# Patient Record
Sex: MALE | Race: WHITE | Employment: OTHER | URBAN - METROPOLITAN AREA
[De-identification: names, ages, dates, MRNs, and addresses within clinical notes are randomized per-mention and may not be internally consistent; named-entity substitution may affect disease eponyms.]

---

## 2018-03-11 ENCOUNTER — NURSE TRIAGE (OUTPATIENT)
Dept: NURSING | Facility: CLINIC | Age: 68
End: 2018-03-11

## 2018-03-11 NOTE — TELEPHONE ENCOUNTER
"  Reason for Disposition    [1] Redness spreading into foot or red streak into foot AND [2] no fever     Wife calling\" My  was in the snow last Wed. 3/7 helping a car that was stuck. The he noticed his big toe on the right foot was red. But it didn't look like frost bite(more like gout). Now it's red, swollen and painful.\" Denies fever, but the whole foot looks \"like there's a red hue\" to it. Triaged and advised UC.    Additional Information    Negative: Followed a toe injury    Negative: Wound looks infected    Negative: Caused by an animal bite    Negative: Caused by frostbite    Negative: Caused by an ingrown toenail    Negative: Athlete's Foot suspected (i.e., itchy red rash in web space between toes)    Negative: Foot pain is main symptom    Negative: Foot is cool or blue in comparison to other foot    Negative: Purple or black skin on toe  (Exception: simple recalled injury with bruise)    Negative: [1] Looks infected (e.g., spreading redness, red streak, pus) AND [2] fever    Negative: Patient sounds very sick or weak to the triager    Negative: [1] SEVERE pain (e.g., excruciating, unable to do any normal activities) AND [2] not improved after 2 hours of pain medicine    Protocols used: TOE PAIN-ADULT-    "

## 2020-10-10 ENCOUNTER — NURSE TRIAGE (OUTPATIENT)
Dept: NURSING | Facility: CLINIC | Age: 70
End: 2020-10-10

## 2020-10-10 NOTE — TELEPHONE ENCOUNTER
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  He felt a rash started to develop on his left forehead after doing this yard work  Today the top of left eye is swelling, redness and sensitive in spots   Patient wonders if he has an insect stinger in his eyebrow  Vision is fine, no fever  Gave disposition to be seen in the [facility] urgent care today  Patient was agreeable and will go this morning  Rae Dow RN  [facility] Nurse Advisor          Additional Information    Negative: [1] SEVERE eyelid swelling (i.e., shut or almost) AND [2] fever    Negative: [1] Eyelid (outer) is very red AND [2] fever    MODERATE-SEVERE eyelid swelling on one side  (Exception: due to a mosquito bite)    Negative: Patient sounds very sick or weak to the triager    Negative: [1] Pregnant > 20 weeks AND [2] sudden weight gain (i.e., more than 3 lbs or 1.4 kg in one week)    Negative: [1] SEVERE eyelid swelling (i.e., shut or almost) AND [2] involves both eyes      (Exception: itchy eyes, which  are probably an allergic reaction)    Negative: [1] SEVERE eyelid swelling on one side AND [2] red and painful (or tender to touch)    Negative: [1] SEVERE eyelid swelling on one side AND [2] sinus pain or pressure    Negative: [1] MILD swelling AND [2] fever    Protocols used: EYE - SWELLING-A-AH

## 2021-07-12 ENCOUNTER — APPOINTMENT (OUTPATIENT)
Dept: CARDIOLOGY | Facility: CLINIC | Age: 71
DRG: 551 | End: 2021-07-12
Attending: INTERNAL MEDICINE
Payer: COMMERCIAL

## 2021-07-12 ENCOUNTER — APPOINTMENT (OUTPATIENT)
Dept: MRI IMAGING | Facility: CLINIC | Age: 71
DRG: 551 | End: 2021-07-12
Attending: HOSPITALIST
Payer: COMMERCIAL

## 2021-07-12 ENCOUNTER — HOSPITAL ENCOUNTER (INPATIENT)
Facility: CLINIC | Age: 71
LOS: 2 days | Discharge: ANOTHER HOSPITAL | DRG: 551 | End: 2021-07-14
Attending: EMERGENCY MEDICINE | Admitting: INTERNAL MEDICINE
Payer: COMMERCIAL

## 2021-07-12 ENCOUNTER — APPOINTMENT (OUTPATIENT)
Dept: CT IMAGING | Facility: CLINIC | Age: 71
DRG: 551 | End: 2021-07-12
Attending: EMERGENCY MEDICINE
Payer: COMMERCIAL

## 2021-07-12 ENCOUNTER — APPOINTMENT (OUTPATIENT)
Dept: GENERAL RADIOLOGY | Facility: CLINIC | Age: 71
DRG: 551 | End: 2021-07-12
Attending: EMERGENCY MEDICINE
Payer: COMMERCIAL

## 2021-07-12 DIAGNOSIS — R29.898 BILATERAL ARM WEAKNESS: ICD-10-CM

## 2021-07-12 DIAGNOSIS — J18.9 PNEUMONIA DUE TO INFECTIOUS ORGANISM, UNSPECIFIED LATERALITY, UNSPECIFIED PART OF LUNG: ICD-10-CM

## 2021-07-12 DIAGNOSIS — R60.0 LOWER EXTREMITY EDEMA: ICD-10-CM

## 2021-07-12 DIAGNOSIS — R09.02 HYPOXIA: ICD-10-CM

## 2021-07-12 DIAGNOSIS — R06.02 SHORTNESS OF BREATH: ICD-10-CM

## 2021-07-12 DIAGNOSIS — M48.02 CERVICAL STENOSIS OF SPINAL CANAL: Primary | ICD-10-CM

## 2021-07-12 LAB
ABO/RH(D): NORMAL
ALBUMIN SERPL-MCNC: 3.4 G/DL (ref 3.4–5)
ALBUMIN UR-MCNC: 50 MG/DL
ALP SERPL-CCNC: 89 U/L (ref 40–150)
ALT SERPL W P-5'-P-CCNC: 30 U/L (ref 0–70)
ANION GAP SERPL CALCULATED.3IONS-SCNC: 3 MMOL/L (ref 3–14)
ANTIBODY SCREEN: NEGATIVE
APPEARANCE UR: CLEAR
AST SERPL W P-5'-P-CCNC: 33 U/L (ref 0–45)
BASOPHILS # BLD AUTO: 0.1 10E3/UL (ref 0–0.2)
BASOPHILS NFR BLD AUTO: 1 %
BILIRUB SERPL-MCNC: 0.4 MG/DL (ref 0.2–1.3)
BILIRUB UR QL STRIP: NEGATIVE
BUN SERPL-MCNC: 22 MG/DL (ref 7–30)
CALCIUM SERPL-MCNC: 8.9 MG/DL (ref 8.5–10.1)
CHLORIDE BLD-SCNC: 114 MMOL/L (ref 94–109)
CK SERPL-CCNC: 168 U/L (ref 30–300)
CO2 SERPL-SCNC: 25 MMOL/L (ref 20–32)
COLOR UR AUTO: ABNORMAL
CREAT BLD-MCNC: 1.5 MG/DL (ref 0.7–1.3)
CREAT SERPL-MCNC: 1.37 MG/DL (ref 0.66–1.25)
EOSINOPHIL # BLD AUTO: 0.1 10E3/UL (ref 0–0.7)
EOSINOPHIL NFR BLD AUTO: 1 %
ERYTHROCYTE [DISTWIDTH] IN BLOOD BY AUTOMATED COUNT: 11.8 % (ref 10–15)
GFR SERPL CREATININE-BSD FRML MDRD: 46 ML/MIN/1.73M2
GFR SERPL CREATININE-BSD FRML MDRD: 52 ML/MIN/1.73M2
GLUCOSE BLD-MCNC: 139 MG/DL (ref 70–99)
GLUCOSE UR STRIP-MCNC: NEGATIVE MG/DL
HCO3 BLDV-SCNC: 26 MMOL/L (ref 21–28)
HCT VFR BLD AUTO: 43.1 % (ref 40–53)
HGB BLD-MCNC: 14.7 G/DL (ref 13.3–17.7)
HGB UR QL STRIP: ABNORMAL
HOLD SPECIMEN: NORMAL
IMM GRANULOCYTES # BLD: 0.1 10E3/UL
IMM GRANULOCYTES NFR BLD: 1 %
INTERPRETATION ECG - MUSE: NORMAL
KETONES UR STRIP-MCNC: NEGATIVE MG/DL
LACTATE BLD-SCNC: 0.6 MMOL/L
LEUKOCYTE ESTERASE UR QL STRIP: NEGATIVE
LIPASE SERPL-CCNC: 133 U/L (ref 73–393)
LVEF ECHO: NORMAL
LYMPHOCYTES # BLD AUTO: 1.4 10E3/UL (ref 0.8–5.3)
LYMPHOCYTES NFR BLD AUTO: 19 %
MAGNESIUM SERPL-MCNC: 2.3 MG/DL (ref 1.6–2.3)
MCH RBC QN AUTO: 34.2 PG (ref 26.5–33)
MCHC RBC AUTO-ENTMCNC: 34.1 G/DL (ref 31.5–36.5)
MCV RBC AUTO: 100 FL (ref 78–100)
MONOCYTES # BLD AUTO: 0.3 10E3/UL (ref 0–1.3)
MONOCYTES NFR BLD AUTO: 4 %
MUCOUS THREADS #/AREA URNS LPF: PRESENT /LPF
NEUTROPHILS # BLD AUTO: 5.2 10E3/UL (ref 1.6–8.3)
NEUTROPHILS NFR BLD AUTO: 74 %
NITRATE UR QL: NEGATIVE
NRBC # BLD AUTO: 0 10E3/UL
NRBC BLD AUTO-RTO: 0 /100
NT-PROBNP SERPL-MCNC: 71 PG/ML (ref 0–900)
PCO2 BLDV: 58 MM HG (ref 40–50)
PH BLDV: 7.26 [PH] (ref 7.32–7.43)
PH UR STRIP: 5.5 [PH] (ref 5–7)
PLATELET # BLD AUTO: 102 10E3/UL (ref 150–450)
PO2 BLDV: 74 MM HG (ref 25–47)
POTASSIUM BLD-SCNC: 4.4 MMOL/L (ref 3.4–5.3)
PROT SERPL-MCNC: 7.6 G/DL (ref 6.8–8.8)
RBC # BLD AUTO: 4.3 10E6/UL (ref 4.4–5.9)
RBC URINE: 9 /HPF
SAO2 % BLDV: 92 % (ref 94–100)
SARS-COV-2 RNA RESP QL NAA+PROBE: NEGATIVE
SODIUM SERPL-SCNC: 142 MMOL/L (ref 133–144)
SP GR UR STRIP: 1.02 (ref 1–1.03)
SPECIMEN EXPIRATION DATE: NORMAL
SQUAMOUS EPITHELIAL: <1 /HPF
TROPONIN I SERPL-MCNC: <0.015 UG/L (ref 0–0.04)
TSH SERPL DL<=0.005 MIU/L-ACNC: 2.68 MU/L (ref 0.4–4)
UROBILINOGEN UR STRIP-MCNC: NORMAL MG/DL
WBC # BLD AUTO: 7.1 10E3/UL (ref 4–11)
WBC URINE: 0 /HPF

## 2021-07-12 PROCEDURE — 96375 TX/PRO/DX INJ NEW DRUG ADDON: CPT

## 2021-07-12 PROCEDURE — C9803 HOPD COVID-19 SPEC COLLECT: HCPCS

## 2021-07-12 PROCEDURE — 84443 ASSAY THYROID STIM HORMONE: CPT | Performed by: EMERGENCY MEDICINE

## 2021-07-12 PROCEDURE — 96361 HYDRATE IV INFUSION ADD-ON: CPT

## 2021-07-12 PROCEDURE — 84484 ASSAY OF TROPONIN QUANT: CPT | Performed by: EMERGENCY MEDICINE

## 2021-07-12 PROCEDURE — A9585 GADOBUTROL INJECTION: HCPCS | Performed by: INTERNAL MEDICINE

## 2021-07-12 PROCEDURE — 93306 TTE W/DOPPLER COMPLETE: CPT | Mod: 26 | Performed by: INTERNAL MEDICINE

## 2021-07-12 PROCEDURE — 96360 HYDRATION IV INFUSION INIT: CPT | Mod: 59

## 2021-07-12 PROCEDURE — 96365 THER/PROPH/DIAG IV INF INIT: CPT | Mod: 59

## 2021-07-12 PROCEDURE — 70553 MRI BRAIN STEM W/O & W/DYE: CPT

## 2021-07-12 PROCEDURE — 71275 CT ANGIOGRAPHY CHEST: CPT

## 2021-07-12 PROCEDURE — 255N000002 HC RX 255 OP 636: Performed by: INTERNAL MEDICINE

## 2021-07-12 PROCEDURE — 258N000003 HC RX IP 258 OP 636: Performed by: EMERGENCY MEDICINE

## 2021-07-12 PROCEDURE — 82565 ASSAY OF CREATININE: CPT

## 2021-07-12 PROCEDURE — 99291 CRITICAL CARE FIRST HOUR: CPT | Mod: 25

## 2021-07-12 PROCEDURE — 250N000011 HC RX IP 250 OP 636: Performed by: INTERNAL MEDICINE

## 2021-07-12 PROCEDURE — 258N000003 HC RX IP 258 OP 636: Performed by: PSYCHIATRY & NEUROLOGY

## 2021-07-12 PROCEDURE — 72156 MRI NECK SPINE W/O & W/DYE: CPT

## 2021-07-12 PROCEDURE — 99223 1ST HOSP IP/OBS HIGH 75: CPT | Mod: AI | Performed by: INTERNAL MEDICINE

## 2021-07-12 PROCEDURE — 72125 CT NECK SPINE W/O DYE: CPT

## 2021-07-12 PROCEDURE — 250N000011 HC RX IP 250 OP 636: Performed by: EMERGENCY MEDICINE

## 2021-07-12 PROCEDURE — 71045 X-RAY EXAM CHEST 1 VIEW: CPT

## 2021-07-12 PROCEDURE — 70450 CT HEAD/BRAIN W/O DYE: CPT

## 2021-07-12 PROCEDURE — 83880 ASSAY OF NATRIURETIC PEPTIDE: CPT | Performed by: EMERGENCY MEDICINE

## 2021-07-12 PROCEDURE — 250N000011 HC RX IP 250 OP 636: Performed by: PSYCHIATRY & NEUROLOGY

## 2021-07-12 PROCEDURE — 258N000003 HC RX IP 258 OP 636: Performed by: INTERNAL MEDICINE

## 2021-07-12 PROCEDURE — 93005 ELECTROCARDIOGRAM TRACING: CPT

## 2021-07-12 PROCEDURE — 86900 BLOOD TYPING SEROLOGIC ABO: CPT | Performed by: EMERGENCY MEDICINE

## 2021-07-12 PROCEDURE — 120N000001 HC R&B MED SURG/OB

## 2021-07-12 PROCEDURE — 99292 CRITICAL CARE ADDL 30 MIN: CPT

## 2021-07-12 PROCEDURE — 83735 ASSAY OF MAGNESIUM: CPT | Performed by: EMERGENCY MEDICINE

## 2021-07-12 PROCEDURE — 87635 SARS-COV-2 COVID-19 AMP PRB: CPT | Performed by: EMERGENCY MEDICINE

## 2021-07-12 PROCEDURE — 82550 ASSAY OF CK (CPK): CPT | Performed by: EMERGENCY MEDICINE

## 2021-07-12 PROCEDURE — 82803 BLOOD GASES ANY COMBINATION: CPT

## 2021-07-12 PROCEDURE — 81003 URINALYSIS AUTO W/O SCOPE: CPT | Performed by: EMERGENCY MEDICINE

## 2021-07-12 PROCEDURE — 36592 COLLECT BLOOD FROM PICC: CPT | Performed by: EMERGENCY MEDICINE

## 2021-07-12 PROCEDURE — 80053 COMPREHEN METABOLIC PANEL: CPT | Performed by: EMERGENCY MEDICINE

## 2021-07-12 PROCEDURE — 83690 ASSAY OF LIPASE: CPT | Performed by: EMERGENCY MEDICINE

## 2021-07-12 PROCEDURE — 85025 COMPLETE CBC W/AUTO DIFF WBC: CPT | Performed by: EMERGENCY MEDICINE

## 2021-07-12 PROCEDURE — G0463 HOSPITAL OUTPT CLINIC VISIT: HCPCS | Performed by: PHYSICIAN ASSISTANT

## 2021-07-12 PROCEDURE — 87040 BLOOD CULTURE FOR BACTERIA: CPT | Performed by: EMERGENCY MEDICINE

## 2021-07-12 PROCEDURE — 999N000208 ECHOCARDIOGRAM COMPLETE

## 2021-07-12 PROCEDURE — 74176 CT ABD & PELVIS W/O CONTRAST: CPT

## 2021-07-12 RX ORDER — ONDANSETRON 4 MG/1
4 TABLET, ORALLY DISINTEGRATING ORAL EVERY 6 HOURS PRN
Status: DISCONTINUED | OUTPATIENT
Start: 2021-07-12 | End: 2021-07-14 | Stop reason: HOSPADM

## 2021-07-12 RX ORDER — ACETAMINOPHEN 325 MG/1
650 TABLET ORAL EVERY 6 HOURS PRN
Status: DISCONTINUED | OUTPATIENT
Start: 2021-07-12 | End: 2021-07-14 | Stop reason: HOSPADM

## 2021-07-12 RX ORDER — AMOXICILLIN 250 MG
1 CAPSULE ORAL 2 TIMES DAILY PRN
Status: DISCONTINUED | OUTPATIENT
Start: 2021-07-12 | End: 2021-07-14 | Stop reason: HOSPADM

## 2021-07-12 RX ORDER — CEFTRIAXONE 2 G/1
2 INJECTION, POWDER, FOR SOLUTION INTRAMUSCULAR; INTRAVENOUS EVERY 24 HOURS
Status: DISCONTINUED | OUTPATIENT
Start: 2021-07-12 | End: 2021-07-14 | Stop reason: HOSPADM

## 2021-07-12 RX ORDER — GADOBUTROL 604.72 MG/ML
15 INJECTION INTRAVENOUS ONCE
Status: COMPLETED | OUTPATIENT
Start: 2021-07-12 | End: 2021-07-12

## 2021-07-12 RX ORDER — AZITHROMYCIN 500 MG/5ML
500 INJECTION, POWDER, LYOPHILIZED, FOR SOLUTION INTRAVENOUS ONCE
Status: COMPLETED | OUTPATIENT
Start: 2021-07-12 | End: 2021-07-12

## 2021-07-12 RX ORDER — ONDANSETRON 2 MG/ML
4 INJECTION INTRAMUSCULAR; INTRAVENOUS EVERY 30 MIN PRN
Status: DISCONTINUED | OUTPATIENT
Start: 2021-07-12 | End: 2021-07-12

## 2021-07-12 RX ORDER — ONDANSETRON 2 MG/ML
4 INJECTION INTRAMUSCULAR; INTRAVENOUS EVERY 6 HOURS PRN
Status: DISCONTINUED | OUTPATIENT
Start: 2021-07-12 | End: 2021-07-14 | Stop reason: HOSPADM

## 2021-07-12 RX ORDER — AMOXICILLIN 250 MG
2 CAPSULE ORAL 2 TIMES DAILY PRN
Status: DISCONTINUED | OUTPATIENT
Start: 2021-07-12 | End: 2021-07-14 | Stop reason: HOSPADM

## 2021-07-12 RX ORDER — AZITHROMYCIN 250 MG/1
250 TABLET, FILM COATED ORAL DAILY
Status: DISCONTINUED | OUTPATIENT
Start: 2021-07-13 | End: 2021-07-14 | Stop reason: HOSPADM

## 2021-07-12 RX ORDER — ACETAMINOPHEN 650 MG/1
650 SUPPOSITORY RECTAL EVERY 6 HOURS PRN
Status: DISCONTINUED | OUTPATIENT
Start: 2021-07-12 | End: 2021-07-14 | Stop reason: HOSPADM

## 2021-07-12 RX ORDER — LIDOCAINE 40 MG/G
CREAM TOPICAL
Status: DISCONTINUED | OUTPATIENT
Start: 2021-07-12 | End: 2021-07-14 | Stop reason: HOSPADM

## 2021-07-12 RX ORDER — IOPAMIDOL 755 MG/ML
500 INJECTION, SOLUTION INTRAVASCULAR ONCE
Status: COMPLETED | OUTPATIENT
Start: 2021-07-12 | End: 2021-07-12

## 2021-07-12 RX ADMIN — AZITHROMYCIN MONOHYDRATE 500 MG: 500 INJECTION, POWDER, LYOPHILIZED, FOR SOLUTION INTRAVENOUS at 06:55

## 2021-07-12 RX ADMIN — CEFTRIAXONE 2 G: 2 INJECTION, POWDER, FOR SOLUTION INTRAMUSCULAR; INTRAVENOUS at 08:55

## 2021-07-12 RX ADMIN — IOPAMIDOL 90 ML: 755 INJECTION, SOLUTION INTRAVENOUS at 00:48

## 2021-07-12 RX ADMIN — TAZOBACTAM SODIUM AND PIPERACILLIN SODIUM 4.5 G: 500; 4 INJECTION, SOLUTION INTRAVENOUS at 02:02

## 2021-07-12 RX ADMIN — GADOBUTROL 12 ML: 604.72 INJECTION INTRAVENOUS at 17:23

## 2021-07-12 RX ADMIN — HUMAN ALBUMIN MICROSPHERES AND PERFLUTREN 3 ML: 10; .22 INJECTION, SOLUTION INTRAVENOUS at 14:22

## 2021-07-12 RX ADMIN — SODIUM CHLORIDE 86 ML: 9 INJECTION, SOLUTION INTRAVENOUS at 00:48

## 2021-07-12 RX ADMIN — SODIUM CHLORIDE 500 MG: 9 INJECTION, SOLUTION INTRAVENOUS at 20:46

## 2021-07-12 RX ADMIN — ONDANSETRON 4 MG: 2 INJECTION INTRAMUSCULAR; INTRAVENOUS at 02:01

## 2021-07-12 ASSESSMENT — ACTIVITIES OF DAILY LIVING (ADL)
PATIENT_/_FAMILY_COMMUNICATION_STYLE: SPOKEN LANGUAGE (ENGLISH OR BILINGUAL)
ADLS_ACUITY_SCORE: 21
ADLS_ACUITY_SCORE: 22
ADLS_ACUITY_SCORE: 22
HEARING_DIFFICULTY_OR_DEAF: NO
ADLS_ACUITY_SCORE: 21

## 2021-07-12 ASSESSMENT — MIFFLIN-ST. JEOR: SCORE: 1907.49

## 2021-07-12 ASSESSMENT — ENCOUNTER SYMPTOMS
WEAKNESS: 1
ARTHRALGIAS: 1
ABDOMINAL PAIN: 0
CONFUSION: 1
COUGH: 0
DIAPHORESIS: 1
FEVER: 0
SHORTNESS OF BREATH: 1
NUMBNESS: 1

## 2021-07-12 NOTE — CONSULTS
"[facility]    Neurosurgery Consultation     Date of Admission:  7/12/2021  Date of Consult (When I saw the patient): 07/12/21    Assessment & Plan   Rigo Johns is a 70 year old male with history of C5-7 anterior fusion in 1993 in [place] presented to ED on 7/12/21 with acute onset of neck pain and BL shoulder pain with associated BUE weakness and LLE weakness.    PLAN  Obtain c spine MRI w/o contrast. Scheduled for 1430.  Determine plans to follow    I have discussed the following assessment and plan with Dr. Hanley who is in agreement with the initial plan and will follow up with further consultation recommendations.    Code Status    Full Code    Reason for Consult   Reason for consult: I was asked by Dr. Hammond to evaluate this patient for neck pain and BUE weakness.    Primary Care Physician   [facility]    Chief Complaint   BUE weakness    History is obtained from the patient    History of Present Illness   Rigo Johns is a 70 year old male with history of C5-7 fusion in 1993 in [place] presented to ED on 7/12/21 with acute onset of neck pain and BL shoulder pain with associated BUE weakness and LLE weakness. Rigo is historian and reliable. Rigo was sitting at his desk with neck hyperflexed looking at paper work for approximately 2 hours when he noted acute neck pain that radiated into right shoulder. After several hours, he noted pain radiated into both shoulders and deltoids and had weakness in both arms. Currently, pain is still in neck and bilateral shoulders. Weakness initially was primarily in right arm and is now mostly in LUE. He admits he can no longer lift his left arm off the bed. He admits to \"tingles\" in his 3rd-5th digits in his right hand. Denies tingles, numbness in his left upper extremity. Admits to slight weakness of his left leg as well. Denies paresthesias in legs, saddle anesthesia, bowel/bladder changes. No known " trauma or fall prior to pain and weakness starting. No known infectious s/s.     EXAM: CT CERVICAL SPINE W/O CONTRAST  LOCATION: [facility]  DATE/TIME: 7/12/2021 2:27 AM     INDICATION: Cervical radiculopathy, prior C-spine surgery  Cervical radiculopathy, infection suspected  COMPARISON: None.  TECHNIQUE: Routine CT Cervical Spine without IV contrast. Multiplanar reformats. Dose reduction techniques were used.     FINDINGS:  VERTEBRA: Straightening of usual cervical lordosis. There is solid fusion of C5-C7. No fracture or posttraumatic subluxation.      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 Moderate foraminal narrowing bilaterally at C3-4, on the right C4-5, bilaterally at C5-6. There is at least moderate central canal stenosis at C4-5 and C3-4.     PARASPINAL: No extraspinal abnormality.                                                                      IMPRESSION:  1.  No evidence for acute fracture or subluxation.  2.  Straightening of the usual cervical lordosis with solid fusion from C5 through C7.  3.  The spinal canal is narrow on a congenital basis pedicles and there is at least moderate central canal stenosis at C3-4 and C4-5.  4.  Multilevel neural foraminal narrowing as described.    Past Medical History   I have reviewed this patient's medical history and updated it with pertinent information if needed.   No past medical history on file.    Past Surgical History   I have reviewed this patient's surgical history and updated it with pertinent information if needed.  No past surgical history on file.    Prior to Admission Medications   Prior to Admission Medications   Prescriptions Last Dose Informant Patient Reported? Taking?   UNKNOWN TO PATIENT   Yes Yes   Sig: Patient takes some unknown supplements-will try to clarify.      Facility-Administered Medications: None     Allergies   Allergies   Allergen Reactions     Shrimp GI Disturbance  "      Social History   I have reviewed this patient's social history and updated it with pertinent information if needed. Rigo Johns      Family History   I have reviewed this patient's family history and updated it with pertinent information if needed.   No family history on file.    Review of Systems    ROS: 10 point ROS neg other than the symptoms noted above in the HPI.    Physical Exam   Temp: 97.8  F (36.6  C) Temp src: Oral BP: 135/70 Pulse: 87   Resp: 18 SpO2: 97 % O2 Device: None (Room air) Oxygen Delivery: 1 LPM  Vital Signs with Ranges  Temp:  [97.5  F (36.4  C)-97.8  F (36.6  C)] 97.8  F (36.6  C)  Pulse:  [57-87] 87  Resp:  [0-28] 18  BP: (131-162)/(70-94) 135/70  SpO2:  [93 %-98 %] 97 %  249 lbs 3.19 oz     , Blood pressure 135/70, pulse 87, temperature 97.8  F (36.6  C), temperature source Oral, resp. rate 18, height 5' 11\" (1.803 m), weight 249 lb 3.2 oz (113 kg), SpO2 97 %.  249 lbs 3.19 oz    NEUROLOGICAL EXAMINATION:   Awake, alert, appropriate, NAD  PERRL  Full EOMs  Symmetric facies  Tongue midline   +TTP paraspinous lower cervical region   No midline TTP along cervical spine     RUE shoulder flexion 3/5, triceps 4/5, biceps 3/5, wrist 4/5, grasp 4/5  LUE shoulder flexion 2/5, triceps 2/5, biceps 2/5, wrist 4/5, grasp 4/5    RLE 5/5 throughout   LLE hip 3/5, knee 3/5, PF/DF 5/5    Data   All new lab and imaging data was personally reviewed by me.  CT:  EXAM: CT CERVICAL SPINE W/O CONTRAST  LOCATION: [facility]  DATE/TIME: 7/12/2021 2:27 AM     INDICATION: Cervical radiculopathy, prior C-spine surgery  Cervical radiculopathy, infection suspected  COMPARISON: None.  TECHNIQUE: Routine CT Cervical Spine without IV contrast. Multiplanar reformats. Dose reduction techniques were used.     FINDINGS:  VERTEBRA: Straightening of usual cervical lordosis. There is solid fusion of C5-C7. No fracture or posttraumatic subluxation.      CANAL/FORAMINA: The spinal canal is narrow on a " congenital basis due to short pedicles. Moderate foraminal narrowing bilaterally at C3-4, on the right C4-5, bilaterally at C5-6. There is at least moderate central canal stenosis at C4-5 and C3-4.     PARASPINAL: No extraspinal abnormality.                                                                      IMPRESSION:  1.  No evidence for acute fracture or subluxation.  2.  Straightening of the usual cervical lordosis with solid fusion from C5 through C7.  3.  The spinal canal is narrow on a congenital basis pedicles and there is at least moderate central canal stenosis at C3-4 and C4-5.  4.  Multilevel neural foraminal narrowing as described.  CBC RESULTS:   Recent Labs   Lab Test 07/12/21  0025   WBC 7.1   RBC 4.30*   HGB 14.7   HCT 43.1      MCH 34.2*   MCHC 34.1   RDW 11.8   *     Basic Metabolic Panel:  Lab Results   Component Value Date     07/12/2021      Lab Results   Component Value Date    POTASSIUM 4.4 07/12/2021     Lab Results   Component Value Date    CHLORIDE 114 07/12/2021     Lab Results   Component Value Date    GARRISON 8.9 07/12/2021     Lab Results   Component Value Date    CO2 25 07/12/2021     Lab Results   Component Value Date    BUN 22 07/12/2021     Lab Results   Component Value Date    CR 1.5 07/12/2021    CR 1.37 07/12/2021     Lab Results   Component Value Date     07/12/2021     INR:  No results found for: INR

## 2021-07-12 NOTE — ED NOTES
Straight cath performed w/ 14f coude for UA. Pt tolerated well. Was difficult cath w/ straight cath. 330cc of clear, yellow urine out.

## 2021-07-12 NOTE — H&P
[facility]  History and Physical   Hospitalist Service    Joel Beatty MD    Rigo Johns MRN# [id number]   YOB: 1950 Age: 70 year old      Date of Admission:  7/12/2021           Assessment and Plan:   Summary:  Rigo Johns is a 70-year-old male with history of obstructive sleep apnea for which he uses CPAP and C6-7 bulging disc for which he had surgery.  He does not really go to the doctor much.  He does not take any medications.  He presented to the emergency department early this morning for evaluation of shortness of breath.  Yesterday afternoon at about 4 PM he developed right neck and shoulder pain.  That lasted for couple of hours.  He had some associated bilateral arm weakness.  At about 11 PM he developed shortness of breath.  He called paramedics.  When they arrived he had oxygen saturations of 59%.  He was placed on 12 L and brought to the emergency department.  He reports some recent bilateral ankle swelling.  He had persistent weakness in his arms upon arrival here.  He denied chest pain, abdominal pain, fever, chills, and cough.  He was able to wean down on oxygen but not completely off of it.  His shortness of breath improved.  Weakness in his right arm improved some but not back to normal.  His left arm remains weak.  Emergency department evaluation showed hypoxia and elevated blood pressure.  Laboratory evaluation showed thrombocytopenia with platelets of 62.  Creatinine was 1.37 but basic metabolic panel was otherwise unremarkable.  Liver function tests, urine analysis, BNP, lactic acid, and COVID-19 testing were negative.  Venous blood gas showed hypoxia.  Urinalysis was unremarkable.  Chest x-ray showed no acute process.  CT of abdomen and pelvis showed bibasilar atelectasis or infiltrate.  It also showed nodular liver abnormality.  In the right lower pole of the kidney there was a lesion.  Follow-up ultrasound at some point was recommended.  CT of chest  was obtained and showed no pulmonary embolism.  It also showed by basilar infiltrates.  CT of head was unremarkable.  CT of C-spine showed central stenosis at C3-4 and C4-5.  Stroke neurology was called because of this weakness.  They did not think this was a stroke and recommended general neurology consult.  Rigo was given Zosyn for possible pneumonia.  I was asked to admit him to the hospital for further evaluation.    Problem list:    Improved right neck and shoulder pain  Bilateral upper extremity weakness, left greater than right  -The cause of these presenting symptoms is not clear.  Consider due to cervical stenosis.  I have ordered MRI of brain and MRI of C-spine.  Have requested general neurology consult    Acute hypoxic respiratory failure of unclear cause, improving  Possible bibasilar pneumonia  Rule out congestive heart failure  -Wean supplemental oxygen as able  -For possible pneumonia with Rocephin and Zithromax  -With recent pedal edema will evaluate for congestive heart failure with echocardiogram    Nodular liver changes  -This is worrisome for early cirrhosis  -Abdominal ultrasound at some point would be appropriate    Right lower pole kidney lesion  -Ultrasound at some point to evaluate for complex cyst versus solid tumor    Thrombocytopenia  -Repeat CBC with platelets; if persisting consider peripheral blood smear    Obstructive sleep apnea  -CPAP with sleep    CODE STATUS: Full code  DVT prophylaxis: Pneumoboots  Disposition: Admit as inpatient           Code Status:   Full Code         Primary Care Physician:   [facility], [facility]- [phone]         Chief Complaint:   Right neck and shoulder pain, shortness of breath, bilateral arm weakness    History is obtained from Dr. Michele Sierra, and the medical record         History of Present Illness:   Rigo Johns is a 70-year-old male with history of obstructive sleep apnea for which he uses CPAP and C6-7  bulging disc for which he had surgery.  He does not really go to the doctor much.  He does not take any medications.  He presented to the emergency department early this morning for evaluation of shortness of breath.  Yesterday afternoon at about 4 PM he developed right neck and shoulder pain.  That lasted for couple of hours.  He had some associated bilateral arm weakness.  At about 11 PM he developed shortness of breath.  He called paramedics.  When they arrived he had oxygen saturations of 59%.  He was placed on 12 L and brought to the emergency department.  He reports some recent bilateral ankle swelling.  He had persistent weakness in his arms upon arrival here.  He denied chest pain, abdominal pain, fever, chills, and cough.  He was able to wean down on oxygen but not completely off of it.  His shortness of breath improved.  Weakness in his right arm improved some but not back to normal.  His left arm remains weak.  Emergency department evaluation showed hypoxia and elevated blood pressure.  Laboratory evaluation showed thrombocytopenia with platelets of 62.  Creatinine was 1.37 but basic metabolic panel was otherwise unremarkable.  Liver function tests, urine analysis, BNP, lactic acid, and COVID-19 testing were negative.  Venous blood gas showed hypoxia.  Urinalysis was unremarkable.  Chest x-ray showed no acute process.  CT of abdomen and pelvis showed bibasilar atelectasis or infiltrate.  It also showed nodular liver abnormality.  In the right lower pole of the kidney there was a lesion.  Follow-up ultrasound at some point was recommended.  CT of chest was obtained and showed no pulmonary embolism.  It also showed by basilar infiltrates.  CT of head was unremarkable.  CT of C-spine showed central stenosis at C3-4 and C4-5.  Stroke neurology was called because of this weakness.  They did not think this was a stroke and recommended general neurology consult.  Rigo was given Zosyn for possible pneumonia.   I was asked to admit him to the hospital for further evaluation.           Past Medical History:     Patient Active Problem List   Diagnosis     Shortness of breath     Hypoxia     Bilateral arm weakness      Active sleep apnea for which he uses CPAP          Past Surgical History:   Discectomy at C6-7 for bulging disc         Home Medications:   None         Allergies:     Allergies   Allergen Reactions     Shrimp GI Disturbance            Social History:     Does not use tobacco  Does not drink alcohol         Family History:   Denies family medical history           Review of Systems:   The 10 point Review of Systems is negative other than as noted in the HPI.           Physical Exam:   Blood pressure (!) 141/78, pulse 74, temperature 97.5  F (36.4  C), temperature source Oral, resp. rate 18, weight 111.1 kg (245 lb), SpO2 98 %.  245 lbs 0 oz      GENERAL: Pleasant and cooperative. No acute distress.  EYES: Pupils equal and round. No scleral erythema or icterus.  ENT: External ears are normal without deformity. Posterior oropharynx is without erythem, swelling, or exudate.  NECK: Supple. No masses or swelling. No tenderness. Thyroid is normal without mass or tenderness.  CHEST: Clear to auscultation. Normal breath sounds. No retractions.   CV: Regular rate and rhythm. No JVD. Pulses normal.  ABDOMEN: Bowel sounds present. No tenderness. No masses or hernia.  EXTREMETIES: No clubbing, cyanosis, or ischemia.  SKIN: Warm and dry to touch. No wounds or rashes.  NEUROLOGIC: Strength is diminished in both arms- left> right. ensation is normal. Deep tendon reflexes are normal. Cranial nerves are normal.             Data:   All new lab and imaging data was reviewed.     Results for orders placed or performed during the hospital encounter of 07/12/21 (from the past 24 hour(s))   EKG 12 lead   Result Value Ref Range    Interpretation ECG Click View Image link to view waveform and result    Extra Tube ([place] Draw)     Narrative    The following orders were created for panel order Extra Tube ([place] Draw).  Procedure                               Abnormality         Status                     ---------                               -----------         ------                     Extra Blood Culture Bottle[[id number]]                                                  Extra Blue Top Tube[[id number]]                              Final result               Extra Red Top Tube[[id number]]                               Final result               Extra Green Top (L[place]...[[id number]]                      Final result               Extra Green Top (Lithium...[[id number]]                                                 Extra Purple Top Tube[[id number]]                            Final result               Extra Blood Bank Purple ...[[id number]]                      In process                   Please view results for these tests on the individual orders.   Extra Blue Top Tube   Result Value Ref Range    Hold Specimen JIC    Extra Red Top Tube   Result Value Ref Range    Hold Specimen JIC    Extra Green Top ([place]ium Heparin) Tube   Result Value Ref Range    Hold Specimen JIC    Extra Purple Top Tube   Result Value Ref Range    Hold Specimen DONE    ABO/Rh type and screen    Narrative    The following orders were created for panel order ABO/Rh type and screen.  Procedure                               Abnormality         Status                     ---------                               -----------         ------                     Adult Type and Screen[[id number]]                            Edited Result - FINAL        Please view results for these tests on the individual orders.   Comprehensive metabolic panel   Result Value Ref Range    Sodium 142 133 - 144 mmol/L    Potassium 4.4 3.4 - 5.3 mmol/L    Chloride 114 (H) 94 - 109 mmol/L    Carbon Dioxide (CO2) 25 20 - 32 mmol/L    Anion Gap 3 3 - 14 mmol/L    Urea Nitrogen 22 7 - 30 mg/dL     Creatinine 1.37 (H) 0.66 - 1.25 mg/dL    Calcium 8.9 8.5 - 10.1 mg/dL    Glucose 139 (H) 70 - 99 mg/dL    Alkaline Phosphatase 89 40 - 150 U/L    AST 33 0 - 45 U/L    ALT 30 0 - 70 U/L    Protein Total 7.6 6.8 - 8.8 g/dL    Albumin 3.4 3.4 - 5.0 g/dL    Bilirubin Total 0.4 0.2 - 1.3 mg/dL    GFR Estimate 52 (L) >60 mL/min/1.73m2   Troponin I   Result Value Ref Range    Troponin I <0.015 0.000 - 0.045 ug/L   TSH with free T4 reflex   Result Value Ref Range    TSH 2.68 0.40 - 4.00 mU/L   Magnesium   Result Value Ref Range    Magnesium 2.3 1.6 - 2.3 mg/dL   Nt probnp inpatient (BNP)   Result Value Ref Range    N terminal Pro BNP Inpatient 71 0 - 900 pg/mL   CK total   Result Value Ref Range     30 - 300 U/L   Adult Type and Screen   Result Value Ref Range    ABO/RH(D) O POS     Antibody Screen Negative Negative    SPECIMEN EXPIRATION DATE [id number]    Lipase   Result Value Ref Range    Lipase 133 73 - 393 U/L   Extra Tube ([place] Draw)    Narrative    The following orders were created for panel order Extra Tube ([place] Draw).  Procedure                               Abnormality         Status                     ---------                               -----------         ------                     Extra Blood Culture Bottle[[id number]]                                                  Extra Purple Top Tube[[id number]]                            Final result               Extra Heparinized Syringe[[id number]]                                                   Extra Green Top (Lithium...[[id number]]                      Final result                 Please view results for these tests on the individual orders.   Extra Purple Top Tube   Result Value Ref Range    Hold Specimen JIC    Extra Green Top (Lithium Heparin) ON ICE   Result Value Ref Range    Hold Specimen JIC    CBC with platelets differential    Narrative    The following orders were created for panel order CBC with platelets differential.  Procedure                                Abnormality         Status                     ---------                               -----------         ------                     CBC with platelets and d...[[id number]]  Abnormal            Final result                 Please view results for these tests on the individual orders.   iStat Gases (lactate) venous, POCT   Result Value Ref Range    Lactic Acid POCT 0.6 <=2.0 mmol/L    Bicarbonate Venous POCT 26 21 - 28 mmol/L    O2 Sat, Venous POCT 92 (L) 94 - 100 %    pCO2V Venous POCT 58 (H) 40 - 50 mm Hg    pH Venous POCT 7.26 (L) 7.32 - 7.43    pO2 Venous POCT 74 (H) 25 - 47 mm Hg   Creatinine POCT   Result Value Ref Range    Creatinine POCT 1.5 (H) 0.7 - 1.3 mg/dL    GFR, ESTIMATED 46 (L) >60 mL/min/1.73m2   CBC with platelets and differential   Result Value Ref Range    WBC Count 7.1 4.0 - 11.0 10e3/uL    RBC Count 4.30 (L) 4.40 - 5.90 10e6/uL    Hemoglobin 14.7 13.3 - 17.7 g/dL    Hematocrit 43.1 40.0 - 53.0 %     78 - 100 fL    MCH 34.2 (H) 26.5 - 33.0 pg    MCHC 34.1 31.5 - 36.5 g/dL    RDW 11.8 10.0 - 15.0 %    Platelet Count 102 (L) 150 - 450 10e3/uL    % Neutrophils 74 %    % Lymphocytes 19 %    % Monocytes 4 %    % Eosinophils 1 %    % Basophils 1 %    % Immature Granulocytes 1 %    NRBCs per 100 WBC 0 <1 /100    Absolute Neutrophils 5.2 1.6 - 8.3 10e3/uL    Absolute Lymphocytes 1.4 0.8 - 5.3 10e3/uL    Absolute Monocytes 0.3 0.0 - 1.3 10e3/uL    Absolute Eosinophils 0.1 0.0 - 0.7 10e3/uL    Absolute Basophils 0.1 0.0 - 0.2 10e3/uL    Absolute Immature Granulocytes 0.1 (H) <=0.0 10e3/uL    Absolute NRBCs 0.0 10e3/uL   CBC with platelets differential *Canceled*    Narrative    The following orders were created for panel order CBC with platelets differential.  Procedure                               Abnormality         Status                     ---------                               -----------         ------                       Please view results for these tests on the  individual orders.   Extra Tube ([place] Draw)    Narrative    The following orders were created for panel order Extra Tube ([place] Draw).  Procedure                               Abnormality         Status                     ---------                               -----------         ------                     Extra Blood Culture Bottle[[id number]]                                                    Please view results for these tests on the individual orders.   XR Chest Port 1 View    Narrative    EXAM: XR CHEST PORT 1 VIEW  LOCATION: [facility]  DATE/TIME: 7/12/2021 12:42 AM    INDICATION: SOB  COMPARISON: None.      Impression    IMPRESSION: Heart size within normal limits. Moderately low lung volumes with bibasilar infiltrate. Upper lung zones are clear. No visible pneumothorax.   CT Head w/o Contrast    Narrative    EXAM: CT HEAD W/O CONTRAST  LOCATION: [facility]  DATE/TIME: 7/12/2021 12:45 AM    INDICATION: Neuro deficit, acute, stroke suspected  COMPARISON: None.  TECHNIQUE: Routine CT Head without IV contrast. Multiplanar reformats. Dose reduction techniques were used.    FINDINGS:  INTRACRANIAL CONTENTS: No intracranial hemorrhage, extraaxial collection, or mass effect.  No CT evidence of acute infarct. Mild presumed chronic small vessel ischemic changes. Mild generalized volume loss. No hydrocephalus.     VISUALIZED ORBITS/SINUSES/MASTOIDS: No intraorbital abnormality. No paranasal sinus mucosal disease. No middle ear or mastoid effusion.    BONES/SOFT TISSUES: No acute abnormality.      Impression    IMPRESSION:  1.  No acute intracranial process.   CT Chest Pulmonary Embolism w Contrast    Narrative    EXAM: CT CHEST PULMONARY EMBOLISM W CONTRAST  LOCATION: [facility]  DATE/TIME: 7/12/2021 12:46 AM    INDICATION: Shortness of breath.  COMPARISON: None.  TECHNIQUE: CT chest pulmonary angiogram during arterial phase injection of IV contrast. Multiplanar reformats  and MIP reconstructions were performed. Dose reduction techniques were used.   CONTRAST: 90 mL Isovue-370.    FINDINGS:  ANGIOGRAM CHEST: Pulmonary arteries are normal caliber and negative for pulmonary emboli. Thoracic aorta is negative for dissection. No CT evidence of right heart strain.    LUNGS AND PLEURA: Moderate patchy infiltrates versus atelectasis with some mild consolidation in the lower lungs greatest in the lower lobes bilaterally. While much of this may be due to atelectasis, cannot exclude pneumonitis and clinical correlation is   needed. Central airways are clear. No pleural effusions.    MEDIASTINUM/AXILLAE: A mildly prominent right hilar lymph node is probably reactive in nature. Otherwise no significant adenopathy. Mild cardiac enlargement. No pericardial effusion. Normal caliber thoracic aorta. Esophagus is grossly negative.    CORONARY ARTERY CALCIFICATION: None.    UPPER ABDOMEN: Normal.    MUSCULOSKELETAL: Hypertrophic/degenerative changes thoracic spine.      Impression    IMPRESSION:  1.  Negative for pulmonary embolism.    2.  Moderately prominent patchy opacities in both lower lungs greatest in the lower lobes. While some of this is probably due to atelectasis, there is also concern for underlying pneumonitis as well. Clinical correlation.    3.  Cardiac enlargement.    4.  No significant findings elsewhere.   Symptomatic COVID-19 Virus (Coronavirus) by PCR Nasopharyngeal    Specimen: Nasopharyngeal; Swab    Narrative    The following orders were created for panel order Symptomatic COVID-19 Virus (Coronavirus) by PCR Nasopharyngeal.  Procedure                               Abnormality         Status                     ---------                               -----------         ------                     SARS-COV2 (COVID-19) Vir...[[id number]]  Normal              Final result                 Please view results for these tests on the individual orders.   SARS-COV2 (COVID-19) Virus RT-PCR     Specimen: Nasopharyngeal; Swab   Result Value Ref Range    SARS CoV2 PCR Negative Negative    Narrative    Testing was performed using the ivonne  SARS-CoV-2 & Influenza A/B Assay on the ivonne  [employer]  System.  This test should be ordered for the detection of SARS-COV-2 in individuals who meet SARS-CoV-2 clinical and/or epidemiological criteria. Test performance is unknown in asymptomatic patients.  This test is for in vitro diagnostic use under the FDA EUA for laboratories certified under CLIA to perform moderate and/or high complexity testing. This test has not been FDA cleared or approved.  A negative test does not rule out the presence of PCR inhibitors in the specimen or target RNA in concentration below the limit of detection for the assay. The possibility of a false negative should be considered if the patient's recent exposure or clinical presentation suggests COVID-19.  [facility] are certified under the Clinical Laboratory Improvement Amendments of 1988 (CLIA-88) as qualified to perform moderate and/or high complexity laboratory testing.   UA with Microscopic reflex to Culture    Specimen: Urine, Catheter   Result Value Ref Range    Color Urine Light Yellow Colorless, Straw, Light Yellow, Yellow    Appearance Urine Clear Clear    Glucose Urine Negative Negative mg/dL    Bilirubin Urine Negative Negative    Ketones Urine Negative Negative mg/dL    Specific Gravity Urine 1.025 1.003 - 1.035    Blood Urine Small (A) Negative    pH Urine 5.5 5.0 - 7.0    Protein Albumin Urine 50  (A) Negative mg/dL    Urobilinogen Urine Normal Normal, 2.0 mg/dL    Nitrite Urine Negative Negative    Leukocyte Esterase Urine Negative Negative    Mucus Urine Present (A) None Seen /LPF    RBC Urine 9 (H) <=2 /HPF    WBC Urine 0 <=5 /HPF    Squamous Epithelials Urine <1 <=1 /HPF    Narrative    Urine Culture not indicated   Abd/pelvis CT no contrast - [place] Protocol    Narrative    EXAM: CT ABDOMEN PELVIS W/O  CONTRAST  LOCATION: [facility]  DATE/TIME: 7/12/2021 2:27 AM    INDICATION: Abdominal distension  COMPARISON: Chest CT performed on the same date.  TECHNIQUE: CT scan of the abdomen and pelvis was performed without IV contrast. Multiplanar reformats were obtained. Dose reduction techniques were used.  CONTRAST: None.    FINDINGS:   LOWER CHEST: Bibasilar atelectasis and/or infiltrate.    HEPATOBILIARY: Nondistended gallbladder. Slight nodularity of the liver surface contour inferiorly.    PANCREAS: Normal.    SPLEEN: Normal.    ADRENAL GLANDS: Normal.    KIDNEYS/BLADDER: Excreted contrast opacifies the collecting systems. There is an exophytic lesion of intermediate density arising from the right renal lower pole measuring 2.7 x 2.5 cm on image 100 of series 3. No hydronephrosis. Excreted contrast within   the urinary bladder. Bladder is unremarkable.    BOWEL: Normal caliber large and small bowel. Normal appendix. No free air.    LYMPH NODES: Normal.    VASCULATURE: Mild aortoiliac atherosclerotic calcification.    PELVIC ORGANS: Normal.    MUSCULOSKELETAL: Lumbar spine facet arthropathy. Small fat-containing supraumbilical ventral hernia.      Impression    IMPRESSION:   1.  No bowel obstruction or free air.    2.  Bibasilar atelectasis or infiltrate.    3.  Slight nodularity of the liver surface contour concerning for early changes of cirrhosis and/or fibrosis.    4.  2.7 cm exophytic focus of intermediate density arising from the right renal lower pole. Recommend follow-up ultrasound on a nonemergent basis to determine if this is a complex cyst or solid lesion.     Cervical spine CT w/o contrast    Narrative    EXAM: CT CERVICAL SPINE W/O CONTRAST  LOCATION: [facility]  DATE/TIME: 7/12/2021 2:27 AM    INDICATION: Cervical radiculopathy, prior C-spine surgery  Cervical radiculopathy, infection suspected  COMPARISON: None.  TECHNIQUE: Routine CT Cervical Spine without IV contrast.  Multiplanar reformats. Dose reduction techniques were used.    FINDINGS:  VERTEBRA: Straightening of usual cervical lordosis. There is solid fusion of C5-C7. No fracture or posttraumatic subluxation.     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 Moderate foraminal narrowing bilaterally at C3-4, on the right C4-5, bilaterally at C5-6. There is at least moderate central canal stenosis at C4-5 and C3-4.    PARASPINAL: No extraspinal abnormality.      Impression    IMPRESSION:  1.  No evidence for acute fracture or subluxation.  2.  Straightening of the usual cervical lordosis with solid fusion from C5 through C7.  3.  The spinal canal is narrow on a congenital basis pedicles and there is at least moderate central canal stenosis at C3-4 and C4-5.  4.  Multilevel neural foraminal narrowing as described.

## 2021-07-12 NOTE — PHARMACY-ADMISSION MEDICATION HISTORY
Admission medication history interview status for this patient is complete. See [facility] admission navigator for allergy information, prior to admission medications and immunization status.     Medication history interview done, indicate source(s): Patient and Family  Medication history resources (including written lists, pill bottles, clinic record):None  Ph[facility]: [facility]    Changes made to PTA medication list:  Added: all  Deleted:    Changed:      Actions taken by pharmacist (provider contacted, etc):None     Additional medication history information:None    Medication reconciliation/reorder completed by provider prior to medication history?  N   (Y/N)         Prior to Admission medications    Medication Sig Last Dose Taking? Auth Provider   Digestive Enzymes (DIGESTIVE SUPPORT PO) Unknown ingredients  Yes Unknown, Entered By History   TURMERIC PO   Yes Unknown, Entered By History   UNKNOWN TO PATIENT Patient takes Immune Booster supplement (contains Vit C, Zinc, D3 etc)  Yes Unknown, Entered By History

## 2021-07-12 NOTE — ED TRIAGE NOTES
Arrives by EMS, experienced L arm/shoulder/back pain at 1600. This evening at 2300 had onset of SOB, diaphoresis and altered mental status. O2 sats on EMS arrival 59% on RA. Increased to 96% w/ 12L o2 NRB.

## 2021-07-12 NOTE — PLAN OF CARE
Vitals: Temp: 97.8  F (36.6  C) Temp src: Oral BP: 135/70 Pulse: 87   Resp: 18 SpO2: 97 % O2 Device: None (Room air)   Pain: Denies  Neuro: Ao4, able to make needs known. Unable to life left arm, able to lift right arm but not able to feed self. Reports tingling in right hand. See flowsheets for full neuro  Respiratory: LS clr, denies SOB. O2 stable on room air  Cardiac: Tele SR w/BBB. Denies CP  GI/: Incontinent of bowels x1 this shift  Skin: Redness on groin. Pt states redness has been there for multiple years, does not itch or burn or bother him.   LDAs: PIV SL  Diet: Reg  Activity: A2 with lift  Plan: Echo done today, MRI scheduled for today. Will continue POC.

## 2021-07-12 NOTE — PROGRESS NOTES
Patient admitted by my colleague Demario Beatty MD this AM.  See his H&P for full details of presentation.    70-year-old male with a history of cervical stenosis with C5-7 anterior fusion in the early 1990s who presents with acute onset of cervical neck pain with bilateral shoulder pain along with bilateral upper extremity weakness.  Left upper extremity weakness more profound than right upper extremity weakness.  Sensation seems to be intact.  Cranial nerves are all intact.  No preceding illness.  No fall or trauma.  He was sitting at his desk with his neck flexed when symptoms started.      CT cervical spine showed no evidence of fracture but did show spinal canal narrowing and at least moderate central canal stenosis.  CT of the head negative.    Vitals:    07/12/21 0820 07/12/21 1131 07/12/21 1208 07/12/21 1551   BP: 131/74 (!) 141/94 135/70 (!) 142/75   BP Location:   Right arm Right arm   Pulse:  70 87 79   Resp:  18 18 18   Temp:   97.8  F (36.6  C) 98.7  F (37.1  C)   TempSrc:   Oral Oral   SpO2: 94% 95% 97% 96%   Weight:       Height:         Patient is resting in bed.  He is in no acute distress.  Answers appropriately.  Lungs are clear bilaterally.  Heart is regular without murmur.  Abdomen is soft and nondistended.  Neuro exam is notable for cranial nerves all being intact.  Speech is clear.  He has 4/5 strength in right upper extremity with shoulder flexion and grasp with 2-3/5 strength in the left upper extremity with shoulder flexion and grasp.  Sensation seems to be intact.      Suspect patient's symptoms are either related to cervical stenosis given history of fusion with canal narrowing noted on CT scan vs. Cervical spondylotic myelopathy (though would not expect such rapid onset)  vs transverse myelitis.     -I did discuss with neurosurgery after my evaluation who recommended MRI of the cervical spine and brain which is already ordered.  It is to be done today.  Neurology is also consulted as neuro  stroke was contacted in the ED and given symptoms associated with cervical neck pain lower suspicion for stroke as symptoms.  Appreciate their evaluation and recommendations.   -In terms of his initial hypoxemia, patient is on Rocephin and azithromycin given CT showing possible bibasilar pneumonia.  Lower suspicion given absence of fevers and white count.  He is now off supplemental oxygen.    Hesham Hammond MD

## 2021-07-12 NOTE — PLAN OF CARE
Assumed care around 0456. Transferred from stretcher via sliding board assist of 4. VSS ex 1 LPM via oxymask, weaned down from 3. A&Ox4, can make needs known and calls appropiately. LS clear, diminished in bilat lower lobes. Denies pain and nausea. Reporting weakness in left extremity but hand  moderate bilaterally. Reporting mild tingling in right hand. Bladder scanned for 140 mls, strait cathed in ED. PIVx2.

## 2021-07-12 NOTE — ED PROVIDER NOTES
History   Chief Complaint:  Shortness of Breath and Altered Mental Status       HPI   Rigo Johns is a 70 year old male who presents with shortness of breath and an altered mental status. Per EMS, the patient comes from home. At 1600 this afternoon, he began experiencing bilateral shoulder pain that lasted for a couple of hours. He tried to stretch, but it did not relieve symptoms. While sitting in his recliner at around 2300, he subsequently began experiencing shortness of breath and noticed that he was pale in appearance. Upon EMS arrival, he was satting at 59 on room air. 12 L of oxygen via mask brought him to 100%. At this time, he was altered and diaphoretic. Here in the ED, he seems less altered, but still mentions that he is short of breath. Recently, he has noticed bilateral ankle swelling. He currently endorses whole-body weakness, which he says is more severe in his arms. While his upper back/shoulders were hurting earlier this afternoon, he was experiencing numbness and tingling in this region. This has since resolved. He denies chest pain, abdominal pain, fever, cough, recent falls, recent changes in daily activities, and says that he is not currently in pain. He is not taking any daily medication and does not see a doctor regularly. He has no significant past medical history. The last time he visited the hospital was in the 90's, when he had a spinal fusion performed. No recent illness.    Review of Systems   Constitutional: Positive for diaphoresis. Negative for fever.   Respiratory: Positive for shortness of breath. Negative for cough.    Cardiovascular: Positive for leg swelling. Negative for chest pain.   Gastrointestinal: Negative for abdominal pain.   Musculoskeletal: Positive for arthralgias.   Skin: Positive for pallor.   Neurological: Positive for weakness and numbness.   Psychiatric/Behavioral: Positive for confusion.   All other systems reviewed and are  negative.    Allergies:  Shrimp    Medications:  No known current medications    Past Medical History:    No known past medical history    Past Surgical History:    Spinal fusion    Family History:    No known family history    Social History:  Denies smoking  Arrives via EMS from home  Unaccompanied in ED  Minimal alcohol use    Physical Exam     Patient Vitals for the past 24 hrs:   BP Temp Temp src Pulse Resp SpO2 Weight   07/12/21 0410 -- -- -- 58 16 96 % --   07/12/21 0405 -- -- -- 57 -- 96 % --   07/12/21 0400 (!) 144/82 -- -- 63 -- 97 % --   07/12/21 0355 -- -- -- 59 -- 96 % --   07/12/21 0350 -- -- -- 60 -- 95 % --   07/12/21 0345 -- -- -- 60 -- 97 % --   07/12/21 0340 -- -- -- 60 12 96 % --   07/12/21 0335 -- -- -- 67 16 94 % --   07/12/21 0330 (!) 143/81 -- -- 58 8 95 % --   07/12/21 0325 -- -- -- 62 10 96 % --   07/12/21 0320 -- -- -- 63 11 96 % --   07/12/21 0315 (!) 148/86 -- -- 70 23 94 % --   07/12/21 0310 -- -- -- 62 18 95 % --   07/12/21 0305 -- -- -- 57 (!) 0 95 % --   07/12/21 0300 (!) 156/94 -- -- 62 9 96 % --   07/12/21 0255 -- -- -- 62 (!) 0 96 % --   07/12/21 0250 -- -- -- 72 17 95 % --   07/12/21 0245 (!) 156/88 -- -- 65 (!) 0 95 % --   07/12/21 0240 (!) 152/91 -- -- 63 (!) 0 95 % --   07/12/21 0225 -- -- -- 61 (!) 7 96 % --   07/12/21 0220 -- -- -- 68 15 95 % --   07/12/21 0215 (!) 144/85 -- -- 68 10 95 % --   07/12/21 0210 -- -- -- 68 19 95 % --   07/12/21 0205 -- -- -- 72 28 94 % --   07/12/21 0200 (!) 147/80 -- -- 79 13 95 % --   07/12/21 0145 (!) 147/79 -- -- 74 -- 95 % --   07/12/21 0130 (!) 152/78 -- -- 75 -- 96 % --   07/12/21 0121 (!) 147/76 -- -- 87 19 96 % --   07/12/21 0115 (!) 147/76 -- -- 80 21 96 % --   07/12/21 0100 (!) 154/82 -- -- -- -- -- --   07/12/21 0039 -- -- -- -- -- -- 111.1 kg (245 lb)   07/12/21 0038 -- 97.5  F (36.4  C) Oral -- -- -- --   07/12/21 0033 (!) 162/90 -- -- 87 26 96 % --   07/12/21 0030 (!) 146/80 -- -- 82 23 96 % --   07/12/21 0016 (!) 150/77 -- -- 84  26 94 % --       Physical Exam  General: Sitting up in bed  Eyes:  The pupils are equal and round    Conjunctivae and sclerae are normal  ENT:    Wearing a mask  Neck:  Normal range of motion  CV:  Regular rate, regular rhythm     Skin warm and well perfused     Radial pulses 2+ bilaterally    PT pulses 2+ bilaterally  Resp:  Non labored breathing on room air    No tachypnea    No cough heard    Lungs diminished bilaterally    On oxygen via mask  GI:  Abdomen is soft, there is no rigidity    No distension    No rebound tenderness     No abdominal tenderness  MS:  Mild bilateral LE edema  Skin:  No rash or acute skin lesions noted  Neuro:   Awake, alert.      Speech is normal and fluent.    Face is symmetric.     Minimal movement of bilateral LE    SILT on bilateral UE/LE    Unable to lift arms off bed, symmetric    Has decreased  strength    Able to move bilateral LE but also very weak  Psych: Normal affect.  Appropriate interactions.    Emergency Department Course   ECG  ECG taken at 0022, ECG read at 0027  Normal sinus rhythm  Left axis deviation  Nonspecific intraventricular block  Abnormal ECG   Rate 83 bpm. LA interval 168 ms. QRS duration 130 ms. QT/QTc 388/455 ms. P-R-T axes 33 -72 15.     Imaging:    Cervical spine CT w/o contrast    1.  No evidence for acute fracture or subluxation.  2.  Straightening of the usual cervical lordosis with solid fusion from C5 through C7.  3.  The spinal canal is narrow on a congenital basis pedicles and there is at least moderate central canal stenosis at C3-4 and C4-5.  4.  Multilevel neural foraminal narrowing as described.    Abd/pelvis CT no contrast - Stone Protocol    1.  No bowel obstruction or free air.    2.  Bibasilar atelectasis or infiltrate.    3.  Slight nodularity of the liver surface contour concerning for early changes of cirrhosis and/or fibrosis.    4.  2.7 cm exophytic focus of intermediate density arising from the right renal lower pole. Recommend  follow-up ultrasound on a nonemergent basis to determine if this is a complex cyst or solid lesion.    CT Chest Pulmonary Embolism w Contrast    1.  Negative for pulmonary embolism.    2.  Moderately prominent patchy opacities in both lower lungs greatest in the lower lobes. While some of this is probably due to atelectasis, there is also concern for underlying pneumonitis as well. Clinical correlation.    3.  Cardiac enlargement.    4.  No significant findings elsewhere.    CT Head w/o Contrast  1.  No acute intracranial process.    XR Chest Port 1 View  Heart size within normal limits. Moderately low lung volumes with bibasilar infiltrate. Upper lung zones are clear. No visible pneumothorax.    Reads per radiology    Laboratory:    ISTAT gases venous: pH: 7.26 (L), PCO2: 58 (H), PO2: 74 (H), Bicarbonate: 26, Lactw 0.6, O2 sat 92 (L)    Creatinine POCT: creatinine 1.5 (H), GFR est 46 (L)    CBC: WBC 7.1, HGB 14.7,  (L)     CMP: chloride 114 (H), glucose 139 (H), GFR 52 (L) o/w WNL (Creatinine 1.37 (H))     ABO RH Type and Screen: O, antibody positive    Troponin (Collected 0022): <0.015    TSH: 2.68    Magnesium: 2.3    BNP: 71    CK total: 168    UA with microscopic: blood small (A), albumin 50 (A), mucus present (A), rbc urine 9 (H) o/w WNL    Symptomatic Influenza A/B & SARS-CoV2 (COVID19) Virus PCR Multiplex: negative     Blood cultures pending x2    Emergency Department Course:    Reviewed:  I reviewed nursing notes, vitals, past medical history and care everywhere    Assessments:  0012 I obtained history and examined the patient as noted above.   0155 I rechecked the patient and explained findings.    I rexamined the patient, now with more movement of RUE but still has difficulty lifting LUE off bed    Consults:   0207 I consulted with Dr. Paul MD, of stroke neuro.   0340 I consulted with Dr. Rogerio MD, of the hospitalist service, who agrees to admit the patient.     Interventions:  0048 Normal  Saline 1000 mL IV  0201 Zofran 4 mg IV  0202 Zosyn 4.5 g IV    Disposition:  The patient was admitted to the hospital under the care of Dr. Rogerio MD.     Impression & Plan     Medical Decision Making:  Rigo Johns is a 70 year old male who presented to the ED with shortness of breath. Patient profoundly hypoxic at home.  Patient on mask here in the emergency department on arrival.  Has diffuse weakness on exam though seems to have more weakness on the upper extremities versus the lower extremities.  Limited bedside ultrasound done and does appear that likely has low EF.  Given his profound hypoxia, concern for PE so obtain CT chest that shows no PE but does have possible pneumonia.  CT head unremarkable.  Repeated exam and now seems to actually have improved strength though still weak in upper extremities left more than right.  While the emergency department he did have loose stool which it seemed he had difficulty controlling and was incontinent. Also had no urge to urinate and had ~350 in bladder.  Also started to dry heave.  Given this, obtain CT abdomen that showed findings of possible cirrhosis. Also needs imaging of kidney which was discussed with him.  Imaging of neck obtained that shows evidence of his prior fusion.  Now more concerned that this is something neurologic.  Spoke to stroke neurology recommended obtaining neurology consult in the morning and likely will need MRI suspect of brain and cervical spine.  Given the bilateral nature of the weakness, stroke seems unlikely.  Would not be a TPA candidate given the timing of onset and doubt acute occlusion.  Wonder if may have cervical pathology such as myelitis, severe stenosis, etc. Patient trialed off oxygen but still becomes hypoxic into the mid 80s requiring oxygen.  Patient is alert and oriented.  Maintaining respiratory drive and does not require intubation.  Discussed with hospitalist for admission. No preceding illness to suggest guillain  barre and seems unusual with the UE>LE weakness.     Critical Care Time: was 60 minutes for this patient excluding procedures    Covid-19  Rigo Johns was evaluated during a global COVID-19 pandemic, which necessitated consideration that the patient might be at risk for infection with the SARS-CoV-2 virus that causes COVID-19.   Applicable protocols for evaluation were followed during the patient's care.   COVID-19 was considered as part of the patient's evaluation. The plan for testing is:  a test was obtained during this visit.    Diagnosis:    ICD-10-CM    1. Hypoxia  R09.02    2. Bilateral arm weakness  R29.898    3. Shortness of breath  R06.02    4. Lower extremity edema  R60.0    5. Pneumonia due to infectious organism, unspecified laterality, unspecified part of lung  J18.9      Scribe Disclosure:  Avery MCKEON, am serving as a scribe at 12:14 AM on 7/12/2021 to document services personally performed by Dr. Michele MD, based on my observations and the provider's statements to me.                                                                  Linda Bautista MD  07/12/21 0777

## 2021-07-12 NOTE — ED NOTES
[facility]  ED Nurse Handoff Report    Rigo Johns is a 70 year old male   ED Chief complaint: Shortness of Breath and Altered Mental Status  . ED Diagnosis:   Final diagnoses:   None     Allergies:   Allergies   Allergen Reactions     Shrimp GI Disturbance       Code Status: Full Code  Activity level - Baseline/Home:  Independent. Activity Level - Current:   Total Care. Lift room needed: No. Bariatric: No   Needed: No   Isolation: No. Infection: Not Applicable  COVID r/o and special precautions.     Vital Signs:   Vitals:    07/12/21 0320 07/12/21 0325 07/12/21 0330 07/12/21 0335   BP:   (!) 143/81    Pulse: 63 62 58 67   Resp: 11 10  16   Temp:       TempSrc:       SpO2: 96% 96% 95% 94%   Weight:           Cardiac Rhythm:  ,      Pain level:    Patient confused: Yes. Patient Falls Risk: Yes.   Elimination Status: Urethral catheter (torres) in place; refer to orders to discontinue as per protocol    Patient Report - Initial Complaint: shortness of breath/AMS. Focused Assessment:    00:14 ED Triage Notes Filed Arrives by EMS, experienced L arm/shoulder/back pain at 1600. This evening at 2300 had onset of SOB, diaphoresis and altered mental status. O2 sats on EMS arrival 59% on RA. Increased to 96% w/ 12L o2 NRB.      00:50 Neurological Cranial Nerve Assess - Facial Sensation: Sensation intact  Hearing - left: no identified problems  Hearing - right: no identified problems   Hand /Ankle Strength - Hand , Left: moderate  Hand , Right: moderate   Additional Neuro Documentation - Other Neurologic Symptoms: None  EM     00:50 Musculoskeletal Musculoskeletal - General Mobility: generalized weakness  LUE Extremity Movement: active ROM severely impaired  RUE Extremity Movement: active ROM severely impaired  CMS Intact: Yes       Tests Performed: blood work, imagine. Abnormal Results:   Labs Ordered and Resulted from Time of ED Arrival Up to the Time of Departure from the ED    COMPREHENSIVE METABOLIC PANEL - Abnormal; Notable for the following components:       Result Value    Chloride 114 (*)     Creatinine 1.37 (*)     Glucose 139 (*)     GFR Estimate 52 (*)     All other components within normal limits   ROUTINE UA WITH MICROSCOPIC REFLEX TO CULTURE - Abnormal; Notable for the following components:    Blood Urine Small (*)     Protein Albumin Urine 50  (*)     Mucus Urine Present (*)     RBC Urine 9 (*)     All other components within normal limits    Narrative:     Urine Culture not indicated   ISTAT GASES LACTATE VENOUS POCT - Abnormal; Notable for the following components:    O2 Sat, Venous POCT 92 (*)     pCO2V Venous POCT 58 (*)     pH Venous POCT 7.26 (*)     pO2 Venous POCT 74 (*)     All other components within normal limits   ISTAT CREATININE POCT - Abnormal; Notable for the following components:    Creatinine POCT 1.5 (*)     GFR, ESTIMATED 46 (*)     All other components within normal limits   CBC WITH PLATELETS AND DIFFERENTIAL - Abnormal; Notable for the following components:    RBC Count 4.30 (*)     MCH 34.2 (*)     Platelet Count 102 (*)     Absolute Immature Granulocytes 0.1 (*)     All other components within normal limits   TROPONIN I - Normal   TSH WITH FREE T4 REFLEX - Normal   MAGNESIUM - Normal   NT PROBNP INPATIENT - Normal   CK TOTAL - Normal   SARS-COV2 (COVID-19) VIRUS RT-PCR - Normal    Narrative:     Testing was performed using the ivonne  SARS-CoV-2 & Influenza A/B Assay on the ivonne  Chana  System.  This test should be ordered for the detection of SARS-COV-2 in individuals who meet SARS-CoV-2 clinical and/or epidemiological criteria. Test performance is unknown in asymptomatic patients.  This test is for in vitro diagnostic use under the FDA EUA for laboratories certified under CLIA to perform moderate and/or high complexity testing. This test has not been FDA cleared or approved.  A negative test does not rule out the presence of PCR inhibitors in the  specimen or target RNA in concentration below the limit of detection for the assay. The possibility of a false negative should be considered if the patient's recent exposure or clinical presentation suggests COVID-19.  [facility] are certified under the Clinical Laboratory Improvement Amendments of 1988 (CLIA-88) as qualified to perform moderate and/or high complexity laboratory testing.   EXTRA BLUE TOP TUBE   EXTRA RED TOP TUBE   EXTRA GREEN TOP (LITHIUM HEPARIN) TUBE   EXTRA PURPLE TOP TUBE   EXTRA PURPLE TOP TUBE   EXTRA GREEN TOP (LITHIUM HEPARIN) ON ICE   EXTRA BLOOD CULTURE BOTTLE   EXTRA GREEN TOP (LITHIUM HEPARIN) TUBE   EXTRA BLOOD BANK PURPLE TOP TUBE   EXTRA BLOOD CULTURE BOTTLE   EXTRA HEPARINIZED SYRINGE   INR   EXTRA BLOOD CULTURE BOTTLE   LIPASE   PERIPHERAL IV CATHETER   ISTAT CG4 GASES LACTATE SANGEETA NURSING POCT   CREATININE POCT   TYPE AND SCREEN, ADULT   COVID-19 VIRUS (CORONAVIRUS) BY PCR    Narrative:     The following orders were created for panel order Symptomatic COVID-19 Virus (Coronavirus) by PCR Nasopharyngeal.  Procedure                               Abnormality         Status                     ---------                               -----------         ------                     SARS-COV2 (COVID-19) Vir...[[id number]]  Normal              Final result                 Please view results for these tests on the individual orders.   BLOOD CULTURE   BLOOD CULTURE   ABO/RH TYPE AND SCREEN    Narrative:     The following orders were created for panel order ABO/Rh type and screen.  Procedure                               Abnormality         Status                     ---------                               -----------         ------                     Adult Type and Screen[[id number]]                            Edited Result - FINAL        Please view results for these tests on the individual orders.   CBC WITH PLATELETS & DIFFERENTIAL    Narrative:     The following orders  were created for panel order CBC with platelets differential.  Procedure                               Abnormality         Status                     ---------                               -----------         ------                     CBC with platelets and d...[[id number]]  Abnormal            Final result                 Please view results for these tests on the individual orders.   EXTRA TUBE    Narrative:     The following orders were created for panel order Extra Tube ([place] Draw).  Procedure                               Abnormality         Status                     ---------                               -----------         ------                     Extra Blood Culture Bottle[[id number]]                                                  Extra Blue Top Tube[[id number]]                              Final result               Extra Red Top Tube[[id number]]                               Final result               Extra Green Top (Lithium...[[id number]]                      Final result               Extra Green Top (Lithium...[[id number]]                                                 Extra Purple Top Tube[[id number]]                            Final result               Extra Blood Bank Purple ...[[id number]]                      In process                   Please view results for these tests on the individual orders.   EXTRA TUBE    Narrative:     The following orders were created for panel order Extra Tube ([place] Draw).  Procedure                               Abnormality         Status                     ---------                               -----------         ------                     Extra Blood Culture Bottle[[id number]]                                                  Extra Purple Top Tube[[id number]]                            Final result               Extra Heparinized Syringe[[id number]]                                                   Extra Green Top (Lithium...[[id number]]                       Final result                 Please view results for these tests on the individual orders.   CBC WITH PLATELETS & DIFFERENTIAL   EXTRA TUBE    Narrative:     The following orders were created for panel order Extra Tube ([place] Draw).  Procedure                               Abnormality         Status                     ---------                               -----------         ------                     Extra Blood Culture Bottle[[id number]]                                                    Please view results for these tests on the individual orders.     Cervical spine CT w/o contrast   Final Result   IMPRESSION:   1.  No evidence for acute fracture or subluxation.   2.  Straightening of the usual cervical lordosis with solid fusion from C5 through C7.   3.  The spinal canal is narrow on a congenital basis pedicles and there is at least moderate central canal stenosis at C3-4 and C4-5.   4.  Multilevel neural foraminal narrowing as described.      Abd/pelvis CT no contrast - [place] Protocol   Final Result   IMPRESSION:    1.  No bowel obstruction or free air.      2.  Bibasilar atelectasis or infiltrate.      3.  Slight nodularity of the liver surface contour concerning for early changes of cirrhosis and/or fibrosis.      4.  2.7 cm exophytic focus of intermediate density arising from the right renal lower pole. Recommend follow-up ultrasound on a nonemergent basis to determine if this is a complex cyst or solid lesion.         CT Chest Pulmonary Embolism w Contrast   Final Result   IMPRESSION:   1.  Negative for pulmonary embolism.      2.  Moderately prominent patchy opacities in both lower lungs greatest in the lower lobes. While some of this is probably due to atelectasis, there is also concern for underlying pneumonitis as well. Clinical correlation.      3.  Cardiac enlargement.      4.  No significant findings elsewhere.      CT Head w/o Contrast   Final Result   IMPRESSION:   1.  No acute intracranial  process.      XR Chest Port 1 View   Final Result   IMPRESSION: Heart size within normal limits. Moderately low lung volumes with bibasilar infiltrate. Upper lung zones are clear. No visible pneumothorax.        .   Treatments provided: zofran, fluids, antibiotics   Family Comments: no family present  OBS brochure/video discussed/provided to patient:  N/A  ED Medications:   Medications   ondansetron (ZOFRAN) injection 4 mg (4 mg Intravenous Given 7/12/21 0201)   0.9% sodium chloride BOLUS (0 mLs Intravenous Stopped 7/12/21 0335)   iopamidol (ISOVUE-370) solution 500 mL (90 mLs Intravenous Given 7/12/21 0048)   piperacillin-tazobactam (ZOSYN) intermittent infusion 4.5 g (0 g Intravenous Stopped 7/12/21 0249)     Drips infusing:  No  For the majority of the shift, the patient's behavior Green. Interventions performed were frequent rounding.    Sepsis treatment initiated: No     Patient tested for COVID 19 prior to admission: YES    ED Nurse Name/Phone Number: Shannan Hernandez RN,   3:36 AM  RECEIVING UNIT ED HANDOFF REVIEW    Above ED Nurse Handoff Report was reviewed: Yes  Reviewed by: Gema Buck RN on July 12, 2021 at 4:10 AM

## 2021-07-13 ENCOUNTER — ANESTHESIA EVENT (OUTPATIENT)
Dept: SURGERY | Facility: CLINIC | Age: 71
DRG: 471 | End: 2021-07-13
Payer: COMMERCIAL

## 2021-07-13 PROBLEM — M48.02 CERVICAL STENOSIS OF SPINAL CANAL: Status: ACTIVE | Noted: 2021-07-13

## 2021-07-13 LAB
ANION GAP SERPL CALCULATED.3IONS-SCNC: 9 MMOL/L (ref 3–14)
BUN SERPL-MCNC: 29 MG/DL (ref 7–30)
CALCIUM SERPL-MCNC: 9.1 MG/DL (ref 8.5–10.1)
CHLORIDE BLD-SCNC: 114 MMOL/L (ref 94–109)
CO2 SERPL-SCNC: 21 MMOL/L (ref 20–32)
CREAT SERPL-MCNC: 1.88 MG/DL (ref 0.66–1.25)
ERYTHROCYTE [DISTWIDTH] IN BLOOD BY AUTOMATED COUNT: 12 % (ref 10–15)
GFR SERPL CREATININE-BSD FRML MDRD: 35 ML/MIN/1.73M2
GLUCOSE BLD-MCNC: 140 MG/DL (ref 70–99)
HCT VFR BLD AUTO: 47.1 % (ref 40–53)
HGB BLD-MCNC: 15.2 G/DL (ref 13.3–17.7)
MAGNESIUM SERPL-MCNC: 2.4 MG/DL (ref 1.6–2.3)
MCH RBC QN AUTO: 33 PG (ref 26.5–33)
MCHC RBC AUTO-ENTMCNC: 32.3 G/DL (ref 31.5–36.5)
MCV RBC AUTO: 102 FL (ref 78–100)
PLATELET # BLD AUTO: 125 10E3/UL (ref 150–450)
POTASSIUM BLD-SCNC: 4 MMOL/L (ref 3.4–5.3)
RBC # BLD AUTO: 4.6 10E6/UL (ref 4.4–5.9)
SODIUM SERPL-SCNC: 144 MMOL/L (ref 133–144)
WBC # BLD AUTO: 7.7 10E3/UL (ref 4–11)

## 2021-07-13 PROCEDURE — 83735 ASSAY OF MAGNESIUM: CPT | Performed by: HOSPITALIST

## 2021-07-13 PROCEDURE — 250N000013 HC RX MED GY IP 250 OP 250 PS 637: Performed by: INTERNAL MEDICINE

## 2021-07-13 PROCEDURE — 258N000003 HC RX IP 258 OP 636: Performed by: PSYCHIATRY & NEUROLOGY

## 2021-07-13 PROCEDURE — 250N000011 HC RX IP 250 OP 636: Performed by: PSYCHIATRY & NEUROLOGY

## 2021-07-13 PROCEDURE — 36415 COLL VENOUS BLD VENIPUNCTURE: CPT | Performed by: INTERNAL MEDICINE

## 2021-07-13 PROCEDURE — 120N000001 HC R&B MED SURG/OB

## 2021-07-13 PROCEDURE — 85014 HEMATOCRIT: CPT | Performed by: INTERNAL MEDICINE

## 2021-07-13 PROCEDURE — 99239 HOSP IP/OBS DSCHRG MGMT >30: CPT | Performed by: HOSPITALIST

## 2021-07-13 PROCEDURE — 999N000147 HC STATISTIC PT IP EVAL DEFER

## 2021-07-13 PROCEDURE — 250N000011 HC RX IP 250 OP 636: Performed by: INTERNAL MEDICINE

## 2021-07-13 PROCEDURE — 80048 BASIC METABOLIC PNL TOTAL CA: CPT | Performed by: INTERNAL MEDICINE

## 2021-07-13 RX ORDER — CEFTRIAXONE 2 G/1
2 INJECTION, POWDER, FOR SOLUTION INTRAMUSCULAR; INTRAVENOUS EVERY 24 HOURS
Status: ON HOLD | DISCHARGE
Start: 2021-07-14 | End: 2021-07-18

## 2021-07-13 RX ORDER — ACETAMINOPHEN 325 MG/1
650 TABLET ORAL EVERY 6 HOURS PRN
DISCHARGE
Start: 2021-07-13 | End: 2021-11-09

## 2021-07-13 RX ORDER — AZITHROMYCIN 250 MG/1
250 TABLET, FILM COATED ORAL DAILY
Status: ON HOLD | DISCHARGE
Start: 2021-07-14 | End: 2021-07-18

## 2021-07-13 RX ORDER — CEFAZOLIN SODIUM 2 G/100ML
2 INJECTION, SOLUTION INTRAVENOUS
Status: CANCELLED | OUTPATIENT
Start: 2021-07-14

## 2021-07-13 RX ORDER — CEFAZOLIN SODIUM 2 G/100ML
2 INJECTION, SOLUTION INTRAVENOUS SEE ADMIN INSTRUCTIONS
Status: CANCELLED | OUTPATIENT
Start: 2021-07-14

## 2021-07-13 RX ADMIN — AZITHROMYCIN MONOHYDRATE 250 MG: 250 TABLET ORAL at 08:41

## 2021-07-13 RX ADMIN — CEFTRIAXONE 2 G: 2 INJECTION, POWDER, FOR SOLUTION INTRAMUSCULAR; INTRAVENOUS at 08:41

## 2021-07-13 RX ADMIN — SODIUM CHLORIDE 500 MG: 9 INJECTION, SOLUTION INTRAVENOUS at 19:52

## 2021-07-13 ASSESSMENT — ACTIVITIES OF DAILY LIVING (ADL)
ADLS_ACUITY_SCORE: 25
ADLS_ACUITY_SCORE: 25
ADLS_ACUITY_SCORE: 21
ADLS_ACUITY_SCORE: 25
ADLS_ACUITY_SCORE: 28
ADLS_ACUITY_SCORE: 25

## 2021-07-13 NOTE — PROGRESS NOTES
[facility]    Neurosurgery  Daily Note    Assessment & Plan   Rigo Johns is a 70 year old male with history of C5-7 anterior fusion in 1993 in [place] presented to ED on 7/12/21 with acute onset of neck pain and BL shoulder pain with associated BUE weakness and LLE weakness with radiographic evidence as stated below.     Reviewed imaging findings, plans for surgical intervention, procedure, post op recovery time frame.     CERVICAL SPINE MRI:  1.  High-grade C3-C4 and C4-C5 spinal canal stenoses and bilateral neural foraminal stenoses.  2.  Abnormal T2 prolongation in each hemicord from C3 through C4. It is slightly more pronounced oFon the left.  3.  High-grade C3-C4 and C4-C5 spinal canal stenoses and bilateral neural foraminal stenoses.  4.  Solid C5-C6 and C6-C7 interbody fusions. Bony bridging at C5-C6 is better seen on CT     Plan:  Transfer to [facility] today for surgical intervention with Dr. Hanley likely on Wednesday 7/14/21  Solumedrol as ordered by Neurology.  Follow platelet count (102k today)    Active Problems:    Shortness of breath    Hypoxia    Bilateral arm weakness    Interval History   Stable     Physical Exam   Temp: 98.7  F (37.1  C) Temp src: Oral BP: (!) 150/78 Pulse: 89   Resp: 19 SpO2: 99 % O2 Device: Simple face mask Oxygen Delivery: 1 LPM  Vitals:    07/12/21 0039 07/12/21 0810   Weight: 245 lb (111.1 kg) 249 lb 3.2 oz (113 kg)     Vital Signs with Ranges  Temp:  [98.7  F (37.1  C)] 98.7  F (37.1  C)  Pulse:  [79-89] 89  Resp:  [18-20] 19  BP: (142-150)/(75-80) 150/78  SpO2:  [90 %-99 %] 99 %  I/O last 3 completed shifts:  In: 260 [P.O.:240; I.V.:20]  Out: -     Awake, alert, NAD  See note from 7/12. Motor exam unchanged from yesterday   Weakness BUE L>>R  Weakness LLE    Medications        azithromycin  250 mg Oral Daily     cefTRIAXone  2 g Intravenous Q24H     methylPREDNISolone  500 mg Intravenous Q24H     sodium chloride (PF)  3 mL Intracatheter Q8H       Plans  discussed with Dr. Hanley who was in agreement with plans    Irasema CAIN [facility]  [address] [place] [address]    Tel [phone]  Pager [phone]

## 2021-07-13 NOTE — PROGRESS NOTES
Brief sw note:    Sw met with the pt and his wife who provided sw with a completed HCD to be scanned into the pt's chart.  Sw obtained a copy for the pt's chart and emailed it to [employer] [employer] to be scanned into the pt's chart.    BARRETT Simon, [facility]  Inpatient Care Coordination  [facility]  [phone]

## 2021-07-13 NOTE — PROGRESS NOTES
Cross cover following up on brain and C-spine MRIs.  Brain MRI does not show any acute CVA.  CERVICAL SPINE MRI:  1.  High-grade C3-C4 and C4-C5 spinal canal stenoses and bilateral neural foraminal stenoses.  2.  Abnormal T2 prolongation in each hemicord from C3 through C4. It is slightly more pronounced oFon the left.  3.  High-grade C3-C4 and C4-C5 spinal canal stenoses and bilateral neural foraminal stenoses.  4.  Solid C5-C6 and C6-C7 interbody fusions. Bony bridging at C5-C6 is better seen on CT    Spoke to neurosurgery on call.  Plan for surgical intervention, likely Wednesday at [facility], this will not happen tomorrow.  Call patient placement tomorrow morning for transfer.  Neurology has ordered 500 mg IV Solu-Medrol daily for 3 days.  I discussed the results of the MRI with the patient and he is agreeable with the plan.

## 2021-07-13 NOTE — DISCHARGE SUMMARY
"[facility]    Discharge Summary  Hospitalist    Date of Admission:  7/12/2021  Date of Discharge:  7/13/2021  Discharging Provider: Hesham Hammond MD  Date of Service (when I saw the patient): 07/13/21    Discharge Diagnoses   #Compressive cervical myelopathy due to cervical stenosis  #Possible community-acquired pneumonia    History of Present Illness   \"Rigo Johns is a 70-year-old male with history of obstructive sleep apnea for which he uses CPAP and C6-7 bulging disc for which he had surgery.  He does not really go to the doctor much.  He does not take any medications.  He presented to the emergency department early this morning for evaluation of shortness of breath.  Yesterday afternoon at about 4 PM he developed right neck and shoulder pain.  That lasted for couple of hours.  He had some associated bilateral arm weakness.  At about 11 PM he developed shortness of breath.  He called paramedics.  When they arrived he had oxygen saturations of 59%.  He was placed on 12 L and brought to the emergency department.  He reports some recent bilateral ankle swelling.  He had persistent weakness in his arms upon arrival here.  He denied chest pain, abdominal pain, fever, chills, and cough.  He was able to wean down on oxygen but not completely off of it.  His shortness of breath improved.  Weakness in his right arm improved some but not back to normal.  His left arm remains weak.  Emergency department evaluation showed hypoxia and elevated blood pressure.  Laboratory evaluation showed thrombocytopenia with platelets of 62.  Creatinine was 1.37 but basic metabolic panel was otherwise unremarkable.  Liver function tests, urine analysis, BNP, lactic acid, and COVID-19 testing were negative.  Venous blood gas showed hypoxia.  Urinalysis was unremarkable.  Chest x-ray showed no acute process.  CT of abdomen and pelvis showed bibasilar atelectasis or infiltrate.  It also showed nodular liver " "abnormality.  In the right lower pole of the kidney there was a lesion.  Follow-up ultrasound at some point was recommended.  CT of chest was obtained and showed no pulmonary embolism.  It also showed by basilar infiltrates.  CT of head was unremarkable.  CT of C-spine showed central stenosis at C3-4 and C4-5.  Stroke neurology was called because of this weakness.  They did not think this was a stroke and recommended general neurology consult.  Rigo was given Zosyn for possible pneumonia.  I was asked to admit him to the hospital for further evaluation.\"    Hospital Course   #Compressive cervical myelopathy due to cervical stenosis: Patient with ongoing weakness in both his arms and his legs, worse in his arms and on the left side.  He underwent MRI of his brain and cervical spine.  MRI of the brain showed no acute abnormality.  MRI of cervical spine showed high-grade cervical spinal canal stenoses and bilateral neuroforaminal stenoses with abnormal T2 prolongation from C3-C4 that was slightly more pronounced on the left.  Neurology and neurosurgery were both consulted.  He was started on IV steroids.  Neurosurgery recommended transfer to [facility] for decompressive surgery which is scheduled for Wednesday, 7/14.    -He will continue on methyl Pred 500 mg for 3 days per neurology  -I have spoken to Dr. De Anda from [facility] who graciously accepts patient for transfer.  Anticipate patient will be transferred later this afternoon for planned OR tomorrow.  Explained plan to patient and he is agreeable.    #Possible community-acquired pneumonia: Patient with hypoxemia requiring supplemental oxygen on presentation.  He underwent CT of the chest that showed patchy opacities in both lungs greatest in the lower lobes.  Thought probably due to atelectasis but was started on ceftriaxone and azithromycin for possible pneumonia.  He did not have a white count or fever on presentation.    Hesham Hammond MD    Pending " Results   These results will be followed up by [facility]  Unresulted Labs Ordered in the Past 30 Days of this Admission     Date and Time Order Name Status Description    7/12/2021 12:26 AM Blood Culture Line, Other Preliminary     7/12/2021 12:26 AM Blood Culture Arm, Left Preliminary           Code Status   Full Code       Primary Care Physician   [facility]    Physical Exam   Temp: 98.7  F (37.1  C) Temp src: Oral BP: (!) 150/78 Pulse: 89   Resp: 19 SpO2: 90 % O2 Device: None (Room air)    Vitals:    07/12/21 0039 07/12/21 0810   Weight: 111.1 kg (245 lb) 113 kg (249 lb 3.2 oz)     Vital Signs with Ranges  Temp:  [97.8  F (36.6  C)-98.7  F (37.1  C)] 98.7  F (37.1  C)  Pulse:  [70-89] 89  Resp:  [18-20] 19  BP: (135-150)/(70-94) 150/78  SpO2:  [90 %-97 %] 90 %  I/O last 3 completed shifts:  In: 260 [P.O.:240; I.V.:20]  Out: -     Gen: nontoxic, lying in bed. Conversational  HEENT: MMM. No oral lesions.  Supple  CV: regular. No murmur  Lungs: CTAB  Abd: BS+, NT, ND  Ext: WWP, no edema  Neuro: Conversational, CNII-XII intact. 4/5 strenght in RUE with shoulder flexion, 2/5 strength in LUE with shoulder flexion.  Sensation intact. 4/5 strength in LE bilaterally with sensation intact.   Psych: calm    Discharge Disposition   Transferred to [facility]  Condition at discharge: Stable    Consultations This Hospital Stay   NEUROLOGY IP CONSULT  PHYSICAL THERAPY ADULT IP CONSULT  OCCUPATIONAL THERAPY ADULT IP CONSULT  SPINE SURGERY ADULT IP CONSULT    Time Spent on this Encounter   I, Hesham Hammond MD, personally saw the patient today and spent greater than 30 minutes discharging this patient.    Discharge Orders   No discharge procedures on file.  Discharge Medications   Current Discharge Medication List      START taking these medications    Details   acetaminophen (TYLENOL) 325 MG tablet Take 2 tablets (650 mg) by mouth every 6 hours as needed for mild pain or other  (and adjunct with moderate or severe pain or per patient request)    Associated Diagnoses: Cervical stenosis of spinal canal      azithromycin (ZITHROMAX) 250 MG tablet Take 1 tablet (250 mg) by mouth daily    Associated Diagnoses: Pneumonia due to infectious organism, unspecified laterality, unspecified part of lung      cefTRIAXone (ROCEPHIN) 2 GM vial Inject 2 g into the vein every 24 hours    Associated Diagnoses: Pneumonia due to infectious organism, unspecified laterality, unspecified part of lung      methylPREDNISolone sodium succinate 500 mg Inject 500 mg into the vein every 24 hours    Associated Diagnoses: Cervical stenosis of spinal canal         CONTINUE these medications which have NOT CHANGED    Details   TURMERIC PO          STOP taking these medications       Digestive Enzymes (DIGESTIVE SUPPORT PO) Comments:   Reason for Stopping:         UNKNOWN TO PATIENT Comments:   Reason for Stopping:             Allergies   Allergies   Allergen Reactions     Shrimp GI Disturbance     Data   Most Recent 3 CBC's:Recent Labs   Lab Test 07/13/21 0628 07/12/21 0025   WBC 7.7 7.1   HGB 15.2 14.7   * 100   * 102*      Most Recent 3 BMP's:  Recent Labs   Lab Test 07/13/21 0628 07/12/21 0025 07/12/21 0022     --  142   POTASSIUM 4.0  --  4.4   CHLORIDE 114*  --  114*   CO2 21  --  25   BUN 29  --  22   CR 1.88* 1.5* 1.37*   ANIONGAP 9  --  3   GARRISON 9.1  --  8.9   *  --  139*     Most Recent 2 LFT's:  Recent Labs   Lab Test 07/12/21 0022   AST 33   ALT 30   ALKPHOS 89   BILITOTAL 0.4     Most Recent INR's and Anticoagulation Dosing History:  Anticoagulation Dose History    There is no flowsheet data to display.       Most Recent 3 Troponin's:  Recent Labs   Lab Test 07/12/21 0022   TROPONIN <0.015     Most Recent Cholesterol Panel:No lab results found.  Most Recent 6 Bacteria Isolates From Any Culture (See EPIC Reports for Culture Details):No lab results found.  Most Recent TSH, T4 and  A1c Labs:  Recent Labs   Lab Test 07/12/21  0022   TSH 2.68

## 2021-07-13 NOTE — PLAN OF CARE
PT: Patient with planned transfer to [facility] today per chart review and discussion with RN.  Will complete [facility] PT orders at this time.

## 2021-07-13 NOTE — PLAN OF CARE
A&Ox4. Ax2 with lift. VSS. Denies any pain. Tele- SR w/BBB, O2 stable on 1LPM w/ simple mask, +1 edema in BLE, Reg diet.Transfer to [facility] this evening. Surgical intervention planned for tomorrow.

## 2021-07-13 NOTE — CONSULTS
Neurology Consult Note  [facility]       [2021]                                                                                       Admission Date: 2021  Hospital Day: 1      Patient: Rigo Johns      : 1950  MRN:  [id number]     CC:      Chief Complaint   Patient presents with     Shortness of Breath     Altered Mental Status       Consult Request:  Referring Provider:  Joel Beatty MD  Primary Care Provider:  [facility]- MD        HPI:  Rigo Johns is a 70 year old yo male admitted for new onset weakness in arms and legs with neck soreness yesterday evening. He was working on computer for few hours and noted right sided neck pain and bilateral shoulder area pain. He felt shortness of breath and difficulty in taking deep breath. He noticed increased weakness in left arm and leg more than the right. He also c/o difficulty in passing urine.He was in acute respiratory failure at the admission which improved over the next few hours.  Patient has h/o sleep apnea and  C6-7 disc bulge s/p decompressive surgery and fusion ().          A complete review of symptoms was performed including vascular, infectious, cardiovascular, pulmonary, gastrointestinal, endocrinological, hematologic, dermatologic, musculoskeletal, and neurological. All were normal except as above.    PAST MEDICAL HISTORY:  ALLERGIES:   Allergies   Allergen Reactions     Shrimp GI Disturbance     Tobacco:    History   Smoking Status     Not on file   Smokeless Tobacco     Not on file     Alcohol:  Social History    Substance and Sexual Activity      Alcohol use: Not on file    MEDICATIONS:       CURRENTLY SCHEDULED MEDICATIONS     [START ON 2021] azithromycin  250 mg Oral Daily     cefTRIAXone  2 g Intravenous Q24H     sodium chloride (PF)  3 mL Intracatheter Q8H          HOME MEDICATIONS  Medications Prior to Admission   Medication Sig  "Dispense Refill Last Dose     Digestive Enzymes (DIGESTIVE SUPPORT PO) Unknown ingredients        TURMERIC PO         UNKNOWN TO PATIENT Patient takes Immune Booster supplement (contains Vit C, Zinc, D3 etc)        MEDICAL HISTORY  No past medical history on file.  SURGICAL HISTORY  No past surgical history on file.  FAMILY HISTORY    No family history on file.  SOCIAL HISTORY  Social History     Socioeconomic History     Marital status:      Spouse name: Not on file     Number of children: Not on file     Years of education: Not on file     Highest education level: Not on file   Occupational History     Not on file   Tobacco Use     Smoking status: Not on file   Substance and Sexual Activity     Alcohol use: Not on file     Drug use: Not on file     Sexual activity: Not on file   Other Topics Concern     Not on file   Social History Narrative     Not on file     Social Determinants of Health     Financial Resource Strain:      Difficulty of Paying Living Expenses:    Food Insecurity:      Worried About Running Out of Food in the Last Year:      Ran Out of Food in the Last Year:    Transportation Needs:      Lack of Transportation (Medical):      Lack of Transportation (Non-Medical):    Physical Activity:      Days of Exercise per Week:      Minutes of Exercise per Session:    Stress:      Feeling of Stress :    Social Connections:      Frequency of Communication with Friends and Family:      Frequency of Social Gatherings with Friends and Family:      Attends Yarsanism Services:      Active Member of Clubs or Organizations:      Attends Club or Organization Meetings:      Marital Status:    Intimate Partner Violence:      Fear of Current or Ex-Partner:      Emotionally Abused:      Physically Abused:      Sexually Abused:             Height: 180.3 cm (5' 11\")     Temp: 98.7  F (37.1  C)   Weight: 113 kg (249 lb 3.2 oz)    Temp src: Oral         BP: (!) 142/75         Estimated body mass index is 34.76 kg/m  " "as calculated from the following:    Height as of this encounter: 1.803 m (5' 11\").    Weight as of this encounter: 113 kg (249 lb 3.2 oz).    Resp: 18   SpO2: 96 %   O2 Device: None (Room air)     Blood Pressure:   BP Readings from Last 3 Encounters:   21 (!) 142/75     T24 : Temp (24hrs), Av.9  F (36.6  C), Min:97.5  F (36.4  C), Max:98.7  F (37.1  C)       GENERAL EXAMINATION:  HEENT: PERRLA, EOMI.  Neck: Supple.  Chest: Bilateral air entry present.  Heart: S1, S2 RRR.      NEUROLOGICAL EXAMINATION  Mental Status:  Patient is awake, alert, and oriented X3. Speech and language functions are normal. Short- and long-term memory are intact.      Cranial Nerves: Pupils are equal and reactive to light.Visual fields are full to confrontation. Extraocular movements are intact. There is no nystagmus in the horizontal or vertical planes.No facial sensory deficits. No facial weakness. The tongue is midline.     Motor: Muscle tone is decreased in upper extremities. He has significant weakness in left shoulder abductors 2/5, let biceps/triceps 3/5, left finger abductors 4/5. The right UE showed 3/5 proximal strength and minimal weakness in finger abductors. Patient has 3/5 strength in LLE proximally and 4/5 in toe extensors. The RLE had 4/5 strength proximally and 5/5 distally. Absent reflexes in upper extremities bilaterally and 3/5 reflexes in knees and ankles. Plantars are equivocal .     Sensory: Patient has sensory level at T2..     Coordination: not evaluated.    Gait: not evaluated.    .  LABORATORY RESULTS      Recent Labs   Lab 21  002   WBC 7.1   HGB 14.7   HCT 43.1      *     Recent Labs   Lab 21  0025 21  0022   NA  --  142   POTASSIUM  --  4.4   CHLORIDE  --  114*   CO2  --  25   ANIONGAP  --  3   GLC  --  139*   BUN  --  22   CR 1.5* 1.37*   GFRESTIMATED 46* 52*   GARRISON  --  8.9   MAG  --  2.3   PROTTOTAL  --  7.6   ALBUMIN  --  3.4   BILITOTAL  --  0.4   ALKPHOS  --  89 "   AST  --  33   ALT  --  30     Recent Labs   Lab 07/12/21  0150   COLOR Light Yellow   APPEARANCE Clear   URINEGLC Negative   URINEBILI Negative   URINEKETONE Negative   SG 1.025   UBLD Small*   URINEPH 5.5   PROTEIN 50 *   NITRITE Negative   LEUKEST Negative   RBCU 9*   WBCU 0       IMAGING RESULTS     Recent Results (from the past 24 hour(s))   XR Chest Port 1 View    Narrative    EXAM: XR CHEST PORT 1 VIEW  LOCATION: [facility]  DATE/TIME: 7/12/2021 12:42 AM    INDICATION: SOB  COMPARISON: None.      Impression    IMPRESSION: Heart size within normal limits. Moderately low lung volumes with bibasilar infiltrate. Upper lung zones are clear. No visible pneumothorax.   CT Head w/o Contrast    Narrative    EXAM: CT HEAD W/O CONTRAST  LOCATION: [facility]  DATE/TIME: 7/12/2021 12:45 AM    INDICATION: Neuro deficit, acute, stroke suspected  COMPARISON: None.  TECHNIQUE: Routine CT Head without IV contrast. Multiplanar reformats. Dose reduction techniques were used.    FINDINGS:  INTRACRANIAL CONTENTS: No intracranial hemorrhage, extraaxial collection, or mass effect.  No CT evidence of acute infarct. Mild presumed chronic small vessel ischemic changes. Mild generalized volume loss. No hydrocephalus.     VISUALIZED ORBITS/SINUSES/MASTOIDS: No intraorbital abnormality. No paranasal sinus mucosal disease. No middle ear or mastoid effusion.    BONES/SOFT TISSUES: No acute abnormality.      Impression    IMPRESSION:  1.  No acute intracranial process.   CT Chest Pulmonary Embolism w Contrast    Narrative    EXAM: CT CHEST PULMONARY EMBOLISM W CONTRAST  LOCATION: [facility]  DATE/TIME: 7/12/2021 12:46 AM    INDICATION: Shortness of breath.  COMPARISON: None.  TECHNIQUE: CT chest pulmonary angiogram during arterial phase injection of IV contrast. Multiplanar reformats and MIP reconstructions were performed. Dose reduction techniques were used.   CONTRAST: 90 mL  Isovue-370.    FINDINGS:  ANGIOGRAM CHEST: Pulmonary arteries are normal caliber and negative for pulmonary emboli. Thoracic aorta is negative for dissection. No CT evidence of right heart strain.    LUNGS AND PLEURA: Moderate patchy infiltrates versus atelectasis with some mild consolidation in the lower lungs greatest in the lower lobes bilaterally. While much of this may be due to atelectasis, cannot exclude pneumonitis and clinical correlation is   needed. Central airways are clear. No pleural effusions.    MEDIASTINUM/AXILLAE: A mildly prominent right hilar lymph node is probably reactive in nature. Otherwise no significant adenopathy. Mild cardiac enlargement. No pericardial effusion. Normal caliber thoracic aorta. Esophagus is grossly negative.    CORONARY ARTERY CALCIFICATION: None.    UPPER ABDOMEN: Normal.    MUSCULOSKELETAL: Hypertrophic/degenerative changes thoracic spine.      Impression    IMPRESSION:  1.  Negative for pulmonary embolism.    2.  Moderately prominent patchy opacities in both lower lungs greatest in the lower lobes. While some of this is probably due to atelectasis, there is also concern for underlying pneumonitis as well. Clinical correlation.    3.  Cardiac enlargement.    4.  No significant findings elsewhere.   Abd/pelvis CT no contrast - Stone Protocol    Narrative    EXAM: CT ABDOMEN PELVIS W/O CONTRAST  LOCATION: [facility]  DATE/TIME: 7/12/2021 2:27 AM    INDICATION: Abdominal distension  COMPARISON: Chest CT performed on the same date.  TECHNIQUE: CT scan of the abdomen and pelvis was performed without IV contrast. Multiplanar reformats were obtained. Dose reduction techniques were used.  CONTRAST: None.    FINDINGS:   LOWER CHEST: Bibasilar atelectasis and/or infiltrate.    HEPATOBILIARY: Nondistended gallbladder. Slight nodularity of the liver surface contour inferiorly.    PANCREAS: Normal.    SPLEEN: Normal.    ADRENAL GLANDS: Normal.    KIDNEYS/BLADDER:  Excreted contrast opacifies the collecting systems. There is an exophytic lesion of intermediate density arising from the right renal lower pole measuring 2.7 x 2.5 cm on image 100 of series 3. No hydronephrosis. Excreted contrast within   the urinary bladder. Bladder is unremarkable.    BOWEL: Normal caliber large and small bowel. Normal appendix. No free air.    LYMPH NODES: Normal.    VASCULATURE: Mild aortoiliac atherosclerotic calcification.    PELVIC ORGANS: Normal.    MUSCULOSKELETAL: Lumbar spine facet arthropathy. Small fat-containing supraumbilical ventral hernia.      Impression    IMPRESSION:   1.  No bowel obstruction or free air.    2.  Bibasilar atelectasis or infiltrate.    3.  Slight nodularity of the liver surface contour concerning for early changes of cirrhosis and/or fibrosis.    4.  2.7 cm exophytic focus of intermediate density arising from the right renal lower pole. Recommend follow-up ultrasound on a nonemergent basis to determine if this is a complex cyst or solid lesion.     Cervical spine CT w/o contrast    Narrative    EXAM: CT CERVICAL SPINE W/O CONTRAST  LOCATION: [facility]  DATE/TIME: 7/12/2021 2:27 AM    INDICATION: Cervical radiculopathy, prior C-spine surgery  Cervical radiculopathy, infection suspected  COMPARISON: None.  TECHNIQUE: Routine CT Cervical Spine without IV contrast. Multiplanar reformats. Dose reduction techniques were used.    FINDINGS:  VERTEBRA: Straightening of usual cervical lordosis. There is solid fusion of C5-C7. No fracture or posttraumatic subluxation.     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 Moderate foraminal narrowing bilaterally at C3-4, on the right C4-5, bilaterally at C5-6. There is at least moderate central canal stenosis at C4-5 and C3-4.    PARASPINAL: No extraspinal abnormality.      Impression    IMPRESSION:  1.  No evidence for acute fracture or subluxation.  2.  Straightening of  the usual cervical lordosis with solid fusion from C5 through C7.  3.  The spinal canal is narrow on a congenital basis pedicles and there is at least moderate central canal stenosis at C3-4 and C4-5.  4.  Multilevel neural foraminal narrowing as described.   Echocardiogram Complete   Result Value    LVEF  60-65%    [facility]    [id number]  [id number]  [id number]  [id number]^ROLANDO^SUGAR^MACO     [facility]  Echocardiography Laboratory  [address] [place] [address]     Name: ELHAM TURNER  MRN: [id number]  : 1950  Study Date: 2021 01:54 PM  Age: 70 yrs  Gender: Male  Patient Location: [facility]  Reason For Study: SOB  Ordering Physician: SUGAR MARSHALL  Referring Physician: [facility]  Performed By: Robi Hebert RDCS     BSA: 2.3 m2  Height: 71 in  Weight: 245 lb  HR: 69  BP: 141/78 mmHg  ______________________________________________________________________________  Procedure  Complete Portable Echo Adult. Optison (NDC #8057-2067) given intravenously.  ______________________________________________________________________________  Interpretation Summary     Very poor image quality, likely due to lung interference. This notably  decreases quality of interpretation  Left ventricular systolic function is normal.  Cannot tell is septum is mildly hypokinetic or abnormal motion due to  conduction disturbance  RV not well seen, probably normal RVEF, possible RVH  ______________________________________________________________________________  Left Ventricle  The left ventricle is not well visualized. The left ventricle is normal in  size. Left ventricular systolic function is normal. The visual ejection  fraction is 60-65%. Left ventricular diastolic function is indeterminate.  Regional wall motion abnormalities cannot be excluded due to limited  visualization. Cannot tell is septum is mildly hypokinetic or abnormal motion  due to conduction disturbance.     Right Ventricle  RV not  well seen, probably normal RVEF, possible RVH. The right ventricle is  not well visualized.     Atria  Normal left atrial size. Right atrial size is normal.     Mitral Valve  The mitral valve is not well visualized. Grossly normal mitral valve.     Tricuspid Valve  The tricuspid valve is not well visualized. TV not well seen, grossly normal.     Aortic Valve  The aortic valve is not well visualized. No hemodynamically significant  valvular aortic stenosis.     Pulmonic Valve  The pulmonic valve is not well visualized.     Vessels  The aortic root is not well visualized.     Pericardium  The pericardium appears normal.     Rhythm  Sinus rhythm was noted.  ______________________________________________________________________________  MMode/2D Measurements & Calculations  Ao root diam: 3.2 cm  LA dimension: 3.1 cm     LA/Ao: 0.99  LA Volume (BP): 36.7 ml  LA Volume Index (BP): 16.0 ml/m2     Doppler Measurements & Calculations  MV E max melania: 94.6 cm/sec  MV A max melania: 101.4 cm/sec  MV E/A: 0.93  MV dec time: 0.27 sec  E/E' av.9  Lateral E/e': 12.6  Medial E/e': 11.3     ______________________________________________________________________________  Report approved by: Janie Blakely 2021 02:49 PM         MR Brain w/o & w Contrast    Narrative    EXAM: MR BRAIN W/O and W CONTRAST, MR CERVICAL SPINE W/O and W CONTRAST  LOCATION: [facility]  DATE/TIME: 2021 5:15 PM    INDICATION: Bilateral upper extremity weakness. Acute onset of neck pain and shoulder pain.  COMPARISON: Head CT 2021 and cervical spine CT 2021.  CONTRAST: 12 mL Gadavist.  TECHNIQUE:   1) Routine multiplanar multisequence head MRI without and with intravenous contrast.  2) Routine Cervical Spine MRI without and with IV contrast.    FINDINGS:  HEAD MRI:  INTRACRANIAL CONTENTS: No acute or subacute infarct. No mass, acute hemorrhage, or extra-axial fluid collections. Small, chronic, lacunar infarct in the left  thalamus. Minimal presumed chronic small vessel ischemic change. Mild generalized cerebral   atrophy. No hydrocephalus. Normal position of the cerebellar tonsils. No pathologic enhancement.    SELLA: No abnormality accounting for technique.    OSSEOUS STRUCTURES/SOFT TISSUES: Normal marrow signal. The major intracranial vascular flow voids are maintained.     ORBITS: No abnormality accounting for technique.     SINUSES/MASTOIDS: No paranasal sinus mucosal disease. No middle ear or mastoid effusion.     CERVICAL SPINE:   Normal vertebral body heights. Straightened lordosis. Solid C5-C6 and C6-C7 interbody fusions. Bony bridging at C5-C6 is better seen on CT. Mild T2 prolongation in each hemicord from C3 through C4. It is slightly more prominent on the left. No   extraspinal abnormality.    Craniovertebral junction and C1-C2: Normal.    C2-C3: 3 mm retrolisthesis. Normal disc height. Mild disc T2 signal loss. Mild posterior annular bulge. Mild bilateral facet arthropathy. No spinal canal or neural foraminal stenosis.     C3-C4: Moderate disc height loss. Mild circumferential disc osteophyte complex. Mild bilateral facet arthropathy. Moderate to severe spinal canal stenosis. Moderate to severe bilateral neural foraminal stenoses.     C4-C5: Moderate disc height loss. Mild to moderate circumferential disc osteophyte complex. Mild bilateral facet arthropathy. Severe spinal canal stenosis. Moderate to severe bilateral neural foraminal stenoses.     C5-C6: Solid interbody fusion. Mild facet arthropathy bilaterally. Mild effacement of the ventral spinal canal. Mild bilateral neural foraminal stenoses.     C6-C7: Solid interbody fusion. Mild bilateral facet arthropathy. No spinal canal or neural foraminal stenosis.     C7-T1: Mild disc height loss. Mild right foraminal osteophytic spurring. Mild bilateral facet arthropathy. No spinal canal stenosis. Mild to moderate right neural foraminal stenosis. No left neural foraminal  stenosis.      Impression    IMPRESSION:  HEAD MRI:   1.  No mass, hemorrhage or acute stroke.  2.  Small chronic lacunar infarct in the left thalamus.  3.  Minimal presumed chronic small vessel ischemic change with mild generalized volume loss.    CERVICAL SPINE MRI:  1.  High-grade C3-C4 and C4-C5 spinal canal stenoses and bilateral neural foraminal stenoses.  2.  Abnormal T2 prolongation in each hemicord from C3 through C4. It is slightly more pronounced on the left.  3.  High-grade C3-C4 and C4-C5 spinal canal stenoses and bilateral neural foraminal stenoses.  4.  Solid C5-C6 and C6-C7 interbody fusions. Bony bridging at C5-C6 is better seen on CT.   MR Cervical Spine w/o & w Contrast    Narrative    EXAM: MR BRAIN W/O and W CONTRAST, MR CERVICAL SPINE W/O and W CONTRAST  LOCATION: [facility]  DATE/TIME: 7/12/2021 5:15 PM    INDICATION: Bilateral upper extremity weakness. Acute onset of neck pain and shoulder pain.  COMPARISON: Head CT 07/12/2021 and cervical spine CT 07/12/2021.  CONTRAST: 12 mL Gadavist.  TECHNIQUE:   1) Routine multiplanar multisequence head MRI without and with intravenous contrast.  2) Routine Cervical Spine MRI without and with IV contrast.    FINDINGS:  HEAD MRI:  INTRACRANIAL CONTENTS: No acute or subacute infarct. No mass, acute hemorrhage, or extra-axial fluid collections. Small, chronic, lacunar infarct in the left thalamus. Minimal presumed chronic small vessel ischemic change. Mild generalized cerebral   atrophy. No hydrocephalus. Normal position of the cerebellar tonsils. No pathologic enhancement.    SELLA: No abnormality accounting for technique.    OSSEOUS STRUCTURES/SOFT TISSUES: Normal marrow signal. The major intracranial vascular flow voids are maintained.     ORBITS: No abnormality accounting for technique.     SINUSES/MASTOIDS: No paranasal sinus mucosal disease. No middle ear or mastoid effusion.     CERVICAL SPINE:   Normal vertebral body heights.  Straightened lordosis. Solid C5-C6 and C6-C7 interbody fusions. Bony bridging at C5-C6 is better seen on CT. Mild T2 prolongation in each hemicord from C3 through C4. It is slightly more prominent on the left. No   extraspinal abnormality.    Craniovertebral junction and C1-C2: Normal.    C2-C3: 3 mm retrolisthesis. Normal disc height. Mild disc T2 signal loss. Mild posterior annular bulge. Mild bilateral facet arthropathy. No spinal canal or neural foraminal stenosis.     C3-C4: Moderate disc height loss. Mild circumferential disc osteophyte complex. Mild bilateral facet arthropathy. Moderate to severe spinal canal stenosis. Moderate to severe bilateral neural foraminal stenoses.     C4-C5: Moderate disc height loss. Mild to moderate circumferential disc osteophyte complex. Mild bilateral facet arthropathy. Severe spinal canal stenosis. Moderate to severe bilateral neural foraminal stenoses.     C5-C6: Solid interbody fusion. Mild facet arthropathy bilaterally. Mild effacement of the ventral spinal canal. Mild bilateral neural foraminal stenoses.     C6-C7: Solid interbody fusion. Mild bilateral facet arthropathy. No spinal canal or neural foraminal stenosis.     C7-T1: Mild disc height loss. Mild right foraminal osteophytic spurring. Mild bilateral facet arthropathy. No spinal canal stenosis. Mild to moderate right neural foraminal stenosis. No left neural foraminal stenosis.      Impression    IMPRESSION:  HEAD MRI:   1.  No mass, hemorrhage or acute stroke.  2.  Small chronic lacunar infarct in the left thalamus.  3.  Minimal presumed chronic small vessel ischemic change with mild generalized volume loss.    CERVICAL SPINE MRI:  1.  High-grade C3-C4 and C4-C5 spinal canal stenoses and bilateral neural foraminal stenoses.  2.  Abnormal T2 prolongation in each hemicord from C3 through C4. It is slightly more pronounced on the left.  3.  High-grade C3-C4 and C4-C5 spinal canal stenoses and bilateral neural  foraminal stenoses.  4.  Solid C5-C6 and C6-C7 interbody fusions. Bony bridging at C5-C6 is better seen on CT.       _____________________________________________________________________________    _____________________________________________________________________________          ASSESSMENT     1. Cervical myelopathy at C3 and C4- Compressive myelopathy due to cervical stenosis.        RECOMMENDATIONS   1. Neurosurgery to evaluate and consider decompressive surgery.  2. Start patient on IV Solumedrol 500 mg every day for 3 days

## 2021-07-13 NOTE — PLAN OF CARE
[id number]  Neurology placed methylprednisone order; neurosurgery paged per hospitalist, possible transfer tomorrow to [facility] and surgery to follow. Pt has generalized weakness with left upper and lower extremity more weak when compared to right; pt states improvement since methylprednisone was administered. Pt is alert and oriented and denies pain.

## 2021-07-14 ENCOUNTER — DOCUMENTATION ONLY (OUTPATIENT)
Dept: OTHER | Facility: CLINIC | Age: 71
End: 2021-07-14

## 2021-07-14 ENCOUNTER — ANESTHESIA (OUTPATIENT)
Dept: SURGERY | Facility: CLINIC | Age: 71
DRG: 471 | End: 2021-07-14
Payer: COMMERCIAL

## 2021-07-14 ENCOUNTER — HOSPITAL ENCOUNTER (INPATIENT)
Facility: CLINIC | Age: 71
LOS: 4 days | Discharge: NURSING, REHAB OR LONG-TERM CARE | DRG: 471 | End: 2021-07-18
Attending: NEUROLOGICAL SURGERY | Admitting: NEUROLOGICAL SURGERY
Payer: COMMERCIAL

## 2021-07-14 ENCOUNTER — APPOINTMENT (OUTPATIENT)
Dept: GENERAL RADIOLOGY | Facility: CLINIC | Age: 71
DRG: 471 | End: 2021-07-14
Attending: NEUROLOGICAL SURGERY
Payer: COMMERCIAL

## 2021-07-14 VITALS
SYSTOLIC BLOOD PRESSURE: 147 MMHG | HEART RATE: 78 BPM | TEMPERATURE: 98.1 F | DIASTOLIC BLOOD PRESSURE: 80 MMHG | WEIGHT: 249.2 LBS | OXYGEN SATURATION: 91 % | HEIGHT: 71 IN | BODY MASS INDEX: 34.89 KG/M2 | RESPIRATION RATE: 18 BRPM

## 2021-07-14 DIAGNOSIS — M48.02 CERVICAL STENOSIS OF SPINAL CANAL: Primary | ICD-10-CM

## 2021-07-14 DIAGNOSIS — M48.02 CERVICAL STENOSIS OF SPINAL CANAL: ICD-10-CM

## 2021-07-14 LAB
GLUCOSE BLDC GLUCOMTR-MCNC: 116 MG/DL (ref 70–99)
GLUCOSE BLDC GLUCOMTR-MCNC: 121 MG/DL (ref 70–99)
HBA1C MFR BLD: 5 % (ref 0–5.6)
HOLD SPECIMEN: NORMAL
PLATELET # BLD AUTO: 126 10E3/UL (ref 150–450)
POTASSIUM BLD-SCNC: 4.1 MMOL/L (ref 3.4–5.3)

## 2021-07-14 PROCEDURE — 250N000011 HC RX IP 250 OP 636: Performed by: ANESTHESIOLOGY

## 2021-07-14 PROCEDURE — 22853 INSJ BIOMECHANICAL DEVICE: CPT | Mod: AS | Performed by: NURSE PRACTITIONER

## 2021-07-14 PROCEDURE — 272N000001 HC OR GENERAL SUPPLY STERILE: Performed by: NEUROLOGICAL SURGERY

## 2021-07-14 PROCEDURE — 250N000013 HC RX MED GY IP 250 OP 250 PS 637: Performed by: NURSE PRACTITIONER

## 2021-07-14 PROCEDURE — 83036 HEMOGLOBIN GLYCOSYLATED A1C: CPT | Performed by: PHYSICIAN ASSISTANT

## 2021-07-14 PROCEDURE — 258N000003 HC RX IP 258 OP 636: Performed by: ANESTHESIOLOGY

## 2021-07-14 PROCEDURE — 250N000011 HC RX IP 250 OP 636: Performed by: NURSE PRACTITIONER

## 2021-07-14 PROCEDURE — 370N000017 HC ANESTHESIA TECHNICAL FEE, PER MIN: Performed by: NEUROLOGICAL SURGERY

## 2021-07-14 PROCEDURE — 0RG20A0 FUSION OF 2 OR MORE CERVICAL VERTEBRAL JOINTS WITH INTERBODY FUSION DEVICE, ANTERIOR APPROACH, ANTERIOR COLUMN, OPEN APPROACH: ICD-10-PCS | Performed by: NEUROLOGICAL SURGERY

## 2021-07-14 PROCEDURE — 22845 INSERT SPINE FIXATION DEVICE: CPT | Mod: 59 | Performed by: NEUROLOGICAL SURGERY

## 2021-07-14 PROCEDURE — 250N000013 HC RX MED GY IP 250 OP 250 PS 637: Performed by: PHYSICIAN ASSISTANT

## 2021-07-14 PROCEDURE — 36415 COLL VENOUS BLD VENIPUNCTURE: CPT | Performed by: HOSPITALIST

## 2021-07-14 PROCEDURE — 258N000003 HC RX IP 258 OP 636: Performed by: PHYSICIAN ASSISTANT

## 2021-07-14 PROCEDURE — 99222 1ST HOSP IP/OBS MODERATE 55: CPT | Performed by: PHYSICIAN ASSISTANT

## 2021-07-14 PROCEDURE — 36415 COLL VENOUS BLD VENIPUNCTURE: CPT | Performed by: ANESTHESIOLOGY

## 2021-07-14 PROCEDURE — 250N000011 HC RX IP 250 OP 636: Performed by: PHYSICIAN ASSISTANT

## 2021-07-14 PROCEDURE — 258N000003 HC RX IP 258 OP 636: Performed by: NURSE ANESTHETIST, CERTIFIED REGISTERED

## 2021-07-14 PROCEDURE — 85049 AUTOMATED PLATELET COUNT: CPT | Performed by: ANESTHESIOLOGY

## 2021-07-14 PROCEDURE — 999N000180 XR SURGERY CARM FLUORO LESS THAN 5 MIN

## 2021-07-14 PROCEDURE — 22853 INSJ BIOMECHANICAL DEVICE: CPT | Performed by: NEUROLOGICAL SURGERY

## 2021-07-14 PROCEDURE — 22552 ARTHRD ANT NTRBD CERVICAL EA: CPT | Mod: AS | Performed by: NURSE PRACTITIONER

## 2021-07-14 PROCEDURE — 278N000051 HC OR IMPLANT GENERAL: Performed by: NEUROLOGICAL SURGERY

## 2021-07-14 PROCEDURE — 01N10ZZ RELEASE CERVICAL NERVE, OPEN APPROACH: ICD-10-PCS | Performed by: NEUROLOGICAL SURGERY

## 2021-07-14 PROCEDURE — 999N000141 HC STATISTIC PRE-PROCEDURE NURSING ASSESSMENT: Performed by: NEUROLOGICAL SURGERY

## 2021-07-14 PROCEDURE — 22845 INSERT SPINE FIXATION DEVICE: CPT | Mod: AS | Performed by: NURSE PRACTITIONER

## 2021-07-14 PROCEDURE — C1713 ANCHOR/SCREW BN/BN,TIS/BN: HCPCS | Performed by: NEUROLOGICAL SURGERY

## 2021-07-14 PROCEDURE — 22551 ARTHRD ANT NTRBDY CERVICAL: CPT | Performed by: NEUROLOGICAL SURGERY

## 2021-07-14 PROCEDURE — 710N000009 HC RECOVERY PHASE 1, LEVEL 1, PER MIN: Performed by: NEUROLOGICAL SURGERY

## 2021-07-14 PROCEDURE — 22552 ARTHRD ANT NTRBD CERVICAL EA: CPT | Performed by: NEUROLOGICAL SURGERY

## 2021-07-14 PROCEDURE — 250N000009 HC RX 250: Performed by: NURSE ANESTHETIST, CERTIFIED REGISTERED

## 2021-07-14 PROCEDURE — C1762 CONN TISS, HUMAN(INC FASCIA): HCPCS | Performed by: NEUROLOGICAL SURGERY

## 2021-07-14 PROCEDURE — 120N000001 HC R&B MED SURG/OB

## 2021-07-14 PROCEDURE — 250N000011 HC RX IP 250 OP 636: Performed by: NEUROLOGICAL SURGERY

## 2021-07-14 PROCEDURE — 250N000025 HC SEVOFLURANE, PER MIN: Performed by: NEUROLOGICAL SURGERY

## 2021-07-14 PROCEDURE — 84132 ASSAY OF SERUM POTASSIUM: CPT | Performed by: HOSPITALIST

## 2021-07-14 PROCEDURE — 99207 PR CONSULT E&M CHANGED TO INITIAL LEVEL: CPT | Performed by: PHYSICIAN ASSISTANT

## 2021-07-14 PROCEDURE — 0RT30ZZ RESECTION OF CERVICAL VERTEBRAL DISC, OPEN APPROACH: ICD-10-PCS | Performed by: NEUROLOGICAL SURGERY

## 2021-07-14 PROCEDURE — 250N000011 HC RX IP 250 OP 636: Performed by: NURSE ANESTHETIST, CERTIFIED REGISTERED

## 2021-07-14 PROCEDURE — 250N000009 HC RX 250: Performed by: NEUROLOGICAL SURGERY

## 2021-07-14 PROCEDURE — 22551 ARTHRD ANT NTRBDY CERVICAL: CPT | Mod: AS | Performed by: NURSE PRACTITIONER

## 2021-07-14 PROCEDURE — 360N000084 HC SURGERY LEVEL 4 W/ FLUORO, PER MIN: Performed by: NEUROLOGICAL SURGERY

## 2021-07-14 DEVICE — GRAFT BONE FIBERS GRAFTON DBM DBF 1ML T50101: Type: IMPLANTABLE DEVICE | Site: SPINE CERVICAL | Status: FUNCTIONAL

## 2021-07-14 DEVICE — IMP SCR MEDT ZEVO 3.5X15MM ST VA 7723515: Type: IMPLANTABLE DEVICE | Site: SPINE CERVICAL | Status: FUNCTIONAL

## 2021-07-14 DEVICE — IMP PLATE CERV MEDT ZEVO 39MM 2 LVL 3002039: Type: IMPLANTABLE DEVICE | Site: SPINE CERVICAL | Status: FUNCTIONAL

## 2021-07-14 DEVICE — IMP SCR MEDT ZEVO 3.5X17MM ST VA 7723517: Type: IMPLANTABLE DEVICE | Site: SPINE CERVICAL | Status: FUNCTIONAL

## 2021-07-14 DEVICE — IMP SPACER MEDT CERVICAL ANATOMIC PEEK PTC 16X14X6MM 5030664: Type: IMPLANTABLE DEVICE | Site: SPINE CERVICAL | Status: FUNCTIONAL

## 2021-07-14 RX ORDER — NALOXONE HYDROCHLORIDE 0.4 MG/ML
0.2 INJECTION, SOLUTION INTRAMUSCULAR; INTRAVENOUS; SUBCUTANEOUS
Status: DISCONTINUED | OUTPATIENT
Start: 2021-07-14 | End: 2021-07-18 | Stop reason: HOSPADM

## 2021-07-14 RX ORDER — CEFAZOLIN SODIUM 2 G/100ML
2 INJECTION, SOLUTION INTRAVENOUS SEE ADMIN INSTRUCTIONS
Status: DISCONTINUED | OUTPATIENT
Start: 2021-07-14 | End: 2021-07-14

## 2021-07-14 RX ORDER — DEXTROSE MONOHYDRATE 25 G/50ML
25-50 INJECTION, SOLUTION INTRAVENOUS
Status: DISCONTINUED | OUTPATIENT
Start: 2021-07-14 | End: 2021-07-18 | Stop reason: HOSPADM

## 2021-07-14 RX ORDER — ONDANSETRON 2 MG/ML
INJECTION INTRAMUSCULAR; INTRAVENOUS PRN
Status: DISCONTINUED | OUTPATIENT
Start: 2021-07-14 | End: 2021-07-14

## 2021-07-14 RX ORDER — PROCHLORPERAZINE MALEATE 5 MG
5 TABLET ORAL EVERY 6 HOURS PRN
Status: DISCONTINUED | OUTPATIENT
Start: 2021-07-14 | End: 2021-07-18 | Stop reason: HOSPADM

## 2021-07-14 RX ORDER — POLYETHYLENE GLYCOL 3350 17 G/17G
17 POWDER, FOR SOLUTION ORAL DAILY
Status: DISCONTINUED | OUTPATIENT
Start: 2021-07-15 | End: 2021-07-18 | Stop reason: HOSPADM

## 2021-07-14 RX ORDER — ACETAMINOPHEN 325 MG/1
975 TABLET ORAL EVERY 8 HOURS
Status: DISPENSED | OUTPATIENT
Start: 2021-07-14 | End: 2021-07-17

## 2021-07-14 RX ORDER — ACETAMINOPHEN 325 MG/1
975 TABLET ORAL ONCE
Status: COMPLETED | OUTPATIENT
Start: 2021-07-14 | End: 2021-07-14

## 2021-07-14 RX ORDER — HYDROMORPHONE HCL IN WATER/PF 6 MG/30 ML
0.2 PATIENT CONTROLLED ANALGESIA SYRINGE INTRAVENOUS
Status: DISCONTINUED | OUTPATIENT
Start: 2021-07-14 | End: 2021-07-18 | Stop reason: HOSPADM

## 2021-07-14 RX ORDER — BUPIVACAINE HYDROCHLORIDE AND EPINEPHRINE 2.5; 5 MG/ML; UG/ML
INJECTION, SOLUTION INFILTRATION; PERINEURAL PRN
Status: DISCONTINUED | OUTPATIENT
Start: 2021-07-14 | End: 2021-07-14 | Stop reason: HOSPADM

## 2021-07-14 RX ORDER — ACETAMINOPHEN 325 MG/1
650 TABLET ORAL EVERY 4 HOURS PRN
Status: DISCONTINUED | OUTPATIENT
Start: 2021-07-17 | End: 2021-07-18 | Stop reason: HOSPADM

## 2021-07-14 RX ORDER — SODIUM CHLORIDE, SODIUM LACTATE, POTASSIUM CHLORIDE, CALCIUM CHLORIDE 600; 310; 30; 20 MG/100ML; MG/100ML; MG/100ML; MG/100ML
INJECTION, SOLUTION INTRAVENOUS CONTINUOUS
Status: DISCONTINUED | OUTPATIENT
Start: 2021-07-14 | End: 2021-07-14 | Stop reason: HOSPADM

## 2021-07-14 RX ORDER — SODIUM CHLORIDE, SODIUM LACTATE, POTASSIUM CHLORIDE, CALCIUM CHLORIDE 600; 310; 30; 20 MG/100ML; MG/100ML; MG/100ML; MG/100ML
INJECTION, SOLUTION INTRAVENOUS CONTINUOUS
Status: DISCONTINUED | OUTPATIENT
Start: 2021-07-14 | End: 2021-07-14

## 2021-07-14 RX ORDER — HYDRALAZINE HYDROCHLORIDE 20 MG/ML
10 INJECTION INTRAMUSCULAR; INTRAVENOUS EVERY 4 HOURS PRN
Status: DISCONTINUED | OUTPATIENT
Start: 2021-07-14 | End: 2021-07-18 | Stop reason: HOSPADM

## 2021-07-14 RX ORDER — SODIUM CHLORIDE, SODIUM LACTATE, POTASSIUM CHLORIDE, CALCIUM CHLORIDE 600; 310; 30; 20 MG/100ML; MG/100ML; MG/100ML; MG/100ML
INJECTION, SOLUTION INTRAVENOUS CONTINUOUS PRN
Status: DISCONTINUED | OUTPATIENT
Start: 2021-07-14 | End: 2021-07-14

## 2021-07-14 RX ORDER — FENTANYL CITRATE 50 UG/ML
25-50 INJECTION, SOLUTION INTRAMUSCULAR; INTRAVENOUS EVERY 5 MIN PRN
Status: DISCONTINUED | OUTPATIENT
Start: 2021-07-14 | End: 2021-07-14 | Stop reason: HOSPADM

## 2021-07-14 RX ORDER — CEFAZOLIN SODIUM 2 G/100ML
2 INJECTION, SOLUTION INTRAVENOUS
Status: DISCONTINUED | OUTPATIENT
Start: 2021-07-14 | End: 2021-07-14

## 2021-07-14 RX ORDER — AZITHROMYCIN 250 MG/1
250 TABLET, FILM COATED ORAL EVERY EVENING
Status: COMPLETED | OUTPATIENT
Start: 2021-07-14 | End: 2021-07-16

## 2021-07-14 RX ORDER — KETAMINE HYDROCHLORIDE 10 MG/ML
INJECTION INTRAMUSCULAR; INTRAVENOUS PRN
Status: DISCONTINUED | OUTPATIENT
Start: 2021-07-14 | End: 2021-07-14

## 2021-07-14 RX ORDER — SODIUM CHLORIDE 9 MG/ML
INJECTION, SOLUTION INTRAVENOUS CONTINUOUS
Status: DISCONTINUED | OUTPATIENT
Start: 2021-07-14 | End: 2021-07-16

## 2021-07-14 RX ORDER — HYDROXYZINE HYDROCHLORIDE 10 MG/1
10 TABLET, FILM COATED ORAL EVERY 6 HOURS PRN
Status: DISCONTINUED | OUTPATIENT
Start: 2021-07-14 | End: 2021-07-18 | Stop reason: HOSPADM

## 2021-07-14 RX ORDER — CEFAZOLIN SODIUM 2 G/100ML
2 INJECTION, SOLUTION INTRAVENOUS
Status: COMPLETED | OUTPATIENT
Start: 2021-07-14 | End: 2021-07-14

## 2021-07-14 RX ORDER — FENTANYL CITRATE 50 UG/ML
INJECTION, SOLUTION INTRAMUSCULAR; INTRAVENOUS PRN
Status: DISCONTINUED | OUTPATIENT
Start: 2021-07-14 | End: 2021-07-14

## 2021-07-14 RX ORDER — LIDOCAINE HYDROCHLORIDE 20 MG/ML
INJECTION, SOLUTION INFILTRATION; PERINEURAL PRN
Status: DISCONTINUED | OUTPATIENT
Start: 2021-07-14 | End: 2021-07-14

## 2021-07-14 RX ORDER — AMOXICILLIN 250 MG
1 CAPSULE ORAL 2 TIMES DAILY
Status: DISCONTINUED | OUTPATIENT
Start: 2021-07-14 | End: 2021-07-18 | Stop reason: HOSPADM

## 2021-07-14 RX ORDER — CEFAZOLIN SODIUM 2 G/100ML
2 INJECTION, SOLUTION INTRAVENOUS EVERY 8 HOURS
Status: DISCONTINUED | OUTPATIENT
Start: 2021-07-14 | End: 2021-07-14

## 2021-07-14 RX ORDER — PROPOFOL 10 MG/ML
INJECTION, EMULSION INTRAVENOUS PRN
Status: DISCONTINUED | OUTPATIENT
Start: 2021-07-14 | End: 2021-07-14

## 2021-07-14 RX ORDER — BISACODYL 10 MG
10 SUPPOSITORY, RECTAL RECTAL DAILY PRN
Status: DISCONTINUED | OUTPATIENT
Start: 2021-07-14 | End: 2021-07-18 | Stop reason: HOSPADM

## 2021-07-14 RX ORDER — ONDANSETRON 4 MG/1
4 TABLET, ORALLY DISINTEGRATING ORAL EVERY 6 HOURS PRN
Status: DISCONTINUED | OUTPATIENT
Start: 2021-07-14 | End: 2021-07-18 | Stop reason: HOSPADM

## 2021-07-14 RX ORDER — OXYCODONE HYDROCHLORIDE 5 MG/1
5 TABLET ORAL EVERY 4 HOURS PRN
Status: DISCONTINUED | OUTPATIENT
Start: 2021-07-14 | End: 2021-07-18 | Stop reason: HOSPADM

## 2021-07-14 RX ORDER — PROPOFOL 10 MG/ML
INJECTION, EMULSION INTRAVENOUS CONTINUOUS PRN
Status: DISCONTINUED | OUTPATIENT
Start: 2021-07-14 | End: 2021-07-14

## 2021-07-14 RX ORDER — NALOXONE HYDROCHLORIDE 0.4 MG/ML
0.4 INJECTION, SOLUTION INTRAMUSCULAR; INTRAVENOUS; SUBCUTANEOUS
Status: DISCONTINUED | OUTPATIENT
Start: 2021-07-14 | End: 2021-07-18 | Stop reason: HOSPADM

## 2021-07-14 RX ORDER — ONDANSETRON 2 MG/ML
4 INJECTION INTRAMUSCULAR; INTRAVENOUS EVERY 30 MIN PRN
Status: DISCONTINUED | OUTPATIENT
Start: 2021-07-14 | End: 2021-07-14 | Stop reason: HOSPADM

## 2021-07-14 RX ORDER — EPHEDRINE SULFATE 50 MG/ML
INJECTION, SOLUTION INTRAMUSCULAR; INTRAVENOUS; SUBCUTANEOUS PRN
Status: DISCONTINUED | OUTPATIENT
Start: 2021-07-14 | End: 2021-07-14

## 2021-07-14 RX ORDER — LIDOCAINE 40 MG/G
CREAM TOPICAL
Status: DISCONTINUED | OUTPATIENT
Start: 2021-07-14 | End: 2021-07-18 | Stop reason: HOSPADM

## 2021-07-14 RX ORDER — SODIUM CHLORIDE, SODIUM LACTATE, POTASSIUM CHLORIDE, CALCIUM CHLORIDE 600; 310; 30; 20 MG/100ML; MG/100ML; MG/100ML; MG/100ML
INJECTION, SOLUTION INTRAVENOUS CONTINUOUS
Status: CANCELLED | OUTPATIENT
Start: 2021-07-14

## 2021-07-14 RX ORDER — CALCIUM CARBONATE 500 MG/1
500 TABLET, CHEWABLE ORAL 4 TIMES DAILY PRN
Status: DISCONTINUED | OUTPATIENT
Start: 2021-07-14 | End: 2021-07-18 | Stop reason: HOSPADM

## 2021-07-14 RX ORDER — DEXAMETHASONE SODIUM PHOSPHATE 4 MG/ML
INJECTION, SOLUTION INTRA-ARTICULAR; INTRALESIONAL; INTRAMUSCULAR; INTRAVENOUS; SOFT TISSUE PRN
Status: DISCONTINUED | OUTPATIENT
Start: 2021-07-14 | End: 2021-07-14

## 2021-07-14 RX ORDER — HYDROMORPHONE HCL IN WATER/PF 6 MG/30 ML
0.2 PATIENT CONTROLLED ANALGESIA SYRINGE INTRAVENOUS EVERY 5 MIN PRN
Status: DISCONTINUED | OUTPATIENT
Start: 2021-07-14 | End: 2021-07-14 | Stop reason: HOSPADM

## 2021-07-14 RX ORDER — NICOTINE POLACRILEX 4 MG
15-30 LOZENGE BUCCAL
Status: DISCONTINUED | OUTPATIENT
Start: 2021-07-14 | End: 2021-07-18 | Stop reason: HOSPADM

## 2021-07-14 RX ORDER — ONDANSETRON 4 MG/1
4 TABLET, ORALLY DISINTEGRATING ORAL EVERY 30 MIN PRN
Status: DISCONTINUED | OUTPATIENT
Start: 2021-07-14 | End: 2021-07-14 | Stop reason: HOSPADM

## 2021-07-14 RX ORDER — CEFTRIAXONE 2 G/1
2 INJECTION, POWDER, FOR SOLUTION INTRAMUSCULAR; INTRAVENOUS EVERY 24 HOURS
Status: DISCONTINUED | OUTPATIENT
Start: 2021-07-14 | End: 2021-07-18

## 2021-07-14 RX ORDER — METHOCARBAMOL 750 MG/1
750 TABLET, FILM COATED ORAL EVERY 6 HOURS PRN
Status: DISCONTINUED | OUTPATIENT
Start: 2021-07-14 | End: 2021-07-18 | Stop reason: HOSPADM

## 2021-07-14 RX ORDER — ONDANSETRON 2 MG/ML
4 INJECTION INTRAMUSCULAR; INTRAVENOUS EVERY 6 HOURS PRN
Status: DISCONTINUED | OUTPATIENT
Start: 2021-07-14 | End: 2021-07-18 | Stop reason: HOSPADM

## 2021-07-14 RX ADMIN — OXYCODONE HYDROCHLORIDE 5 MG: 5 TABLET ORAL at 18:46

## 2021-07-14 RX ADMIN — AZITHROMYCIN MONOHYDRATE 250 MG: 250 TABLET ORAL at 20:21

## 2021-07-14 RX ADMIN — MIDAZOLAM 1 MG: 1 INJECTION INTRAMUSCULAR; INTRAVENOUS at 12:54

## 2021-07-14 RX ADMIN — Medication 5 MG: at 15:10

## 2021-07-14 RX ADMIN — SENNOSIDES AND DOCUSATE SODIUM 1 TABLET: 8.6; 5 TABLET ORAL at 20:21

## 2021-07-14 RX ADMIN — FENTANYL CITRATE 25 MCG: 50 INJECTION, SOLUTION INTRAMUSCULAR; INTRAVENOUS at 16:45

## 2021-07-14 RX ADMIN — PROPOFOL 170 MG: 10 INJECTION, EMULSION INTRAVENOUS at 13:02

## 2021-07-14 RX ADMIN — SODIUM CHLORIDE, POTASSIUM CHLORIDE, SODIUM LACTATE AND CALCIUM CHLORIDE: 600; 310; 30; 20 INJECTION, SOLUTION INTRAVENOUS at 09:16

## 2021-07-14 RX ADMIN — HYDROMORPHONE HYDROCHLORIDE 0.2 MG: 0.2 INJECTION, SOLUTION INTRAMUSCULAR; INTRAVENOUS; SUBCUTANEOUS at 16:20

## 2021-07-14 RX ADMIN — LIDOCAINE HYDROCHLORIDE 50 MG: 20 INJECTION, SOLUTION INFILTRATION; PERINEURAL at 13:02

## 2021-07-14 RX ADMIN — SODIUM CHLORIDE, POTASSIUM CHLORIDE, SODIUM LACTATE AND CALCIUM CHLORIDE: 600; 310; 30; 20 INJECTION, SOLUTION INTRAVENOUS at 13:10

## 2021-07-14 RX ADMIN — FENTANYL CITRATE 25 MCG: 50 INJECTION, SOLUTION INTRAMUSCULAR; INTRAVENOUS at 16:56

## 2021-07-14 RX ADMIN — PHENYLEPHRINE HYDROCHLORIDE 0.2 MCG/KG/MIN: 10 INJECTION INTRAVENOUS at 13:35

## 2021-07-14 RX ADMIN — ONDANSETRON 4 MG: 2 INJECTION INTRAMUSCULAR; INTRAVENOUS at 15:11

## 2021-07-14 RX ADMIN — PROPOFOL 30 MG: 10 INJECTION, EMULSION INTRAVENOUS at 14:05

## 2021-07-14 RX ADMIN — ACETAMINOPHEN 975 MG: 325 TABLET, FILM COATED ORAL at 09:27

## 2021-07-14 RX ADMIN — CEFTRIAXONE SODIUM 2 G: 2 INJECTION, POWDER, FOR SOLUTION INTRAMUSCULAR; INTRAVENOUS at 20:21

## 2021-07-14 RX ADMIN — ROCURONIUM BROMIDE 50 MG: 10 INJECTION INTRAVENOUS at 13:02

## 2021-07-14 RX ADMIN — ROCURONIUM BROMIDE 20 MG: 10 INJECTION INTRAVENOUS at 14:05

## 2021-07-14 RX ADMIN — Medication 10 MG: at 13:50

## 2021-07-14 RX ADMIN — SODIUM CHLORIDE, POTASSIUM CHLORIDE, SODIUM LACTATE AND CALCIUM CHLORIDE: 600; 310; 30; 20 INJECTION, SOLUTION INTRAVENOUS at 06:43

## 2021-07-14 RX ADMIN — CEFAZOLIN SODIUM 2 G: 2 INJECTION, SOLUTION INTRAVENOUS at 13:08

## 2021-07-14 RX ADMIN — FENTANYL CITRATE 50 MCG: 50 INJECTION, SOLUTION INTRAMUSCULAR; INTRAVENOUS at 13:48

## 2021-07-14 RX ADMIN — ROCURONIUM BROMIDE 10 MG: 10 INJECTION INTRAVENOUS at 14:45

## 2021-07-14 RX ADMIN — OXYCODONE HYDROCHLORIDE 5 MG: 5 TABLET ORAL at 23:50

## 2021-07-14 RX ADMIN — ACETAMINOPHEN 975 MG: 325 TABLET, FILM COATED ORAL at 18:46

## 2021-07-14 RX ADMIN — SODIUM CHLORIDE 500 MG: 9 INJECTION, SOLUTION INTRAVENOUS at 21:07

## 2021-07-14 RX ADMIN — SUGAMMADEX 200 MG: 100 INJECTION, SOLUTION INTRAVENOUS at 15:11

## 2021-07-14 RX ADMIN — HYDROMORPHONE HYDROCHLORIDE 0.2 MG: 0.2 INJECTION, SOLUTION INTRAMUSCULAR; INTRAVENOUS; SUBCUTANEOUS at 17:07

## 2021-07-14 RX ADMIN — SODIUM CHLORIDE: 9 INJECTION, SOLUTION INTRAVENOUS at 22:37

## 2021-07-14 RX ADMIN — Medication 10 MG: at 14:45

## 2021-07-14 RX ADMIN — FENTANYL CITRATE 50 MCG: 50 INJECTION, SOLUTION INTRAMUSCULAR; INTRAVENOUS at 14:05

## 2021-07-14 RX ADMIN — HYDROMORPHONE HYDROCHLORIDE 0.2 MG: 0.2 INJECTION, SOLUTION INTRAMUSCULAR; INTRAVENOUS; SUBCUTANEOUS at 16:10

## 2021-07-14 RX ADMIN — DEXAMETHASONE SODIUM PHOSPHATE 4 MG: 4 INJECTION, SOLUTION INTRA-ARTICULAR; INTRALESIONAL; INTRAMUSCULAR; INTRAVENOUS; SOFT TISSUE at 13:55

## 2021-07-14 ASSESSMENT — ACTIVITIES OF DAILY LIVING (ADL)
ADLS_ACUITY_SCORE: 21
ADLS_ACUITY_SCORE: 27
ADLS_ACUITY_SCORE: 21
ADLS_ACUITY_SCORE: 21

## 2021-07-14 NOTE — OP NOTE
Date of surgery: 7/14/2021  Surgeon: Tee Hanley MD  Assistant: Mary Evans NP  Note: Mary Evans was present for and assisted with the entire surgery, and his/her role as an assistant was crucial for aid in positioning, exposure, suctioning, retraction, and closure    Preoperative diagnosis: Cervical myelopathy  Postoperative diagnosis: Cervical myelopathy    Procedure:  1.  C3-4 anterior discectomy and interbody arthrodesis  2.  C4-5 anterior discectomy and interbody arthrodesis  3.  C3-4 insertion of intervertebral graft  4.  C4-5 insertion of intervertebral graft  5.  C3 to C5 anterior spinal instrumentation with insertion of Medtronic Zevo plate and vertebral body screws  6.  Use of intraoperative microscope and fluoroscopy    EBL: 50 mL    Indications: 70-year-old male with history of C5-7 ACDF was admitted with progressive bilateral arm and leg weakness, worse on the left, and overt myelopathy.  MRI demonstrated severe stenosis from C3 to C5 with cord signal changes.  Non-surgical management without improvement.  Risks, benefits, indications, and alternatives were discussed with the patient and family in detail.  All their questions were answered, and they wished to proceed with surgery.    Description of surgery: The patient was positioned supine.  Sterile prepping and draping procedures were performed.  Antibiotics were administered and timeout was performed.  A right horizontal neck incision was performed.  The monopolar was used to divide the platysma, and the Metzenbaum scissors were used to create a plane in the fascia medial to the sternocleidomastoid muscle.  Blunt dissection was used to come down upon the anterior cervical spine.  The spinal needle was used to verify the correct level.  The monopolar was used to elevate the longus coli, and the Trimline retractor was inserted.  The 15 blade was used to perform an annulotomy in the C4-5 disc space.  A complete discectomy was  performed with combination of the high-speed drill, curettes, and pituitary rongeurs.  Interbody arthrodesis was performed with a high-speed drill.  The posterior osteophytes were removed with the drill, and the posterior longitudinal ligament was removed with the Kerrison rongeurs, with decompression of the bilateral neural foramina.  An intervertebral graft was delivered in the disc space at C4-5.  Next, the 15 blade was used to perform an annulotomy in the C3-4 disc space.  A complete discectomy was performed with combination of the high-speed drill, curettes, and pituitary rongeurs.  Interbody arthrodesis was performed with a high-speed drill.  The posterior osteophytes were removed with the drill, and the posterior longitudinal ligament was removed with the Kerrison rongeurs, with decompression of the bilateral neural foramina.  An intervertebral graft was delivered in the disc space at C3-4.  Subsequently, a Medtronic Zevo plate was positioned, and fixed into place from C3 to C5 with bilateral vertebral body screws at C3, C4, and C5.  Hemostasis was achieved.  Antibiotic irrigation was performed.  The platysma and dermal layers were closed with 3-0 Vicryl sutures, and the skin was closed with a running subcuticular stitch.  There were no intraprocedural complications.

## 2021-07-14 NOTE — PLAN OF CARE
Pt transferred at change of shift to [facility] via EMS. VSS on RA. Pre-op aware EMS on the way.

## 2021-07-14 NOTE — PLAN OF CARE
A/Ox4.  VSS on 2L O2 oxymask.  Desatted to 88% on 1L.  LS dim, denies SOB.  Cpap HS.  Tele: SR, denies CP.  Trace edema to BLE.  R PIV SL x2.  External catheter WDL, good UO.  A2 w/ lift.  Reg diet, good appetite.  NPO at midnight.  Plan to transfer to [facility] pre-op tomm at 0730 for surgical interventions.  Continue POC.

## 2021-07-14 NOTE — ANESTHESIA POSTPROCEDURE EVALUATION
Patient: Rigo Johns    Procedure(s):  C3-C4, C4-C5 anterior cervical discetomy and fusion    Diagnosis:Cervical stenosis of spinal canal [M48.02]  Diagnosis Additional Information: No value filed.    Anesthesia Type:  General    Note:     Postop Pain Control: Uneventful            Sign Out: Well controlled pain   PONV: No   Neuro/Psych: Uneventful            Sign Out: Acceptable/Baseline neuro status   Airway/Respiratory: Uneventful            Sign Out: Acceptable/Baseline resp. status   CV/Hemodynamics: Uneventful            Sign Out: Acceptable CV status   Other NRE: NONE   DID A NON-ROUTINE EVENT OCCUR? No           Last vitals:  Vitals:    07/14/21 1610 07/14/21 1620 07/14/21 1630   BP: (!) 156/89 (!) 150/86 (!) 148/82   Pulse: 81 81 84   Resp: 19 14 12   Temp:      SpO2: 93% 94% 93%       Last vitals prior to Anesthesia Care Transfer:  CRNA VITALS  7/14/2021 1508 - 7/14/2021 1608      7/14/2021             NIBP:  (!) 178/121    NIBP Mean:  154          Electronically Signed By: Ryann Jean-Baptiste MD  July 14, 2021  4:46 PM

## 2021-07-14 NOTE — ANESTHESIA PREPROCEDURE EVALUATION
Anesthesia Pre-Procedure Evaluation    Patient: Rigo Johns   MRN: [id number] : 1950        Preoperative Diagnosis: Cervical stenosis of spinal canal [M48.02]   Procedure : Procedure(s):  C3-C4, C4-C5 anterior cervical disketomy and fusion     No past medical history on file.   No past surgical history on file.   Allergies   Allergen Reactions     Shrimp GI Disturbance      Social History     Tobacco Use     Smoking status: Not on file   Substance Use Topics     Alcohol use: Not on file      Wt Readings from Last 1 Encounters:   21 113 kg (249 lb 3.2 oz)        Anesthesia Evaluation   Pt has had prior anesthetic.     No history of anesthetic complications       ROS/MED HX  ENT/Pulmonary: Comment: Hypoxemia  dyspnea , Possible pneumonia                                                                  IMPRESSION:  1.  Negative for pulmonary embolism.     2.  Moderately prominent patchy opacities in both lower lungs greatest in the lower lobes. While some of this is probably due to atelectasis, there is also concern for underlying pneumonitis as well. Clinical correlation.     3.  Cardiac enlargement.    (+) sleep apnea, uses CPAP,     Neurologic: Comment: 4 ext weakness, numbness, tingling, loss of bowel bladder fn, no change in sx with neck ext or flex,  bilat shoulder and neck pain                                                                     IMPRESSION:  HEAD MRI:   1.  No mass, hemorrhage or acute stroke.  2.  Small chronic lacunar infarct in the left thalamus.  3.  Minimal presumed chronic small vessel ischemic change with mild generalized volume loss.   bilat arm weakness        CERVICAL SPINE MRI:  1.  High-grade C3-C4 and C4-C5 spinal canal stenoses and bilateral neural foraminal stenoses.  2.  Abnormal T2 prolongation in each hemicord from C3 through C4. It is slightly more pronounced on the left.  3.  High-grade C3-C4 and C4-C5 spinal canal stenoses and bilateral neural foraminal  stenoses.  4.  Solid C5-C6 and C6-C7 interbody fusions. Bony bridging at C5-C6 is better seen on CT.      Cardiovascular: Comment: Untreated HTN    Procedure  Complete Portable Echo Adult. Optison (NDC #[id number]) given intravenously.  ______________________________________________________________________________  Interpretation Summary     Very poor image quality, likely due to lung interference. This notably  decreases quality of interpretation  Left ventricular systolic function is normal.  Cannot tell is septum is mildly hypokinetic or abnormal motion due to  conduction disturbance  RV not well seen, probably normal RVEF, possible RVH    (+) hypertension-----    METS/Exercise Tolerance: >4 METS    Hematologic: Comments: Thrombocytopenia       Musculoskeletal: Comment: C 5-7 ant fusion      GI/Hepatic:       Renal/Genitourinary:     (+) renal disease, type: ARF,     Endo:     (+) Obesity,     Psychiatric/Substance Use:       Infectious Disease:       Malignancy:       Other:            Physical Exam    Airway        Mallampati: II   TM distance: > 3 FB   Neck ROM: full   Mouth opening: > 3 cm    Respiratory Devices and Support     Nasal Canula 2  l/min.     Dental       (+) caps      Cardiovascular          Rhythm and rate: regular     Pulmonary           breath sounds clear to auscultation           OUTSIDE LABS:  CBC:   Lab Results   Component Value Date    WBC 7.7 07/13/2021    WBC 7.1 07/12/2021    HGB 15.2 07/13/2021    HGB 14.7 07/12/2021    HCT 47.1 07/13/2021    HCT 43.1 07/12/2021     (L) 07/13/2021     (L) 07/12/2021     BMP:   Lab Results   Component Value Date     07/13/2021     07/12/2021    POTASSIUM 4.0 07/13/2021    POTASSIUM 4.4 07/12/2021    CHLORIDE 114 (H) 07/13/2021    CHLORIDE 114 (H) 07/12/2021    CO2 21 07/13/2021    CO2 25 07/12/2021    BUN 29 07/13/2021    BUN 22 07/12/2021    CR 1.88 (H) 07/13/2021    CR 1.5 (H) 07/12/2021     (H) 07/13/2021      (H) 07/12/2021     COAGS: No results found for: PTT, INR, FIBR  POC: No results found for: BGM, HCG, HCGS  HEPATIC:   Lab Results   Component Value Date    ALBUMIN 3.4 07/12/2021    PROTTOTAL 7.6 07/12/2021    ALT 30 07/12/2021    AST 33 07/12/2021    ALKPHOS 89 07/12/2021    BILITOTAL 0.4 07/12/2021     OTHER:   Lab Results   Component Value Date    PH 7.26 (L) 07/12/2021    LACT 0.6 07/12/2021    GARRISON 9.1 07/13/2021    MAG 2.4 (H) 07/13/2021    LIPASE 133 07/12/2021    TSH 2.68 07/12/2021     Prior to Admission medications    Medication Sig Start Date End Date Taking? Authorizing Provider   acetaminophen (TYLENOL) 325 MG tablet Take 2 tablets (650 mg) by mouth every 6 hours as needed for mild pain or other (and adjunct with moderate or severe pain or per patient request) 7/13/21   Hesham Hammond MD   azithromycin (ZITHROMAX) 250 MG tablet Take 1 tablet (250 mg) by mouth daily 7/14/21   Hehsam Hammond MD   cefTRIAXone (ROCEPHIN) 2 GM vial Inject 2 g into the vein every 24 hours 7/14/21   Hesham Hammond MD   methylPREDNISolone sodium succinate 500 mg Inject 500 mg into the vein every 24 hours 7/13/21   Hesham Hammond MD   TURMERIC PO     Unknown, Entered By History     Current Facility-Administered Medications Ordered in Epic   Medication Dose Route Frequency Last Rate Last Admin     ceFAZolin (ANCEF) intermittent infusion 2 g in 100 mL dextrose PRE-MIX  2 g Intravenous Pre-Op/Pre-procedure x 1 dose         ceFAZolin (ANCEF) intermittent infusion 2 g in 100 mL dextrose PRE-MIX  2 g Intravenous See Admin Instructions         ceFAZolin (ANCEF) intermittent infusion 2 g in 100 mL dextrose PRE-MIX  2 g Intravenous Pre-Op/Pre-procedure x 1 dose         ceFAZolin (ANCEF) intermittent infusion 2 g in 100 mL dextrose PRE-MIX  2 g Intravenous See Admin Instructions         lactated ringers infusion   Intravenous Continuous 10 mL/hr at 07/14/21 0916 New Bag at 07/14/21 0916     lidocaine 1 % 0.1-1 mL  0.1-1 mL Other Q1H PRN         No  current Epic-ordered outpatient medications on file.       lactated ringers 10 mL/hr at 07/14/21 0916     No results for input(s): ABO, RH in the last 72591 hours.  No results for input(s): HCG in the last 76813 hours.  Recent Results (from the past 744 hour(s))   XR Chest Port 1 View    Narrative    EXAM: XR CHEST PORT 1 VIEW  LOCATION: [facility]  DATE/TIME: 7/12/2021 12:42 AM    INDICATION: SOB  COMPARISON: None.      Impression    IMPRESSION: Heart size within normal limits. Moderately low lung volumes with bibasilar infiltrate. Upper lung zones are clear. No visible pneumothorax.   CT Head w/o Contrast    Narrative    EXAM: CT HEAD W/O CONTRAST  LOCATION: [facility]  DATE/TIME: 7/12/2021 12:45 AM    INDICATION: Neuro deficit, acute, stroke suspected  COMPARISON: None.  TECHNIQUE: Routine CT Head without IV contrast. Multiplanar reformats. Dose reduction techniques were used.    FINDINGS:  INTRACRANIAL CONTENTS: No intracranial hemorrhage, extraaxial collection, or mass effect.  No CT evidence of acute infarct. Mild presumed chronic small vessel ischemic changes. Mild generalized volume loss. No hydrocephalus.     VISUALIZED ORBITS/SINUSES/MASTOIDS: No intraorbital abnormality. No paranasal sinus mucosal disease. No middle ear or mastoid effusion.    BONES/SOFT TISSUES: No acute abnormality.      Impression    IMPRESSION:  1.  No acute intracranial process.   CT Chest Pulmonary Embolism w Contrast    Narrative    EXAM: CT CHEST PULMONARY EMBOLISM W CONTRAST  LOCATION: [facility]  DATE/TIME: 7/12/2021 12:46 AM    INDICATION: Shortness of breath.  COMPARISON: None.  TECHNIQUE: CT chest pulmonary angiogram during arterial phase injection of IV contrast. Multiplanar reformats and MIP reconstructions were performed. Dose reduction techniques were used.   CONTRAST: 90 mL [id number].    FINDINGS:  ANGIOGRAM CHEST: Pulmonary arteries are normal caliber and negative for pulmonary  emboli. Thoracic aorta is negative for dissection. No CT evidence of right heart strain.    LUNGS AND PLEURA: Moderate patchy infiltrates versus atelectasis with some mild consolidation in the lower lungs greatest in the lower lobes bilaterally. While much of this may be due to atelectasis, cannot exclude pneumonitis and clinical correlation is   needed. Central airways are clear. No pleural effusions.    MEDIASTINUM/AXILLAE: A mildly prominent right hilar lymph node is probably reactive in nature. Otherwise no significant adenopathy. Mild cardiac enlargement. No pericardial effusion. Normal caliber thoracic aorta. Esophagus is grossly negative.    CORONARY ARTERY CALCIFICATION: None.    UPPER ABDOMEN: Normal.    MUSCULOSKELETAL: Hypertrophic/degenerative changes thoracic spine.      Impression    IMPRESSION:  1.  Negative for pulmonary embolism.    2.  Moderately prominent patchy opacities in both lower lungs greatest in the lower lobes. While some of this is probably due to atelectasis, there is also concern for underlying pneumonitis as well. Clinical correlation.    3.  Cardiac enlargement.    4.  No significant findings elsewhere.   Abd/pelvis CT no contrast - Stone Protocol    Narrative    EXAM: CT ABDOMEN PELVIS W/O CONTRAST  LOCATION: [facility]  DATE/TIME: 7/12/2021 2:27 AM    INDICATION: Abdominal distension  COMPARISON: Chest CT performed on the same date.  TECHNIQUE: CT scan of the abdomen and pelvis was performed without IV contrast. Multiplanar reformats were obtained. Dose reduction techniques were used.  CONTRAST: None.    FINDINGS:   LOWER CHEST: Bibasilar atelectasis and/or infiltrate.    HEPATOBILIARY: Nondistended gallbladder. Slight nodularity of the liver surface contour inferiorly.    PANCREAS: Normal.    SPLEEN: Normal.    ADRENAL GLANDS: Normal.    KIDNEYS/BLADDER: Excreted contrast opacifies the collecting systems. There is an exophytic lesion of intermediate density arising  from the right renal lower pole measuring 2.7 x 2.5 cm on image 100 of series 3. No hydronephrosis. Excreted contrast within   the urinary bladder. Bladder is unremarkable.    BOWEL: Normal caliber large and small bowel. Normal appendix. No free air.    LYMPH NODES: Normal.    VASCULATURE: Mild aortoiliac atherosclerotic calcification.    PELVIC ORGANS: Normal.    MUSCULOSKELETAL: Lumbar spine facet arthropathy. Small fat-containing supraumbilical ventral hernia.      Impression    IMPRESSION:   1.  No bowel obstruction or free air.    2.  Bibasilar atelectasis or infiltrate.    3.  Slight nodularity of the liver surface contour concerning for early changes of cirrhosis and/or fibrosis.    4.  2.7 cm exophytic focus of intermediate density arising from the right renal lower pole. Recommend follow-up ultrasound on a nonemergent basis to determine if this is a complex cyst or solid lesion.     Cervical spine CT w/o contrast    Narrative    EXAM: CT CERVICAL SPINE W/O CONTRAST  LOCATION: [facility]  DATE/TIME: 7/12/2021 2:27 AM    INDICATION: Cervical radiculopathy, prior C-spine surgery  Cervical radiculopathy, infection suspected  COMPARISON: None.  TECHNIQUE: Routine CT Cervical Spine without IV contrast. Multiplanar reformats. Dose reduction techniques were used.    FINDINGS:  VERTEBRA: Straightening of usual cervical lordosis. There is solid fusion of C5-C7. No fracture or posttraumatic subluxation.     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 Moderate foraminal narrowing bilaterally at C3-4, on the right C4-5, bilaterally at C5-6. There is at least moderate central canal stenosis at C4-5 and C3-4.    PARASPINAL: No extraspinal abnormality.      Impression    IMPRESSION:  1.  No evidence for acute fracture or subluxation.  2.  Straightening of the usual cervical lordosis with solid fusion from C5 through C7.  3.  The spinal canal is narrow on a  congenital basis pedicles and there is at least moderate central canal stenosis at C3-4 and C4-5.  4.  Multilevel neural foraminal narrowing as described.   Echocardiogram Complete   Result Value    LVEF  60-65%    [facility]    [id number]  [id number]  [id number]  [id number]^ROLANDO^SUGAR^MACO     [facility]  Echocardiography Laboratory  [address] [place] [address]     Name: ELHAM TURNER  MRN: [id number]  : 1950  Study Date: 2021 01:54 PM  Age: 70 yrs  Gender: Male  Patient Location: [facility]  Reason For Study: SOB  Ordering Physician: SUGAR MARSHALL  Referring Physician: [facility]  Performed By: Robi Hebert RDCS     BSA: 2.3 m2  Height: 71 in  Weight: 245 lb  HR: 69  BP: 141/78 mmHg  ______________________________________________________________________________  Procedure  Complete Portable Echo Adult. Optison (NDC #[id number]) given intravenously.  ______________________________________________________________________________  Interpretation Summary     Very poor image quality, likely due to lung interference. This notably  decreases quality of interpretation  Left ventricular systolic function is normal.  Cannot tell is septum is mildly hypokinetic or abnormal motion due to  conduction disturbance  RV not well seen, probably normal RVEF, possible RVH  ______________________________________________________________________________  Left Ventricle  The left ventricle is not well visualized. The left ventricle is normal in  size. Left ventricular systolic function is normal. The visual ejection  fraction is 60-65%. Left ventricular diastolic function is indeterminate.  Regional wall motion abnormalities cannot be excluded due to limited  visualization. Cannot tell is septum is mildly hypokinetic or abnormal motion  due to conduction disturbance.     Right Ventricle  RV not well seen, probably normal RVEF, possible RVH. The right ventricle is  not well visualized.      Atria  Normal left atrial size. Right atrial size is normal.     Mitral Valve  The mitral valve is not well visualized. Grossly normal mitral valve.     Tricuspid Valve  The tricuspid valve is not well visualized. TV not well seen, grossly normal.     Aortic Valve  The aortic valve is not well visualized. No hemodynamically significant  valvular aortic stenosis.     Pulmonic Valve  The pulmonic valve is not well visualized.     Vessels  The aortic root is not well visualized.     Pericardium  The pericardium appears normal.     Rhythm  Sinus rhythm was noted.  ______________________________________________________________________________  MMode/2D Measurements & Calculations  Ao root diam: 3.2 cm  LA dimension: 3.1 cm     LA/Ao: 0.99  LA Volume (BP): 36.7 ml  LA Volume Index (BP): 16.0 ml/m2     Doppler Measurements & Calculations  MV E max melania: 94.6 cm/sec  MV A max melania: 101.4 cm/sec  MV E/A: 0.93  MV dec time: 0.27 sec  E/E' av.9  Lateral E/e': 12.6  Medial E/e': 11.3     ______________________________________________________________________________  Report approved by: Janie Blakely 2021 02:49 PM         MR Brain w/o & w Contrast    Narrative    EXAM: MR BRAIN W/O and W CONTRAST, MR CERVICAL SPINE W/O and W CONTRAST  LOCATION: [facility]  DATE/TIME: 2021 5:15 PM    INDICATION: Bilateral upper extremity weakness. Acute onset of neck pain and shoulder pain.  COMPARISON: Head CT 2021 and cervical spine CT 2021.  CONTRAST: 12 mL Gadavist.  TECHNIQUE:   1) Routine multiplanar multisequence head MRI without and with intravenous contrast.  2) Routine Cervical Spine MRI without and with IV contrast.    FINDINGS:  HEAD MRI:  INTRACRANIAL CONTENTS: No acute or subacute infarct. No mass, acute hemorrhage, or extra-axial fluid collections. Small, chronic, lacunar infarct in the left thalamus. Minimal presumed chronic small vessel ischemic change. Mild generalized cerebral    atrophy. No hydrocephalus. Normal position of the cerebellar tonsils. No pathologic enhancement.    SELLA: No abnormality accounting for technique.    OSSEOUS STRUCTURES/SOFT TISSUES: Normal marrow signal. The major intracranial vascular flow voids are maintained.     ORBITS: No abnormality accounting for technique.     SINUSES/MASTOIDS: No paranasal sinus mucosal disease. No middle ear or mastoid effusion.     CERVICAL SPINE:   Normal vertebral body heights. Straightened lordosis. Solid C5-C6 and C6-C7 interbody fusions. Bony bridging at C5-C6 is better seen on CT. Mild T2 prolongation in each hemicord from C3 through C4. It is slightly more prominent on the left. No   extraspinal abnormality.    Craniovertebral junction and C1-C2: Normal.    C2-C3: 3 mm retrolisthesis. Normal disc height. Mild disc T2 signal loss. Mild posterior annular bulge. Mild bilateral facet arthropathy. No spinal canal or neural foraminal stenosis.     C3-C4: Moderate disc height loss. Mild circumferential disc osteophyte complex. Mild bilateral facet arthropathy. Moderate to severe spinal canal stenosis. Moderate to severe bilateral neural foraminal stenoses.     C4-C5: Moderate disc height loss. Mild to moderate circumferential disc osteophyte complex. Mild bilateral facet arthropathy. Severe spinal canal stenosis. Moderate to severe bilateral neural foraminal stenoses.     C5-C6: Solid interbody fusion. Mild facet arthropathy bilaterally. Mild effacement of the ventral spinal canal. Mild bilateral neural foraminal stenoses.     C6-C7: Solid interbody fusion. Mild bilateral facet arthropathy. No spinal canal or neural foraminal stenosis.     C7-T1: Mild disc height loss. Mild right foraminal osteophytic spurring. Mild bilateral facet arthropathy. No spinal canal stenosis. Mild to moderate right neural foraminal stenosis. No left neural foraminal stenosis.      Impression    IMPRESSION:  HEAD MRI:   1.  No mass, hemorrhage or acute  stroke.  2.  Small chronic lacunar infarct in the left thalamus.  3.  Minimal presumed chronic small vessel ischemic change with mild generalized volume loss.    CERVICAL SPINE MRI:  1.  High-grade C3-C4 and C4-C5 spinal canal stenoses and bilateral neural foraminal stenoses.  2.  Abnormal T2 prolongation in each hemicord from C3 through C4. It is slightly more pronounced on the left.  3.  High-grade C3-C4 and C4-C5 spinal canal stenoses and bilateral neural foraminal stenoses.  4.  Solid C5-C6 and C6-C7 interbody fusions. Bony bridging at C5-C6 is better seen on CT.   MR Cervical Spine w/o & w Contrast    Narrative    EXAM: MR BRAIN W/O and W CONTRAST, MR CERVICAL SPINE W/O and W CONTRAST  LOCATION: [facility]  DATE/TIME: 7/12/2021 5:15 PM    INDICATION: Bilateral upper extremity weakness. Acute onset of neck pain and shoulder pain.  COMPARISON: Head CT 07/12/2021 and cervical spine CT 07/12/2021.  CONTRAST: 12 mL Gadavist.  TECHNIQUE:   1) Routine multiplanar multisequence head MRI without and with intravenous contrast.  2) Routine Cervical Spine MRI without and with IV contrast.    FINDINGS:  HEAD MRI:  INTRACRANIAL CONTENTS: No acute or subacute infarct. No mass, acute hemorrhage, or extra-axial fluid collections. Small, chronic, lacunar infarct in the left thalamus. Minimal presumed chronic small vessel ischemic change. Mild generalized cerebral   atrophy. No hydrocephalus. Normal position of the cerebellar tonsils. No pathologic enhancement.    SELLA: No abnormality accounting for technique.    OSSEOUS STRUCTURES/SOFT TISSUES: Normal marrow signal. The major intracranial vascular flow voids are maintained.     ORBITS: No abnormality accounting for technique.     SINUSES/MASTOIDS: No paranasal sinus mucosal disease. No middle ear or mastoid effusion.     CERVICAL SPINE:   Normal vertebral body heights. Straightened lordosis. Solid C5-C6 and C6-C7 interbody fusions. Bony bridging at C5-C6 is better  seen on CT. Mild T2 prolongation in each hemicord from C3 through C4. It is slightly more prominent on the left. No   extraspinal abnormality.    Craniovertebral junction and C1-C2: Normal.    C2-C3: 3 mm retrolisthesis. Normal disc height. Mild disc T2 signal loss. Mild posterior annular bulge. Mild bilateral facet arthropathy. No spinal canal or neural foraminal stenosis.     C3-C4: Moderate disc height loss. Mild circumferential disc osteophyte complex. Mild bilateral facet arthropathy. Moderate to severe spinal canal stenosis. Moderate to severe bilateral neural foraminal stenoses.     C4-C5: Moderate disc height loss. Mild to moderate circumferential disc osteophyte complex. Mild bilateral facet arthropathy. Severe spinal canal stenosis. Moderate to severe bilateral neural foraminal stenoses.     C5-C6: Solid interbody fusion. Mild facet arthropathy bilaterally. Mild effacement of the ventral spinal canal. Mild bilateral neural foraminal stenoses.     C6-C7: Solid interbody fusion. Mild bilateral facet arthropathy. No spinal canal or neural foraminal stenosis.     C7-T1: Mild disc height loss. Mild right foraminal osteophytic spurring. Mild bilateral facet arthropathy. No spinal canal stenosis. Mild to moderate right neural foraminal stenosis. No left neural foraminal stenosis.      Impression    IMPRESSION:  HEAD MRI:   1.  No mass, hemorrhage or acute stroke.  2.  Small chronic lacunar infarct in the left thalamus.  3.  Minimal presumed chronic small vessel ischemic change with mild generalized volume loss.    CERVICAL SPINE MRI:  1.  High-grade C3-C4 and C4-C5 spinal canal stenoses and bilateral neural foraminal stenoses.  2.  Abnormal T2 prolongation in each hemicord from C3 through C4. It is slightly more pronounced on the left.  3.  High-grade C3-C4 and C4-C5 spinal canal stenoses and bilateral neural foraminal stenoses.  4.  Solid C5-C6 and C6-C7 interbody fusions. Bony bridging at C5-C6 is better seen  on CT.         Anesthesia Plan    ASA Status:  3      Anesthesia Type: General.     - Airway: ETT         Techniques and Equipment:     - Airway: Video-Laryngoscope         Consents    Anesthesia Plan(s) and associated risks, benefits, and realistic alternatives discussed. Questions answered and patient/representative(s) expressed understanding.     - Discussed with:  Patient      - Extended Intubation/Ventilatory Support Discussed: Yes.         Postoperative Care       PONV prophylaxis: Ondansetron (or other 5HT-3)     Comments:    Glidescope, phenylephrine infusion             Ryann Jean-Baptiste MD

## 2021-07-14 NOTE — OR NURSING
Patient left arm significantly weaker than right. Able to perform a shoulder shrug, but not lift left arm above shoulder height. Lower extremity neuros intact. Patient reports he is incontinent for urine and stool at present.

## 2021-07-14 NOTE — CONSULTS
[facility]  Consult Note - Hospitalist Service     Date of Admission:  7/14/2021  Consult Requested by: Neurosurgery  Reason for Consult: CAP, medical management    Assessment & Plan   Rigo Johns is a 70 year old male with PMH significant for PILI on CPAP, C5-7 anterior fusion 1993 in [place] who was admitted to [facility] on 7/12/21 with acute onset of neck, bilateral shoulder pain with associated BUE weakness and LLE weakness. Workup revealed high grade cervical stenosis with compressive cervical myelopathy and neurosurgery recommended transfer to [facility] for surgical intervention with Dr. Hanley. Hospitalization was complicated by acute respiratory failure due to possible community acquired pneumonia. He was transferred to [facility] for surgery on 7/14/21 and Hospitalist consulted for medical management of pneumonia and other medical problems.    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 He underwent MRI of his brain and cervical spine.  MRI of the brain showed no acute abnormality.  MRI of cervical spine showed high-grade cervical spinal canal stenoses and bilateral neuroforaminal stenoses with abnormal T2 prolongation from C3-C4 that was slightly more pronounced on the left. He was started on IV steroids.  Neurosurgery recommended transfer to [facility] for decompressive surgery which was completed today.  - Postoperative cares per primary service, Neurosurgery including IVF, pain control  - PT/OT a  - Neurology evaluated at [facility] and recommended Solumedrol 500 mg/d x 3 days. Final dose to be given this evening. No further steroids have been ordered by Neurosurgery.       Possible community-acquired pneumonia  Acute hypoxic respiratory failure, resolved, PE ruled  out  Presented to [facility] with SOB and hypoxemia reportedly down to 59% via EMS requiring supplemental oxygen 8-12L on presentation. Workup including chest CT revealed patchy opacities in bilateral lungs greatest in the lower lobes, no PE. Thought probably due to atelectasis but was given a dose of Zosyn in the ED and started on ceftriaxone and azithromycin for possible pneumonia and hypoxia. No leukocytosis or fevers. Lactate wnl. ABG c/w respiratory acidosis. ECHO with normal EF, very poor image quality, unable to visualize RV properly.  - Blood cultures NGTD  - Monitor for fevers  - Weaned to O2, monitor pulseox  - Continue course of ceftriaxone and azithromycin    PILI on nocturnal CPAP: Continue CPAP with home settings    Acute vs Chronic kidney disease, likely stage 3a  Proteinuria  Baseline Cr unclear ad no prior labs available and patient does not doctor frequently. On admission, 1.37-->1.88.  Proteinuria noted on UA  - IVF  - Avoid nephrotoxins - contrast, NSAIDs  - BMP in am    Hypertension  BPs ranging 130s-160s/70s-80s at [facility]. Patient indicates he has previously been told he has HTN but wife prefers to focus on lifestyle and dietary changes  - Monitor for now. Hydralazine prn   - Dicussed with patient if consistently elevated would recommend treatment, especially in the setting of CKD    Thrombocytopenia  - Monitor, 126 day of admission to [facility]  - ? Related to acute illness    Steroid induced hyperglycemia  - Monitor  - Initial gluc check elevated, check HbA1c as patient does not doctor frequently  - SSI    Possible cirrhosis found on CT scan  Liver tests wnl. Notes 1 alcoholic drink per week  - PCP follow up    Renal lesion, incidental finding on CT  - Follow up with PCP for ultrasound to further evaluate, nonurgent  - Patient informed in the ED    Obesity class I  BMI 34.  - Increases all cause mortality  - Establish with primary care        The patient's care was discussed with the  Patient.    Carline Gonzales PA-C  [facility]  Securely message with the [employer] Web Console (learn more here)  Text page via [employer] Paging/Directory        ______________________________________________________________________    Chief Complaint   Neck pain, extremity weakness, shortness of breath    History of Present Illness   Rigo Johns is a 70 year old male with PMH significant for PILI on CPAP, C5-7 anterior fusion 1993 in [place] who was admitted to [facility] on 7/12/21 with acute onset of shortness of breath, neck and bilateral shoulder pain with associated BUE weakness and LLE weakness. Head CT negative for acute pathology. Cspine CT showed central stenosis of C 3-4 and C4-5. Stroke neurology called due to weakness and felt not c/w stroke, recommended general neurology consultation. Cervical MRI revealed high grade cervical stenosis with compressive cervical myelopathy and neurosurgery recommended transfer to [facility] for surgical intervention with Dr. Hanley.    Hospitalization was complicated by acute respiratory failure due to possible community acquired pneumonia. He was found to have oxygen saturation 59% by EMS and started on 12L O2 weaned down to 8L in the ED. CT chest showed no PE but had evidence of possible bilateral lower lobe infiltrate vs pneumonitis vs atelectasis. CXR without acute pathology. VBG c/w respiratory acidosis. Given dose of Zosyn and started on ceftriaxone and azithromycin for possible community acquired pneumonia and able to wean off of oxygen. COVID19 negative. UA unremarkable. Liver tests, lytes, BNP, lactate wnl. Given cervical findings as above he was transferred to [facility] for surgery on 7/14/21 and Hospitalist consulted for medical management of pneumonia and other medical problems. For full details see HPI from [facility] Hospitalist H&P.    Upon arrival to [facility] the patient went straight to the OR for spinal decompression  with Dr. Hanley. No immediate postoperative complications. EBL 50 ml.       Review of Systems   The 10 point Review of Systems is negative other than noted in the HPI or here.     Past Medical History    I have reviewed this patient's medical history and updated it with pertinent information if needed.   Past Medical History:   Diagnosis Date     Hypertension      Sleep apnea     uses cpap       Past Surgical History   I have reviewed this patient's surgical history and updated it with pertinent information if needed.  Past Surgical History:   Procedure Laterality Date     BACK SURGERY      cervical c5-6 fusion       Social History   I have reviewed this patient's social history and updated it with pertinent information if needed.  Social History     Tobacco Use     Smoking status: Never Smoker     Smokeless tobacco: Never Used   Vaping Use     Vaping Use: Never assessed   Substance Use Topics     Alcohol use: Yes     Comment: once a week     Drug use: Never       Family History     No significant family history    Medications   I have reviewed this patient's current medications    Allergies   Allergies   Allergen Reactions     Shrimp GI Disturbance       Physical Exam   Vital Signs: Temp: 98.5  F (36.9  C) Temp src: Oral BP: 138/83 Pulse: 80   Resp: 18 SpO2: 95 % O2 Device: Nasal cannula Oxygen Delivery: 2 LPM  Weight: 0 lbs 0 oz    Constitutional: Alert, resting comfortably in NAD  HEENT: Head normocephalic, atraumatic. Eyes sclera non icteric. Oropharynx clear and most. Dressing to anterior neck c/d/i. JOSEFINA drain inplace  Respiratory: Normal effort, symmetric expansion, no crackles or wheezing  Cardiovascular: RRR no murmurs   GI: Non distended, normal bowels sounds, no tenderness or guarding  MSK: LE without edema. Dorsalis pedis pulse palpated bilaterally.   Skin/Integumen: Clear  Neuro: CN II-XII grossly intact  Psych:  Alert and oriented x 3. Normal affect        Data   I personally reviewed no images or EKG's  today.

## 2021-07-14 NOTE — BRIEF OP NOTE
[facility] Brief Operative Note    Pre-operative diagnosis: Cervical stenosis of spinal canal [M48.02]   Post-operative diagnosis As above   Procedure: Procedure(s):  C3-C4, C4-C5 anterior cervical discetomy and fusion   Surgeon(s): Surgeon(s) and Role:     * Tee Hanley MD - Primary   Estimated blood loss: 50 mL    Specimens: ID Type Source Tests Collected by Time Destination   A :  Tissue Other OR DOCUMENTATION ONLY Tee Hanley MD 7/14/2021  2:53 PM       Findings: See op note

## 2021-07-14 NOTE — PLAN OF CARE
Patient is A/Ox4 with VSS. Patient has been using CPAP since HS. Patient is discharging to [facility] today at 0730 for spinal decompression. He has been resting in bed all night with no concerns. Assist of 2 with lift. Tele--SR.    6:45 AM  Report given to OLGA LIDIA Johnston at [facility] for patient transfer. She requested a call back when he leaves [facility]. Call back number--[phone]

## 2021-07-14 NOTE — PROGRESS NOTES
Arrived on station 73.  BP (!) 146/80   Pulse 80   Temp 98.5  F (36.9  C) (Oral)   Resp 16   SpO2 96%   Dressing cdi.  Cms and neuros intact.  JOSEFINA patent.

## 2021-07-14 NOTE — PLAN OF CARE
This RN spoke w/overnight OR charge RN @ [facility]. Pt on their schedule but no accepting unit or provider known (other than Dr. Hanley - neurosurg), no time of arrival known, & pt placement unaware of pt coming to [facility] on 7/14/21. Chart reviewed, per neurosurgery PA note Dr. Hanley is anticipated to be performing pt's surgery. Per Dr. Hammond's discharge summary note, Dr. De Anda had accepted the pt as a transfer on 7/13/21. Unit [address] no longer had a bed for pt so transfer postponed to 7/14/21. [facility] this RN was informed pt is to be picked up at [facility] @ 0730, was to come to the OR pre-admitting area for surgery @ 1010. Concern of where to hold pt between arrival to [facility] & prior to OR. This RN will discuss w/[facility] ANS to aid in facilitating and ensure all steps in place for pt transfer later this AM.    ADDM: [facility] speaker. Marina ANS reported all arrangements are complete & no concerns for delay of pt arrival to [facility] are expected.    Cande Arrieta, BSN, RN  Medical/Telemetry - 3

## 2021-07-14 NOTE — ANESTHESIA PROCEDURE NOTES
Airway       Patient location during procedure: OR       Procedure Start/Stop Times: 7/14/2021 1:05 PM  Staff -        Anesthesiologist:  Ryann Jean-Baptiste MD       CRNA: Kaylene Miller APRN CRNA       Performed By: CRNA  Consent for Airway        Urgency: elective  Indications and Patient Condition       Indications for airway management: sandra-procedural       Induction type:intravenous       Mask difficulty assessment: 2 - vent by mask + OA or adjuvant +/- NMBA    Final Airway Details       Final airway type: endotracheal airway       Successful airway: ETT - single  Endotracheal Airway Details        ETT size (mm): 8.0       Cuffed: yes       Cuff volume (mL): 8       Successful intubation technique: video laryngoscopy       VL Blade Size: Glidescope 4       Grade View of Cords: 1       Adjucts: stylet       Position: Left       Measured from: lips       Secured at (cm): 23       Bite block used: Molar    Post intubation assessment        Placement verified by: capnometry, equal breath sounds and chest rise        Number of attempts at approach: 1       Secured with: pink tape       Ease of procedure: easy       Dentition: Unchanged and Intact

## 2021-07-14 NOTE — ANESTHESIA CARE TRANSFER NOTE
Patient: Rigo Johns    Procedure(s):  C3-C4, C4-C5 anterior cervical discetomy and fusion    Diagnosis: Cervical stenosis of spinal canal [M48.02]  Diagnosis Additional Information: No value filed.    Anesthesia Type:   General     Note:    Oropharynx: oropharynx clear of all foreign objects and spontaneously breathing  Level of Consciousness: drowsy  Oxygen Supplementation: face mask  Level of Supplemental Oxygen (L/min / FiO2): 8  Independent Airway: airway patency satisfactory and stable  Dentition: dentition unchanged  Vital Signs Stable: post-procedure vital signs reviewed and stable  Report to RN Given: handoff report given  Patient transferred to: PACU    Handoff Report: Identifed the Patient, Identified the Reponsible Provider, Reviewed the pertinent medical history, Discussed the surgical course, Reviewed Intra-OP anesthesia mangement and issues during anesthesia, Set expectations for post-procedure period and Allowed opportunity for questions and acknowledgement of understanding      Vitals: (Last set prior to Anesthesia Care Transfer)  CRNA VITALS  7/14/2021 1508 - 7/14/2021 1546      7/14/2021             NIBP:  (!) 178/121    NIBP Mean:  154        Electronically Signed By: AMERICA Tee CRNA  July 14, 2021  3:46 PM

## 2021-07-15 ENCOUNTER — APPOINTMENT (OUTPATIENT)
Dept: OCCUPATIONAL THERAPY | Facility: CLINIC | Age: 71
DRG: 471 | End: 2021-07-15
Attending: NURSE PRACTITIONER
Payer: COMMERCIAL

## 2021-07-15 ENCOUNTER — APPOINTMENT (OUTPATIENT)
Dept: PHYSICAL THERAPY | Facility: CLINIC | Age: 71
DRG: 471 | End: 2021-07-15
Attending: NEUROLOGICAL SURGERY
Payer: COMMERCIAL

## 2021-07-15 LAB
ANION GAP SERPL CALCULATED.3IONS-SCNC: 7 MMOL/L (ref 3–14)
BUN SERPL-MCNC: 66 MG/DL (ref 7–30)
CALCIUM SERPL-MCNC: 8.2 MG/DL (ref 8.5–10.1)
CHLORIDE BLD-SCNC: 115 MMOL/L (ref 94–109)
CO2 SERPL-SCNC: 20 MMOL/L (ref 20–32)
CREAT SERPL-MCNC: 2.13 MG/DL (ref 0.66–1.25)
ERYTHROCYTE [DISTWIDTH] IN BLOOD BY AUTOMATED COUNT: 11.8 % (ref 10–15)
GFR SERPL CREATININE-BSD FRML MDRD: 30 ML/MIN/1.73M2
GLUCOSE BLD-MCNC: 131 MG/DL (ref 70–99)
GLUCOSE BLDC GLUCOMTR-MCNC: 124 MG/DL (ref 70–99)
GLUCOSE BLDC GLUCOMTR-MCNC: 140 MG/DL (ref 70–99)
GLUCOSE BLDC GLUCOMTR-MCNC: 141 MG/DL (ref 70–99)
GLUCOSE BLDC GLUCOMTR-MCNC: 145 MG/DL (ref 70–99)
GLUCOSE BLDC GLUCOMTR-MCNC: 146 MG/DL (ref 70–99)
GLUCOSE BLDC GLUCOMTR-MCNC: 172 MG/DL (ref 70–99)
HCT VFR BLD AUTO: 38.7 % (ref 40–53)
HGB BLD-MCNC: 13 G/DL (ref 13.3–17.7)
MCH RBC QN AUTO: 32.9 PG (ref 26.5–33)
MCHC RBC AUTO-ENTMCNC: 33.6 G/DL (ref 31.5–36.5)
MCV RBC AUTO: 98 FL (ref 78–100)
PLATELET # BLD AUTO: 101 10E3/UL (ref 150–450)
POTASSIUM BLD-SCNC: 4.1 MMOL/L (ref 3.4–5.3)
RBC # BLD AUTO: 3.95 10E6/UL (ref 4.4–5.9)
SODIUM SERPL-SCNC: 142 MMOL/L (ref 133–144)
WBC # BLD AUTO: 12.7 10E3/UL (ref 4–11)

## 2021-07-15 PROCEDURE — 97535 SELF CARE MNGMENT TRAINING: CPT | Mod: GO | Performed by: OCCUPATIONAL THERAPIST

## 2021-07-15 PROCEDURE — 99233 SBSQ HOSP IP/OBS HIGH 50: CPT | Performed by: INTERNAL MEDICINE

## 2021-07-15 PROCEDURE — 120N000001 HC R&B MED SURG/OB

## 2021-07-15 PROCEDURE — 250N000011 HC RX IP 250 OP 636: Performed by: PHYSICIAN ASSISTANT

## 2021-07-15 PROCEDURE — 36415 COLL VENOUS BLD VENIPUNCTURE: CPT | Performed by: PHYSICIAN ASSISTANT

## 2021-07-15 PROCEDURE — 250N000013 HC RX MED GY IP 250 OP 250 PS 637: Performed by: NURSE PRACTITIONER

## 2021-07-15 PROCEDURE — 97530 THERAPEUTIC ACTIVITIES: CPT | Mod: GP | Performed by: PHYSICAL THERAPIST

## 2021-07-15 PROCEDURE — 97530 THERAPEUTIC ACTIVITIES: CPT | Mod: GO | Performed by: OCCUPATIONAL THERAPIST

## 2021-07-15 PROCEDURE — 250N000013 HC RX MED GY IP 250 OP 250 PS 637: Performed by: PHYSICIAN ASSISTANT

## 2021-07-15 PROCEDURE — 80048 BASIC METABOLIC PNL TOTAL CA: CPT | Performed by: PHYSICIAN ASSISTANT

## 2021-07-15 PROCEDURE — 97161 PT EVAL LOW COMPLEX 20 MIN: CPT | Mod: GP | Performed by: PHYSICAL THERAPIST

## 2021-07-15 PROCEDURE — 97166 OT EVAL MOD COMPLEX 45 MIN: CPT | Mod: GO | Performed by: OCCUPATIONAL THERAPIST

## 2021-07-15 PROCEDURE — 258N000003 HC RX IP 258 OP 636: Performed by: INTERNAL MEDICINE

## 2021-07-15 PROCEDURE — 85027 COMPLETE CBC AUTOMATED: CPT | Performed by: PHYSICIAN ASSISTANT

## 2021-07-15 PROCEDURE — 258N000003 HC RX IP 258 OP 636: Performed by: PHYSICIAN ASSISTANT

## 2021-07-15 RX ADMIN — ACETAMINOPHEN 975 MG: 325 TABLET, FILM COATED ORAL at 20:11

## 2021-07-15 RX ADMIN — ACETAMINOPHEN 975 MG: 325 TABLET, FILM COATED ORAL at 09:26

## 2021-07-15 RX ADMIN — ACETAMINOPHEN 975 MG: 325 TABLET, FILM COATED ORAL at 03:55

## 2021-07-15 RX ADMIN — CEFTRIAXONE SODIUM 2 G: 2 INJECTION, POWDER, FOR SOLUTION INTRAMUSCULAR; INTRAVENOUS at 20:11

## 2021-07-15 RX ADMIN — POLYETHYLENE GLYCOL 3350 17 G: 17 POWDER, FOR SOLUTION ORAL at 09:26

## 2021-07-15 RX ADMIN — AZITHROMYCIN MONOHYDRATE 250 MG: 250 TABLET ORAL at 20:12

## 2021-07-15 RX ADMIN — SODIUM CHLORIDE: 9 INJECTION, SOLUTION INTRAVENOUS at 11:30

## 2021-07-15 RX ADMIN — SENNOSIDES AND DOCUSATE SODIUM 1 TABLET: 8.6; 5 TABLET ORAL at 09:26

## 2021-07-15 RX ADMIN — SODIUM CHLORIDE, POTASSIUM CHLORIDE, SODIUM LACTATE AND CALCIUM CHLORIDE 1000 ML: 600; 310; 30; 20 INJECTION, SOLUTION INTRAVENOUS at 17:46

## 2021-07-15 ASSESSMENT — ACTIVITIES OF DAILY LIVING (ADL)
ADLS_ACUITY_SCORE: 19
PREVIOUS_RESPONSIBILITIES: MEAL PREP;HOUSEKEEPING;LAUNDRY;SHOPPING;YARDWORK;MEDICATION MANAGEMENT;FINANCES;DRIVING
ADLS_ACUITY_SCORE: 21
ADLS_ACUITY_SCORE: 25
IADL_COMMENTS: PT IS RETIRED

## 2021-07-15 NOTE — PLAN OF CARE
POD #1 from a C3-5 anterior cervical fusion. A&Ox4. CMS intact, left sided weakness LUE 3/5, LLE 4/5. Bowel sounds active, not passing flatus, tolerating regular diet. VSS. Dressing CDI. JOSEFINA patent. Up with A1/GB. Fluids infusing. Incontinent of bladder, . Straight cathed for 1275 ml at 0415. C/o minimal incisional pain, decreased with 5 mg oxycodone, ice, and rest. Pt scoring green on the Aggression Stop Light Tool. Continue to mobilize.

## 2021-07-15 NOTE — PROGRESS NOTES
"   07/15/21 0900   Quick Adds   Type of Visit Initial PT Evaluation       Present no   Living Environment   People in home spouse   Current Living Arrangements house   Home Accessibility stairs to enter home   Number of Stairs, Main Entrance 2   Stair Railings, Main Entrance railing on left side (ascending)   Transportation Anticipated family or friend will provide   Living Environment Comments Pt lives with his spouse in a house. Pt reports there are stairs to negotiate upon entering home but once inside, the bedroom, bathroom, and kitchen are all on the main floor. Pt reports his spouse will pick pt up upon discharge. Pt reports his spouse will be available 24/7 to assist pt as needed.   Self-Care   Usual Activity Tolerance good   Current Activity Tolerance moderate   Regular Exercise No   Equipment Currently Used at Home grab bar, toilet;grab bar, tub/shower   Activity/Exercise/Self-Care Comment Pt reports being IND at baseline with all ADLs including cooking, cleaning, bathing, and dressing. Pt reports ambulating within the household and community without AD. Pt reports having access to a FWW if needed. Pt reports stairs are not difficult for pt. Pt reports ambulating ~1 block before needing a seated rest break. Pt reports he still drives and gets assistance from wife with errand management.   Disability/Function   Hearing Difficulty or Deaf no   Wear Glasses or Blind yes   Vision Management glasses   Concentrating, Remembering or Making Decisions Difficulty no   Difficulty Communicating no   Walking or Climbing Stairs Difficulty no   Doing Errands Independently Difficulty (such as shopping) no   Fall history within last six months no   Change in Functional Status Since Onset of Current Illness/Injury yes   General Information   Onset of Illness/Injury or Date of Surgery 07/15/21   Referring Physician Mary Evans NP   Patient/Family Therapy Goals Statement (PT) \"To get stronger " "and to go home\"   Pertinent History of Current Problem (include personal factors and/or comorbidities that impact the POC) s/p C3-C5 anterior fusion POD#1   Existing Precautions/Restrictions spinal   Weight-Bearing Status - LLE full weight-bearing   Weight-Bearing Status - RLE full weight-bearing   General Observations Pt has a deteriorating meniscus in the R LE and limits his functional mobility.   Cognition   Orientation Status (Cognition) oriented x 3   Pain Assessment   Patient Currently in Pain No   Integumentary/Edema   Integumentary/Edema Comments Incision in anterior neck with dressing intact   Posture    Posture Forward head position;Protracted shoulders   Range of Motion (ROM)   ROM Quick Adds ROM WFL   Strength   Manual Muscle Testing Quick Adds Able to perform R SLR;Deficits observed during functional mobility   Strength Comments R hip flexion: 4+/5 ; L hip flexion: 3/5   Bed Mobility   Comment (Bed Mobility) Performed supine>sit w/ min A x 1   Transfers   Transfer Safety Comments Performed sit>stand from elevated bed w/ FWW and mod A x 1   Gait/Stairs (Locomotion)   Comment (Gait/Stairs) Unable to initiate   Balance   Balance Comments Pt able to sit at EOB unsupported without LOB. Pt ambulates using a FWW for added stability and support.   Sensory Examination   Sensory Perception Comments Pt reports numbness and tingling in fingers 2-4 on the R UE   Clinical Impression   Criteria for Skilled Therapeutic Intervention yes, treatment indicated   PT Diagnosis (PT) Impaired gait   Influenced by the following impairments Decreased activity tolerance; decreased balance; decreased strength   Functional limitations due to impairments Impaired functional mobility   Clinical Presentation Stable/Uncomplicated   Clinical Presentation Rationale Clinical Judgement   Clinical Decision Making (Complexity) low complexity   Therapy Frequency (PT) Daily   Predicted Duration of Therapy Intervention (days/wks) 7 days "   Planned Therapy Interventions (PT) balance training;bed mobility training;gait training;home exercise program;patient/family education;stair training;strengthening;transfer training   Risk & Benefits of therapy have been explained evaluation/treatment results reviewed;care plan/treatment goals reviewed;risks/benefits reviewed;current/potential barriers reviewed;participants voiced agreement with care plan;participants included;patient   PT Discharge Planning    PT Discharge Recommendation (DC Rec) [facility]-Motivated patient will benefit from intensive, interdisciplinary therapy.  Anticipate will be able to tolerate 3 hours of therapy per day   PT Rationale for DC Rec Pt is below baseline at this time but is highly motivated to return to PLOF. Pt unable to initiate gait at this time due to decreased activity tolerance and strength, especially in LUE and LLE. Pt unable to lift L LE in standing and needed assist to place LUE on FWW. Pt would benefit from continued skilled PT servicees to improve activity tolerance, balance, and IND with safety and functional mobility. If pt improves quickly during hospital stay, may be able to discharge home with assist. At this time, pt unsafe to return home due to inability to initiate gait and is a high falls risk.   PT Brief overview of current status  Supine>sit w/ min A x 1, sit>stand w/ FWW and mod A x 1   Total Evaluation Time   Total Evaluation Time (Minutes) 10

## 2021-07-15 NOTE — PROGRESS NOTES
Neurosurgery Progress     Dx:     POD #1 s/p C3-C4, C4-C5 anterior cervical discetomy and fusion.    Neuro stable.     TODAY'S PLAN:     We will remove the drain today based on output. Will work with therapies today in anticipation of discharge in the next day or two.      In the event that patient's symptoms worsen or change we would appreciate being contacted. We did discuss signs of a worsening problem that he should seek being evaluated.     We did review the above information with the patient whom agrees with the plan and did verbalize understanding.   ________________________________________________________________     Mr. Johns overall feels well and denies any significant discomfort. Tolerating regular diet without n/v. Bladder scan improved.     /72 (BP Location: Right arm)   Pulse 53   Temp 97.6  F (36.4  C) (Oral)   Resp 18   SpO2 95%      Patient examined in room 711. He appears comfortable and in no apparent distress. He is moving all of his extremities well. Left extremities feel improved regard to strength. Left extremities are improved in regard to paresthesia and numbness.  CN II-XII intact, alert and appropriate with conversation. Follows all commands.   He does have an acceptable post-operative range of motion.   His cervical incision is absent for concerning edema, erythema or drainage.   Bandage is clean, dry and intact.   Calves soft and non-tender.      All pertinent labs and updated imaging reviewed in EPIC.     Kuldip Samano PA-C   Neurosurgery   [phone] (P)

## 2021-07-15 NOTE — PLAN OF CARE
Reason for Admission: POD 1 C3-5 anterior cervical fusion    Cognitive/Mentation: A/Ox 4  Neuros/CMS: Intact ex left sided weakness LUE 3/5 and LLE 4/5. Reports tingling in right hand, but claims this was baseline before surgery and it is improving.  VS: stable  GI: BS audible, ++ flatus, No BM this shift. Continent.  : BS at 1100 820 ml. Attempted urinal sitting bedside, no results. MD notified. Davis placed. 1150 ml drained immediately.  Pulmonary: LS diminished bilaterally.  Pain: incisional pain managed with scheduled tylenol.     Drains: Davis  Skin: Incision dressing changed and JOSEFINA drain removed per MD order  Activity: Assist x 2 with GB and walker.   PT stood with pt at bedside. No walking due to weakness.  Diet: Regular with thin liquids. Takes pills whole.     Therapies recs: PT/OT  Discharge: Pending post-surgical progress  and resolution of urinary symptoms

## 2021-07-15 NOTE — PROGRESS NOTES
[facility]    Medicine Progress Note - Hospitalist Service       Date of Admission:  7/14/2021  Assessment & Plan   Rigo Johns is a 70 year-old male with past medical history significant for PILI on CPAP, C5-7 anterior fusion 1993 in [place] who was admitted to [facility] on 7/12/21 with acute onset of neck, bilateral shoulder pain with associated BUE weakness and LLE weakness. Workup revealed high grade cervical stenosis with compressive cervical myelopathy and neurosurgery recommended transfer to [facility] for surgical intervention with Dr. Hanley. Hospitalization was complicated by acute respiratory failure due to possible community acquired pneumonia. He was transferred to [facility] for surgery on 7/14/21 and Hospitalist consulted for medical management of pneumonia and other medical problems.     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 He underwent MRI of his brain and cervical spine.  MRI of the brain showed no acute abnormality.  MRI of cervical spine showed high-grade cervical spinal canal stenoses and bilateral neuroforaminal stenoses with abnormal T2 prolongation from C3-C4 that was slightly more pronounced on the left. Treated initially with IV steroids.  Neurosurgery recommended transfer to [facility] for decompressive surgery which was completed today.  - Routine post-operative cares per primary service, neurosurgery including IVF, pain control  - PT/OT     Possible community-acquired pneumonia  Acute hypoxic respiratory failure, resolved, PE ruled out  Presented to [facility] with SOB and hypoxemia reportedly down to 59% via EMS requiring supplemental oxygen 8-12L on presentation. Workup including chest CT revealed patchy opacities in bilateral lungs greatest in the lower lobes,  no PE. Thought probably due to atelectasis but was given a dose of Zosyn in the ED and started on ceftriaxone and azithromycin for possible pneumonia and hypoxia. No leukocytosis or fevers. Lactate wnl. ABG c/w respiratory acidosis. ECHO with normal EF, very poor image quality, unable to visualize RV properly.  - Blood cultures 7/12 NGTD  - Weaned to room air, though oxygen saturations borderline. Continue to monitor   - Continue ceftriaxone and azithromycin     PILI on nocturnal CPAP  Continue CPAP with home settings     Acute kidney injury on possible chronic kidney disease  Proteinuria  Baseline creatinine unknown, does not doctor frequently. Initial creatinine 1.37, increased to 1.88. Intake has been poor for several days.   - Appears pre-renal with BUN 66, creatinine 2.13. Also noted to have retention as below, may be contributing to post renal etiology.  - 1L LR ordered  - Avoid nephrotoxins     Urinary retention  No prior history of retention. Potentially secondary to spinal cord compression versus pain medication versus immobility versus anesthesia, most likely multifactorial   - Davis catheter  - Consider TOV prior to discharge versus as outpatient     Hypertension  BPs ranging 130s-160s/70s-80s at  [facility]. Patient indicates he has previously been told he has hypertension but wife prefers to focus on lifestyle and dietary changes.  - Blood pressure improving, currently normal   - Monitor trend. Unless significantly and persistently elevated during hospitalization, anticipate following up with primary care provider for ambulatory check     Leukocytosis  - WBC 12.7  - Likely secondary to steroids  - Monitor     Thrombocytopenia  Initial platelet count 102. Unclear if secondary to infection versus possible undiagnosed liver disease as noted below  - Stable in low 100s.      Steroid induced hyperglycemia  - HgbA1c 5.0%  - Sliding scale insulin for now, likely can discontinue shortly as steroids are finished       Abnormal liver on CT scan  Liver tests wnl. Notes 1 alcoholic drink per week  - PCP follow up     Renal lesion, incidental finding on CT  Discussed in ED.  - Follow up with PCP for ultrasound to further evaluate, non-urgent     Obesity class I  BMI 34. Increase in all-cause morbidity and mortality. Encourage weight loss.     Diet: Advance Diet as Tolerated: Regular Diet Adult    DVT Prophylaxis: Defer to surgical services   Davis Catheter: Not present  Code Status: Full Code      Disposition Plan   Expected discharge: Pending improvement in renal function, neurosurgical clearance.  Anticipate 1 to 2 days.  Entered: Donn Castañeda MD 07/15/2021, 8:37 AM       The patient's care was discussed with the Patient.    Donn Castañeda MD  Hospitalist Service  [facility]    ______________________________________________________________________    Interval History   No acute events overnight. Reports some mild post-operative pain.  Denies any chest pain.  Shortness of breath improving, feels near baseline.  Intermittent dry cough.  Tolerating diet.  Denies any prior history of urinary retention or prostate enlargement.    Data reviewed today: I reviewed all medications, new labs and imaging results over the last 24 hours. I personally reviewed no images or EKG's today.    Physical Exam   Vital Signs: Temp: 97.6  F (36.4  C) Temp src: Oral BP: 123/72 Pulse: 53   Resp: 18 SpO2: 95 % O2 Device: BiPAP/CPAP Oxygen Delivery: 2 LPM  Weight: 0 lbs 0 oz    Constitutional: Well-appearing, NAD  Respiratory: Clear to auscultation bilaterally, good air movement bilaterally  Cardiovascular: RRR, no m/r/g. No peripheral edema.  GI: Soft, non-tender, non-distended.   Skin/Integumen: Warm, dry  Other:      Data   Recent Labs   Lab 07/15/21  0202 07/14/21  2100 07/14/21 2026 07/14/21  0909 07/14/21  0622 07/13/21  0628 07/12/21  0025 07/12/21  0022   WBC  --   --   --   --   --  7.7 7.1  --    HGB  --   --    --   --   --  15.2 14.7  --    MCV  --   --   --   --   --  102* 100  --    PLT  --   --   --  126*  --  125* 102*  --    NA  --   --   --   --   --  144  --  142   POTASSIUM  --   --   --   --  4.1 4.0  --  4.4   CHLORIDE  --   --   --   --   --  114*  --  114*   CO2  --   --   --   --   --  21  --  25   BUN  --   --   --   --   --  29  --  22   CR  --   --   --   --   --  1.88* 1.5* 1.37*   ANIONGAP  --   --   --   --   --  9  --  3   GARRISON  --   --   --   --   --  9.1  --  8.9   * 121* 116*  --   --  140*  --  139*   ALBUMIN  --   --   --   --   --   --   --  3.4   PROTTOTAL  --   --   --   --   --   --   --  7.6   BILITOTAL  --   --   --   --   --   --   --  0.4   ALKPHOS  --   --   --   --   --   --   --  89   ALT  --   --   --   --   --   --   --  30   AST  --   --   --   --   --   --   --  33   LIPASE  --   --   --   --   --   --   --  133   TROPONIN  --   --   --   --   --   --   --  <0.015       Recent Results (from the past 24 hour(s))   XR Surgery JACKY Fluoro L/T 5 Min    Narrative    This exam was marked as non-reportable because it will not be read by a   radiologist or a [facility] non-radiologist provider.           Medications     sodium chloride 75 mL/hr at 07/14/21 2237       acetaminophen  975 mg Oral Q8H     azithromycin  250 mg Oral QPM     cefTRIAXone  2 g Intravenous Q24H     insulin aspart  1-7 Units Subcutaneous TID AC     insulin aspart  1-5 Units Subcutaneous At Bedtime     polyethylene glycol  17 g Oral Daily     senna-docusate  1 tablet Oral BID     sodium chloride (PF)  3 mL Intracatheter Q8H

## 2021-07-15 NOTE — PROGRESS NOTES
Date: July 15, 2021  Shift: [id number]  Name: Rigo Johns  Age: 70 year old  YOB: 1950    Reason for Admission: POD 1 C3-5 anterior cervical fusion     Cognitive/Mentation: A/Ox 4  Neuros/CMS: Intact ex left sided weakness LUE 3/5 and LLE 4/5. Reports tingling in right hand, but claims this was baseline before surgery and it is improving.  VS: stable  GI: BS audible, ++ flatus, No BM this shift. Continent.  : Bladder Scanned at 1100 820 ml. Attempted urinal sitting bedside, no results. MD notified. Davis placed. 1150 ml drained immediately.  Pulmonary: LS diminished bilaterally.  Pain: incisional pain managed with scheduled tylenol.      Drains: Davis  Skin: Incision dressing changed and JOSEFINA drain removed per MD order  Activity: Assist x 2 with GB and walker.   PT stood with pt at bedside. No walking due to weakness.  Diet: Regular with thin liquids. Takes pills whole.      Therapies recs: PT/OT  Discharge: Pending post-surgical progress  and resolution of urinary symptoms. Bolus of LR started, 1000 mls over 4 hours.

## 2021-07-15 NOTE — PLAN OF CARE
Pt here POD 0 C3-5 discectomy and fusion.  A/O x 4.  LUE 3/5.  LLE 4/5.  Denies NT.  Vitals stable.  LS clear.  Intermittent cough.  CPAP on while sleeping.  2L supp O2.  Tolerating small amount of full liquid diet.  BS audible.  Passing gas.  Incontinent of urine.  Cr elevated.  MIVF at 75.  BMP in AM.  Using external cath.  Pain control adequate with ice, tylenol and oxycodone.  JOSEFINA  Patent.  60ml red bloody output.  PIV infusing.  Belongings at  Bedside. Scoring green on aggression stoplight tool.

## 2021-07-15 NOTE — PHARMACY-ADMISSION MEDICATION HISTORY
"Admission medication history interview status for the 7/14/2021  admission is complete. See EPIC admission navigator for prior to admission medications     Medication history source reliability:Good    Actions taken by pharmacist (provider contacted, etc):Rewviewed MARS from [facility] and PTA med rec done at [facility] to update PTA med rec list.     Additional medication history information not noted on PTA med list :None    Medication reconciliation/reorder completed by provider prior to medication history? No    Time spent in this activity: 15\"    Prior to Admission medications    Medication Sig Last Dose Taking? Auth Provider   azithromycin (ZITHROMAX) 250 MG tablet Take 1 tablet (250 mg) by mouth daily 7/13/2021 at 0841 Yes Hesham Hammond MD   cefTRIAXone (ROCEPHIN) 2 GM vial Inject 2 g into the vein every 24 hours 7/13/2021 at 0841 Yes Hesham Hammond MD   methylPREDNISolone sodium succinate 500 mg Inject 500 mg into the vein every 24 hours 7/13/2021 at 1952 Yes Hesham Hammond MD   acetaminophen (TYLENOL) 325 MG tablet Take 2 tablets (650 mg) by mouth every 6 hours as needed for mild pain or other (and adjunct with moderate or severe pain or per patient request) Hesham Gastelum MD   TURMERIC PO  PTA med  Unknown, Entered By History         "

## 2021-07-15 NOTE — PROGRESS NOTES
"   07/15/21 1447   Quick Adds   Type of Visit Initial Occupational Therapy Evaluation   Living Environment   People in home spouse   Current Living Arrangements house   Home Accessibility stairs to enter home   Number of Stairs, Main Entrance 2   Stair Railings, Main Entrance railing on left side (ascending)   Transportation Anticipated family or friend will provide   Living Environment Comments 2 steps to get inside house, once inside all needs met on main level. Walk-in shower with 4\" threshold, shower seat. Has jacuzzi tub with seat and cutout door, only 6\" to step into tub   Self-Care   Usual Activity Tolerance good   Current Activity Tolerance moderate   Regular Exercise No   Equipment Currently Used at Home grab bar, tub/shower;shower chair  (shower seat)   Activity/Exercise/Self-Care Comment Pt reports IND at baseline with I/ADLs   Instrumental Activities of Daily Living (IADL)   Previous Responsibilities meal prep;housekeeping;laundry;shopping;yardwork;medication management;finances;driving   IADL Comments Pt is retired   Disability/Function   Hearing Difficulty or Deaf no   Wear Glasses or Blind yes   Vision Management glasses   Concentrating, Remembering or Making Decisions Difficulty no   Walking or Climbing Stairs Difficulty no   Dressing/Bathing Difficulty no   Toileting issues no   Doing Errands Independently Difficulty (such as shopping) no   Fall history within last six months no   General Information   Onset of Illness/Injury or Date of Surgery 07/14/21   Referring Physician Mary Evans NP   Additional Occupational Profile Info/Pertinent History of Current Problem Rigo Johns is a 70 year old male with PMH significant for PILI on CPAP, C5-7 anterior fusion 1993 in [place] who was admitted to [facility] on 7/12/21 with acute onset of neck, bilateral shoulder pain with associated BUE weakness and LLE weakness. Workup revealed high grade cervical stenosis with compressive " cervical myelopathy and neurosurgery recommended transfer to [facility] for surgical intervention with Dr. Hanley. Hospitalization was complicated by acute respiratory failure due to possible community acquired pneumonia. He was transferred to [facility] for surgery on 7/14/21    Existing Precautions/Restrictions fall;spinal   Cognitive Status Examination   Orientation Status orientation to person, place and time   Affect/Mental Status (Cognitive) WFL   Follows Commands WFL   Visual Perception   Visual Impairment/Limitations corrective lenses for distance   Sensory   Sensory Comments Pt reports tingling in R 2-4th fingers   Integumentary/Edema   Integumentary/Edema Comments Bandage to anterior neck   Posture   Posture forward head position;protracted shoulders   Range of Motion Comprehensive   General Range of Motion upper extremity range of motion deficits identified   Comment, General Range of Motion Pt with limited AROM in L UE, shoulder flex to 20 degrees with significant shoulder elevation to compensate. Elbow flexion to ~40 degrees actively. Strong grasp in L UE. R UE shoulder flexion limited to 90 degrees, pt unable to perform functional reach to back of head with R UE without turning head   General Upper Extremity Assessment (Range of Motion)   Upper Extremity: Range of Motion shoulder, left: UE ROM;shoulder, right: UE ROM;elbow/forearm, left: UE ROM;wrist, left: UE ROM   Strength Comprehensive (MMT)   Comment, General Manual Muscle Testing (MMT) Assessment Pt with below 3 strength in L UE, see ROM notes above. L UE 4/5 strength. R knee meniscus breakdown per pt   Muscle Tone Assessment   Muscle Tone Quick Adds No deficits were identified   Coordination   Coordination Comments Pt with intact finger to thumb opposition bilaterally.    Bed Mobility   Bed Mobility supine-sit;sit-supine   Supine-Sit [place] (Bed Mobility) minimum assist (75% patient effort)   Sit-Supine [place] (Bed Mobility) moderate  assist (50% patient effort)   Assistive Device (Bed Mobility) bed rails   Transfers   Transfers sit-stand transfer   Sit-Stand Transfer   Sit-Stand [place] (Transfers) moderate assist (50% patient effort);2 person assist   Sit/Stand Transfer Comments Pt with LE buckling in standing, requiring 2 person assist   Activities of Daily Living   BADL Assessment grooming;lower body dressing   Lower Body Dressing Assessment   [place] Level (Lower Body Dressing) moderate assist (50% patient effort)   Comment (Lower Body Dressing) Pt able to achieve figure 4 with L LE and doff sock with R hand, unable to achieve with R LE   Grooming Assessment   [place] Level (Grooming) set up   Position (Grooming) sitting up in bed   Clinical Impression   Criteria for Skilled Therapeutic Interventions Met (OT) yes;meets criteria;skilled treatment is necessary   OT Diagnosis Impaired independence with I/ADLs and func mobility   OT Problem List-Impairments impacting ADL activity tolerance impaired;balance;coordination;mobility;motor control;range of motion (ROM);strength;post-surgical precautions;postural control;sensory feedback   Assessment of Occupational Performance 5 or more Performance Deficits   Identified Performance Deficits func mobility, bathing, toileting, dressing, I/ADLs, grooming   Planned Therapy Interventions (OT) ADL retraining;IADL retraining;bed mobility training;motor coordination training;neuromuscular re-education;ROM;strengthening;transfer training   Clinical Decision Making Complexity (OT) moderate complexity   Therapy Frequency (OT) Daily   Anticipated Equipment Needs Upon Discharge (OT)   (defer to TCU)   Risk & Benefits of therapy have been explained evaluation/treatment results reviewed;care plan/treatment goals reviewed;risks/benefits reviewed;current/potential barriers reviewed;participants included;participants voiced agreement with care plan;patient   OT Discharge Planning    OT Discharge  Recommendation (DC Rec) Acute Rehab Center-Motivated patient will benefit from intensive, interdisciplinary therapy.  Anticipate will be able to tolerate 3 hours of therapy per day   OT Rationale for DC Rec Pt well below baseline of independence with functional mobility and I/ADLs, limited by UE and LE weakness, unable to ambulate at this time. Pt very motivated to work with therapies, recommend intensive therapies at [facility] to maximize independence and safety with I/ADLs   OT Brief overview of current status  min A sup>sit, mod A sit>sup, mod A of 2 sit>stand   Total Evaluation Time (Minutes)   Total Evaluation Time (Minutes) 15

## 2021-07-15 NOTE — PROVIDER NOTIFICATION
[facility]  [address]  711  SHEYLA  note says  IVF for DONOVAN.  pt only has fluids ordered at 10ml/hr.  did you want it at this rate?  please advise.  Mark aguirre RN  [phone]

## 2021-07-16 ENCOUNTER — APPOINTMENT (OUTPATIENT)
Dept: OCCUPATIONAL THERAPY | Facility: CLINIC | Age: 71
DRG: 471 | End: 2021-07-16
Attending: NEUROLOGICAL SURGERY
Payer: COMMERCIAL

## 2021-07-16 ENCOUNTER — APPOINTMENT (OUTPATIENT)
Dept: PHYSICAL THERAPY | Facility: CLINIC | Age: 71
DRG: 471 | End: 2021-07-16
Attending: NEUROLOGICAL SURGERY
Payer: COMMERCIAL

## 2021-07-16 ENCOUNTER — APPOINTMENT (OUTPATIENT)
Dept: ULTRASOUND IMAGING | Facility: CLINIC | Age: 71
DRG: 471 | End: 2021-07-16
Attending: INTERNAL MEDICINE
Payer: COMMERCIAL

## 2021-07-16 LAB
ANION GAP SERPL CALCULATED.3IONS-SCNC: 2 MMOL/L (ref 3–14)
BUN SERPL-MCNC: 58 MG/DL (ref 7–30)
CALCIUM SERPL-MCNC: 7.9 MG/DL (ref 8.5–10.1)
CHLORIDE BLD-SCNC: 120 MMOL/L (ref 94–109)
CO2 SERPL-SCNC: 26 MMOL/L (ref 20–32)
CREAT SERPL-MCNC: 1.58 MG/DL (ref 0.66–1.25)
GFR SERPL CREATININE-BSD FRML MDRD: 43 ML/MIN/1.73M2
GLUCOSE BLD-MCNC: 133 MG/DL (ref 70–99)
GLUCOSE BLDC GLUCOMTR-MCNC: 124 MG/DL (ref 70–99)
GLUCOSE BLDC GLUCOMTR-MCNC: 127 MG/DL (ref 70–99)
GLUCOSE BLDC GLUCOMTR-MCNC: 135 MG/DL (ref 70–99)
GLUCOSE BLDC GLUCOMTR-MCNC: 138 MG/DL (ref 70–99)
GLUCOSE BLDC GLUCOMTR-MCNC: 140 MG/DL (ref 70–99)
HGB BLD-MCNC: 12.8 G/DL (ref 13.3–17.7)
POTASSIUM BLD-SCNC: 3.9 MMOL/L (ref 3.4–5.3)
SODIUM SERPL-SCNC: 148 MMOL/L (ref 133–144)

## 2021-07-16 PROCEDURE — 258N000002 HC RX IP 258 OP 250: Performed by: INTERNAL MEDICINE

## 2021-07-16 PROCEDURE — 250N000011 HC RX IP 250 OP 636: Performed by: PHYSICIAN ASSISTANT

## 2021-07-16 PROCEDURE — 250N000013 HC RX MED GY IP 250 OP 250 PS 637: Performed by: INTERNAL MEDICINE

## 2021-07-16 PROCEDURE — 97112 NEUROMUSCULAR REEDUCATION: CPT | Mod: GO | Performed by: OCCUPATIONAL THERAPIST

## 2021-07-16 PROCEDURE — 97530 THERAPEUTIC ACTIVITIES: CPT | Mod: GP

## 2021-07-16 PROCEDURE — 250N000013 HC RX MED GY IP 250 OP 250 PS 637: Performed by: NURSE PRACTITIONER

## 2021-07-16 PROCEDURE — 97116 GAIT TRAINING THERAPY: CPT | Mod: GP

## 2021-07-16 PROCEDURE — 99232 SBSQ HOSP IP/OBS MODERATE 35: CPT | Performed by: INTERNAL MEDICINE

## 2021-07-16 PROCEDURE — 97535 SELF CARE MNGMENT TRAINING: CPT | Mod: GO | Performed by: OCCUPATIONAL THERAPIST

## 2021-07-16 PROCEDURE — 36415 COLL VENOUS BLD VENIPUNCTURE: CPT | Performed by: NURSE PRACTITIONER

## 2021-07-16 PROCEDURE — 85018 HEMOGLOBIN: CPT | Performed by: NURSE PRACTITIONER

## 2021-07-16 PROCEDURE — 80048 BASIC METABOLIC PNL TOTAL CA: CPT | Performed by: INTERNAL MEDICINE

## 2021-07-16 PROCEDURE — 258N000003 HC RX IP 258 OP 636: Performed by: PHYSICIAN ASSISTANT

## 2021-07-16 PROCEDURE — 120N000001 HC R&B MED SURG/OB

## 2021-07-16 PROCEDURE — 76770 US EXAM ABDO BACK WALL COMP: CPT

## 2021-07-16 PROCEDURE — 250N000013 HC RX MED GY IP 250 OP 250 PS 637: Performed by: PHYSICIAN ASSISTANT

## 2021-07-16 RX ORDER — TAMSULOSIN HYDROCHLORIDE 0.4 MG/1
0.4 CAPSULE ORAL DAILY
Status: DISCONTINUED | OUTPATIENT
Start: 2021-07-16 | End: 2021-07-18 | Stop reason: HOSPADM

## 2021-07-16 RX ORDER — SODIUM CHLORIDE 450 MG/100ML
INJECTION, SOLUTION INTRAVENOUS CONTINUOUS
Status: DISCONTINUED | OUTPATIENT
Start: 2021-07-16 | End: 2021-07-17

## 2021-07-16 RX ADMIN — SODIUM CHLORIDE: 4.5 INJECTION, SOLUTION INTRAVENOUS at 11:31

## 2021-07-16 RX ADMIN — CEFTRIAXONE SODIUM 2 G: 2 INJECTION, POWDER, FOR SOLUTION INTRAMUSCULAR; INTRAVENOUS at 20:07

## 2021-07-16 RX ADMIN — SODIUM CHLORIDE: 4.5 INJECTION, SOLUTION INTRAVENOUS at 23:48

## 2021-07-16 RX ADMIN — TAMSULOSIN HYDROCHLORIDE 0.4 MG: 0.4 CAPSULE ORAL at 11:38

## 2021-07-16 RX ADMIN — POLYETHYLENE GLYCOL 3350 17 G: 17 POWDER, FOR SOLUTION ORAL at 09:49

## 2021-07-16 RX ADMIN — SENNOSIDES AND DOCUSATE SODIUM 1 TABLET: 8.6; 5 TABLET ORAL at 09:49

## 2021-07-16 RX ADMIN — SODIUM CHLORIDE: 9 INJECTION, SOLUTION INTRAVENOUS at 04:06

## 2021-07-16 RX ADMIN — ACETAMINOPHEN 975 MG: 325 TABLET, FILM COATED ORAL at 03:59

## 2021-07-16 RX ADMIN — ACETAMINOPHEN 975 MG: 325 TABLET, FILM COATED ORAL at 11:38

## 2021-07-16 RX ADMIN — ACETAMINOPHEN 975 MG: 325 TABLET, FILM COATED ORAL at 20:06

## 2021-07-16 RX ADMIN — AZITHROMYCIN MONOHYDRATE 250 MG: 250 TABLET ORAL at 20:07

## 2021-07-16 ASSESSMENT — ACTIVITIES OF DAILY LIVING (ADL)
ADLS_ACUITY_SCORE: 19
ADLS_ACUITY_SCORE: 18
ADLS_ACUITY_SCORE: 19

## 2021-07-16 NOTE — PLAN OF CARE
1900- 2300: A&Ox4. VSS on RA, home CPAP at night. Assist of 2, GB and walker. Tolerating regular diet. Lung sounds clear. Bowel sounds active, + flatus, multiple small BMs. Adequate urine output via torres. Dressing CDI. Neuros intact, ex baseline L sided weakness. Denies pain.

## 2021-07-16 NOTE — PLAN OF CARE
POD #2 from a C3-5 anterior cervical fusion. A&Ox4. CMS intact, left sided weakness LUE 3/5, LLE 4/5. Bowel sounds active, passing flatus, tolerating regular diet. VSS. Dressing CDI. Up with A2/GB/W. Fluids infusing. Davis in place for retention. CPAP overnight. C/o minimal incisional pain, decreased with scheduled tylenol. Pt scoring green on the Aggression Stop Light Tool. Continue to mobilize.

## 2021-07-16 NOTE — PLAN OF CARE
Reason for Admission: POD 2 from C3-5 anterior cervical fusion    Cognitive/Mentation: A/Ox 4  Neuros/CMS: Intact ex weakness 4/5 on left side  VS: stable  GI: BS audible normoactive, ++ flatus, last BM today. Incontinent.  : Davis.  Pulmonary: LS clear equal bilaterally.  Pain: Incisional pain managed with scheduled Tylenol.    Drains: Davis patent and draining  Skin: Groin rash. Pt states it is normal for him. Daiana care and powder applied.   Activity: Assist x 2 with GB walker. Up to chair for lunch.  Diet: Regular with thin liquids. Takes pills whole.     Therapies recs: PT/OT  Discharge: Acute rehab when stable    End of shift summary: Urology ultra sound this morning. Flomax started for urinary retention. Pt incontinent of bowel 2x this shift. States he has a delayed sensation and by the time he feels he has to go, it is too late. Recommend to hold bowel meds. Bed and chair alarms on. Pt calls appropriately.

## 2021-07-16 NOTE — PROGRESS NOTES
[facility]    Neurosurgery  Daily Note    Assessment & Plan   Procedure(s):  C3-C4, C4-C5 anterior cervical discetomy and fusion   2 Days Post-Op  Rigo Johns is a 70 year old male with history of C5-7 anterior fusion in 1993 in [place] presented to ED on 7/12/21 with acute onset of neck pain and BL shoulder pain with associated BUE weakness and LLE weakness who is POD2 s/p C3-5 ACDF with Dr. Hanley. Strength on left side is improving per patient. Incision c/d/i.    Plan:  -Advance activity as tolerated  -Continue supportive and symptomatic treatment  -Start or continue physical therapy  -Pain control measures  -Advance diet as tolerated  -Routine wound care  -Reviewed plans with patient and Dr. Hanley   -Call with questions    Principal Problem:    Cervical stenosis of spinal canal    Interval History   Stable.    Physical Exam   Temp: 97.9  F (36.6  C) Temp src: Oral BP: 130/74 Pulse: 55   Resp: 18 SpO2: 93 % O2 Device: None (Room air)    There were no vitals filed for this visit.  Vital Signs with Ranges  Temp:  [97.5  F (36.4  C)-98.4  F (36.9  C)] 97.9  F (36.6  C)  Pulse:  [55-71] 55  Resp:  [18] 18  BP: (119-133)/(62-74) 130/74  SpO2:  [90 %-93 %] 93 %  I/O last 3 completed shifts:  In: -   Out: 3120 [Urine:3100; Drains:20]    Awake, alert, NAD  II-XII grossly intact  Anterior dressing removed; incision c/d/i with steri strips in place  R shoulder abduction 3/5, remainder RUE 4/5  LUE 3/5 at shoulder, elbow; 4/5 hand grasp   LLE 3/5 throughout      Medications     NaCl          acetaminophen  975 mg Oral Q8H     azithromycin  250 mg Oral QPM     cefTRIAXone  2 g Intravenous Q24H     insulin aspart  1-7 Units Subcutaneous TID AC     insulin aspart  1-5 Units Subcutaneous At Bedtime     polyethylene glycol  17 g Oral Daily     senna-docusate  1 tablet Oral BID     sodium chloride (PF)  3 mL Intracatheter Q8H     tamsulosin  0.4 mg Oral Daily       Plans discussed with Dr. Hanley who was  in agreement with plans    Irasema CAIN [facility]  [address] [place] [address]    Tel [phone]  Pager [phone]

## 2021-07-16 NOTE — PROGRESS NOTES
[facility]    Medicine Progress Note - Hospitalist Service        Date of Admission:  7/14/2021  8:43 AM    Assessment & Plan:   Rigo Johns is a 70 year-old male with past medical history significant for PILI on CPAP, C5-7 anterior fusion 1993 in [place] who was admitted to [facility] on 7/12/21 with acute onset of neck, bilateral shoulder pain with associated BUE weakness and LLE weakness. Workup revealed high grade cervical stenosis with compressive cervical myelopathy and neurosurgery recommended transfer to [facility] for surgical intervention with Dr. Halney. Hospitalization was complicated by acute respiratory failure due to possible community acquired pneumonia. He was transferred to [facility] for surgery on 7/14/21 and Hospitalist consulted for medical management of pneumonia and other medical problems.     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 He underwent MRI of his brain and cervical spine.  MRI of the brain showed no acute abnormality.  MRI of cervical spine showed high-grade cervical spinal canal stenoses and bilateral neuroforaminal stenoses with abnormal T2 prolongation from C3-C4 that was slightly more pronounced on the left. Treated initially with IV steroids.  Neurosurgery recommended transfer to [facility] for decompressive surgery which was completed on 7/14/2021  - Routine post-operative cares per primary service, neurosurgery including IVF, pain control  - PT/OT  - Physical therapy recommending acute rehab     Possible community-acquired pneumonia  Acute hypoxic respiratory failure, resolved, PE ruled out  Presented to [facility] with SOB and hypoxemia reportedly down to 59% via EMS requiring supplemental oxygen 8-12L on presentation. Workup including chest CT revealed patchy  opacities in bilateral lungs greatest in the lower lobes, no PE. Thought probably due to atelectasis but was given a dose of Zosyn in the ED and started on ceftriaxone and azithromycin for possible pneumonia and hypoxia. No leukocytosis or fevers. Lactate wnl. ABG c/w respiratory acidosis. ECHO with normal EF, very poor image quality, unable to visualize RV properly.  - Blood cultures 7/12 NGTD  - Weaned to room air  - Continue ceftriaxone(day 5/7) and azithromycin(day 5/5 today)     PILI on nocturnal CPAP  -Continue CPAP with home settings     Acute kidney injury on possible chronic kidney disease-stage III  -Baseline creatinine unknown, does not doctor frequently. Initial creatinine 1.37, increased to 2.13. Intake has been poor for several days.   -Likely prerenal, improving with IV hydration, creatinine down to 1.58. Continue IV hydration for another 24 hours.(Changed to 0.45% saline due to hyponatremia)  - Avoid nephrotoxins      Urinary retention  No prior history of retention. Potentially secondary to spinal cord compression versus pain medication versus immobility versus anesthesia, most likely multifactorial   -Davis catheter  -Start Flomax 0.4 mg p.o. daily  -Renal ultrasound with no hydronephrosis in either kidney  -Likely will discharge with Davis catheter and Flomax. Recommend outpatient urology follow-up in 1 to 2 weeks.     Hypertension  BPs ranging 130s-160s/70s-80s at  [facility]. Patient indicates he has previously been told he has hypertension but wife prefers to focus on lifestyle and dietary changes.  -Blood pressure improving, currently normal   -Monitor trend. Unless significantly and persistently elevated during hospitalization, anticipate following up with primary care provider for ambulatory check     Leukocytosis  -WBC 12.7  -Likely secondary to steroids  -Monitor      Thrombocytopenia  Initial platelet count 102. Unclear if secondary to infection versus possible undiagnosed liver disease  "as noted below  -Stable in low 100s.      Steroid induced hyperglycemia  -HgbA1c 5.0%  -Sliding scale insulin for now, likely can discontinue shortly as steroids are finished      Abnormal liver on CT scan  Liver tests wnl. Notes 1 alcoholic drink per week  -PCP follow up     Renal lesion, incidental finding on CT  Discussed in ED.  -Follow up with PCP for ultrasound to further evaluate, non-urgent     Obesity class I  BMI 34. Increase in all-cause morbidity and mortality. Encourage weight loss.        Diet: Advance Diet as Tolerated: Regular Diet Adult     DVT Prophylaxis: Pneumatic Compression Devices   Davis Catheter: PRESENT, indication: Retention  Code Status: Full Code     Disposition Plan    Expected discharge: 07/17/2021 recommended to acute rehab.    Entered: Michael Bagley MD 07/16/2021, 11:46 AM        The patient's care was discussed with the Bedside Nurse and Patient.    Michael Bagley MD  Hospitalist Service  [facility]  Text Page 7AM-6PM  Securely message with the Vocera Web Console (learn more here)  Text page via [employer] Paging/Directory    ______________________________________________________________________    Interval History   Renal function better with hydration. Overall feels better. Respiratory status continues to be stable, continues to be off oxygen. Pain adequately controlled.    Data reviewed today: I reviewed all medications, new labs and imaging results over the last 24 hours. I personally reviewed no images or EKG's today.    Physical Exam   Vital signs:  Temp: 97.9  F (36.6  C) Temp src: Oral BP: 130/74 Pulse: 55   Resp: 18 SpO2: 93 % O2 Device: None (Room air) Oxygen Delivery: 2 LPM      Estimated body mass index is 34.76 kg/m  as calculated from the following:    Height as of 7/12/21: 1.803 m (5' 11\").    Weight as of 7/12/21: 113 kg (249 lb 3.2 oz).      Wt Readings from Last 2 Encounters:   07/12/21 113 kg (249 lb 3.2 oz)       Gen: AAOX3, NAD  Resp: " CTA B/L, normal WOB, no crackles  CVS: RRR, no murmur  Abd/GI: Soft, non-tender. BS- normoactive.    Skin: Warm, dry no rashes  MSK: no pedal edema  : Davis +  Neuro- CN- intact. No focal deficits.      Data   Recent Labs   Lab 07/16/21  0851 07/16/21  0610 07/16/21  0210 07/15/21  0940 07/14/21  0909 07/14/21  0622 07/13/21  0628 07/13/21  0628 07/12/21  0025 07/12/21  0022 07/12/21  0022   WBC  --   --   --  12.7*  --   --   --  7.7 7.1  --   --    HGB 12.8*  --   --  13.0*  --   --   --  15.2 14.7   < >  --    MCV  --   --   --  98  --   --   --  102* 100  --   --    PLT  --   --   --  101* 126*  --   --  125* 102*   < >  --    *  --   --  142  --   --   --  144  --   --  142   POTASSIUM 3.9  --   --  4.1  --  4.1  --  4.0  --   --  4.4   CHLORIDE 120*  --   --  115*  --   --   --  114*  --   --  114*   CO2 26  --   --  20  --   --   --  21  --   --  25   BUN 58*  --   --  66*  --   --   --  29  --   --  22   CR 1.58*  --   --  2.13*  --   --   --  1.88* 1.5*  --  1.37*   ANIONGAP 2*  --   --  7  --   --   --  9  --   --  3   GARRISON 7.9*  --   --  8.2*  --   --   --  9.1  --   --  8.9   * 135* 138* 131*  --   --    < > 140*  --   --  139*   ALBUMIN  --   --   --   --   --   --   --   --   --   --  3.4   PROTTOTAL  --   --   --   --   --   --   --   --   --   --  7.6   BILITOTAL  --   --   --   --   --   --   --   --   --   --  0.4   ALKPHOS  --   --   --   --   --   --   --   --   --   --  89   ALT  --   --   --   --   --   --   --   --   --   --  30   AST  --   --   --   --   --   --   --   --   --   --  33   LIPASE  --   --   --   --   --   --   --   --   --   --  133   TROPONIN  --   --   --   --   --   --   --   --   --   --  <0.015    < > = values in this interval not displayed.       No results found for this or any previous visit (from the past 24 hour(s)).  Medications     NaCl 75 mL/hr at 07/16/21 1131       acetaminophen  975 mg Oral Q8H     azithromycin  250 mg Oral QPM     cefTRIAXone  2 g  Intravenous Q24H     insulin aspart  1-7 Units Subcutaneous TID AC     insulin aspart  1-5 Units Subcutaneous At Bedtime     polyethylene glycol  17 g Oral Daily     senna-docusate  1 tablet Oral BID     sodium chloride (PF)  3 mL Intracatheter Q8H     tamsulosin  0.4 mg Oral Daily

## 2021-07-17 ENCOUNTER — APPOINTMENT (OUTPATIENT)
Dept: OCCUPATIONAL THERAPY | Facility: CLINIC | Age: 71
DRG: 471 | End: 2021-07-17
Attending: NEUROLOGICAL SURGERY
Payer: COMMERCIAL

## 2021-07-17 LAB
ANION GAP SERPL CALCULATED.3IONS-SCNC: 3 MMOL/L (ref 3–14)
BACTERIA BLD CULT: NO GROWTH
BACTERIA BLD CULT: NO GROWTH
BUN SERPL-MCNC: 42 MG/DL (ref 7–30)
CALCIUM SERPL-MCNC: 7.7 MG/DL (ref 8.5–10.1)
CHLORIDE BLD-SCNC: 119 MMOL/L (ref 94–109)
CO2 SERPL-SCNC: 23 MMOL/L (ref 20–32)
CREAT SERPL-MCNC: 1.28 MG/DL (ref 0.66–1.25)
GFR SERPL CREATININE-BSD FRML MDRD: 56 ML/MIN/1.73M2
GLUCOSE BLD-MCNC: 96 MG/DL (ref 70–99)
GLUCOSE BLDC GLUCOMTR-MCNC: 126 MG/DL (ref 70–99)
GLUCOSE BLDC GLUCOMTR-MCNC: 87 MG/DL (ref 70–99)
GLUCOSE BLDC GLUCOMTR-MCNC: 95 MG/DL (ref 70–99)
POTASSIUM BLD-SCNC: 3.8 MMOL/L (ref 3.4–5.3)
SODIUM SERPL-SCNC: 145 MMOL/L (ref 133–144)

## 2021-07-17 PROCEDURE — 120N000001 HC R&B MED SURG/OB

## 2021-07-17 PROCEDURE — 80048 BASIC METABOLIC PNL TOTAL CA: CPT | Performed by: INTERNAL MEDICINE

## 2021-07-17 PROCEDURE — 36415 COLL VENOUS BLD VENIPUNCTURE: CPT | Performed by: INTERNAL MEDICINE

## 2021-07-17 PROCEDURE — 250N000011 HC RX IP 250 OP 636: Performed by: INTERNAL MEDICINE

## 2021-07-17 PROCEDURE — 250N000013 HC RX MED GY IP 250 OP 250 PS 637: Performed by: INTERNAL MEDICINE

## 2021-07-17 PROCEDURE — 99232 SBSQ HOSP IP/OBS MODERATE 35: CPT | Performed by: INTERNAL MEDICINE

## 2021-07-17 PROCEDURE — 97112 NEUROMUSCULAR REEDUCATION: CPT | Mod: GO

## 2021-07-17 PROCEDURE — 250N000013 HC RX MED GY IP 250 OP 250 PS 637: Performed by: NURSE PRACTITIONER

## 2021-07-17 RX ADMIN — ACETAMINOPHEN 650 MG: 325 TABLET, FILM COATED ORAL at 17:38

## 2021-07-17 RX ADMIN — TAMSULOSIN HYDROCHLORIDE 0.4 MG: 0.4 CAPSULE ORAL at 08:21

## 2021-07-17 RX ADMIN — ACETAMINOPHEN 975 MG: 325 TABLET, FILM COATED ORAL at 04:26

## 2021-07-17 RX ADMIN — ACETAMINOPHEN 650 MG: 325 TABLET, FILM COATED ORAL at 13:05

## 2021-07-17 RX ADMIN — ACETAMINOPHEN 650 MG: 325 TABLET, FILM COATED ORAL at 21:39

## 2021-07-17 RX ADMIN — CEFTRIAXONE SODIUM 2 G: 2 INJECTION, POWDER, FOR SOLUTION INTRAMUSCULAR; INTRAVENOUS at 21:39

## 2021-07-17 ASSESSMENT — ACTIVITIES OF DAILY LIVING (ADL)
ADLS_ACUITY_SCORE: 18

## 2021-07-17 NOTE — PLAN OF CARE
POD #3 from an anterior C3 - C5 discectomy/fusion with Dr. Hanley. Alert, oriented x4. CMS with improving strength with the L. Side scoring a 4/5 and R. Side scoring a 5/5, tingling in the 2nd and 3rd digits of the R. Hand. Incision SATNAM, steri strips present, approximated, no drainage noted. Bowel sounds audible, passing flatus, tolerating regular diet. VSS, on CPAP overnight. Up with A2 using GB/walker. Advis in place with adequate output, incontinent of bowel. C/o mild incisional pain, decreased with scheduled tylenol. Pt scoring green on the Aggression Stop Light Tool. Discharge to ARU pending.

## 2021-07-17 NOTE — PROGRESS NOTES
[facility]     Neurosurgery  Daily Note        Assessment & Plan     Procedure(s):  C3-C4, C4-C5 anterior cervical discetomy and fusion   3 Days Post-Op  Rigo Johns is a 70 year old male with history of C5-7 anterior fusion in 1993 in [place] presented to ED on 7/12/21 with acute onset of neck pain and BL shoulder pain with associated BUE weakness and LLE weakness who is POD2 s/p C3-5 ACDF with Dr. Hanley. Strength on left side is improving per patient. Incision c/d/i.     Plan:  -Advance activity as tolerated  -Continue supportive and symptomatic treatment  -Start or continue physical therapy  -Pain control measures  -Advance diet as tolerated  -Routine wound care  -Awaiting placement.   -Call with questions     Principal Problem:    Cervical stenosis of spinal canal           Interval History     Stable.           Physical Exam     Temp: 97.9  F (36.6  C) Temp src: Oral BP: 130/74 Pulse: 55   Resp: 18 SpO2: 93 % O2 Device: None (Room air)    There were no vitals filed for this visit.  Vital Signs with Ranges  Temp:  [97.5  F (36.4  C)-98.4  F (36.9  C)] 97.9  F (36.6  C)  Pulse:  [55-71] 55  Resp:  [18] 18  BP: (119-133)/(62-74) 130/74  SpO2:  [90 %-93 %] 93 %  I/O last 3 completed shifts:  In: -   Out: 3120 [Urine:3100; Drains:20]     Awake, alert, NAD  II-XII grossly intact  Anterior dressing removed; incision c/d/i with steri strips in place  R shoulder abduction 3/5, remainder RUE 4/5  LUE 3/5 at shoulder, elbow; 4/5 hand grasp   LLE 3/5 throughout

## 2021-07-17 NOTE — INTERIM SUMMARY
[facility] Pre-Admission Screen    Referral Source:  [place] [address] [facility]  [address]  Admit date to referring facility: 7/14/2021    Physical Medicine and Rehab Consult Completed: No    Rehab Diagnosis:    Spinal Cord Dysfunction Spinal Non-Traumatic 04.1211 Quadriplegia, Incomplete C1-4 - s/p C3-5 ACDF.     Justification for Acute Inpatient Rehabilitation  Rigo Johns is a 70 year-old male with past medical history significant for PILI on CPAP, C5-7 anterior fusion 1993 in [place] who was admitted to [facility] on 7/12/21 with acute onset of neck, bilateral shoulder pain with associated BUE weakness and LLE weakness. Workup revealed high grade cervical stenosis with compressive cervical myelopathy. Pt now s/p s/p C3-5 ACDF. Hospitalization was complicated by acute respiratory failure due to possible community acquired pneumonia, DONOVAN, urinary retention, HTN, and thrombocytopenia. Pt is medically ready to admit to Acute Inpatient Rehab Unit.     Patient requires an intensive inpatient rehab program to address the following acute impairments:impaired strength, impaired activity tolerance, impaired balance and impaired coordination    Current Active Medical Management Needs/Risks for Clinical Complications  The patient requires the high level of rehabilitation physician supervision that accompanies the provision of intensive rehabilitation therapy.  The patient needs the services of the rehabilitation physician to assess the patient medically and functionally and to modify the course of treatment as needed to maximize the patient's capacity to benefit from the rehabilitation process.    Neuro - Manages changes in neuro status. Steroids completed. Anterior neck incision C/D/I - monitor for s/s of infection.      Pulmonary/PNA - chest CT revealed patchy opacities in bilateral lungs greatest in the lower lobes, no PE. Treated with  "Ceftriaxone. Blood cultures 7/12 NGTD. Supplemental O2 weaned. On RA and Bipap at night  - s/o PILI.     Renal - Initial creatinine 1.37, increased to 2.13.  Creatinine now much improved with IV hydration.Renal lesion, incidental finding on CT.  Monitor labs.    Cardiac/HTN - BPs ranging 130s-160s/70s-80s at  [facility]. Wife prefers to focus on lifestyle and dietary changes for control.    Thrombocytopenia - Initial platelet count 102. Unclear if secondary to infection versus possible undiagnosed liver disease. Liver tests wnl.Last plt count 101 on 7/15. F/u with PCP at discharge.    Urinary retention - Potentially secondary to spinal cord compression versus pain medication versus immobility versus anesthesia, most likely multifactorial. Renal ultrasound with no hydronephrosis in either kidney. Flomax.    Pain management - Tylenol, Oxycodone PRN    DVT prophylaxis - Pneumatic Compression Devices     Past Medical/Surgical History   Surgery in the past 100 days: Yes   Additional relevant past medical history: PILI on CPAP, C5-7 anterior fusion 1993 in [place]    Level of Functioning Prior to Admission:    LIVING ENVIRONMENT   People in home: spouse   Current Living Arrangements: house   Home Accessibility: stairs to enter home    Number of Stairs, Main Entrance: 2    Stair Railings, Main Entrance: railing on left side (ascending)    Transportation Anticipated: family or friend will provide   Living Environment Comments: 2 steps to get inside house, once inside all needs met on main level. Walk-in shower with 4\" threshold, shower seat. Has SkySQLi tub with seat and cutout door, only 6\" to step into tub    SELF-CARE   Usual Activity Tolerance: good   Regular Exercise: No   Equipment Currently Used at Home: grab bar, tub/shower, shower chair (shower seat)   Activity/Exercise/Self-Care Comment: Pt reports IND at baseline with I/ADLs    DISABILITY/FUNCTION   Concentrating, Remembering or Making Decisions Difficulty: " no   Difficulty Communicating: no   Walking or Climbing Stairs Difficulty: no   Dressing/Bathing Difficulty: no     Toileting issues: no   Doing Errands Independently Difficulty (such as shopping): no   Fall history within last six months: no;     Change in Functional Status Since Onset of Current Illness/Injury: yes    Level of Function: GG Scale (Section GG Functional Ability and Goals; CMS's CHING Version 3.0 Manual effective 10.1.2019):  PT Current Function Goals for Rehab   Bed Rolling 4 Supervision or touching assitance 6 Independent   Supine to Sit 3 Partial/moderate assistance 6 Independent   Sit to Stand 3 Partial/moderate assistance 6 Independent   Transfer 3 Partial/moderate assistance 6 Independent   Ambulation 1 Dependent 6 Independent   Stairs Not completed 6 Independent     OT Current Function Goals for Rehab   Feeding 5 Setup or clean-up assistance 6 Independent   Grooming 4 Supervision or touching assitance 6 Independent   Bathing Not completed 6 Independent   Upper Body Dressing Not completed 6 Independent   Lower Body Dressing Not completed 6 Independent   Toileting 3 Partial/moderate assistance 6 Independent   Toilet Transfer 3 Partial/moderate assistance 6 Independent   Tub/Shower Transfer Not completed 6 Independent   Cognition Not Impaired Independent     SLP Current Function Goals for Rehab   Swallow Not Impaired Not applicable   Communication Not Impaired Not applicable       Current Diet:  Regular diet and Thin liquids    Summary Statement:  Sup>sit with cues for logroll technique, CGA. Sit<>stand from elevated bed to FWW with modAx1. Standing balance x4 minutes with weight shift L/R .Cues for increasing equal WB through LEs. Pt reports about 60% through R, 40% through L due to L LE weakness. Small forward/backward steps within Ax2 FWW. Difficulty fully clearning L LE performing shuffling steps. Pt sat at EOB for ~20 minutes with neuromuscular re-ed and ADL activity. Pt brushed teeth with  setup, demonstrating good bilateral coordination, lifting R UE to mouth to brush teeth. Sit>supine with min A to help LE into bed.    Expected Therapies and Services Required During Inpatient Rehab Admission  Intensity of Therapy: Patient requires intensive therapies not available in a lesser level of care. Patient is motivated, making gains, and can tolerate 3 hours of therapy a day.  Physical Therapy: 90 minutes per day, 7 days a week for 14 days  Occupational Therapy: 90 minutes per day, 7 days a week for 14 days  Speech and Language Therapy: SLP services not indicated  Rehabilitation Nursing Needs: Patient requires 24 hour Rehab Nursing to manage vitals, medication education, positioning, carryover of new rehab techniques, care coordination, skin integrity, assess neurologic status, pain management and provide safe environment for patient at falls risk.    Precautions/restrictions/special needs:   Precautions: spinal precautions   Restrictions: N/A   Special Needs: N/A    Expected Level of Improvement: Mod I with mobility, transfers, staris, and ADLs.   Expected Length of time to achieve: 14 days    Anticipated Discharge Needs:  Anticipated Discharge Destination: Home  Anticipated Discharge Support: Family member and Friends  24/7 support available : Unknown  Identified caregiver(s):  Wife, daughter  Anticipated Discharge Needs: Home with outpatient therapy    Identified challenges/barriers:  None     Rehab Admissions Liaison Signature:      Physician statement of review and agreement:  I have reviewed and am in agreement of the need for IRF stay to address above functional and medical needs. In addition to above statements address, Patient requires intensive active and ongoing therapeutic intervention and multiple therapies; Patient requires medical supervision; Expected to actively participate in the intensive rehab program; Sufficiently stable to actively participate; Expectation for measurable improvement in  functional capacity or adaption to impairments.    Physician Signature:                                Date/TIme:

## 2021-07-17 NOTE — PROGRESS NOTES
Care Management Follow Up    Length of Stay (days): 3    Expected Discharge Date: 07/18/2021 or 7/19/2021     Concerns to be Addressed: all concerns addressed in this encounter     Patient plan of care discussed at interdisciplinary rounds: Yes    Anticipated Discharge Disposition:  None  Anticipated Discharge Services:  None  Anticipated Discharge DME:  None    Patient/family educated on Medicare website which has current facility and service quality ratings:    Education Provided on the Discharge Plan:    Patient/Family in Agreement with the Plan:      Referrals Placed by CM/SW:  BC[employer] authorization started with [employer] via phone  Private pay costs discussed: Not applicable    Additional Information:  Spoke with [facility] to inquire about bed availability and they do not have bed available for patient today. Requested BS[employer] authorization via [employer]. (Case#[id number])      ZAYRA Rivera    Auth received [employer] on behalf of [facility]. Approved for Acute Rehab  7/18-7/22 (Auth # [id number]). Updated [facility] and they may have bed available on Sunday. [facility] wheelchair at 1330

## 2021-07-17 NOTE — PROGRESS NOTES
[facility]    Medicine Progress Note - Hospitalist Service        Date of Admission:  7/14/2021  8:43 AM    Assessment & Plan:   Rigo Johns is a 70 year-old male with past medical history significant for PILI on CPAP, C5-7 anterior fusion 1993 in [place] who was admitted to [facility] on 7/12/21 with acute onset of neck, bilateral shoulder pain with associated BUE weakness and LLE weakness. Workup revealed high grade cervical stenosis with compressive cervical myelopathy and neurosurgery recommended transfer to [facility] for surgical intervention with Dr. Hanley. Hospitalization was complicated by acute respiratory failure due to possible community acquired pneumonia. He was transferred to [facility] for surgery on 7/14/21 and Hospitalist consulted for medical management of pneumonia and other medical problems.     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 He underwent MRI of his brain and cervical spine.  MRI of the brain showed no acute abnormality.  MRI of cervical spine showed high-grade cervical spinal canal stenoses and bilateral neuroforaminal stenoses with abnormal T2 prolongation from C3-C4 that was slightly more pronounced on the left. Treated initially with IV steroids.  Neurosurgery recommended transfer to [facility] for decompressive surgery which was completed on 7/14/2021  - Routine post-operative cares per primary service, neurosurgery including IVF, pain control  - PT/OT  - Physical therapy recommending acute rehab     Possible community-acquired pneumonia  Acute hypoxic respiratory failure, resolved, PE ruled out  Presented to [facility] with SOB and hypoxemia reportedly down to 59% via EMS requiring supplemental oxygen 8-12L on presentation. Workup including chest CT revealed patchy  opacities in bilateral lungs greatest in the lower lobes, no PE. Thought probably due to atelectasis but was given a dose of Zosyn in the ED and started on ceftriaxone and azithromycin for possible pneumonia and hypoxia. No leukocytosis or fevers. Lactate wnl. ABG c/w respiratory acidosis. ECHO with normal EF, very poor image quality, unable to visualize RV properly.  - Blood cultures 7/12 NGTD  - Weaned to room air  -Completed azithromycin.  - Continue ceftriaxone(day 6/7)      PILI on nocturnal CPAP  -Continue CPAP with home settings     Acute kidney injury on possible chronic kidney disease-stage III  -Baseline creatinine unknown, does not doctor frequently. Initial creatinine 1.37, increased to 2.13.  Creatinine now much improved with IV hydration, down to 1.28.     Urinary retention  No prior history of retention. Potentially secondary to spinal cord compression versus pain medication versus immobility versus anesthesia, most likely multifactorial   -Davis catheter placed on 7/15 due to acute urinary retention  -Started on  Flomax 0.4 mg p.o. daily  -Renal ultrasound with no hydronephrosis in either kidney  -We will attempt a voiding trial either prior to discharge or at acute rehab depending on how long he ends up staying here.     Hypertension  BPs ranging 130s-160s/70s-80s at  [facility]. Patient indicates he has previously been told he has hypertension but wife prefers to focus on lifestyle and dietary changes.  -Blood pressure improving, currently normal   -Monitor trend. Unless significantly and persistently elevated during hospitalization, anticipate following up with primary care provider for ambulatory check.     Leukocytosis  -WBC 12.7  -Likely secondary to steroids  -Monitor      Thrombocytopenia  Initial platelet count 102. Unclear if secondary to infection versus possible undiagnosed liver disease as noted below  -Stable in low 100s.      Steroid induced hyperglycemia  -HgbA1c 5.0%  -Sliding scale  "insulin for now, likely can discontinue shortly as steroids are finished      Abnormal liver on CT scan  Liver tests wnl. Notes 1 alcoholic drink per week  -PCP follow up     Renal lesion, incidental finding on CT  Discussed in ED.  -Follow up with PCP for ultrasound to further evaluate, non-urgent     Obesity class I  BMI 34. Increase in all-cause morbidity and mortality. Encourage weight loss.        Diet: Advance Diet as Tolerated: Regular Diet Adult     DVT Prophylaxis: Pneumatic Compression Devices   Davis Catheter: PRESENT, indication: Retention  Code Status: Full Code     Disposition Plan    Expected discharge: Awaiting transfer to acute rehab.  Entered: Michael Bagley MD 07/17/2021, 11:20 AM        The patient's care was discussed with the Bedside Nurse and Patient.    Michael Bagley MD  Hospitalist Service  [facility]  Text Page 7AM-6PM  Securely message with the Vocera Web Console (learn more here)  Text page via [employer] Paging/Directory    ______________________________________________________________________    Interval History   Renal function test sodium improved with IV hydration.  Respiratory function continues to improve.  No nausea or vomiting.    Data reviewed today: I reviewed all medications, new labs and imaging results over the last 24 hours. I personally reviewed no images or EKG's today.    Physical Exam   Vital signs:  Temp: 97.7  F (36.5  C) Temp src: Oral BP: 130/76 Pulse: 53   Resp: 16 SpO2: 96 % O2 Device: BiPAP/CPAP Oxygen Delivery: 2 LPM      Estimated body mass index is 34.76 kg/m  as calculated from the following:    Height as of 7/12/21: 1.803 m (5' 11\").    Weight as of 7/12/21: 113 kg (249 lb 3.2 oz).      Wt Readings from Last 2 Encounters:   07/12/21 113 kg (249 lb 3.2 oz)       Gen: AAOX3, NAD  Resp: CTA B/L, normal WOB, no crackles  CVS: RRR, no murmur  Abd/GI: Soft, non-tender. BS- normoactive.    Skin: Warm, dry no rashes  MSK: no pedal " edema  : Davis +  Neuro- CN- intact. No focal deficits.      Data   Recent Labs   Lab 07/17/21  0755 07/17/21  0727 07/17/21  0218 07/16/21  0851 07/15/21  0940 07/14/21  0909 07/14/21  0622 07/13/21  0628 07/12/21  0025 07/12/21  0022 07/12/21 0022   WBC  --   --   --   --  12.7*  --   --  7.7 7.1  --   --    HGB  --   --   --  12.8* 13.0*  --   --  15.2 14.7   < >  --    MCV  --   --   --   --  98  --   --  102* 100  --   --    PLT  --   --   --   --  101* 126*  --  125* 102*   < >  --    *  --   --  148* 142  --   --  144  --   --  142   POTASSIUM 3.8  --   --  3.9 4.1  --   --  4.0  --   --  4.4   CHLORIDE 119*  --   --  120* 115*  --   --  114*  --   --  114*   CO2 23  --   --  26 20  --   --  21  --   --  25   BUN 42*  --   --  58* 66*  --   --  29  --   --  22   CR 1.28*  --   --  1.58* 2.13*  --   --  1.88* 1.5*  --  1.37*   ANIONGAP 3  --   --  2* 7  --   --  9  --   --  3   GARRISON 7.7*  --   --  7.9* 8.2*  --   --  9.1  --   --  8.9   GLC 96 87 126* 133* 131*  --    < > 140*  --   --  139*   ALBUMIN  --   --   --   --   --   --   --   --   --   --  3.4   PROTTOTAL  --   --   --   --   --   --   --   --   --   --  7.6   BILITOTAL  --   --   --   --   --   --   --   --   --   --  0.4   ALKPHOS  --   --   --   --   --   --   --   --   --   --  89   ALT  --   --   --   --   --   --   --   --   --   --  30   AST  --   --   --   --   --   --   --   --   --   --  33   LIPASE  --   --   --   --   --   --   --   --   --   --  133   TROPONIN  --   --   --   --   --   --   --   --   --   --  <0.015    < > = values in this interval not displayed.       No results found for this or any previous visit (from the past 24 hour(s)).  Medications       acetaminophen  975 mg Oral Q8H     cefTRIAXone  2 g Intravenous Q24H     polyethylene glycol  17 g Oral Daily     senna-docusate  1 tablet Oral BID     sodium chloride (PF)  3 mL Intracatheter Q8H     tamsulosin  0.4 mg Oral Daily

## 2021-07-17 NOTE — PLAN OF CARE
POD 2 C3-C5 anterior spinal fusion. Alert and oriented x4. VSS on RA. CMS with tingling in fingers 2 and 3 of R hand, LUE with weak-moderate  and 3/5 strength, LLE 4/5 strength with moderate dorsi/plantar flexions. Lung sounds clear. Dressing on anterior neck WDL, open to air. Davis for retention - patent with adequate o/p. Incontinent of stool x1. Ambulating short distances with asst of 2 GB and walker. Minimal incision pain well managed with scheduled tylenol. Therapies recommending ARU at discharge.

## 2021-07-18 ENCOUNTER — APPOINTMENT (OUTPATIENT)
Dept: PHYSICAL THERAPY | Facility: CLINIC | Age: 71
DRG: 471 | End: 2021-07-18
Attending: NEUROLOGICAL SURGERY
Payer: COMMERCIAL

## 2021-07-18 ENCOUNTER — HOSPITAL ENCOUNTER (INPATIENT)
Facility: CLINIC | Age: 71
LOS: 9 days | Discharge: ANOTHER HOSPITAL | DRG: 052 | End: 2021-07-27
Attending: PHYSICAL MEDICINE & REHABILITATION | Admitting: PHYSICAL MEDICINE & REHABILITATION
Payer: COMMERCIAL

## 2021-07-18 VITALS
RESPIRATION RATE: 16 BRPM | SYSTOLIC BLOOD PRESSURE: 144 MMHG | OXYGEN SATURATION: 94 % | TEMPERATURE: 98.9 F | HEART RATE: 65 BPM | DIASTOLIC BLOOD PRESSURE: 80 MMHG

## 2021-07-18 DIAGNOSIS — R19.7 DIARRHEA, UNSPECIFIED TYPE: ICD-10-CM

## 2021-07-18 DIAGNOSIS — G47.00 INSOMNIA, UNSPECIFIED TYPE: ICD-10-CM

## 2021-07-18 DIAGNOSIS — K59.00 CONSTIPATION, UNSPECIFIED CONSTIPATION TYPE: Primary | ICD-10-CM

## 2021-07-18 DIAGNOSIS — R33.9 URINARY RETENTION: ICD-10-CM

## 2021-07-18 DIAGNOSIS — R11.0 NAUSEA: ICD-10-CM

## 2021-07-18 DIAGNOSIS — A41.9 SEPSIS, DUE TO UNSPECIFIED ORGANISM, UNSPECIFIED WHETHER ACUTE ORGAN DYSFUNCTION PRESENT (H): ICD-10-CM

## 2021-07-18 PROBLEM — G95.9 CERVICAL MYELOPATHY (H): Status: ACTIVE | Noted: 2021-07-18

## 2021-07-18 LAB
GLUCOSE BLDC GLUCOMTR-MCNC: 86 MG/DL (ref 70–99)
GLUCOSE BLDC GLUCOMTR-MCNC: 90 MG/DL (ref 70–99)

## 2021-07-18 PROCEDURE — 97110 THERAPEUTIC EXERCISES: CPT | Mod: GP | Performed by: PHYSICAL THERAPIST

## 2021-07-18 PROCEDURE — 99232 SBSQ HOSP IP/OBS MODERATE 35: CPT | Performed by: INTERNAL MEDICINE

## 2021-07-18 PROCEDURE — 128N000003 HC R&B REHAB

## 2021-07-18 PROCEDURE — 250N000013 HC RX MED GY IP 250 OP 250 PS 637: Performed by: NURSE PRACTITIONER

## 2021-07-18 PROCEDURE — 99223 1ST HOSP IP/OBS HIGH 75: CPT | Mod: 24 | Performed by: PHYSICAL MEDICINE & REHABILITATION

## 2021-07-18 PROCEDURE — 97530 THERAPEUTIC ACTIVITIES: CPT | Mod: GP | Performed by: PHYSICAL THERAPIST

## 2021-07-18 PROCEDURE — 250N000013 HC RX MED GY IP 250 OP 250 PS 637: Performed by: PHYSICAL MEDICINE & REHABILITATION

## 2021-07-18 PROCEDURE — 250N000013 HC RX MED GY IP 250 OP 250 PS 637: Performed by: INTERNAL MEDICINE

## 2021-07-18 RX ORDER — TAMSULOSIN HYDROCHLORIDE 0.4 MG/1
0.4 CAPSULE ORAL DAILY
Refills: 0 | Status: ON HOLD | DISCHARGE
Start: 2021-07-18 | End: 2021-09-08

## 2021-07-18 RX ORDER — CEFUROXIME AXETIL 500 MG/1
500 TABLET ORAL 2 TIMES DAILY WITH MEALS
Status: DISCONTINUED | OUTPATIENT
Start: 2021-07-18 | End: 2021-07-18 | Stop reason: HOSPADM

## 2021-07-18 RX ORDER — NALOXONE HYDROCHLORIDE 0.4 MG/ML
0.4 INJECTION, SOLUTION INTRAMUSCULAR; INTRAVENOUS; SUBCUTANEOUS
Status: DISCONTINUED | OUTPATIENT
Start: 2021-07-18 | End: 2021-07-27 | Stop reason: HOSPADM

## 2021-07-18 RX ORDER — ONDANSETRON 4 MG/1
4 TABLET, FILM COATED ORAL EVERY 6 HOURS PRN
Status: DISCONTINUED | OUTPATIENT
Start: 2021-07-18 | End: 2021-07-27 | Stop reason: HOSPADM

## 2021-07-18 RX ORDER — POLYETHYLENE GLYCOL 3350 17 G/17G
17 POWDER, FOR SOLUTION ORAL DAILY
Status: DISCONTINUED | OUTPATIENT
Start: 2021-07-19 | End: 2021-07-18

## 2021-07-18 RX ORDER — TAMSULOSIN HYDROCHLORIDE 0.4 MG/1
0.4 CAPSULE ORAL DAILY
Status: DISCONTINUED | OUTPATIENT
Start: 2021-07-19 | End: 2021-07-27 | Stop reason: HOSPADM

## 2021-07-18 RX ORDER — POLYETHYLENE GLYCOL 3350 17 G/17G
17 POWDER, FOR SOLUTION ORAL DAILY PRN
Status: DISCONTINUED | OUTPATIENT
Start: 2021-07-18 | End: 2021-07-27 | Stop reason: HOSPADM

## 2021-07-18 RX ORDER — NALOXONE HYDROCHLORIDE 0.4 MG/ML
0.2 INJECTION, SOLUTION INTRAMUSCULAR; INTRAVENOUS; SUBCUTANEOUS
Status: DISCONTINUED | OUTPATIENT
Start: 2021-07-18 | End: 2021-07-27 | Stop reason: HOSPADM

## 2021-07-18 RX ORDER — CEFTRIAXONE 2 G/1
2 INJECTION, POWDER, FOR SOLUTION INTRAMUSCULAR; INTRAVENOUS ONCE
Status: DISCONTINUED | OUTPATIENT
Start: 2021-07-18 | End: 2021-07-18

## 2021-07-18 RX ORDER — AMOXICILLIN 250 MG
1 CAPSULE ORAL 2 TIMES DAILY
Status: DISCONTINUED | OUTPATIENT
Start: 2021-07-18 | End: 2021-07-18

## 2021-07-18 RX ORDER — ACETAMINOPHEN 325 MG/1
650 TABLET ORAL EVERY 4 HOURS PRN
Status: DISCONTINUED | OUTPATIENT
Start: 2021-07-18 | End: 2021-07-27 | Stop reason: HOSPADM

## 2021-07-18 RX ORDER — AMOXICILLIN 250 MG
1 CAPSULE ORAL 2 TIMES DAILY PRN
Refills: 0 | Status: ON HOLD | DISCHARGE
Start: 2021-07-18 | End: 2021-09-08

## 2021-07-18 RX ORDER — OXYCODONE HYDROCHLORIDE 5 MG/1
5 TABLET ORAL EVERY 4 HOURS PRN
Status: DISCONTINUED | OUTPATIENT
Start: 2021-07-18 | End: 2021-07-27 | Stop reason: HOSPADM

## 2021-07-18 RX ORDER — METHOCARBAMOL 750 MG/1
750 TABLET, FILM COATED ORAL EVERY 6 HOURS PRN
Qty: 50 TABLET | Refills: 0 | Status: ON HOLD | DISCHARGE
Start: 2021-07-18 | End: 2021-09-08

## 2021-07-18 RX ORDER — BISACODYL 10 MG
10 SUPPOSITORY, RECTAL RECTAL DAILY PRN
Status: DISCONTINUED | OUTPATIENT
Start: 2021-07-18 | End: 2021-07-27 | Stop reason: HOSPADM

## 2021-07-18 RX ORDER — METHOCARBAMOL 750 MG/1
750 TABLET, FILM COATED ORAL EVERY 6 HOURS PRN
Status: DISCONTINUED | OUTPATIENT
Start: 2021-07-18 | End: 2021-07-27 | Stop reason: HOSPADM

## 2021-07-18 RX ORDER — AMOXICILLIN 250 MG
1 CAPSULE ORAL 2 TIMES DAILY PRN
Status: DISCONTINUED | OUTPATIENT
Start: 2021-07-18 | End: 2021-07-27 | Stop reason: HOSPADM

## 2021-07-18 RX ORDER — OXYCODONE HYDROCHLORIDE 5 MG/1
5 TABLET ORAL EVERY 4 HOURS PRN
Qty: 50 TABLET | Refills: 0 | Status: ON HOLD | DISCHARGE
Start: 2021-07-18 | End: 2021-07-27

## 2021-07-18 RX ADMIN — CEFUROXIME AXETIL 500 MG: 500 TABLET ORAL at 10:33

## 2021-07-18 RX ADMIN — ACETAMINOPHEN 650 MG: 325 TABLET, FILM COATED ORAL at 06:16

## 2021-07-18 RX ADMIN — ACETAMINOPHEN 650 MG: 325 TABLET, FILM COATED ORAL at 12:44

## 2021-07-18 RX ADMIN — ACETAMINOPHEN 650 MG: 325 TABLET, FILM COATED ORAL at 01:27

## 2021-07-18 RX ADMIN — ACETAMINOPHEN 650 MG: 325 TABLET, FILM COATED ORAL at 21:20

## 2021-07-18 RX ADMIN — ACETAMINOPHEN 650 MG: 325 TABLET, FILM COATED ORAL at 17:19

## 2021-07-18 RX ADMIN — TAMSULOSIN HYDROCHLORIDE 0.4 MG: 0.4 CAPSULE ORAL at 09:02

## 2021-07-18 ASSESSMENT — ACTIVITIES OF DAILY LIVING (ADL)
ADLS_ACUITY_SCORE: 18

## 2021-07-18 ASSESSMENT — MIFFLIN-ST. JEOR: SCORE: 1942.87

## 2021-07-18 NOTE — PLAN OF CARE
POD 3 C3-C5 anterior spinal fusion. Alert and oriented x4. VSS on RA. CMS with resolved tingling in R hand, LUE with moderate , strong bicep/tricep and weak deltoid, LLE 4/5 strength with moderate dorsi flexion.  Lung sounds clear. Dressing on anterior neck WDL, open to air. Davis for retention - patent with adequate o/p. Incontinent of stool x1. Ambulating short distances with asst of 1-2 GB and walker. Minimal incision pain well managed with scheduled tylenol. Therapies recommending ARU at discharge - plan for discharge tomorrow at 1330 with HE ride.

## 2021-07-18 NOTE — PROVIDER NOTIFICATION
"Text page sent to hospitalist: \"Pt does not have IV access. Can last dose of Rocephin be changed to oral abx? Pt has been accepted at ARU - may go today at 1330 if bed is available.\"  "

## 2021-07-18 NOTE — PROGRESS NOTES
[facility]    Medicine Progress Note - Hospitalist Service        Date of Admission:  7/14/2021  8:43 AM    Assessment & Plan:   Rigo Johns is a 70 year-old male with past medical history significant for PILI on CPAP, C5-7 anterior fusion 1993 in [place] who was admitted to [facility] on 7/12/21 with acute onset of neck, bilateral shoulder pain with associated BUE weakness and LLE weakness. Workup revealed high grade cervical stenosis with compressive cervical myelopathy and neurosurgery recommended transfer to [facility] for surgical intervention with Dr. Hanley. Hospitalization was complicated by acute respiratory failure due to possible community acquired pneumonia. He was transferred to [facility] for surgery on 7/14/21 and Hospitalist consulted for medical management of pneumonia and other medical problems.     Compressive cervical myelopathy due to high grade cervical stenosis s/p C3-C5 anterior cervical diskectomy and fusion 7/14/21   Presented to [facility] with weakness in bilateral arms and legs, worse in his arms and on the left side. He underwent MRI of his brain and cervical spine.  MRI of the brain showed no acute abnormality.  MRI of cervical spine showed high-grade cervical spinal canal stenoses and bilateral neuroforaminal stenoses with abnormal T2 prolongation from C3-C4 that was slightly more pronounced on the left. Treated initially with IV steroids.  Neurosurgery recommended transfer to [facility] for decompressive surgery which was completed on 7/14/2021  - Routine post-operative cares per neurosurgery.  - PT/OT  - Physical therapy recommending acute rehab     Possible community-acquired pneumonia  Acute hypoxic respiratory failure, resolved, PE ruled out  Presented to [facility] with SOB and hypoxemia reportedly down to 59% via EMS requiring supplemental oxygen 8-12L on presentation. Workup including chest CT revealed patchy opacities in bilateral lungs greatest in the  lower lobes, no PE. Thought probably due to atelectasis but was given a dose of Zosyn in the ED and started on ceftriaxone and azithromycin for possible pneumonia and hypoxia. No leukocytosis or fevers. Lactate wnl. ABG c/w respiratory acidosis. ECHO with normal EF, very poor image quality, unable to visualize RV properly.  - Blood cultures 7/12 NGTD  - Weaned to room air  - Completed course of azithromycin and Rocephin in the hospital.    PILI on nocturnal CPAP  -Continue CPAP with home settings     Acute kidney injury on possible chronic kidney disease-stage III  -Baseline creatinine unknown, does not doctor frequently. Initial creatinine 1.37, increased to 2.13.  Creatinine now much improved with IV hydration, down to 1.28.     Urinary retention  No prior history of retention. Potentially secondary to spinal cord compression versus pain medication versus immobility versus anesthesia, most likely multifactorial   -Davis catheter placed on 7/15 due to acute urinary retention  -Started on  Flomax 0.4 mg p.o. daily  -Renal ultrasound with no hydronephrosis in either kidney  -Recommend attempting a voiding trial in 2 to 3 days at acute rehab.     Hypertension  BPs ranging 130s-160s/70s-80s at  [facility]. Patient indicates he has previously been told he has hypertension but wife prefers to focus on lifestyle and dietary changes.  -Blood pressure improving, currently normal   -Monitor trend.  Continues to be normotensive.  Follow-up outpatient with PCP     Leukocytosis  -WBC 12.7  -Likely secondary to steroids  -Monitor      Thrombocytopenia  Initial platelet count 102. Unclear if secondary to infection versus possible undiagnosed liver disease as noted below  -Stable in low 100s.      Steroid induced hyperglycemia  -HgbA1c 5.0%  -Off sliding scale.     Abnormal liver on CT scan  Liver tests wnl. Notes 1 alcoholic drink per week  -PCP follow up     Renal lesion, incidental finding on CT  Discussed in ED.  -Follow up  "with PCP for ultrasound to further evaluate, non-urgent     Obesity class I  BMI 34. Increase in all-cause morbidity and mortality. Encourage weight loss.        Diet: Advance Diet as Tolerated: Regular Diet Adult  Advance Diet as Tolerated     DVT Prophylaxis: Pneumatic Compression Devices   Davis Catheter: PRESENT, indication: Retention  Code Status: Full Code     Disposition Plan    Expected discharge: To acute rehab today  Entered: Michael Bagley MD 07/18/2021, 9:30 AM        The patient's care was discussed with the Bedside Nurse and Patient.    Michael Bagley MD  Hospitalist Service  [facility]  Text Page 7AM-6PM  Securely message with the Vocera Web Console (learn more here)  Text page via [employer] Paging/Directory    ______________________________________________________________________    Interval History   Patient endorses pain at surgical site otherwise denies new complaints.  Dyspnea stable.  No nausea vomiting.    Data reviewed today: I reviewed all medications, new labs and imaging results over the last 24 hours. I personally reviewed no images or EKG's today.    Physical Exam   Vital signs:  Temp: 98.2  F (36.8  C) Temp src: Oral BP: 130/76 Pulse: 56   Resp: 16 SpO2: 96 % O2 Device: BiPAP/CPAP Oxygen Delivery: 2 LPM      Estimated body mass index is 34.76 kg/m  as calculated from the following:    Height as of 7/12/21: 1.803 m (5' 11\").    Weight as of 7/12/21: 113 kg (249 lb 3.2 oz).      Wt Readings from Last 2 Encounters:   07/12/21 113 kg (249 lb 3.2 oz)       Gen: AAOX3, NAD  Resp: CTA B/L, normal WOB, no crackles  CVS: RRR, no murmur  Abd/GI: Soft, non-tender. BS- normoactive.    Skin: Warm, dry no rashes  MSK: no pedal edema  : Davis +  Neuro- CN- intact. No focal deficits.      Data   Recent Labs   Lab 07/18/21  0812 07/18/21  0214 07/17/21  2145 07/17/21  0755 07/16/21  0851 07/15/21  0940 07/14/21  0909 07/14/21  0622 07/13/21  0628 07/12/21  0025 " 07/12/21 0022 07/12/21  0022   WBC  --   --   --   --   --  12.7*  --   --  7.7 7.1  --   --    HGB  --   --   --   --  12.8* 13.0*  --   --  15.2 14.7   < >  --    MCV  --   --   --   --   --  98  --   --  102* 100  --   --    PLT  --   --   --   --   --  101* 126*  --  125* 102*   < >  --    NA  --   --   --  145* 148* 142  --   --  144  --   --  142   POTASSIUM  --   --   --  3.8 3.9 4.1  --   --  4.0  --   --  4.4   CHLORIDE  --   --   --  119* 120* 115*  --   --  114*  --   --  114*   CO2  --   --   --  23 26 20  --   --  21  --   --  25   BUN  --   --   --  42* 58* 66*  --   --  29  --   --  22   CR  --   --   --  1.28* 1.58* 2.13*  --   --  1.88* 1.5*  --  1.37*   ANIONGAP  --   --   --  3 2* 7  --   --  9  --   --  3   GARRISON  --   --   --  7.7* 7.9* 8.2*  --   --  9.1  --   --  8.9   GLC 86 90 95 96 133* 131*  --    < > 140*  --   --  139*   ALBUMIN  --   --   --   --   --   --   --   --   --   --   --  3.4   PROTTOTAL  --   --   --   --   --   --   --   --   --   --   --  7.6   BILITOTAL  --   --   --   --   --   --   --   --   --   --   --  0.4   ALKPHOS  --   --   --   --   --   --   --   --   --   --   --  89   ALT  --   --   --   --   --   --   --   --   --   --   --  30   AST  --   --   --   --   --   --   --   --   --   --   --  33   LIPASE  --   --   --   --   --   --   --   --   --   --   --  133   TROPONIN  --   --   --   --   --   --   --   --   --   --   --  <0.015    < > = values in this interval not displayed.       No results found for this or any previous visit (from the past 24 hour(s)).  Medications       cefTRIAXone  2 g Intravenous Once     polyethylene glycol  17 g Oral Daily     senna-docusate  1 tablet Oral BID     sodium chloride (PF)  3 mL Intracatheter Q8H     tamsulosin  0.4 mg Oral Daily

## 2021-07-18 NOTE — PLAN OF CARE
Physical Therapy Discharge Summary    Reason for therapy discharge:    Discharged to acute rehabilitation facility.    Progress towards therapy goal(s). See goals on Care Plan in [facility] electronic health record for goal details.  Goals partially met.  Barriers to achieving goals:   discharge from facility.    Therapy recommendation(s):    Continued therapy is recommended.  Rationale/Recommendations:   .

## 2021-07-18 NOTE — PLAN OF CARE
POD #4 from an anterior C3 - C5 discectomy/fusion with Dr. Hanley. Alert, oriented x4. CMS with LUE scoring a 3/5, LLE scoring a 4/5 and R. Side scoring a 5/5, tingling in the 2nd and 3rd digits of the R. Hand. Incision SATNAM, steri strips present, approximated, no drainage noted. Bowel sounds audible, passing flatus, tolerating regular diet. VSS, on CPAP overnight. Up with A2 using GB/walker. Davis in place with adequate output, incontinent of bowel. C/o incisional pain, decreased with scheduled tylenol. Pt scoring green on the Aggression Stop Light Tool. Discharge to ARU today, transport setup at 1330.

## 2021-07-18 NOTE — PROGRESS NOTES
Care Management Discharge Note    Discharge Date: 07/18/2021       Discharge Disposition: Transitional Care    Discharge Services: Transportation Services    Discharge DME: None    Discharge Transportation: agency    Private pay costs discussed: transportation costs    PAS Confirmation Code:    Patient/family educated on Medicare website which has current facility and service quality ratings: yes    Education Provided on the Discharge Plan:    Persons Notified of Discharge Plans: nurse, admissions at [facility] and patient  Patient/Family in Agreement with the Plan: yes    Handoff Referral Completed: No    Additional Information:  Patient to discharge to [facility] via [facility] wheelchair transport at 1330. Patient aware cost of WC transport ($75 and $5 per mile).  BCBS auth faxed to [facility].         ZAYRA Rivera

## 2021-07-18 NOTE — PLAN OF CARE
Patient is A&Ox4, calm, and cooperative. Assist of 2 stand, pivot for transfers. Admitted today from [facility]. Has torres catheter, patent. Incontinent of bowel, last BM today. Regular/thin diet, takes pills whole. Complains of neck pain during shift, PRN Tylenol given. Has steri strips on cervical surgical site.  Wearing CPAP at night. Patient had mild fever, denies any other symptoms. VS stable. Continue POC.

## 2021-07-18 NOTE — PLAN OF CARE
"BP (!) 152/69 (BP Location: Right arm)   Pulse 78   Temp 99.5  F (37.5  C) (Oral)   Resp 18   Ht 1.803 m (5' 11\")   Wt 116.6 kg (257 lb)   SpO2 91%   BMI 35.84 kg/m    Pt arrived in the unit at around 2.20 pm, room orientation provided and educated on safety precautions. Vitals stable ex for slightly elevated BP. Denies pain.  Transferred with assist of 2 stand pivot with gait belt. Davis catheter in place. Wearing a soft collar for comfort per report. Alarms on for safety.  "

## 2021-07-18 NOTE — PLAN OF CARE
POD 4 C3-C5 anterior spinal fusion. Alert and oriented x4. VSS on RA. CMS with resolved tingling in R hand, LUE with moderate , strong bicep/tricep and weak deltoid, LLE 4/5 strength with moderate dorsi flexion.  Lung sounds clear. Dressing on anterior neck WDL, open to air. Davis for retention - patent with adequate o/p. Ambulating short distances with asst of 1-2 GB and walker. Minimal incision pain well managed with scheduled tylenol. Reviewed written discharge instructions with pt and his spouse and [facility] Report provided to receiving RN at [facility]

## 2021-07-18 NOTE — DISCHARGE INSTRUCTIONS
[facility] at [facility]  Dr. Hanley Discharge Instructions Following Spine Surgery  [phone]  Monday - Friday; 8:00 AM - 4:00 PM    In General:   After you have had surgery on your spine, remember do not twist, or excessively flex or extend the area that you had surgery.  These activities can prevent healing.  Pain is normal and to be expected following surgery.  Please call our office to schedule your appointment follow up appointment.      Bowel Care:  Many people have constipation (hard stools) after surgery.  To help prevent constipation: Drink plenty of fluid (8-10 glasses/day); Eat more fiber, such as whole grain bread, bran cereal, and fruits and vegetables; Stay active by walking; Over the counter stool softener may also help.      Medications:  Spine surgery and pain management is unique to all patients.  You will generally be given medications for pain, muscle spasms or tightness, and for constipation during the immediate post op period.  It is important that you use these as prescribed.  Please remember to bring your pill bottles to all of your appointments. Avoid alcoholic beverages while taking narcotic pain medications. You can use ice to areas of pain as needed, 20 minutes at a time.  Changing positions and walking will help loosen your muscles as well.    Driving:  No driving while on narcotic pain medications.  It is state law not to drive while under the influence of a drug to a degree which renders you incapable of safely driving.  The narcotic medication you will be taking after surgery falls under this category.     Activity:   After surgery, most people feel less pain than they have had in a long time.  Walking and light activities will help you regain the use of your muscles.  You are encouraged to walk: start with short walks 5-10 minutes at a time for 4-5 times per day and increase as tolerated.  Stair climbing as tolerated, we recommend you use the railing.  No lifting greater than 10 pounds: approximately equal to one gallon of milk. No twisting, bending in the area you have had surgery. No housework, vacuuming, laundry, leaf raking, lawn mowing, or snow removal. Wear your brace (if ordered) as directed.    Showers:  If you have sutures or staples you may shower two days after surgery. It is ok to let water run over your incision but do not touch or scrub on the incision. Pat dry immediately after showering. If there is a dressing in place, you may remove it 2 days after surgery. If you were closed with Derma hurtado (glue), you may shower without covering the incision. No baths, hot tubs, or pool activity for at least 6 weeks.     Nutrition:  In general, your diet restrictions will not change with your surgery.  You may need to eat small frequent meals initially until your appetite returns.  Eat plenty of high fiber foods and drink plenty of fluids. If you do not have a fluid restriction from or prior to surgery, we recommend 6-8 (8oz) glasses of water per day. Other fluids are fine, but water is best. Nausea is not uncommon; it is a common side effect to many pain medications.  We recommend that you take the pain medications with food, if this does not improve your symptoms, please call us.     Smoking:  For proper healing it is required that you quit using all tobacco products.  This includes smoking, chewing, nicotine gums, and nicotine patches.  Call Dr. Hanley if these occur: Drainage from your incision, increased pain/redness/swelling, temperatures greater than 101.5, increased leg pain or swelling or unrelieved headaches    Go to the nearest Emergency Room if you experience: chest pain, shortness of breath, neck swelling or swallowing problems

## 2021-07-18 NOTE — PLAN OF CARE
Occupational Therapy Discharge Summary    Reason for therapy discharge:    Discharged to acute rehabilitation facility.    Progress towards therapy goal(s). See goals on Care Plan in [facility] electronic health record for goal details.  Goals not met.  Barriers to achieving goals:   discharge from facility.    Therapy recommendation(s):    Continued therapy is recommended.  Rationale/Recommendations:  Pt limited by UE/LE weakness, would benefit from continued skilled OT at ARU to address independence and safety with I/ADLs.

## 2021-07-18 NOTE — H&P
[facility]   Acute Rehabilitation Unit  Admission History and Physical    CHIEF COMPLAINT   Weakness, left worse than right     REHAB DIAGNOSIS  Cervical myelopathy s/p C3-4, C4-5 ACDF    HISTORY OF PRESENT ILLNESS  Rigo Johns is a 70 year old L hand dominant male with a PMH of HTN, PILI, and prior cervical surgery (C5-7 anterior fusion in 1993 per notes) who initially presented to the ED at [facility] on 7/12/21 with dyspnea and acute onset of neck and shoulder pain, and bilateral arm weakness.  A CT of the chest demonstrated bibasilar ATX vs. Infiltrate, and he was started on Zosyn for likely PNA.  A CT of the C-spine was also done and showed central stenosis at the C3-4 and C4-5 levels, with a subsequent MRI confirming this and also demonstrating severe b/l foraminal stenosis and abnormal T2 signal bilaterally, L>R.  He was started on IV steroids and transferred to [facility] for a C3-4, C4-5 ACDF which occurred on 7/14/21 by Dr. Hanley.  Hospital course has been complicated by DONOVAN with improvement with IV fluids, urinary retention with a Davis currently in place, steroid induced hyperglycemia, thrombocytopenia, and steroid induced leukocytosis.      Currently overall feels well, Mr. Johns rates his pain 3/10. Denies chest pain, shortness of breath, fevers, chills, or diaphoresis but has noted some facial flushing. Denies any dysphagia and appetite has been good. Davis catheter remains in place and he has had loose stools with incontinence but feels he is getting more control of his bowels with sphincter contraction.      FUNCTIONAL HISTORY  Current Functional Status:  PT:  Sup>sit with cues for logroll technique, CGA. Sit<>stand from elevated bed to FWW with modAx1. Standing balance x4 minutes with weight shift L/R .Cues for increasing equal WB through LEs. Pt reports about 60% through R, 40% through L due to L LE weakness. Small forward/backward steps within Ax2 FWW. Difficulty  fully clearning L LE performing shuffling steps.    OT:   Pt sat at EOB for ~20 minutes with neuromuscular re-ed and ADL activity. Pt brushed teeth with setup, demonstrating good bilateral coordination, lifting R UE to mouth to brush teeth. Sit>supine with min A to help LE into bed.      Prior Functional Status:  Independent with ambulation, mobility, and ADLs. Wife perform any IADLs around the home.    PAST MEDICAL HISTORY   Reviewed and updated in Epic.  Past Medical History:   Diagnosis Date     Hypertension      Sleep apnea     uses cpap       SURGICAL HISTORY  Reviewed and updated in Epic.  Past Surgical History:   Procedure Laterality Date     BACK SURGERY      cervical c5-6 fusion     FUSION CERVICAL ANTERIOR TWO LEVELS N/A 7/14/2021    Procedure: C3-C4, C4-C5 anterior cervical discetomy and fusion;  Surgeon: Tee Hanley MD;  Location:  OR       SOCIAL HISTORY  Reviewed and updated in Epic.  Marital Status:   Living situation: Lives in a house with 2+1 stairs to enter and all needs are met on the main level  Family support: Wife is very supportive and also has a niece who is a physical therapist  Tobacco use: Denies  Alcohol use: 1 alcoholic beverage per week    Social History     Socioeconomic History     Marital status:      Spouse name: Not on file     Number of children: Not on file     Years of education: Not on file     Highest education level: Not on file   Occupational History     Not on file   Tobacco Use     Smoking status: Never Smoker     Smokeless tobacco: Never Used   Vaping Use     Vaping Use: Never assessed   Substance and Sexual Activity     Alcohol use: Yes     Comment: once a week     Drug use: Never     Sexual activity: Not on file   Other Topics Concern     Parent/sibling w/ CABG, MI or angioplasty before 65F 55M? Not Asked   Social History Narrative     Not on file     Social Determinants of Health     Financial Resource Strain:      Difficulty of Paying Living  Expenses:    Food Insecurity:      Worried About Running Out of Food in the Last Year:      Ran Out of Food in the Last Year:    Transportation Needs:      Lack of Transportation (Medical):      Lack of Transportation (Non-Medical):    Physical Activity:      Days of Exercise per Week:      Minutes of Exercise per Session:    Stress:      Feeling of Stress :    Social Connections:      Frequency of Communication with Friends and Family:      Frequency of Social Gatherings with Friends and Family:      Attends Tenriism Services:      Active Member of Clubs or Organizations:      Attends Club or Organization Meetings:      Marital Status:    Intimate Partner Violence:      Fear of Current or Ex-Partner:      Emotionally Abused:      Physically Abused:      Sexually Abused:        FAMILY HISTORY  Reviewed and updated in Epic.  No family history on file.        MEDICATIONS  Scheduled meds  Medications Prior to Admission   Medication Sig Dispense Refill Last Dose     acetaminophen (TYLENOL) 325 MG tablet Take 2 tablets (650 mg) by mouth every 6 hours as needed for mild pain or other (and adjunct with moderate or severe pain or per patient request)   7/18/2021 at Unknown time     oxyCODONE (ROXICODONE) 5 MG tablet Take 1 tablet (5 mg) by mouth every 4 hours as needed for severe pain ((pain rating 7-10)) 50 tablet 0 Past Week at Unknown time     tamsulosin (FLOMAX) 0.4 MG capsule Take 1 capsule (0.4 mg) by mouth daily  0 7/18/2021 at Unknown time     methocarbamol (ROBAXIN) 750 MG tablet Take 1 tablet (750 mg) by mouth every 6 hours as needed for muscle spasms 50 tablet 0 Unknown at Unknown time     senna-docusate (SENOKOT-S/PERICOLACE) 8.6-50 MG tablet Take 1 tablet by mouth 2 times daily as needed for constipation  0 Unknown at Unknown time     TURMERIC PO    Unknown at Unknown time       ALLERGIES     Allergies   Allergen Reactions     Shrimp GI Disturbance   No known drug allergies      REVIEW OF SYSTEMS  A 10 point  "ROS was performed and negative unless otherwise noted in HPI.     Constitutional: +Flushing  Eyes: Negative for change in vision  Ears, Nose, Throat: Negative for dysphagia  Cardiovascular: Negative for chest pain  Respiratory: Negative for SOB  Gastrointestinal: Retention with Davis  Genitourinary: Loose, incontinent  Musculoskeletal: +Weakness  Neurologic: Cervical myelopathy  Skin: Negative for rashes      PHYSICAL EXAM  VITAL SIGNS:  BP (!) 152/69 (BP Location: Right arm)   Pulse 78   Temp 99.5  F (37.5  C) (Oral)   Resp 18   Ht 1.803 m (5' 11\")   Wt 116.6 kg (257 lb)   SpO2 91%   BMI 35.84 kg/m    BMI:  Estimated body mass index is 35.84 kg/m  as calculated from the following:    Height as of this encounter: 1.803 m (5' 11\").    Weight as of this encounter: 116.6 kg (257 lb).     General: NAD, pleasant and cooperative, obese  HEENT: Anterior cervical surgical incision covered with steristrips  Pulmonary: Non-labored breathing, CTA b/l, no w/r/r  Cardiovascular: RRR, S1+S2, no m/r/g  Abdominal: Soft, NT/ND, BS+  Lower Extremities: Trace pitting LE edema b/l, no calf tenderness  MSK/neuro:   Mental Status:  alert and oriented x3    Cranial Nerves: grossly normal   1. 2nd CN: Pupils equal, round, reactive to light and accomodation. and visual fields intact to confrontation.   2. 3rd,4th,6th CN:  EOMI, appropriate pupillary responses  3. 5th CN: facial sensation intact   4. 7th CN: face symmetrical   5. 8th CN: functional hearing bilaterally  6. 9th, 10th CN: palate elevates symmetrically   7. 11th CN: sternocleidomastoids and trapezii strong   8. 12th CN: tongue midline and without fasciculations    Sensory: Normal to light touch in bilateral upper and lower extremities   Strength:   Shoulder abd  EF  WE  EE    Finger abd  R 2 4+ 4+ 4+ 4+ 4+  L 1 4- 4 4 4 4     HF  KE  DF  EHL  PF   R  4 5 5 5 5  L  3  4 4 4 5     Reflexes: "    Biceps BR Triceps Patella Achilles  R 1+ 1+ 1+ 2+ 1+  L 1+ 1+ 1+ 2+ 1+     Epstein's test: negative bilaterally    Abnormal movements: None    Speech: Fluent  Comprehension: In tact  Repetition: In tact  -->: No aphasia      LABS  CBC RESULTS: Recent Labs   Lab Test 07/16/21  0851 07/15/21  0940   WBC  --  12.7*   RBC  --  3.95*   HGB 12.8* 13.0*   HCT  --  38.7*   MCV  --  98   MCH  --  32.9   MCHC  --  33.6   RDW  --  11.8   PLT  --  101*         Last Comprehensive Metabolic Panel:  Sodium   Date Value Ref Range Status   07/17/2021 145 (H) 133 - 144 mmol/L Final     Potassium   Date Value Ref Range Status   07/17/2021 3.8 3.4 - 5.3 mmol/L Final     Chloride   Date Value Ref Range Status   07/17/2021 119 (H) 94 - 109 mmol/L Final     Carbon Dioxide (CO2)   Date Value Ref Range Status   07/17/2021 23 20 - 32 mmol/L Final     Anion Gap   Date Value Ref Range Status   07/17/2021 3 3 - 14 mmol/L Final     Glucose   Date Value Ref Range Status   07/17/2021 96 70 - 99 mg/dL Final     GLUCOSE BY METER POCT   Date Value Ref Range Status   07/18/2021 86 70 - 99 mg/dL Final     Urea Nitrogen   Date Value Ref Range Status   07/17/2021 42 (H) 7 - 30 mg/dL Final     Creatinine   Date Value Ref Range Status   07/17/2021 1.28 (H) 0.66 - 1.25 mg/dL Final     GFR Estimate   Date Value Ref Range Status   07/17/2021 56 (L) >60 mL/min/1.73m2 Final     Comment:     As of July 11, 2021, eGFR is calculated by the CKD-EPI creatinine equation, without race adjustment. eGFR can be influenced by muscle mass, exercise, and diet. The reported eGFR is an estimation only and is only applicable if the renal function is stable.     GFR, ESTIMATED POCT   Date Value Ref Range Status   07/12/2021 46 (L) >60 mL/min/1.73m2 Final     Calcium   Date Value Ref Range Status   07/17/2021 7.7 (L) 8.5 - 10.1 mg/dL Final     Recent Labs   Lab 07/18/21  0812 07/18/21  0214 07/17/21  2145 07/17/21  0755 07/17/21  0727 07/17/21  0218   GLC 86 90 95 96 87 126*          IMAGING  MRI C-Spine (7/12/21)  IMPRESSION:  HEAD MRI:   1.  No mass, hemorrhage or acute stroke.  2.  Small chronic lacunar infarct in the left thalamus.  3.  Minimal presumed chronic small vessel ischemic change with mild generalized volume loss.     CERVICAL SPINE MRI:  1.  High-grade C3-C4 and C4-C5 spinal canal stenoses and bilateral neural foraminal stenoses.  2.  Abnormal T2 prolongation in each hemicord from C3 through C4. It is slightly more pronounced on the left.  3.  High-grade C3-C4 and C4-C5 spinal canal stenoses and bilateral neural foraminal stenoses.  4.  Solid C5-C6 and C6-C7 interbody fusions. Bony bridging at C5-C6 is better seen on CT.      Renal Ultrasound (7/16/21)  IMPRESSION:  1.  No hydronephrosis in either kidney.      IMPRESSION  Rigo Johns is a 70 year old L hand dominant male with a PMH of HTN, PILI, and prior cervical surgery (C5-7 anterior fusion in 1993 per notes) who is now status post a C3-4, C4-5 ACDF on 7/14/2021 for cervical myelopathy. Hospital course has been complicated by community-acquired pneumonia status post antibiotics, DONOVAN, urinary retention requiring a Davis catheter, steroid induced hyperglycemia, thrombocytopenia, and steroid induced leukocytosis.        Impairment group code: 04.1211 Quadriplegia, Incomplete C1-4 - s/p C3-5 ACDF.     PLAN  Rehabilitation  -The patient has impairments in strength, range of motion, balance, and endurance, which are leading to activity limitations in ambulation, mobility, and ADLs.  He will be admitted into a comprehensive, interdisciplinary, inpatient rehabilitation program including  PT and OT for 90 minutes of each on a daily basis.  The patient requires close supervision by a physiatrist for management and monitoring of active and chronic medical co-morbidities, and to ensure the patient's ability to fully participate and benefit from the rigorous program.  Additionally, the patient requires specialized rehabilitation  nursing for monitoring of vital signs, medication administration, patient training and education, monitoring of bowel and bladder, and wound care.  The assistance of the dietary and social work teams are also needed to optimize nutritional status and to collaborate and achieve the identified discharge goals.   A comprehensive individualized Care Plan will be formulated by the attending Physiatrist after initial evaluations by each Rehabilitation Team member at the initial team conference within four days of admission and updated/modified at subsequent team conferences.   This level of care and multidisciplinary approach is medically necessary and only available at the inpatient rehabilitation facility level of care.  The patient is willing to participate in 3 hours of therapy per day and has the potential for improvement.     Medical  1)Neurology  -Cervical Myelopathy s/p C3-4, C4-5 ACDF on 7/14/21   -Maintain postoperative spinal precautions. No need for cervical collar.  -Follow-up with neurosurgery scheduled for 7/28      2)CVS  -No acute issues  -Monitor blood pressures as he has had some high readings. In addition with described facial flushing, will monitor closely for autonomic dysreflexia.      3)Pulmonary  #CAP  -Now completed course of antibiotics (Zosyn x1 in ED, Azithromycin 7/12-7/16, Ceftriaxone 7/12 - 7/17, and Cefuroxime x1 prior to transfer to rehab.  -Encourage incentive spirometer  -Monitor respiratory status, supplementary oxygen PRN.  Now weaned to room air.     #PILI  -Continue CPAP with home settings    4)FENGI  #Diet: Regular consistency solids and thin liquids    #Neurogenic bowel  -patient has been incontinent but states he is having more sensation and control of sphincter  -Stools have been loose therefore we will change all bowel meds to as needed including Senokot-S, MiraLAX, Dulcolax suppository. If remains incontinent consider formal bowel program  -Monitor, adjust as  needed    #Liver  -Abnormal appearance is seen on CAT scan with slight nodularity inferiorly. LFTs within normal limits.  -Follow-up primary care physician    5)  #Urinary Retention  -Davis catheter placed 7/15/21.  Plan for TOV in the next few days  -Continue Flomax 0.4 mg daily    #DONOVAN and likely CKD  -Pt with limited physician follow up so baseline creatinine not known. Peak at 2.13, improved with IV hydration  -Avoid nephrotoxic agents, NSAIDs  -Re-check BMP in the morning    #Renal lesion  -Incidental finding on CT scan  -Renal ultrasound showed simple cysts of bilateral kidneys  -Follow up with PCP    6)DVT prophylaxis  -PCDs and ambulation      7)Pain  -Tylenol 650 mg q4h PRN  -Oxycodone 5 mg q4h PRN and Robaxin 750 mg q6h PRN.  Wean as able.    8)Endo  -Steroid induced hyperglycemia.  HbA1c 5.0  -Now completed course of steroids and glucose checks have been well controlled. No need for further routine checks      9)Heme  #Acute postoperative blood loss anemia  -hemoglobin mildly low at 12.8. Monitor peripherally.    #Leukocytosis  -Thought to be secondary to steroids which are now discontinued. WBCs 12.7 on last check.  -Recheck CBC in the morning      #Thrombocytopenia  -Noted upon initial admission to the emergency room. Has been stable low 100s. Unclear if due to acute distress and infection versus possible undiagnosed liver disease. Monitor peripherally, follow-up with primary care physician.      10)Psych  -Monitor mood, will consult health psychology if needed        11)Social/Dispo  -Anticipate discharge home at a modified independent level for ambulation, mobility, and ADLs.   -ELOS: 14 days  -Rehab prognosis: Good  -Follow up appointments: PCP, NSGY (Dr. Hanley)    Code status: Full Code (Discussed with patient upon admission)    Robi Whitehead MD  Department of Rehabilitation Medicine    Time Spent on this Encounter   I, Robi Whitehead, spent a total of 80 minutes bedside and on the inpatient unit today  managing the care of Rigo Johns.  Over 50% of my time on the unit was spent counseling the patient and /or coordinating care.  See note for details.

## 2021-07-19 ENCOUNTER — APPOINTMENT (OUTPATIENT)
Dept: OCCUPATIONAL THERAPY | Facility: CLINIC | Age: 71
DRG: 052 | End: 2021-07-19
Attending: PHYSICAL MEDICINE & REHABILITATION
Payer: COMMERCIAL

## 2021-07-19 ENCOUNTER — APPOINTMENT (OUTPATIENT)
Dept: GENERAL RADIOLOGY | Facility: CLINIC | Age: 71
DRG: 052 | End: 2021-07-19
Attending: PHYSICAL MEDICINE & REHABILITATION
Payer: COMMERCIAL

## 2021-07-19 ENCOUNTER — APPOINTMENT (OUTPATIENT)
Dept: ULTRASOUND IMAGING | Facility: CLINIC | Age: 71
DRG: 052 | End: 2021-07-19
Attending: PHYSICAL MEDICINE & REHABILITATION
Payer: COMMERCIAL

## 2021-07-19 ENCOUNTER — APPOINTMENT (OUTPATIENT)
Dept: PHYSICAL THERAPY | Facility: CLINIC | Age: 71
DRG: 052 | End: 2021-07-19
Attending: PHYSICAL MEDICINE & REHABILITATION
Payer: COMMERCIAL

## 2021-07-19 PROBLEM — R50.9 FEVER AND CHILLS: Status: ACTIVE | Noted: 2021-07-19

## 2021-07-19 LAB
ALBUMIN UR-MCNC: 10 MG/DL
ALBUMIN UR-MCNC: NEGATIVE MG/DL
ANION GAP SERPL CALCULATED.3IONS-SCNC: 4 MMOL/L (ref 3–14)
APPEARANCE UR: CLEAR
APPEARANCE UR: CLEAR
BACTERIA #/AREA URNS HPF: ABNORMAL /HPF
BACTERIA #/AREA URNS HPF: ABNORMAL /HPF
BASOPHILS # BLD AUTO: 0 10E3/UL (ref 0–0.2)
BASOPHILS # BLD AUTO: 0 10E3/UL (ref 0–0.2)
BASOPHILS NFR BLD AUTO: 0 %
BASOPHILS NFR BLD AUTO: 0 %
BILIRUB UR QL STRIP: NEGATIVE
BILIRUB UR QL STRIP: NEGATIVE
BUN SERPL-MCNC: 27 MG/DL (ref 7–30)
CALCIUM SERPL-MCNC: 8.4 MG/DL (ref 8.5–10.1)
CHLORIDE BLD-SCNC: 114 MMOL/L (ref 94–109)
CO2 SERPL-SCNC: 25 MMOL/L (ref 20–32)
COLOR UR AUTO: ABNORMAL
COLOR UR AUTO: ABNORMAL
CREAT SERPL-MCNC: 1.32 MG/DL (ref 0.66–1.25)
CRP SERPL-MCNC: 64 MG/L (ref 0–8)
EOSINOPHIL # BLD AUTO: 0.5 10E3/UL (ref 0–0.7)
EOSINOPHIL # BLD AUTO: 0.5 10E3/UL (ref 0–0.7)
EOSINOPHIL NFR BLD AUTO: 4 %
EOSINOPHIL NFR BLD AUTO: 4 %
ERYTHROCYTE [DISTWIDTH] IN BLOOD BY AUTOMATED COUNT: 11.9 % (ref 10–15)
ERYTHROCYTE [DISTWIDTH] IN BLOOD BY AUTOMATED COUNT: 11.9 % (ref 10–15)
GFR SERPL CREATININE-BSD FRML MDRD: 54 ML/MIN/1.73M2
GLUCOSE BLD-MCNC: 123 MG/DL (ref 70–99)
GLUCOSE UR STRIP-MCNC: NEGATIVE MG/DL
GLUCOSE UR STRIP-MCNC: NEGATIVE MG/DL
HCT VFR BLD AUTO: 38.9 % (ref 40–53)
HCT VFR BLD AUTO: 41.1 % (ref 40–53)
HGB BLD-MCNC: 13 G/DL (ref 13.3–17.7)
HGB BLD-MCNC: 14 G/DL (ref 13.3–17.7)
HGB UR QL STRIP: ABNORMAL
HGB UR QL STRIP: ABNORMAL
HOLD SPECIMEN: NORMAL
HYALINE CASTS: 1 /LPF
IMM GRANULOCYTES # BLD: 0.1 10E3/UL
IMM GRANULOCYTES # BLD: 0.1 10E3/UL
IMM GRANULOCYTES NFR BLD: 1 %
IMM GRANULOCYTES NFR BLD: 1 %
KETONES UR STRIP-MCNC: NEGATIVE MG/DL
KETONES UR STRIP-MCNC: NEGATIVE MG/DL
LACTATE SERPL-SCNC: 1.5 MMOL/L (ref 0.7–2)
LEUKOCYTE ESTERASE UR QL STRIP: NEGATIVE
LEUKOCYTE ESTERASE UR QL STRIP: NEGATIVE
LYMPHOCYTES # BLD AUTO: 1.4 10E3/UL (ref 0.8–5.3)
LYMPHOCYTES # BLD AUTO: 1.5 10E3/UL (ref 0.8–5.3)
LYMPHOCYTES NFR BLD AUTO: 11 %
LYMPHOCYTES NFR BLD AUTO: 12 %
MCH RBC QN AUTO: 33 PG (ref 26.5–33)
MCH RBC QN AUTO: 33.5 PG (ref 26.5–33)
MCHC RBC AUTO-ENTMCNC: 33.4 G/DL (ref 31.5–36.5)
MCHC RBC AUTO-ENTMCNC: 34.1 G/DL (ref 31.5–36.5)
MCV RBC AUTO: 98 FL (ref 78–100)
MCV RBC AUTO: 99 FL (ref 78–100)
MONOCYTES # BLD AUTO: 0.7 10E3/UL (ref 0–1.3)
MONOCYTES # BLD AUTO: 1.1 10E3/UL (ref 0–1.3)
MONOCYTES NFR BLD AUTO: 5 %
MONOCYTES NFR BLD AUTO: 8 %
MUCOUS THREADS #/AREA URNS LPF: PRESENT /LPF
MUCOUS THREADS #/AREA URNS LPF: PRESENT /LPF
NEUTROPHILS # BLD AUTO: 10 10E3/UL (ref 1.6–8.3)
NEUTROPHILS # BLD AUTO: 9.7 10E3/UL (ref 1.6–8.3)
NEUTROPHILS NFR BLD AUTO: 76 %
NEUTROPHILS NFR BLD AUTO: 78 %
NITRATE UR QL: NEGATIVE
NITRATE UR QL: NEGATIVE
NRBC # BLD AUTO: 0 10E3/UL
NRBC # BLD AUTO: 0 10E3/UL
NRBC BLD AUTO-RTO: 0 /100
NRBC BLD AUTO-RTO: 0 /100
PH UR STRIP: 5 [PH] (ref 5–7)
PH UR STRIP: 5 [PH] (ref 5–7)
PLATELET # BLD AUTO: 103 10E3/UL (ref 150–450)
PLATELET # BLD AUTO: 95 10E3/UL (ref 150–450)
POTASSIUM BLD-SCNC: 4.3 MMOL/L (ref 3.4–5.3)
PROCALCITONIN SERPL-MCNC: <0.05 NG/ML
RBC # BLD AUTO: 3.94 10E6/UL (ref 4.4–5.9)
RBC # BLD AUTO: 4.18 10E6/UL (ref 4.4–5.9)
RBC URINE: 27 /HPF
RBC URINE: 8 /HPF
SODIUM SERPL-SCNC: 143 MMOL/L (ref 133–144)
SP GR UR STRIP: 1.01 (ref 1–1.03)
SP GR UR STRIP: 1.02 (ref 1–1.03)
UROBILINOGEN UR STRIP-MCNC: NORMAL MG/DL
UROBILINOGEN UR STRIP-MCNC: NORMAL MG/DL
WBC # BLD AUTO: 12.4 10E3/UL (ref 4–11)
WBC # BLD AUTO: 13.2 10E3/UL (ref 4–11)
WBC URINE: 1 /HPF
WBC URINE: 2 /HPF

## 2021-07-19 PROCEDURE — 99233 SBSQ HOSP IP/OBS HIGH 50: CPT | Mod: 24 | Performed by: PHYSICAL MEDICINE & REHABILITATION

## 2021-07-19 PROCEDURE — 97535 SELF CARE MNGMENT TRAINING: CPT | Mod: GO

## 2021-07-19 PROCEDURE — 97530 THERAPEUTIC ACTIVITIES: CPT | Mod: GP | Performed by: STUDENT IN AN ORGANIZED HEALTH CARE EDUCATION/TRAINING PROGRAM

## 2021-07-19 PROCEDURE — 84145 PROCALCITONIN (PCT): CPT | Performed by: PHYSICAL MEDICINE & REHABILITATION

## 2021-07-19 PROCEDURE — 81001 URINALYSIS AUTO W/SCOPE: CPT | Performed by: PHYSICAL MEDICINE & REHABILITATION

## 2021-07-19 PROCEDURE — 81003 URINALYSIS AUTO W/O SCOPE: CPT | Performed by: STUDENT IN AN ORGANIZED HEALTH CARE EDUCATION/TRAINING PROGRAM

## 2021-07-19 PROCEDURE — 85004 AUTOMATED DIFF WBC COUNT: CPT | Performed by: PHYSICAL MEDICINE & REHABILITATION

## 2021-07-19 PROCEDURE — 83605 ASSAY OF LACTIC ACID: CPT | Performed by: STUDENT IN AN ORGANIZED HEALTH CARE EDUCATION/TRAINING PROGRAM

## 2021-07-19 PROCEDURE — 80048 BASIC METABOLIC PNL TOTAL CA: CPT | Performed by: PHYSICAL MEDICINE & REHABILITATION

## 2021-07-19 PROCEDURE — 97166 OT EVAL MOD COMPLEX 45 MIN: CPT | Mod: GO

## 2021-07-19 PROCEDURE — 250N000013 HC RX MED GY IP 250 OP 250 PS 637: Performed by: PHYSICAL MEDICINE & REHABILITATION

## 2021-07-19 PROCEDURE — 97163 PT EVAL HIGH COMPLEX 45 MIN: CPT | Mod: GP | Performed by: STUDENT IN AN ORGANIZED HEALTH CARE EDUCATION/TRAINING PROGRAM

## 2021-07-19 PROCEDURE — 85004 AUTOMATED DIFF WBC COUNT: CPT | Performed by: STUDENT IN AN ORGANIZED HEALTH CARE EDUCATION/TRAINING PROGRAM

## 2021-07-19 PROCEDURE — 128N000003 HC R&B REHAB

## 2021-07-19 PROCEDURE — 93971 EXTREMITY STUDY: CPT | Mod: 26 | Performed by: RADIOLOGY

## 2021-07-19 PROCEDURE — 36415 COLL VENOUS BLD VENIPUNCTURE: CPT | Performed by: STUDENT IN AN ORGANIZED HEALTH CARE EDUCATION/TRAINING PROGRAM

## 2021-07-19 PROCEDURE — 71046 X-RAY EXAM CHEST 2 VIEWS: CPT | Mod: 26 | Performed by: RADIOLOGY

## 2021-07-19 PROCEDURE — 71046 X-RAY EXAM CHEST 2 VIEWS: CPT

## 2021-07-19 PROCEDURE — 87040 BLOOD CULTURE FOR BACTERIA: CPT | Performed by: STUDENT IN AN ORGANIZED HEALTH CARE EDUCATION/TRAINING PROGRAM

## 2021-07-19 PROCEDURE — 86140 C-REACTIVE PROTEIN: CPT | Performed by: PHYSICAL MEDICINE & REHABILITATION

## 2021-07-19 PROCEDURE — 93971 EXTREMITY STUDY: CPT | Mod: RT

## 2021-07-19 PROCEDURE — 93971 EXTREMITY STUDY: CPT | Mod: LT,XS

## 2021-07-19 RX ADMIN — TAMSULOSIN HYDROCHLORIDE 0.4 MG: 0.4 CAPSULE ORAL at 07:38

## 2021-07-19 RX ADMIN — ACETAMINOPHEN 650 MG: 325 TABLET, FILM COATED ORAL at 14:13

## 2021-07-19 RX ADMIN — ACETAMINOPHEN 650 MG: 325 TABLET, FILM COATED ORAL at 21:56

## 2021-07-19 RX ADMIN — ACETAMINOPHEN 650 MG: 325 TABLET, FILM COATED ORAL at 05:45

## 2021-07-19 NOTE — PROGRESS NOTES
07/19/21 1500   Quick Adds   Type of Visit Initial Occupational Therapy Evaluation   Living Environment   People in home spouse   Home Accessibility stairs to enter home;stairs within home   Number of Stairs, Main Entrance 3   Stair Railings, Within Home, Primary railings on both sides of stairs   Transportation Anticipated family or friend will provide   Living Environment Comments 2 short steps w/ rail then one step into home. full flight to basement with model train Jiahe, B rails to basement. walk in shower and uses shower chair with back and arms. 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  wife does most of the indoor task   Disability/Function   Hearing Difficulty or Deaf no   Wear Glasses or Blind yes   Vision Management constant vision management   Concentrating, Remembering or Making Decisions Difficulty no   Difficulty Communicating no   Difficulty Eating/Swallowing no   Walking or Climbing Stairs Difficulty no   Dressing/Bathing Difficulty no   Toileting issues no   Doing Errands Independently Difficulty (such as shopping) no   Fall history within last six months no   Change in Functional Status Since Onset of Current Illness/Injury no   General Information   Onset of Illness/Injury or Date of Surgery 07/12/21   Referring Physician Luis Whitehead MD   Patient/Family Therapy Goal Statement (OT) to return home   Additional Occupational Profile Info/Pertinent History of Current Problem per MD report pt  is a 70 year old L hand dominant male with a PMH of HTN, PILI, and prior cervical surgery (C5-7 anterior fusion in 1993 per notes) who initially  presented to the ED at [facility] on 7/12/21 with dyspnea and acute onset of neck and shoulder pain, and bilateral arm weakness.  A CT of the chest demonstrated bibasilar ATX vs. Infiltrate, and he was started on Zosyn for likely PNA.  A CT of the C-spine was also done and showed central stenosis at the C3-4 and C4-5 levels, with a subsequent MRI confirming this and also demonstrating severe b/l foraminal stenosis and abnormal T2 signal bilaterally, L>R.  He was started on IV steroids and transferred to [facility] for a C3-4, C4-5 ACDF which occurred on 7/14/21 by Dr. Hanley.  Hospital course has been complicated by DONOVAN with improvement with IV fluids, urinary retention with a Davis currently in place, steroid induced hyperglycemia, thrombocytopenia, and steroid induced leukocytosis.     Left Upper Extremity (Weight-bearing Status) weight-bearing as tolerated (WBAT)  (spinal precautions)   Right Upper Extremity (Weight-bearing Status) weight-bearing as tolerated (WBAT)  (spinal precautions)   Left Lower Extremity (Weight-bearing Status) weight-bearing as tolerated (WBAT)  (spinal precautions)   Right Lower Extremity (Weight-bearing Status) weight-bearing as tolerated (WBAT)  (spinal precautions)   Heart Disease Risk Factors Medical history   Cognitive Status Examination   Orientation Status orientation to person, place and time   Visual Perception   Visual Impairment/Limitations WNL   Sensory   Sensory Quick Adds   (tingling in r hand)   Pain Assessment   Patient Currently in Pain No   Integumentary/Edema   Integumentary/Edema   (l u/e, and b l/e)   Range of Motion Comprehensive   Comment, General Range of Motion r shoulder prom wfl arom to 70 . l shoulder prom not tested to due cheching on cause of edema arom 10 degrees with subsitution of shoulder elevation, arom 90 degrees elbow flexion   Strength Comprehensive (MMT)   Comment, General Manual Muscle Testing (MMT) Assessment r shoulder 2-/5 1/2 range, elbow 4/5, wirst 4/5     Coordination   Coordination Comments ot l u/e to be assessed pt waiting for testing of L U/E due to increase swelling last few days   ARC Assessment Only   Acute Rehab Functional Assessment See IP Rehab Daily Documentation Flowsheet for Functional Mobility/ADL Assessment   Clinical Impression   Criteria for Skilled Therapeutic Interventions Met (OT) yes   OT Diagnosis decrease adls and Iadls   OT Problem List-Impairments impacting ADL activity tolerance impaired;balance;mobility;range of motion (ROM);strength;sensation;post-surgical precautions   ADL comments/analysis decrease adls and Iadls from pta   Assessment of Occupational Performance 3-5 Performance Deficits   Identified Performance Deficits bathing dressing toileting  meal prep and homemanagement   Planned Therapy Interventions (OT) ADL retraining;IADL retraining;balance training;bed mobility training;fine motor coordination training;motor coordination training;neuromuscular re-education;ROM;strengthening;transfer training;home program guidelines;progressive activity/exercise   Intervention Comments who initially presented to the ED at [facility] on 7/12/21 with dyspnea and acute onset of neck and shoulder pain, and bilateral arm weakness.  A CT of the chest demonstrated bibasilar ATX vs. Infiltrate, and he was started on Zosyn for likely PNA.  A CT of the C-spine was also done and showed central stenosis at the C3-4 and C4-5 levels, with a subsequent MRI confirming this and also demonstrating severe b/l foraminal stenosis and abnormal T2 signal bilaterally, L>R.  He was started on IV steroids and transferred to [facility] for a C3-4, C4-5 ACDF which occurred on 7/14/21 by Dr. Hanley.  pt has decrease adl and Iadls from prior to diagnosisi will benifit from ot per ot goals   Clinical Decision Making Complexity (OT) moderate complexity   Therapy Frequency (OT) Daily   Predicted Duration of Therapy 3weeks   Anticipated Equipment Needs Upon Discharge (OT) raised  toilet seat   Risk & Benefits of therapy have been explained evaluation/treatment results reviewed;care plan/treatment goals reviewed;risks/benefits reviewed;current/potential barriers reviewed;participants voiced agreement with care plan;participants included;patient   Comment-Clinical Impression pt silver benift from ot following decreae b u/e strength  resulting in  [facility]SD for a C3-4, C4-5 ACDF which occurred on 7/14/21 pt will benifit from ot per ot goals   Total Evaluation Time (Minutes)   Total Evaluation Time (Minutes) 30

## 2021-07-19 NOTE — PROGRESS NOTES
"  [facility]   Acute Rehabilitation Unit  Daily progress note    INTERVAL HISTORY  Patient had an acute fever this am 101.3 F, which re-occurred this afternoon. Work-up unremarkable thus far (see details below) and further investigations have been ordered. He also had BP ranging from 140/63 - 157/66. This am he complained that at times it is \"harder to breathe\" but denies chest pain. She also stated that when he woke up this morning his bed was wet with sweat and his head felt flushed, which was relieved with a cold towel on his head. He denies nausea, vomiting, and abdominal pain. He also denies having pain or discomfort otherwise as well.       Functionally, he is dependent with ambulation, partial/moderate assist for transfer, STS, supine to sit, toileting, and toilet transfer. Also needs supervision or touching assistance with bed rolling and grooming.         MEDICATIONS  Scheduled meds    tamsulosin  0.4 mg Oral Daily       PRN meds:  acetaminophen, bisacodyl, methocarbamol, naloxone **OR** naloxone **OR** naloxone **OR** naloxone, ondansetron, oxyCODONE, polyethylene glycol, senna-docusate      PHYSICAL EXAM  /69 (BP Location: Right arm)   Pulse 83   Temp 100.2  F (37.9  C) (Oral)   Resp 16   Ht 1.803 m (5' 11\")   Wt 116.6 kg (257 lb)   SpO2 95%   BMI 35.84 kg/m    Gen: cooperative, alert, obese, skin not flushed at this time  HEENT: atraumatic, anterior cervical surgical incision covered with steri-strips and bandage, slightly tender with no exudate from bandage  Cardio: RRR, S1 and S2 present, no murmurs auscultated  Pulm: Non-labored breathing, clear to auscultation b/l  Abd: Soft, non-distended, non-tender, bowel sounds present  Ext: Trace pitting edema in LE b/l, no calf tenderness, +2 edema in LUE from hand to above elbow  Neuro/MSK: A&O x3, EOM intact, no slurring of speech     LABS  Results for orders placed or performed during the hospital encounter " of 07/18/21 (from the past 24 hour(s))   CBC with platelets differential    Narrative    The following orders were created for panel order CBC with platelets differential.  Procedure                               Abnormality         Status                     ---------                               -----------         ------                     CBC with platelets and d...[[id number]]  Abnormal            Final result                 Please view results for these tests on the individual orders.   Basic metabolic panel   Result Value Ref Range    Sodium 143 133 - 144 mmol/L    Potassium 4.3 3.4 - 5.3 mmol/L    Chloride 114 (H) 94 - 109 mmol/L    Carbon Dioxide (CO2) 25 20 - 32 mmol/L    Anion Gap 4 3 - 14 mmol/L    Urea Nitrogen 27 7 - 30 mg/dL    Creatinine 1.32 (H) 0.66 - 1.25 mg/dL    Calcium 8.4 (L) 8.5 - 10.1 mg/dL    Glucose 123 (H) 70 - 99 mg/dL    GFR Estimate 54 (L) >60 mL/min/1.73m2   Lactic acid whole blood   Result Value Ref Range    Lactic Acid 1.5 0.7 - 2.0 mmol/L   CRP inflammation   Result Value Ref Range    CRP Inflammation 64.0 (H) 0.0 - 8.0 mg/L   CBC with platelets and differential   Result Value Ref Range    WBC Count 12.4 (H) 4.0 - 11.0 10e3/uL    RBC Count 4.18 (L) 4.40 - 5.90 10e6/uL    Hemoglobin 14.0 13.3 - 17.7 g/dL    Hematocrit 41.1 40.0 - 53.0 %    MCV 98 78 - 100 fL    MCH 33.5 (H) 26.5 - 33.0 pg    MCHC 34.1 31.5 - 36.5 g/dL    RDW 11.9 10.0 - 15.0 %    Platelet Count 103 (L) 150 - 450 10e3/uL    % Neutrophils 78 %    % Lymphocytes 12 %    % Monocytes 5 %    % Eosinophils 4 %    % Basophils 0 %    % Immature Granulocytes 1 %    NRBCs per 100 WBC 0 <1 /100    Absolute Neutrophils 9.7 (H) 1.6 - 8.3 10e3/uL    Absolute Lymphocytes 1.5 0.8 - 5.3 10e3/uL    Absolute Monocytes 0.7 0.0 - 1.3 10e3/uL    Absolute Eosinophils 0.5 0.0 - 0.7 10e3/uL    Absolute Basophils 0.0 0.0 - 0.2 10e3/uL    Absolute Immature Granulocytes 0.1 (H) <=0.0 10e3/uL    Absolute NRBCs 0.0 10e3/uL   US Lower Extremity  Venous Duplex Right    Narrative    EXAMINATION: US LOWER EXTREMITY VENOUS DUPLEX RIGHT  7/19/2021 9:40 AM       CLINICAL HISTORY: Swelling in right lower extremity and fever    COMPARISON: None        PROCEDURE COMMENTS: Ultrasound was performed of the deep venous system  of the right lower extremity using grayscale, color, and spectral  Doppler.    FINDINGS:  The common femoral, greater saphenous origin, femoral, popliteal, and  deep calf veins are visualized of the right lower extremity and are  patent. The common femoral vein of the left leg is visualized and is  patent. Venous waveforms are normal. There is normal response to  compression.      Impression    IMPRESSION:.  No deep vein thrombosis in the right lower extremity..    I have personally reviewed the examination and initial interpretation  and I agree with the findings.    DARIEL HAHN MD         SYSTEM ID:  [id number]   Extra Tube    Narrative    The following orders were created for panel order Extra Tube.  Procedure                               Abnormality         Status                     ---------                               -----------         ------                     Extra Urine Collection[[id number]]                           Final result                 Please view results for these tests on the individual orders.   Extra Urine Collection   Result Value Ref Range    Hold Specimen JIC    UA reflex to Microscopic and Culture    Specimen: Urine, Davis Catheter   Result Value Ref Range    Color Urine Light Yellow Colorless, Straw, Light Yellow, Yellow    Appearance Urine Clear Clear    Glucose Urine Negative Negative mg/dL    Bilirubin Urine Negative Negative    Ketones Urine Negative Negative mg/dL    Specific Gravity Urine 1.013 1.003 - 1.035    Blood Urine Moderate (A) Negative    pH Urine 5.0 5.0 - 7.0    Protein Albumin Urine Negative Negative mg/dL    Urobilinogen Urine Normal Normal, 2.0 mg/dL    Nitrite Urine Negative Negative     Leukocyte Esterase Urine Negative Negative    Bacteria Urine Few (A) None Seen /HPF    Mucus Urine Present (A) None Seen /LPF    RBC Urine 8 (H) <=2 /HPF    WBC Urine 1 <=5 /HPF    Narrative    Urine Culture not indicated   XR Chest 2 Views    Narrative    EXAM: XR CHEST 2 VW  7/19/2021 11:56 AM      HISTORY: fever, recent hx of PNA    COMPARISON: CT dated 7/12/2021    FINDINGS: Two views of the chest. Stable rightward tracheal deviation.  Cardiomediastinal silhouette is within normal limits. Hazy bibasilar  opacity seen on prior chest x-ray are improved. Streaky left midlung  opacity likely represents atelectasis. No pleural effusion or  pneumothorax.      Impression    IMPRESSION:  Streaky left midlung opacity likely represents atelectasis. Previously  seen hazy opacities in the bilateral bases are improved.    I have personally reviewed the examination and initial interpretation  and I agree with the findings.    CORY CEBALLOS MD         SYSTEM ID:  [id number]       ASSESSMENT AND PLAN    Rigo Johns is a 70 year old L hand dominant male with a PMH of HTN, PILI, and prior cervical surgery (C5-7 anterior fusion in 1993 per notes) who is now status post a C3-4, C4-5 ACDF on 7/14/2021 for cervical myelopathy. Hospital course has been complicated by community-acquired pneumonia status post antibiotics, DONOVAN, urinary retention requiring a Davis catheter, steroid induced hyperglycemia, thrombocytopenia, and steroid induced leukocytosis.      Impairment group code: 04.1211 Quadriplegia, Incomplete C1-4 - s/p C3-5 ACDF.     PLAN  Rehabilitation  -The patient has impairments in strength, range of motion, balance, and endurance, which are leading to activity limitations in ambulation, mobility, and ADLs.  He will be admitted into a comprehensive, interdisciplinary, inpatient rehabilitation program including  PT and OT for 90 minutes of each on a daily basis.  The patient requires close supervision by a physiatrist for  management and monitoring of active and chronic medical co-morbidities, and to ensure the patient's ability to fully participate and benefit from the rigorous program.  Additionally, the patient requires specialized rehabilitation nursing for monitoring of vital signs, medication administration, patient training and education, monitoring of bowel and bladder, and wound care.  The assistance of the dietary and social work teams are also needed to optimize nutritional status and to collaborate and achieve the identified discharge goals.   A comprehensive individualized Care Plan will be formulated by the attending Physiatrist after initial evaluations by each Rehabilitation Team member at the initial team conference within four days of admission and updated/modified at subsequent team conferences.   This level of care and multidisciplinary approach is medically necessary and only available at the inpatient rehabilitation facility level of care.  The patient is willing to participate in 3 hours of therapy per day and has the potential for improvement.      Medical  Cervical Myelopathy s/p C3-4, C4-5 ACDF on 7/14/21   -Maintain postoperative spinal precautions. No need for cervical collar.  -Follow-up with neurosurgery scheduled for 7/28        Fever 2/2 possible autonomic dysreflexia vs infection  Recent CAP infection  Leukocytosis  -Now completed course of antibiotics (Zosyn x1 in ED, Azithromycin 7/12-7/16, Ceftriaxone 7/12 - 7/17, and Cefuroxime x1 prior to transfer to rehab)  This morning and this afternoon pt had fever of >101. CXR, WBC (white count stable), lactic acid, UA, U/S of RLE are all unremarkable. Awaiting blood cultures x2, urine culture, procalc, and ultrasound of LUE (had edema this am of LUE).  -Leukocytosis may be secondary to steroids  -Monitor blood pressures as he has had some high readings. In addition with described facial flushing, will continue to monitor closely for possible noxious causes  of autonomic dysreflexia  -Follow procalc to further investigate possibility of infection  -Ultrasound of LUE ordered with presence of edema and fever  -Monitor respiratory status, supplementary oxygen PRN.  Now weaned to room air.      PILI  -Continue CPAP with home settings     Diet: Regular consistency solids and thin liquids     Neurogenic bowel  -patient has been incontinent but states he is having more sensation and control of sphincter  -Stools have been loose therefore we will change all bowel meds to as needed including Senokot-S, MiraLAX, Dulcolax suppository. If remains incontinent consider formal bowel program  -Monitor, adjust as needed     Urinary Retention  -Davis catheter placed 7/15/21.  Plan for TOV in the next few days  -Continue Flomax 0.4 mg daily     DONOVAN and likely CKD  -Pt with limited physician follow up so baseline creatinine not known. Peak at 2.13, improved with IV hydration  -Avoid nephrotoxic agents, NSAIDs  -Re-check BMP in the morning    Renal lesion  -Incidental finding on CT scan  -Renal ultrasound showed simple cysts of bilateral kidneys  -Follow up with PCP        Pain  -Tylenol 650 mg q4h PRN  -Oxycodone 5 mg q4h PRN and Robaxin 750 mg q6h PRN.  Wean as able.     Steroid induced hyperglycemia, resolved  HbA1c 5.0  -Now completed course of steroids and glucose checks have been well controlled. No need for further routine checks      Acute postoperative blood loss anemia  -hemoglobin mildly low at 12.8, today 14.0. Monitor peripherally.        Thrombocytopenia  -Noted upon initial admission to the emergency room. Has been stable low 100s. Unclear if due to acute distress and infection versus possible undiagnosed liver disease. Monitor peripherally, follow-up with primary care physician.      Psych  -Monitor mood, will consult health psychology if needed           Social/Dispo  -Anticipate discharge home at a modified independent level for ambulation, mobility, and ADLs.   -ELOS: 14  days  -Rehab prognosis: Good  -Follow up appointments: Abnormal appearance of liver as seen on CAT scan with slight nodularity inferiorly. LFTs within normal limits (Follow-up primary care physician), NSGY (Dr. Hanley)     DVT prophylaxis  -PCDs and ambulation    Code status: Full Code (Discussed with patient upon admission)      Teri Jordan MD  Physical Medicine & Rehabilitation

## 2021-07-19 NOTE — PLAN OF CARE
FOCUS/GOAL  Medical management    ASSESSMENT, INTERVENTIONS AND CONTINUING PLAN FOR GOAL:  Temp 101.3 this morning. Patient received Tylenol  less then 2 hours earlier. . Other vitals WNL. Patent is puffy, R ankle particularly swollen. No SOB . Lung sounds are normal. Resident informed in person. STAT labs, UA/UC and imaging ordered.  All the tests done, urine sample sent to lab. Wife Odessa updated.   Temp down to 97.7 mid day then spike to 100.2 around 1400. Tylenol given at the moment for neck/back pain per patient request. Davis patent . Will continue with POC.

## 2021-07-19 NOTE — PLAN OF CARE
Discharge Planner Post-Acute Rehab OT:     Discharge Plan: home with wife home vs OP therapy    Precautions: spinal waiting results of testing 7-19 for increased temp and swelling of L U/E and  b L/E    Current Status:  ADLs: max a shirt sitting, max a feet  IADLs: tbd  Vision/Cognition: wfl at eval assess as needed    Assessment: pt busy day with testing max a with unit(s)/b dressing and feet cga w/c to bed transfer and mod a with legs eob to supine pt will benift from ot per ot goals    Other Barriers to Discharge (DME, Family Training, etc): tbd

## 2021-07-19 NOTE — PHARMACY-MEDICATION REGIMEN REVIEW
Pharmacy Medication Regimen Review  Rigo Johns is a 70 year old male who is currently in the Acute Rehab Unit.    Assessment: All medications have an appropriate indications, durations and no unnecessary use was found    Plan:   Attending provider will be sent this note for review.  If there are any emergent issues noted above, pharmacist will contact provider directly by phone.      Pharmacy will periodically review the resident's medication regimen for any PRN medications not administered in > 72 hours and discontinue them. The pharmacist will discuss gradual dose reductions of psychopharmacologic medications with interdisciplinary team on a regular basis.    Please contact pharmacy if the above does not answer specific medication questions/concerns.    Background:  A pharmacist has reviewed all medications and pertinent medical history today.  Medications were reviewed for appropriate use and any irregularities found are listed with recommendations.      Current Facility-Administered Medications:      acetaminophen (TYLENOL) tablet 650 mg, 650 mg, Oral, Q4H PRN, Luis Whitehead MD, 650 mg at 07/19/21 0545     bisacodyl (DULCOLAX) Suppository 10 mg, 10 mg, Rectal, Daily PRN, Luis Whitehead MD     methocarbamol (ROBAXIN) tablet 750 mg, 750 mg, Oral, Q6H PRN, Luis Whitehead MD     naloxone (NARCAN) injection 0.2 mg, 0.2 mg, Intravenous, Q2 Min PRN **OR** naloxone (NARCAN) injection 0.4 mg, 0.4 mg, Intravenous, Q2 Min PRN **OR** naloxone (NARCAN) injection 0.2 mg, 0.2 mg, Intramuscular, Q2 Min PRN **OR** naloxone (NARCAN) injection 0.4 mg, 0.4 mg, Intramuscular, Q2 Min PRN, Luis Whitehead MD     ondansetron (ZOFRAN) tablet 4 mg, 4 mg, Oral, Q6H PRN, Luis Whitehead MD     oxyCODONE (ROXICODONE) tablet 5 mg, 5 mg, Oral, Q4H PRN, Luis Whitehead MD     polyethylene glycol (MIRALAX) Packet 17 g, 17 g, Oral, Daily PRN, Luis Whitehead MD     senna-docusate  (SENOKOT-S/PERICOLACE) 8.6-50 MG per tablet 1 tablet, 1 tablet, Oral, BID PRN, Luis Whitehead MD     tamsulosin (FLOMAX) capsule 0.4 mg, 0.4 mg, Oral, Daily, Luis Whitehead MD, 0.4 mg at 07/19/21 0738  No current outpatient prescriptions on file.   Janae Lizama, [facility]  PharmD,BCPS  July 19, 2021

## 2021-07-19 NOTE — DISCHARGE SUMMARY
Service Date: 2021  Discharge Date: 2021    PRIMARY DIAGNOSIS:    1.  Cervical stenosis of spinal canal.   2.  Operation performed C3-C4, C4-C5 anterior cervical diskectomy and fusion performed by Dr. Hanley on 2021.    COMPLICATIONS:  None.    CONSULTATIONS:  Hospitalist Service.    HOSPITAL COURSE:  The patient is a 70-year-old male who presented with cervical spinal stenosis in which surgical management was deemed appropriate and the patient was prepared for surgery 2021 at which time he underwent the above-described procedure.  Please see the dictated operative report for further details.  He tolerated surgery well and there were no complications.    Postoperatively, he has made an adequate neurosurgical recovery.  He has remained afebrile with stable vital signs and he is tolerating a regular diet without difficulty.  His operative incision is healing well.  There are no signs of drainage, erythema or edema.  He is ready to be discharged to the transitional care unit 2021 in improved condition as compared to admission.    All instructions regarding diet, activity, wound care, bowel management and followup were given to the patient who was released in good condition.    DISCHARGE MEDICATIONS:    1.  Robaxin 750 mg, instructed to take 1 tablet every 6 hours as needed for pain.  2.  Oxycodone 5 mg, instructed to take 1 tablet by mouth as needed for pain every 4 hours.    PLAN:    1.  Follow up in the Neurosurgery Clinic 2021.  2.  Follow up in Neurosurgery Clinic 2021 and 10/22/2021.  3.  As always, the patient was instructed to call or return to the clinic for any worsening or changes in symptoms.    As Dictated by KARL SIDDIQI        D: 2021   T: 2021   MT: [id number]    Name:     ELHAM TURNER  MRN:      [id number]        Account:      [id number]   :      1950           Service Date: 2021                                  Discharge Date:  07/18/2021     Document: [id number]

## 2021-07-19 NOTE — PROGRESS NOTES
07/19/21 0914   Quick Adds   Type of Visit Initial PT Evaluation   Living Environment   People in home spouse   Home Accessibility stairs to enter home;stairs within home   Number of Stairs, Main Entrance 3   Stair Railings, Main Entrance railings safe and in good condition   Number of Stairs, Within Home, Primary other (see comments)   Stair Railings, Within Home, Primary railings on both sides of stairs   Transportation Anticipated family or friend will provide   Living Environment Comments 2 short steps w/ rail then one step into home. full flight to basement with model train [employer], B rails to basement. walk in shower. raised toilet seats, adjustable bed (HOB) high off ground   Self-Care   Usual Activity Tolerance good   Current Activity Tolerance moderate   Regular Exercise No   Equipment Currently Used at Home none   Activity/Exercise/Self-Care Comment Pt reports IND at baseline with I/ADLs, manages 7 acres land   Disability/Function   Hearing Difficulty or Deaf no   Wear Glasses or Blind yes   Concentrating, Remembering or Making Decisions Difficulty no   Difficulty Communicating no   Difficulty Eating/Swallowing no   Walking or Climbing Stairs Difficulty no   Dressing/Bathing Difficulty no   Toileting issues no   Doing Errands Independently Difficulty (such as shopping) no   Fall history within last six months no   Change in Functional Status Since Onset of Current Illness/Injury no   General Information   Onset of Illness/Injury or Date of Surgery 07/12/21   Referring Physician Luis Whitehead MD   Patient/Family Therapy Goals Statement (PT) to go home safely   Pertinent History of Current Problem (include personal factors and/or comorbidities that impact the POC) presented to hospital with pna, neck shoulder pain with BUE weakness, s/p C3-4, C4-5 ACDF, Hospital course has been complicated by DONOVAN with improvement with IV fluids, urinary retention, steroid induced hyperglycemia, thrombocytopenia, and  steroid induced leukocytosis. PMH of HTN, PILI, and prior cervical surgery (C5-7 anterior fusion in 1993)   Existing Precautions/Restrictions fall;spinal   Heart Disease Risk Factors High blood pressure   General Observations pt in bed, pleasant and agreeable to PT eval   Cognition   Orientation Status (Cognition) oriented x 4   Affect/Mental Status (Cognition) WNL   Follows Commands (Cognition) WNL   Safety Deficit (Cognition) insight into deficits/self-awareness   Cognitive Status Comments pt clearly stating PMH and CMH, verbalizes understanding of his deficits   Pain Assessment   Patient Currently in Pain Yes, see Vital Sign flowsheet   Integumentary/Edema   Integumentary/Edema Comments +1 BLE, L hand +2   Posture    Posture Forward head position;Protracted shoulders;Kyphosis   Range of Motion (ROM)   ROM Comment gorssly WFL, some tightness in hips, knees. LUE supination lacking ~35*   Strength   Strength Comments BLE MMT tests fairly strong, though L <R, however functionally falls apart. toe intrinsics: L side 3-/5, grt toe EXT L 3/5   MMT: Hip, Rehab Eval   Hip Flexion - Left Side (4-/5) good minus, left   Hip Flexion - Right Side (5/5) normal,right   MMT: Knee, Rehab Eval   Knee Flexion - Left Side (4-/5) good minus, left   Knee Extension - Left Side (5/5) normal,left   Knee Flexion - Right Side (5/5) normal,right   Knee Extension - Right Side (5/5) normal,right   MMT: Ankle, Rehab Eval   Ankle Dorsiflexion - Left Side (3+/5) fair plus, left   Ankle Plantarflexion - Left Side 5/5) normal,left   Ankle Dorsiflexion - Right Side 5/5) normal,right   Ankle Plantarflexion - Right Side 5/5) normal, right   ARC Assessment Only   Acute Rehab Functional Assessment See IP Rehab Daily Documentation Flowsheet for Functional Mobility/ADL Assessment   Balance   Balance Comments needs haeavy UE support in standing as well as locking knees (pt reports recent knee buckling in static stance)   Sensory Examination   Sensory  Perception Comments protective sensation intact B hands, severely impaired B feet 9/10 absent. grt toe proprioceptipn intact B'ly. B feet perceive sharp/dull as dull   Hot/Cold Sensation   LUE w/i normal limits   LLE absent sensation   RUE w/i normal limits   RLE absent sensation   Sensation Sharp Dull Discrimination   LUE w/i normal limits   LLE severe impairment   RUE w/i normal limits   RLE severe impairment   Proprioception    LLE w/i normal limits   RLE w/i normal limits   Coordination   Coordination Comments grossly impaired in standing, in sitting BLE difficulty with floor circles,    Muscle Tone   Muscle Tone no deficits were identified   Clinical Impression   Criteria for Skilled Therapeutic Intervention yes, treatment indicated   PT Diagnosis (PT) impaired functional strength in context of cervical SCI   Influenced by the following impairments strength, balance, coordination, somatosensation, activity tolerance   Functional limitations due to impairments bed mobility, transfers, gait, stairs, household and community mobility, ability to drive, maintain acreage at home   Clinical Presentation Evolving/Changing   Clinical Presentation Rationale complex medical, > 4 personal factors limiting independence with functional mobility   Clinical Decision Making (Complexity) high complexity   Therapy Frequency (PT) Other (see comments)  ( minutes daily)   Predicted Duration of Therapy Intervention (days/wks) 3 weeks   Planned Therapy Interventions (PT) balance training;bed mobility training;E-stim;gait training;home exercise program;motor coordination training;orthotic fitting/training;patient/family education;stair training;strengthening;stretching;TENS;transfer training;wheelchair management/propulsion training;progressive activity/exercise;risk factor education;home program guidelines   Anticipated Equipment Needs at Discharge (PT) gait belt;transfer board;walker, rolling;wheelchair   Risk & Benefits of  therapy have been explained evaluation/treatment results reviewed;care plan/treatment goals reviewed;risks/benefits reviewed;participants voiced agreement with care plan;participants included;patient   Clinical Impression Comments pt below baseline will benefit from skilled PT to maximize independence with functional mobility, decrease care giver burden, skilled assessment/planning for DME/A.D, decision for FWW vs wc vs mixed mobility for safe household and community mobility.    PT Discharge Planning    PT Discharge Recommendation (DC Rec) home with assist;home with home care physical therapy;home with outpatient physical therapy   PT Rationale for DC Rec pending progress, activity tolerance   PT Brief overview of current status  please see above   Total Evaluation Time   Total Evaluation Time (Minutes) 40   Skin WDL   Skin WDL X   Skin Elasticity quick return to original state   Skin Integrity/Characteristics bruised;unable to assess  (steri strip on neck, )

## 2021-07-19 NOTE — PLAN OF CARE
Discharge Planner Post-Acute Rehab PT:     Discharge Plan: anticipate home vs OP PT, ELOS 3 weeks    Precautions: falls, cervical    Current Status:  Bed Mobility: SBA sup>sit, sit>sup NT  Transfer: close SBA for lateral scoot level transfer  Gait: unable at this time  Stairs: NT  Balance: good sitting balance, needs heavy UE support in standing    Assessment:  pt below baseline will benefit from skilled PT to maximize independence with functional mobility, decrease care giver burden, skilled assessment/planning for DME/A.D, decision for FWW vs wc vs mixed mobility for safe household and community mobility.     Other Barriers to Discharge (DME, Family Training, etc): JOH[place] home, family training, potential mixed mobility FWW and wc   Pt very motivated.

## 2021-07-19 NOTE — CONSULTS
" 21 1000   Living Arrangements   People in home spouse   Able to Return to Prior Arrangements yes   Home Safety   Patient Feels Safe Living in Home? yes   CM/SW Discharge Planning   Patient/Family Anticipates Transition to home with family   Concerns to be Addressed no discharge needs identified;denies needs/concerns at this time   Patient/family educated on Medicare website which has current facility and service quality ratings no   Transportation Anticipated family or friend will provide   Anticipated Discharge Disposition (CM/SW) Home;Home Care;Outpatient Rehab (PT, OT, SLP, Cardiac or Pulmonary)   Patient/Family in Agreement with Plan yes       Social Work: Initial Assessment with Discharge Plan    Patient Name: Rigo Johns  : 1950  Age: 70 year old  MRN: [id number]  Completed assessment with: Chart review and interview with pt at bedside   Admitted to ARU: 2021    Presenting Information   Date of  assessment: 2021  Health Care Directive: Copy in Chart and Health Care Directive Agent (if patient not able to make decisions)  Primary Health Care Agent: Patient/self  Secondary Health Care Agent: Pt spouse, NOK.   Living Situation: Lives in a house w/ spouse in [place], [place]. 2+1 stairs to enter and all needs are met on the main level. Walk-in shower with 4\" threshold, shower seat. Has jacuzzi tub with seat and cutout door, only 6\" to step into tub. No pets in the home.   Previous Functional Status: Indep with ADLs and IADLs. Manage own medications, finances, was driving and retired. Denied falls at home. Left handed.   DME available: grab bar, tub/shower, shower chair (shower seat)  Patient and family understanding of hospitalization: Appropriate   Cultural/Language/Spiritual Considerations: 69 y/o  male, english-speaking and Taoist-angel luis.       Physical Health  Reason for admission: Cervical myelopathy s/p C3-4, C4-5 ACDF     HISTORY OF PRESENT ILLNESS  Rigo Johns is " a 70 year old L hand dominant male with a PMH of HTN, PILI, and prior cervical surgery (C5-7 anterior fusion in 1993 per notes) who initially presented to the ED at [facility] on 7/12/21 with dyspnea and acute onset of neck and shoulder pain, and bilateral arm weakness.  A CT of the chest demonstrated bibasilar ATX vs. Infiltrate, and he was started on Zosyn for likely PNA.  A CT of the C-spine was also done and showed central stenosis at the C3-4 and C4-5 levels, with a subsequent MRI confirming this and also demonstrating severe b/l foraminal stenosis and abnormal T2 signal bilaterally, L>R.  He was started on IV steroids and transferred to [facility] for a C3-4, C4-5 ACDF which occurred on 7/14/21 by Dr. Hanley.  Hospital course has been complicated by DONOVAN with improvement with IV fluids, urinary retention with a Davis currently in place, steroid induced hyperglycemia, thrombocytopenia, and steroid induced leukocytosis.      Provider Information   Primary Care Physician:[facility]-   [address] [place] [address]  PH: [phone]  : None reported     Mental Health/Chemical Dependency:   Diagnosis: None reported   Alcohol/Tobacco/Narcotis: None reported. Reported 1 drink/month.   Support/Services in Place: None reported   Services Needed/Recommended: Supportive services available by consult (Health Psychology and [place] services)   Sexuality/Intimacy: Not discussed     Support System  Marital Status: Wife supportive, able to A at discharge. Wife retired, does a lot of gardening. Pt reported that his wife is healthy and well.   Family support: Niece who lives close and is a PT at [employer]. No children. No other family in the area.   Other support available: Pt reported some close friends that he can rely on.     Community Resources  Current in home services: None reported   Previous services: None reported     Financial/Employment/Education  Employment Status: Worked  at the [employer] in /planning.   Income Source: [employer] long-term.   Education: Not discussed   Financial Concerns:  None reported   Insurance: BCBS Med Advant[employer]       Discharge Plan   Patient and family discharge goal: Home with family A and HC vs OP pending progress   Provided Education on discharge plan: Yes  Patient agreeable to discharge plan:  Yes  Provided education and attained signature for Medicare IM and IRF Patient Rights and Privacy Information provided to patient : YES  Provided patient with [place] [facility] [place] Resources: N/A  Barriers to discharge: None identified     Discharge Recommendations   Disposition: See above   Transportation Needs: Family assistance   Name of Transportation Company and Phone: N/A     Additional comments   ELOS 14 days. Discharge needs pending progress. SW role and team rounds explained. Pt denied any immediate needs or concerns. SW available as needs arise.     Please invite to Care Conference:  N/A     MELA Aguilar, CAD-[facility] Acute Rehab Unit   Phone: [phone]  I   Pager: [phone]

## 2021-07-19 NOTE — PLAN OF CARE
FOCUS/GOAL  Bowel management, Bladder management, Pain management and Cognition/Memory/Judgment/Problem solving    ASSESSMENT, INTERVENTIONS AND CONTINUING PLAN FOR GOAL:  Pt is alert and oriented x4, denies fever, chills, CP, SOB, N/V, abdominal pain, or new weakness/numbness/tingling, continent of bowel and bladder, transferring with assist of 2 stand pivot transfer, dressing on neck CDI, sleeping well throughout the night, using a CPAP, Pt temp in within normal range but has elevated BP, no further care concerns at this time continue with POC.

## 2021-07-20 ENCOUNTER — APPOINTMENT (OUTPATIENT)
Dept: OCCUPATIONAL THERAPY | Facility: CLINIC | Age: 71
DRG: 052 | End: 2021-07-20
Attending: PHYSICAL MEDICINE & REHABILITATION
Payer: COMMERCIAL

## 2021-07-20 ENCOUNTER — APPOINTMENT (OUTPATIENT)
Dept: PHYSICAL THERAPY | Facility: CLINIC | Age: 71
DRG: 052 | End: 2021-07-20
Attending: PHYSICAL MEDICINE & REHABILITATION
Payer: COMMERCIAL

## 2021-07-20 PROBLEM — R19.7 DIARRHEA: Status: ACTIVE | Noted: 2021-07-20

## 2021-07-20 PROBLEM — D72.829 LEUKOCYTOSIS: Status: ACTIVE | Noted: 2021-07-20

## 2021-07-20 LAB
ANION GAP SERPL CALCULATED.3IONS-SCNC: 2 MMOL/L (ref 3–14)
BASOPHILS # BLD AUTO: 0 10E3/UL (ref 0–0.2)
BASOPHILS # BLD AUTO: 0 10E3/UL (ref 0–0.2)
BASOPHILS NFR BLD AUTO: 0 %
BASOPHILS NFR BLD AUTO: 0 %
BUN SERPL-MCNC: 26 MG/DL (ref 7–30)
C DIFF TOX B STL QL: NEGATIVE
CALCIUM SERPL-MCNC: 8.3 MG/DL (ref 8.5–10.1)
CHLORIDE BLD-SCNC: 115 MMOL/L (ref 94–109)
CO2 SERPL-SCNC: 23 MMOL/L (ref 20–32)
CREAT SERPL-MCNC: 1.21 MG/DL (ref 0.66–1.25)
CRP SERPL-MCNC: 74 MG/L (ref 0–8)
EOSINOPHIL # BLD AUTO: 0.5 10E3/UL (ref 0–0.7)
EOSINOPHIL # BLD AUTO: 0.6 10E3/UL (ref 0–0.7)
EOSINOPHIL NFR BLD AUTO: 3 %
EOSINOPHIL NFR BLD AUTO: 3 %
ERYTHROCYTE [DISTWIDTH] IN BLOOD BY AUTOMATED COUNT: 12 % (ref 10–15)
ERYTHROCYTE [DISTWIDTH] IN BLOOD BY AUTOMATED COUNT: 12 % (ref 10–15)
GFR SERPL CREATININE-BSD FRML MDRD: 60 ML/MIN/1.73M2
GLUCOSE BLD-MCNC: 83 MG/DL (ref 70–99)
HCT VFR BLD AUTO: 37.6 % (ref 40–53)
HCT VFR BLD AUTO: 39.4 % (ref 40–53)
HGB BLD-MCNC: 12.9 G/DL (ref 13.3–17.7)
HGB BLD-MCNC: 13.6 G/DL (ref 13.3–17.7)
IMM GRANULOCYTES # BLD: 0.1 10E3/UL
IMM GRANULOCYTES # BLD: 0.2 10E3/UL
IMM GRANULOCYTES NFR BLD: 1 %
IMM GRANULOCYTES NFR BLD: 1 %
LACTATE SERPL-SCNC: 1.4 MMOL/L (ref 0.7–2)
LYMPHOCYTES # BLD AUTO: 1.3 10E3/UL (ref 0.8–5.3)
LYMPHOCYTES # BLD AUTO: 1.3 10E3/UL (ref 0.8–5.3)
LYMPHOCYTES NFR BLD AUTO: 8 %
LYMPHOCYTES NFR BLD AUTO: 8 %
MCH RBC QN AUTO: 33.7 PG (ref 26.5–33)
MCH RBC QN AUTO: 34.1 PG (ref 26.5–33)
MCHC RBC AUTO-ENTMCNC: 34.3 G/DL (ref 31.5–36.5)
MCHC RBC AUTO-ENTMCNC: 34.5 G/DL (ref 31.5–36.5)
MCV RBC AUTO: 98 FL (ref 78–100)
MCV RBC AUTO: 99 FL (ref 78–100)
MONOCYTES # BLD AUTO: 1.1 10E3/UL (ref 0–1.3)
MONOCYTES # BLD AUTO: 1.4 10E3/UL (ref 0–1.3)
MONOCYTES NFR BLD AUTO: 7 %
MONOCYTES NFR BLD AUTO: 8 %
NEUTROPHILS # BLD AUTO: 13 10E3/UL (ref 1.6–8.3)
NEUTROPHILS # BLD AUTO: 13.3 10E3/UL (ref 1.6–8.3)
NEUTROPHILS NFR BLD AUTO: 80 %
NEUTROPHILS NFR BLD AUTO: 81 %
NRBC # BLD AUTO: 0 10E3/UL
NRBC # BLD AUTO: 0 10E3/UL
NRBC BLD AUTO-RTO: 0 /100
NRBC BLD AUTO-RTO: 0 /100
PLATELET # BLD AUTO: 102 10E3/UL (ref 150–450)
PLATELET # BLD AUTO: 103 10E3/UL (ref 150–450)
POTASSIUM BLD-SCNC: 4.6 MMOL/L (ref 3.4–5.3)
RBC # BLD AUTO: 3.83 10E6/UL (ref 4.4–5.9)
RBC # BLD AUTO: 3.99 10E6/UL (ref 4.4–5.9)
SODIUM SERPL-SCNC: 140 MMOL/L (ref 133–144)
WBC # BLD AUTO: 16.1 10E3/UL (ref 4–11)
WBC # BLD AUTO: 16.7 10E3/UL (ref 4–11)

## 2021-07-20 PROCEDURE — 80048 BASIC METABOLIC PNL TOTAL CA: CPT | Performed by: STUDENT IN AN ORGANIZED HEALTH CARE EDUCATION/TRAINING PROGRAM

## 2021-07-20 PROCEDURE — 86140 C-REACTIVE PROTEIN: CPT | Performed by: PHYSICAL MEDICINE & REHABILITATION

## 2021-07-20 PROCEDURE — 99232 SBSQ HOSP IP/OBS MODERATE 35: CPT | Mod: 24 | Performed by: PHYSICAL MEDICINE & REHABILITATION

## 2021-07-20 PROCEDURE — 36415 COLL VENOUS BLD VENIPUNCTURE: CPT | Performed by: PHYSICAL MEDICINE & REHABILITATION

## 2021-07-20 PROCEDURE — 97530 THERAPEUTIC ACTIVITIES: CPT | Mod: GP | Performed by: PHYSICAL THERAPIST

## 2021-07-20 PROCEDURE — 97110 THERAPEUTIC EXERCISES: CPT | Mod: GO

## 2021-07-20 PROCEDURE — 97110 THERAPEUTIC EXERCISES: CPT | Mod: GP | Performed by: PHYSICAL THERAPIST

## 2021-07-20 PROCEDURE — 250N000013 HC RX MED GY IP 250 OP 250 PS 637: Performed by: PHYSICAL MEDICINE & REHABILITATION

## 2021-07-20 PROCEDURE — 87493 C DIFF AMPLIFIED PROBE: CPT | Performed by: PHYSICAL MEDICINE & REHABILITATION

## 2021-07-20 PROCEDURE — 85025 COMPLETE CBC W/AUTO DIFF WBC: CPT | Performed by: PHYSICAL MEDICINE & REHABILITATION

## 2021-07-20 PROCEDURE — 85025 COMPLETE CBC W/AUTO DIFF WBC: CPT | Performed by: STUDENT IN AN ORGANIZED HEALTH CARE EDUCATION/TRAINING PROGRAM

## 2021-07-20 PROCEDURE — 36415 COLL VENOUS BLD VENIPUNCTURE: CPT | Performed by: STUDENT IN AN ORGANIZED HEALTH CARE EDUCATION/TRAINING PROGRAM

## 2021-07-20 PROCEDURE — 97116 GAIT TRAINING THERAPY: CPT | Mod: GP | Performed by: PHYSICAL THERAPIST

## 2021-07-20 PROCEDURE — 83605 ASSAY OF LACTIC ACID: CPT | Performed by: PHYSICAL MEDICINE & REHABILITATION

## 2021-07-20 PROCEDURE — 97535 SELF CARE MNGMENT TRAINING: CPT | Mod: GO

## 2021-07-20 PROCEDURE — 128N000003 HC R&B REHAB

## 2021-07-20 RX ADMIN — TAMSULOSIN HYDROCHLORIDE 0.4 MG: 0.4 CAPSULE ORAL at 08:10

## 2021-07-20 RX ADMIN — ACETAMINOPHEN 650 MG: 325 TABLET, FILM COATED ORAL at 22:49

## 2021-07-20 RX ADMIN — METHOCARBAMOL 750 MG: 750 TABLET ORAL at 10:08

## 2021-07-20 RX ADMIN — ACETAMINOPHEN 650 MG: 325 TABLET, FILM COATED ORAL at 17:53

## 2021-07-20 RX ADMIN — METHOCARBAMOL 750 MG: 750 TABLET ORAL at 22:49

## 2021-07-20 RX ADMIN — ACETAMINOPHEN 650 MG: 325 TABLET, FILM COATED ORAL at 08:10

## 2021-07-20 NOTE — PLAN OF CARE
Discharge Planner Post-Acute Rehab OT:     Discharge Plan: home with wife home vs OP therapy    Precautions: spinal, fall    Current Status:  ADLs:  G/H: set up   UB dressing:  Max a for hospital gown,   LB dressing: max a   IADLs: tbd  Vision/Cognition: wfl at [facility] assess as needed    Assessment: Shower completed this date. Pt requires significant assist due to UE weakness and limited endurance. Will progress as appropriate.     Other Barriers to Discharge (DME, Family Training, etc): tbd

## 2021-07-20 NOTE — PLAN OF CARE
Pt alert and oriented x 4, with soreness on neck at start of shift PRN Tylenol just given at 1400H, PRN Tylenol given at 2130H for neck pain with relief, sleeping at end of shift. Davis catheter patent with good output. On regular and thin liquids, ate 50%. Had incontinent bm this pm shift, loose. A1 squat, pivot. VSS except temperature, slightly elevated throughout pm shift. Ultrasound of L arm done this pm shift and resulted - no DVT. Labs resulted and not remarkable. L arm still with edema, weak. CPAP on.

## 2021-07-20 NOTE — PLAN OF CARE
"Pt c/o 2/10 anterior neck pain this AM. Tylenol 650mg given. No change in pain. Pt had OT therapy & pain increased to 4/10 pain.  C/o \"crunching\" while turning neck & \"twitches but not quite spasms\".  Pt does not want to take oxycodone unless absolutely necessary. PRN Robaxin 750mg & there was a decrease in pain to 2/10 with absence of the \"crunching\" w/neck turns & no longer having the twitches.  Pt had urge to have bowel movement x2 this shift w/no results.  Stool sample needs to be collected on next BM to rule out CDiff. Pt would like to start on probiotic. He has been taking it approximately 10years & would like to start again particularly since he just ran a recent course of probiotics & is experiencing loose stools. Sticky note left for provider.  Continues to experience low grade fevers (99.9) w/Tylenol on board.   "

## 2021-07-20 NOTE — PROGRESS NOTES
"  [facility]   Acute Rehabilitation Unit  Daily progress note    INTERVAL HISTORY  Patient continued to have a low-grade fever overnight. This am he reports having felt feverish overnight and this am along with sweating, albeit, not as sweaty as yesterday morning. He is unsure if his diet has made him feel this sick, although he only endorses \"stomach rumbles\" and being gassy, but no cramps or abdominal pain reported. Per nursing charting, pt has been incontinent with stools and they have been loose. He states he normally takes a probiotic, which he has not been receiving here at the hospital. He otherwise does not feel sick. He has a productive cough that is chronic and he attributes to the dry CPAP he uses nightly. He denies n/v, SOB, chest pain, congestion, and sore throat.      Functionally, he needs max assistance with upper and lower body dressing due to weakness and limited endurance. Needs FWW and minimal assist for sit to transfer and for ambulation 2x10 feet needs FWW and CGA (wheelchair will follow).      MEDICATIONS  Scheduled meds    tamsulosin  0.4 mg Oral Daily       PRN meds:  acetaminophen, bisacodyl, methocarbamol, naloxone **OR** naloxone **OR** naloxone **OR** naloxone, ondansetron, oxyCODONE, polyethylene glycol, senna-docusate      PHYSICAL EXAM  BP (!) 154/74 (BP Location: Right arm)   Pulse 83   Temp (!) 101.1  F (38.4  C) (Oral)   Resp 16   Ht 1.803 m (5' 11\")   Wt 116.6 kg (257 lb)   SpO2 95%   BMI 35.84 kg/m    Gen: cooperative, alert, obese, skin not flushed at this time  HEENT: atraumatic, anterior cervical surgical incision covered with steri-strips and bandage, slightly tender with no exudate from bandage  Cardio: RRR, S1 and S2 present, no murmurs auscultated  Pulm: Non-labored breathing, clear to auscultation b/l  Abd: Soft, non-distended, non-tender, bowel sounds present  Ext: Trace pitting edema in LE b/l, no calf tenderness, +2 edema in " LUE from hand to above elbow  Neuro/MSK: A&O x3, EOM intact, no slurring of speech     LABS  Results for orders placed or performed during the hospital encounter of 07/18/21 (from the past 24 hour(s))   CBC with Platelets & Differential    Narrative    The following orders were created for panel order CBC with Platelets & Differential.  Procedure                               Abnormality         Status                     ---------                               -----------         ------                     CBC with platelets and d...[[id number]]  Abnormal            Final result                 Please view results for these tests on the individual orders.   Procalcitonin   Result Value Ref Range    Procalcitonin <0.05 <5.00 ng/mL   CBC with platelets and differential   Result Value Ref Range    WBC Count 13.2 (H) 4.0 - 11.0 10e3/uL    RBC Count 3.94 (L) 4.40 - 5.90 10e6/uL    Hemoglobin 13.0 (L) 13.3 - 17.7 g/dL    Hematocrit 38.9 (L) 40.0 - 53.0 %    MCV 99 78 - 100 fL    MCH 33.0 26.5 - 33.0 pg    MCHC 33.4 31.5 - 36.5 g/dL    RDW 11.9 10.0 - 15.0 %    Platelet Count 95 (L) 150 - 450 10e3/uL    % Neutrophils 76 %    % Lymphocytes 11 %    % Monocytes 8 %    % Eosinophils 4 %    % Basophils 0 %    % Immature Granulocytes 1 %    NRBCs per 100 WBC 0 <1 /100    Absolute Neutrophils 10.0 (H) 1.6 - 8.3 10e3/uL    Absolute Lymphocytes 1.4 0.8 - 5.3 10e3/uL    Absolute Monocytes 1.1 0.0 - 1.3 10e3/uL    Absolute Eosinophils 0.5 0.0 - 0.7 10e3/uL    Absolute Basophils 0.0 0.0 - 0.2 10e3/uL    Absolute Immature Granulocytes 0.1 (H) <=0.0 10e3/uL    Absolute NRBCs 0.0 10e3/uL   CBC with platelets differential    Narrative    The following orders were created for panel order CBC with platelets differential.  Procedure                               Abnormality         Status                     ---------                               -----------         ------                     CBC with platelets and d...[[id number]]  Abnormal             Final result                 Please view results for these tests on the individual orders.   Basic metabolic panel   Result Value Ref Range    Sodium 140 133 - 144 mmol/L    Potassium 4.6 3.4 - 5.3 mmol/L    Chloride 115 (H) 94 - 109 mmol/L    Carbon Dioxide (CO2) 23 20 - 32 mmol/L    Anion Gap 2 (L) 3 - 14 mmol/L    Urea Nitrogen 26 7 - 30 mg/dL    Creatinine 1.21 0.66 - 1.25 mg/dL    Calcium 8.3 (L) 8.5 - 10.1 mg/dL    Glucose 83 70 - 99 mg/dL    GFR Estimate 60 (L) >60 mL/min/1.73m2   CRP inflammation   Result Value Ref Range    CRP Inflammation 74.0 (H) 0.0 - 8.0 mg/L   CBC with platelets and differential   Result Value Ref Range    WBC Count 16.1 (H) 4.0 - 11.0 10e3/uL    RBC Count 3.99 (L) 4.40 - 5.90 10e6/uL    Hemoglobin 13.6 13.3 - 17.7 g/dL    Hematocrit 39.4 (L) 40.0 - 53.0 %    MCV 99 78 - 100 fL    MCH 34.1 (H) 26.5 - 33.0 pg    MCHC 34.5 31.5 - 36.5 g/dL    RDW 12.0 10.0 - 15.0 %    Platelet Count 103 (L) 150 - 450 10e3/uL    % Neutrophils 81 %    % Lymphocytes 8 %    % Monocytes 7 %    % Eosinophils 3 %    % Basophils 0 %    % Immature Granulocytes 1 %    NRBCs per 100 WBC 0 <1 /100    Absolute Neutrophils 13.0 (H) 1.6 - 8.3 10e3/uL    Absolute Lymphocytes 1.3 0.8 - 5.3 10e3/uL    Absolute Monocytes 1.1 0.0 - 1.3 10e3/uL    Absolute Eosinophils 0.5 0.0 - 0.7 10e3/uL    Absolute Basophils 0.0 0.0 - 0.2 10e3/uL    Absolute Immature Granulocytes 0.1 (H) <=0.0 10e3/uL    Absolute NRBCs 0.0 10e3/uL       ASSESSMENT AND PLAN    Rigo Johns is a 70 year old L hand dominant male with a PMH of HTN, PILI, and prior cervical surgery (C5-7 anterior fusion in 1993 per notes) who is now status post a C3-4, C4-5 ACDF on 7/14/2021 for cervical myelopathy. Hospital course has been complicated by community-acquired pneumonia status post antibiotics, DONOVAN, urinary retention requiring a Davis catheter, steroid induced hyperglycemia, thrombocytopenia, and steroid induced leukocytosis.      Impairment group  code: 04.1211 Quadriplegia, Incomplete C1-4 - s/p C3-5 ACDF.     PLAN  Rehabilitation  -The patient has impairments in strength, range of motion, balance, and endurance, which are leading to activity limitations in ambulation, mobility, and ADLs.  He will be admitted into a comprehensive, interdisciplinary, inpatient rehabilitation program including  PT and OT for 90 minutes of each on a daily basis.  The patient requires close supervision by a physiatrist for management and monitoring of active and chronic medical co-morbidities, and to ensure the patient's ability to fully participate and benefit from the rigorous program.  Additionally, the patient requires specialized rehabilitation nursing for monitoring of vital signs, medication administration, patient training and education, monitoring of bowel and bladder, and wound care.  The assistance of the dietary and social work teams are also needed to optimize nutritional status and to collaborate and achieve the identified discharge goals.   A comprehensive individualized Care Plan will be formulated by the attending Physiatrist after initial evaluations by each Rehabilitation Team member at the initial team conference within four days of admission and updated/modified at subsequent team conferences.   This level of care and multidisciplinary approach is medically necessary and only available at the inpatient rehabilitation facility level of care.  The patient is willing to participate in 3 hours of therapy per day and has the potential for improvement.      Medical  Cervical Myelopathy s/p C3-4, C4-5 ACDF on 7/14/21   -Maintain postoperative spinal precautions. No need for cervical collar.  -Follow-up with neurosurgery scheduled for 7/28        Fever 2/2 possible autonomic dysreflexia vs infection  Recent CAP infection  Leukocytosis  -Now completed course of antibiotics (Zosyn x1 in ED, Azithromycin 7/12-7/16, Ceftriaxone 7/12 - 7/17, and Cefuroxime x1 prior to  transfer to rehab)  Continues to have fevers and loose stools. WBC was stable, but increased from 13.2 yesterday to 16.1 today. CXR, lactic acid, UA, procalc, U/S of RLE and LUE are all unremarkable. No growth of blood cultures x2 at 24 hours and urine culture pending. Ordered C diff lab today given loose stools and recent antibiotic regimen. If this results as negative, will seek consultation from IM. May be a lingering infection that is now re-appearing as antibiotic regimen stopped. Will repeat labs in the am and continue to monitor closely.     -Leukocytosis thought originally to be secondary to steroids, but now suspicious for occult infection  -Monitor blood pressures as he has had some high readings. In addition with described facial flushing, will continue to monitor closely for possible noxious causes of autonomic dysreflexia  -C diff ordered, if negative will consult IM  -Monitor respiratory status, supplementary oxygen PRN.  Now weaned to room air.      PILI  -Continue CPAP with home settings     Diet: Regular consistency solids and thin liquids     Neurogenic bowel  -patient has been incontinent but states he is having more sensation and control of sphincter  -Stools have been loose therefore we will change all bowel meds to as needed including Senokot-S, MiraLAX, Dulcolax suppository. If remains incontinent consider formal bowel program  -Monitor, adjust as needed     Urinary Retention  -Davis catheter placed 7/15/21.  Plan for TOV in the next few days  -Continue Flomax 0.4 mg daily     DONOVAN and likely CKD  -Pt with limited physician follow up so baseline creatinine not known. Peak at 2.13, improved with IV hydration; Cr 1.21 on 7/20  -Avoid nephrotoxic agents, NSAIDs    Renal lesion  -Incidental finding on CT scan  -Renal ultrasound showed simple cysts of bilateral kidneys  -Follow up with PCP      Pain  -Tylenol 650 mg q4h PRN  -Oxycodone 5 mg q4h PRN and Robaxin 750 mg q6h PRN.  Wean as  able.     Steroid induced hyperglycemia, resolved  HbA1c 5.0  -Now completed course of steroids and glucose checks have been well controlled. No need for further routine checks      Acute postoperative blood loss anemia  -hemoglobin mildly low at 12.8, on 7/20 13.6. Monitor peripherally.     Thrombocytopenia  -Noted upon initial admission to the emergency room. Has been stable low 100s. Unclear if due to acute distress and infection versus possible undiagnosed liver disease.   Platelets 103 07/20  -Monitor peripherally, follow-up with primary care physician.      Psych  -Monitor mood, will consult health psychology if needed           Social/Dispo  -Anticipate discharge home at a modified independent level for ambulation, mobility, and ADLs.   -ELOS: 14 days  -Rehab prognosis: Good  -Follow up appointments: Abnormal appearance of liver as seen on CAT scan with slight nodularity inferiorly. LFTs within normal limits (Follow-up primary care physician), NSGY (Dr. Hanley)     DVT prophylaxis  -PCDs and ambulation    Code status: Full Code (Discussed with patient upon admission)      Teri Jordan MD  Physical Medicine & Rehabilitation

## 2021-07-20 NOTE — PLAN OF CARE
FOCUS/GOAL  Bowel management, Bladder management, Pain management and Cognition/Memory/Judgment/Problem solving    ASSESSMENT, INTERVENTIONS AND CONTINUING PLAN FOR GOAL:  Pt is alert and oriented, sleeping well using CPAP overnight, torres in place and patent, incontinent of stool, able to reposition in bed, assist of 1 squat pivot to transfer, no further care concerns at this time continue with POC.

## 2021-07-20 NOTE — PLAN OF CARE
PT: Attempted to see pt for scheduled time of 1630. Pt informs author that he just recently had a BM and is waiting for nursing to take a stool sample and therapy will have to be held.     - 30 minutes.

## 2021-07-21 ENCOUNTER — APPOINTMENT (OUTPATIENT)
Dept: OCCUPATIONAL THERAPY | Facility: CLINIC | Age: 71
DRG: 052 | End: 2021-07-21
Attending: PHYSICAL MEDICINE & REHABILITATION
Payer: COMMERCIAL

## 2021-07-21 ENCOUNTER — APPOINTMENT (OUTPATIENT)
Dept: PHYSICAL THERAPY | Facility: CLINIC | Age: 71
DRG: 052 | End: 2021-07-21
Attending: PHYSICAL MEDICINE & REHABILITATION
Payer: COMMERCIAL

## 2021-07-21 LAB
ALBUMIN SERPL-MCNC: 2.3 G/DL (ref 3.4–5)
ALBUMIN SERPL-MCNC: 2.5 G/DL (ref 3.4–5)
ALP SERPL-CCNC: 78 U/L (ref 40–150)
ALP SERPL-CCNC: 86 U/L (ref 40–150)
ALT SERPL W P-5'-P-CCNC: 39 U/L (ref 0–70)
ALT SERPL W P-5'-P-CCNC: 41 U/L (ref 0–70)
ANION GAP SERPL CALCULATED.3IONS-SCNC: 2 MMOL/L (ref 3–14)
ANION GAP SERPL CALCULATED.3IONS-SCNC: <1 MMOL/L (ref 3–14)
AST SERPL W P-5'-P-CCNC: ABNORMAL U/L
AST SERPL W P-5'-P-CCNC: ABNORMAL U/L
BASOPHILS # BLD AUTO: 0 10E3/UL (ref 0–0.2)
BASOPHILS NFR BLD AUTO: 0 %
BILIRUB SERPL-MCNC: 1 MG/DL (ref 0.2–1.3)
BILIRUB SERPL-MCNC: 1.2 MG/DL (ref 0.2–1.3)
BUN SERPL-MCNC: 23 MG/DL (ref 7–30)
BUN SERPL-MCNC: 24 MG/DL (ref 7–30)
CALCIUM SERPL-MCNC: 8.2 MG/DL (ref 8.5–10.1)
CALCIUM SERPL-MCNC: 8.5 MG/DL (ref 8.5–10.1)
CHLORIDE BLD-SCNC: 111 MMOL/L (ref 94–109)
CHLORIDE BLD-SCNC: 112 MMOL/L (ref 94–109)
CO2 SERPL-SCNC: 26 MMOL/L (ref 20–32)
CO2 SERPL-SCNC: 27 MMOL/L (ref 20–32)
CREAT SERPL-MCNC: 1.31 MG/DL (ref 0.66–1.25)
CREAT SERPL-MCNC: 1.34 MG/DL (ref 0.66–1.25)
CRP SERPL-MCNC: 100 MG/L (ref 0–8)
EOSINOPHIL # BLD AUTO: 0.5 10E3/UL (ref 0–0.7)
EOSINOPHIL NFR BLD AUTO: 3 %
ERYTHROCYTE [DISTWIDTH] IN BLOOD BY AUTOMATED COUNT: 12.1 % (ref 10–15)
GFR SERPL CREATININE-BSD FRML MDRD: 53 ML/MIN/1.73M2
GFR SERPL CREATININE-BSD FRML MDRD: 54 ML/MIN/1.73M2
GLUCOSE BLD-MCNC: 83 MG/DL (ref 70–99)
GLUCOSE BLD-MCNC: 85 MG/DL (ref 70–99)
HCT VFR BLD AUTO: 41.6 % (ref 40–53)
HGB BLD-MCNC: 13.9 G/DL (ref 13.3–17.7)
IMM GRANULOCYTES # BLD: 0.1 10E3/UL
IMM GRANULOCYTES NFR BLD: 1 %
LYMPHOCYTES # BLD AUTO: 1.4 10E3/UL (ref 0.8–5.3)
LYMPHOCYTES NFR BLD AUTO: 8 %
MCH RBC QN AUTO: 33.1 PG (ref 26.5–33)
MCHC RBC AUTO-ENTMCNC: 33.4 G/DL (ref 31.5–36.5)
MCV RBC AUTO: 99 FL (ref 78–100)
MONOCYTES # BLD AUTO: 1.2 10E3/UL (ref 0–1.3)
MONOCYTES NFR BLD AUTO: 7 %
NEUTROPHILS # BLD AUTO: 13.6 10E3/UL (ref 1.6–8.3)
NEUTROPHILS NFR BLD AUTO: 81 %
NRBC # BLD AUTO: 0 10E3/UL
NRBC BLD AUTO-RTO: 0 /100
PLATELET # BLD AUTO: 116 10E3/UL (ref 150–450)
POTASSIUM BLD-SCNC: 5 MMOL/L (ref 3.4–5.3)
POTASSIUM BLD-SCNC: 5.4 MMOL/L (ref 3.4–5.3)
PROT SERPL-MCNC: 6.4 G/DL (ref 6.8–8.8)
PROT SERPL-MCNC: 7 G/DL (ref 6.8–8.8)
RBC # BLD AUTO: 4.2 10E6/UL (ref 4.4–5.9)
SODIUM SERPL-SCNC: 139 MMOL/L (ref 133–144)
SODIUM SERPL-SCNC: 139 MMOL/L (ref 133–144)
WBC # BLD AUTO: 16.8 10E3/UL (ref 4–11)

## 2021-07-21 PROCEDURE — 99223 1ST HOSP IP/OBS HIGH 75: CPT | Performed by: INTERNAL MEDICINE

## 2021-07-21 PROCEDURE — 97530 THERAPEUTIC ACTIVITIES: CPT | Mod: GP

## 2021-07-21 PROCEDURE — 250N000013 HC RX MED GY IP 250 OP 250 PS 637: Performed by: PHYSICAL MEDICINE & REHABILITATION

## 2021-07-21 PROCEDURE — 36415 COLL VENOUS BLD VENIPUNCTURE: CPT | Performed by: INTERNAL MEDICINE

## 2021-07-21 PROCEDURE — 87040 BLOOD CULTURE FOR BACTERIA: CPT | Performed by: INTERNAL MEDICINE

## 2021-07-21 PROCEDURE — 128N000003 HC R&B REHAB

## 2021-07-21 PROCEDURE — 250N000011 HC RX IP 250 OP 636: Performed by: INTERNAL MEDICINE

## 2021-07-21 PROCEDURE — 97110 THERAPEUTIC EXERCISES: CPT | Mod: GP

## 2021-07-21 PROCEDURE — 99232 SBSQ HOSP IP/OBS MODERATE 35: CPT | Mod: 24 | Performed by: PHYSICAL MEDICINE & REHABILITATION

## 2021-07-21 PROCEDURE — 86140 C-REACTIVE PROTEIN: CPT | Performed by: PHYSICAL MEDICINE & REHABILITATION

## 2021-07-21 PROCEDURE — 36415 COLL VENOUS BLD VENIPUNCTURE: CPT | Performed by: PHYSICAL MEDICINE & REHABILITATION

## 2021-07-21 PROCEDURE — 82247 BILIRUBIN TOTAL: CPT | Performed by: PHYSICAL MEDICINE & REHABILITATION

## 2021-07-21 PROCEDURE — 97535 SELF CARE MNGMENT TRAINING: CPT | Mod: GO

## 2021-07-21 PROCEDURE — 85025 COMPLETE CBC W/AUTO DIFF WBC: CPT | Performed by: PHYSICAL MEDICINE & REHABILITATION

## 2021-07-21 PROCEDURE — 84155 ASSAY OF PROTEIN SERUM: CPT | Performed by: PHYSICAL MEDICINE & REHABILITATION

## 2021-07-21 RX ORDER — SACCHAROMYCES BOULARDII 250 MG
250 CAPSULE ORAL 2 TIMES DAILY
Status: DISCONTINUED | OUTPATIENT
Start: 2021-07-21 | End: 2021-07-26

## 2021-07-21 RX ORDER — LIDOCAINE 40 MG/G
CREAM TOPICAL
Status: DISCONTINUED | OUTPATIENT
Start: 2021-07-21 | End: 2021-07-24

## 2021-07-21 RX ADMIN — METHOCARBAMOL 750 MG: 750 TABLET ORAL at 21:01

## 2021-07-21 RX ADMIN — Medication 250 MG: at 21:01

## 2021-07-21 RX ADMIN — TAMSULOSIN HYDROCHLORIDE 0.4 MG: 0.4 CAPSULE ORAL at 08:26

## 2021-07-21 RX ADMIN — ACETAMINOPHEN 650 MG: 325 TABLET, FILM COATED ORAL at 11:02

## 2021-07-21 RX ADMIN — ACETAMINOPHEN 650 MG: 325 TABLET, FILM COATED ORAL at 15:51

## 2021-07-21 RX ADMIN — Medication 250 MG: at 08:25

## 2021-07-21 RX ADMIN — CEFEPIME HYDROCHLORIDE 2 G: 2 INJECTION, POWDER, FOR SOLUTION INTRAVENOUS at 17:54

## 2021-07-21 NOTE — DISCHARGE INSTRUCTIONS
Follow up in the Neurosurgery Clinic 07/28/2021, 08/25/2021 and 10/22/2021 (per neurosurgery).   Follow-up with PCP 1 week following discharge.

## 2021-07-21 NOTE — PLAN OF CARE
Discharge Planner Post-Acute Rehab OT:      Discharge Plan: home with wife home vs OP therapy     Precautions: spinal, fall     Current Status:  ADLs-  G/H: set up   UB dressing:  Mod A don pull over shirt   LB dressing: mod A w/AE  IADLs: tbd  Vision/Cognition: wfl at eval assess as needed     Assessment:  therapist faciliated ADL routine with training for compensatory strategies. Pt supine to sit EOB SBA, instructed pt in reacher for donning shorts, pt able to complete with Mod A when standing to pull over hips w/fww. Instructed pt in use of sock aide, pt donned with Min A for RLE while seated EOB. Pt CGA w/fww for SPT bed to w/c. From w/c pt completed seated g/h tasks at sink with compensatory positioning techniques for decreased BUE shld. strength during tasks. Pt Mod A to don pull over shirt w/cues for technique. Fatigued with tasks, requires PRN rest breaks.     2nd OT session cancelled, -45 min d/t pt fever increased to 101.2 and pt c/o not feeling well or up to it.       Other Barriers to Discharge (DME, Family Training, etc): tbd

## 2021-07-21 NOTE — PLAN OF CARE
FOCUS/GOAL  Medical management    ASSESSMENT, INTERVENTIONS AND CONTINUING PLAN FOR GOAL:  Temp 100.3 this morning, temp increased to 101.2 around 11:00.  Provider informed. Patient has internal Medicine consult order in. Waiting for the specialist to came visit.He received 650 Tylenol. Result TBD. Reported increased sediment in urine bag need change?. Denied pain at this moment. Will continue with POC.

## 2021-07-21 NOTE — PLAN OF CARE
Discharge Planner Post-Acute Rehab PT:      Discharge Plan: Home with 3 JOHNSON with bilat handrail. Home care vs OP PT pending progress.      Precautions: falls, cervical     Current Status:  Bed Mobility: SBA supine<>sit  Transfer: SBA squat pivot transfer. Variable CGA<>min A for STS from EOB, WC  Gait: up to 10 ft with FWW, CGA, WC follow. Significantly decreased gait speed and stride length, with forward flexed posture  Stairs: NT  Balance: good sitting balance, needs heavy UE support in standing     Assessment:  More fatigued today with ongoing fever. Pt engaged in NuStep for strengthening, but tolerance was limited d/t not feeling well.      Other Barriers to Discharge (DME, Family Training, etc): JOHNSON home, family training, potential mixed mobility FWW vs WC

## 2021-07-21 NOTE — PLAN OF CARE
Pt alert and oriented x 4; on regular diet and thin liquids ate 25%; torres catheter in place, patent with clear/light yellow urine. Incontinent of bowel, stool specimen sent to lab for C. Diff awaiting result. With elevated temperatures ranging from 100.3 to 101.8, PRN Tylenol given with slight drop in temperature. Cold compress applied to forehead. With slight pain/soreness on back of neck. Another PRN Tylenol given at 2249 as well as PRN Robaxin (per pt request). CPAP on.

## 2021-07-21 NOTE — PROGRESS NOTES
"  [facility]   Acute Rehabilitation Unit  Daily progress note    INTERVAL HISTORY  Continues to spike fevers overnight, Tmax 101.8.  He does feel hot and diaphoretic during these times, and also does not have an appetite.  He otherwise denies chest pain, shortness of breath, or abdominal pain.  Neck pain is overall improving since his surgery.  C. Diff sent yesterday and was negative.  Will consult ID today for further assistance which was discussed with him.  Functionally he is SBA for squat pivot transfers, variable STS transfers, ambulating up to 10 ft with a FWW.      MEDICATIONS  Scheduled meds    saccharomyces boulardii  250 mg Oral BID     tamsulosin  0.4 mg Oral Daily       PRN meds:  acetaminophen, bisacodyl, methocarbamol, naloxone **OR** naloxone **OR** naloxone **OR** naloxone, ondansetron, oxyCODONE, polyethylene glycol, senna-docusate      PHYSICAL EXAM  /66 (BP Location: Right arm)   Pulse 70   Temp 100.3  F (37.9  C) (Oral)   Resp 18   Ht 1.803 m (5' 11\")   Wt 116.6 kg (257 lb)   SpO2 92%   BMI 35.84 kg/m    Gen: cooperative, alert, obese, skin not flushed at this time  HEENT: atraumatic, anterior cervical surgical incision covered with steri-strips and bandage, slightly tender with no exudate from bandage  Cardio: RRR, S1 and S2 present, no murmurs auscultated  Pulm: Non-labored breathing, clear to auscultation b/l  Abd: Soft, non-distended, non-tender, Hyperactive bowel sounds present  Ext: Trace pitting edema in LE b/l, no calf tenderness, +2 edema in LUE from hand to above elbow  Neuro/MSK: A&O x3, EOM intact, no slurring of speech     LABS  Results for orders placed or performed during the hospital encounter of 07/18/21 (from the past 24 hour(s))   Clostridium difficile toxin B PCR    Specimen: Per Rectum; Stool   Result Value Ref Range    C Difficile Toxin B by PCR Negative Negative    Narrative    The [employer] Xpert C. difficile Assay, performed " on the [employer], is a qualitative in vitro diagnostic test for rapid detection of toxin B gene sequences from unformed (liquid or soft) stool specimens collected from patients suspected of having Clostridioides difficile infection (CDI). The test utilizes automated real-time polymerase chain reaction (PCR) to detect toxin gene sequences associated with toxin producing C. difficile. The Xpert C. difficile Assay is intended as an aid in the diagnosis of CDI.   Lactic Acid STAT   Result Value Ref Range    Lactic Acid 1.4 0.7 - 2.0 mmol/L   CBC with Platelets & Differential    Narrative    The following orders were created for panel order CBC with Platelets & Differential.  Procedure                               Abnormality         Status                     ---------                               -----------         ------                     CBC with platelets and d...[[id number]]  Abnormal            Final result                 Please view results for these tests on the individual orders.   CBC with platelets and differential   Result Value Ref Range    WBC Count 16.7 (H) 4.0 - 11.0 10e3/uL    RBC Count 3.83 (L) 4.40 - 5.90 10e6/uL    Hemoglobin 12.9 (L) 13.3 - 17.7 g/dL    Hematocrit 37.6 (L) 40.0 - 53.0 %    MCV 98 78 - 100 fL    MCH 33.7 (H) 26.5 - 33.0 pg    MCHC 34.3 31.5 - 36.5 g/dL    RDW 12.0 10.0 - 15.0 %    Platelet Count 102 (L) 150 - 450 10e3/uL    % Neutrophils 80 %    % Lymphocytes 8 %    % Monocytes 8 %    % Eosinophils 3 %    % Basophils 0 %    % Immature Granulocytes 1 %    NRBCs per 100 WBC 0 <1 /100    Absolute Neutrophils 13.3 (H) 1.6 - 8.3 10e3/uL    Absolute Lymphocytes 1.3 0.8 - 5.3 10e3/uL    Absolute Monocytes 1.4 (H) 0.0 - 1.3 10e3/uL    Absolute Eosinophils 0.6 0.0 - 0.7 10e3/uL    Absolute Basophils 0.0 0.0 - 0.2 10e3/uL    Absolute Immature Granulocytes 0.2 (H) <=0.0 10e3/uL    Absolute NRBCs 0.0 10e3/uL   CRP inflammation   Result Value Ref Range    CRP  Inflammation 100.0 (H) 0.0 - 8.0 mg/L   Comprehensive metabolic panel   Result Value Ref Range    Sodium 139 133 - 144 mmol/L    Potassium 5.4 (H) 3.4 - 5.3 mmol/L    Chloride 112 (H) 94 - 109 mmol/L    Carbon Dioxide (CO2) 27 20 - 32 mmol/L    Anion Gap <1 (L) 3 - 14 mmol/L    Urea Nitrogen 23 7 - 30 mg/dL    Creatinine 1.34 (H) 0.66 - 1.25 mg/dL    Calcium 8.2 (L) 8.5 - 10.1 mg/dL    Glucose 85 70 - 99 mg/dL    Alkaline Phosphatase 78 40 - 150 U/L    AST      ALT 39 0 - 70 U/L    Protein Total 6.4 (L) 6.8 - 8.8 g/dL    Albumin 2.3 (L) 3.4 - 5.0 g/dL    Bilirubin Total 1.0 0.2 - 1.3 mg/dL    GFR Estimate 53 (L) >60 mL/min/1.73m2   CBC with platelets differential    Narrative    The following orders were created for panel order CBC with platelets differential.  Procedure                               Abnormality         Status                     ---------                               -----------         ------                     CBC with platelets and d...[[id number]]  Abnormal            Final result                 Please view results for these tests on the individual orders.   CBC with platelets and differential   Result Value Ref Range    WBC Count 16.8 (H) 4.0 - 11.0 10e3/uL    RBC Count 4.20 (L) 4.40 - 5.90 10e6/uL    Hemoglobin 13.9 13.3 - 17.7 g/dL    Hematocrit 41.6 40.0 - 53.0 %    MCV 99 78 - 100 fL    MCH 33.1 (H) 26.5 - 33.0 pg    MCHC 33.4 31.5 - 36.5 g/dL    RDW 12.1 10.0 - 15.0 %    Platelet Count 116 (L) 150 - 450 10e3/uL    % Neutrophils 81 %    % Lymphocytes 8 %    % Monocytes 7 %    % Eosinophils 3 %    % Basophils 0 %    % Immature Granulocytes 1 %    NRBCs per 100 WBC 0 <1 /100    Absolute Neutrophils 13.6 (H) 1.6 - 8.3 10e3/uL    Absolute Lymphocytes 1.4 0.8 - 5.3 10e3/uL    Absolute Monocytes 1.2 0.0 - 1.3 10e3/uL    Absolute Eosinophils 0.5 0.0 - 0.7 10e3/uL    Absolute Basophils 0.0 0.0 - 0.2 10e3/uL    Absolute Immature Granulocytes 0.1 (H) <=0.0 10e3/uL    Absolute NRBCs 0.0 10e3/uL        ASSESSMENT AND PLAN    Rigo Johns is a 70 year old L hand dominant male with a PMH of HTN, PILI, and prior cervical surgery (C5-7 anterior fusion in 1993 per notes) who is now status post a C3-4, C4-5 ACDF on 7/14/2021 for cervical myelopathy. Hospital course has been complicated by community-acquired pneumonia status post antibiotics, DONOVAN, urinary retention requiring a Davis catheter, steroid induced hyperglycemia, thrombocytopenia, and steroid induced leukocytosis.      Impairment group code: 04.1211 Quadriplegia, Incomplete C1-4 - s/p C3-5 ACDF.     PLAN  Rehabilitation     1. PT and OT 90 minutes of each on a daily basis, in addition to rehab nursing and close management of physiatrist.       2. Impairment of ADL's: Noted to have impaired ROM, impaired strength, impaired activity tolerance, edema management, impaired balance, and impaired coordination, all affecting his ability to safely and independently perform basic ADLs.  Goal for mod I with basic ADLs.     3. Impairment of mobility:  Noted to have impaired ROM, impaired strength, impaired activity tolerance, edema management, impaired balance and impaired coordination, all affecting his ability to safely and independently ambulate and perform basic mobility.  Goal for mod I with basic mobility.     Medical  Cervical Myelopathy s/p C3-4, C4-5 ACDF on 7/14/21   -Maintain postoperative spinal precautions. No need for cervical collar.  -Follow-up with neurosurgery scheduled for 7/28        Fever 2/2 possible autonomic dysreflexia vs infection  Recent CAP infection  Leukocytosis  -Now completed course of antibiotics (Zosyn x1 in ED, Azithromycin 7/12-7/16, Ceftriaxone 7/12 - 7/17, and Cefuroxime x1 prior to transfer to rehab)  Continues to have fevers and loose stools. WBC increased to 16.8, and CRP increased to 100. CXR, lactic acid, UA, procalc, C. Diff, and U/S of RLE and LUE are all unremarkable. No growth of blood cultures x2 at 24 hours  -Will  consult infectious disease for further assistance regarding fever of unknown origin  -Monitor blood pressures as he has had some high readings. In addition with described facial flushing, will continue to monitor closely for possible noxious causes of autonomic dysreflexia  -Monitor respiratory status, supplementary oxygen PRN.  Now weaned to room air.      PILI  -Continue CPAP with home settings     Diet: Regular consistency solids and thin liquids     Neurogenic bowel  -patient has been incontinent but states he is having more sensation and control of sphincter  -Stools have been loose therefore we will change all bowel meds to as needed including Senokot-S, MiraLAX, Dulcolax suppository. If remains incontinent consider formal bowel program  -Monitor, adjust as needed     Urinary Retention  -Davis catheter placed 7/15/21.  Plan for TOV in the next few days  -Continue Flomax 0.4 mg daily     DONOVAN and likely CKD  -Pt with limited physician follow up so baseline creatinine not known. Peak at 2.13, improved with IV hydration; Cr 1.31 on 7/21 which is overall stable  -Avoid nephrotoxic agents, NSAIDs    Renal lesion  -Incidental finding on CT scan  -Renal ultrasound showed simple cysts of bilateral kidneys  -Follow up with PCP      Pain  -Tylenol 650 mg q4h PRN  -Oxycodone 5 mg q4h PRN and Robaxin 750 mg q6h PRN.  Wean as able.     Steroid induced hyperglycemia, resolved  HbA1c 5.0  -Now completed course of steroids and glucose checks have been well controlled. No need for further routine checks      Acute postoperative blood loss anemia  -hemoglobin 13.9 on 7/21.      Thrombocytopenia  -Noted upon initial admission to the emergency room. Has been stable low 100s. Unclear if due to acute distress and infection versus possible undiagnosed liver disease.   Platelets 116 7/21 (Prior 102)  -Monitor peripherally, follow-up with primary care physician.      Psych  -Monitor mood, will consult health psychology if  needed           Social/Dispo  -Anticipate discharge home at a modified independent level for ambulation, mobility, and ADLs.   -ELOS: Tentative discharge date 8/9/21  -Rehab prognosis: Good  -Follow up appointments: Abnormal appearance of liver as seen on CAT scan with slight nodularity inferiorly. LFTs within normal limits (Follow-up primary care physician), NSGY (Dr. Hanley)     DVT prophylaxis  -PCDs and ambulation    Code status: Full Code (Discussed with patient upon admission)    Robi Whitehead MD  Department of Rehabilitation Medicine    Time Spent on this Encounter   I, Robi Whitehead, spent a total of 25 minutes face-to-face or managing the care of Rigo Johns. Over 50% of my time on the unit was spent counseling the patient and coordinating care. See note for details.

## 2021-07-21 NOTE — DISCHARGE SUMMARY
[facility]   Acute Rehabilitation Unit  Discharge summary     Date of Admission: 7/18/2021  Date of Discharge: 07/27/21  Disposition: [facility]   Primary Care Physician: Stowe Shaji, [facility]-  Attending physician: Luis Whitehead MD  Other significant physician provider(s): Dr. Teri Jordan (resident on team) and Dr. Angela Lucio (ID consult)      discharge diagnosis    - 04.1211 Quadriplegia, Incomplete C1-4 - s/p C3-5 ACDF    - Came to ACU status post a C3-4, C4-5 ACDF on 7/14/2021 for cervical myelopathy. Patient had impairments in strength, range of motion, balance, and endurance, which are leading to activity limitations in ambulation, mobility, and ADLs.    - Other medical concerns:  HTN, PILI, recent diagnosis of CAP, neurogenic bowel, fevers, urinary retention, DONOVAN and likely CKD, leukocytosis, thrombocytopenia, and a renal lesion.      brief summary per HPI  Rigo Johns is a 70 year old L hand dominant male with a PMH of HTN, PILI, and prior cervical surgery (C5-7 anterior fusion in 1993 per notes) who initially presented to the ED at [facility] on 7/12/21 with dyspnea and acute onset of neck and shoulder pain, and bilateral arm weakness.  A CT of the chest demonstrated bibasilar ATX vs. Infiltrate, and he was started on Zosyn for likely PNA.  A CT of the C-spine was also done and showed central stenosis at the C3-4 and C4-5 levels, with a subsequent MRI confirming this and also demonstrating severe b/l foraminal stenosis and abnormal T2 signal bilaterally, L>R.  He was started on IV steroids and transferred to [facility] for a C3-4, C4-5 ACDF which occurred on 7/14/21 by Dr. Hanlye at [facility].  Hospital course was been complicated by DONOVAN with improvement with IV fluids, urinary retention with a Davis currently in place, steroid induced hyperglycemia, thrombocytopenia, and steroid induced leukocytosis.        Currently overall feels well, Mr. Johns rates his pain 3/10. Denies chest pain, shortness of breath, fevers, chills, or diaphoresis but has noted some facial flushing. Denies any dysphagia and appetite has been good. Davis catheter remains in place and he has had loose stools with incontinence but feels he is getting more control of his bowels with sphincter contraction.    rehabilitaiton course  Rigo Johns was admitted to the acute inpatient rehabilitation unit on 7/18/21 where he participated in physical and occupational therapies for 90 minutes of each on a daily basis. Although he was highly motivated to participate in therapies, his course since arrival to the unit was impeded by his fevers and feeling unwell (see below). At the time of admission, his functionality was:    PT: Sup>sit with cues for logroll technique, CGA. Sit<>stand from elevated bed to FWW with modAx1. Standing balance x4 minutes with weight shift L/R .Cues for increasing equal WB through LEs. Pt reports about 60% through R, 40% through L due to L LE weakness. Small forward/backward steps within Ax2 FWW. Difficulty fully clearning L LE performing shuffling steps.  OT:  Pt sat at EOB for ~20 minutes with neuromuscular re-ed and ADL activity. Pt brushed teeth with setup, demonstrating good bilateral coordination, lifting R UE to mouth to brush teeth. Sit>supine with min A to help LE into bed.    At time of discharge his functionality improved to:    PT: Bed Mobility: SBA supine<>sit  Transfer: SBA squat pivot transfer. Variable CGA<>min A for STS from EOB, WC  Gait: CGA with WW up to 37 feet  Stairs: NT  Balance: good sitting balance, needs heavy UE support in standing  Pt w/ c/o feeling exhausted, states 15 days of poor sleep - multifactorial reasons. PT addressed positioning noc as well as sitting, adjusted C-collar. Spoke with PA and RN for minimizing noc interruptions, clustering cares, other sleep hygiene practices.   Completed  bed>wc SPT with FWW CGA.     OT: ADLs: set up at sink for g/h tasks; Mod A for UB dressing; CGA for LB dressing excluding shoes. Needs catheter mgmt and AE for LB dressing, assist for incontinence. Pt making progress with ADL, pt needs larger shirts in order to be successful in donning own shirt. Incontinence still an issue.         mEDICAL COURSE    The patient who initially presented to the ED at [facility] on 7/12/21 with dyspnea and acute onset of neck and shoulder pain, and bilateral arm weakness was found to have cervical spine stenosis s/p fusion. During that hospitalization, he had a CT of the chest in the OSH (7/12) and it demonstrated bibasilar atelectasis vs. Infiltrate, and he was started on Zosyn for likely pneumonia. A CT of the C-spine was also done on 7/12 and showed central stenosis at the C3-4 and C4-5 levels, with a subsequent MRI confirming this and also demonstrating severe b/l foraminal stenosis and abnormal T2 signal bilaterally, L>R.  He was started on IV steroids and transferred to [facility] for a C3-4, C4-5 anterior cervical discetomy and fusion which occurred on 7/14/21 by Dr. Hanley. That hospital course was complicated by DONOVAN with improvement with IV fluids, urinary retention with a Davis currently in place, steroid induced hyperglycemia, thrombocytopenia, and steroid induced leukocytosis.      He received 3 days of azithromycin (7/14-7/16) and 4 days of ceftriaxone (7/14-7/17) with transition to a single dose of cefuroxime administered on 7/18 for presumable pneumonia. His antibiotics were discontinued following discharge to his current hospitalization at the acute rehab unit on 7/18. The morning following his admission to the rehab unit (on 7/19), he spiked of fever of 101.3F and began developing an increasing white count and CRP level. He continued having fevers through the date of discharge. After initial work-up (detailed below) did not reveal a source of infection and  autonomic dysreflexia seemed a much less likely possibility, ID was consulted on 7/21/21. They recommended gathering a new set of blood cultures (prior to start of antibiotics) and cefepime 2g IV q8h which was initiated the evening of 7/21/21 and vancomycin added on 7/23 (initially only single dose vanc administered and 4 of cefepime).    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 Neurosurgery from [facility] did not recommend surgical intervention at that time, but asked to be paged if his clinical status changed. ID recommended to discontinue the antibiotics as well on 7/24 to continue and monitor the patient.     Following the fever of 101.2 F on the afternoon of 7/24, he continued to have low-grade fevers, including one of 100.3 F on the afternoon of 7/26. On the morning of 7/27, he spike another fever with a Tmax of 102.3 F in the afternoon. Updated neurosurgery at [facility] (Dr. Hanley's team) with the patient's status and they recommended to discharge patient back to their care in [facility]. While awaiting a bed, he triggered the sepsis protocol with a heart rate of 101, temp 102.3 and a lactate of 2.1. 1L lactated ringers bolus was administered. Just prior to leaving he was also started on cefepime per ID and ICU (rapid automatically called for sepsis protocol). They also recommend starting vancomycin as well, which was not initiated prior to discharge.     Notable labs include WBC 10.3 on 7/27, WBC peak 16.8 on 0721, CRP 80.0 on 7/27, CRP peak of 110 on 7/23.        The following work up was completed:  - COVID nasopharyngeal PCR 7/12: negative  - Blood cultures 7/12, 7/21 (final result: no growth), 7/25 (No growth after 2 days; final results pending), 7/27 (In process)  - CXR 7/19:   - UA 7/19: no suggestive of a UTI  - C. Diff 7/20: negative  - MRSA nasal swab: negative 7/23  -  7/23 MRI and CT spine (as above)  - 7/23 CT chest/abdomen: unremarkable aside from fluid collection as described in spine MRI and CT spine as well  - UA and UC 7/27 in process    Of note, during his hospitalization, Mr Johns was getting very poor sleep, which greatly improved with minimal interruptions by staff between 10p-6a and the addition of trazodone 50mg. He also had a barrier cream that was applied to his groin and buttocks daily for skin redness.     Additionally, during most of his hospitalization, he had a torres catheter in place for urinary retention. It was discontinued on 7/26 and he had scans with intermittent straight caths as needed for PVRs > 300mL. Flomax was also provided 0.4mg daily. He also had an DONOVAN that was resolving from his last hospitalization. His creatinine peak was 2.13 on 7/15 (before being admitted to the ARU) and improved to 1.38 on day of discharge.       Please follow-up with PCP for incidental imaging findings (as below).     Please consider discharging Mr. Johns back to our ARU for further rehab needs following his hospitalization.     dISCHARGE MEDICATIONS  Discharge Medication List as of 7/27/2021  4:59 PM      START taking these medications    Details   bisacodyl (DULCOLAX) 10 MG suppository Place 1 suppository (10 mg) rectally daily as needed for constipation, Transitional      ceFEPIme (MAXIPIME) 2 G vial Inject 2 g into the vein every 8 hours, Transitional      ondansetron (ZOFRAN) 4 MG tablet Take 1 tablet (4 mg) by mouth every 6 hours as needed for nausea or vomiting, Transitional      psyllium (METAMUCIL/KONSYL) Packet Take 1 packet by mouth daily, Transitional      saccharomyces boulardii (FLORASTOR) 250 MG capsule Take 1 capsule (250 mg) by mouth 2 times daily, Transitional      traZODone (DESYREL) 50 MG tablet Take 1 tablet (50 mg) by mouth At Bedtime, Transitional         CONTINUE these medications which have NOT CHANGED    Details   acetaminophen (TYLENOL) 325 MG  tablet Take 2 tablets (650 mg) by mouth every 6 hours as needed for mild pain or other (and adjunct with moderate or severe pain or per patient request), Transitional      melatonin 1 MG TABS tablet Take 1 tablet (1 mg) by mouth At Bedtime, Transitional      senna-docusate (SENOKOT-S/PERICOLACE) 8.6-50 MG tablet Take 1 tablet by mouth 2 times daily as needed for constipation, R-0, Transitional      tamsulosin (FLOMAX) 0.4 MG capsule Take 1 capsule (0.4 mg) by mouth daily, R-0, Transitional      methocarbamol (ROBAXIN) 750 MG tablet Take 1 tablet (750 mg) by mouth every 6 hours as needed for muscle spasms, Disp-50 tablet, R-0, Transitional      TURMERIC PO Historical         STOP taking these medications       oxyCODONE (ROXICODONE) 5 MG tablet Comments:   Reason for Stopping:                 DISCHARGE INSTRUCTIONS AND FOLLOW UP  Discharge Procedure Orders   Follow Up and recommended labs and tests   Order Comments: Please refer patient back to Acute rehab to continue rehabilitation course following hospitalization    Follow-up with primary care physician regarding:   - Abnormal appearance of liver as seen on CAT scan with slight nodularity inferiorly. LFTs within normal limits  - Incidental ultrasound and CT finding of simple cysts of bilateral kidneys     Reason for your hospital stay   Order Comments: Cervical Myelopathy s/p C3-4, C4-5 ACDF on 7/14/21     Bladder scan and straight catheterization for urinary retention   Order Comments: Monitor voids and PVRs.  IC as needed for PVRs >300 ml.     Activity - Up with nursing assistance     Order Specific Question Answer Comments   Is discharge order? Yes      Wound care (specify)   Order Comments: Site:   Buttocks  Instructions:  Barrier cream to red areas of groin and buttocks     Wound care   Order Comments: Site:   Cervical wound care  Instructions:  Per neurosurgery team     Full Code     Order Specific Question Answer Comments   Code status determined by:  Discussion with patient/ legal decision maker      Fall precautions   Order Comments: NO BED ALARMS PLEASE     Advance Diet as Tolerated   Order Comments: Follow this diet upon discharge:        Snacks/Supplements Adult: Ensure Clear; Between Meals      Combination Diet Regular Diet Adult     Order Specific Question Answer Comments   Is discharge order? Yes           physical examination    Most recent Vital Signs:   Vitals:    07/27/21 1502 07/27/21 1545 07/27/21 1546 07/27/21 1630   BP: (!) 147/69 (!) 150/79     BP Location: Left arm Left arm     Pulse: 94 92     Resp: 22 22     Temp: (!) 101.8  F (38.8  C) (!) 101.3  F (38.5  C) (!) 101.3  F (38.5  C) (!) 101.4  F (38.6  C)   TempSrc: Oral Oral     SpO2: 94% 93%     Weight:       Height:           Gen: cooperative, alert, obese, slightly diaphoretic  HEENT: atraumatic, anterior cervical surgical incision covered with steri-strips and bandage; c/d/i  Cardio: Tachycardic, normal rhythm, no murmurs auscultated  Pulm: Non-labored breathing, CTAB  Abd: Non-disteded, bowel sounds present, non-tender to palpation  Ext: Trace pitting edema in LE b/l, no calf tenderness, +edema in LUE from hand to above elbow, compression glove in place  Neuro/MSK: A&O x3, EOM intact, no slurring of speech       Discharge summary was forwarded to [facility]- (PCP) at the time of discharge, so as to bridge from hospital to outpatient care.     It was our pleasure to care for Rigo Johns during this hospitalization. Please do not hesitate to contact me should there be questions regarding the hospital course or discharge plan.        Teri Jordan MD  Physical Medicine & Rehabilitation

## 2021-07-21 NOTE — PLAN OF CARE
Individualized Overall Plan Of Care (IOPOC)      Rehab diagnosis/Impairment Group Code: Spinal cord dysfunction spinal non-traumatic 04.1211 quadriplegia, incomplete c1-4 - s/p c3-5 acdf.   Cervical myelopathy (h)       Expected functional outcome: Mod I for ambulation, mobility, ADLs    Clinical Impression Comments: Motivated, participating well, but ongoing fever    Mobility: pt below baseline will benefit from skilled PT to maximize independence with functional mobility, decrease care giver burden, skilled assessment/planning for DME/A.D, decision for FWW vs wc vs mixed mobility for safe household and community mobility.     ADL: pt silver benift from ot following decreae b u/e strength  resulting in  FVSD for a C3-4, C4-5 ACDF which occurred on 7/14/21 pt will benifit from ot per ot goals    Communication/Cognition/Swallow:       Intensity of therapy:   PT 90 minutes, Daily, for 3 weeks  OT 90 minutes, Daily, for 3weeks    Orthotics None  Education wound care  Neuropsychology Testing: No  Other:  None      Medical Prognosis: Good      Physician summary statement: Pt is motivated and participating with therapies, but has ongoing fevers of unclear etiology.  ID will be consulted.      Discharge destination: prior home  Discharge rehabilitation needs: home care, RN, PT and OT      Estimated length of stay: 3 weeks      Rehabilitation Physician Luis Whitehead MD

## 2021-07-21 NOTE — CONSULTS
GENERAL ID SERVICE: NEW CONSULTATION  Patient:  Rigo Johns, Date of birth 1950, Medical record number [id number]  Date of Admission: 7/18/2021  Date of Visit:  7/21/2021  Requesting Provider: Desmond Whitehead         Assessment and Recommendations:   Problem List:    # Fever and CRP elevation    # Spinal stenosis s/p C3-4, C4-5 anterior cervical discetomy and fusion which occurred on 7/14/21 by Dr. Hanley    Discussion:    Mr. Rigo Johns is a 70 year old  Male with PMHx of HTN, PILI, and prior cervical surgery (C5-7 anterior fusion in 1993 per notes) who initially presented to the ED at [facility] on 7/12/21 with dyspnea and acute onset of neck and shoulder pain, and bilateral arm weakness found to have cervical spine stenosis s/p fusion. Now we are consulted because of fever, leukocytosis and elevated CRP. Patient had a CT of the chest in the OSH (7/12) and it demonstrated bibasilar atelectasis vs. Infiltrate, and he was started on Zosyn for likely PNA in the OSH.  A CT of the C-spine was also done on 7/12 and showed central stenosis at the C3-4 and C4-5 levels, with a subsequent MRI confirming this and also demonstrating severe b/l foraminal stenosis and abnormal T2 signal bilaterally, L>R.  He was started on IV steroids and transferred to [facility] for a C3-4, C4-5 anterior cervical discetomy and fusion which occurred on 7/14/21 by Dr. Hanley.  Hospital course has been complicated by DONOVAN with improvement with IV fluids, urinary retention with a Davis currently in place, steroid induced hyperglycemia, thrombocytopenia, and steroid induced leukocytosis.      He received 3 days of azithromycin (7/14-7/16) and 4 days of ceftriaxone (7/14-7/17) with transition to cefuroxime on 7/18 (stopped on 7/19) for presumable pneumonia. On 7/19, he spike the fever (101.3 F) and since then he is developing increasing white count and CRP. C diff from 7/20 was negative. Blood cultures (7/12 and 7/19) are negative to date. COVID  "test is negative. WBC today is 16.8. LA normal. CRP is 100, pro-calcitonin is <0.05. UA showed moderated blood, 27 RBC, few bacteria, but just 2 wbcs and negative LE and nitrite.   Repeat CXR from 7/19 showed \"Streaky left midlung opacity likely represents atelectasis. Previously seen hazy opacities in the bilateral bases are improved\". US doppler of lower right extremity and upper left extremity is negative for DVT. He is on room air. On my examination today, the patient looks overall well and he is not in distress. He does have lower extremity weakness given the spinal abnormality and he does not have control or urine or bowel. He denies new cough or respiratory symptom. He denies new skin lesions. His neurosurgical incision is on his anterior neck and it is healing wel and I could not appreciate signs of infection.     It is not entirely clear the reason for the fever, however it is noted that it recurred after antibiotics were stopped. So far his blood cultures from 7/19 and 7/12 are negative. His C diff is negative and his UA is not suggestive of UTI. His surgical incision does not look infected. I will plan to repeat blood cultures today and obtain an MRSA swab. Since the fever recurred one day after antibiotics were stopped and also because of high fever and risk for hospital acquired infection, I will start cefepime empirically at this moment.       Recommendations:    1. I will start cefepime 2 grams IV q 8h empirically at this moment    2. I will order blood cultures - please obtain blood cultures before starting cefepime    3. I will continue to monitor pending cultures    4. If no clear source is found and/or if fever does not improve over the next 48h, I would recommend to repeat CT chest/abdomen and pelvis and also to perform a cervical spine CT           Thank you for this consult. The General ID team will continue to follow this patient. Please feel free to call with any questions.     Angela Linares " "MD Khadijah  Date of Service: 07/21/21  Pager: [phone]       History of Present Illness:   Mr. Rigo Johns is a 70 year old  Male with PMHx of HTN, PILI, and prior cervical surgery (C5-7 anterior fusion in 1993 per notes) who initially presented to the ED at [facility] on 7/12/21 with dyspnea and acute onset of neck and shoulder pain, and bilateral arm weakness found to have cervical spine stenosis s/p fusion. Now we are consulted because of fever, leukocytosis and elevated CRP.     Patient had a CT of the chest in the OSH (7/12) and it demonstrated bibasilar atelectasis vs. Infiltrate, and he was started on Zosyn for likely PNA in the OSH.  A CT of the C-spine was also done on 7/12 and showed central stenosis at the C3-4 and C4-5 levels, with a subsequent MRI confirming this and also demonstrating severe b/l foraminal stenosis and abnormal T2 signal bilaterally, L>R.  He was started on IV steroids and transferred to [facility] for a C3-4, C4-5 anterior cervical discetomy and fusion which occurred on 7/14/21 by Dr. Hanley.  Hospital course has been complicated by DONOVAN with improvement with IV fluids, urinary retention with a Davis currently in place, steroid induced hyperglycemia, thrombocytopenia, and steroid induced leukocytosis.      He received 3 days of azithromycin (7/14-7/16) and 4 days of ceftriaxone (7/14-7/17) with transition to cefuroxime on 7/18 (stopped on 7/19) for presumable pneumonia. On 7/19, he spike the fever (101.3 F) and since then he is developing increasing white count and CRP. C diff from 7/20 was negative. Blood cultures (7/12 and 7/19) are negative to date. COVID test is negative. WBC today is 16.8. LA normal. CRP is 100, pro-calcitonin is <0.05. UA showed moderated blood, 27 RBC, few bacteria, but just 2 wbcs and negative LE and nitrite.   Repeat CXR from 7/19 showed \"Streaky left midlung opacity likely represents atelectasis. Previously seen hazy opacities in the bilateral bases are improved\". US " doppler of lower right extremity and upper left extremity is negative for DVT. He is on room air.    On my examination today, the patient looks overall well and he is not in distress. He does have lower extremity weakness given the spinal abnormality and he does not have control or urine or bowel. He denies new cough or respiratory symptom. He denies new skin lesions. His neurosurgical incision is on his anterior neck and it is healing wel and I could not appreciate signs of infection.               Review of Systems:     CONSTITUTIONAL:  See HPI  INTEGUMENTARY/SKIN: Surgical site does not have signs of infection  EYES: Negative for icterus, vision changes or irritation  ENT/MOUTH:  Negative for oral lesions and sore throat  RESPIRATORY:  Negative for cough and dyspnea  CARDIOVASCULAR:  Negative for chest pain, palpitations and  shortness of breath  GASTROINTESTINAL:  Negative for abdominal pain, nausea, vomiting, diarrhea and constipation  GENITOURINARY:  Negative for dysuria, hematuria, frequency and urgency  MUSCULOSKELETAL: Negative for joint pain, swelling, motion restriction, negative for musculoskeletal pain  NEURO:  See HPI  PSYCHIATRIC: Negative for changes in mood or affect  HEMATOLOGIC/LYMPHATIC: negative for lymphadenopathy or bleeding  ALLERGIC/IMMUNOLOGIC: Negative for allergic reaction   ENDOCRINE: Negative for temperature intolerance, skin/hair changes       Past Medical History:     Past Medical History:   Diagnosis Date     Hypertension      Sleep apnea     uses cpap         Allergies:      Allergies   Allergen Reactions     Shrimp GI Disturbance            Family History:   Reviewed and noncontributory.        Social History:     Social History     Socioeconomic History     Marital status:      Spouse name: Not on file     Number of children: Not on file     Years of education: Not on file     Highest education level: Not on file   Occupational History     Not on file   Tobacco Use      "Smoking status: Never Smoker     Smokeless tobacco: Never Used   Vaping Use     Vaping Use: Never assessed   Substance and Sexual Activity     Alcohol use: Yes     Comment: once a week     Drug use: Never     Sexual activity: Not on file   Other Topics Concern     Parent/sibling w/ CABG, MI or angioplasty before 65F 55M? Not Asked   Social History Narrative     Not on file     Social Determinants of Health     Financial Resource Strain:      Difficulty of Paying Living Expenses:    Food Insecurity:      Worried About Running Out of Food in the Last Year:      Ran Out of Food in the Last Year:    Transportation Needs:      Lack of Transportation (Medical):      Lack of Transportation (Non-Medical):    Physical Activity:      Days of Exercise per Week:      Minutes of Exercise per Session:    Stress:      Feeling of Stress :    Social Connections:      Frequency of Communication with Friends and Family:      Frequency of Social Gatherings with Friends and Family:      Attends Taoism Services:      Active Member of Clubs or Organizations:      Attends Club or Organization Meetings:      Marital Status:    Intimate Partner Violence:      Fear of Current or Ex-Partner:      Emotionally Abused:      Physically Abused:      Sexually Abused:               Physical Exam:   /66 (BP Location: Right arm)   Pulse 70   Temp 100.3  F (37.9  C) (Oral)   Resp 18   Ht 1.803 m (5' 11\")   Wt 116.6 kg (257 lb)   SpO2 92%   BMI 35.84 kg/m         Exam:  GENERAL:  Well-developed, well-nourished, not in acute distress.   HEAD: Normocephalic and atraumatic  ENT:  No hearing impairment, oral mucous membranes moist  EYES:  Eyes grossly normal to inspection, PERRL and conjunctivae and sclerae normal   LUNGS:  Clear to auscultation - no rales, rhonchi or wheezes  CARDIOVASCULAR:  Regular rate and rhythm, normal S1 S2  ABDOMEN:  Soft, nontender, no hepatosplenomegaly, no masses and bowel sounds normal  SKIN:  Surgical insusion on " the anterior neck. No signs of infection  NEUROLOGIC:  Weakness on both legs. Bowel and urinary incontinence  PSYCHIATRIC: Mood stable, mentation appears normal, affect normal             Laboratory Data:     Creatinine   Date Value Ref Range Status   07/21/2021 1.31 (H) 0.66 - 1.25 mg/dL Final   07/21/2021 1.34 (H) 0.66 - 1.25 mg/dL Final   07/20/2021 1.21 0.66 - 1.25 mg/dL Final   07/19/2021 1.32 (H) 0.66 - 1.25 mg/dL Final   07/17/2021 1.28 (H) 0.66 - 1.25 mg/dL Final     WBC Count   Date Value Ref Range Status   07/21/2021 16.8 (H) 4.0 - 11.0 10e3/uL Final   07/20/2021 16.7 (H) 4.0 - 11.0 10e3/uL Final   07/20/2021 16.1 (H) 4.0 - 11.0 10e3/uL Final   07/19/2021 13.2 (H) 4.0 - 11.0 10e3/uL Final   07/19/2021 12.4 (H) 4.0 - 11.0 10e3/uL Final     Hemoglobin   Date Value Ref Range Status   07/21/2021 13.9 13.3 - 17.7 g/dL Final     Platelet Count   Date Value Ref Range Status   07/21/2021 116 (L) 150 - 450 10e3/uL Final     Lab Results   Component Value Date     07/21/2021    BUN 24 07/21/2021    CO2 26 07/21/2021     CRP Inflammation   Date Value Ref Range Status   07/21/2021 100.0 (H) 0.0 - 8.0 mg/L Final   07/20/2021 74.0 (H) 0.0 - 8.0 mg/L Final   07/19/2021 64.0 (H) 0.0 - 8.0 mg/L Final               Imaging:     Recent Results (from the past 48 hour(s))   XR Chest 2 Views    Narrative    EXAM: XR CHEST 2 VW  7/19/2021 11:56 AM      HISTORY: fever, recent hx of PNA    COMPARISON: CT dated 7/12/2021    FINDINGS: Two views of the chest. Stable rightward tracheal deviation.  Cardiomediastinal silhouette is within normal limits. Hazy bibasilar  opacity seen on prior chest x-ray are improved. Streaky left midlung  opacity likely represents atelectasis. No pleural effusion or  pneumothorax.      Impression    IMPRESSION:  Streaky left midlung opacity likely represents atelectasis. Previously  seen hazy opacities in the bilateral bases are improved.    I have personally reviewed the examination and initial  interpretation  and I agree with the findings.    CORY CEBALLOS MD         SYSTEM ID:  [id number]   US Venous upper extremity LT ([facility] only)    Narrative    EXAMINATION: US UPPER EXTREMITY VENOUS DUPLEX LEFT  7/19/2021 5:27 PM       CLINICAL HISTORY: Fever, LUE swelling, r/out DVT    COMPARISON: None        PROCEDURE COMMENTS: Ultrasound was performed of the deep venous system  of the left upper extremity using grayscale, color, and spectral  Doppler.    FINDINGS:  The internal jugular, brachiocephalic, subclavian, brachial, basilic  and cephalic veins are visualized and are patent. Venous waveforms are  normal. There is normal response to compression. Mild subcutaneous  edema seen throughout the left forearm and wrist.        Impression    IMPRESSION:  No deep venous thrombosis in the left upper extremity.    ELHAM RIBEIRO MD         SYSTEM ID:  [id number]

## 2021-07-21 NOTE — PLAN OF CARE
FOCUS/GOAL  Bladder management, Pain management, Cognition/Memory/Judgment/Problem solving, and Safety management    ASSESSMENT, INTERVENTIONS AND CONTINUING PLAN FOR GOAL:  Pt is alert and oriented. VSS. Davis catheter is patent and intact, draining well. Denied pain or discomfort this shift. Reports sleeping well this shift. Uses call light appropriately, able to make needs known. Will continue with POC.  Lab phoned at 0650 to report chemistry sample was slightly hemolyzed thus resulting in slightly elevated K+ (5.4) and unable to process the AST.   0724: paged Dr. Whitehead regarding labs. Awaiting return call.

## 2021-07-22 ENCOUNTER — APPOINTMENT (OUTPATIENT)
Dept: PHYSICAL THERAPY | Facility: CLINIC | Age: 71
DRG: 052 | End: 2021-07-22
Attending: PHYSICAL MEDICINE & REHABILITATION
Payer: COMMERCIAL

## 2021-07-22 ENCOUNTER — APPOINTMENT (OUTPATIENT)
Dept: OCCUPATIONAL THERAPY | Facility: CLINIC | Age: 71
DRG: 052 | End: 2021-07-22
Attending: PHYSICAL MEDICINE & REHABILITATION
Payer: COMMERCIAL

## 2021-07-22 LAB
BASOPHILS # BLD AUTO: 0 10E3/UL (ref 0–0.2)
BASOPHILS NFR BLD AUTO: 0 %
CRP SERPL-MCNC: 110 MG/L (ref 0–8)
EOSINOPHIL # BLD AUTO: 0.4 10E3/UL (ref 0–0.7)
EOSINOPHIL NFR BLD AUTO: 3 %
ERYTHROCYTE [DISTWIDTH] IN BLOOD BY AUTOMATED COUNT: 12 % (ref 10–15)
HCT VFR BLD AUTO: 37.5 % (ref 40–53)
HGB BLD-MCNC: 12.4 G/DL (ref 13.3–17.7)
IMM GRANULOCYTES # BLD: 0.2 10E3/UL
IMM GRANULOCYTES NFR BLD: 1 %
LYMPHOCYTES # BLD AUTO: 1.4 10E3/UL (ref 0.8–5.3)
LYMPHOCYTES NFR BLD AUTO: 13 %
MCH RBC QN AUTO: 32.7 PG (ref 26.5–33)
MCHC RBC AUTO-ENTMCNC: 33.1 G/DL (ref 31.5–36.5)
MCV RBC AUTO: 99 FL (ref 78–100)
MONOCYTES # BLD AUTO: 1.1 10E3/UL (ref 0–1.3)
MONOCYTES NFR BLD AUTO: 10 %
NEUTROPHILS # BLD AUTO: 8.2 10E3/UL (ref 1.6–8.3)
NEUTROPHILS NFR BLD AUTO: 73 %
NRBC # BLD AUTO: 0 10E3/UL
NRBC BLD AUTO-RTO: 0 /100
PLATELET # BLD AUTO: 106 10E3/UL (ref 150–450)
RBC # BLD AUTO: 3.79 10E6/UL (ref 4.4–5.9)
WBC # BLD AUTO: 11.3 10E3/UL (ref 4–11)

## 2021-07-22 PROCEDURE — 99232 SBSQ HOSP IP/OBS MODERATE 35: CPT | Mod: 24 | Performed by: PHYSICAL MEDICINE & REHABILITATION

## 2021-07-22 PROCEDURE — 97116 GAIT TRAINING THERAPY: CPT | Mod: GP | Performed by: PHYSICAL THERAPIST

## 2021-07-22 PROCEDURE — 97530 THERAPEUTIC ACTIVITIES: CPT | Mod: GP

## 2021-07-22 PROCEDURE — 128N000003 HC R&B REHAB

## 2021-07-22 PROCEDURE — 250N000013 HC RX MED GY IP 250 OP 250 PS 637: Performed by: PHYSICAL MEDICINE & REHABILITATION

## 2021-07-22 PROCEDURE — 97530 THERAPEUTIC ACTIVITIES: CPT | Mod: GP | Performed by: PHYSICAL THERAPIST

## 2021-07-22 PROCEDURE — 97110 THERAPEUTIC EXERCISES: CPT | Mod: GP | Performed by: PHYSICAL THERAPIST

## 2021-07-22 PROCEDURE — 250N000013 HC RX MED GY IP 250 OP 250 PS 637: Performed by: STUDENT IN AN ORGANIZED HEALTH CARE EDUCATION/TRAINING PROGRAM

## 2021-07-22 PROCEDURE — 86140 C-REACTIVE PROTEIN: CPT | Performed by: PHYSICAL MEDICINE & REHABILITATION

## 2021-07-22 PROCEDURE — 36415 COLL VENOUS BLD VENIPUNCTURE: CPT | Performed by: PHYSICAL MEDICINE & REHABILITATION

## 2021-07-22 PROCEDURE — 250N000011 HC RX IP 250 OP 636: Performed by: INTERNAL MEDICINE

## 2021-07-22 PROCEDURE — 97535 SELF CARE MNGMENT TRAINING: CPT | Mod: GO

## 2021-07-22 PROCEDURE — 97535 SELF CARE MNGMENT TRAINING: CPT | Mod: GO | Performed by: OCCUPATIONAL THERAPIST

## 2021-07-22 PROCEDURE — 85025 COMPLETE CBC W/AUTO DIFF WBC: CPT | Performed by: PHYSICAL MEDICINE & REHABILITATION

## 2021-07-22 RX ADMIN — CEFEPIME HYDROCHLORIDE 2 G: 2 INJECTION, POWDER, FOR SOLUTION INTRAVENOUS at 18:15

## 2021-07-22 RX ADMIN — METHOCARBAMOL 750 MG: 750 TABLET ORAL at 22:33

## 2021-07-22 RX ADMIN — Medication 250 MG: at 09:05

## 2021-07-22 RX ADMIN — CEFEPIME HYDROCHLORIDE 2 G: 2 INJECTION, POWDER, FOR SOLUTION INTRAVENOUS at 02:12

## 2021-07-22 RX ADMIN — Medication 1 MG: at 20:17

## 2021-07-22 RX ADMIN — ACETAMINOPHEN 650 MG: 325 TABLET, FILM COATED ORAL at 22:33

## 2021-07-22 RX ADMIN — METHOCARBAMOL 750 MG: 750 TABLET ORAL at 16:33

## 2021-07-22 RX ADMIN — Medication 250 MG: at 20:17

## 2021-07-22 RX ADMIN — ACETAMINOPHEN 650 MG: 325 TABLET, FILM COATED ORAL at 14:48

## 2021-07-22 RX ADMIN — CEFEPIME HYDROCHLORIDE 2 G: 2 INJECTION, POWDER, FOR SOLUTION INTRAVENOUS at 11:15

## 2021-07-22 RX ADMIN — TAMSULOSIN HYDROCHLORIDE 0.4 MG: 0.4 CAPSULE ORAL at 09:05

## 2021-07-22 NOTE — PLAN OF CARE
FOCUS/GOAL  Bowel management, Bladder management, Pain management, Skin integrity and Cognition/Memory/Judgment/Problem solving    ASSESSMENT, INTERVENTIONS AND CONTINUING PLAN FOR GOAL:  Pt is alert and oriented x4, denies fever, chills, CP, SOB, N/V, abdominal pain, or new weakness/numbness/tingling, incontinent of bowel, torres in place and patent, transferring with assist of 1 pivot, sleeping well throughout the night using CPAP, PIV in RUE used for antibiotics q8hr patent, vs stable with low grade temp in am 99/8, no further care concerns at this time continue with POC.

## 2021-07-22 NOTE — PROGRESS NOTES
"  [facility]   Acute Rehabilitation Unit  Daily progress note    INTERVAL HISTORY  He continued to have fevers late morning yesterday. ID consulted and strongly recommended starting IV abx and drawing a new set of blood cx, which was completed. Patient, patient's wife, overnight resident, and nursing staff were updated with plan. Patient stayed on unit and only had low a grade fever overnight. Did not have any diaphoretic episodes. He also said his appetite is increasing as well. He was only sleeping on/off all night and is open to scheduling melatonin in the evening. He does endorse being less light-headed when sitting up first thing in the morning this am and that the stomach \"grumbles\" have also improved. He reports being sore from all the therapies, but happy about feeling better and being able to participate. He denies chills, SOB, chest pain, nausea and vomiting.   Functionally to don shorts he is moderate assist when standing to pull over hips with 4 wheel walker. Becomes fatigued with tasks. Unable to complete therapies due to feeling unwell yesterday and having fevers of >101.       MEDICATIONS  Scheduled meds    ceFEPIme (MAXIPIME) IV  2 g Intravenous Q8H     saccharomyces boulardii  250 mg Oral BID     sodium chloride (PF)  3 mL Intracatheter Q8H     tamsulosin  0.4 mg Oral Daily       PRN meds:  acetaminophen, bisacodyl, lidocaine 4%, lidocaine (buffered or not buffered), methocarbamol, naloxone **OR** naloxone **OR** naloxone **OR** naloxone, ondansetron, oxyCODONE, polyethylene glycol, senna-docusate, sodium chloride (PF)      PHYSICAL EXAM  /61 (BP Location: Right arm)   Pulse 69   Temp 99.8  F (37.7  C) (Oral)   Resp 18   Ht 1.803 m (5' 11\")   Wt 116.6 kg (257 lb)   SpO2 96%   BMI 35.84 kg/m    Gen: cooperative, alert, obese  HEENT: atraumatic, anterior cervical surgical incision covered with steri-strips and bandage, slightly tender with no exudate " from bandage  Cardio: RRR, S1 and S2 present, no murmurs auscultated  Pulm: Non-labored breathing, clear to auscultation b/l  Abd: Soft, non-distended, non-tender, Hyperactive bowel sounds present  Ext: Trace pitting edema in LE b/l, no calf tenderness, slight edema in LUE from hand to above elbow with compression glove and sleeve on left UE  Neuro/MSK: A&O x3, EOM intact, no slurring of speech     LABS  Results for orders placed or performed during the hospital encounter of 07/18/21 (from the past 24 hour(s))   Blood Culture Hand, Right    Specimen: Hand, Right; Blood   Result Value Ref Range    Culture No growth after 12 hours    CBC with platelets differential    Narrative    The following orders were created for panel order CBC with platelets differential.  Procedure                               Abnormality         Status                     ---------                               -----------         ------                     CBC with platelets and d...[[id number]]  Abnormal            Final result                 Please view results for these tests on the individual orders.   CRP inflammation   Result Value Ref Range    CRP Inflammation 110.0 (H) 0.0 - 8.0 mg/L   CBC with platelets and differential   Result Value Ref Range    WBC Count 11.3 (H) 4.0 - 11.0 10e3/uL    RBC Count 3.79 (L) 4.40 - 5.90 10e6/uL    Hemoglobin 12.4 (L) 13.3 - 17.7 g/dL    Hematocrit 37.5 (L) 40.0 - 53.0 %    MCV 99 78 - 100 fL    MCH 32.7 26.5 - 33.0 pg    MCHC 33.1 31.5 - 36.5 g/dL    RDW 12.0 10.0 - 15.0 %    Platelet Count 106 (L) 150 - 450 10e3/uL    % Neutrophils 73 %    % Lymphocytes 13 %    % Monocytes 10 %    % Eosinophils 3 %    % Basophils 0 %    % Immature Granulocytes 1 %    NRBCs per 100 WBC 0 <1 /100    Absolute Neutrophils 8.2 1.6 - 8.3 10e3/uL    Absolute Lymphocytes 1.4 0.8 - 5.3 10e3/uL    Absolute Monocytes 1.1 0.0 - 1.3 10e3/uL    Absolute Eosinophils 0.4 0.0 - 0.7 10e3/uL    Absolute Basophils 0.0 0.0 - 0.2 10e3/uL     Absolute Immature Granulocytes 0.2 (H) <=0.0 10e3/uL    Absolute NRBCs 0.0 10e3/uL       ASSESSMENT AND PLAN    Rigo Johns is a 70 year old L hand dominant male with a PMH of HTN, PILI, and prior cervical surgery (C5-7 anterior fusion in 1993 per notes) who is now status post a C3-4, C4-5 ACDF on 7/14/2021 for cervical myelopathy. Hospital course has been complicated by community-acquired pneumonia status post antibiotics, DONOVAN, urinary retention requiring a Davis catheter, steroid induced hyperglycemia, thrombocytopenia, and steroid induced leukocytosis.      Impairment group code: 04.1211 Quadriplegia, Incomplete C1-4 - s/p C3-5 ACDF.     PLAN  Rehabilitation     1. PT and OT 90 minutes of each on a daily basis, in addition to rehab nursing and close management of physiatrist.       2. Impairment of ADL's: Noted to have impaired ROM, impaired strength, impaired activity tolerance, edema management, impaired balance, and impaired coordination, all affecting his ability to safely and independently perform basic ADLs.  Goal for mod I with basic ADLs.     3. Impairment of mobility:  Noted to have impaired ROM, impaired strength, impaired activity tolerance, edema management, impaired balance and impaired coordination, all affecting his ability to safely and independently ambulate and perform basic mobility.  Goal for mod I with basic mobility.     Medical  Cervical Myelopathy s/p C3-4, C4-5 ACDF on 7/14/21   -Maintain postoperative spinal precautions. No need for cervical collar.  -Follow-up with neurosurgery scheduled for 7/28        Fever 2/2 possible autonomic dysreflexia vs infection  Recent CAP infection  Leukocytosis    Patient began having a fever >101 on the day following admission (ie 7/19). CXR, lactic acid, UA, procalc, C. Diff, and U/S of RLE and LUE were all unremarkable. Blood cultures from 7/12 had no growth. Blood cx from 7/19 and 7/21 as below.  Infectious disease following since 7/21 with new  set of blood cx drawn and IV antibiotics started immediately after cx were draw. They recommend CT of chest, abdomen, pelvis, and spine if he continues to symptomatic on 7/23. Will continue following their recs. Patient remains stable enough to remain on floor.    -Course of antibiotics prior to this admission: Zosyn x1 in ED, Azithromycin 7/12-7/16, Ceftriaxone 7/12-7/17, and Cefuroxime x1   -Only low-grade fevers since starting IV abx on 7/21  -Today WBC decreased to 11.3 from 16.8 yesterday. However, CRP incresed slightly to 110 from 100 yesterday.  -Will continue to appreciate infectious disease recs  - Continue cefepime 2g Q8H  -Blood cx 7/19 & 7/21 NGTD @ 3 days and 12 hours respectively  -Monitor blood pressures as he has had some high readings. Complained of facial flushing with fevers; will continue to monitor closely for possible noxious causes of autonomic dysreflexia  -Monitor respiratory status, supplementary oxygen PRN.  Now weaned to room air.      PILI  -Continue CPAP with home settings     Diet: Regular consistency solids and thin liquids     Neurogenic bowel  -patient has been incontinent but states he is having more sensation and control of sphincter  -Stools have been loose therefore we will change all bowel meds to as needed including Senokot-S, MiraLAX, Dulcolax suppository. If remains incontinent consider formal bowel program  -Monitor, adjust as needed     Urinary Retention  -Davis catheter placed 7/15/21.  Plan for TOV in the next few days  -Continue Flomax 0.4 mg daily     DONOVAN and likely CKD  -Pt with limited physician follow up so baseline creatinine not known. Peak at 2.13, improved with IV hydration; Cr 1.31 on 7/21 which is overall stable  -Avoid nephrotoxic agents, NSAIDs    Renal lesion  -Incidental finding on CT scan  -Renal ultrasound showed simple cysts of bilateral kidneys  -Follow up with PCP      Pain  -Tylenol 650 mg q4h PRN  -Oxycodone 5 mg q4h PRN and Robaxin 750 mg q6h PRN.   Wean as able.    Sleep  -Scheduling melatonin 1mg at bedtime starting 7/22     Steroid induced hyperglycemia, resolved  HbA1c 5.0  -Now completed course of steroids and glucose checks have been well controlled. No need for further routine checks      Acute postoperative blood loss anemia  -hemoglobin 13.9 on 7/21.      Thrombocytopenia  -Noted upon initial admission to the emergency room. Has been stable low 100s. Unclear if due to acute distress and infection versus possible undiagnosed liver disease.   Platelets 116 7/21 (Prior 102)  -Monitor peripherally, follow-up with primary care physician.      Psych  -Monitor mood, will consult health psychology if needed           Social/Dispo  -Anticipate discharge home at a modified independent level for ambulation, mobility, and ADLs.   -ELOS: Tentative discharge date 8/9/21  -Rehab prognosis: Good  -Follow up appointments: Abnormal appearance of liver as seen on CAT scan with slight nodularity inferiorly. LFTs within normal limits (Follow-up primary care physician), NSGY (Dr. Hanley)     DVT prophylaxis  -PCDs and ambulation    Code status: Full Code (Discussed with patient upon admission)    Teri Jordan MD    Department of Rehabilitation Medicine

## 2021-07-22 NOTE — PLAN OF CARE
Pt is alert and oriented x4. Denies pain, chest pain or SOB.   VSS except Temp 99.4 at 1546. Prn tylenol offered at 1551 for comfort.   Seen by ID provider. Blood cultures done. Cefepime IV started. New piv in right FA.  Had incontinent of small soft bm x2, total assist for change. Davis catheter in place, draining good amount of yellow urine.   Pt used IS with encouragement, able to reach 2100 level which is improving per pt.   Pt ate 75% of supper, drinking well.   Prn Robaxin given at HS per request. Pt reports feeling okay overall.   Wears cpap at night. Will continue to monitor fever.

## 2021-07-22 NOTE — PLAN OF CARE
"FOCUS/GOAL  Bowel management, Bladder management, Medical management, Mobility, Skin integrity, and Safety management    ASSESSMENT, INTERVENTIONS AND CONTINUING PLAN FOR GOAL:  A/Ox4. Denies pain, CP, SOB, N/T, N/V, dizziness. On RA. Ax1 s/p with gb/walker. Incontinent of bowel. BM today. Davis patent and draining adequately. Old I/J site approximated with steri strips, SATNAM. Tolerating regular diet, good appetite. New PIV R hand SL in between IV ABX, low grade fever present. Pt able to make needs known and call light within reach. Continue POC    /61 (BP Location: Right arm)   Pulse 69   Temp 99.8  F (37.7  C) (Oral)   Resp 18   Ht 1.803 m (5' 11\")   Wt 116.6 kg (257 lb)   SpO2 96%   BMI 35.84 kg/m       "

## 2021-07-22 NOTE — PLAN OF CARE
Discharge Planner Post-Acute Rehab PT:      Discharge Plan: Home with 3 JOHNSON with bilat handrail. Home care vs OP PT pending progress.      Precautions: falls, cervical     Current Status:  Bed Mobility: SBA supine<>sit  Transfer: SBA squat pivot transfer. Variable CGA<>min A for STS from EOB, WC  Gait: 10- 30 ft with FWW, CGA, WC follow. Significantly decreased gait speed and stride length, with forward flexed posture  Stairs: NT  Balance: good sitting balance, needs heavy UE support in standing     Assessment:  Pt with good effort despite having temp still.  Pt needing rest breaks and missed 10 minutes due to fatigue, toileting.  Pt had an episode of incontinence of BM while ambulating in the pm session.  Pt did increase his ambulation distance in the morning session to 34 ft .  Pt able to amb 23 ft in pm, more fatigued, needs cga, cues for posture and a wheelchair follow for safety.      Other Barriers to Discharge (DME, Family Training, etc): JOHNSON home, family training, potential mixed mobility FWW vs WC

## 2021-07-23 ENCOUNTER — APPOINTMENT (OUTPATIENT)
Dept: CT IMAGING | Facility: CLINIC | Age: 71
DRG: 052 | End: 2021-07-23
Attending: PHYSICAL MEDICINE & REHABILITATION
Payer: COMMERCIAL

## 2021-07-23 ENCOUNTER — APPOINTMENT (OUTPATIENT)
Dept: PHYSICAL THERAPY | Facility: CLINIC | Age: 71
DRG: 052 | End: 2021-07-23
Attending: PHYSICAL MEDICINE & REHABILITATION
Payer: COMMERCIAL

## 2021-07-23 ENCOUNTER — APPOINTMENT (OUTPATIENT)
Dept: OCCUPATIONAL THERAPY | Facility: CLINIC | Age: 71
DRG: 052 | End: 2021-07-23
Attending: PHYSICAL MEDICINE & REHABILITATION
Payer: COMMERCIAL

## 2021-07-23 ENCOUNTER — TELEPHONE (OUTPATIENT)
Dept: NEUROSURGERY | Facility: CLINIC | Age: 71
End: 2021-07-23

## 2021-07-23 ENCOUNTER — APPOINTMENT (OUTPATIENT)
Dept: MRI IMAGING | Facility: CLINIC | Age: 71
DRG: 052 | End: 2021-07-23
Attending: PHYSICAL MEDICINE & REHABILITATION
Payer: COMMERCIAL

## 2021-07-23 LAB
ANION GAP SERPL CALCULATED.3IONS-SCNC: 3 MMOL/L (ref 3–14)
BASOPHILS # BLD AUTO: 0 10E3/UL (ref 0–0.2)
BASOPHILS NFR BLD AUTO: 0 %
BUN SERPL-MCNC: 23 MG/DL (ref 7–30)
CALCIUM SERPL-MCNC: 8.3 MG/DL (ref 8.5–10.1)
CHLORIDE BLD-SCNC: 113 MMOL/L (ref 94–109)
CO2 SERPL-SCNC: 23 MMOL/L (ref 20–32)
CREAT SERPL-MCNC: 1.44 MG/DL (ref 0.66–1.25)
CRP SERPL-MCNC: 110 MG/L (ref 0–8)
EOSINOPHIL # BLD AUTO: 0.3 10E3/UL (ref 0–0.7)
EOSINOPHIL NFR BLD AUTO: 2 %
ERYTHROCYTE [DISTWIDTH] IN BLOOD BY AUTOMATED COUNT: 12 % (ref 10–15)
GFR SERPL CREATININE-BSD FRML MDRD: 49 ML/MIN/1.73M2
GLUCOSE BLD-MCNC: 94 MG/DL (ref 70–99)
HCT VFR BLD AUTO: 37.6 % (ref 40–53)
HGB BLD-MCNC: 12.4 G/DL (ref 13.3–17.7)
IMM GRANULOCYTES # BLD: 0.1 10E3/UL
IMM GRANULOCYTES NFR BLD: 1 %
LYMPHOCYTES # BLD AUTO: 1.6 10E3/UL (ref 0.8–5.3)
LYMPHOCYTES NFR BLD AUTO: 15 %
MCH RBC QN AUTO: 32.9 PG (ref 26.5–33)
MCHC RBC AUTO-ENTMCNC: 33 G/DL (ref 31.5–36.5)
MCV RBC AUTO: 100 FL (ref 78–100)
MONOCYTES # BLD AUTO: 1.1 10E3/UL (ref 0–1.3)
MONOCYTES NFR BLD AUTO: 10 %
MRSA DNA SPEC QL NAA+PROBE: NEGATIVE
NEUTROPHILS # BLD AUTO: 7.3 10E3/UL (ref 1.6–8.3)
NEUTROPHILS NFR BLD AUTO: 72 %
NRBC # BLD AUTO: 0 10E3/UL
NRBC BLD AUTO-RTO: 0 /100
PLATELET # BLD AUTO: 107 10E3/UL (ref 150–450)
POTASSIUM BLD-SCNC: 4.1 MMOL/L (ref 3.4–5.3)
RADIOLOGIST FLAGS: ABNORMAL
RBC # BLD AUTO: 3.77 10E6/UL (ref 4.4–5.9)
SA TARGET DNA: NEGATIVE
SODIUM SERPL-SCNC: 139 MMOL/L (ref 133–144)
WBC # BLD AUTO: 10.4 10E3/UL (ref 4–11)

## 2021-07-23 PROCEDURE — 80048 BASIC METABOLIC PNL TOTAL CA: CPT | Performed by: PHYSICAL MEDICINE & REHABILITATION

## 2021-07-23 PROCEDURE — 74177 CT ABD & PELVIS W/CONTRAST: CPT | Mod: 26 | Performed by: RADIOLOGY

## 2021-07-23 PROCEDURE — 250N000011 HC RX IP 250 OP 636: Performed by: INTERNAL MEDICINE

## 2021-07-23 PROCEDURE — 250N000011 HC RX IP 250 OP 636: Performed by: PHYSICAL MEDICINE & REHABILITATION

## 2021-07-23 PROCEDURE — 97530 THERAPEUTIC ACTIVITIES: CPT | Mod: GP

## 2021-07-23 PROCEDURE — 258N000003 HC RX IP 258 OP 636: Performed by: PHYSICAL MEDICINE & REHABILITATION

## 2021-07-23 PROCEDURE — 99233 SBSQ HOSP IP/OBS HIGH 50: CPT | Mod: 24 | Performed by: PHYSICAL MEDICINE & REHABILITATION

## 2021-07-23 PROCEDURE — 72156 MRI NECK SPINE W/O & W/DYE: CPT | Mod: 26 | Performed by: RADIOLOGY

## 2021-07-23 PROCEDURE — 85004 AUTOMATED DIFF WBC COUNT: CPT | Performed by: PHYSICAL MEDICINE & REHABILITATION

## 2021-07-23 PROCEDURE — 36415 COLL VENOUS BLD VENIPUNCTURE: CPT | Performed by: PHYSICAL MEDICINE & REHABILITATION

## 2021-07-23 PROCEDURE — 72125 CT NECK SPINE W/O DYE: CPT | Mod: 26 | Performed by: RADIOLOGY

## 2021-07-23 PROCEDURE — 97535 SELF CARE MNGMENT TRAINING: CPT | Mod: GO | Performed by: STUDENT IN AN ORGANIZED HEALTH CARE EDUCATION/TRAINING PROGRAM

## 2021-07-23 PROCEDURE — 71260 CT THORAX DX C+: CPT

## 2021-07-23 PROCEDURE — 255N000002 HC RX 255 OP 636: Performed by: PHYSICAL MEDICINE & REHABILITATION

## 2021-07-23 PROCEDURE — 86140 C-REACTIVE PROTEIN: CPT | Performed by: PHYSICAL MEDICINE & REHABILITATION

## 2021-07-23 PROCEDURE — 128N000003 HC R&B REHAB

## 2021-07-23 PROCEDURE — 72156 MRI NECK SPINE W/O & W/DYE: CPT

## 2021-07-23 PROCEDURE — 999N000150 HC STATISTIC PT MED CONFERENCE < 30 MIN

## 2021-07-23 PROCEDURE — G0463 HOSPITAL OUTPT CLINIC VISIT: HCPCS

## 2021-07-23 PROCEDURE — A9585 GADOBUTROL INJECTION: HCPCS | Performed by: PHYSICAL MEDICINE & REHABILITATION

## 2021-07-23 PROCEDURE — 250N000013 HC RX MED GY IP 250 OP 250 PS 637: Performed by: STUDENT IN AN ORGANIZED HEALTH CARE EDUCATION/TRAINING PROGRAM

## 2021-07-23 PROCEDURE — 72126 CT NECK SPINE W/DYE: CPT

## 2021-07-23 PROCEDURE — 99233 SBSQ HOSP IP/OBS HIGH 50: CPT | Performed by: INTERNAL MEDICINE

## 2021-07-23 PROCEDURE — 250N000009 HC RX 250: Performed by: PHYSICAL MEDICINE & REHABILITATION

## 2021-07-23 PROCEDURE — 71260 CT THORAX DX C+: CPT | Mod: 26 | Performed by: RADIOLOGY

## 2021-07-23 PROCEDURE — 87641 MR-STAPH DNA AMP PROBE: CPT | Performed by: PHYSICAL MEDICINE & REHABILITATION

## 2021-07-23 PROCEDURE — 250N000013 HC RX MED GY IP 250 OP 250 PS 637: Performed by: PHYSICAL MEDICINE & REHABILITATION

## 2021-07-23 PROCEDURE — 999N000125 HC STATISTIC PATIENT MED CONFERENCE < 30 MIN: Performed by: STUDENT IN AN ORGANIZED HEALTH CARE EDUCATION/TRAINING PROGRAM

## 2021-07-23 RX ORDER — BISACODYL 10 MG
10 SUPPOSITORY, RECTAL RECTAL ONCE
Status: DISCONTINUED | OUTPATIENT
Start: 2021-07-23 | End: 2021-07-26

## 2021-07-23 RX ORDER — METHOCARBAMOL 750 MG/1
750 TABLET, FILM COATED ORAL ONCE
Status: COMPLETED | OUTPATIENT
Start: 2021-07-23 | End: 2021-07-23

## 2021-07-23 RX ORDER — IOPAMIDOL 755 MG/ML
150 INJECTION, SOLUTION INTRAVASCULAR ONCE
Status: COMPLETED | OUTPATIENT
Start: 2021-07-23 | End: 2021-07-23

## 2021-07-23 RX ORDER — ZINC/PETROLATUM,WHITE
PASTE (GRAM) TOPICAL
Status: DISCONTINUED | OUTPATIENT
Start: 2021-07-23 | End: 2021-07-27 | Stop reason: HOSPADM

## 2021-07-23 RX ORDER — GADOBUTROL 604.72 MG/ML
0.1 INJECTION INTRAVENOUS ONCE
Status: COMPLETED | OUTPATIENT
Start: 2021-07-23 | End: 2021-07-23

## 2021-07-23 RX ORDER — CEFAZOLIN SODIUM 1 G/50ML
1750 SOLUTION INTRAVENOUS EVERY 24 HOURS
Status: DISCONTINUED | OUTPATIENT
Start: 2021-07-23 | End: 2021-07-24

## 2021-07-23 RX ORDER — LORAZEPAM 0.5 MG/1
.25-.5 TABLET ORAL
Status: COMPLETED | OUTPATIENT
Start: 2021-07-23 | End: 2021-07-23

## 2021-07-23 RX ADMIN — ACETAMINOPHEN 650 MG: 325 TABLET, FILM COATED ORAL at 06:00

## 2021-07-23 RX ADMIN — CEFEPIME HYDROCHLORIDE 2 G: 2 INJECTION, POWDER, FOR SOLUTION INTRAVENOUS at 10:16

## 2021-07-23 RX ADMIN — CEFEPIME HYDROCHLORIDE 2 G: 2 INJECTION, POWDER, FOR SOLUTION INTRAVENOUS at 02:09

## 2021-07-23 RX ADMIN — TAMSULOSIN HYDROCHLORIDE 0.4 MG: 0.4 CAPSULE ORAL at 07:48

## 2021-07-23 RX ADMIN — SODIUM CHLORIDE 72 ML: 9 INJECTION, SOLUTION INTRAVENOUS at 12:42

## 2021-07-23 RX ADMIN — VANCOMYCIN HYDROCHLORIDE 1750 MG: 1 INJECTION, POWDER, LYOPHILIZED, FOR SOLUTION INTRAVENOUS at 16:44

## 2021-07-23 RX ADMIN — Medication 250 MG: at 07:48

## 2021-07-23 RX ADMIN — ACETAMINOPHEN 650 MG: 325 TABLET, FILM COATED ORAL at 22:03

## 2021-07-23 RX ADMIN — CEFEPIME HYDROCHLORIDE 2 G: 2 INJECTION, POWDER, FOR SOLUTION INTRAVENOUS at 19:44

## 2021-07-23 RX ADMIN — Medication 0.5 MG: at 20:32

## 2021-07-23 RX ADMIN — Medication 1 MG: at 22:03

## 2021-07-23 RX ADMIN — Medication 250 MG: at 20:32

## 2021-07-23 RX ADMIN — GADOBUTROL 11 ML: 604.72 INJECTION INTRAVENOUS at 21:11

## 2021-07-23 RX ADMIN — METHOCARBAMOL 750 MG: 750 TABLET ORAL at 22:03

## 2021-07-23 RX ADMIN — ACETAMINOPHEN 650 MG: 325 TABLET, FILM COATED ORAL at 16:59

## 2021-07-23 RX ADMIN — METHOCARBAMOL 750 MG: 750 TABLET ORAL at 11:50

## 2021-07-23 RX ADMIN — PSYLLIUM HUSK 1 PACKET: 3.4 POWDER ORAL at 14:18

## 2021-07-23 RX ADMIN — METHOCARBAMOL 750 MG: 750 TABLET ORAL at 06:00

## 2021-07-23 RX ADMIN — IOPAMIDOL 100 ML: 755 INJECTION, SOLUTION INTRAVENOUS at 12:41

## 2021-07-23 NOTE — PROGRESS NOTES
GENERAL ID SERVICE: PROGRESS NOTE  Patient:  Rigo Johns, Date of birth 1950, Medical record number [id number]  Date of Admission: 7/18/2021  Date of Visit:  7/23/2021         Assessment and Recommendations:   Problem List:    # Fever and CRP elevation     # Spinal stenosis s/p C3-4, C4-5 anterior cervical discetomy and fusion which occurred on 7/14/21 by Dr. Hanley     Discussion:     Mr. Rigo Johns is a 70 year old  Male with PMHx of HTN, PILI, and prior cervical surgery (C5-7 anterior fusion in 1993 per notes) who initially presented to the ED at [facility] on 7/12/21 with dyspnea and acute onset of neck and shoulder pain, and bilateral arm weakness found to have cervical spine stenosis s/p fusion. Now we are consulted because of fever, leukocytosis and elevated CRP. Patient had a CT of the chest in the OSH (7/12) and it demonstrated bibasilar atelectasis vs. Infiltrate, and he was started on Zosyn for likely PNA in the OSH.  A CT of the C-spine was also done on 7/12 and showed central stenosis at the C3-4 and C4-5 levels, with a subsequent MRI confirming this and also demonstrating severe b/l foraminal stenosis and abnormal T2 signal bilaterally, L>R.  He was started on IV steroids and transferred to [facility] for a C3-4, C4-5 anterior cervical discetomy and fusion which occurred on 7/14/21 by Dr. Hanley.  Hospital course has been complicated by DONOVAN with improvement with IV fluids, urinary retention with a Davis currently in place, steroid induced hyperglycemia, thrombocytopenia, and steroid induced leukocytosis.       He received 3 days of azithromycin (7/14-7/16) and 4 days of ceftriaxone (7/14-7/17) with transition to cefuroxime on 7/18 (stopped on 7/19) for presumable pneumonia. On 7/19, he spike the fever (101.3 F) and since then he is developing increasing white count and CRP. C diff from 7/20 was negative. Blood cultures (7/12 and 7/19) are negative to date. COVID test is negative. WBC today is 16.8.  "LA normal. CRP is 100, pro-calcitonin is <0.05. UA showed moderated blood, 27 RBC, few bacteria, but just 2 wbcs and negative LE and nitrite.   Repeat CXR from 7/19 showed \"Streaky left midlung opacity likely represents atelectasis. Previously seen hazy opacities in the bilateral bases are improved\". US doppler of lower right extremity and upper left extremity is negative for DVT. He is on room air. On my examination today, the patient looks overall well and he is not in distress. He does have lower extremity weakness given the spinal abnormality and he does not have control or urine or bowel. He denies new cough or respiratory symptom. He denies new skin lesions. His neurosurgical incision is on his anterior neck and it is healing wel and I could not appreciate signs of infection.      It is not entirely clear the reason for the fever, however it is noted that it recurred after antibiotics were stopped. So far his blood cultures from 7/19 and 7/12 are negative. His C diff is negative and his UA is not suggestive of UTI. He does feel neck spasms, but surgical incision does not look infected. Blood cultures are negative to date.given recurrence of fever  one day after antibiotics were stopped before and also because of high fever and risk for hospital acquired infection, cefepime was started empirically on 7/21/21. The fever curve is somewhat improved after cefepime and the white count is also improving. Noted that CRP is still elevated. I discussed with the rehab hospitalist, and given no clear source for the fever I recommend to perform a CT spine (to rule our post op collections), as well as a new CT chest and CT abdomen/pelvis.          Recommendations:     1. Please continue cefepime 2 grams IV q 8h at this moment    2. Please obtain MRSA swab, if positive IV vancomycin can be started (pharmacy to dose)    3. Given no clear source for the fever, I recommend to perform a CT cervical spine (to rule our post op " "collections), as well as a new CT chest and CT abdomen/pelvis - Rehab team ordered     4. I will continue to monitor pending cultures        Recommendations discussed with Rehab hospitalist    The General ID team will continue to follow this patient. Please feel free to call with any questions.     Angela Lucio MD  Date of Service: 07/23/21  Pager: [phone]        ADDENDUM (7/23/21):    CT cervical spine showed 1. Prevertebral fluid collection extending throughout the cervical  spine, extending anteriorly to the subcutaneous area within the right side of the neck, which is concerning for seroma/abscess on this noncontrast cervical CT. No definite fistula to the skin.     Additional Recommendations:    1. Please consult neurosurgery/spinal surgery for consideration of source control (I&D or drainage) since the prevertebral collection is likely the source of the fever.    2. Please start IV vancomycin (pharmacy to dose)    3. Please continue cefepime as stated above    4. CT chest/abdomen/pelvis report is in progress           Physical Exam:   /56 (BP Location: Right arm)   Pulse 83   Temp 98.1  F (36.7  C) (Oral)   Resp 20   Ht 1.803 m (5' 11\")   Wt 116.6 kg (257 lb)   SpO2 93%   BMI 35.84 kg/m         Exam:  GENERAL:  Well-developed, well-nourished, not in acute distress.   HEAD: Normocephalic and atraumatic  ENT:  No hearing impairment, oral mucous membranes moist  EYES:  Eyes grossly normal to inspection, PERRL and conjunctivae and sclerae normal   LUNGS:  Clear to auscultation - no rales, rhonchi or wheezes  CARDIOVASCULAR:  Regular rate and rhythm, normal S1 S2  ABDOMEN:  Soft, nontender, no hepatosplenomegaly, no masses and bowel sounds normal  SKIN:  Surgical insusion on the anterior neck. No external signs of infection.  NEUROLOGIC:  Weakness on both legs. Bowel and urinary incontinence  PSYCHIATRIC: Mood stable, mentation appears normal, affect normal           Laboratory Data:     Creatinine "   Date Value Ref Range Status   07/23/2021 1.44 (H) 0.66 - 1.25 mg/dL Final   07/21/2021 1.31 (H) 0.66 - 1.25 mg/dL Final   07/21/2021 1.34 (H) 0.66 - 1.25 mg/dL Final   07/20/2021 1.21 0.66 - 1.25 mg/dL Final   07/19/2021 1.32 (H) 0.66 - 1.25 mg/dL Final     WBC Count   Date Value Ref Range Status   07/23/2021 10.4 4.0 - 11.0 10e3/uL Final   07/22/2021 11.3 (H) 4.0 - 11.0 10e3/uL Final   07/21/2021 16.8 (H) 4.0 - 11.0 10e3/uL Final   07/20/2021 16.7 (H) 4.0 - 11.0 10e3/uL Final   07/20/2021 16.1 (H) 4.0 - 11.0 10e3/uL Final     Hemoglobin   Date Value Ref Range Status   07/23/2021 12.4 (L) 13.3 - 17.7 g/dL Final     Platelet Count   Date Value Ref Range Status   07/23/2021 107 (L) 150 - 450 10e3/uL Final     Lab Results   Component Value Date     07/23/2021    BUN 23 07/23/2021    CO2 23 07/23/2021     CRP Inflammation   Date Value Ref Range Status   07/23/2021 110.0 (H) 0.0 - 8.0 mg/L Final   07/22/2021 110.0 (H) 0.0 - 8.0 mg/L Final   07/21/2021 100.0 (H) 0.0 - 8.0 mg/L Final   07/20/2021 74.0 (H) 0.0 - 8.0 mg/L Final   07/19/2021 64.0 (H) 0.0 - 8.0 mg/L Final

## 2021-07-23 NOTE — CARE CONFERENCE
"Acute Rehab Care Conference/Team Rounds      Type: Team Rounds    Present: Jonathan BRANDON, Dr. Luis Whitheead, Resident MD Teri Jordan, Shelly Bolton RN, Patti Prescott OTJesi PT, Stefany Del Valle , Susan Weiss Dietician, RN Care Coordinator Deonna Oliveros        Discharge Barriers/Treatment/Education    Rehab Diagnosis: Cervical myelopathy s/p C3-4, C4-5 ACDF    Active Medical Co-morbidities/Prognosis: HTN, PILI, urinary retention with Torres, neurogenic bowel with incontinence, fevers, leukocytosis, thrombocytopenia, steroid induced hyperglycemia, PNA, DONOVAN on CKD, ABLA    Safety: Pt is alert and oriented, calling for needs, assist of 1 squat pivot transfer.     Pain: Pt reports pain in upper back, neck incision, and back, received tylenol and robaxin with partial relief.     Medications, Skin, Tubes/Lines: Pt is taking pills whole with water, anterior neck incision is covered with steri strips, intergluteal cleft is excoriated, meatus of penis likely has pressure injury due to torres, PIV in RUE.    Swallowing/Nutrition:    Bowel/Bladder: Pt is frequently incontinent of stool and would benefit from bowel program. Torres in place and patent. 2 anchors added to BLE so that pressure can be shifted frequently. LBM 7/23    Psychosocial: Lives in a house w/ spouse in [place], [place]. 2+1 stairs to enter and all needs are met on the main level. Walk-in shower with 4\" threshold, shower seat. Has jacuzzi tub with seat and cutout door, only 6\" to step into tub. No pets in the home. English speaking. No mental or chemical health concerns.  Wife supportive, able to A at discharge. Wife retired, does a lot of gardening. Pt reported that his wife is healthy and well.Niece who lives close and is a PT at [employer]. No children. No other family in the area. 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 Limited by incontinence as well. Pt is making improvements is arm function and standing to be able to participate in ADL better. Pt still benefiting from skilled OT.     Mobility: Slow progress in PT. Limited by dizziness, neck pain, fever, and generally low energy. Decreased functional capacity today, concern for multiple days of not feeling well. MD and RN aware.     Cognition/Language: In tact    Transportation: Not a  - family to assist.    Decision maker: self    Plan of Care and goals reviewed and updated.    Discharge Plan/Recommendations    Fall Precautions: continue    Overall plan for the patient:   Ongoing fevers of unclear etiology, which has limited therapies at times.  ID consulted.  Functionally has been making improvements, but fatigues very easily.  At best has been able to ambulate 37 ft CGA with a walker and WC follow.  He is motivated and pushes himself, although often has difficulty self-assessing his limitations.  Has not tried any stairs yet.  WC based for ADLs, but today able to stand for pulling up pants.  Dependent for toileting.  Needs ongoing medical workup and continue PT and OT as able.  Tentative discharge date 8/9/21, likely will need home health.        Utilization Review and Continued Stay Justification    Medical Necessity Criteria:    For any criteria that is not met, please document reason and plan for discharge, transfer, or modification of plan of care to address.    Requires intensive rehabilitation program to treat functional deficits?: Yes    Requires 3x per week or greater involvement of rehabilitation physician to oversee rehabilitation program?: Yes    Requires rehabilitation nursing interventions?: Yes    Patient is making functional progress?: Yes    There is a potential for additional functional progress? Yes    Patient is participating in therapy 3 hours per day a minimum of 5 days per week or 15 hours per week in 7 day period?:Yes    Has discharge  needs that require coordinated discharge planning approach?:Yes      Barriers/Concerns related to meeting medical necessity criteria:  Fevers of unknown etiology    Team Plan to Address Concern:  Ongoing medical workup, ID consultation      Final Physician Sign off    Statement of Approval: I have reviewed and agree with the recommendations and documentation in this care conference note.       Patient Goals  SW: Confirm discharge recommendations with therapy, coordinate safe discharge plan and remain available to support and assist as needed.         OT Frequency: 21 days for 90 min  OT goal: hygiene/grooming: modified independent  OT goal: upper body dressing: Modified independent  OT goal: lower body dressing: Modified independent  OT goal: upper body bathing: Modified independent  OT goal: lower body bathing: Modified independent  OT goal: bed mobility: Modified independent  OT Goal: transfer: Modified independent  OT goal: toilet transfer/toileting: Modified independent  OT goal: meal preparation: Supervision/stand-by assist  OT goal: home management: Supervision/stand-by assist           OT goal 1: pt will be mod I hep B U/E  OT goal 2: pt will be sba shower transfer                PT Frequency:  minutes daily  PT goal: bed mobility: Independent, Rolling, Supine to/from sit, Bridging, Within precautions  PT goal: transfers: Modified independent, Bed to/from chair, Sit to/from stand, Assistive device, Within precautions (using FWW for household negotiation)  PT goal: gait: 50 feet, Modified independent, Rolling walker (for household mobility)  PT goal: stairs: 3 stairs, Minimal assist, Rail on right (for home access)  PT goal: perform aerobic activity with stable cardiovascular response: NuStep, continuous activity, 20 minutes  PT goal 1: using handout be ind with HEP for improved functional mobility  PT Goal 2: following education pt will state 3 or > fall prevention strategies to demo knowledge of how to  reduce fall risk  PT Goal 3: perform car transfer with MIN A or < in order to access personal transportation                                                                            Patient/Family Goal: Bowel: Patient will be continent of bowel by timed toileting prior to discharge from ARU.  Patient/Family Goal: Bladder: Patient will have torres catheter removed and be continent of bowel prior to discharge from ARU.                                   Goal: Safety Management: Patient will remain free from falls on the ARU by demonstrating correct use of call light and waiting for staff for assistance.

## 2021-07-23 NOTE — PROGRESS NOTES
"  [facility]   Acute Rehabilitation Unit  Daily progress note    INTERVAL HISTORY  Pt seen bedside, and again in team rounds with wife present.  Did not sleep well last night due to increased pain in the neck.  Tmax 100.7 yesterday, and this morning was feeling dizzy with a BP of 110/63.  Subsequent BPs were in the 120s.  WBCs improving, however CRP remains elevated at 110.  Discussed with ID, will obtain CT imaging of C-spine, chest, abd, and pelvis. He remains incontinent of bowel, and nursing has noted a possible urethral injury from Davis.  WOC consulted.  For full functional updates, see team rounds note from today.      MEDICATIONS  Scheduled meds    ceFEPIme (MAXIPIME) IV  2 g Intravenous Q8H     iopamidol (ISOVUE-370)  150 mL Intravenous Once     melatonin  1 mg Oral At Bedtime     saccharomyces boulardii  250 mg Oral BID     sodium chloride (PF)  3 mL Intracatheter Q8H     sodium chloride 0.9 %  100 mL Intravenous Once     tamsulosin  0.4 mg Oral Daily       PRN meds:  acetaminophen, bisacodyl, lidocaine 4%, lidocaine (buffered or not buffered), methocarbamol, naloxone **OR** naloxone **OR** naloxone **OR** naloxone, ondansetron, oxyCODONE, polyethylene glycol, senna-docusate, sodium chloride (PF)      PHYSICAL EXAM  /56 (BP Location: Right arm)   Pulse 83   Temp 98.1  F (36.7  C) (Oral)   Resp 20   Ht 1.803 m (5' 11\")   Wt 116.6 kg (257 lb)   SpO2 93%   BMI 35.84 kg/m    Gen: cooperative, alert, obese, mild facial flushing today  HEENT: atraumatic, anterior cervical surgical incision covered with steri-strips and bandage.  No TTP in the posterior paraspinals or upper traps.   Pulm: Non-labored breathing  Abd: Non-disteded  : Davis catheter present  Ext: Trace pitting edema in LE b/l, no calf tenderness, +edema in LUE from hand to above elbow, compression glove in place  Neuro/MSK: A&O x3, EOM intact, no slurring of speech     LABS  Results for orders " placed or performed during the hospital encounter of 07/18/21 (from the past 24 hour(s))   CBC with platelets differential    Narrative    The following orders were created for panel order CBC with platelets differential.  Procedure                               Abnormality         Status                     ---------                               -----------         ------                     CBC with platelets and d...[[id number]]  Abnormal            Final result                 Please view results for these tests on the individual orders.   CRP inflammation   Result Value Ref Range    CRP Inflammation 110.0 (H) 0.0 - 8.0 mg/L   CBC with platelets and differential   Result Value Ref Range    WBC Count 10.4 4.0 - 11.0 10e3/uL    RBC Count 3.77 (L) 4.40 - 5.90 10e6/uL    Hemoglobin 12.4 (L) 13.3 - 17.7 g/dL    Hematocrit 37.6 (L) 40.0 - 53.0 %     78 - 100 fL    MCH 32.9 26.5 - 33.0 pg    MCHC 33.0 31.5 - 36.5 g/dL    RDW 12.0 10.0 - 15.0 %    Platelet Count 107 (L) 150 - 450 10e3/uL    % Neutrophils 72 %    % Lymphocytes 15 %    % Monocytes 10 %    % Eosinophils 2 %    % Basophils 0 %    % Immature Granulocytes 1 %    NRBCs per 100 WBC 0 <1 /100    Absolute Neutrophils 7.3 1.6 - 8.3 10e3/uL    Absolute Lymphocytes 1.6 0.8 - 5.3 10e3/uL    Absolute Monocytes 1.1 0.0 - 1.3 10e3/uL    Absolute Eosinophils 0.3 0.0 - 0.7 10e3/uL    Absolute Basophils 0.0 0.0 - 0.2 10e3/uL    Absolute Immature Granulocytes 0.1 (H) <=0.0 10e3/uL    Absolute NRBCs 0.0 10e3/uL   Basic metabolic panel   Result Value Ref Range    Sodium 139 133 - 144 mmol/L    Potassium 4.1 3.4 - 5.3 mmol/L    Chloride 113 (H) 94 - 109 mmol/L    Carbon Dioxide (CO2) 23 20 - 32 mmol/L    Anion Gap 3 3 - 14 mmol/L    Urea Nitrogen 23 7 - 30 mg/dL    Creatinine 1.44 (H) 0.66 - 1.25 mg/dL    Calcium 8.3 (L) 8.5 - 10.1 mg/dL    Glucose 94 70 - 99 mg/dL    GFR Estimate 49 (L) >60 mL/min/1.73m2       ASSESSMENT AND PLAN    Rigo Johns is a 70 year old L  hand dominant male with a PMH of HTN, PILI, and prior cervical surgery (C5-7 anterior fusion in 1993 per notes) who is now status post a C3-4, C4-5 ACDF on 7/14/2021 for cervical myelopathy. Hospital course has been complicated by community-acquired pneumonia status post antibiotics, DONOVAN, urinary retention requiring a Davis catheter, steroid induced hyperglycemia, thrombocytopenia, and steroid induced leukocytosis.      Impairment group code: 04.1211 Quadriplegia, Incomplete C1-4 - s/p C3-5 ACDF.     PLAN  Rehabilitation     1. PT and OT 90 minutes of each on a daily basis, in addition to rehab nursing and close management of physiatrist.       2. Impairment of ADL's: Noted to have impaired ROM, impaired strength, impaired activity tolerance, edema management, impaired balance, and impaired coordination, all affecting his ability to safely and independently perform basic ADLs.  Goal for mod I with basic ADLs.     3. Impairment of mobility:  Noted to have impaired ROM, impaired strength, impaired activity tolerance, edema management, impaired balance and impaired coordination, all affecting his ability to safely and independently ambulate and perform basic mobility.  Goal for mod I with basic mobility.     Medical  Cervical Myelopathy s/p C3-4, C4-5 ACDF on 7/14/21   -Maintain postoperative spinal precautions. No need for cervical collar.  -Follow-up with neurosurgery scheduled for 7/28        Fever 2/2 possible autonomic dysreflexia vs infection  Recent CAP infection  Leukocytosis  -Now completed course of antibiotics (Zosyn x1 in ED, Azithromycin 7/12-7/16, Ceftriaxone 7/12 - 7/17, and Cefuroxime x1 prior to transfer to rehab)  Continues to have fevers and loose stools with incontinence. WBCs are improving, today 10.8 (peak or 16.8), but CRP continues to be elevated at 110.  Prior workup has included CXR, lactic acid, UA, procalc, C. Diff, and U/S of RLE and LUE are all unremarkable. No growth of blood cultures x2  from 7/19 and x2 from 7/21  -Infectious diseases consulted, assistance appreciated.  Cefepime started empirically 7/21.  Discussed with them today, will obtain CT with contrast C-spine and C/A/P to further evaluate for source of infection.  -Obtain MRSA swab  -Monitor blood pressures.  Has mostly had elevated pressures, but this morning was 110/68.  In addition with described facial flushing, will continue to monitor closely for possible noxious causes of autonomic dysreflexia  -Monitor respiratory status, supplementary oxygen PRN.  Now weaned to room air.      PILI  -Continue CPAP with home settings     Diet: Regular consistency solids and thin liquids     Neurogenic bowel  -patient has been incontinent but states he is having more sensation and control of sphincter  -Stools have been loose therefore we will change all bowel meds to as needed including Senokot-S, MiraLAX, Dulcolax suppository. If remains incontinent consider formal bowel program  -Monitor, adjust as needed  -Ongoing loose and incontinent stools present.  Will add fiber today and nightly suppository for bowel program.     Urinary Retention  -Davis catheter placed 7/15/21.  Will do voiding trial today.  Monitor voids and PVRs.  IC as needed for PVRs >300 ml.    -Continue Flomax 0.4 mg daily     DONOVAN and likely CKD  -Pt with limited physician follow up so baseline creatinine not known. Peak at 2.13, improved with IV hydration; Cr 1.34 on 7/23.  Per review of labs, likely baseline seems to be ~1.2-1.4.  -Avoid nephrotoxic agents, NSAIDs    Renal lesion  -Incidental finding on CT scan  -Renal ultrasound showed simple cysts of bilateral kidneys  -Follow up with PCP      Pain  -Tylenol 650 mg q4h PRN  -Oxycodone 5 mg q4h PRN and Robaxin 750 mg q6h PRN.  Wean as able.     Steroid induced hyperglycemia, resolved  HbA1c 5.0  -Now completed course of steroids and glucose checks have been well controlled. No need for further routine checks      Acute  postoperative blood loss anemia  -hemoglobin 13.9 on 7/21.      Thrombocytopenia  -Noted upon initial admission to the emergency room. Has been stable low 100s. Unclear if due to acute distress and infection versus possible undiagnosed liver disease.   Platelets 107 7/23, overall stable  -Monitor peripherally, follow-up with primary care physician.      Psych  -Monitor mood, will consult health psychology if needed      Skin  -Barrier cream to red areas of groin and buttocks  -WOC consulted for possible pressure injury from Davis, but none seen from their assessment.  Assistance appreciated.     Social/Dispo  -Anticipate discharge home at a modified independent level for ambulation, mobility, and ADLs.   -ELOS: Tentative discharge date 8/9/21  -Rehab prognosis: Good  -Follow up appointments: Abnormal appearance of liver as seen on CAT scan with slight nodularity inferiorly. LFTs within normal limits (Follow-up primary care physician), NSGY (Dr. Hanley)     DVT prophylaxis  -PCDs and ambulation    Code status: Full Code (Discussed with patient upon admission)    Robi Whitehead MD  Department of Rehabilitation Medicine      Addendum:  Possible prevertebral abscess seen on CT.  Will discuss with neurosurgery team.    Addendum 2:  Discussed with NSGY team who will review images and discuss with attending.  I also discussed with ID and will add Vancomycin in addition to Cefepime.  Pharmacy to assist with dosing, consult placed.     Addendum 3:  MRI C-spine w/ and w/out contrast recommended per NSGY, order placed.  Patient updated.       Time Spent on this Encounter   I, Robi Whitehead, spent a total of 45 minutes face-to-face or managing the care of Rigo Johns. Over 50% of my time on the unit was spent counseling the patient and coordinating care. See note for details.

## 2021-07-23 NOTE — PLAN OF CARE
FOCUS/GOAL  Bowel management, Bladder management, Pain management, Skin integrity and Cognition/Memory/Judgment/Problem solving    ASSESSMENT, INTERVENTIONS AND CONTINUING PLAN FOR GOAL:  Pt is alert and oriented, reported continuing but decreasing neck pain, denied chest pain, sob, fever, chills, n/v or new numbness and tingling, incontinent of stool, torres in place and patent, PIV in right hand patent without blood return, IV antibiotics infused without issue, assist of 1 squat pivot, anterior neck incision CDI with steri strips, no further care concerns at this time continue with pOC,

## 2021-07-23 NOTE — CONSULTS
WOC Consult    S: WOC Consult to evaluate and treat possible pressure injury to ureteral meatus.    B: Per Dr Teri Jordan on 7/22/2021: Rigo Johns is a 70 year old L hand dominant male with a PMH of HTN, PILI, and prior cervical surgery (C5-7 anterior fusion in 1993 per notes) who is now status post a C3-4, C4-5 ACDF on 7/14/2021 for cervical myelopathy. Hospital course has been complicated by community-acquired pneumonia status post antibiotics, DONOVAN, urinary retention requiring a Torres catheter, steroid induced hyperglycemia, thrombocytopenia, and steroid induced leukocytosis.      A: Patient does have a torres catheter in place. On WOC assessment today, catheter is pulling to the left and is tight against the skin over the penis and testicles. Catheter moved. No pressure injury noted at meatus, penis or testicles. Blood had been noted on assessment, this may be coming from the urethra, no blood noted on WOC assessment today.    P: WOC will sign off. Please reposition securement device so patient has more tubing available so it's not pulling. Discussed with RN.    Gabby Merrill RN, CWOCN

## 2021-07-23 NOTE — PLAN OF CARE
Patient is alert and oriented x4, complains of pain in the anterior neck and bilateral shoulders. PRN Robaxin given x2 and PRN Tylenol given x1 with some relief per patient report. Also applied heat packs to bilateral neck/shoulder area at HS for additional relief. Patient is afebrile this shift, see flowsheet for details. Patient is Ax1 SPT for transfers,  based. Was incontinent of bowel x1. Davis is patent and draining adequately. Davis cares done. Blood noted at meatus, pt denies pain at the insertion site. Patient is tolerating diet well with good appetite. PIV to R hand patent and IV abx infused without difficulty. Saline locked now. Anterior neck incision is SATNAM with steri strips intact. Bed alarm is on for safety, call light is within reach. Continue with POC.

## 2021-07-23 NOTE — PLAN OF CARE
Discharge Planner Post-Acute Rehab PT:      Discharge Plan: Home with 3 JOHNSON with bilat handrail. Home care vs OP PT pending progress.      Precautions: falls, cervical     Current Status:  Bed Mobility: SBA supine<>sit  Transfer: SBA squat pivot transfer. Variable CGA<>min A for STS from EOB, WC  Gait: CGA with WW up to 37 feet  Stairs: NT  Balance: good sitting balance, needs heavy UE support in standing     Assessment: Despite fatigue and neck pain, pt consistently CGA to stand with WW and amb in room. OK to amb with RN staff in room. Caution long distances without w/c follow.       Other Barriers to Discharge (DME, Family Training, etc): JOHNSON home, family training, potential mixed mobility FWW vs WC

## 2021-07-23 NOTE — TELEPHONE ENCOUNTER
Received page from Dr. Robi Whitehead at  of [facility].     70M who is 9 days s/p C3-5 ACDF with Dr. Hanley. Patient has had fevers as high as 101.8 over the past few days.  He's also reporting neck pain. Per Dr. Rosenbaum, patient otherwise denies radicular pain, numbness, or weakness. Incision is intact, healing well, no drainage, swelling, erythema. ID was consulted at [facility] and started abx. Temp decreased to 100.7 with abx.     CT CERVICAL SPINE W CONTRAST 7/23/2021 12:50 PM  Impression:   1. Prevertebral fluid collection extending throughout the cervical  spine, extending anteriorly to the subcutaneous area within the right  side of the neck, which is concerning for seroma/abscess on this  noncontrast cervical CT. No definite fistula to the skin.   2. Postsurgical changes of ACDF at C3-5 without hardware loosening,  new compared to the 7/12/2021.      WBC 10.4    Discussed with Dr. Hanley. Communicated recommendations to Dr. Whitehead at [facility].     Plan:  - MRI w/wo contrast  - Okay to continue abx per ID  - Continue pain control measures and post op activity restrictions  - Routine wound care. Notify NSG with any concerns for infection.   - Will follow-up after MRI has been completed.     Carey Whittington [facility] Neurosurgery  [address] [address]  Tel [phone]  Pager [phone]

## 2021-07-23 NOTE — PLAN OF CARE
"Pt seen for PT session at 0700. Lying supine in bed with head elevated. States he didn't sleep well d/t neck pain and \"not feeling well.\" Assisted to chair for improved neck positioning, but pt experiencing dizziness upon standing. Low BP (110/63). Overall with greater difficulty in standing. Keeping eyes closed d/t pain. Generally not feeling well. RN aware.    Concern with combination of symptoms:  - dizziness  - low BPs  - increased neck pain  - decreased BLE functional strength   "

## 2021-07-23 NOTE — PLAN OF CARE
Alert and oriented x4. Davis present, draining dark yellow urine. Encouraged intake of fluids. Incontinent of bowel. LBM 7/23. Bowel program to start this evening. Some pain in anterior neck, repositioned and medications given with some relief. Assist of 1 with walker. Regular thin diet. PIV in R hand. WOC saw patient for possible urethral meatus pressure injury. WOC RN and this writer did not visualize any wound. Pt has marked baseline redness in groin and gluteal area. Bed alarm on for safety, call light within reach, Ok to continue with care plan.

## 2021-07-23 NOTE — PHARMACY-VANCOMYCIN DOSING SERVICE
Pharmacy Vancomycin Initial Note  Date of Service 2021  Patient's  1950  70 year old, male    Indication: Poss abscess, fever    Current estimated CrCl = Estimated Creatinine Clearance: 61.1 mL/min (A) (based on SCr of 1.44 mg/dL (H)).    Creatinine for last 3 days  2021:  5:49 AM Creatinine 1.34 mg/dL;  8:16 AM Creatinine 1.31 mg/dL  2021:  6:24 AM Creatinine 1.44 mg/dL    Recent Vancomycin Level(s) for last 3 days  No results found for requested labs within last 72 hours.      Vancomycin IV Administrations (past 72 hours)      No vancomycin orders with administrations in past 72 hours.                Nephrotoxins and other renal medications (From now, onward)    Start     Dose/Rate Route Frequency Ordered Stop    21 1630  vancomycin (VANCOCIN) 1,750 mg in sodium chloride 0.9 % 500 mL intermittent infusion      1,750 mg  over 2 Hours Intravenous EVERY 24 HOURS 21 1558            Contrast Orders - past 72 hours (72h ago, onward)    Start     Dose/Rate Route Frequency Ordered Stop    21 1230  iopamidol (ISOVUE-370) solution 150 mL      150 mL Intravenous ONCE 21 1200 21 1241        Loading dose: N/A  Regimen: 1750 mg IV every 24 hours.  Start time: 16:06 on 2021  Exposure target: AUC24 (range)400-600 mg/L.hr   AUC24,ss: 535 mg/L.hr  Probability of AUC24 > 400: 80 %  Ctrough,ss: 16.3 mg/L  Probability of Ctrough,ss > 20: 32 %  Probability of nephrotoxicity (Lodise DRE ): 12 %            Plan:  1. Start vancomycin  1750 mg IV q24h.   2. Vancomycin monitoring method: AUC  3. Vancomycin therapeutic monitoring goal: 400-600 mg*h/L  4. Pharmacy will check vancomycin levels as appropriate in 1-3 Days.    5. Serum creatinine levels will be ordered a minimum of twice weekly.      Janae Lizama, [facility]  PharmD,BCPS  2021

## 2021-07-24 ENCOUNTER — APPOINTMENT (OUTPATIENT)
Dept: PHYSICAL THERAPY | Facility: CLINIC | Age: 71
DRG: 052 | End: 2021-07-24
Attending: PHYSICAL MEDICINE & REHABILITATION
Payer: COMMERCIAL

## 2021-07-24 ENCOUNTER — APPOINTMENT (OUTPATIENT)
Dept: OCCUPATIONAL THERAPY | Facility: CLINIC | Age: 71
DRG: 052 | End: 2021-07-24
Attending: PHYSICAL MEDICINE & REHABILITATION
Payer: COMMERCIAL

## 2021-07-24 LAB
BACTERIA BLD CULT: NO GROWTH
BACTERIA BLD CULT: NO GROWTH
BASOPHILS # BLD AUTO: 0 10E3/UL (ref 0–0.2)
BASOPHILS NFR BLD AUTO: 0 %
CREAT SERPL-MCNC: 1.41 MG/DL (ref 0.66–1.25)
EOSINOPHIL # BLD AUTO: 0.2 10E3/UL (ref 0–0.7)
EOSINOPHIL NFR BLD AUTO: 2 %
ERYTHROCYTE [DISTWIDTH] IN BLOOD BY AUTOMATED COUNT: 11.9 % (ref 10–15)
GFR SERPL CREATININE-BSD FRML MDRD: 50 ML/MIN/1.73M2
HCT VFR BLD AUTO: 37.7 % (ref 40–53)
HGB BLD-MCNC: 12.2 G/DL (ref 13.3–17.7)
IMM GRANULOCYTES # BLD: 0.1 10E3/UL
IMM GRANULOCYTES NFR BLD: 1 %
LYMPHOCYTES # BLD AUTO: 1.8 10E3/UL (ref 0.8–5.3)
LYMPHOCYTES NFR BLD AUTO: 16 %
MCH RBC QN AUTO: 32.4 PG (ref 26.5–33)
MCHC RBC AUTO-ENTMCNC: 32.4 G/DL (ref 31.5–36.5)
MCV RBC AUTO: 100 FL (ref 78–100)
MONOCYTES # BLD AUTO: 0.9 10E3/UL (ref 0–1.3)
MONOCYTES NFR BLD AUTO: 8 %
NEUTROPHILS # BLD AUTO: 7.9 10E3/UL (ref 1.6–8.3)
NEUTROPHILS NFR BLD AUTO: 73 %
NRBC # BLD AUTO: 0 10E3/UL
NRBC BLD AUTO-RTO: 0 /100
PLATELET # BLD AUTO: 117 10E3/UL (ref 150–450)
RBC # BLD AUTO: 3.76 10E6/UL (ref 4.4–5.9)
WBC # BLD AUTO: 10.9 10E3/UL (ref 4–11)

## 2021-07-24 PROCEDURE — 97535 SELF CARE MNGMENT TRAINING: CPT | Mod: GO

## 2021-07-24 PROCEDURE — 36415 COLL VENOUS BLD VENIPUNCTURE: CPT | Performed by: PHYSICAL MEDICINE & REHABILITATION

## 2021-07-24 PROCEDURE — 97110 THERAPEUTIC EXERCISES: CPT | Mod: GP

## 2021-07-24 PROCEDURE — 97530 THERAPEUTIC ACTIVITIES: CPT | Mod: GP

## 2021-07-24 PROCEDURE — 36415 COLL VENOUS BLD VENIPUNCTURE: CPT | Performed by: STUDENT IN AN ORGANIZED HEALTH CARE EDUCATION/TRAINING PROGRAM

## 2021-07-24 PROCEDURE — 99231 SBSQ HOSP IP/OBS SF/LOW 25: CPT | Mod: 24 | Performed by: STUDENT IN AN ORGANIZED HEALTH CARE EDUCATION/TRAINING PROGRAM

## 2021-07-24 PROCEDURE — 97530 THERAPEUTIC ACTIVITIES: CPT | Mod: GO

## 2021-07-24 PROCEDURE — 999N000040 HC STATISTIC CONSULT NO CHARGE VASC ACCESS

## 2021-07-24 PROCEDURE — 250N000011 HC RX IP 250 OP 636: Performed by: INTERNAL MEDICINE

## 2021-07-24 PROCEDURE — 250N000013 HC RX MED GY IP 250 OP 250 PS 637: Performed by: STUDENT IN AN ORGANIZED HEALTH CARE EDUCATION/TRAINING PROGRAM

## 2021-07-24 PROCEDURE — 250N000013 HC RX MED GY IP 250 OP 250 PS 637: Performed by: PHYSICAL MEDICINE & REHABILITATION

## 2021-07-24 PROCEDURE — 999N000044 HC STATISTIC CVC DRESSING CHANGE

## 2021-07-24 PROCEDURE — 82565 ASSAY OF CREATININE: CPT | Performed by: PHYSICAL MEDICINE & REHABILITATION

## 2021-07-24 PROCEDURE — 85025 COMPLETE CBC W/AUTO DIFF WBC: CPT | Performed by: STUDENT IN AN ORGANIZED HEALTH CARE EDUCATION/TRAINING PROGRAM

## 2021-07-24 PROCEDURE — 99231 SBSQ HOSP IP/OBS SF/LOW 25: CPT | Mod: 24 | Performed by: INTERNAL MEDICINE

## 2021-07-24 PROCEDURE — 128N000003 HC R&B REHAB

## 2021-07-24 PROCEDURE — 999N000127 HC STATISTIC PERIPHERAL IV START W US GUIDANCE

## 2021-07-24 RX ORDER — LIDOCAINE 40 MG/G
CREAM TOPICAL
Status: DISCONTINUED | OUTPATIENT
Start: 2021-07-24 | End: 2021-07-27

## 2021-07-24 RX ORDER — LIDOCAINE 40 MG/G
CREAM TOPICAL
Status: ACTIVE | OUTPATIENT
Start: 2021-07-24 | End: 2021-07-27

## 2021-07-24 RX ADMIN — CEFEPIME HYDROCHLORIDE 2 G: 2 INJECTION, POWDER, FOR SOLUTION INTRAVENOUS at 02:35

## 2021-07-24 RX ADMIN — ACETAMINOPHEN 650 MG: 325 TABLET, FILM COATED ORAL at 16:37

## 2021-07-24 RX ADMIN — CEFEPIME HYDROCHLORIDE 2 G: 2 INJECTION, POWDER, FOR SOLUTION INTRAVENOUS at 10:15

## 2021-07-24 RX ADMIN — TAMSULOSIN HYDROCHLORIDE 0.4 MG: 0.4 CAPSULE ORAL at 09:13

## 2021-07-24 RX ADMIN — PSYLLIUM HUSK 1 PACKET: 3.4 POWDER ORAL at 09:13

## 2021-07-24 RX ADMIN — ACETAMINOPHEN 650 MG: 325 TABLET, FILM COATED ORAL at 21:16

## 2021-07-24 RX ADMIN — Medication 1 MG: at 21:17

## 2021-07-24 RX ADMIN — Medication 250 MG: at 09:13

## 2021-07-24 RX ADMIN — METHOCARBAMOL 750 MG: 750 TABLET ORAL at 21:17

## 2021-07-24 RX ADMIN — Medication 250 MG: at 21:17

## 2021-07-24 NOTE — PROGRESS NOTES
"  [facility] acute hospital   Acute Rehabilitation Unit  Daily progress note    INTERVAL HISTORY  Patient feeling \"ok\" this am. Denies SOB, chest pain, nausea, and vomiting. Having some 3-4/10 neck pain this am. Attributes neck pain to having 2 good periods of sleep without changing position much. Had no concerns and requested to keep wife updated about his progress which was done.    See below for update summary regarding neurosurg and ID consults and plan.     MEDICATIONS  Scheduled meds    bisacodyl  10 mg Rectal Once     melatonin  1 mg Oral At Bedtime     psyllium  1 packet Oral Daily     saccharomyces boulardii  250 mg Oral BID     tamsulosin  0.4 mg Oral Daily       PRN meds:  acetaminophen, bisacodyl, lidocaine 4%, lidocaine (buffered or not buffered), methocarbamol, naloxone **OR** naloxone **OR** naloxone **OR** naloxone, ondansetron, oxyCODONE, polyethylene glycol, senna-docusate, sodium chloride (PF), sodium chloride (PF), zinc-white petrolatum      PHYSICAL EXAM  /63 (BP Location: Right arm)   Pulse 79   Temp 99.8  F (37.7  C) (Oral)   Resp 18   Ht 1.803 m (5' 11\")   Wt 116.6 kg (257 lb)   SpO2 96%   BMI 35.84 kg/m    Gen: cooperative, alert, obese, mild facial flushing  HEENT: atraumatic, anterior cervical surgical incision covered with steri-strips and bandage.  No TTP in the posterior paraspinals or upper traps.   Pulm: Non-labored breathing  Abd: Non-disteded, Non tender to palpation  : Davis catheter present  Ext: Trace pitting edema in LE b/l, no calf tenderness, +edema in LUE from hand to above elbow, compression glove in place  Neuro/MSK: A&O x3, EOM intact, no slurring of speech     LABS  Results for orders placed or performed during the hospital encounter of 07/18/21 (from the past 24 hour(s))   MR Cervical Spine w/o & w Contrast    Narrative    MR CERVICAL SPINE W/O & W CONTRAST 7/23/2021 9:45 PM    Provided History: Neck pain, acute, infection suspected. " Status post  anterior cervical discectomy and fusion of C3-C5 on 7/14/2021.    Comparison: Same day cervical spine CT    Technique: Sagittal T1-weighted, sagittal T2-weighted, sagittal  diffusion weighted, axial T2-weighted, and axial T2* gradient echo  images of the cervical spine were obtained without intravenous  contrast. Following intravenous administration of gadolinium, axial  and sagittal T1-weighted images with fat saturation were also  obtained.    Contrast: 11 mL Gadavist IV    Findings:  The cervical vertebrae are normally aligned. Postoperative changes of  anterior cervical fusion hardware and intervertebral disc spacers at  C3-C5. There is no disc height narrowing at any level.  There is  normal signal within and normal contour of the cervical spinal cord.   There is no abnormal contrast enhancement within the cervical spinal  cord, thecal sac or vertebral column.  The findings on a level by  level basis are as follows:    C2-3:  No spinal canal or neural foraminal narrowing.    C3-4:  Bilateral facet and uncinate process arthropathy resulting in  moderate left neuroforaminal stenosis. No significant right neural  foraminal or spinal canal stenosis.    C4-5:  Bilateral facet and uncinate process arthropathy resulting in  moderate right and mild left neural foraminal stenosis. No significant  spinal canal stenosis.    C5-6:  Bilateral facet    The resulting in mild left neural foraminal stenosis. No significant  right neural foraminal or spinal canal stenosis.    C6-7:  No significant spinal canal or neural foraminal stenosis.    C7-T1:  No significant spinal canal or neural foraminal stenosis.     Peripherally enhancing fluid collection extending from C3 through T1  which is not completely included in the field-of-view and this MRI.  This fluid collection measures up to 1.2 x 4.2 x 10.6 cm (series 9,  image 22 and series 7, image 9). This collection extends anterior to  the right internal jugular vein  and common carotid artery. The  anterior extent of this lesion is outside of the field of view. This  peripherally enhancing collection likely extends further distally and  is partially visualized anterior to the T2 vertebral body however is  not well characterized given the limited field of view.      Impression    Impression:   1. Limited evaluation given the field of view of the peripherally  enhancing fluid collection concerning for prevertebral abscess which  spans along the anterior vertebral bodies of C3-T1. Can consider  dedicated neck CT if complete extend of this collection is needed.   2. Postoperative changes of ACDF at C3-C5.  3. Multilevel cervical spondylosis with multiple areas of neural  foraminal stenoses as detailed above.    I have personally reviewed the examination and initial interpretation  and I agree with the findings.    RIZWAN KNIGHT MD         SYSTEM ID:  [id number]   Creatinine   Result Value Ref Range    Creatinine 1.41 (H) 0.66 - 1.25 mg/dL    GFR Estimate 50 (L) >60 mL/min/1.73m2       ASSESSMENT AND PLAN      --Vitals stable. Cr slightly downtrending    -- Dr. Hanley (Neurosurgery from [facility]), viewed MRI. No surgical intervention recommended by him at this time. Will continue to monitor and team should reach out if patient decompensates.    -- ID recommends stopping abx today and continuing to monitor patient's progress. May take 48 hours for antibiotics to completely leave system. They will continue to follow.     --Continue therapies and plan of care otherwise.      Teri Jordan MD

## 2021-07-24 NOTE — PROGRESS NOTES
Notified by nursing this evening that patient's temp spiked again to 102 F. Visited with patient in his room, and he continues to remain asymptomatic and the rest of his vitals are stable. Contacted Infectious Disease team- appreciate their assistance. The plan is that we will continue with the current management course for now, and obtain labs- CBC with diff, BMP, CRP (in am) and blood cultures today. PICC line will also be placed for ease of access. If there any changes clinically, we will restart the cefepime and vancomycin for coverage. Will continue to monitor, discuss with ID and Neurosurgery as appropriate and adjust management plan accordingly. Updated his wife regarding his status and plans.     Irina Flores MD  Physical Medicine & Rehabilitation

## 2021-07-24 NOTE — PLAN OF CARE
FOCUS/GOAL  Medical management    ASSESSMENT, INTERVENTIONS AND CONTINUING PLAN FOR GOAL:  Pt is alert and oriented. Catheter is draining well. Secured well. Denies pain. Incision to neck is clean, steri strips are intact. Pt is independent w/ bed mobility. PIV to rt arm has small bleeding on the site, still patent w/ IV meds administration. Seen sleeping during safety round checks. Use Cpap while sleeping. Has incontinent of BM later on the shift. A1 w/ sandra cares. Sandra area is excoriated, cleanse and applied protective cream. Continue w/ POC.

## 2021-07-24 NOTE — PLAN OF CARE
Discharge Planner Post-Acute Rehab OT:     Discharge Plan: home with wife home vs OP therapy       Precautions: spine, fall    Current Status:  ADLs-  G/H: set up   UB dressing:  Mod A don pull over shirt   LB dressing: mod A w/AE  IADLs: tbd  Vision/Cognition: wfl at eval assess as needed    Assessment: Seen at bedside. SBA for safety with bed mobility. Participated in UE exercises within spine precautions. Needed urgent toilet assist. Incontinent BM x  1 this session. Nursing assist at bedside for toilet cares. -15 min d/t toileting need. Limited by surgical precautions, pain-limiting function, and incontinent bowel. Will continue to monitor and progress towards goals at ARU.    Other Barriers to Discharge (DME, Family Training, etc): to be determined

## 2021-07-24 NOTE — PLAN OF CARE
FOCUS/GOAL  Bowel management, Bladder management, Pain management, Wound care management, Mobility, and Skin integrity    ASSESSMENT, INTERVENTIONS AND CONTINUING PLAN FOR GOAL:  A/O x4, VSS on RA w/ low grade fever of 99.8.  CT done today showing possible abscess, MRI ordered and completed on this shift.  Daily IV vancomycin ordered.  PIV in Right hand patent and SL.  Up w/ assist one w/ walker to w/c.  Davis intact wnl.  Pt incont of BM x4 this shift, HS suppository held due to that.  Regular thin diet, takes pills well whole.  Neck incision intact, open to air w/ steri strips.  Mild c/o pain, prn tylenol give x 2 this shift and robaxin x1 both last given at 2200.  Redness in groin not new, pt reports having for many years, no open areas or c/o pain in groin.  Barrier cream applied prn.  Cont w/ POC.

## 2021-07-24 NOTE — PROGRESS NOTES
"  GENERAL ID SERVICE: PROGRESS NOTE  Patient:  Rigo Johns, Date of birth 1950, Medical record number [id number]  Date of Admission: 7/18/2021  Date of Visit:  7/23/2021         Assessment and Recommendations:   Problem List:    # Fever and CRP elevation     # Spinal stenosis s/p C3-4, C4-5 anterior cervical discetomy and fusion which occurred on 7/14/21 by Dr. Hanley    # CT cervical spine (7/23) - \"Prevertebral fluid collection extending throughout the cervical spine, extending anteriorly to the subcutaneous area within the right  side of the neck, which is concerning for seroma/abscess\" - Neurosurgery reviewed images - no surgical intervention at this time.     Recommendations:   1. Recommend holding abx therapy at this time and clinically monitoring to see if he continues to have recurrent fevers, which may then signify that the prevertebral fluid collection is indeed infectious.   2. Recommend collecting blood cultures when temp is 101 or greater   3. Recommend following CRP (qday x 3 more day)      Discussion: S/p C3-4, C4-5 anterior cervical discetomy and fusion on 7/14. Post-operative complications have included fevers and leukocytosis. Treated from 7/14-3 days of azithromycin (7/14-7/16) and 4 days of ceftriaxone (7/14-7/17) with transition to cefuroxime on 7/18 (stopped on 7/18) for presumable pneumonia. Off abx from 7/19-7/20. While off of abx, white count went up from 13-16. CRP up from 64 - 110. Was started on Cefepime. Overall, white count has declined, however his CRP has remained elevated 110.     CT of the C/A/P - did not find a possible infectious source. Blood cultures have been negative. CT of the cervical spine demonstrated \"Prevertebral fluid collection extending from the level of C2-3 down to the T2, measuring up to 1.2 cm thick at C4-5. Fluid collection extends anteriorly to the right subcutaneous area from the lower level of hyoid bone to the level of cricoid cartilage, measuring 1 cm " "thick. No definite extension to the skin. Thickening of the skin at this area.  Partial effacement of the bilateral piriform sinuses.\" At this time, neurosurgery did not think that there was any plan for an operative intervention.     Given the uncertainty of whether this is an infected fluid collection would agree with neurosurgery in holding abx and closely monitoring his clinical course. Ultimately, if he again decompensates while off abx, we would need to re-explore the need for an I&D(?)       Recommendations discussed with Rehab hospitalist    The General ID team will continue to follow this patient. Please feel free to call with any questions.     Rafaela Torres DO  Date of Service: 07/23/21  Pager: [phone]         Interval History:   No acute events overnight. Tmax 99.8  Other VSS.   Denies any headaches.   Currently does have neck pain.   Denies any chest pain, shortness of breath, ELIZABETH.   No abdominal pain. Does have loose stools, but no significant diarrhea.            Physical Exam:   /63 (BP Location: Right arm)   Pulse 79   Temp 99.8  F (37.7  C) (Oral)   Resp 18   Ht 1.803 m (5' 11\")   Wt 116.6 kg (257 lb)   SpO2 96%   BMI 35.84 kg/m       Exam:  GENERAL:  Well-developed, well-nourished, not in acute distress.   SKIN:  Surgical insusion on the anterior neck. No external signs of infection.  NEUROLOGIC:  Weakness on both legs. Bowel and urinary incontinence  PSYCHIATRIC: Mood stable, mentation appears normal, affect normal         Laboratory Data:     Creatinine   Date Value Ref Range Status   07/24/2021 1.41 (H) 0.66 - 1.25 mg/dL Final   07/23/2021 1.44 (H) 0.66 - 1.25 mg/dL Final   07/21/2021 1.31 (H) 0.66 - 1.25 mg/dL Final   07/21/2021 1.34 (H) 0.66 - 1.25 mg/dL Final   07/20/2021 1.21 0.66 - 1.25 mg/dL Final     WBC Count   Date Value Ref Range Status   07/23/2021 10.4 4.0 - 11.0 10e3/uL Final   07/22/2021 11.3 (H) 4.0 - 11.0 10e3/uL Final   07/21/2021 16.8 (H) 4.0 - 11.0 10e3/uL Final "   07/20/2021 16.7 (H) 4.0 - 11.0 10e3/uL Final   07/20/2021 16.1 (H) 4.0 - 11.0 10e3/uL Final     Hemoglobin   Date Value Ref Range Status   07/23/2021 12.4 (L) 13.3 - 17.7 g/dL Final     Platelet Count   Date Value Ref Range Status   07/23/2021 107 (L) 150 - 450 10e3/uL Final     Lab Results   Component Value Date     07/23/2021    BUN 23 07/23/2021    CO2 23 07/23/2021     CRP Inflammation   Date Value Ref Range Status   07/23/2021 110.0 (H) 0.0 - 8.0 mg/L Final   07/22/2021 110.0 (H) 0.0 - 8.0 mg/L Final   07/21/2021 100.0 (H) 0.0 - 8.0 mg/L Final   07/20/2021 74.0 (H) 0.0 - 8.0 mg/L Final   07/19/2021 64.0 (H) 0.0 - 8.0 mg/L Final

## 2021-07-24 NOTE — PLAN OF CARE
Alert and oriented x4. Davis present, draining dark yellow urine. Davis cares performed. Encouraged intake of fluids. Incontinent of bowel. LBM 7/24. Some pain in anterior neck, repositioning gave some relief. Regular thin diet. PIV in R hand infiltrated during morning IV antibiotics, approximately less than 10 mL infused. Antibiotics stopped, PIV removed, area marked with skin pen, and heat pack applied. Provider notified. No pain after PIV removed. Redness and slight swelling subsiding. Flyer and CRNA tried unsuccessfully to reestablish IV access, to no avail. Pt will need PICC line placed if antibiotics are continued, but at this time Neuro and ID do not want to continue antibiotics. Pt has marked baseline redness in groin and gluteal area. Bed alarm on for safety, call light within reach, Ok to continue with care plan.

## 2021-07-24 NOTE — PROGRESS NOTES
Cervical MRI reviewed with Dr. Hanley.  No plan for immediate operative NS intervention.  No need to be admitted.  Plan to follow clinically.    Recommend discussing antibiotics with ID and if thought appropriate, hold/stop antibiotics and monitor clinical course.    CHARLY Sinha  [facility]  [address]  [address] [place] [id number]    Tel [phone]  Pager [phone]

## 2021-07-25 ENCOUNTER — APPOINTMENT (OUTPATIENT)
Dept: OCCUPATIONAL THERAPY | Facility: CLINIC | Age: 71
DRG: 052 | End: 2021-07-25
Attending: PHYSICAL MEDICINE & REHABILITATION
Payer: COMMERCIAL

## 2021-07-25 ENCOUNTER — APPOINTMENT (OUTPATIENT)
Dept: PHYSICAL THERAPY | Facility: CLINIC | Age: 71
DRG: 052 | End: 2021-07-25
Attending: PHYSICAL MEDICINE & REHABILITATION
Payer: COMMERCIAL

## 2021-07-25 LAB
ALBUMIN SERPL-MCNC: 2.6 G/DL (ref 3.4–5)
ANION GAP SERPL CALCULATED.3IONS-SCNC: 3 MMOL/L (ref 3–14)
BUN SERPL-MCNC: 18 MG/DL (ref 7–30)
CALCIUM SERPL-MCNC: 8.1 MG/DL (ref 8.5–10.1)
CHLORIDE BLD-SCNC: 113 MMOL/L (ref 94–109)
CO2 SERPL-SCNC: 24 MMOL/L (ref 20–32)
CREAT SERPL-MCNC: 1.38 MG/DL (ref 0.66–1.25)
CRP SERPL-MCNC: 93 MG/L (ref 0–8)
GFR SERPL CREATININE-BSD FRML MDRD: 51 ML/MIN/1.73M2
GLUCOSE BLD-MCNC: 91 MG/DL (ref 70–99)
POTASSIUM BLD-SCNC: 4 MMOL/L (ref 3.4–5.3)
SODIUM SERPL-SCNC: 140 MMOL/L (ref 133–144)

## 2021-07-25 PROCEDURE — 97530 THERAPEUTIC ACTIVITIES: CPT | Mod: GO

## 2021-07-25 PROCEDURE — 36415 COLL VENOUS BLD VENIPUNCTURE: CPT | Performed by: STUDENT IN AN ORGANIZED HEALTH CARE EDUCATION/TRAINING PROGRAM

## 2021-07-25 PROCEDURE — 82040 ASSAY OF SERUM ALBUMIN: CPT | Performed by: STUDENT IN AN ORGANIZED HEALTH CARE EDUCATION/TRAINING PROGRAM

## 2021-07-25 PROCEDURE — 97530 THERAPEUTIC ACTIVITIES: CPT | Mod: GP

## 2021-07-25 PROCEDURE — 86140 C-REACTIVE PROTEIN: CPT | Performed by: STUDENT IN AN ORGANIZED HEALTH CARE EDUCATION/TRAINING PROGRAM

## 2021-07-25 PROCEDURE — 128N000003 HC R&B REHAB

## 2021-07-25 PROCEDURE — 97110 THERAPEUTIC EXERCISES: CPT | Mod: GP

## 2021-07-25 PROCEDURE — 250N000013 HC RX MED GY IP 250 OP 250 PS 637: Performed by: PHYSICAL MEDICINE & REHABILITATION

## 2021-07-25 PROCEDURE — 80048 BASIC METABOLIC PNL TOTAL CA: CPT | Performed by: STUDENT IN AN ORGANIZED HEALTH CARE EDUCATION/TRAINING PROGRAM

## 2021-07-25 PROCEDURE — 97535 SELF CARE MNGMENT TRAINING: CPT | Mod: GO

## 2021-07-25 PROCEDURE — 250N000013 HC RX MED GY IP 250 OP 250 PS 637: Performed by: STUDENT IN AN ORGANIZED HEALTH CARE EDUCATION/TRAINING PROGRAM

## 2021-07-25 PROCEDURE — 999N000007 HC SITE CHECK

## 2021-07-25 PROCEDURE — 87040 BLOOD CULTURE FOR BACTERIA: CPT | Performed by: STUDENT IN AN ORGANIZED HEALTH CARE EDUCATION/TRAINING PROGRAM

## 2021-07-25 RX ADMIN — Medication 1 MG: at 20:47

## 2021-07-25 RX ADMIN — TAMSULOSIN HYDROCHLORIDE 0.4 MG: 0.4 CAPSULE ORAL at 08:03

## 2021-07-25 RX ADMIN — METHOCARBAMOL 750 MG: 750 TABLET ORAL at 22:28

## 2021-07-25 RX ADMIN — Medication 250 MG: at 08:03

## 2021-07-25 RX ADMIN — ACETAMINOPHEN 650 MG: 325 TABLET, FILM COATED ORAL at 16:51

## 2021-07-25 RX ADMIN — PSYLLIUM HUSK 1 PACKET: 3.4 POWDER ORAL at 08:03

## 2021-07-25 RX ADMIN — ACETAMINOPHEN 650 MG: 325 TABLET, FILM COATED ORAL at 04:34

## 2021-07-25 RX ADMIN — Medication 250 MG: at 20:47

## 2021-07-25 RX ADMIN — ACETAMINOPHEN 650 MG: 325 TABLET, FILM COATED ORAL at 22:28

## 2021-07-25 ASSESSMENT — MIFFLIN-ST. JEOR: SCORE: 1859.87

## 2021-07-25 NOTE — PROGRESS NOTES
"  [facility]   Acute Rehabilitation Unit  Daily progress note    INTERVAL HISTORY  No acute events overnight. Patient feeling much better this am. Denies SOB, chest pain, nausea, and vomiting. Happy that his fever has remained at bay last night and this am and looking forward to participating in therapies today.       MEDICATIONS  Scheduled meds    bisacodyl  10 mg Rectal Once     melatonin  1 mg Oral At Bedtime     psyllium  1 packet Oral Daily     saccharomyces boulardii  250 mg Oral BID     sodium chloride (PF)  3 mL Intracatheter Q8H     tamsulosin  0.4 mg Oral Daily       PRN meds:  acetaminophen, bisacodyl, lidocaine 4%, lidocaine 4%, lidocaine (buffered or not buffered), lidocaine (buffered or not buffered), methocarbamol, naloxone **OR** naloxone **OR** naloxone **OR** naloxone, ondansetron, oxyCODONE, polyethylene glycol, senna-docusate, sodium chloride (PF), sodium chloride (PF), sodium chloride (PF), sodium chloride (PF), zinc-white petrolatum      PHYSICAL EXAM  /67 (BP Location: Left arm)   Pulse 78   Temp 98.4  F (36.9  C) (Oral)   Resp 24   Ht 1.803 m (5' 11\")   Wt 108.3 kg (238 lb 11.2 oz)   SpO2 93%   BMI 33.29 kg/m      Gen: cooperative, alert, obese  HEENT: atraumatic, anterior cervical surgical incision covered with steri-strips and bandage.  No TTP in the posterior paraspinals or upper traps.   Pulm: Non-labored breathing  Abd: Non-distended, Non tender to palpation, bowel sounds present  : Davis catheter present  Ext: Trace pitting edema in LE b/l with compression socks in place, no calf tenderness, +edema in LUE from hand to above elbow, compression glove in place  Neuro/MSK: A&O x3, EOM intact, no slurring of speech     LABS  Results for orders placed or performed during the hospital encounter of 07/18/21 (from the past 24 hour(s))   CBC with Platelets & Differential    Narrative    The following orders were created for panel order CBC with " Platelets & Differential.  Procedure                               Abnormality         Status                     ---------                               -----------         ------                     CBC with platelets and d...[[id number]]  Abnormal            Final result                 Please view results for these tests on the individual orders.   CBC with platelets and differential   Result Value Ref Range    WBC Count 10.9 4.0 - 11.0 10e3/uL    RBC Count 3.76 (L) 4.40 - 5.90 10e6/uL    Hemoglobin 12.2 (L) 13.3 - 17.7 g/dL    Hematocrit 37.7 (L) 40.0 - 53.0 %     78 - 100 fL    MCH 32.4 26.5 - 33.0 pg    MCHC 32.4 31.5 - 36.5 g/dL    RDW 11.9 10.0 - 15.0 %    Platelet Count 117 (L) 150 - 450 10e3/uL    % Neutrophils 73 %    % Lymphocytes 16 %    % Monocytes 8 %    % Eosinophils 2 %    % Basophils 0 %    % Immature Granulocytes 1 %    NRBCs per 100 WBC 0 <1 /100    Absolute Neutrophils 7.9 1.6 - 8.3 10e3/uL    Absolute Lymphocytes 1.8 0.8 - 5.3 10e3/uL    Absolute Monocytes 0.9 0.0 - 1.3 10e3/uL    Absolute Eosinophils 0.2 0.0 - 0.7 10e3/uL    Absolute Basophils 0.0 0.0 - 0.2 10e3/uL    Absolute Immature Granulocytes 0.1 (H) <=0.0 10e3/uL    Absolute NRBCs 0.0 10e3/uL   Basic metabolic panel   Result Value Ref Range    Sodium 140 133 - 144 mmol/L    Potassium 4.0 3.4 - 5.3 mmol/L    Chloride 113 (H) 94 - 109 mmol/L    Carbon Dioxide (CO2) 24 20 - 32 mmol/L    Anion Gap 3 3 - 14 mmol/L    Urea Nitrogen 18 7 - 30 mg/dL    Creatinine 1.38 (H) 0.66 - 1.25 mg/dL    Calcium 8.1 (L) 8.5 - 10.1 mg/dL    Glucose 91 70 - 99 mg/dL    GFR Estimate 51 (L) >60 mL/min/1.73m2   CRP inflammation   Result Value Ref Range    CRP Inflammation 93.0 (H) 0.0 - 8.0 mg/L   Albumin level   Result Value Ref Range    Albumin 2.6 (L) 3.4 - 5.0 g/dL       ASSESSMENT AND PLAN      --Vitals stable, no return of fever since yesterday afternoon. Patient feeling much better and CRP now downtrending (93 today < 110 7/23). Cr continues to  slightly downtrend too. Patient encouraged to increase fluid intake.     --Wife updated    --ID recommends staying course and continuing the discontinuation of abx and continuing to monitor patient's progress. They will continue to follow.     --Continue therapies and plan of care otherwise.      Teri Jordan MD

## 2021-07-25 NOTE — PLAN OF CARE
Alert and oriented x4. Davis present, draining dark yellow urine. Encouraged intake of fluids. Incontinent of bowel. LBM 7/25. Some pain in anterior neck, repositioning gave some relief. Regular thin diet. Extended dwell PIV in R arm, patent, saline flushed. Right hand with prior IV infiltration on 7/24 has minimal redness and no swelling. Pt has marked baseline redness in groin and gluteal area. Bed alarm on for safety, call light within reach, Ok to continue with care plan.

## 2021-07-25 NOTE — PLAN OF CARE
FOCUS/GOAL  Bowel management, Bladder management, Pain management, Mobility, Skin integrity, and Safety management    ASSESSMENT, INTERVENTIONS AND CONTINUING PLAN FOR GOAL:  A/O x4, VSS on RA.  Wears cpap @ NOC.  Up w/ assist one w/ walker and gait belt.  Regular thin diet, takes pills well whole.  Temp of 101.2, recheck later this evening was 99.1.  c/o pain, prn tylenol given x2 this shift  and robaxin x1, both last given at 2115.  Incontinent of bowel this shift. Davis draining wnl.  Unable to get PICC placed due to no coverage over the weekend w/ PICC team, instead got an extended dwell PIV placed to right FA for the time being.  Cont w/ POC.

## 2021-07-25 NOTE — PLAN OF CARE
Discharge Planner Post-Acute Rehab PT:      Discharge Plan: Home with 3 JOHNSON with bilat handrail. Home care vs OP PT pending progress.      Precautions: falls, cervical     Current Status:  Bed Mobility: SBA supine<>sit  Transfer: SBA squat pivot transfer. Variable CGA<>min A for STS from EOB, WC  Gait: CGA with WW up to 37 feet  Stairs: NT  Balance: good sitting balance, needs heavy UE support in standing     Assessment: Pt pleasant and motivated-likes to analyze the mechanics of his therapy session, which makes him very thorough and aware of safe techniques for transfers and gait.       Other Barriers to Discharge (DME

## 2021-07-25 NOTE — PLAN OF CARE
FOCUS/GOAL  Medical management    ASSESSMENT, INTERVENTIONS AND CONTINUING PLAN FOR GOAL:  Pt is alert and oriented. Use call light appropriately and verbalized needs. Incontinent of bowel. Needs A1 w/ sandra cares and clothing management. Pt is able to turn from side to side during cares. Sandra area is excoriated w/ patchy rashes, cleanse well and protective cream applied. Catheter is draining.  draw specimen for blood culture past midnight,results pending. PIV to rt lower arm flush w/o difficulty. Pt complained of dull ache on his neck, prn tylenol given. Pt denies chills. Has tingling on rt fingertips but that has been his baseline. Latest temp of 99.1 deg F. Cont w/ POC.

## 2021-07-25 NOTE — PLAN OF CARE
Discharge Planner Post-Acute Rehab OT:     Discharge Plan: home with wife home vs OP therapy    Precautions: c spine, falls    Current Status:  ADLs: set up at sink for g/h tasks; Mod A for UB dressing; Min A for adjusting over hips when standing using walker; Needs catheter mgmt and AE for LB dressing  IADLs: tbd  Vision/Cognition: wfl at al assess as needed    Assessment: instructed patient in ADLS using lindsey techniques and AE when seated at EOB. Progressing well towards functional goals. Will benefit from ongoing OT services.    Other Barriers to Discharge (DME, Family Training, etc): 2 JOHNSON from outside; DME-- pt has shower chair; Family training if going home with spouse and home therapy

## 2021-07-25 NOTE — PLAN OF CARE
Discharge Planner Post-Acute Rehab PT:      Discharge Plan: Home with 3 JOHNSON with bilat handrail. Home care vs OP PT pending progress.      Precautions: falls, cervical     Current Status:  Bed Mobility: SBA supine<>sit  Transfer: SBA squat pivot transfer. Variable CGA<>min A for STS from EOB, WC  Gait: CGA with WW up to 37 feet  Stairs: NT  Balance: good sitting balance, needs heavy UE support in standing     Assessment: Improved functional capacity today despite ongoing fevers. More consistently able to amb short distances and stand with walker.      Other Barriers to Discharge (DME

## 2021-07-26 ENCOUNTER — APPOINTMENT (OUTPATIENT)
Dept: PHYSICAL THERAPY | Facility: CLINIC | Age: 71
DRG: 052 | End: 2021-07-26
Attending: PHYSICAL MEDICINE & REHABILITATION
Payer: COMMERCIAL

## 2021-07-26 ENCOUNTER — APPOINTMENT (OUTPATIENT)
Dept: OCCUPATIONAL THERAPY | Facility: CLINIC | Age: 71
DRG: 052 | End: 2021-07-26
Attending: PHYSICAL MEDICINE & REHABILITATION
Payer: COMMERCIAL

## 2021-07-26 LAB
ANION GAP SERPL CALCULATED.3IONS-SCNC: 7 MMOL/L (ref 3–14)
BACTERIA BLD CULT: NO GROWTH
BACTERIA BLD CULT: NO GROWTH
BASOPHILS # BLD AUTO: 0.1 10E3/UL (ref 0–0.2)
BASOPHILS # BLD AUTO: 0.1 10E3/UL (ref 0–0.2)
BASOPHILS NFR BLD AUTO: 1 %
BASOPHILS NFR BLD AUTO: 1 %
BUN SERPL-MCNC: 17 MG/DL (ref 7–30)
CALCIUM SERPL-MCNC: 8.3 MG/DL (ref 8.5–10.1)
CHLORIDE BLD-SCNC: 112 MMOL/L (ref 94–109)
CO2 SERPL-SCNC: 22 MMOL/L (ref 20–32)
CREAT SERPL-MCNC: 1.39 MG/DL (ref 0.66–1.25)
CRP SERPL-MCNC: 76 MG/L (ref 0–8)
EOSINOPHIL # BLD AUTO: 0.3 10E3/UL (ref 0–0.7)
EOSINOPHIL # BLD AUTO: 0.3 10E3/UL (ref 0–0.7)
EOSINOPHIL NFR BLD AUTO: 2 %
EOSINOPHIL NFR BLD AUTO: 3 %
ERYTHROCYTE [DISTWIDTH] IN BLOOD BY AUTOMATED COUNT: 11.8 % (ref 10–15)
ERYTHROCYTE [DISTWIDTH] IN BLOOD BY AUTOMATED COUNT: 11.9 % (ref 10–15)
GFR SERPL CREATININE-BSD FRML MDRD: 51 ML/MIN/1.73M2
GLUCOSE BLD-MCNC: 89 MG/DL (ref 70–99)
HCT VFR BLD AUTO: 36.6 % (ref 40–53)
HCT VFR BLD AUTO: 38.3 % (ref 40–53)
HGB BLD-MCNC: 12.1 G/DL (ref 13.3–17.7)
HGB BLD-MCNC: 12.4 G/DL (ref 13.3–17.7)
IMM GRANULOCYTES # BLD: 0.1 10E3/UL
IMM GRANULOCYTES # BLD: 0.1 10E3/UL
IMM GRANULOCYTES NFR BLD: 1 %
IMM GRANULOCYTES NFR BLD: 1 %
LYMPHOCYTES # BLD AUTO: 2 10E3/UL (ref 0.8–5.3)
LYMPHOCYTES # BLD AUTO: 2 10E3/UL (ref 0.8–5.3)
LYMPHOCYTES NFR BLD AUTO: 16 %
LYMPHOCYTES NFR BLD AUTO: 17 %
MCH RBC QN AUTO: 32.3 PG (ref 26.5–33)
MCH RBC QN AUTO: 32.8 PG (ref 26.5–33)
MCHC RBC AUTO-ENTMCNC: 32.4 G/DL (ref 31.5–36.5)
MCHC RBC AUTO-ENTMCNC: 33.1 G/DL (ref 31.5–36.5)
MCV RBC AUTO: 100 FL (ref 78–100)
MCV RBC AUTO: 99 FL (ref 78–100)
MONOCYTES # BLD AUTO: 1 10E3/UL (ref 0–1.3)
MONOCYTES # BLD AUTO: 1 10E3/UL (ref 0–1.3)
MONOCYTES NFR BLD AUTO: 9 %
MONOCYTES NFR BLD AUTO: 9 %
NEUTROPHILS # BLD AUTO: 8 10E3/UL (ref 1.6–8.3)
NEUTROPHILS # BLD AUTO: 8.7 10E3/UL (ref 1.6–8.3)
NEUTROPHILS NFR BLD AUTO: 69 %
NEUTROPHILS NFR BLD AUTO: 71 %
NRBC # BLD AUTO: 0 10E3/UL
NRBC # BLD AUTO: 0 10E3/UL
NRBC BLD AUTO-RTO: 0 /100
NRBC BLD AUTO-RTO: 0 /100
PLATELET # BLD AUTO: 132 10E3/UL (ref 150–450)
PLATELET # BLD AUTO: 139 10E3/UL (ref 150–450)
POTASSIUM BLD-SCNC: 4 MMOL/L (ref 3.4–5.3)
RBC # BLD AUTO: 3.69 10E6/UL (ref 4.4–5.9)
RBC # BLD AUTO: 3.84 10E6/UL (ref 4.4–5.9)
SODIUM SERPL-SCNC: 141 MMOL/L (ref 133–144)
WBC # BLD AUTO: 11.5 10E3/UL (ref 4–11)
WBC # BLD AUTO: 12.1 10E3/UL (ref 4–11)

## 2021-07-26 PROCEDURE — 97110 THERAPEUTIC EXERCISES: CPT | Mod: GO | Performed by: STUDENT IN AN ORGANIZED HEALTH CARE EDUCATION/TRAINING PROGRAM

## 2021-07-26 PROCEDURE — 128N000003 HC R&B REHAB

## 2021-07-26 PROCEDURE — 86140 C-REACTIVE PROTEIN: CPT | Performed by: STUDENT IN AN ORGANIZED HEALTH CARE EDUCATION/TRAINING PROGRAM

## 2021-07-26 PROCEDURE — 80048 BASIC METABOLIC PNL TOTAL CA: CPT | Performed by: STUDENT IN AN ORGANIZED HEALTH CARE EDUCATION/TRAINING PROGRAM

## 2021-07-26 PROCEDURE — 97110 THERAPEUTIC EXERCISES: CPT | Mod: GP | Performed by: STUDENT IN AN ORGANIZED HEALTH CARE EDUCATION/TRAINING PROGRAM

## 2021-07-26 PROCEDURE — 999N000007 HC SITE CHECK

## 2021-07-26 PROCEDURE — 99233 SBSQ HOSP IP/OBS HIGH 50: CPT | Mod: GC | Performed by: PHYSICAL MEDICINE & REHABILITATION

## 2021-07-26 PROCEDURE — 36415 COLL VENOUS BLD VENIPUNCTURE: CPT | Performed by: STUDENT IN AN ORGANIZED HEALTH CARE EDUCATION/TRAINING PROGRAM

## 2021-07-26 PROCEDURE — 250N000013 HC RX MED GY IP 250 OP 250 PS 637: Performed by: STUDENT IN AN ORGANIZED HEALTH CARE EDUCATION/TRAINING PROGRAM

## 2021-07-26 PROCEDURE — 250N000013 HC RX MED GY IP 250 OP 250 PS 637: Performed by: PHYSICAL MEDICINE & REHABILITATION

## 2021-07-26 PROCEDURE — 97535 SELF CARE MNGMENT TRAINING: CPT | Mod: GO | Performed by: STUDENT IN AN ORGANIZED HEALTH CARE EDUCATION/TRAINING PROGRAM

## 2021-07-26 PROCEDURE — 85025 COMPLETE CBC W/AUTO DIFF WBC: CPT | Performed by: STUDENT IN AN ORGANIZED HEALTH CARE EDUCATION/TRAINING PROGRAM

## 2021-07-26 PROCEDURE — 97530 THERAPEUTIC ACTIVITIES: CPT | Mod: GP | Performed by: STUDENT IN AN ORGANIZED HEALTH CARE EDUCATION/TRAINING PROGRAM

## 2021-07-26 RX ORDER — SACCHAROMYCES BOULARDII 250 MG
250 CAPSULE ORAL 2 TIMES DAILY
Status: DISCONTINUED | OUTPATIENT
Start: 2021-07-26 | End: 2021-07-27 | Stop reason: HOSPADM

## 2021-07-26 RX ORDER — TRAZODONE HYDROCHLORIDE 50 MG/1
50 TABLET, FILM COATED ORAL AT BEDTIME
Status: DISCONTINUED | OUTPATIENT
Start: 2021-07-26 | End: 2021-07-27 | Stop reason: HOSPADM

## 2021-07-26 RX ORDER — BISACODYL 10 MG
10 SUPPOSITORY, RECTAL RECTAL ONCE
Status: DISCONTINUED | OUTPATIENT
Start: 2021-07-26 | End: 2021-07-27 | Stop reason: HOSPADM

## 2021-07-26 RX ADMIN — ACETAMINOPHEN 650 MG: 325 TABLET, FILM COATED ORAL at 22:03

## 2021-07-26 RX ADMIN — Medication 250 MG: at 20:54

## 2021-07-26 RX ADMIN — PSYLLIUM HUSK 1 PACKET: 3.4 POWDER ORAL at 08:26

## 2021-07-26 RX ADMIN — METHOCARBAMOL 750 MG: 750 TABLET ORAL at 22:09

## 2021-07-26 RX ADMIN — Medication 250 MG: at 08:26

## 2021-07-26 RX ADMIN — ACETAMINOPHEN 650 MG: 325 TABLET, FILM COATED ORAL at 05:49

## 2021-07-26 RX ADMIN — TRAZODONE HYDROCHLORIDE 50 MG: 50 TABLET ORAL at 22:03

## 2021-07-26 RX ADMIN — Medication 1 MG: at 20:54

## 2021-07-26 NOTE — PLAN OF CARE
Discharge Planner Post-Acute Rehab PT:     Discharge Plan: Home with 3 JOHNSON with bilat handrail. Home care vs OP PT pending progress. Increased support     Precautions: falls, cervical, orthostatic episode 7/26     Current Status:  Bed Mobility: SBA supine<>sit  Transfer: SBA squat pivot transfer. Variable CGA<>min A for STS from EOB, WC  Gait: CGA with WW up to 37 feet  Stairs: NT  Balance: good sitting balance, needs heavy UE support in standing     Assessment: Pt w/ c/o feeling exhausted, states 15 days of poor sleep - multifactorial reasons. PT addressed positioning noc as well as sitting, adjusted C-collar. Spoke with PA and RN for minimizing noc interruptions, clustering cares, other sleep hygiene practices.   Completed bed>wc SPT with FWW CGA.     Of note- orthostatic episode in the morning.        Other Barriers to Discharge (DME, Family Training, etc): JOHNSON home, family training, potential mixed mobility FWW vs WC

## 2021-07-26 NOTE — PLAN OF CARE
A&O x4, able to make needs known. Denies SOB/chest pain/dizziness/headache. C/o pain in neck managed with PRN tylenol/robaxin x1 before bed. Ant neck incision steri strips in place, SATNAM. Davis in place with adequate output, LBM 7/25 incontinent. Reports tingling in R index finger only but per pt report it is improved from earlier in the day. PIV SL. Refused CPAP at bedtime. Continue to monitor.

## 2021-07-26 NOTE — PROGRESS NOTES
"  [facility]   Acute Rehabilitation Unit  Daily progress note    INTERVAL HISTORY  Over weekend, patient had a fever 101.2 on Saturday afternoon. Neurosurgery reviewed imaging and suggested no surgical intervention at this time and would keep monitoring. ID discontinued antibiotics and continued to monitor patient daily, along with primary team. Patient only with low grade fevers following Saturday afternoon and had no other concerns or acute events overnight.     This am patient, endorses having awful sleep with the continuous interruptions throughout the night. It is significantly affecting his mental health. Other than that, he feels \"ok\" with no new concerns, denying nausea, vomiting, chest pain, and SOB.    Functionally, he can ambulate up to 37 feet with contact guard assist and walker. Needs stand by assist with supine <> sit. Stand by assist with squat pivot transfer and has good sitting balance, but needs heavy UE support with standing. He needs moderate assist to don pull-over shirt and moderate assist with lower body dressing.       MEDICATIONS  Scheduled meds    bisacodyl  10 mg Rectal Once     melatonin  1 mg Oral At Bedtime     psyllium  1 packet Oral Daily     saccharomyces boulardii  250 mg Oral BID     sodium chloride (PF)  3 mL Intracatheter Q8H     tamsulosin  0.4 mg Oral Daily       PRN meds:  acetaminophen, bisacodyl, lidocaine 4%, lidocaine 4%, lidocaine (buffered or not buffered), lidocaine (buffered or not buffered), methocarbamol, naloxone **OR** naloxone **OR** naloxone **OR** naloxone, ondansetron, oxyCODONE, polyethylene glycol, senna-docusate, sodium chloride (PF), sodium chloride (PF), sodium chloride (PF), sodium chloride (PF), zinc-white petrolatum      PHYSICAL EXAM  /68 (BP Location: Left arm)   Pulse 76   Temp 99.1  F (37.3  C) (Oral)   Resp 16   Ht 1.803 m (5' 11\")   Wt 108.3 kg (238 lb 11.2 oz)   SpO2 94%   BMI 33.29 kg/m  "     Gen: cooperative, alert, obese, tearful  HEENT: atraumatic, anterior cervical surgical incision covered with steri-strips and bandage.    Pulm: Non-labored breathing, CTAB  Abd: Non-disteded, bowel sounds present  : Davis catheter present  Ext: Trace pitting edema in LE b/l, no calf tenderness, +edema in LUE from hand to above elbow, compression glove in place  Neuro/MSK: A&O x3, EOM intact, no slurring of speech     LABS  Results for orders placed or performed during the hospital encounter of 07/18/21 (from the past 24 hour(s))   CBC with Platelets & Differential    Narrative    The following orders were created for panel order CBC with Platelets & Differential.  Procedure                               Abnormality         Status                     ---------                               -----------         ------                     CBC with platelets and d...[[id number]]                                                   Please view results for these tests on the individual orders.       ASSESSMENT AND PLAN    Rigo Johns is a 70 year old L hand dominant male with a PMH of HTN, PILI, and prior cervical surgery (C5-7 anterior fusion in 1993 per notes) who is now status post a C3-4, C4-5 ACDF on 7/14/2021 for cervical myelopathy. Hospital course has been complicated by community-acquired pneumonia status post antibiotics, DONOVAN, urinary retention requiring a Davis catheter, steroid induced hyperglycemia, thrombocytopenia, and steroid induced leukocytosis.      Impairment group code: 04.1211 Quadriplegia, Incomplete C1-4 - s/p C3-5 ACDF.     PLAN  Rehabilitation     1. PT and OT 90 minutes of each on a daily basis, in addition to rehab nursing and close management of physiatrist.       2. Impairment of ADL's: Noted to have impaired ROM, impaired strength, impaired activity tolerance, edema management, impaired balance, and impaired coordination, all affecting his ability to safely and independently perform basic  ADLs.  Goal for mod I with basic ADLs.     3. Impairment of mobility:  Noted to have impaired ROM, impaired strength, impaired activity tolerance, edema management, impaired balance and impaired coordination, all affecting his ability to safely and independently ambulate and perform basic mobility.  Goal for mod I with basic mobility.     Medical  Cervical Myelopathy s/p C3-4, C4-5 ACDF on 7/14/21   -Maintain postoperative spinal precautions. No need for cervical collar.  -Follow-up with neurosurgery scheduled for 7/28        Fever 2/2 possible autonomic dysreflexia vs infection  Recent CAP infection  Leukocytosis  - Course of antibiotics completed prior to admission to rehab (Zosyn x1 in ED, Azithromycin 7/12-7/16, Ceftriaxone 7/12 - 7/17, and Cefuroxime x1)  Continues to have fevers and loose stools with incontinence. WBCs are improved, today 12.1 (peak 16.8 on 7/21) and CRP 76.0 today (peak 110 7/23). Most recent fever of 101.2 on afternoon 7/24, o/w continues with low-grade fevers.   - Prior workup has included CXR, lactic acid, UA, procalc, C. Diff, and U/S of RLE and LUE are all unremarkable. No growth of blood cultures from 7/19 (final), NGTD from 7/21 & 7/25 (x2 each)  - Imaging 7/23: Possible prevertebral abscess seen on CT and MRI showed fluid collection concerning for prevertebral abscess which spans along the anterior vertebral bodies of C3-T1. Neurosurg does not recommend surgical intervention at this time, but asked to be paged if his clinical status changes/worsens.  - Infectious diseases consulted, continued assistance greatly appreciated.  Cefepime started empirically 7/21 and vanc on 7/23 (x1 only). Both abx discontinued on 7/24 per ID recs.    - MRSA swab negative  - Monitor blood pressures. In addition with described facial flushing, will continue to monitor closely for possible noxious causes of autonomic dysreflexia  - Monitor respiratory status, supplementary oxygen PRN.  Now weaned to room  air.       Insomnia  Mood  - Start trazodone today  - Placed orders and sign on door not to bother between [id number]  - Psychology consulted    PILI  -Continue CPAP with home settings     Diet: Regular consistency solids and thin liquids     Neurogenic bowel  -patient has been incontinent but states he is having more sensation and control of sphincter  -Stools have been loose therefore we will change all bowel meds to as needed including Senokot-S, MiraLAX, Dulcolax suppository PRN.  -Monitor, adjust as needed  -Ongoing loose and incontinent stools present.  Fiber added and nightly  -Suppository once tonight and reassess bowel function tomorrow;  If remains incontinent consider formal bowel program     Urinary Retention  -Davis catheter placed 7/15/21.  Will do voiding trial today.  Monitor voids and PVRs.  IC as needed for PVRs >300 ml.    -Continue Flomax 0.4 mg daily     DONOVAN and likely CKD  Peak at 2.13, improved with IV hydration; Cr 1.39 7/26   - Per review of labs, likely baseline seems to be ~1.2-1.4.  - Avoid nephrotoxic agents, NSAIDs    Renal lesion  - Incidental finding on CT scan  - Renal ultrasound showed simple cysts of bilateral kidneys  - Follow up with PCP      Pain  - Tylenol 650 mg q4h PRN  - Oxycodone 5 mg q4h PRN and Robaxin 750 mg q6h PRN.  Wean as able.     Steroid induced hyperglycemia, resolved  HbA1c 5.0  - Now completed course of steroids and glucose checks have been well controlled. No need for further routine checks      Acute postoperative blood loss anemia  - hemoglobin 12.4 7/26.      Thrombocytopenia  - Noted upon initial admission to the emergency room. Has been stable low 100s. Unclear if due to acute distress and infection versus possible undiagnosed liver disease.   Platelets 139 7/26, overall stable  - Monitor peripherally, follow-up with primary care physician.      Psych  - Monitor mood, will consult health psychology if needed      Skin  - Barrier cream to red areas of groin  and buttocks  - WOC consulted for possible pressure injury from Davis, but none seen from their assessment.  Assistance appreciated.     Social/Dispo  - Anticipate discharge home at a modified independent level for ambulation, mobility, and ADLs.   - ELOS: Tentative discharge date 8/9/21  - Rehab prognosis: Good  - Follow up appointments: Abnormal appearance of liver as seen on CAT scan with slight nodularity inferiorly. LFTs within normal limits (Follow-up primary care physician), NSGY (Dr. Hanley)     DVT prophylaxis  - PCDs and ambulation    Code status: Full Code (Discussed with patient upon admission)    Teri Jordan MD    Department of Rehabilitation Medicine

## 2021-07-26 NOTE — PLAN OF CARE
FOCUS/GOAL  Bowel management, Bladder management, Medical management, Reinforcement of self-care/ADL and Adjustment to lifestyle change    ASSESSMENT, INTERVENTIONS AND CONTINUING PLAN FOR GOAL:  Patient is alert/oriented x4, has been able to direct cares appropriately on this shift.  Patient has anterior incision to neck with just steri strips and SATNAM.  Patient can ambulate with CGAo1 with a walker and gait belt.  Patient has bee without antibiotics the last few days, MD ordered CBC BMP and CRP this am.  Patient had symptomatic orthostatic hypotension with OT this am (91/74), recheck at 0829 was 108/65 sitting upright. MD was notified of the orthostatic BP, also notified that the WBC count came back elevated slightly more than last check -12.1 and discussed holding off on Tylenol when possible to monitor temp trends, was 99.1 this am and also pushing fluids more today.  CRP is also elevated but that level has improved from last check, is now 76.  Patient reported poor sleep from the night before, worked with PT to come up with strategies for comfort when sitting up in chair and in bed-- soft collar adjusted and best pillow designated, new orders to not disturb during the night, patient is able to call with bowel needs and will be able to use the urinal ind at night as well.  Davis was removed per MD order at 1230, education given and will monitor bladder status.  Patient has denied pain now this shift, reports he only feels discomfort with repositioning and it goes away after movement. Patient reported needing to have a bowel movement today, called before going but was unable to make it in time and did have some incontinence.  No additional care concerns, continue with POC.

## 2021-07-26 NOTE — PLAN OF CARE
Discharge Planner Post-Acute Rehab OT:      Discharge Plan: home with wife home vs OP therapy     Precautions: c spine, falls     Current Status:  ADLs: set up at sink for g/h tasks; Mod A for UB dressing; CGA for LB dressing excluding shoes. Needs catheter mgmt and AE for LB dressing, assist for incontinence   IADLs: tbd  Vision/Cognition: wfl at [facility] assess as needed     Assessment: Pt making progress with ADL, pt needs larger shirts in order to be successful in donning own shirt. Incontinence still an issue. Pt also with orthostatic BP and dizziness this AM. See vital flow sheet. Modified task to be sitting and WC based as a result.      Other Barriers to Discharge (DME, Family Training, etc): 2 JOHNSON from outside; DME-- pt has shower chair; Family training if going home with spouse and home therapy

## 2021-07-26 NOTE — PLAN OF CARE
FOCUS/GOAL  Medical management    ASSESSMENT, INTERVENTIONS AND CONTINUING PLAN FOR GOAL:  Pt use call light at the beginning of the shift. He was angry because of the noise he heard outside his room. He does not to be bothered tonight not unless he called for staff. Explained to him about safety check rounding at night time minimizing noise as much as possible. Did cluster cares to promote sleep and rest at night. He use the call light for incontinent cares. Had BM. Catheter is draining well. A1 w/ incontinent cares and clothing management. Daiana area cleanse and protective cream applied. Extended PIV to rt lower is intact. Slept w/ HOB, decline to use cpap. Complain of neck pain, prn tylenol, warm packs applied and change pillow to lighter one. Cont w/ POC.

## 2021-07-26 NOTE — PLAN OF CARE
Patient is A&Ox4, able to make needs known, using call light appropriately.  VSS, LS clear. CMS impaired on right hand, Neuros are intact. Pain well controlled, taking tylenol PRN. BS present, Patient is passing gas, LBM 07/25, patient had about 5-6 small bowel movements while cleaning up after first BM, encouraged use of suppository although patient was reluctant to try, stated he is now feeling more of an urge to go and would like to see if he improves continence in the next few days. Catheter patent and draining yellow urine, torres catheter bag changed out due to residue in the bag, replaced leg securement device, cath cares complete. Tolerating regular diet. Denies dizziness, SOB & CP. IV SL. Dressing SATNAM on anterior neck. Continue POC.

## 2021-07-27 ENCOUNTER — APPOINTMENT (OUTPATIENT)
Dept: OCCUPATIONAL THERAPY | Facility: CLINIC | Age: 71
DRG: 052 | End: 2021-07-27
Attending: PHYSICAL MEDICINE & REHABILITATION
Payer: COMMERCIAL

## 2021-07-27 ENCOUNTER — APPOINTMENT (OUTPATIENT)
Dept: PHYSICAL THERAPY | Facility: CLINIC | Age: 71
DRG: 052 | End: 2021-07-27
Attending: PHYSICAL MEDICINE & REHABILITATION
Payer: COMMERCIAL

## 2021-07-27 ENCOUNTER — HOSPITAL ENCOUNTER (INPATIENT)
Facility: CLINIC | Age: 71
LOS: 6 days | Discharge: NURSING, REHAB OR LONG-TERM CARE | DRG: 919 | End: 2021-08-02
Attending: HOSPITALIST | Admitting: HOSPITALIST
Payer: COMMERCIAL

## 2021-07-27 VITALS
TEMPERATURE: 101.4 F | OXYGEN SATURATION: 93 % | WEIGHT: 238.7 LBS | HEART RATE: 92 BPM | HEIGHT: 71 IN | RESPIRATION RATE: 22 BRPM | SYSTOLIC BLOOD PRESSURE: 150 MMHG | DIASTOLIC BLOOD PRESSURE: 79 MMHG | BODY MASS INDEX: 33.42 KG/M2

## 2021-07-27 DIAGNOSIS — T81.40XA POSTOPERATIVE INFECTION, UNSPECIFIED TYPE, INITIAL ENCOUNTER: ICD-10-CM

## 2021-07-27 DIAGNOSIS — T81.44XA SEPSIS FOLLOWING PROCEDURE, INITIAL ENCOUNTER (H): Primary | ICD-10-CM

## 2021-07-27 LAB
ALBUMIN UR-MCNC: 10 MG/DL
ANION GAP SERPL CALCULATED.3IONS-SCNC: 10 MMOL/L (ref 3–14)
APPEARANCE UR: CLEAR
BASOPHILS # BLD AUTO: 0.1 10E3/UL (ref 0–0.2)
BASOPHILS NFR BLD AUTO: 1 %
BILIRUB UR QL STRIP: NEGATIVE
BUN SERPL-MCNC: 17 MG/DL (ref 7–30)
CALCIUM SERPL-MCNC: 8.4 MG/DL (ref 8.5–10.1)
CHLORIDE BLD-SCNC: 112 MMOL/L (ref 94–109)
CO2 SERPL-SCNC: 19 MMOL/L (ref 20–32)
COLOR UR AUTO: ABNORMAL
CREAT SERPL-MCNC: 1.38 MG/DL (ref 0.66–1.25)
CRP SERPL-MCNC: 80 MG/L (ref 0–8)
EOSINOPHIL # BLD AUTO: 0.3 10E3/UL (ref 0–0.7)
EOSINOPHIL NFR BLD AUTO: 3 %
ERYTHROCYTE [DISTWIDTH] IN BLOOD BY AUTOMATED COUNT: 11.9 % (ref 10–15)
GFR SERPL CREATININE-BSD FRML MDRD: 51 ML/MIN/1.73M2
GLUCOSE BLD-MCNC: 81 MG/DL (ref 70–99)
GLUCOSE UR STRIP-MCNC: NEGATIVE MG/DL
HCT VFR BLD AUTO: 37.1 % (ref 40–53)
HGB BLD-MCNC: 12.3 G/DL (ref 13.3–17.7)
HGB UR QL STRIP: ABNORMAL
HOLD SPECIMEN: NORMAL
IMM GRANULOCYTES # BLD: 0.1 10E3/UL
IMM GRANULOCYTES NFR BLD: 1 %
KETONES UR STRIP-MCNC: NEGATIVE MG/DL
LACTATE SERPL-SCNC: 1.3 MMOL/L (ref 0.7–2)
LACTATE SERPL-SCNC: 2.1 MMOL/L (ref 0.7–2)
LEUKOCYTE ESTERASE UR QL STRIP: NEGATIVE
LYMPHOCYTES # BLD AUTO: 1.7 10E3/UL (ref 0.8–5.3)
LYMPHOCYTES NFR BLD AUTO: 16 %
MCH RBC QN AUTO: 33.2 PG (ref 26.5–33)
MCHC RBC AUTO-ENTMCNC: 33.2 G/DL (ref 31.5–36.5)
MCV RBC AUTO: 100 FL (ref 78–100)
MONOCYTES # BLD AUTO: 1 10E3/UL (ref 0–1.3)
MONOCYTES NFR BLD AUTO: 9 %
MUCOUS THREADS #/AREA URNS LPF: PRESENT /LPF
NEUTROPHILS # BLD AUTO: 7.3 10E3/UL (ref 1.6–8.3)
NEUTROPHILS NFR BLD AUTO: 70 %
NITRATE UR QL: NEGATIVE
NRBC # BLD AUTO: 0 10E3/UL
NRBC BLD AUTO-RTO: 0 /100
PH UR STRIP: 5 [PH] (ref 5–7)
PLATELET # BLD AUTO: 141 10E3/UL (ref 150–450)
POTASSIUM BLD-SCNC: 4 MMOL/L (ref 3.4–5.3)
RBC # BLD AUTO: 3.7 10E6/UL (ref 4.4–5.9)
RBC URINE: 1 /HPF
SODIUM SERPL-SCNC: 141 MMOL/L (ref 133–144)
SP GR UR STRIP: 1.01 (ref 1–1.03)
UROBILINOGEN UR STRIP-MCNC: NORMAL MG/DL
WBC # BLD AUTO: 10.3 10E3/UL (ref 4–11)
WBC URINE: 1 /HPF

## 2021-07-27 PROCEDURE — 258N000003 HC RX IP 258 OP 636: Performed by: PHYSICIAN ASSISTANT

## 2021-07-27 PROCEDURE — 99233 SBSQ HOSP IP/OBS HIGH 50: CPT | Performed by: INTERNAL MEDICINE

## 2021-07-27 PROCEDURE — 80048 BASIC METABOLIC PNL TOTAL CA: CPT | Performed by: NURSE PRACTITIONER

## 2021-07-27 PROCEDURE — U0005 INFEC AGEN DETEC AMPLI PROBE: HCPCS | Performed by: PHYSICIAN ASSISTANT

## 2021-07-27 PROCEDURE — 250N000011 HC RX IP 250 OP 636: Performed by: NURSE PRACTITIONER

## 2021-07-27 PROCEDURE — 83605 ASSAY OF LACTIC ACID: CPT | Performed by: PHYSICIAN ASSISTANT

## 2021-07-27 PROCEDURE — 36415 COLL VENOUS BLD VENIPUNCTURE: CPT | Performed by: INTERNAL MEDICINE

## 2021-07-27 PROCEDURE — 99239 HOSP IP/OBS DSCHRG MGMT >30: CPT | Mod: 24 | Performed by: PHYSICAL MEDICINE & REHABILITATION

## 2021-07-27 PROCEDURE — 36592 COLLECT BLOOD FROM PICC: CPT | Performed by: STUDENT IN AN ORGANIZED HEALTH CARE EDUCATION/TRAINING PROGRAM

## 2021-07-27 PROCEDURE — 85025 COMPLETE CBC W/AUTO DIFF WBC: CPT | Performed by: STUDENT IN AN ORGANIZED HEALTH CARE EDUCATION/TRAINING PROGRAM

## 2021-07-27 PROCEDURE — 97116 GAIT TRAINING THERAPY: CPT | Mod: GP

## 2021-07-27 PROCEDURE — 81003 URINALYSIS AUTO W/O SCOPE: CPT | Performed by: NURSE PRACTITIONER

## 2021-07-27 PROCEDURE — 97110 THERAPEUTIC EXERCISES: CPT | Mod: GO

## 2021-07-27 PROCEDURE — 97110 THERAPEUTIC EXERCISES: CPT | Mod: GP

## 2021-07-27 PROCEDURE — 97535 SELF CARE MNGMENT TRAINING: CPT | Mod: GO

## 2021-07-27 PROCEDURE — 99223 1ST HOSP IP/OBS HIGH 75: CPT | Mod: AI | Performed by: PHYSICIAN ASSISTANT

## 2021-07-27 PROCEDURE — 36415 COLL VENOUS BLD VENIPUNCTURE: CPT | Performed by: PHYSICIAN ASSISTANT

## 2021-07-27 PROCEDURE — 250N000013 HC RX MED GY IP 250 OP 250 PS 637: Performed by: STUDENT IN AN ORGANIZED HEALTH CARE EDUCATION/TRAINING PROGRAM

## 2021-07-27 PROCEDURE — 120N000001 HC R&B MED SURG/OB

## 2021-07-27 PROCEDURE — 83605 ASSAY OF LACTIC ACID: CPT | Performed by: PHYSICAL MEDICINE & REHABILITATION

## 2021-07-27 PROCEDURE — 97530 THERAPEUTIC ACTIVITIES: CPT | Mod: GP

## 2021-07-27 PROCEDURE — 86140 C-REACTIVE PROTEIN: CPT | Performed by: STUDENT IN AN ORGANIZED HEALTH CARE EDUCATION/TRAINING PROGRAM

## 2021-07-27 PROCEDURE — 36415 COLL VENOUS BLD VENIPUNCTURE: CPT | Performed by: PHYSICAL MEDICINE & REHABILITATION

## 2021-07-27 PROCEDURE — 258N000003 HC RX IP 258 OP 636: Performed by: NURSE PRACTITIONER

## 2021-07-27 PROCEDURE — 250N000011 HC RX IP 250 OP 636

## 2021-07-27 PROCEDURE — 250N000013 HC RX MED GY IP 250 OP 250 PS 637: Performed by: PHYSICAL MEDICINE & REHABILITATION

## 2021-07-27 PROCEDURE — 87040 BLOOD CULTURE FOR BACTERIA: CPT | Performed by: INTERNAL MEDICINE

## 2021-07-27 PROCEDURE — 258N000003 HC RX IP 258 OP 636

## 2021-07-27 RX ORDER — NALOXONE HYDROCHLORIDE 0.4 MG/ML
0.2 INJECTION, SOLUTION INTRAMUSCULAR; INTRAVENOUS; SUBCUTANEOUS
Status: DISCONTINUED | OUTPATIENT
Start: 2021-07-27 | End: 2021-08-02 | Stop reason: HOSPADM

## 2021-07-27 RX ORDER — ACETAMINOPHEN 325 MG/1
650 TABLET ORAL EVERY 6 HOURS PRN
Status: DISCONTINUED | OUTPATIENT
Start: 2021-07-27 | End: 2021-08-02 | Stop reason: HOSPADM

## 2021-07-27 RX ORDER — ONDANSETRON 2 MG/ML
4 INJECTION INTRAMUSCULAR; INTRAVENOUS EVERY 6 HOURS PRN
Status: DISCONTINUED | OUTPATIENT
Start: 2021-07-27 | End: 2021-08-02 | Stop reason: HOSPADM

## 2021-07-27 RX ORDER — NALOXONE HYDROCHLORIDE 0.4 MG/ML
0.4 INJECTION, SOLUTION INTRAMUSCULAR; INTRAVENOUS; SUBCUTANEOUS
Status: DISCONTINUED | OUTPATIENT
Start: 2021-07-27 | End: 2021-08-02 | Stop reason: HOSPADM

## 2021-07-27 RX ORDER — BISACODYL 10 MG
10 SUPPOSITORY, RECTAL RECTAL DAILY PRN
Status: ON HOLD | DISCHARGE
Start: 2021-07-27 | End: 2021-09-08

## 2021-07-27 RX ORDER — LIDOCAINE 40 MG/G
CREAM TOPICAL
Status: CANCELLED | OUTPATIENT
Start: 2021-07-27

## 2021-07-27 RX ORDER — SODIUM CHLORIDE 9 MG/ML
INJECTION, SOLUTION INTRAVENOUS CONTINUOUS
Status: DISCONTINUED | OUTPATIENT
Start: 2021-07-27 | End: 2021-07-29

## 2021-07-27 RX ORDER — TRAZODONE HYDROCHLORIDE 50 MG/1
50 TABLET, FILM COATED ORAL AT BEDTIME
Status: ON HOLD | DISCHARGE
Start: 2021-07-27 | End: 2021-09-08

## 2021-07-27 RX ORDER — AMOXICILLIN 250 MG
2 CAPSULE ORAL 2 TIMES DAILY PRN
Status: DISCONTINUED | OUTPATIENT
Start: 2021-07-27 | End: 2021-07-28

## 2021-07-27 RX ORDER — POLYETHYLENE GLYCOL 3350 17 G/17G
17 POWDER, FOR SOLUTION ORAL DAILY PRN
Status: DISCONTINUED | OUTPATIENT
Start: 2021-07-27 | End: 2021-08-02 | Stop reason: HOSPADM

## 2021-07-27 RX ORDER — LIDOCAINE 40 MG/G
CREAM TOPICAL
Status: DISCONTINUED | OUTPATIENT
Start: 2021-07-27 | End: 2021-08-02 | Stop reason: HOSPADM

## 2021-07-27 RX ORDER — LIDOCAINE 40 MG/G
CREAM TOPICAL
Status: DISCONTINUED | OUTPATIENT
Start: 2021-07-27 | End: 2021-07-27 | Stop reason: HOSPADM

## 2021-07-27 RX ORDER — AMOXICILLIN 250 MG
1 CAPSULE ORAL 2 TIMES DAILY PRN
Status: DISCONTINUED | OUTPATIENT
Start: 2021-07-27 | End: 2021-07-28

## 2021-07-27 RX ORDER — HYDROMORPHONE HYDROCHLORIDE 1 MG/ML
.3-.5 INJECTION, SOLUTION INTRAMUSCULAR; INTRAVENOUS; SUBCUTANEOUS
Status: DISCONTINUED | OUTPATIENT
Start: 2021-07-27 | End: 2021-08-02 | Stop reason: HOSPADM

## 2021-07-27 RX ORDER — ACETAMINOPHEN 650 MG/1
650 SUPPOSITORY RECTAL EVERY 6 HOURS PRN
Status: DISCONTINUED | OUTPATIENT
Start: 2021-07-27 | End: 2021-08-02 | Stop reason: HOSPADM

## 2021-07-27 RX ORDER — ONDANSETRON 4 MG/1
4 TABLET, ORALLY DISINTEGRATING ORAL EVERY 6 HOURS PRN
Status: DISCONTINUED | OUTPATIENT
Start: 2021-07-27 | End: 2021-08-02 | Stop reason: HOSPADM

## 2021-07-27 RX ORDER — OXYCODONE HYDROCHLORIDE 5 MG/1
5 TABLET ORAL EVERY 4 HOURS PRN
Status: DISCONTINUED | OUTPATIENT
Start: 2021-07-27 | End: 2021-08-02 | Stop reason: HOSPADM

## 2021-07-27 RX ORDER — ONDANSETRON 4 MG/1
4 TABLET, FILM COATED ORAL EVERY 6 HOURS PRN
Status: ON HOLD | DISCHARGE
Start: 2021-07-27 | End: 2021-09-08

## 2021-07-27 RX ORDER — SACCHAROMYCES BOULARDII 250 MG
250 CAPSULE ORAL 2 TIMES DAILY
Status: ON HOLD | DISCHARGE
Start: 2021-07-27 | End: 2021-09-08

## 2021-07-27 RX ADMIN — SODIUM CHLORIDE, POTASSIUM CHLORIDE, SODIUM LACTATE AND CALCIUM CHLORIDE 1000 ML: 600; 310; 30; 20 INJECTION, SOLUTION INTRAVENOUS at 14:51

## 2021-07-27 RX ADMIN — Medication 250 MG: at 08:12

## 2021-07-27 RX ADMIN — PSYLLIUM HUSK 1 PACKET: 3.4 POWDER ORAL at 08:12

## 2021-07-27 RX ADMIN — ACETAMINOPHEN 650 MG: 325 TABLET, FILM COATED ORAL at 15:46

## 2021-07-27 RX ADMIN — TAMSULOSIN HYDROCHLORIDE 0.4 MG: 0.4 CAPSULE ORAL at 08:12

## 2021-07-27 RX ADMIN — SODIUM CHLORIDE: 9 INJECTION, SOLUTION INTRAVENOUS at 21:36

## 2021-07-27 RX ADMIN — VANCOMYCIN HYDROCHLORIDE 1500 MG: 5 INJECTION, POWDER, LYOPHILIZED, FOR SOLUTION INTRAVENOUS at 19:39

## 2021-07-27 RX ADMIN — CEFEPIME HYDROCHLORIDE 2 G: 2 INJECTION, POWDER, FOR SOLUTION INTRAVENOUS at 16:35

## 2021-07-27 ASSESSMENT — ACTIVITIES OF DAILY LIVING (ADL): ADLS_ACUITY_SCORE: 20

## 2021-07-27 NOTE — CODE/RAPID RESPONSE
Rapid Response Team Note    Assessment   In assessment a rapid response was called on Rigo Johns due to SIRS/Sepsis trigger. This presentation is likely due to fever and subsequent tachycardia.    Plan   -  1L LR bolus  -  Repeat lactate in 2 hours  -  Start cefepime and vancomycin  -  The PM&R  service was called and in agreement with the plan.  -  Disposition: the patient will remain at ARU until transfer to [facility]  -  Reassessment and plan follow-up will be performed by the hospitalist team at [facility] - notified he'll need repeat lactate and dose of vancomycin, he should have cefepime hanging or infused when he arrives (discussed with KARL Silverio at [facility])    Adriane Raymond, APRN CNP  [facility] RRT AMCOM Job Code [employer] #[id number]    Hospital Course   Brief Summary of events leading to rapid response:   Shawn Johns is a 70 year old male with PMH HTN, PILI, and prior cervical surgery who is currently in ARU after a C3-4, C4-5 anterior cervical discectomy and fusion on 7/14/21 at [facility]. His hospital course was complicated by DONOVAN and urinary retention. During that hospital stay he completed a course of azithromycin and ceftriaxone for presumed CAP. Antibiotics were stopped on 7/19/21 and since then he has been spiking intermittent fevers. Infectious work-up has included negative COVID-19, C diff (7/20), UA, blood cultures (7/12, 7/19, 7/21, 7/25), and MRSA swab. A CT including the lungs 7/23 showed improved bibasilar consolidations with residual atelectasis. DVT evaluation was negative. He was empirically on cefepime 7/21-7/24. His WBC count has been stable and CRP stable to downtrending. CT of the cervical spine on 7/23 did show prevertebral fluid collection, neurosurgery did not think at the time there was operative intervention. Antibiotics were held and he was monitored. Today his temperature climbed to 39.1 with subsequent tachycardia () prompting a check of his lactic acid under the sepsis  protocol. His lactic acid returned at 2.1 prompting the RRT.     I suspect he is tachycardic because of his fever. He is not hypotensive. If his documented intake is accurate, he has also been net negative 2.2-3.5L every day for the last week. His cardiac function is adequate (last TTE 21 with poor image quality but LVEF 60-65% and normal RV function). Discussed with PM&R resident, will plan to give him 1L fluid bolus and recheck the lactate. Also checking a BMP. He has been followed by ID who reordered blood cultures for today. Discussed with ID, will restart cefepime and vancomycin. He is transferring to [facility] within the hour, RN will hang cefepime before he leaves. At [facility] he will be evaluated by neurosurgery with concern for prevertebral abscess.    Admission Diagnosis:   Cervical myelopathy (H) [G95.9]     Physical Exam   Temp: (!) 101.6  F (38.7  C) Temp  Min: 98.7  F (37.1  C)  Max: 102.2  F (39  C)  Resp: 23 Resp  Min: 18  Max: 23  SpO2: 96 % SpO2  Min: 91 %  Max: 96 %  Pulse: 101 Pulse  Min: 81  Max: 101    No data recorded  BP: (!) 161/73 Systolic (24hrs), Av , Min:120 , Max:161   Diastolic (24hrs), Av, Min:65, Max:79     I/Os: I/O last 3 completed shifts:  In: 243 [P.O.:240; I.V.:3]  Out: 3950 [Urine:3950]     Exam:   General: in no acute distress  Mental Status: AAOx4.  Pulmonary: lung sounds clear  Cardiac: S1/S2 without m/r/g; pedal pulses 2+ with non-pitting edema present, cap refill 2+  Skin: Pale, no rashes      Significant Results and Procedures   Lactic Acid:   Recent Labs   Lab Test 21  1341 21  1940 21  0905   LACT 2.1* 1.4 1.5     CBC:   Recent Labs   Lab Test 21  0703 21  0806 21  0720   WBC 10.3 12.1* 11.5*   HGB 12.3* 12.4* 12.1*   HCT 37.1* 38.3* 36.6*   * 139* 132*        Sepsis Evaluation   The patient is suspected to have an infection.  Rigo Johns meets SIRS criteria but does NOT have a lactate >2 or other evidence of acute organ  damage.  These vital sign, lab and physical exam findings constitute a diagnosis of SEPSIS.    Sepsis Time-Zero (time Sepsis diagnosis confirmed): 1430 @ 07/27/21    Anti-infectives (From now, onward)    Start     Dose/Rate Route Frequency Ordered Stop    07/27/21 1600  ceFEPIme (MAXIPIME) 2 g vial to attach to  ml bag for ADULTS or 50 ml bag for PEDS      2 g  over 30 Minutes Intravenous EVERY 8 HOURS 07/27/21 1555          Current antibiotic coverage is appropriate for source of infection.

## 2021-07-27 NOTE — H&P
[facility]    History and Physical - Hospitalist Service       Date of Admission:  7/27/21    Assessment & Plan    Rigo Johns is a 70 year old male with PMHx PILI, HTN, and recent hospitalization for cervical myelopathy s/p urgent surgical decompression who was discharged to ARU on 7/18 who presents as a direct adm from ARU with sepsis 2/2 suspected prevertebral C3-T1 abscess.    Sepsis 2/2 suspected prevertebral abscess.  Fevers starting 7/19/21 with increasing leukocytosis, CRP. Initial etiology unclear with comprehensive workup. CT C-spine and subsequent MRI showing prevertebral fluid collection along C3-T1 with anterior extension to subcutaneous area within right side of neck concerning for seroma/abscess. No surgical intervention recommended after contacting neurosurg. Abx discontinued 7/24 by ID and subsequently patient with fevers, tachycardia, lactic acid elevation and otherwise negative workup prompting transfer to [facility] for neurosurg intervention.  - Appreciate neurosurgery consultation.  - Appreciate ID consult.  - Blood cultures 7/21 and 7/25 NGTD.  - Continue empiric Cefepime.  - Pharm to dose vancomycin, defer to ID whether to cont.  - NPO at midnight once nursing confirms with NSG no plans for surgery tonight.  - NS 75ml/hr at 10p if NPO at midnight.    Cervical myelopathy d/t high grade cervical stenosis s/p urgent C3-5 anterior cervical diskectomy and fusion (7/14/21).  Hx C5-C7 anterior fusion (1993).  Hospital adm 7/14-7/18 for urgent surgical decompression. Postoperative course complicated by DONOVAN and acute respiratory failure from possible community acquired pneumonia. L>R UE and LE weakness.  - PT/OT to continue treatments.  - SW for discharge planning hopeful back to ARU.    Neurogenic bowel and bladder.  Davis during recent adm d/t retention, removed 7/26 with scheduled straight cath. No sensation bladder. Starting to get some sensation with BMs, but mostly  incontinent.  - Follow I&Os.  - Bladder scan and straight cath QID and PRN.  - PTA Flomax 0.4mg daily.  - PTA PRN senna, miralax, dulcolax suppository.    Suspected CKD-III with proteinuria.  Noted during recent hospitalization, baseline Cr unknown as pt does not doctor regularly. Cr has been 1.3-1.4.  - Monitor.    Hypertension.  Appears diagnosed during recent adm, but not started on meds.  - PRN hydralazine.    Thrombocytopenia.  Noted during recent admission, platelet values stable in low 100s, have since trended up to 130s-140s. Possibly 2/2 infection.  - Monitor.    PILI.  - PTA CPAP    Obesity, BMI 34.  Increased risk of all-cause mortality, morbidity.  - Aggressive lifestyle management as outpatient     Diet: Regular Diet Adult Thin Liquids (level 0)  NPO per Anesthesia Guidelines for Procedure/Surgery Except for: Meds  DVT Prophylaxis: Pneumatic Compression Devices, anticipate spinal surgical intervention  Davis Catheter: Not present  Central Lines: None  Code Status: Full Code    Disposition Plan   Expected discharge: 3-5 days recommended to TBD (transfered from ARU) once antibiotic plan established, SIRS/Sepsis treated and OK per neurosurg.     The patient's care was discussed with the Attending Physician, Dr. Sterling, Bedside Nurse and Patient.    JoAnna Barthell, PA-C  7/27/2021   6:40 PM  ______________________________________________________________________    Chief Complaint   Fever, postop problem    History is obtained from the patient and electronic health record    History of Present Illness   Rigo Johns is a 70 year old male with PMHx PILI, HTN, and recent hospitalization for cervical myelopathy s/p urgent surgical decompression who was discharged to ARU on 7/18 who presents as a direct adm from ARU with sepsis 2/2 suspected prevertebral C3-T1 abscess.    Patient admitted at [facility] 714-7/18/2021 after presenting to [facility] [place] [facility] [place] with acute onset neck pain and bilateral upper and lower  extremity weakness found to have severe cervical spinal canal stenosis with myelopathy.  He underwent urgent C3-C4 and C4-C5 ACDF on 7/14/2021. Postoperative course complicated by DONOVAN and acute respiratory failure from possible community acquired pneumonia.  Also required indwelling Davis catheter for urinary retention.    Patient developed fever greater than 101 the day following hospital discharge at ARU and was found to have rising CRP and leukocytosis.  Extensive evaluation was unrevealing.  ID was consulted and patient started on empiric cefepime.  Several days later ON 7/23 CT and MRI ordered and revealed prevertebral fluid collection along C3-T1 with anterior extension.  Neurosurgery was contacted and did not recommend surgical intervention.  The following day 7/24 antibiotics were discontinued.  Patient developed subsequent fever to 102 on 727 with further increase in CRP and found to have lactic acidosis.  Cultures were repeated and empiric antibiotics were resumed.  Patient was discussed with neurosurgery and recommended transfer to [facility].  Patient triggered sepsis alert shortly prior to hospital transfer and received LR 1 L fluid bolus and dose of cefepime with recommendation to start vancomycin.    Patient evaluated at bedside upon arrival.  Overall well-appearing with high normal heart rate and mildly elevated blood pressure.  Temperature 99.6.  Complains of mild discomfort anterior neck region inferior to the mandible.  Denies other localizing signs or symptoms for infection including new or unusual headaches, vision changes, chest pain, difficulty breathing, cough (aside from trying to clear his throat), abdominal pain, nausea, vomiting, diarrhea, constipation, new skin rashes or lesions.  Feels weak and has poor appetite when febrile. He does not have any bladder sensation and remains incontinent since Davis catheter removed 7/26.  He is additionally incontinent of bowel movements with  decreased frequency of loose stools down to 2-3/day since antibiotics recently discontinued.  Notes having some improved sensation since initial hospital discharge.  Has ongoing upper and lower extremity weakness left greater than right.    Review of Systems    The 10 point Review of Systems is negative other than noted in the HPI or here.     Past Medical History    I have reviewed this patient's medical history and updated it with pertinent information if needed.   Past Medical History:   Diagnosis Date     Cervical stenosis of spinal canal      Hypertension      Sleep apnea     uses cpap     Past Surgical History   I have reviewed this patient's surgical history and updated it with pertinent information if needed.  Past Surgical History:   Procedure Laterality Date     BACK SURGERY      cervical c5-6 fusion     FUSION CERVICAL ANTERIOR TWO LEVELS N/A 7/14/2021    Procedure: C3-C4, C4-C5 anterior cervical discetomy and fusion;  Surgeon: Tee Hanley MD;  Location:  OR       Social History   Never smoker.  Consumes about 1 alcoholic beverage per week.  No illicit drug use.  .  Retired .    Family History   I have reviewed this patient's family history and updated it with pertinent information if needed.  Family History   Problem Relation Age of Onset     Lymphoma Mother      Prior to Admission Medications   Prior to Admission Medications   Prescriptions Last Dose Informant Patient Reported? Taking?   TURMERIC PO  Pharmacy Yes No   acetaminophen (TYLENOL) 325 MG tablet  Pharmacy No No   Sig: Take 2 tablets (650 mg) by mouth every 6 hours as needed for mild pain or other (and adjunct with moderate or severe pain or per patient request)   bisacodyl (DULCOLAX) 10 MG suppository   No No   Sig: Place 1 suppository (10 mg) rectally daily as needed for constipation   ceFEPIme (MAXIPIME) 2 G vial   No No   Sig: Inject 2 g into the vein every 8 hours   melatonin 1 MG TABS tablet   No No    Sig: Take 1 tablet (1 mg) by mouth At Bedtime   methocarbamol (ROBAXIN) 750 MG tablet   No No   Sig: Take 1 tablet (750 mg) by mouth every 6 hours as needed for muscle spasms   ondansetron (ZOFRAN) 4 MG tablet   No No   Sig: Take 1 tablet (4 mg) by mouth every 6 hours as needed for nausea or vomiting   psyllium (METAMUCIL/KONSYL) Packet   No No   Sig: Take 1 packet by mouth daily   saccharomyces boulardii (FLORASTOR) 250 MG capsule   No No   Sig: Take 1 capsule (250 mg) by mouth 2 times daily   senna-docusate (SENOKOT-S/PERICOLACE) 8.6-50 MG tablet   No No   Sig: Take 1 tablet by mouth 2 times daily as needed for constipation   tamsulosin (FLOMAX) 0.4 MG capsule   No No   Sig: Take 1 capsule (0.4 mg) by mouth daily   traZODone (DESYREL) 50 MG tablet   No No   Sig: Take 1 tablet (50 mg) by mouth At Bedtime      Facility-Administered Medications: None     Allergies   Allergies   Allergen Reactions     Shrimp GI Disturbance       Physical Exam   BP (!) 147/75 (BP Location: Right arm)   Pulse 97   Temp 99.6  F (37.6  C) (Oral)   Resp 20   SpO2 95%   General:  Appears stated age, no acute distress.   Skin:  Warm, dry. No rashes or lesions on exposed skin.  HEENT:  Normocephalic, atraumatic; EOMs grossly intact.  Neck: Soft neck collar removed and surgical incision clean dry and intact.  No appreciable erythema or swelling.  No palpable crepitus.  Chest:  Breath sounds CTA and no increased work of breathing on room air.  Cardiovascular:  RRR, no rub or murmur. No peripheral edema.  Abdomen:  Soft, obese, non-tender, non-distended.  Musculoskeletal:  Moves all four extremities.  Strength 4-5 on the left in major muscle groups upper and lower extremities compared to right.  Neurological:  CN 2-12 grossly intact.    Data   Data reviewed today: I reviewed all medications, new labs and imaging results over the last 24 hours. I personally reviewed no images or EKG's today.    Recent Labs   Lab 07/27/21  4294  07/26/21  0806 07/26/21  0720 07/25/21  0604 07/22/21  0629 07/21/21  0816 07/21/21  0549   WBC 10.3 12.1* 11.5*  --   --  16.8*  --    HGB 12.3* 12.4* 12.1*  --   --  13.9  --     100 99  --   --  99  --    * 139* 132*  --   --  116*  --      --  141 140   < > 139 139   POTASSIUM 4.0  --  4.0 4.0   < > 5.0 5.4*   CHLORIDE 112*  --  112* 113*   < > 111* 112*   CO2 19*  --  22 24   < > 26 27   BUN 17  --  17 18   < > 24 23   CR 1.38*  --  1.39* 1.38*   < > 1.31* 1.34*   ANIONGAP 10  --  7 3   < > 2* <1*   GARRISON 8.4*  --  8.3* 8.1*   < > 8.5 8.2*   GLC 81  --  89 91   < > 83 85   ALBUMIN  --   --   --  2.6*  --  2.5* 2.3*   PROTTOTAL  --   --   --   --   --  7.0 6.4*   BILITOTAL  --   --   --   --   --  1.2 1.0   ALKPHOS  --   --   --   --   --  86 78   ALT  --   --   --   --   --  41 39    < > = values in this interval not displayed.     No results found for this or any previous visit (from the past 24 hour(s)).

## 2021-07-27 NOTE — PLAN OF CARE
Pt. Is alert and oriented x4. States he is feeling fatigue and some dizziness with and after activity. Last BM: 7/27/2021- remains incontinent. Bladder scan @ 1145: 465, Straight cathed @ 1218: 575 ml. Elevated temperature throughout shift: 1000- 102, 1012- 100.4, 1136- 101.6 (Provider and infection disease notified throughout shift) 1305 VS: Temp 102.2, . R 20, /79- Septic alert: lactate stat ordered per protocol.

## 2021-07-27 NOTE — PLAN OF CARE
FOCUS/GOAL  Medical management    ASSESSMENT, INTERVENTIONS AND CONTINUING PLAN FOR GOAL:  Follow up from earlier note, patient alert/oriented- is able to use call light appropriately. Patient has been febrile since 10am after first therapy today.  Patient has denied symptoms other than feeling fatigued and MD following closely, and has been working on getting patient transferred to [facility].  Patient's vitals triggered the Sepsis protocol at 1330, MD notified and lactic ordered. Lactic level was called to unit at 1:45 and was 2.1 so RRT was paged out.  Patient aware of plan to discharge when bed available and spouse notified.  BP has been staying around 140's over 70's, Temp ranging from 101 to 102 F.  Patient also started having tachycardia 94-100bpm.  RRT came to assess patient at just before 1400, BP at that time 161/73.  Bolus ordered of Lactated ringers and PIV was started in the AC per ED nurse.  Patient had extended dwell PIV that was having slow infusion and so new PIV started, MD gave okay for removal so will be removed before transport.  UA will be collected with next straight cath, continue to follow vitals closely. Bed confirmed at [facility] at 1520 and will be able to transfer out by 1600, patient aware, MD updating patient's spouse.

## 2021-07-27 NOTE — PHARMACY-VANCOMYCIN DOSING SERVICE
Pharmacy Vancomycin Initial Note  Date of Service 2021  Patient's  1950  70 year old, male    Indication: Abscess    Current estimated CrCl = Estimated Creatinine Clearance: 61.5 mL/min (A) (based on SCr of 1.38 mg/dL (H)).    Creatinine for last 3 days  2021:  6:04 AM Creatinine 1.38 mg/dL  2021:  7:20 AM Creatinine 1.39 mg/dL  2021:  7:03 AM Creatinine 1.38 mg/dL    Recent Vancomycin Level(s) for last 3 days  No results found for requested labs within last 72 hours.      Vancomycin IV Administrations (past 72 hours)      No vancomycin orders with administrations in past 72 hours.                Nephrotoxins and other renal medications (From now, onward)    Start     Dose/Rate Route Frequency Ordered Stop    21 1630  vancomycin (VANCOCIN) 1,500 mg in sodium chloride 0.9 % 250 mL intermittent infusion      1,500 mg  over 90 Minutes Intravenous EVERY 24 HOURS 21 1607            Contrast Orders - past 72 hours (72h ago, onward)    None          Loading dose: N/A  Regimen: 1500 mg IV every 24 hours.  Start time: 16:00 on 2021  Exposure target: AUC24 (range)400-600 mg/L.hr   AUC24,ss: 459 mg/L.hr  Probability of AUC24 > 400: 66 %  Ctrough,ss: 13.9 mg/L  Probability of Ctrough,ss > 20: 17 %  Probability of nephrotoxicity (Lodise DRE ): 9 %          Plan:  1. Start vancomycin  1500 mg IV q24h.   2. Vancomycin monitoring method: AUC  3. Vancomycin therapeutic monitoring goal: 400-600 mg*h/L  4. Pharmacy will check vancomycin levels as appropriate in 1-3 Days.    5. Serum creatinine levels will be ordered daily for the first week of therapy and at least twice weekly for subsequent weeks.      ELVIA KNIGHT [facility]

## 2021-07-27 NOTE — PLAN OF CARE
Physical Therapy Discharge Summary    Reason for therapy discharge:    Discharged to hospital for medical work up.    Progress towards therapy goal(s). See goals on Care Plan in [facility] electronic health record for goal details.  Goals partially met.  Barriers to achieving goals:   discharge from facility.    Therapy recommendation(s):    Continued therapy is recommended.  Rationale/Recommendations:  Recommend to continue skilled PT in hospital once medically appropriate.

## 2021-07-27 NOTE — PROGRESS NOTES
"  GENERAL ID SERVICE: PROGRESS NOTE  Patient:  Rigo Johns, Date of birth 1950, Medical record number [id number]  Date of Admission: 7/18/2021  Date of Visit:  7/27/2021         Assessment and Recommendations:   Problem List:    # Fever and CT and MRI findings of prevertebral collection along the anterior vertebral bodies of C3-T1     # Spinal stenosis s/p C3-4, C4-5 anterior cervical discetomy and fusion which occurred on 7/14/21 by Dr. Hanley    Discussion:      Mr. Rigo Johns is a 70 year old  Male with PMHx of HTN, PILI, and prior cervical surgery (C5-7 anterior fusion in 1993 per notes) who initially presented to the ED at [facility] on 7/12/21 with dyspnea and acute onset of neck and shoulder pain, and bilateral arm weakness found to have cervical spine stenosis s/p fusion C3-4, C4-5 anterior cervical discetomy and fusion which occurred on 7/14/21 by Dr. Hanley. He was then transferred to [facility]. On 7/21 we were consulted because of fever, leukocytosis and elevated CRP. Noted that the fever started 24h after empiric antibiotics for pneumonia were stopped (7/19). Work up included:  C diff from 7/20 negative, blood cultures (7/12, 7/19, 7/21, 7/25) negative to date,  COVID test negative. UA showed moderated blood, 27 RBC, few bacteria, but just 2 wbcs and negative LE and nitrite.   Repeat CXR from 7/19 showed \"Streaky left midlung opacity likely represents atelectasis. Previously seen hazy opacities in the bilateral bases are improved\". US doppler of lower right extremity and upper left extremity was negative for DVT. He was started on empiric antibiotics (cefepime on 7/22 and Vancomycin on 7/23) given aurelio fever. CT of the C spine and MRI of the C spine were performed and showed  prevertebral collection along the anterior vertebral bodies of C3-T1. Neurosurgery was consulted on 7/23 and they were more suspicious that the collections were seroma so they suggested a trial of stopping antibiotics " "on 7/24 (patient was afebrile at that time). Since the patient had become afebrile and was hemodynamically stable we agreed with the trial, however today (7/27 - almost 72h after stopping antibiotics) the patient had 102 of fever. Neurosurgery was re-consulted by the Rehab hospitalist team  and they recommended transfer back to [facility] for possible surgical debridement.     My initial goal was to hold off antibiotics (to increase the yield of positive cultures once he undergoes debridement) since the patient was stable hemodynamically, however he had higher fever this afternoon with tachycardia and lactate of 2.1 (RRT was contacted). I then recommended to start cefepime and vancomycin empirically (blood cultures obtained today before antibiotics).     Recommendations:    1. Please start IV cefepime and IV vancomycin empirically (see discussion above) - already started    2. Appreciate Rehab hospitalist and Neurosurgery support and agree with plan for surgical debridement. Neurosurgery is from [facility] and they recommended to transfer the patient back to [facility]. Once debridement is performed please send specimen to Gram stain and bacterial (aerobic and anaerobic) cultures    3. Appreciate RRT team who assessed the patient this afternoon (I discussed the plan with them as well)      Recommendations discussed with hospitalist team     The General ID team will continue to follow this patient. Please feel free to call with any questions.     Angela Lucio MD  Date of Service: 07/27/21  Pager: [phone]           Physical Exam:   BP (!) 150/79 (BP Location: Left arm)   Pulse 92   Temp (!) 101.3  F (38.5  C)   Resp 22   Ht 1.803 m (5' 11\")   Wt 108.3 kg (238 lb 11.2 oz)   SpO2 93%   BMI 33.29 kg/m         Exam:  GENERAL:  Not in acute distress at the time of my exam  HEAD: Normocephalic and atraumatic  EYES:  Eyes grossly normal to inspection, PERRL and conjunctivae and sclerae normal "   LUNGS:  Clear to auscultation - no rales, rhonchi or wheezes  CARDIOVASCULAR:  Regular rate and rhythm, normal S1 S2  ABDOMEN:  Soft, nontender, no hepatosplenomegaly, no masses and bowel sounds normal  SKIN:  Surgical insusion on the anterior neck. No external signs of infection.  NEUROLOGIC:  Weakness on both legs. Bowel and urinary incontinence  PSYCHIATRIC: Mood stable, mentation appears normal, affect normal           Laboratory Data:     Creatinine   Date Value Ref Range Status   07/27/2021 1.38 (H) 0.66 - 1.25 mg/dL Final   07/26/2021 1.39 (H) 0.66 - 1.25 mg/dL Final   07/25/2021 1.38 (H) 0.66 - 1.25 mg/dL Final   07/24/2021 1.41 (H) 0.66 - 1.25 mg/dL Final   07/23/2021 1.44 (H) 0.66 - 1.25 mg/dL Final     WBC Count   Date Value Ref Range Status   07/27/2021 10.3 4.0 - 11.0 10e3/uL Final   07/26/2021 12.1 (H) 4.0 - 11.0 10e3/uL Final   07/26/2021 11.5 (H) 4.0 - 11.0 10e3/uL Final   07/24/2021 10.9 4.0 - 11.0 10e3/uL Final   07/23/2021 10.4 4.0 - 11.0 10e3/uL Final     Hemoglobin   Date Value Ref Range Status   07/27/2021 12.3 (L) 13.3 - 17.7 g/dL Final     Platelet Count   Date Value Ref Range Status   07/27/2021 141 (L) 150 - 450 10e3/uL Final     Lab Results   Component Value Date     07/27/2021    BUN 17 07/27/2021    CO2 19 (L) 07/27/2021     CRP Inflammation   Date Value Ref Range Status   07/27/2021 80.0 (H) 0.0 - 8.0 mg/L Final   07/26/2021 76.0 (H) 0.0 - 8.0 mg/L Final   07/25/2021 93.0 (H) 0.0 - 8.0 mg/L Final   07/23/2021 110.0 (H) 0.0 - 8.0 mg/L Final   07/22/2021 110.0 (H) 0.0 - 8.0 mg/L Final

## 2021-07-27 NOTE — PLAN OF CARE
FOCUS/GOAL  Bowel management and Bladder management    ASSESSMENT, INTERVENTIONS AND CONTINUING PLAN FOR GOAL:  Patient is alert and oriented x 4. Uses his call light appropriately. Assist of 1 using walker for transfers but stayed in bed this shift. Incontinent of bowel. Had loose stools. Scheduled Dulcolax supp was not given. Unable to void. Bladder scan at 1800 was 893. Straight cath done. Urine output was 1050. PRN Tylenol and Robaxin given at hs per request. Bladder scan at 2213 was 363. Agreed that his bladder will be scanned again around midnight tonight.

## 2021-07-27 NOTE — PROGRESS NOTES
CLINICAL NUTRITION SERVICES - ASSESSMENT NOTE     Nutrition Prescription  Malnutrition Status:    Non-severe malnutrition in the context of acute illness    Recommendations already ordered by Registered Dietitian (RD):  Ensure Clear Apple @ 2PM snack    Future/Additional Recommendations:  Monitor wt trends  Assess supplement acceptance     REASON FOR ASSESSMENT  Rigo Johns is a/an 70 year old male assessed by the dietitian for [facility]  Rigo Johns is a 70 year old L hand dominant male with a PMH of HTN, PILI, and prior cervical surgery (C5-7 anterior fusion in 1993 per notes) who initially presented to the ED at [facility] on 7/12/21 with dyspnea and acute onset of neck and shoulder pain, and bilateral arm weakness. Hospital course has been complicated by DONOVAN with improvement with IV fluids, urinary retention with a Davis currently in place, steroid induced hyperglycemia, thrombocytopenia, and steroid induced leukocytosis.      NUTRITION HISTORY  Per chart review:   Shrimp allergy    Assessment:  Pt with wife [place] during assessment. Pt may go back to hospital per RD in ARU rounds d/t fevers with unknown etiology. Fevers have been fluctuating daily which have been affecting Rigo's appetite. Typically he orders fruit and cereal for breakfast and the rest of the day varies since he is out of his room most of the time with appointments. He tries to order things that will stay fresh and are more nutrient dense (ham and cheese, no bread) with fruit/applesauce, chocolate pudding or ice cream. Lemon/lime, pepsi, or milk as beverages. Was happy with wt loss and wouldn't be upset if it continued, but will attempt to continue eating and getting enough protein while healing. Open to trying Ensure Enlive or Magic Cup, but wants to start with Ensure Clear.    CURRENT NUTRITION ORDERS  Diet: Regular + Room Service Assist  Intake/Tolerance: 0-50% 7/19-7/24; 100% since 7/25    LABS  Cr 1.39H (7/26)  GFR 51L  BUN  "17WNL  CRP 93H (7/25)- > 76H (7/26)    MEDICATIONS  Melatonin  Florastor  Metamucil  Dulcolax    ANTHROPOMETRICS  Height: 180.3 cm (5' 11\")  Most Recent Weight: 108.3 kg (238 lb 11.2 oz)    IBW: 78.1 kg  %IBW:   BMI: Obesity Grade I BMI 30-34.9  Weight History: 4.2% wt loss in 2 weeks  Wt Readings from Last 20 Encounters:   07/25/21 108.3 kg (238 lb 11.2 oz)   7/18/2021 116.6 kg (257 lb)   07/12/21 113 kg (249 lb 3.2 oz)     Dosing Weight: 86 kg (adjusted BW)    ASSESSED NUTRITION NEEDS  Estimated Energy Needs: 6585-6127 kcals/day (25 - 30 kcals/kg)  Justification: Maintenance  Estimated Protein Needs: 103-129+ grams protein/day (1.2 - 1.5+ grams of pro/kg)  Justification: Post-op and Repletion  Estimated Fluid Needs: 5537-1332 mL/day (1 mL/kcal)   Justification: Maintenance    MALNUTRITION  % Intake: Decreased intake does not meet criteria  % Weight Loss: > 2% in 1 week (severe)  Subcutaneous Fat Loss: None observed  Muscle Loss: Temporal:  mild, Upper arm (bicep, tricep):  mild, Lower arm  (forearm):  mild and Upper leg (quadricep, hamstring): mild  Fluid Accumulation/Edema: Mild, per flowsheets  Malnutrition Diagnosis: Non-severe malnutrition in the context of acute illness    NUTRITION DIAGNOSIS  Inadequate oral intake related to fever affecting appetite and multiple appts during day as evidenced by pt report and 4.2% wt loss in 2 weeks    INTERVENTIONS  Implementation  Nutrition Education: Provided education on increased protein and kcal needs post-healing, supplements   Medical food supplement therapy - see above    Goals  Patient to consume % of nutritionally adequate meal trays TID, or the equivalent with supplements/snacks.     Monitoring/Evaluation  Progress toward goals will be monitored and evaluated per protocol.    Jodi Hines, MPH, Dietetic Intern  "

## 2021-07-27 NOTE — PLAN OF CARE
FOCUS/GOAL  Bowel management, Bladder management, Pain management and Cognition/Memory/Judgment/Problem solving    ASSESSMENT, INTERVENTIONS AND CONTINUING PLAN FOR GOAL:  Pt is alert and oriented, denied pain but reported some discomfort with soft collar which was further adjusted. Pt reported improvement in comfort after adjustment of the soft collar. Pt was bladder scanned and ISC for 800 at 0030. Extended PIV in RUE patent and saline locked. Meatus of penis red, incision on right neck SATNAM with steri strips intact. Pt educated on pressure ulcer prevention regarding HOB elevation and the purpose of bladder scanning. No further care concerns at this time continue whit POC.

## 2021-07-27 NOTE — PROGRESS NOTES
FOCUS/GOAL  Discharge planning    ASSESSMENT, INTERVENTIONS AND CONTINUING PLAN FOR GOAL:     A/O x4. IV cefepime and 1L LR bolus given prior to transfer.  Pt has Right AC PIV SL access.  Right extended dwell PIV removed prior to transfer.  No c/o pain. PRN tylenol given at 1546 for temp of 101.3.  Bladder scan at 1630 was 577 so straight catheterized for 600.  UA/UC sent down to lab.  Pt left for [facility] w/ transfer service at 1645.

## 2021-07-28 ENCOUNTER — APPOINTMENT (OUTPATIENT)
Dept: GENERAL RADIOLOGY | Facility: CLINIC | Age: 71
DRG: 919 | End: 2021-07-28
Attending: NURSE PRACTITIONER
Payer: COMMERCIAL

## 2021-07-28 ENCOUNTER — APPOINTMENT (OUTPATIENT)
Dept: MRI IMAGING | Facility: CLINIC | Age: 71
DRG: 919 | End: 2021-07-28
Attending: PHYSICIAN ASSISTANT
Payer: COMMERCIAL

## 2021-07-28 LAB
ALBUMIN SERPL-MCNC: 2.5 G/DL (ref 3.4–5)
ALP SERPL-CCNC: 88 U/L (ref 40–150)
ALT SERPL W P-5'-P-CCNC: 32 U/L (ref 0–70)
ANION GAP SERPL CALCULATED.3IONS-SCNC: 3 MMOL/L (ref 3–14)
AST SERPL W P-5'-P-CCNC: 25 U/L (ref 0–45)
BILIRUB SERPL-MCNC: 0.8 MG/DL (ref 0.2–1.3)
BUN SERPL-MCNC: 19 MG/DL (ref 7–30)
CALCIUM SERPL-MCNC: 8.7 MG/DL (ref 8.5–10.1)
CHLORIDE BLD-SCNC: 112 MMOL/L (ref 94–109)
CK SERPL-CCNC: 82 U/L (ref 30–300)
CO2 SERPL-SCNC: 25 MMOL/L (ref 20–32)
CREAT SERPL-MCNC: 1.46 MG/DL (ref 0.66–1.25)
CRP SERPL-MCNC: 87 MG/L (ref 0–8)
GFR SERPL CREATININE-BSD FRML MDRD: 48 ML/MIN/1.73M2
GLUCOSE BLD-MCNC: 86 MG/DL (ref 70–99)
INR PPP: 1.14 (ref 0.85–1.15)
POTASSIUM BLD-SCNC: 3.9 MMOL/L (ref 3.4–5.3)
PROT SERPL-MCNC: 7.1 G/DL (ref 6.8–8.8)
SARS-COV-2 RNA RESP QL NAA+PROBE: NEGATIVE
SODIUM SERPL-SCNC: 140 MMOL/L (ref 133–144)

## 2021-07-28 PROCEDURE — 70543 MRI ORBT/FAC/NCK W/O &W/DYE: CPT

## 2021-07-28 PROCEDURE — 86140 C-REACTIVE PROTEIN: CPT | Performed by: PHYSICIAN ASSISTANT

## 2021-07-28 PROCEDURE — 99024 POSTOP FOLLOW-UP VISIT: CPT | Performed by: NEUROLOGICAL SURGERY

## 2021-07-28 PROCEDURE — 82550 ASSAY OF CK (CPK): CPT | Performed by: INTERNAL MEDICINE

## 2021-07-28 PROCEDURE — 250N000013 HC RX MED GY IP 250 OP 250 PS 637: Performed by: PHYSICIAN ASSISTANT

## 2021-07-28 PROCEDURE — 80053 COMPREHEN METABOLIC PANEL: CPT | Performed by: PHYSICIAN ASSISTANT

## 2021-07-28 PROCEDURE — 74220 X-RAY XM ESOPHAGUS 1CNTRST: CPT

## 2021-07-28 PROCEDURE — 36415 COLL VENOUS BLD VENIPUNCTURE: CPT | Performed by: PHYSICIAN ASSISTANT

## 2021-07-28 PROCEDURE — 99233 SBSQ HOSP IP/OBS HIGH 50: CPT | Performed by: PHYSICIAN ASSISTANT

## 2021-07-28 PROCEDURE — 255N000002 HC RX 255 OP 636: Performed by: HOSPITALIST

## 2021-07-28 PROCEDURE — A9585 GADOBUTROL INJECTION: HCPCS | Performed by: HOSPITALIST

## 2021-07-28 PROCEDURE — 250N000011 HC RX IP 250 OP 636: Performed by: PHYSICIAN ASSISTANT

## 2021-07-28 PROCEDURE — 258N000003 HC RX IP 258 OP 636: Performed by: INTERNAL MEDICINE

## 2021-07-28 PROCEDURE — 120N000001 HC R&B MED SURG/OB

## 2021-07-28 PROCEDURE — 250N000011 HC RX IP 250 OP 636: Performed by: INTERNAL MEDICINE

## 2021-07-28 PROCEDURE — 85610 PROTHROMBIN TIME: CPT | Performed by: PHYSICIAN ASSISTANT

## 2021-07-28 RX ORDER — BISACODYL 10 MG
10 SUPPOSITORY, RECTAL RECTAL DAILY PRN
Status: DISCONTINUED | OUTPATIENT
Start: 2021-07-28 | End: 2021-08-02 | Stop reason: HOSPADM

## 2021-07-28 RX ORDER — TAMSULOSIN HYDROCHLORIDE 0.4 MG/1
0.4 CAPSULE ORAL DAILY
Status: DISCONTINUED | OUTPATIENT
Start: 2021-07-28 | End: 2021-08-02 | Stop reason: HOSPADM

## 2021-07-28 RX ORDER — AMOXICILLIN 250 MG
1 CAPSULE ORAL 2 TIMES DAILY PRN
Status: DISCONTINUED | OUTPATIENT
Start: 2021-07-28 | End: 2021-08-02 | Stop reason: HOSPADM

## 2021-07-28 RX ORDER — SACCHAROMYCES BOULARDII 250 MG
250 CAPSULE ORAL 2 TIMES DAILY
Status: DISCONTINUED | OUTPATIENT
Start: 2021-07-28 | End: 2021-07-28

## 2021-07-28 RX ORDER — CEFEPIME HYDROCHLORIDE 1 G/1
1 INJECTION, POWDER, FOR SOLUTION INTRAMUSCULAR; INTRAVENOUS EVERY 12 HOURS
Status: DISCONTINUED | OUTPATIENT
Start: 2021-07-28 | End: 2021-08-02

## 2021-07-28 RX ORDER — GADOBUTROL 604.72 MG/ML
11 INJECTION INTRAVENOUS ONCE
Status: COMPLETED | OUTPATIENT
Start: 2021-07-28 | End: 2021-07-28

## 2021-07-28 RX ORDER — METHOCARBAMOL 750 MG/1
750 TABLET, FILM COATED ORAL EVERY 6 HOURS PRN
Status: DISCONTINUED | OUTPATIENT
Start: 2021-07-28 | End: 2021-08-02 | Stop reason: HOSPADM

## 2021-07-28 RX ORDER — TRAZODONE HYDROCHLORIDE 50 MG/1
50 TABLET, FILM COATED ORAL AT BEDTIME
Status: DISCONTINUED | OUTPATIENT
Start: 2021-07-28 | End: 2021-08-02 | Stop reason: HOSPADM

## 2021-07-28 RX ADMIN — TAMSULOSIN HYDROCHLORIDE 0.4 MG: 0.4 CAPSULE ORAL at 09:58

## 2021-07-28 RX ADMIN — CEFEPIME HYDROCHLORIDE 2 G: 2 INJECTION, POWDER, FOR SOLUTION INTRAVENOUS at 08:12

## 2021-07-28 RX ADMIN — TRAZODONE HYDROCHLORIDE 50 MG: 50 TABLET ORAL at 22:29

## 2021-07-28 RX ADMIN — GADOBUTROL 11 ML: 604.72 INJECTION INTRAVENOUS at 06:09

## 2021-07-28 RX ADMIN — DAPTOMYCIN 650 MG: 500 INJECTION, POWDER, LYOPHILIZED, FOR SOLUTION INTRAVENOUS at 16:25

## 2021-07-28 RX ADMIN — ACETAMINOPHEN 650 MG: 325 TABLET, FILM COATED ORAL at 22:29

## 2021-07-28 RX ADMIN — CEFEPIME HYDROCHLORIDE 1 G: 1 INJECTION, POWDER, FOR SOLUTION INTRAMUSCULAR; INTRAVENOUS at 20:34

## 2021-07-28 RX ADMIN — HYDROMORPHONE HYDROCHLORIDE 0.3 MG: 1 INJECTION, SOLUTION INTRAMUSCULAR; INTRAVENOUS; SUBCUTANEOUS at 04:48

## 2021-07-28 RX ADMIN — ACETAMINOPHEN 650 MG: 325 TABLET, FILM COATED ORAL at 00:31

## 2021-07-28 RX ADMIN — CEFEPIME HYDROCHLORIDE 2 G: 2 INJECTION, POWDER, FOR SOLUTION INTRAVENOUS at 00:25

## 2021-07-28 ASSESSMENT — ACTIVITIES OF DAILY LIVING (ADL)
ADLS_ACUITY_SCORE: 19
ADLS_ACUITY_SCORE: 19
ADLS_ACUITY_SCORE: 20

## 2021-07-28 NOTE — PROGRESS NOTES
[facility]    Hospitalist Progress Note      Assessment & Plan   Rigo Johns is a 70 year old male with PMHx PILI, HTN, and recent hospitalization for cervical myelopathy s/p urgent surgical decompression who was discharged to ARU on 7/18 who presents as a direct adm from ARU with sepsis 2/2 suspected prevertebral C3-T1 abscess.     Sepsis 2/2 suspected prevertebral abscess.  Fevers starting 7/19/21 with increasing leukocytosis, CRP. Initial etiology unclear with comprehensive workup. CT C-spine and subsequent MRI showing prevertebral fluid collection along C3-T1 with anterior extension to subcutaneous area within right side of neck concerning for seroma/abscess. No surgical intervention recommended after contacting neurosurg. CT C/A/P 7/23 without explanation for fever. Abx discontinued 7/24 by ID and subsequently patient with fevers, tachycardia, lactic acid elevation and otherwise negative workup prompting transfer to [facility] for neurosurg intervention. UA on admission negative. LA 2.1>1.3 after IVF.  MRI on admission shows enhancing prevertbral fluid collection, regressing from last imaging  Tmax overnight 100.4, VSS. CRP 87 (80, 76)  -- Neurosurgery consulted, appreciate assistance. Awaiting plans regarding whether surgical intervention warranted   - ID consulted, appreciate assistance   - Blood cultures 7/21,7/25, and 7/27 NGTD.  - Continue empiric Cefepime.  - Pharm to dose vancomycin, defer to ID whether to cont.  - NPO until surgical plan in place   - NS 75ml/hr      Cervical myelopathy d/t high grade cervical stenosis s/p urgent C3-5 anterior cervical diskectomy and fusion (7/14/21).  Hx C5-C7 anterior fusion (1993).  Hospital adm 7/14-7/18 for urgent surgical decompression. Postoperative course complicated by DONOVAN and acute respiratory failure from possible community acquired pneumonia. L>R UE and LE weakness.  - PT/OT to continue treatments.  - SW for discharge planning hopeful  back to ARU.     Neurogenic bowel and bladder.  Davis during recent adm d/t retention, removed 7/26 with scheduled straight cath. No sensation bladder. Starting to get some sensation with BMs, but mostly incontinent.  - Follow I&Os.  - Bladder scan and straight cath QID and PRN.  - PTA Flomax 0.4mg daily.  - PTA PRN senna, miralax, dulcolax suppository.     Suspected CKD-III with proteinuria.  Noted during recent hospitalization, baseline Cr unknown as pt does not doctor regularly. Cr has been 1.3-1.4. Stable on admission.   - Monitor.     Hypertension.  Appears diagnosed during recent adm, but not started on meds.  - PRN hydralazine.     Thrombocytopenia.  Noted during recent admission, platelet values stable in low 100s, have since trended up to 130s-140s. Possibly 2/2 infection.  - Monitor.     PILI.  - PTA CPAP     Obesity, BMI 34.  Increased risk of all-cause mortality, morbidity.  - Aggressive lifestyle management as outpatient     DVT Prophylaxis: Pneumatic Compression Devices  Code Status: Full Code    Disposition: Expected discharge in 2+ days pending ongoing infectious work up, surgical plan.     Patient was discussed with Dr. Palacio who agrees with the above plan     Ranjana Esteves PA-C    Interval History   Feeling ok this am. Had some chills overnight. No CP. No dyspnea. Has intermittent dry cough from dry throat, unchanged. No significant nausea, abdominal pain. Stools have been loose, non bloody. Typically has 4 episodes back to back daily. Feeling fatigued, increasingly weak today (non focal). Straight cath dependent for urination. No specific joint pains. Neck pain comes and goes but generally not worsening.     -Data reviewed today: I reviewed all new labs and imaging results over the last 24 hours. I personally reviewed no images or EKG's today.    Physical Exam   Temp: 98.5  F (36.9  C) Temp src: Oral BP: 123/61 Pulse: 92   Resp: 16 SpO2: 93 % O2 Device: None (Room air)    There were no  vitals filed for this visit.  Vital Signs with Ranges  Temp:  [98.5  F (36.9  C)-102.3  F (39.1  C)] 98.5  F (36.9  C)  Pulse:  [] 92  Resp:  [16-23] 16  BP: (123-161)/(61-79) 123/61  SpO2:  [92 %-96 %] 93 %  I/O last 3 completed shifts:  In: 480 [P.O.:480]  Out: 1450 [Urine:1450]    Constitutional: Alert and oriented, sitting up in bed. Appears comfortable and is pleasantly conversant   ENT:  moist mucous membranes, c collar in place   Eyes:  Sclera anicteric, EOMI  Respiratory: Lungs with minimal crackles right base. No increased WOB or wheezing  Cardiovascular: Regular rate and rhythm, no significant LE edema   GI:  active bowel sounds, abdomen soft, non-tender  MSK:  Moves all four extremities. strength 5/5 right. Weak on left compared to right, more pronounced LUE than lower. Able to raise arm about alf.  strength 5/5 bilaterally.   Neuro:  Speech is clear. Face symmetric. CN 2-12 grossly intact. Sensation intact.     Medications     sodium chloride 75 mL/hr at 07/27/21 2136       ceFEPIme (MAXIPIME) IV  2 g Intravenous Q8H     sodium chloride (PF)  3 mL Intracatheter Q8H     vancomycin  1,500 mg Intravenous Q24H       Data   Recent Labs   Lab 07/27/21  0703 07/26/21  0806 07/26/21  0720 07/25/21  0604 07/22/21  0629 07/21/21  0816   WBC 10.3 12.1* 11.5*  --   --  16.8*   HGB 12.3* 12.4* 12.1*  --   --  13.9    100 99  --   --  99   * 139* 132*  --   --  116*     --  141 140   < > 139   POTASSIUM 4.0  --  4.0 4.0   < > 5.0   CHLORIDE 112*  --  112* 113*   < > 111*   CO2 19*  --  22 24   < > 26   BUN 17  --  17 18   < > 24   CR 1.38*  --  1.39* 1.38*   < > 1.31*   ANIONGAP 10  --  7 3   < > 2*   GARRISON 8.4*  --  8.3* 8.1*   < > 8.5   GLC 81  --  89 91   < > 83   ALBUMIN  --   --   --  2.6*  --  2.5*   PROTTOTAL  --   --   --   --   --  7.0   BILITOTAL  --   --   --   --   --  1.2   ALKPHOS  --   --   --   --   --  86   ALT  --   --   --   --   --  41    < > = values in this  interval not displayed.       Recent Results (from the past 24 hour(s))   MR Soft Tissue Neck w/o & w Contrast    Narrative    EXAM: MR SOFT TISSUE NECK W/O and W CONTRAST  LOCATION: [facility]  DATE/TIME: 7/28/2021 4:43 AM    INDICATION: Recent cervical fusion. Abnormal comparison films. Prevertebral fluid collection postoperatively possibly an abscess.  COMPARISON: Cervical spine MRI 07/23/2021.  CONTRAST: 11 mL Gadavist  TECHNIQUE: Multiplanar multisequence neck MRI performed without and with IV contrast.    FINDINGS:     MUCOSAL SPACES/SOFT TISSUES: Normal mucosal spaces of the upper aerodigestive tract. No mucosal mass or inflammation identified. Normal vocal cords and infraglottic trachea. Normal parapharyngeal spaces. Normal oral cavity and floor of mouth structures.     C3-C5 anterior cervical discectomy and fusion again noted. Previous C6-C7 interbody fusion and partial ankylosis of the C5-C6 apposing endplates again noted. Prevertebral fluid has significantly diminished. It now measures 11.3 mm in AP dimension and   spans from C3 through C5. It previously measured 13.6 mm in AP dimension and spanning from the mid dens through T2. A surgical tract is identified extending from this fluid collection at the C6-C7 level. The prevertebral fluid and the surgical tract   demonstrate peripheral enhancement. This can be seen with both resolving infection and resolving postoperative change. No evidence of an epidural abscess. Patchy areas of T2 prolongation are noted in the cord from C2-C3 through C3-C4. Abnormal cord   signal has diminished since 07/23/2021.    LYMPH NODES: No pathologic lymph nodes by size or morphology criteria.     SALIVARY GLANDS: Normal parotid and submandibular glands.    THYROID: Normal.     VESSELS: Expected vascular flow voids are identified.    VISUALIZED INTRACRANIAL/ORBITS/SINUSES: No abnormality of the visualized intracranial compartment or orbits. Visualized  paranasal sinuses and mastoid air cells are clear.    BONES: Normal marrow signal.       Impression    IMPRESSION:   1.  Prevertebral fluid and fluid within the surgical tract in the right neck are regressing.  2.  Robust enhancement along the periphery of these fluid collections is again identified.  3.  Both resolving abscess and evolving postsurgical change could have this appearance.  4.  Diminished T2 prolongation in the cord.  5.  No new abnormality.

## 2021-07-28 NOTE — UTILIZATION REVIEW
"  Admission Status; Secondary Review Determination         Under the authority of the Utilization Management Committee, the utilization review process indicated a secondary review on the above patient.  The review outcome is based on review of the medical records, discussions with staff, and applying clinical experience noted on the date of the review.        (xx)      Inpatient Status Appropriate - This patient's medical care is consistent with medical management for inpatient care and reasonable inpatient medical practice.      () Observation Status Appropriate - This patient does not meet hospital inpatient criteria and is placed in observation status. If this patient's primary payer is Medicare and was admitted as an inpatient, Condition Code 44 should be used and patient status changed to \"observation\".   () Admission Status NOT Appropriate - This patient's medical care is not consistent with medical management for Inpatient or Observation Status.          RATIONALE FOR DETERMINATION     Rigo Johns is a 70 year old male with PMHx PILI, HTN, and recent hospitalization for cervical myelopathy s/p urgent surgical decompression who was discharged to ARU on 7/18 who was admitted on 7/27 with sepsis 2/2 suspected prevertebral C3-T1 abscess.  Fevers started 7/19/21 with increasing leukocytosis and CRP.  CT C-spine and subsequent MRI showed prevertebral fluid collection along C3-T1 with anterior extension to subcutaneous area within right side of neck concerning for seroma/abscess. No surgical intervention recommended at that time. CT C/A/P 7/23 without explanation for fever. Abx discontinued 7/24 by ID and subsequently patient with fevers, tachycardia, lactic acid elevation.  He was admitted for neurosurgical intervention. He is presently requiring IVF, IV antibiotics and IV pain medication.  It is reasonable to anticipate a hospital stay of at least 2 midnights.  IP status appropriate.      The severity of " illness, intensity of service provided, expected LOS and risk for adverse outcome make the care complex, high risk and appropriate for hospital admission.        The information on this document is developed by the utilization review team in order for the business office to ensure compliance.  This only denotes the appropriateness of proper admission status and does not reflect the quality of care rendered.         The definitions of Inpatient Status and Observation Status used in making the determination above are those provided in the CMS Coverage Manual, Chapter 1 and Chapter 6, section 70.4.      Sincerely,     Sally Goldsmith MD  Physician Advisor   Utilization Review/ Case Management  [facility].

## 2021-07-28 NOTE — CONSULTS
[facility]    Infectious Disease Consultation     Date of Admission:  7/27/2021  Date of Consult (When I saw the patient): 07/28/21    Assessment & Plan   Rigo Johns is a 70 year old male who was admitted on 7/27/2021.     Impression:  1. 70 y.o male with HTN, Spinal stenosis.   2. Recent admission with cervical myelopathy s/p urgent surgical decompression and fusion done on 7/ 14.   3. Presenting now with Fevers starting 7/19/21 with increasing leukocytosis, CRP.   4. CT C-spine and subsequent MRI showing prevertebral fluid collection along C3-T1 with anterior extension to subcutaneous area within right side of neck concerning for seroma/abscess.   5. No surgical intervention recommended after contacting neurosurg.   6. CT C/A/P 7/23 without any other explanation for fever.   7. Abx discontinued 7/24 by ID at the ARU  and subsequently patient with fevers, tachycardia, lactic acid elevation and otherwise negative workup prompting transfer to [facility] for neurosurg intervention.   8. UA on admission negative.   9. Imaging today: IMPRESSION:   1.  Prevertebral fluid and fluid within the surgical tract in the right neck are regressing.  2.  Robust enhancement along the periphery of these fluid collections is again identified.  3.  Both resolving abscess and evolving postsurgical change could have this appearance.  4.  Diminished T2 prolongation in the cord.  5.  No new abnormality.  10. DONOVAN   11. Currently on vancomycin and cefepime.     Recommendations:   Discussed with NS to consider aspiration of the fluid collection if possible, discussed with Dr. Hanley who think there is no fluid that can be aspirated and the only way to get to the fluid is through another surgery.   He thinks since the fluid collection is already improving this most likely implies resolving post operative fluid and then anther surgery when he is improving will not be beneficial for diagnostic purposes as patient has  already been on antibiotics for 6 days... I agree its possible that antibiotics will given us false negative results on the fluid, I also agree I and D is not needed just for diagnostic purposes as is an invasive procedure and carries risk.   If indeed this is a a deep post surgical infections that is when I and D will be indicated.     Plan:   Antibiotics empiric 2 weeks course. Continue cefepime, switch from vancomycin to daptomycin given CKD and elevated creat.   Followed by repeat MRI and FOLLOW UP with NS.   If no concern for infection stop antibiotics   If concern for infection will reassess for need for further antibiotics alone vs surgical intervention + antibiotics.         Discussed with IM/ NS        Merna Donaldson MD    Reason for Consult   Reason for consult: I was asked to evaluate this patient for possible post surgical infection.    Primary Care Physician   [facility]    Chief Complaint   Neck pain     History is obtained from the patient and medical records    History of Present Illness   Rigo Johns is a 70 year old male  with PMHx PILI, HTN, and recent hospitalization for cervical myelopathy s/p urgent surgical decompression who was discharged to ARU on 7/18 who presents as a direct adm from ARU with sepsis 2/2 suspected prevertebral C3-T1 abscess. With reports of fevers and leucocytosis. Started on broad spectrum abc with resolution of the fever.   Imaging consisting of abscess.     Past Medical History   I have reviewed this patient's medical history and updated it with pertinent information if needed.   Past Medical History:   Diagnosis Date     Cervical stenosis of spinal canal      Hypertension      Sleep apnea     uses cpap       Past Surgical History   I have reviewed this patient's surgical history and updated it with pertinent information if needed.  Past Surgical History:   Procedure Laterality Date     BACK SURGERY      cervical c5-6 fusion      FUSION CERVICAL ANTERIOR TWO LEVELS N/A 7/14/2021    Procedure: C3-C4, C4-C5 anterior cervical discetomy and fusion;  Surgeon: Tee Hanley MD;  Location:  OR       Prior to Admission Medications   Prior to Admission Medications   Prescriptions Last Dose Informant Patient Reported? Taking?   TURMERIC PO Past Month at Unknown time Pharmacy Yes Yes   acetaminophen (TYLENOL) 325 MG tablet  at prn Pharmacy No Yes   Sig: Take 2 tablets (650 mg) by mouth every 6 hours as needed for mild pain or other (and adjunct with moderate or severe pain or per patient request)   bisacodyl (DULCOLAX) 10 MG suppository  at prn  No Yes   Sig: Place 1 suppository (10 mg) rectally daily as needed for constipation   ceFEPIme (MAXIPIME) 2 G vial 7/27/2021 at 1630  No Yes   Sig: Inject 2 g into the vein every 8 hours   melatonin 1 MG TABS tablet 7/26/2021 at pm  No Yes   Sig: Take 1 tablet (1 mg) by mouth At Bedtime   methocarbamol (ROBAXIN) 750 MG tablet  at prn  No Yes   Sig: Take 1 tablet (750 mg) by mouth every 6 hours as needed for muscle spasms   ondansetron (ZOFRAN) 4 MG tablet  at prn  No Yes   Sig: Take 1 tablet (4 mg) by mouth every 6 hours as needed for nausea or vomiting   psyllium (METAMUCIL/KONSYL) Packet 7/27/2021 at am  No Yes   Sig: Take 1 packet by mouth daily   saccharomyces boulardii (FLORASTOR) 250 MG capsule 7/27/2021 at am  No Yes   Sig: Take 1 capsule (250 mg) by mouth 2 times daily   senna-docusate (SENOKOT-S/PERICOLACE) 8.6-50 MG tablet  at prn  No Yes   Sig: Take 1 tablet by mouth 2 times daily as needed for constipation   tamsulosin (FLOMAX) 0.4 MG capsule 7/27/2021 at am  No Yes   Sig: Take 1 capsule (0.4 mg) by mouth daily   traZODone (DESYREL) 50 MG tablet 7/26/2021 at pm  No Yes   Sig: Take 1 tablet (50 mg) by mouth At Bedtime      Facility-Administered Medications: None     Allergies   Allergies   Allergen Reactions     Shrimp GI Disturbance       Immunization History   Immunization History    Administered Date(s) Administered     Pneumo Conj 13-V (2010&after) 09/16/2020       Social History   I have reviewed this patient's social history and updated it with pertinent information if needed. Rigo Johns  reports that he has never smoked. He has never used smokeless tobacco. He reports current alcohol use. He reports that he does not use drugs.    Family History   I have reviewed this patient's family history and updated it with pertinent information if needed.   Family History   Problem Relation Age of Onset     Lymphoma Mother        Review of Systems   The 10 point Review of Systems is negative other than noted in the HPI or here.     Physical Exam   Temp: 98.5  F (36.9  C) Temp src: Oral BP: 123/61 Pulse: 92   Resp: 16 SpO2: 93 % O2 Device: None (Room air)    Vital Signs with Ranges  Temp:  [98.5  F (36.9  C)-102.3  F (39.1  C)] 98.5  F (36.9  C)  Pulse:  [] 92  Resp:  [16-23] 16  BP: (123-161)/(61-79) 123/61  SpO2:  [92 %-95 %] 93 %  0 lbs 0 oz  There is no height or weight on file to calculate BMI.    GENERAL APPEARANCE:  awake  EYES: Eyes grossly normal to inspection, PERRL and conjunctivae and sclerae normal  HENT: ear canals and TM's normal and nose and mouth without ulcers or lesions  NECK: no adenopathy, no asymmetry, masses, or scars and thyroid normal to palpation  RESP: lungs clear to auscultation - no rales, rhonchi or wheezes  CV: regular rates and rhythm, normal S1 S2, no S3 or S4 and no murmur, click or rub  LYMPHATICS: normal ant/post cervical and supraclavicular nodes  ABDOMEN: soft, nontender, without hepatosplenomegaly or masses and bowel sounds normal  MS: extremities normal- no gross deformities noted  SKIN: no suspicious lesions or rashes      Data   Lab Results   Component Value Date    WBC 10.3 07/27/2021    HGB 12.3 (L) 07/27/2021    HCT 37.1 (L) 07/27/2021     (L) 07/27/2021     07/28/2021    POTASSIUM 3.9 07/28/2021    CHLORIDE 112 (H) 07/28/2021    CO2  25 07/28/2021    BUN 19 07/28/2021    CR 1.46 (H) 07/28/2021    GLC 86 07/28/2021    NTBNPI 71 07/12/2021    TROPONIN <0.015 07/12/2021    AST 25 07/28/2021    ALT 32 07/28/2021    ALKPHOS 88 07/28/2021    BILITOTAL 0.8 07/28/2021    INR 1.14 07/28/2021     No results for input(s): CULT in the last 168 hours.  No lab results found.    Invalid input(s): UC

## 2021-07-28 NOTE — PROGRESS NOTES
Discussed with Dr. Mendoza SIMMONS, after her discussion with surgery team plan to adjust antibiotics and continue to monitor. Will order regular diet for patient now.     Ranjana Esteves PA-C

## 2021-07-28 NOTE — CONSULTS
[facility]    Neurosurgery Consultation     Date of Admission:  7/27/2021  Date of Consult (When I saw the patient): 07/28/21    Assessment & Plan   Rigo Johns is a 70 year old male who was admitted on 7/27/2021. I was asked to see the patient for prevertebral abscess s/p C3-5 ACDF on 7/14/21 with Dr. Hanley. Patient developed fevers last week, ranging as high at 102. Imaging showed prevertebral fluid collection concerning for possible abscess. ARU started patient on antibiotics. [facility]. MRI soft tissue neck from today shows fluid collection is resolving. Gastrografin esophagram from today shows no esophageal leak. Fevers improving, most recent temp today 98.5. On exam, patient reports some mild neck pain that mostly occurs when laying flat and improves when sitting upright. He is able to move all extremities and has full strength on the right side, left side weakness improving. Denies any radicular pain. Incision is CDI without swelling, erythema, warmth, or drainage. Patient denies any issues with swallowing or breathing. Cervical collar in place.      Plan:  - No surgical intervention recommended at this time   - Infectious Disease consult to evaluate for other possible source of infection. Appreciate assistance.   - Continue NPO for now  - Pain control measures as needed  - Continue with therapies and C collar   - Will continue to follow     I have discussed the following assessment and plan with Dr. Hanley who is in agreement with initial plan and will follow up with further consultation recommendations.    Carey Whittington CNP  [facility] Neurosurgery  [address] [address]  Tel [phone]  Pager [phone]    Code Status    Full Code    Reason for Consult   Reason for consult: I was asked by Joanna Barthell PA-C to evaluate this patient for prevertebral  abscess.    Primary Care Physician   [facility]    Chief Complaint   Neck pain    History is obtained from the patient and electronic health record    History of Present Illness   Rigo Johsn is a 70 year old male who was admitted on 7/27/2021. I was asked to see the patient for prevertebral abscess s/p C3-5 ACDF on 7/14/21 with Dr. Hanley. Patient developed fevers last week, ranging as high at 102. Imaging showed prevertebral fluid collection concerning for possible abscess. ARU started patient on antibiotics. Patient was transferred from ARU to [facility] for further evaluation. MRI soft tissue neck from today shows fluid collection is resolving. Gastrografin esophagram from today shows no esophageal leak. Fevers improving, most recent temp today 98.5. On exam, patient reports some mild neck pain that mostly occurs when laying flat and improves when sitting upright. He is able to move all extremities and has full strength on the right side, left side weakness improving. Denies any radicular pain. Incision is CDI without swelling, erythema, warmth, or drainage. Patient denies any issues with swallowing or breathing. Cervical collar in place.    CRP 87  WBC 10.3    MR SOFT TISSUE NECK W/O and W CONTRAST 7/28/2021 4:43 AM    IMPRESSION:   1.  Prevertebral fluid and fluid within the surgical tract in the right neck are regressing.  2.  Robust enhancement along the periphery of these fluid collections is again identified.  3.  Both resolving abscess and evolving postsurgical change could have this appearance.  4.  Diminished T2 prolongation in the cord.  5.  No new abnormality.    XR GASTROGRAFIN ESOPHAGRAM 7/28/2021 9:05 AM           IMPRESSION:  1.  No esophageal leak.    MR CERVICAL SPINE W/O & W CONTRAST 7/23/2021 9:45 PM  IMPRESSION:  1. Limited evaluation given the field of view of the peripherally  enhancing fluid collection concerning for prevertebral abscess which  spans along  the anterior vertebral bodies of C3-T1. Can consider  dedicated neck CT if complete extend of this collection is needed.   2. Postoperative changes of ACDF at C3-C5.  3. Multilevel cervical spondylosis with multiple areas of neural  foraminal stenoses as detailed above.     Past Medical History   I have reviewed this patient's medical history and updated it with pertinent information if needed.   Past Medical History:   Diagnosis Date     Cervical stenosis of spinal canal      Hypertension      Sleep apnea     uses cpap       Past Surgical History   I have reviewed this patient's surgical history and updated it with pertinent information if needed.  Past Surgical History:   Procedure Laterality Date     BACK SURGERY      cervical c5-6 fusion     FUSION CERVICAL ANTERIOR TWO LEVELS N/A 7/14/2021    Procedure: C3-C4, C4-C5 anterior cervical discetomy and fusion;  Surgeon: Tee Hanley MD;  Location:  OR       Prior to Admission Medications   Prior to Admission Medications   Prescriptions Last Dose Informant Patient Reported? Taking?   TURMERIC PO Past Month at Unknown time Pharmacy Yes Yes   acetaminophen (TYLENOL) 325 MG tablet  at prn Pharmacy No Yes   Sig: Take 2 tablets (650 mg) by mouth every 6 hours as needed for mild pain or other (and adjunct with moderate or severe pain or per patient request)   bisacodyl (DULCOLAX) 10 MG suppository  at prn  No Yes   Sig: Place 1 suppository (10 mg) rectally daily as needed for constipation   ceFEPIme (MAXIPIME) 2 G vial 7/27/2021 at 1630  No Yes   Sig: Inject 2 g into the vein every 8 hours   melatonin 1 MG TABS tablet 7/26/2021 at pm  No Yes   Sig: Take 1 tablet (1 mg) by mouth At Bedtime   methocarbamol (ROBAXIN) 750 MG tablet  at prn  No Yes   Sig: Take 1 tablet (750 mg) by mouth every 6 hours as needed for muscle spasms   ondansetron (ZOFRAN) 4 MG tablet  at prn  No Yes   Sig: Take 1 tablet (4 mg) by mouth every 6 hours as needed for nausea or vomiting    psyllium (METAMUCIL/KONSYL) Packet 7/27/2021 at am  No Yes   Sig: Take 1 packet by mouth daily   saccharomyces boulardii (FLORASTOR) 250 MG capsule 7/27/2021 at am  No Yes   Sig: Take 1 capsule (250 mg) by mouth 2 times daily   senna-docusate (SENOKOT-S/PERICOLACE) 8.6-50 MG tablet  at prn  No Yes   Sig: Take 1 tablet by mouth 2 times daily as needed for constipation   tamsulosin (FLOMAX) 0.4 MG capsule 7/27/2021 at am  No Yes   Sig: Take 1 capsule (0.4 mg) by mouth daily   traZODone (DESYREL) 50 MG tablet 7/26/2021 at pm  No Yes   Sig: Take 1 tablet (50 mg) by mouth At Bedtime      Facility-Administered Medications: None     Allergies   Allergies   Allergen Reactions     Shrimp GI Disturbance       Social History   I have reviewed this patient's social history and updated it with pertinent information if needed. Rigo Johns  reports that he has never smoked. He has never used smokeless tobacco. He reports current alcohol use. He reports that he does not use drugs.    Family History   I have reviewed this patient's family history and updated it with pertinent information if needed.   Family History   Problem Relation Age of Onset     Lymphoma Mother        Review of Systems   10 point ROS negative other than symptoms noted in HPI    Physical Exam   Temp: 98.5  F (36.9  C) Temp src: Oral BP: 123/61 Pulse: 92   Resp: 16 SpO2: 93 % O2 Device: None (Room air)    Vital Signs with Ranges  Temp:  [98.5  F (36.9  C)-102.3  F (39.1  C)] 98.5  F (36.9  C)  Pulse:  [] 92  Resp:  [16-23] 16  BP: (123-161)/(61-79) 123/61  SpO2:  [92 %-96 %] 93 %  0 lbs 0 oz     , Blood pressure 123/61, pulse 92, temperature 98.5  F (36.9  C), temperature source Oral, resp. rate 16, SpO2 93 %.  0 lbs 0 oz    NEUROLOGICAL EXAMINATION:   Mental status:  Alert and Oriented x 3, speech is fluent.  Cranial nerves:  II-XII intact.   Motor:  Strength is 5/5 on right side, some continued weakness on left side. Strong hand  bilaterally.  Able to move all extremities and lift both legs off the bed.    Sensation:  Intact  Reflexes:   Negative Babinski.  Negative Clonus.    Incision: CDI     Data     CBC RESULTS:   Recent Labs   Lab Test 07/27/21  0703   WBC 10.3   RBC 3.70*   HGB 12.3*   HCT 37.1*      MCH 33.2*   MCHC 33.2   RDW 11.9   *     Basic Metabolic Panel:  Lab Results   Component Value Date     07/28/2021      Lab Results   Component Value Date    POTASSIUM 3.9 07/28/2021     Lab Results   Component Value Date    CHLORIDE 112 07/28/2021     Lab Results   Component Value Date    GARRISON 8.7 07/28/2021     Lab Results   Component Value Date    CO2 25 07/28/2021     Lab Results   Component Value Date    BUN 19 07/28/2021     Lab Results   Component Value Date    CR 1.46 07/28/2021     Lab Results   Component Value Date    GLC 86 07/28/2021     INR:  Lab Results   Component Value Date    INR 1.14 07/28/2021

## 2021-07-28 NOTE — PROGRESS NOTES
Reviewed with Dr. Hanley. XR gastrografin esophagram ordered. Scheduled for today at 0830. Further recommendations to follow. Nursing notified as well.     Carey Whittington CNP  [facility] Neurosurgery  [address] [address]  Tel [phone]  Pager [phone]

## 2021-07-28 NOTE — PLAN OF CARE
Summary:     DATE & TIME: 7/27/21 4449-0236  Cognitive Concerns/ Orientation : A&Ox4   BEHAVIOR & AGGRESSION TOOL COLOR: green   ABNL VS/O2: VSS on RA except tmax of 100.4, tylenol given  NEURO: Neuro checks q4h- UE weakness, Left sided weakness.   MOBILITY: A1 W/GB- not OOB this shift.   PAIN MANAGMENT: Iv dilaudid given for pain management before MRI  DIET: NPO at midnight  BOWEL/BLADDER: Incontinent, bladder scanning PRN, Retains urine, orders for straight cath once a shift.   ABNL LAB/BG: lactic 1.3  DRAIN/DEVICES: PIV infusing NS at 75mL/hr  TELEMETRY RHYTHM: n/a  SKIN: WDL ex rash to inner thigh/groin (pt states this has been there for 20 years)- barrier cream applied.  TESTS/PROCEDURES: Neurosurgery to see patient today and most likely will do surgery to removed fluid filled sack. MRI overnight, Awaiting results  D/C DAY/GOALS/PLACE: pending- will transfer to station 73 tomorrow after surgery.  OTHER IMPORTANT INFO: Covid test sent. Pt wears soft collar for comfort. BLE edema 1-2+. On IV abx (cefepine and vancomycin). Pt did not wear CPAP overnight and sleep with HOB raised.    Pt wears own CPAP at night if lying flat, states he doesn't need to wear it if he is sitting upright.

## 2021-07-28 NOTE — PHARMACY-ADMISSION MEDICATION HISTORY
Pharmacy Medication History  Admission medication history interview status for the 7/27/2021  admission is complete. See EPIC admission navigator for prior to admission medications     Location of Interview: n/a  Medication history sources: Care Everywhere, recent admit/discharge med list, MAR    Significant changes made to the medication list:  none    In the past week, patient estimated taking medication this percent of the time: greater than 90%    Additional medication history information:   none    Medication reconciliation completed by provider prior to medication history? No    Time spent in this activity: 15 minutes    Prior to Admission medications    Medication Sig Last Dose Taking? Auth Provider   acetaminophen (TYLENOL) 325 MG tablet Take 2 tablets (650 mg) by mouth every 6 hours as needed for mild pain or other (and adjunct with moderate or severe pain or per patient request)  at prn Yes Hesham Hammond MD   bisacodyl (DULCOLAX) 10 MG suppository Place 1 suppository (10 mg) rectally daily as needed for constipation  at prn Yes Teri Jordan MD   ceFEPIme (MAXIPIME) 2 G vial Inject 2 g into the vein every 8 hours 7/27/2021 at 1630 Yes Teri Jordan MD   melatonin 1 MG TABS tablet Take 1 tablet (1 mg) by mouth At Bedtime 7/26/2021 at pm Yes Teri Jordan MD   methocarbamol (ROBAXIN) 750 MG tablet Take 1 tablet (750 mg) by mouth every 6 hours as needed for muscle spasms  at prn Yes Zuly York PA-C   ondansetron (ZOFRAN) 4 MG tablet Take 1 tablet (4 mg) by mouth every 6 hours as needed for nausea or vomiting  at prn Yes Teri Jordan MD   psyllium (METAMUCIL/KONSYL) Packet Take 1 packet by mouth daily 7/27/2021 at am Yes Teri Jordan MD   saccharomyces boulardii (FLORASTOR) 250 MG capsule Take 1 capsule (250 mg) by mouth 2 times daily 7/27/2021 at am Yes Teri Jordan MD   senna-docusate (SENOKOT-S/PERICOLACE) 8.6-50 MG tablet Take 1 tablet by mouth 2 times daily as needed for  constipation  at prn Yes Zuly York PA-C   tamsulosin (FLOMAX) 0.4 MG capsule Take 1 capsule (0.4 mg) by mouth daily 7/27/2021 at am Yes Zuly York PA-C   traZODone (DESYREL) 50 MG tablet Take 1 tablet (50 mg) by mouth At Bedtime 7/26/2021 at pm Yes Teri Jordan MD   TURMERIC PO  Past Month at Unknown time Yes Unknown, Entered By History       The information provided in this note is only as accurate as the sources available at the time of update(s)     Marquita Valenzuela, PharmD  Inpatient Clinical Pharmacist  [phone]

## 2021-07-28 NOTE — PROGRESS NOTES
Neurosurgery note:    Patient admitted for further work up of possible cervical spine infection status post ACDF C3-C4, C4-C5 7/14/21.    RECOMMENDATIONS:  Okay for general diet tonight.  NPO after midnight.  MRI neck soft tissue with and without contrast tonight.    Discussed with Dr. Hanley.    Zuly York, MPAS  [facility] Neurosurgery  [address] [address]    Tel [phone]  Pager [phone]

## 2021-07-28 NOTE — PLAN OF CARE
Summary:     DATE & TIME: 7/27/21 8013-2151  Cognitive Concerns/ Orientation : A&Ox4   BEHAVIOR & AGGRESSION TOOL COLOR: green   ABNL VS/O2: VSS on RA- afebrile this shift- temps 101.5 earlier today at [facility].  NEURO: Neuro checks q4h- UE weakness, Left sided weakness.   MOBILITY: A1 W/GB- not OOB this shift.   PAIN MANAGMENT: denies pain  DIET: Reg- NPO at [place]  BOWEL/BLADDER: Retains urine (straight cathed 4x since midnight at [place]). Straight cath for 750mL @ 2300. New orders to straight cath every shift and PRN. Incontinent of BM- small soft BM this shift  ABNL LAB/BG: Lactic recheck 1.3  DRAIN/DEVICES: PIV infusing NS at 75mL/hr  TELEMETRY RHYTHM: n/a  SKIN: WDL ex rash to inner thigh/groin (pt states this has been there for 20 years)- barrier cream applied.  TESTS/PROCEDURES: Neurosurgery to see patient tomorrow, most likely will do surgery tomorrow. MRI ordered- checklist sent.  D/C DAY/GOALS/PLACE: pending- will transfer to station 73 tomorrow after surgery.  OTHER IMPORTANT INFO: Covid test sent. Pt wears soft collar for comfort. BLE edema 1-2+. On IV abx (cefepine and vancomycin). Pt wears own CPAP at night if lying flat, states he doesn't need to wear it if he is sitting upright.

## 2021-07-28 NOTE — PLAN OF CARE
Physical Therapy Discharge Summary    Reason for therapy discharge:    Discharged to hospital for ongoing medical management.     Progress towards therapy goal(s). See goals on Care Plan in [facility] electronic health record for goal details.  Goals not met.  Barriers to achieving goals:   discharge from facility.    Therapy recommendation(s):    Continued therapy is recommended.  Rationale/Recommendations:  once medically stable..

## 2021-07-28 NOTE — PROGRESS NOTES
Discussed case with Dr. Donaldson of ID.  New MR Neck shows the prevertebral fluid to be largely resolved, there is not a meaningful collection for IR aspiration or to warrant surgical debridement.  Given the multiple doses of broad spectrum antibiotics already administered, cultures would also be likely low yield.  Will plan for a course of IV antibiotics, and follow up with repeat MRI in 2 weeks to assess progress.

## 2021-07-29 LAB
ANION GAP SERPL CALCULATED.3IONS-SCNC: 4 MMOL/L (ref 3–14)
BUN SERPL-MCNC: 20 MG/DL (ref 7–30)
CALCIUM SERPL-MCNC: 8.6 MG/DL (ref 8.5–10.1)
CHLORIDE BLD-SCNC: 113 MMOL/L (ref 94–109)
CO2 SERPL-SCNC: 24 MMOL/L (ref 20–32)
CREAT SERPL-MCNC: 1.25 MG/DL (ref 0.66–1.25)
ERYTHROCYTE [DISTWIDTH] IN BLOOD BY AUTOMATED COUNT: 11.6 % (ref 10–15)
GFR SERPL CREATININE-BSD FRML MDRD: 58 ML/MIN/1.73M2
GLUCOSE BLD-MCNC: 106 MG/DL (ref 70–99)
HCT VFR BLD AUTO: 37 % (ref 40–53)
HGB BLD-MCNC: 12.3 G/DL (ref 13.3–17.7)
MCH RBC QN AUTO: 32.9 PG (ref 26.5–33)
MCHC RBC AUTO-ENTMCNC: 33.2 G/DL (ref 31.5–36.5)
MCV RBC AUTO: 99 FL (ref 78–100)
PLATELET # BLD AUTO: 136 10E3/UL (ref 150–450)
POTASSIUM BLD-SCNC: 3.7 MMOL/L (ref 3.4–5.3)
RBC # BLD AUTO: 3.74 10E6/UL (ref 4.4–5.9)
SODIUM SERPL-SCNC: 141 MMOL/L (ref 133–144)
WBC # BLD AUTO: 6.4 10E3/UL (ref 4–11)

## 2021-07-29 PROCEDURE — 85027 COMPLETE CBC AUTOMATED: CPT | Performed by: PHYSICIAN ASSISTANT

## 2021-07-29 PROCEDURE — 250N000011 HC RX IP 250 OP 636: Performed by: INTERNAL MEDICINE

## 2021-07-29 PROCEDURE — 258N000003 HC RX IP 258 OP 636: Performed by: PHYSICIAN ASSISTANT

## 2021-07-29 PROCEDURE — 80048 BASIC METABOLIC PNL TOTAL CA: CPT | Performed by: PHYSICIAN ASSISTANT

## 2021-07-29 PROCEDURE — 250N000009 HC RX 250: Performed by: INTERNAL MEDICINE

## 2021-07-29 PROCEDURE — 258N000003 HC RX IP 258 OP 636: Performed by: INTERNAL MEDICINE

## 2021-07-29 PROCEDURE — 272N000450 HC KIT 4FR POWER PICC SINGLE LUMEN

## 2021-07-29 PROCEDURE — 120N000001 HC R&B MED SURG/OB

## 2021-07-29 PROCEDURE — 250N000013 HC RX MED GY IP 250 OP 250 PS 637: Performed by: PHYSICIAN ASSISTANT

## 2021-07-29 PROCEDURE — 250N000009 HC RX 250

## 2021-07-29 PROCEDURE — 36415 COLL VENOUS BLD VENIPUNCTURE: CPT | Performed by: PHYSICIAN ASSISTANT

## 2021-07-29 PROCEDURE — 36569 INSJ PICC 5 YR+ W/O IMAGING: CPT

## 2021-07-29 PROCEDURE — 99232 SBSQ HOSP IP/OBS MODERATE 35: CPT | Performed by: PHYSICIAN ASSISTANT

## 2021-07-29 RX ORDER — LIDOCAINE HYDROCHLORIDE 20 MG/ML
JELLY TOPICAL
Status: COMPLETED
Start: 2021-07-29 | End: 2021-07-29

## 2021-07-29 RX ADMIN — CEFEPIME HYDROCHLORIDE 1 G: 1 INJECTION, POWDER, FOR SOLUTION INTRAMUSCULAR; INTRAVENOUS at 20:19

## 2021-07-29 RX ADMIN — DAPTOMYCIN 650 MG: 500 INJECTION, POWDER, LYOPHILIZED, FOR SOLUTION INTRAVENOUS at 16:35

## 2021-07-29 RX ADMIN — LIDOCAINE HYDROCHLORIDE ANHYDROUS 5 ML: 10 INJECTION, SOLUTION INFILTRATION at 17:10

## 2021-07-29 RX ADMIN — LIDOCAINE HYDROCHLORIDE: 20 JELLY TOPICAL at 19:36

## 2021-07-29 RX ADMIN — ACETAMINOPHEN 650 MG: 325 TABLET, FILM COATED ORAL at 16:42

## 2021-07-29 RX ADMIN — TAMSULOSIN HYDROCHLORIDE 0.4 MG: 0.4 CAPSULE ORAL at 08:09

## 2021-07-29 RX ADMIN — SODIUM CHLORIDE: 9 INJECTION, SOLUTION INTRAVENOUS at 01:11

## 2021-07-29 RX ADMIN — TRAZODONE HYDROCHLORIDE 50 MG: 50 TABLET ORAL at 21:46

## 2021-07-29 RX ADMIN — CEFEPIME HYDROCHLORIDE 1 G: 1 INJECTION, POWDER, FOR SOLUTION INTRAMUSCULAR; INTRAVENOUS at 08:08

## 2021-07-29 RX ADMIN — PSYLLIUM HUSK 1 PACKET: 3.4 POWDER ORAL at 08:10

## 2021-07-29 ASSESSMENT — ACTIVITIES OF DAILY LIVING (ADL)
ADLS_ACUITY_SCORE: 20
ADLS_ACUITY_SCORE: 20
ADLS_ACUITY_SCORE: 19
ADLS_ACUITY_SCORE: 19
ADLS_ACUITY_SCORE: 20
ADLS_ACUITY_SCORE: 19

## 2021-07-29 NOTE — CONSULTS
SW:  Care Transition staff will be following for discharge planning. Patient came from  ARU.  ARU is following patient's stay electronically.  ARU reports patient will need [employer] insurance authoization to return. In order to obtain authorization, PT and OT will need to complete consults during this stay.

## 2021-07-29 NOTE — PLAN OF CARE
DATE & TIME: 7/30/21 8066-7220  Cognitive Concerns/ Orientation : A&Ox4   BEHAVIOR & AGGRESSION TOOL COLOR: green   ABNL VS/O2: VSS on RA;   NEURO: Neuro checks q4h- L sided weakness.  MOBILITY: A1 W/GB-.  turns well assist 1.  PAIN MANAGMENT: c/o shoulder pain, declined medication, pillows for comfort  DIET: Reg-  appetite.  Improving, good fluid intake, SL IVF   BOWEL/BLADDER: Retains urine (straight cathed at 1300 for 1100 mL). Incontinent of BM- 2 BMs this shift.  ABNL LAB/BG: Crp 87.5   IV : SL  TELEMETRY RHYTHM: n/a  SKIN: WDL ex rash to inner thigh/groin (pt states this has been there for 20 years)- powder applied.  TESTS/PROCEDURES: Nothing this shift  D/C DAY/GOALS/PLACE: pending improvement, 1-2 days to  TCU on IV abx for 2 weeks  OTHER IMPORTANT INFO: Pt wears soft collar for comfort. BLE edema 1-2+. On IV abx (cefepine and cubicin). Pt wears own CPAP at night., ID and NS following,       0

## 2021-07-29 NOTE — PROGRESS NOTES
[facility]    Infectious Disease Progress Note    Date of Service (when I saw the patient): 07/29/2021     Assessment & Plan   Rigo Johns is a 70 year old male who was admitted on 7/27/2021.     Impression:  1. 70 y.o male with HTN, Spinal stenosis.   2. Recent admission with cervical myelopathy s/p urgent surgical decompression and fusion done on 7/ 14.   3. Presenting now with Fevers starting 7/19/21 with increasing leukocytosis, CRP.   4. CT C-spine and subsequent MRI showing prevertebral fluid collection along C3-T1 with anterior extension to subcutaneous area within right side of neck concerning for seroma/abscess.   5. No surgical intervention recommended after contacting neurosurg.   6. CT C/A/P 7/23 without any other explanation for fever.   7. Abx discontinued 7/24 by ID at the ARU  and subsequently patient with fevers, tachycardia, lactic acid elevation and otherwise negative workup prompting transfer to [facility] for neurosurg intervention.   8. UA on admission negative.   9. Imaging today: IMPRESSION:   1.  Prevertebral fluid and fluid within the surgical tract in the right neck are regressing.  2.  Robust enhancement along the periphery of these fluid collections is again identified.  3.  Both resolving abscess and evolving postsurgical change could have this appearance.  4.  Diminished T2 prolongation in the cord.  5.  No new abnormality.  10. DONOVAN   11. Currently on vancomycin and cefepime.      Recommendations:   Discussed with NS to consider aspiration of the fluid collection if possible, discussed with Dr. Hanley who think there is no fluid that can be aspirated and the only way to get to the fluid is through another surgery.   He thinks since the fluid collection is already improving this most likely implies resolving post operative fluid and then anther surgery when he is improving will not be beneficial for diagnostic purposes as patient has already been on antibiotics for 6  days... I agree its possible that antibiotics will given us false negative results on the fluid, I also agree I and D is not needed just for diagnostic purposes as is an invasive procedure and carries risk.   If indeed this is a a deep post surgical infections that is when I and D will be indicated.      Plan:   Antibiotics empiric 2 weeks course. Continue cefepime, switch from vancomycin to daptomycin given CKD and elevated creat.   Followed by repeat MRI and FOLLOW UP with NS.   If no concern for infection stop antibiotics   If concern for infection will reassess for need for further antibiotics alone vs surgical intervention + antibiotics.      Ok to get the PICC     Merna Donaldson MD    Interval History   In different room today so was able to sleep hence feeling better   Afebrile     Physical Exam   Temp: 98.4  F (36.9  C) Temp src: Oral BP: (!) 147/84 Pulse: 76   Resp: 18 SpO2: 95 % O2 Device: None (Room air)    There were no vitals filed for this visit.  Vital Signs with Ranges  Temp:  [98.4  F (36.9  C)-100.3  F (37.9  C)] 98.4  F (36.9  C)  Pulse:  [76-92] 76  Resp:  [16-18] 18  BP: (145-147)/(75-84) 147/84  SpO2:  [92 %-95 %] 95 %    Constitutional: Awake, alert, cooperative, no apparent distress  Lungs: Clear to auscultation bilaterally, no crackles or wheezing  Cardiovascular: Regular rate and rhythm, normal S1 and S2, and no murmur noted  Abdomen: Normal bowel sounds, soft, non-distended, non-tender  Skin: No rashes, no cyanosis, no edema  Other:    Medications     sodium chloride 75 mL/hr at 07/29/21 0111       ceFEPIme (MAXIPIME) IV  1 g Intravenous Q12H     DAPTOmycin (CUBICIN) intermittent infusion  6 mg/kg Intravenous Q24H     psyllium  1 packet Oral Daily     sodium chloride (PF)  3 mL Intracatheter Q8H     tamsulosin  0.4 mg Oral Daily     traZODone  50 mg Oral At Bedtime       Data   All microbiology laboratory data reviewed.  Recent Labs   Lab Test 07/29/21  0835 07/27/21  0703 07/26/21  0806    WBC 6.4 10.3 12.1*   HGB 12.3* 12.3* 12.4*   HCT 37.0* 37.1* 38.3*   MCV 99 100 100   * 141* 139*     Recent Labs   Lab Test 07/29/21  0835 07/28/21  0813 07/27/21  0703   CR 1.25 1.46* 1.38*     No lab results found.  No lab results found.    Invalid input(s): UC

## 2021-07-29 NOTE — PROCEDURES
[facility]    Single Lumen PICC Placement    Date/Time: 7/29/2021 5:25 PM  Performed by: Petra Chadwick RN  Authorized by: Merna Donaldson MD   Indications: vascular access    UNIVERSAL PROTOCOL   Site Marked: Yes  Prior Images Obtained and Reviewed:  Yes  Required items: Required blood products, implants, devices and special equipment available    Patient identity confirmed:  Verbally with patient, arm band and hospital-assigned identification number  NA - No sedation, light sedation, or local anesthesia  Confirmation Checklist:  Patient's identity using two indicators, relevant allergies, procedure was appropriate and matched the consent or emergent situation and correct equipment/implants were available  Time out: Immediately prior to the procedure a time out was called    Universal Protocol: the Joint Commission Universal Protocol was followed    Preparation: Patient was prepped and draped in usual sterile fashion    ESBL (mL):  30         ANESTHESIA    Anesthesia: Local infiltration  Local Anesthetic:  Lidocaine 1% without epinephrine  Anesthetic Total (mL):  5      SEDATION    Patient Sedated: No        Preparation: skin prepped with ChloraPrep  Skin prep agent: skin prep agent completely dried prior to procedure  Sterile barriers: maximum sterile barriers were used: cap, mask, sterile gown, sterile gloves, and large sterile sheet  Hand hygiene: hand hygiene performed prior to central venous catheter insertion  Type of line used: Power PICC  Catheter type: single lumen  Lumen type: non-valved  Catheter size: 4 Fr  Brand: Bard  Placement method: venipuncture and tip confirmation system  Number of attempts: 1  Successful placement: yes  Orientation: left  Location: basilic vein  Arm circumference: adults 10 cm  Extremity circumference: 35  Visible catheter length: 3  Internal length: 49 cm  Total catheter length: 52  Dressing and securement: alcohol impregnated caps, antibiotic disc  placed, blood cleaned with CHG, securement device and occlusive dressing applied  Post procedure assessment: blood return through all ports and free fluid flow  PROCEDURE   Patient Tolerance:  Patient tolerated the procedure well with no immediate complications

## 2021-07-29 NOTE — CONSULTS
Care Management Initial Consult    General Information  Pt. Is planning to transition back to [facility].  PT/OT have been ordered today.  Pt is wanting to have a PICC placed due to right arm is getting very sore from the multiple lab draws and IV sticks.  Discussed with Dr Donaldson and she will place orders.  Pt will need two weeks of Daptomycin.  Pt will require insurance authorization for ARU.  Have not sent referral thru [facility] yet, but e-mail was sent.      Assessment completed with: Patient,    Type of CM/SW Visit: Initial Assessment    Primary Care Provider verified and updated as needed: Yes   Readmission within the last 30 days: previous discharge plan unsuccessful   Return Category: Post-op/Post-procedure complication  Reason for Consult: care coordination/care conference, discharge planning  Advance Care Planning: Advance Care Planning Reviewed: no concerns identified       General Information Comments: recent hospitalization for cervical myelopathy s/p urgent surgical decompression. Discharged to ARU on 7/18.  Direct admission from ARU with sepsis due to prevertebral C3-T1 abscess.    Communication Assessment  Patient's communication style: spoken language (English or Bilingual)    Hearing Difficulty or Deaf: no   Wear Glasses or Blind: yes    Cognitive  Cognitive/Neuro/Behavioral: WDL  Level of Consciousness: alert  Arousal Level: opens eyes spontaneously  Orientation: oriented x 4  Mood/Behavior: calm, cooperative  Best Language: 0 - No aphasia  Speech: clear, spontaneous    Living Environment:   People in home: spouse     Current living Arrangements: house, Pt came from [facility], baseline lives in a one level      Able to return to prior arrangements: other (see comments)  Living Arrangement Comments: Pt is planning to transition back to [facility]    Family/Social Support:  Care provided by:    Provides care for:    Marital Status:   Wife               Current Resources:   Patient receiving home care  services: No     Community Resources: Acute Rehab  Equipment currently used at home: none  Supplies currently used at home: None    Employment/Financial:  Employment Status: retired     Employment/ Comments: Retired [employer]   Financial Concerns: No concerns identified           Functional Status:  Prior to admission patient needed assistance:  Prior to his decompression surgery, no          Mental Health Status:  Mental Health Status: No Current Concerns       Chemical Dependency Status:  Chemical Dependency Status: No Current Concerns             Values/Beliefs:  Spiritual, Cultural Beliefs, Shinto Practices, Values that affect care:   No                Patti Hill RN   Inpatient Care Management  [phone]

## 2021-07-29 NOTE — PROGRESS NOTES
[facility]    Hospitalist Progress Note      Assessment & Plan   Rigo Johns is a 70 year old male with PMHx PILI, HTN, and recent hospitalization for cervical myelopathy s/p urgent surgical decompression who was discharged to ARU on 7/18 who presents as a direct adm from ARU with sepsis 2/2 suspected prevertebral C3-T1 abscess.     Sepsis, suspect secondary to prevertebral fluid collection   Fevers starting 7/19/21 with increasing leukocytosis, CRP. Initial etiology unclear with comprehensive workup. CT C-spine and subsequent MRI showing prevertebral fluid collection along C3-T1 with anterior extension to subcutaneous area within right side of neck concerning for seroma/abscess. No surgical intervention recommended after contacting neurosurg. CT C/A/P 7/23 without explanation for fever. Abx discontinued 7/24 by ID and subsequently patient with fevers, tachycardia, lactic acid elevation and otherwise negative workup prompting transfer to [facility] for neurosurg intervention. UA on admission negative. LA 2.1>1.3 after IVF.  MRI on admission shows enhancing prevertbral fluid collection, regressing from last imaging  Tmax overnight 100.3, VSS. CRP 87 (80, 76). WBC normal   -- Neurosurgery consulted, appreciate assistance. recommending ongoing treatment with abx. No surgical intervention planned and does not believe fluid collection large enough to warrant IR aspiration.   - ID consulted, appreciate assistance. Recommending 2 week empiric course of abx, currently on cefepime and daptomycin with follow up MRI.   - Blood cultures 7/21,7/25, and 7/27 NGTD.  - Continue empiric Cefepime and Daptomycin   --PT/OT, will start planning for return to ARU. OK to discharge once afebrile, likely 1-2 days.   --PICC today     Cervical myelopathy d/t high grade cervical stenosis s/p urgent C3-5 anterior cervical diskectomy and fusion (7/14/21).  Hx C5-C7 anterior fusion (1993).  Hospital adm 7/14-7/18 for urgent  surgical decompression. Postoperative course complicated by DONOVAN and acute respiratory failure from possible community acquired pneumonia. L>R UE and LE weakness.  - PT/OT to continue treatments.  - SW for discharge planning hopeful back to ARU.     Neurogenic bowel and bladder.  Davis during recent adm d/t retention, removed 7/26 with scheduled straight cath. No sensation bladder. Starting to get some sensation with BMs, but mostly incontinent.  - Follow I&Os.  - Bladder scan and straight cath QID and PRN.  - PTA Flomax 0.4mg daily.  - PTA PRN senna, miralax, dulcolax suppository.     Suspected CKD-III with proteinuria.  Noted during recent hospitalization, baseline Cr unknown as pt does not doctor regularly. Cr has been 1.3-1.4. Stable on admission.   - Monitor.     Hypertension.  Appears diagnosed during recent adm, but not started on meds.  - PRN hydralazine.     Thrombocytopenia.  Noted during recent admission, platelet values stable in low 100s, have since trended up to 130s-140s. Possibly 2/2 infection.  - Monitor.     PILI.  - PTA CPAP     Obesity, BMI 34.  Increased risk of all-cause mortality, morbidity.  - Aggressive lifestyle management as outpatient    DVT Prophylaxis: Pneumatic Compression Devices  Code Status: Full Code    Disposition: Expected discharge in 1-2 days pending fever curve, dispo logistics. Anticipate back to ARU    Patient was discussed with Dr. Palacio who agrees with the above plan     Ranjana Esteves PA-C    Interval History   Doing well today. Denies worsening pain. No CP, SOB, nausea. Appetite is poor. Anxious to return to ARU when able. Sensation/strength unchanged.    -Data reviewed today: I reviewed all new labs and imaging results over the last 24 hours. I personally reviewed no images or EKG's today.    Physical Exam   Temp: 98.4  F (36.9  C) Temp src: Oral BP: (!) 147/84 Pulse: 76   Resp: 18 SpO2: 95 % O2 Device: None (Room air)    There were no vitals filed for this  visit.  Vital Signs with Ranges  Temp:  [98.4  F (36.9  C)-100.3  F (37.9  C)] 98.4  F (36.9  C)  Pulse:  [76-92] 76  Resp:  [16-18] 18  BP: (145-147)/(75-84) 147/84  SpO2:  [92 %-95 %] 95 %  I/O last 3 completed shifts:  In: 2075 [P.O.:260; I.V.:1815]  Out: 2725 [Urine:2725]    Constitutional: Alert and oriented, sitting up in bed. Appears comfortable and is pleasantly conversant   ENT:  moist mucous membranes, c collar in place   Eyes:  Sclera anicteric, EOMI  Respiratory: Lungs with minimal crackles right base. No increased WOB or wheezing  Cardiovascular: Regular rate and rhythm, trace bilateral LE edema   GI:  active bowel sounds, abdomen soft, non-tender  MSK:  Moves all four extremities. strength 5/5 right. Weak on left compared to right, more pronounced LUE than lower. Able to raise arm about residential.  strength 5/5 bilaterally.   Neuro:  Speech is clear. Face symmetric. CN 2-12 grossly intact. Sensation intact.     Medications       ceFEPIme (MAXIPIME) IV  1 g Intravenous Q12H     DAPTOmycin (CUBICIN) intermittent infusion  6 mg/kg Intravenous Q24H     psyllium  1 packet Oral Daily     sodium chloride (PF)  3 mL Intracatheter Q8H     tamsulosin  0.4 mg Oral Daily     traZODone  50 mg Oral At Bedtime       Data   Recent Labs   Lab 07/29/21  0835 07/28/21  0813 07/27/21  0703 07/26/21  0806 07/25/21  0604   WBC 6.4  --  10.3 12.1*  --    HGB 12.3*  --  12.3* 12.4*  --    MCV 99  --  100 100  --    *  --  141* 139*  --    INR  --  1.14  --   --   --     140 141  --  140   POTASSIUM 3.7 3.9 4.0  --  4.0   CHLORIDE 113* 112* 112*  --  113*   CO2 24 25 19*  --  24   BUN 20 19 17  --  18   CR 1.25 1.46* 1.38*  --  1.38*   ANIONGAP 4 3 10  --  3   GARRISON 8.6 8.7 8.4*  --  8.1*   * 86 81  --  91   ALBUMIN  --  2.5*  --   --  2.6*   PROTTOTAL  --  7.1  --   --   --    BILITOTAL  --  0.8  --   --   --    ALKPHOS  --  88  --   --   --    ALT  --  32  --   --   --    AST  --  25  --   --   --         No results found for this or any previous visit (from the past 24 hour(s)).

## 2021-07-29 NOTE — PLAN OF CARE
DATE & TIME: 7/29/21 7451-2265  Cognitive Concerns/ Orientation : A&Ox4   BEHAVIOR & AGGRESSION TOOL COLOR: green   ABNL VS/O2: VSS on RA; T max 99.4  NEURO: Neuro checks q4h- L sided weakness.  MOBILITY: A1 W/GB- not OOB this shift. Encouraged to shift weight in bed, turns well assist 1.  PAIN MANAGMENT: c/o shoulder pain, declined medication, pillows for comfort  DIET: Reg- poor appetite.   BOWEL/BLADDER: Retains urine (straight cathed at 0115- 550 mL and 0630 for 850 mL). Incontinent of BM- 2 BMs this shift.  ABNL LAB/BG: None new  DRAIN/DEVICES: PIV infusing NS at 75mL/hr  TELEMETRY RHYTHM: n/a  SKIN: WDL ex rash to inner thigh/groin (pt states this has been there for 20 years)- powder applied.  TESTS/PROCEDURES: Nothing this shift  D/C DAY/GOALS/PLACE: pending improvement  OTHER IMPORTANT INFO: Pt wears soft collar for comfort. BLE edema 1-2+. On IV abx (cefepine and cubicin). Pt wears own CPAP at night if lying flat, states he doesn't need to wear it if he is sitting upright.

## 2021-07-29 NOTE — PLAN OF CARE
DATE & TIME: 7/28/21 6062-0150  Cognitive Concerns/ Orientation : A&Ox4   BEHAVIOR & AGGRESSION TOOL COLOR: green   ABNL VS/O2: VSS on RA ex temp 100.1 this shift  NEURO: Neuro checks q4h- L sided weakness.  MOBILITY: A1 W/GB- not OOB this shift. Encouraged to shift weight in bed, turns well assist 1.  PAIN MANAGMENT: c/o shoulder pain, tylenol given x1  DIET: Reg- poor appetite. Did not want dinner  BOWEL/BLADDER: Retains urine (straight cathed at 1900- 675mL). Incontinent of BM- medium soft BM this shift  ABNL LAB/BG: CRP 87.0, creat 1.46  DRAIN/DEVICES: PIV infusing NS at 75mL/hr  TELEMETRY RHYTHM: n/a  SKIN: WDL ex rash to inner thigh/groin (pt states this has been there for 20 years)- barrier cream applied.  TESTS/PROCEDURES: Nothing this shift  D/C DAY/GOALS/PLACE: pending improvement  OTHER IMPORTANT INFO: Pt wears soft collar for comfort. BLE edema 1-2+. On IV abx (cefepine and cubicin). Pt wears own CPAP at night if lying flat, states he doesn't need to wear it if he is sitting upright.

## 2021-07-30 ENCOUNTER — APPOINTMENT (OUTPATIENT)
Dept: PHYSICAL THERAPY | Facility: CLINIC | Age: 71
DRG: 919 | End: 2021-07-30
Attending: PHYSICIAN ASSISTANT
Payer: COMMERCIAL

## 2021-07-30 ENCOUNTER — APPOINTMENT (OUTPATIENT)
Dept: OCCUPATIONAL THERAPY | Facility: CLINIC | Age: 71
DRG: 919 | End: 2021-07-30
Attending: PHYSICIAN ASSISTANT
Payer: COMMERCIAL

## 2021-07-30 LAB
BACTERIA BLD CULT: NO GROWTH
BACTERIA BLD CULT: NO GROWTH
CK SERPL-CCNC: 203 U/L (ref 30–300)
CREAT SERPL-MCNC: 1.34 MG/DL (ref 0.66–1.25)
GFR SERPL CREATININE-BSD FRML MDRD: 53 ML/MIN/1.73M2

## 2021-07-30 PROCEDURE — 36592 COLLECT BLOOD FROM PICC: CPT

## 2021-07-30 PROCEDURE — 120N000001 HC R&B MED SURG/OB

## 2021-07-30 PROCEDURE — 97530 THERAPEUTIC ACTIVITIES: CPT | Mod: GP | Performed by: PHYSICAL THERAPIST

## 2021-07-30 PROCEDURE — 250N000011 HC RX IP 250 OP 636: Performed by: INTERNAL MEDICINE

## 2021-07-30 PROCEDURE — 999N000190 HC STATISTIC VAT ROUNDS

## 2021-07-30 PROCEDURE — 97530 THERAPEUTIC ACTIVITIES: CPT | Mod: GO

## 2021-07-30 PROCEDURE — 82565 ASSAY OF CREATININE: CPT

## 2021-07-30 PROCEDURE — 250N000013 HC RX MED GY IP 250 OP 250 PS 637: Performed by: PHYSICIAN ASSISTANT

## 2021-07-30 PROCEDURE — 97166 OT EVAL MOD COMPLEX 45 MIN: CPT | Mod: GO

## 2021-07-30 PROCEDURE — 82550 ASSAY OF CK (CPK): CPT | Performed by: INTERNAL MEDICINE

## 2021-07-30 PROCEDURE — 99233 SBSQ HOSP IP/OBS HIGH 50: CPT | Performed by: PHYSICIAN ASSISTANT

## 2021-07-30 PROCEDURE — 97161 PT EVAL LOW COMPLEX 20 MIN: CPT | Mod: GP | Performed by: PHYSICAL THERAPIST

## 2021-07-30 PROCEDURE — 258N000003 HC RX IP 258 OP 636: Performed by: INTERNAL MEDICINE

## 2021-07-30 RX ORDER — CEFEPIME HYDROCHLORIDE 1 G/1
1 INJECTION, POWDER, FOR SOLUTION INTRAMUSCULAR; INTRAVENOUS EVERY 12 HOURS
Qty: 280 ML | Refills: 0 | DISCHARGE
Start: 2021-07-30 | End: 2021-08-02

## 2021-07-30 RX ADMIN — DAPTOMYCIN 650 MG: 500 INJECTION, POWDER, LYOPHILIZED, FOR SOLUTION INTRAVENOUS at 16:03

## 2021-07-30 RX ADMIN — ACETAMINOPHEN 650 MG: 325 TABLET, FILM COATED ORAL at 00:29

## 2021-07-30 RX ADMIN — CEFEPIME HYDROCHLORIDE 1 G: 1 INJECTION, POWDER, FOR SOLUTION INTRAMUSCULAR; INTRAVENOUS at 08:00

## 2021-07-30 RX ADMIN — TRAZODONE HYDROCHLORIDE 50 MG: 50 TABLET ORAL at 22:03

## 2021-07-30 RX ADMIN — ACETAMINOPHEN 650 MG: 325 TABLET, FILM COATED ORAL at 22:03

## 2021-07-30 RX ADMIN — PSYLLIUM HUSK 1 PACKET: 3.4 POWDER ORAL at 08:00

## 2021-07-30 RX ADMIN — CEFEPIME HYDROCHLORIDE 1 G: 1 INJECTION, POWDER, FOR SOLUTION INTRAMUSCULAR; INTRAVENOUS at 20:38

## 2021-07-30 RX ADMIN — TAMSULOSIN HYDROCHLORIDE 0.4 MG: 0.4 CAPSULE ORAL at 08:00

## 2021-07-30 ASSESSMENT — ACTIVITIES OF DAILY LIVING (ADL)
ADLS_ACUITY_SCORE: 19
ADLS_ACUITY_SCORE: 20
ADLS_ACUITY_SCORE: 20
ADLS_ACUITY_SCORE: 19
ADLS_ACUITY_SCORE: 18
PREVIOUS_RESPONSIBILITIES: MEAL PREP;HOUSEKEEPING;LAUNDRY;SHOPPING;YARDWORK;MEDICATION MANAGEMENT;FINANCES;DRIVING
ADLS_ACUITY_SCORE: 18

## 2021-07-30 NOTE — PROGRESS NOTES
Care Management Follow Up    Length of Stay (days): 3    Expected Discharge Date: 07/30/2021     Concerns to be Addressed: discharge planning     Patient plan of care discussed at interdisciplinary rounds: Yes    Anticipated Discharge Disposition: Acute Rehab     Anticipated Discharge Services: None  Anticipated Discharge DME: None    Patient/family educated on Medicare website which has current facility and service quality ratings: no  Education Provided on the Discharge Plan:    Patient/Family in Agreement with the Plan:      Referrals Placed by CM/SW:    Private pay costs discussed: Not applicable    Additional Information:  Writer spoke with PA who stated that pt may be ready today or tomorrow. Writer spoke with ARU who stated they do not have a bed today, but will put him on the list for tomorrow. SW will need to call to check bed availability in the morning.     ZAYRA Albrecht

## 2021-07-30 NOTE — PLAN OF CARE
A/O x4. Tylenol given for neck pain. Slightly weaker L , LUE strength 4/5. Incontinent of stool x1. Perineum excoriated. PICC line in place and saline locked.

## 2021-07-30 NOTE — PLAN OF CARE
DATE & TIME: 7/30/210700-1530  Cognitive Concerns/ Orientation : A&Ox4   BEHAVIOR & AGGRESSION TOOL COLOR: green   ABNL VS/O2: VSS on RA, Tmax 99.3  NEURO: Neuro checks q4h- L sided weakness unchanged.  MOBILITY: A1 W/GB-.  turns well assist 1,   PAIN MANAGMENT: denied  DIET: Reg  BOWEL/BLADDER: Retains urine, straight cath 4 times daily, (completed at 1300)). Wants to keep torres in, notified KARL Chilel One BM this shift, incontinent  ABNL LAB/BG: Cr 1.34  IV: L PICC w/ int abx,   TELEMETRY RHYTHM: n/a  SKIN: WDL ex rash to inner thigh/groin (pt states this has been there for 20 years)- powder/ barrier cream applied.  TESTS/PROCEDURES: None  D/C DAY/GOALS/PLACE: pending improvement, 1-2 days to  ARU  OTHER IMPORTANT INFO: Pt wears soft collar for comfort. BLE edema 1-2+. On IV abx, declines CPAP at bedtime, ID ok to discharge  him to ARU on IV BX., worked with PT and OT

## 2021-07-30 NOTE — PROGRESS NOTES
[facility]    Hospitalist Progress Note      Assessment & Plan   Rigo Johns is a 70 year old male with PMHx PILI, HTN, and recent hospitalization for cervical myelopathy s/p urgent surgical decompression who was discharged to ARU on 7/18 who presents as a direct adm from ARU with sepsis 2/2 suspected prevertebral C3-T1 abscess.     Sepsis, suspect secondary to prevertebral fluid collection   Fevers starting 7/19/21 with increasing leukocytosis, CRP. Initial etiology unclear with comprehensive workup. CT C-spine and subsequent MRI showing prevertebral fluid collection along C3-T1 with anterior extension to subcutaneous area within right side of neck concerning for seroma/abscess. No surgical intervention recommended after contacting neurosurg. CT C/A/P 7/23 without explanation for fever. Abx discontinued 7/24 by ID and subsequently patient with fevers, tachycardia, lactic acid elevation and otherwise negative workup prompting transfer to [facility] for neurosurg intervention. UA on admission negative. LA 2.1>1.3 after IVF.  MRI on admission shows enhancing prevertbral fluid collection, regressing from last imaging  Tmax overnight 100.3, VSS. CRP 87 (80, 76). WBC normal   -- Neurosurgery consulted, appreciate assistance. recommending ongoing treatment with abx. No surgical intervention planned and does not believe fluid collection large enough to warrant IR aspiration.   - ID consulted, appreciate assistance. Recommending 2 week empiric course of abx, currently on cefepime and daptomycin with follow up MRI.   - Blood cultures 7/21,7/25, and 7/27 NGTD.  - Continue empiric Cefepime and Daptomycin   --PT/OT, will start planning for return to ARU. OK to discharge once afebrile, likely later today vs tomorrow   --PICC placed 7/29    Anorexia.  Non severe malnutrition   Reports poor appetite over past few weeks with difficulty taking substantial po.   --nutrition consult  --start boost      Cervical  myelopathy d/t high grade cervical stenosis s/p urgent C3-5 anterior cervical diskectomy and fusion (7/14/21).  Hx C5-C7 anterior fusion (1993).  Hospital adm 7/14-7/18 for urgent surgical decompression. Postoperative course complicated by DONOVAN and acute respiratory failure from possible community acquired pneumonia. L>R UE and LE weakness.  - PT/OT to continue treatments.  - SW for discharge planning hopeful back to ARU.     Neurogenic bowel and bladder.  Davis during recent adm d/t retention, removed 7/26 with scheduled straight cath. No sensation bladder. Starting to get some sensation with BMs, but mostly incontinent.  - Follow I&Os.  - Bladder scan and straight cath QID and PRN.  - PTA Flomax 0.4mg daily.  - PTA PRN senna, miralax, dulcolax suppository.     Suspected CKD-III with proteinuria.  Noted during recent hospitalization, baseline Cr unknown as pt does not doctor regularly. Cr has been 1.3-1.4. Stable on admission.   - Monitor.     Hypertension.  Appears diagnosed during recent adm, but not started on meds.  - PRN hydralazine.     Thrombocytopenia.  Noted during recent admission, platelet values stable in low 100s, have since trended up to 130s-140s. Possibly 2/2 infection.  - Monitor.     PILI.  - PTA CPAP     Obesity, BMI 34.  Increased risk of all-cause mortality, morbidity.  - Aggressive lifestyle management as outpatient    DVT Prophylaxis: Pneumatic Compression Devices  Code Status: Full Code    Disposition: Expected discharge to ARU later today vs tomorrow pending ID eval, logistics    Patient was discussed with Dr. Palacio who agrees with the above plan     Ranjana Esteves PA-C    Interval History   Continues to feel fatigued and reports worsening generalized weakness from inactivity with stable focal weakness. Night sweats last night. No nausea, vomiting. Appetite remains poor. No increased pain.     -Data reviewed today: I reviewed all new labs and imaging results over the last 24 hours. I  personally reviewed no images or EKG's today.    Physical Exam   Temp: 99.3  F (37.4  C) Temp src: Oral BP: 138/74 Pulse: 75   Resp: 18 SpO2: 94 % O2 Device: None (Room air)    There were no vitals filed for this visit.  Vital Signs with Ranges  Temp:  [98.4  F (36.9  C)-100.4  F (38  C)] 99.3  F (37.4  C)  Pulse:  [75-92] 75  Resp:  [16-18] 18  BP: (126-138)/(69-77) 138/74  SpO2:  [94 %] 94 %  I/O last 3 completed shifts:  In: 1230 [P.O.:720; I.V.:510]  Out: 3050 [Urine:3050]    Constitutional: Alert and oriented, sitting up in bed. Appears comfortable and is pleasantly conversant   ENT:  moist mucous membranes, c collar in place   Eyes:  Sclera anicteric, EOMI  Respiratory: Lungs with minimal crackles right base. No increased WOB or wheezing  Cardiovascular: Regular rate and rhythm, trace bilateral LE edema   GI:  active bowel sounds, abdomen soft, non-tender  MSK:  Moves all four extremities. strength 5/5 right. Weak on left compared to right, more pronounced LUE than lower. Able to raise arm about group home.  strength 5/5 right, 4/5 left  Neuro:  Speech is clear. Face symmetric. CN 2-12 grossly intact. Sensation intact.     Medications       ceFEPIme (MAXIPIME) IV  1 g Intravenous Q12H     DAPTOmycin (CUBICIN) intermittent infusion  6 mg/kg Intravenous Q24H     psyllium  1 packet Oral Daily     sodium chloride (PF)  10-40 mL Intracatheter Q8H     sodium chloride (PF)  3 mL Intracatheter Q8H     tamsulosin  0.4 mg Oral Daily     traZODone  50 mg Oral At Bedtime       Data   Recent Labs   Lab 07/30/21  0638 07/29/21  0835 07/28/21  0813 07/27/21  0703 07/26/21  0806 07/25/21  0604   WBC  --  6.4  --  10.3 12.1*  --    HGB  --  12.3*  --  12.3* 12.4*  --    MCV  --  99  --  100 100  --    PLT  --  136*  --  141* 139*  --    INR  --   --  1.14  --   --   --    NA  --  141 140 141  --  140   POTASSIUM  --  3.7 3.9 4.0  --  4.0   CHLORIDE  --  113* 112* 112*  --  113*   CO2  --  24 25 19*  --  24   BUN  --  20 19 17   --  18   CR 1.34* 1.25 1.46* 1.38*  --  1.38*   ANIONGAP  --  4 3 10  --  3   GARRISON  --  8.6 8.7 8.4*  --  8.1*   GLC  --  106* 86 81  --  91   ALBUMIN  --   --  2.5*  --   --  2.6*   PROTTOTAL  --   --  7.1  --   --   --    BILITOTAL  --   --  0.8  --   --   --    ALKPHOS  --   --  88  --   --   --    ALT  --   --  32  --   --   --    AST  --   --  25  --   --   --        No results found for this or any previous visit (from the past 24 hour(s)).

## 2021-07-30 NOTE — PROGRESS NOTES
07/30/21 0959   Quick Adds   Type of Visit Initial Occupational Therapy Evaluation   Living Environment   People in home spouse   Current Living Arrangements house   Home Accessibility stairs to enter home;stairs within home   Self-Care   Usual Activity Tolerance good   Current Activity Tolerance fair   Activity/Exercise/Self-Care Comment Per prior eval- Pt reports being IND at baseline with all ADLs including cooking, cleaning, bathing, and dressing. Pt reports ambulating within the household and community without AD. Pt reports having access to a FWW if needed. Pt reports stairs are not difficult for pt. Pt reports ambulating ~1 block before needing a seated rest break. Pt reports he still drives and gets assistance from wife with errand management.   Instrumental Activities of Daily Living (IADL)   Previous Responsibilities meal prep;housekeeping;laundry;shopping;yardwork;medication management;finances;driving   IADL Comments    Disability/Function   Hearing Difficulty or Deaf no   Fall history within last six months no   Change in Functional Status Since Onset of Current Illness/Injury yes   General Information   Onset of Illness/Injury or Date of Surgery 07/27/21   Referring Physician Ranjana Esteves PA-C   Patient/Family Therapy Goal Statement (OT) Return to ARU and become more independent   Additional Occupational Profile Info/Pertinent History of Current Problem PMHx PILI, HTN, and recent hospitalization for cervical myelopathy s/p urgent surgical decompression who was discharged to ARU on 7/18 who presents as a direct adm from ARU with sepsis 2/2 suspected prevertebral C3-T1 abscess.   Existing Precautions/Restrictions fall;spinal;other (see comments)  (cervical soft collar on)   Cognitive Status Examination   Orientation Status orientation to person, place and time   Affect/Mental Status (Cognitive) WFL   Follows Commands WFL   Sensory   Sensory Comments Pt reports tingling in R 2-4th  fingers   Pain Assessment   Patient Currently in Pain Yes, see Vital Sign flowsheet   Posture   Posture Comments kyphotic unsupported sitting and standing   Strength Comprehensive (MMT)   Comment, General Manual Muscle Testing (MMT) Assessment r shoulder 2-/5 1/2 range, elbow 4/5, wrist 4/5    Coordination   Upper Extremity Coordination Left UE impaired   Bed Mobility   Bed Mobility sit-supine;rolling right;rolling left   Rolling Left [place] (Bed Mobility) contact guard   Rolling Right [place] (Bed Mobility) contact guard   Supine-Sit [place] (Bed Mobility) minimum assist (75% patient effort)   Sit-Stand Transfer   Sit-Stand [place] (Transfers) moderate assist (50% patient effort);2 person assist   Assistive Device (Sit-Stand Transfers) walker, front-wheeled   Activities of Daily Living   BADL Assessment toileting   Lower Body Dressing Assessment   [place] Level (Lower Body Dressing) maximum assist (25% patient effort);assist of 2   Toileting   [place] Level (Toileting) dependent (less than 25% patient effort)   Clinical Impression   Criteria for Skilled Therapeutic Interventions Met (OT) yes;meets criteria;skilled treatment is necessary   OT Diagnosis decline function   OT Problem List-Impairments impacting ADL activity tolerance impaired;balance;mobility;range of motion (ROM);strength;sensation;post-surgical precautions;coordination;flexibility;motor control;pain;postural control   Assessment of Occupational Performance 5 or more Performance Deficits   Identified Performance Deficits dressing, bathing, grooming, toileting, functional mobility, strenuous IADLs   Planned Therapy Interventions (OT) ADL retraining;balance training;bed mobility training;fine motor coordination training;motor coordination training;neuromuscular re-education;ROM;strengthening;transfer training;home program guidelines;progressive activity/exercise   Clinical Decision Making Complexity (OT) moderate  complexity   Therapy Frequency (OT) Daily   Predicted Duration of Therapy 5 days   Risk & Benefits of therapy have been explained evaluation/treatment results reviewed;care plan/treatment goals reviewed;risks/benefits reviewed;current/potential barriers reviewed;participants voiced agreement with care plan;participants included;patient   OT Discharge Planning    OT Discharge Recommendation (DC Rec) [facility]-Motivated patient will benefit from intensive, interdisciplinary therapy.  Anticipate will be able to tolerate 3 hours of therapy per day   OT Rationale for DC Rec Pt well below baseline of independence with functional mobility and I/ADLs, primarily limited by left sided weakness and incoordination. Pt very motivated to work with therapies, recommend intensive therapies at ARU to maximize independence and safety with I/ADLs   Total Evaluation Time (Minutes)   Total Evaluation Time (Minutes) 7

## 2021-07-30 NOTE — PROGRESS NOTES
07/30/21 1015   Quick Adds   Type of Visit Initial PT Evaluation   Living Environment   People in home spouse   Current Living Arrangements house   Home Accessibility stairs to enter home   Number of Stairs, Main Entrance 3   Stair Railings, Main Entrance railings safe and in good condition   Number of Stairs, Within Home, Primary other (see comments)   Stair Railings, Within Home, Primary railings on both sides of stairs   Transportation Anticipated family or friend will provide   Living Environment Comments from ARU, hopeful to return to ARU for continued rehab    Self-Care   Usual Activity Tolerance good   Current Activity Tolerance fair   Regular Exercise No   Equipment Currently Used at Home none   Activity/Exercise/Self-Care Comment Ind at baseline with all IADLs/ADLs. Ambulating without device and able to access community. Has FWW as needed. Normally stairs and walking 1 block is not difficulty. Pt has felt weaker since coming to hospital from [facility], had progressed at ARU to walking 100 feet broken into 20 feet bouts per pt report.    Disability/Function   Fall history within last six months no   General Information   Onset of Illness/Injury or Date of Surgery 07/27/21   Referring Physician Ranjana Esteves PA-C   Patient/Family Therapy Goals Statement (PT) continue to progress towards doing things on his own with more therapy    Pertinent History of Current Problem (include personal factors and/or comorbidities that impact the POC) 70 year old male with PMHx PILI, HTN, and recent hospitalization for cervical myelopathy s/p urgent surgical decompression who was discharged to ARU on 7/18 who presents as a direct adm from ARU with sepsis 2/2 suspected prevertebral C3-T1 abscess   Existing Precautions/Restrictions fall;spinal   Cognition   Orientation Status (Cognition) oriented x 4   Affect/Mental Status (Cognition) WNL   Follows Commands (Cognition) WNL   Pain Assessment   Patient Currently in Pain Yes,  see Vital Sign flowsheet  (mild neck pain )   Posture    Posture Comments drifting to the right while sitting EOB    Strength   Strength Comments B LE Anti-gravity, per subjective report L LE weaker than R LE and reports generalized weakness/malaise    Bed Mobility   Comment (Bed Mobility) Min A supine <> sit via modified log roll    Transfers   Transfer Safety Comments Min A x 2 sit <> stand with FWW    Gait/Stairs (Locomotion)   [place] Level (Gait) minimum assist (75% patient effort)  (2 people assist for safety )   Assistive Device (Gait) walker, front-wheeled   Distance in Feet (Required for LE Total Joints) 2'    Clinical Impression   Criteria for Skilled Therapeutic Intervention yes, treatment indicated   PT Diagnosis (PT) impaired mobility   Influenced by the following impairments weakness, poor endurance, impaired balance, pain    Functional limitations due to impairments fall risk, decreased activity tolerance    Clinical Presentation Stable/Uncomplicated   Clinical Presentation Rationale clinical judgement    Clinical Decision Making (Complexity) low complexity   Therapy Frequency (PT) Daily   Predicted Duration of Therapy Intervention (days/wks) 5 days    Planned Therapy Interventions (PT) balance training;bed mobility training;gait training;neuromuscular re-education;strengthening;transfer training   Anticipated Equipment Needs at Discharge (PT) walker, rolling   Risk & Benefits of therapy have been explained evaluation/treatment results reviewed;care plan/treatment goals reviewed;risks/benefits reviewed;participants included;participants voiced agreement with care plan;current/potential barriers reviewed;patient   PT Discharge Planning    PT Discharge Recommendation (DC Rec) [facility]-Motivated patient will benefit from intensive, interdisciplinary therapy.  Anticipate will be able to tolerate 3 hours of therapy per day   PT Rationale for DC Rec Pt below baseline for functional mobility  and would benefit from intensive multidisciplinary cares/therapies to address fucntional limitations and facilitate safe return to home. Pt is motivated and has good support at home.    PT Brief overview of current status  Min A bed mob, Min A x 2 sit <> stand, Min A x 2 gait 2 steps    Total Evaluation Time   Total Evaluation Time (Minutes) 10

## 2021-07-30 NOTE — PLAN OF CARE
Pt is A&Ox4. Denied pain, denied nausea. Neuros unchanged, L sided weakness at baseline. Soft collar for comfort. Incision to front of neck, closed w/ steri strips. Rash to inner thigh/groin (pt states this has been there for 20 years)- powder applied. L PICC SL w/ int abx. R PIV SL. Up W/ A1, GB, W, not OOB this shift. Neurogenic bladder, straight cath q6h. (Would like done at midnight and 6am). One BM this shift, incontinent of stool. Tolerating a regular diet. Decline CPAP at bedtime. Discharge is pending.

## 2021-07-30 NOTE — PROGRESS NOTES
[facility]    Infectious Disease Progress Note    Date of Service (when I saw the patient): 07/30/2021     Assessment & Plan   Rigo Johns is a 70 year old male who was admitted on 7/27/2021.     Impression:  1. 70 y.o male with HTN, Spinal stenosis.   2. Recent admission with cervical myelopathy s/p urgent surgical decompression and fusion done on 7/ 14.   3. Presenting now with Fevers starting 7/19/21 with increasing leukocytosis, CRP.   4. CT C-spine and subsequent MRI showing prevertebral fluid collection along C3-T1 with anterior extension to subcutaneous area within right side of neck concerning for seroma/abscess.   5. No surgical intervention recommended after contacting neurosurg.   6. CT C/A/P 7/23 without any other explanation for fever.   7. Abx discontinued 7/24 by ID at the ARU  and subsequently patient with fevers, tachycardia, lactic acid elevation and otherwise negative workup prompting transfer to [facility] for neurosurg intervention.   8. UA on admission negative.   9. Imaging today: IMPRESSION:   1.  Prevertebral fluid and fluid within the surgical tract in the right neck are regressing.  2.  Robust enhancement along the periphery of these fluid collections is again identified.  3.  Both resolving abscess and evolving postsurgical change could have this appearance.  4.  Diminished T2 prolongation in the cord.  5.  No new abnormality.  10. DONOVAN   11. Currently on vancomycin and cefepime.      Recommendations:   Discussed with NS to consider aspiration of the fluid collection if possible, discussed with Dr. Hanley who think there is no fluid that can be aspirated and the only way to get to the fluid is through another surgery.   He thinks since the fluid collection is already improving this most likely implies resolving post operative fluid and then anther surgery when he is improving will not be beneficial for diagnostic purposes as patient has already been on antibiotics for 6  days... I agree its possible that antibiotics will given us false negative results on the fluid, I also agree I and D is not needed just for diagnostic purposes as is an invasive procedure and carries risk.   If indeed this is a a deep post surgical infections that is when I and D will be indicated.      Plan:   Antibiotics empiric 2 weeks course. Continue cefepime, switched from vancomycin to daptomycin given CKD and elevated creat.   Followed by repeat MRI and FOLLOW UP with NS.   If no concern for infection stop antibiotics   If concern for infection will reassess for need for further antibiotics alone vs surgical intervention + antibiotics.      Ok to get the PICC   Orders for 2 weeks of daptomycin and cefepime are in the chart     Merna Donaldson MD    Interval History   Afebrile       Physical Exam   Temp: 99.3  F (37.4  C) Temp src: Oral BP: 138/74 Pulse: 75   Resp: 18 SpO2: 94 % O2 Device: None (Room air)    There were no vitals filed for this visit.  Vital Signs with Ranges  Temp:  [98.4  F (36.9  C)-100.4  F (38  C)] 99.3  F (37.4  C)  Pulse:  [75-92] 75  Resp:  [16-18] 18  BP: (126-138)/(69-77) 138/74  SpO2:  [94 %] 94 %    Constitutional: Awake, alert, cooperative, no apparent distress  Lungs: Clear to auscultation bilaterally, no crackles or wheezing  Cardiovascular: Regular rate and rhythm, normal S1 and S2, and no murmur noted  Abdomen: Normal bowel sounds, soft, non-distended, non-tender  Skin: No rashes, no cyanosis, no edema  Other:    Medications       ceFEPIme (MAXIPIME) IV  1 g Intravenous Q12H     DAPTOmycin (CUBICIN) intermittent infusion  6 mg/kg Intravenous Q24H     psyllium  1 packet Oral Daily     sodium chloride (PF)  10-40 mL Intracatheter Q8H     sodium chloride (PF)  3 mL Intracatheter Q8H     tamsulosin  0.4 mg Oral Daily     traZODone  50 mg Oral At Bedtime       Data   All microbiology laboratory data reviewed.  Recent Labs   Lab Test 07/29/21  0835 07/27/21  0703 07/26/21  0806   WBC  6.4 10.3 12.1*   HGB 12.3* 12.3* 12.4*   HCT 37.0* 37.1* 38.3*   MCV 99 100 100   * 141* 139*     Recent Labs   Lab Test 07/30/21  0638 07/29/21  0835 07/28/21  0813   CR 1.34* 1.25 1.46*     No lab results found.  No lab results found.    Invalid input(s): UC

## 2021-07-30 NOTE — PROGRESS NOTES
CLINICAL NUTRITION SERVICES  -  ASSESSMENT NOTE      Recommendations Ordered by Registered Dietitian (RD):   Ensure Enlive Chocolate between meals at 10 am and 2 pm   MVI+M for micronutrient needs with decreased PO intake x 2 weeks    Future/Additional Recommendations:   Continue regular diet   Monitor adequacy of PO intake once fevers reduce --> if remains with poor appetite may need to consider appetite stimulant    Malnutrition:   % Weight Loss:  Up to 5% in 1 month (non-severe malnutrition)  % Intake:  <75% for > 7 days (non-severe malnutrition)  Subcutaneous Fat Loss:  None observed  Muscle Loss:  Temporal region mild depletion, Clavicle bone region mild depletion, Acromion bone region mild depletion and Dorsal hand region mild depletion  Fluid Retention:  Mild BLE     Malnutrition Diagnosis: Non-Severe malnutrition  In Context of:  Acute illness or injury       REASON FOR ASSESSMENT  Rigo Johns is a 70 year old male seen by Registered Dietitian for Provider Order - poor appetite, recent prolonged hospital stay     Per chart review, PMH significant for PILI, HTN, and recent hospitalization for cervical myelopathy s/p urgent decompression. Admitted directly from ARU 2/2 sepsis from suspected prevertebral C3-T1 abscess. Planning to discharge back to ARU potentially tomorrow as they do not have a bed available today per .       NUTRITION HISTORY  - Information obtained from chart review and patient at bedside.  - Patient was last seen by RD at ARU on 7/27. Recent fevers had been affecting patient's appetite. Typically was ordering fruit and cereal for breakfast with variable meals for lunch and dinner. Received Room Service w/ Assist and Ensure Clear at 2 pm snack during ARU admission.   - Pt was very focused on his frustration that coming back to the hospital as set back his progress during my visit this afternoon. Writer offered encouragement and reflective listening for pt. Did not obtain great hx due to  "pt frustration, but pt did note that when he spikes a fever he feels very achy and it is hard to eat or drink anything. Denies feeling nauseous when fever spikes.   - Food allergies: Shrimp       CURRENT NUTRITION ORDERS  Diet Order:     Regular     Current Intake/Tolerance:  Flowsheets documenting % meal intakes over this admission. Patient ordered breakfast and lunch yesterday and breakfast this AM. Stilll working on breakfast tray from this morning - cold cereal with fruit. Has not ordered lunch but pt agreeable to trying an Ensure supplement this afternoon and sipping on that.       NUTRITION FOCUSED PHYSICAL ASSESSMENT FOR DIAGNOSING MALNUTRITION)  Yes         Observed:    Muscle wasting (refer to documentation in Malnutrition section) and Dry skin   Poor skin turgor     Obtained from Chart/Interdisciplinary Team:  - trace to mild BLE edema   - Last BM noted on 7/29   - ID consulted --> recommending 2 week empiric course of abx with follow up MRI     ANTHROPOMETRICS  Height: 5' 11\"   Weight: 108.3 kg (238 lb) - 7/25, no updated weight this admission   BMI: 33.29 kg/m^2  Weight Status:  Obesity Grade I BMI 30-34.9  IBW: 78.2 kg  % IBW: 138%  Weight History: Patient has lost 11 lb over the past 2 weeks (4%). No other wt hx available.   Wt Readings from Last 10 Encounters:   07/25/21 108.3 kg (238 lb 11.2 oz)   07/12/21 113 kg (249 lb 3.2 oz)       LABS  Labs reviewed  - WBC trending down --> (7/26) 12.1 --> (7/29) 6.4    MEDICATIONS  Medications reviewed      ASSESSED NUTRITION NEEDS PER APPROVED PRACTICE GUIDELINES:    Dosing Weight 86 kg (adjusted)  Estimated Energy Needs: 3961-9205 kcals (25-30 Kcal/Kg)  Justification: maintenance of current wt   Estimated Protein Needs: 103-129 grams protein (1.2-1.5 g pro/Kg)  Justification: increased needs with infection   Estimated Fluid Needs: 1925-5442  mL (1 mL/Kcal)  Justification: maintenance      NUTRITION DIAGNOSIS:  Inadequate oral intake related to appetite " fluctuations 2/2 fevers as evidenced by patient consuming < 75% of usual PO intake x 2 weeks, 4% wt loss x 2 weeks, mild muscle wasting on upper body and mild edema likely at least in part due to acute decrease in nutritional intake       NUTRITION INTERVENTIONS  Recommendations / Nutrition Prescription  Continue regular diet  Ensure Enlive Chocolate between meals at 10 am and 2 pm   MVI+M for micronutrient needs with decreased PO intake x 2 weeks       Implementation  Nutrition education: Provided education on importance of adequate protein and calories for healing and keeping up strength for rehab. Reviewed available oral nutrition supplements and indications for use.   Medical Food Supplement and Multivitamin/Mineral      Nutrition Goals  Patient will consume % of nutritionally adequate meal trays TID or equivalent with supplements.       MONITORING AND EVALUATION:  Progress towards goals will be monitored and evaluated per protocol and Practice Guidelines      Kiki Pak RD, LD  Unit RD Pager: [phone]

## 2021-07-30 NOTE — PLAN OF CARE
DATE & TIME: 7/30/21 5161-9066  Cognitive Concerns/ Orientation : A&Ox4   BEHAVIOR & AGGRESSION TOOL COLOR: green   ABNL VS/O2: VSS on RA  NEURO: Neuro checks q4h- L sided weakness unchanged.  MOBILITY: A1 W/GB-.  turns well assist 1, (not OOB this shift)  PAIN MANAGMENT: PRN Tylenol x1 for neck pain, improved per pt.  DIET: Reg  BOWEL/BLADDER: Retains urine, straight cath 4 times daily, (completed at 0030 and 0540). One BM this shift, incontinent  ABNL LAB/BG: None new  IV: L PICC w/ int abx, R PIV SL  TELEMETRY RHYTHM: n/a  SKIN: WDL ex rash to inner thigh/groin (pt states this has been there for 20 years)- powder/ barrier cream applied.  TESTS/PROCEDURES: None  D/C DAY/GOALS/PLACE: pending improvement, 1-2 days to  ARU  OTHER IMPORTANT INFO: Pt wears soft collar for comfort. BLE edema 1-2+. On IV abx, declines CPAP at bedtime

## 2021-07-31 ENCOUNTER — APPOINTMENT (OUTPATIENT)
Dept: OCCUPATIONAL THERAPY | Facility: CLINIC | Age: 71
DRG: 919 | End: 2021-07-31
Attending: HOSPITALIST
Payer: COMMERCIAL

## 2021-07-31 ENCOUNTER — APPOINTMENT (OUTPATIENT)
Dept: PHYSICAL THERAPY | Facility: CLINIC | Age: 71
DRG: 919 | End: 2021-07-31
Attending: HOSPITALIST
Payer: COMMERCIAL

## 2021-07-31 LAB
CREAT SERPL-MCNC: 1.24 MG/DL (ref 0.66–1.25)
GFR SERPL CREATININE-BSD FRML MDRD: 58 ML/MIN/1.73M2
GLUCOSE BLDC GLUCOMTR-MCNC: 122 MG/DL (ref 70–99)

## 2021-07-31 PROCEDURE — 250N000013 HC RX MED GY IP 250 OP 250 PS 637: Performed by: PHYSICIAN ASSISTANT

## 2021-07-31 PROCEDURE — 82565 ASSAY OF CREATININE: CPT

## 2021-07-31 PROCEDURE — 99232 SBSQ HOSP IP/OBS MODERATE 35: CPT | Performed by: HOSPITALIST

## 2021-07-31 PROCEDURE — 999N000190 HC STATISTIC VAT ROUNDS

## 2021-07-31 PROCEDURE — 250N000011 HC RX IP 250 OP 636: Performed by: INTERNAL MEDICINE

## 2021-07-31 PROCEDURE — 97530 THERAPEUTIC ACTIVITIES: CPT | Mod: GP

## 2021-07-31 PROCEDURE — 120N000001 HC R&B MED SURG/OB

## 2021-07-31 PROCEDURE — 97110 THERAPEUTIC EXERCISES: CPT | Mod: GO | Performed by: OCCUPATIONAL THERAPIST

## 2021-07-31 PROCEDURE — 258N000003 HC RX IP 258 OP 636: Performed by: INTERNAL MEDICINE

## 2021-07-31 PROCEDURE — 97530 THERAPEUTIC ACTIVITIES: CPT | Mod: GO | Performed by: OCCUPATIONAL THERAPIST

## 2021-07-31 RX ADMIN — CEFEPIME HYDROCHLORIDE 1 G: 1 INJECTION, POWDER, FOR SOLUTION INTRAMUSCULAR; INTRAVENOUS at 08:15

## 2021-07-31 RX ADMIN — TRAZODONE HYDROCHLORIDE 50 MG: 50 TABLET ORAL at 22:17

## 2021-07-31 RX ADMIN — DAPTOMYCIN 650 MG: 500 INJECTION, POWDER, LYOPHILIZED, FOR SOLUTION INTRAVENOUS at 16:06

## 2021-07-31 RX ADMIN — CEFEPIME HYDROCHLORIDE 1 G: 1 INJECTION, POWDER, FOR SOLUTION INTRAMUSCULAR; INTRAVENOUS at 20:16

## 2021-07-31 RX ADMIN — PSYLLIUM HUSK 1 PACKET: 3.4 POWDER ORAL at 08:16

## 2021-07-31 RX ADMIN — ACETAMINOPHEN 650 MG: 325 TABLET, FILM COATED ORAL at 22:17

## 2021-07-31 RX ADMIN — TAMSULOSIN HYDROCHLORIDE 0.4 MG: 0.4 CAPSULE ORAL at 08:15

## 2021-07-31 ASSESSMENT — ACTIVITIES OF DAILY LIVING (ADL)
ADLS_ACUITY_SCORE: 19

## 2021-07-31 NOTE — PLAN OF CARE
DATE & TIME: 7/30/2021; 0474-3135  Cognitive Concerns/ Orientation :  A & O x  4, agitated with neuro checks. Note left to MD to chg from q4h or at least overnight    BEHAVIOR & AGGRESSION TOOL COLOR: Green   CIWA SCORE: NA    ABNL VS/O2: VSS on RA, Tmax 99.9  MOBILITY: x1-2 assist Gb/walker.Declines turns  PAIN MANAGMENT: denied pain this shift. C collar on  DIET: Regula  BOWEL/BLADDER: BS active x 4, incontinent of bowel-having frequent soft stools, torres/patent -torres care done  ABNL LAB/BG: Creatinine=1.34- recheck t his a.m.  DRAIN/DEVICES: L. PICC-single lumen ,+ blood return, flushes well.PCU collect  TELEMETRY RHYTHM: NA  SKIN: pale, dry/redness/scabbing to face, rash/excoriated groin/buttocks area, +1-2 BLE, anterior neck inc CDI steristrips  TESTS/PROCEDURES: NA   D/C DATE: 7/31 to ARU   Discharge Barriers: pending improvement, placement  OTHER IMPORTANT INFO: cervical collar on place for comfort, neuros  q4-agitated with neuros q4h renae at [facility]. Neuro intact/unchanged: R. Index finger tingling . Declined 0400 neuro exam. and L.sided weakness and LLE numbness. C= Surgery/ID/PT/OT

## 2021-07-31 NOTE — PLAN OF CARE
DATE & TIME: 7/31/2021; [id number]  Cognitive Concerns/ Orientation :  A&Ox4, discontinued neuro checks  BEHAVIOR & AGGRESSION TOOL COLOR: Green   CIWA SCORE: NA        ABNL VS/O2: VSS on RA, afebrile  MOBILITY: assist x1-2; refuses repositioning  PAIN MANAGMENT: does not c/o pain or N&V; C-collar in place for comfort  DIET: Tolerating reg diet  BOWEL/BLADDER: Torres; patent, torres care done. Incontinent of bowel; large BM x1 on shift.  ABNL LAB/BG: Creatinine 1.24  DRAIN/DEVICES: L PICC single lumen; PCU collect  TELEMETRY RHYTHM: NA  SKIN: pale, dry/redness/scabbing to face, rash/excoriated groin/buttocks area, +1-2 BLE, anterior neck inc CDI steristrips  TESTS/PROCEDURES: NA   D/C DATE:  ARU, pending placement  Discharge Barriers: pending improvement, placement  OTHER IMPORTANT INFO: Surgery/ID/PT/OT

## 2021-07-31 NOTE — PROGRESS NOTES
[facility]    Medicine Progress Note - Hospitalist Service       Date of Admission:  7/27/2021    Assessment & Plan         Rigo Johns is a 70 year old male with PMHx PILI, HTN, and recent hospitalization for cervical myelopathy s/p urgent surgical decompression who was discharged to ARU on 7/18 who presents as a direct adm from ARU with sepsis 2/2 suspected prevertebral C3-T1 abscess.     Sepsis, suspect secondary to prevertebral fluid collection   Fevers starting 7/19/21 with increasing leukocytosis, CRP. Initial etiology unclear with comprehensive workup. CT C-spine and subsequent MRI showing prevertebral fluid collection along C3-T1 with anterior extension to subcutaneous area within right side of neck concerning for seroma/abscess. No surgical intervention recommended after contacting neurosurg. CT C/A/P 7/23 without explanation for fever. Abx discontinued 7/24 by ID and subsequently patient with fevers, tachycardia, lactic acid elevation and otherwise negative workup prompting transfer to [facility] for neurosurg intervention. UA on admission negative. LA 2.1>1.3 after IVF.  MRI on admission shows enhancing prevertbral fluid collection, regressing from last imaging  Tmax overnight 100.3, VSS. CRP 87 (80, 76). WBC normal   -- Neurosurgery consulted, appreciate assistance. recommending ongoing treatment with abx. No surgical intervention planned and does not believe fluid collection large enough to warrant IR aspiration.   - ID consulted, appreciate assistance. Recommending 2 week empiric course of abx, currently on cefepime and daptomycin with follow up MRI.   - Blood cultures 7/21,7/25, and 7/27 NGTD.  - Continue empiric Cefepime and Daptomycin   - PT/OT, planning for return to ARU. OK to discharge once afebrile, awaiting ARU availability  - PICC placed 7/29     Anorexia. Reports poor appetite over past few weeks with difficulty taking substantial po.   - nutrition consult  - start boost       Cervical myelopathy d/t high grade cervical stenosis s/p urgent C3-5 anterior cervical diskectomy and fusion (7/14/21)  Hx C5-C7 anterior fusion (1993)  Hospital adm 7/14-7/18 for urgent surgical decompression. Postoperative course complicated by DONOVAN and acute respiratory failure from possible community acquired pneumonia. L>R UE and LE weakness.  - PT/OT to continue treatments.  - SW for discharge planning hopeful back to ARU.     Neurogenic bowel and bladder  Davis during recent adm d/t retention, removed 7/26 with scheduled straight cath. No sensation bladder. Starting to get some sensation with BMs, but mostly incontinent.  - Follow I&Os.  - Bladder scan and straight cath QID and PRN.  - PTA Flomax 0.4mg daily.  - PTA PRN senna, miralax, dulcolax suppository.     Suspected CKD-III with proteinuria  Noted during recent hospitalization, baseline Cr unknown as pt does not doctor regularly. Cr has been 1.3-1.4. Stable on admission.   - Monitor 7/31 creat 1.24     Hypertension  Appears diagnosed during recent adm, but not started on meds.  - PRN hydralazine.     Thrombocytopenia  Noted during recent admission, platelet values stable in low 100s, have since trended up to 130s-140s. Possibly 2/2 infection.  - Monitor.     PILI  - PTA CPAP     Obesity, BMI 34  Increased risk of all-cause mortality, morbidity.  - Aggressive lifestyle management as outpatient    Diet: Regular Diet Adult  Snacks/Supplements Adult: Ensure Enlive; Between Meals    DVT Prophylaxis: Pneumatic Compression Devices  Davis Catheter: PRESENT, indication: Neurogenic Bladder  Central Lines: None  Code Status: Full Code      Disposition Plan   Expected discharge: awaiting ARU acceptance     The patient's care was discussed with the Bedside Nurse and Patient.    Joshua Palacio MD  Hospitalist Service  [facility]  Securely message with the Vocera Web Console (learn more here)  Text page via Slated  Paging/Directory      Clinically Significant Risk Factors Present on Admission                ______________________________________________________________________    Interval History   Seen and examined midday. No new complaints, up in chair eating lunch.  Reports improved temperature spikes. No new pain or sob. Awaiting ARU bed availability.    Data reviewed today: I reviewed all medications, new labs and imaging results over the last 24 hours. I personally reviewed no images or EKG's today.    Physical Exam   Vital Signs: Temp: 99.4  F (37.4  C) Temp src: Oral BP: 122/62 Pulse: 108   Resp: 16 SpO2: 91 % O2 Device: None (Room air)    Weight: 0 lbs 0 oz    Gen: NAD, pleasant  HEENT: Normocephalic, EOMI, MMM, neck brace in place  Resp: no focal crackles,  no wheezes, no increased work of resp  CV: S1S2 heard, reg rhythm, reg rate  Abdo: soft, nontender, nondistended, bowel sounds present  Ext: calves nontender, well perfused  Neuro: AAOx3, CN grossly intact, no facial asymmetry      Data   Recent Labs   Lab 07/31/21  0712 07/30/21  0638 07/29/21  0835 07/28/21  0813 07/27/21  0703 07/26/21  0806 07/25/21  0604   WBC  --   --  6.4  --  10.3 12.1*  --    HGB  --   --  12.3*  --  12.3* 12.4*  --    MCV  --   --  99  --  100 100  --    PLT  --   --  136*  --  141* 139*  --    INR  --   --   --  1.14  --   --   --    NA  --   --  141 140 141  --  140   POTASSIUM  --   --  3.7 3.9 4.0  --  4.0   CHLORIDE  --   --  113* 112* 112*  --  113*   CO2  --   --  24 25 19*  --  24   BUN  --   --  20 19 17  --  18   CR 1.24 1.34* 1.25 1.46* 1.38*  --  1.38*   ANIONGAP  --   --  4 3 10  --  3   GARRISON  --   --  8.6 8.7 8.4*  --  8.1*   GLC  --   --  106* 86 81  --  91   ALBUMIN  --   --   --  2.5*  --   --  2.6*   PROTTOTAL  --   --   --  7.1  --   --   --    BILITOTAL  --   --   --  0.8  --   --   --    ALKPHOS  --   --   --  88  --   --   --    ALT  --   --   --  32  --   --   --    AST  --   --   --  25  --   --   --      No  results found for this or any previous visit (from the past 24 hour(s)).

## 2021-07-31 NOTE — PROGRESS NOTES
Care Management Follow Up    Length of Stay (days): 4    Expected Discharge Date: 07/30/2021     Concerns to be Addressed: discharge planning     Patient plan of care discussed at interdisciplinary rounds: Yes    Anticipated Discharge Disposition: Acute Rehab     Anticipated Discharge Services: None  Anticipated Discharge DME: None    Patient/family educated on Medicare website which has current facility and service quality ratings: no  Education Provided on the Discharge Plan:    Patient/Family in Agreement with the Plan:      Referrals Placed by CM/SW:    Private pay costs discussed: Not applicable    Additional Information:  Call placed to  ARU regarding bed. Writer informed they are at capacity and to call again Sunday morning for availability.       ZAYRA Manjarrez

## 2021-07-31 NOTE — PLAN OF CARE
DATE & TIME: 7/30/2021; 0553-7154  Cognitive Concerns/ Orientation :  A & O x 4, calm and cooperative    BEHAVIOR & AGGRESSION TOOL COLOR: Green   CIWA SCORE: NA    ABNL VS/O2: VSS on RA, afebrile  MOBILITY: x1-2 assist Gb/walker   PAIN MANAGMENT: reporting 2/10 pain in neck, managed well with PRN tylenol and cervical collar   DIET: Regular, tolerating   BOWEL/BLADDER: BS active x 4, incontinent of bowel-having frequent soft stools, torres/patent   ABNL LAB/BG: Creatinine=1.34  DRAIN/DEVICES: L. PICC-single lumen   TELEMETRY RHYTHM: NA  SKIN: pale, dry/redness/scabbing to face, rash to groin area, +1-2 BLE, neck dressing CDI   TESTS/PROCEDURES: NA   D/C DATE: 7/31 to ARU   Discharge Barriers: pending improvement, placement  OTHER IMPORTANT INFO: cervical collar in place for comfort, neuros [id number]/unchanged except R. Index finger numbness and L.sided weakness; neurosurgery/ID/PT/OT consulted

## 2021-08-01 ENCOUNTER — APPOINTMENT (OUTPATIENT)
Dept: PHYSICAL THERAPY | Facility: CLINIC | Age: 71
DRG: 919 | End: 2021-08-01
Attending: HOSPITALIST
Payer: COMMERCIAL

## 2021-08-01 ENCOUNTER — APPOINTMENT (OUTPATIENT)
Dept: OCCUPATIONAL THERAPY | Facility: CLINIC | Age: 71
DRG: 919 | End: 2021-08-01
Attending: HOSPITALIST
Payer: COMMERCIAL

## 2021-08-01 LAB
BACTERIA BLD CULT: NO GROWTH
BACTERIA BLD CULT: NO GROWTH
CREAT SERPL-MCNC: 1.25 MG/DL (ref 0.66–1.25)
GFR SERPL CREATININE-BSD FRML MDRD: 58 ML/MIN/1.73M2
GLUCOSE BLDC GLUCOMTR-MCNC: 84 MG/DL (ref 70–99)

## 2021-08-01 PROCEDURE — 97116 GAIT TRAINING THERAPY: CPT | Mod: GP

## 2021-08-01 PROCEDURE — 97110 THERAPEUTIC EXERCISES: CPT | Mod: GP

## 2021-08-01 PROCEDURE — 250N000013 HC RX MED GY IP 250 OP 250 PS 637: Performed by: PHYSICIAN ASSISTANT

## 2021-08-01 PROCEDURE — 97535 SELF CARE MNGMENT TRAINING: CPT | Mod: GO | Performed by: OCCUPATIONAL THERAPIST

## 2021-08-01 PROCEDURE — 999N000190 HC STATISTIC VAT ROUNDS

## 2021-08-01 PROCEDURE — 99232 SBSQ HOSP IP/OBS MODERATE 35: CPT | Performed by: HOSPITALIST

## 2021-08-01 PROCEDURE — 97530 THERAPEUTIC ACTIVITIES: CPT | Mod: GP

## 2021-08-01 PROCEDURE — 120N000001 HC R&B MED SURG/OB

## 2021-08-01 PROCEDURE — 258N000003 HC RX IP 258 OP 636: Performed by: INTERNAL MEDICINE

## 2021-08-01 PROCEDURE — 86618 LYME DISEASE ANTIBODY: CPT | Performed by: HOSPITALIST

## 2021-08-01 PROCEDURE — 97530 THERAPEUTIC ACTIVITIES: CPT | Mod: GO | Performed by: OCCUPATIONAL THERAPIST

## 2021-08-01 PROCEDURE — 250N000011 HC RX IP 250 OP 636: Performed by: INTERNAL MEDICINE

## 2021-08-01 PROCEDURE — 82565 ASSAY OF CREATININE: CPT

## 2021-08-01 RX ADMIN — TAMSULOSIN HYDROCHLORIDE 0.4 MG: 0.4 CAPSULE ORAL at 08:43

## 2021-08-01 RX ADMIN — ACETAMINOPHEN 650 MG: 325 TABLET, FILM COATED ORAL at 22:27

## 2021-08-01 RX ADMIN — TRAZODONE HYDROCHLORIDE 50 MG: 50 TABLET ORAL at 22:27

## 2021-08-01 RX ADMIN — PSYLLIUM HUSK 1 PACKET: 3.4 POWDER ORAL at 08:43

## 2021-08-01 RX ADMIN — CEFEPIME HYDROCHLORIDE 1 G: 1 INJECTION, POWDER, FOR SOLUTION INTRAMUSCULAR; INTRAVENOUS at 20:38

## 2021-08-01 RX ADMIN — DAPTOMYCIN 650 MG: 500 INJECTION, POWDER, LYOPHILIZED, FOR SOLUTION INTRAVENOUS at 16:58

## 2021-08-01 RX ADMIN — CEFEPIME HYDROCHLORIDE 1 G: 1 INJECTION, POWDER, FOR SOLUTION INTRAMUSCULAR; INTRAVENOUS at 08:43

## 2021-08-01 ASSESSMENT — ACTIVITIES OF DAILY LIVING (ADL)
ADLS_ACUITY_SCORE: 19

## 2021-08-01 NOTE — PLAN OF CARE
SUMMARY: Pt admitted on the 27 th with post op infection. Pt is s/p decompression and was discharged to ARU on July 18 th, but pt came back with sepsis 2/2 C3-T1 abscess. PMH: includes neurogenic bladder/bowel,PILI,CKD stage 3,HTN,   DATE & TIME: 08/01/[id number]  Cognitive Concerns/ Orientation : Alert/Oriented x 4    BEHAVIOR & AGGRESSION TOOL COLOR: Green     ABNL VS/O2: VSS, room air  MOBILITY: 1-2 assist,   PAIN MANAGMENT: Denies pain; soft cervical collar in place for comfort   DIET: Regular diet; Ensure shakes   BOWEL/BLADDER: Davis catheter for neurogenic bladder.  ABNL LAB/BG: WBL  DRAIN/DEVICES: L PICC patent ,single lumen, PCU collect   SKIN: pale, dry/redness rash/excoriated groin/buttocks area; +1-2 BLE's/compression stockings in place; steri strips dressing to anterior neck incision (CDI)   TESTS/PROCEDURES: N/A  D/C DATE: Pending, awaiting placement  Discharge Barriers: placement to ARU   OTHER IMPORTANT INFO: Surgery/ID/PT/OT. Pt c/o right index finger N/T. Pt denies any n/v. No c/o SOB/ELIZABETH. Left sided weaker than the right from 2/2 previous surgery pt reported. Davis patent. ./67   Pulse 108   Temp 98.7  F (37.1  C) (Oral)   Resp 18   SpO2 90%   On RA. Will continue to monitor..Katia Cunningham RN

## 2021-08-01 NOTE — PLAN OF CARE
DATE & TIME: 7/31/2021; [id number]  Cognitive Concerns/ Orientation : A & O x 4, calm and cooperative    BEHAVIOR & AGGRESSION TOOL COLOR: Green   CIWA SCORE: NA    ABNL VS/O2: VSS on RA, low grade temp (MAX 99.4)  MOBILITY: 1-2 assist, refusing repositioning   PAIN MANAGMENT: reporting 2/10 neck pain, managed well with rest and PRN tylenol, cervical collar in place for comfort   DIET: regular diet with ensure shakes   BOWEL/BLADDER: BS active x 4, incontinent of bowel x 2 BM, Davis/patent   ABNL LAB/BG: Creatinine=1.24   DRAIN/DEVICES: L. PICC/SL, single lumen, PCU collect   TELEMETRY RHYTHM: NA  SKIN: pale, dry/redness/scabbing to face, rash/excoriated groin/buttocks area; +1-2 BLE/compression stockings in place; anterior neck incision dressing (CDI)   TESTS/PROCEDURES: NA  D/C DATE: pending discharge tomorrow   Discharge Barriers: placement to ARU   OTHER IMPORTANT INFO: Surgery/ID/PT/OT

## 2021-08-01 NOTE — PROGRESS NOTES
DATE & TIME: 7/31/2021 Night  Cognitive Concerns/ Orientation : Alert/Oriented x 4    BEHAVIOR & AGGRESSION TOOL COLOR: Green     ABNL VS/O2: Temp 99.2 otherwise VSS, room air  MOBILITY: 1-2 assist, refusing repositioning   PAIN MANAGMENT: Denies pain; cervical collar in place for comfort   DIET: Regular diet; Ensure shakes   BOWEL/BLADDER: Davis catheter for neurogenic bladder; Used bedpan for very small soft BM   ABNL LAB/BG: WBL  DRAIN/DEVICES: L PICC saline locked-single lumen, PCU collect   SKIN: pale, dry/redness/scabbing to face, rash/excoriated groin/buttocks area; +1-2 BLE/compression stockings in place; anterior neck incision dressing (CDI)   TESTS/PROCEDURES: N/A  D/C DATE: Pending, awaiting placement-possibly 8/1   Discharge Barriers: placement to ARU   OTHER IMPORTANT INFO: Surgery/ID/PT/OT

## 2021-08-01 NOTE — PROGRESS NOTES
Care Management Follow Up    Length of Stay (days): 5    Expected Discharge Date: 08/01/2021     Concerns to be Addressed: discharge planning     Patient plan of care discussed at interdisciplinary rounds: Yes    Anticipated Discharge Disposition: Acute Rehab     Anticipated Discharge Services: None  Anticipated Discharge DME: None    Patient/family educated on Medicare website which has current facility and service quality ratings: no  Education Provided on the Discharge Plan:    Patient/Family in Agreement with the Plan:      Referrals Placed by CM/SW:    Private pay costs discussed: Not applicable    Additional Information:    Jimbo spoke with  ARU that they do not have a bed available today, possibly tomorrow; asked Sw to f/u 8/2.       Autumn Higgins, JENNIFERSW

## 2021-08-01 NOTE — PROGRESS NOTES
[facility]    Medicine Progress Note - Hospitalist Service       Date of Admission:  7/27/2021    Assessment & Plan             Rigo Johns is a 70 year old male with PMHx PILI, HTN, and recent hospitalization for cervical myelopathy s/p urgent surgical decompression who was discharged to ARU on 7/18 who presents as a direct adm from ARU with sepsis 2/2 suspected prevertebral C3-T1 abscess.     Sepsis, suspect secondary to prevertebral fluid collection   Fevers starting 7/19/21 with increasing leukocytosis, CRP. Initial etiology unclear with comprehensive workup. CT C-spine and subsequent MRI showing prevertebral fluid collection along C3-T1 with anterior extension to subcutaneous area within right side of neck concerning for seroma/abscess. No surgical intervention recommended after contacting neurosurg. CT C/A/P 7/23 without explanation for fever. Abx discontinued 7/24 by ID and subsequently patient with fevers, tachycardia, lactic acid elevation and otherwise negative workup prompting transfer to [facility] for neurosurg intervention. UA on admission negative. LA 2.1>1.3 after IVF.  MRI on admission shows enhancing prevertbral fluid collection, regressing from last imaging  Tmax overnight 100.3, VSS. CRP 87 (80, 76). WBC normal   -- Neurosurgery consulted, appreciate assistance. recommending ongoing treatment with abx. No surgical intervention planned and does not believe fluid collection large enough to warrant IR aspiration.   - ID consulted, appreciate assistance. Recommending 2 week empiric course of abx, currently on cefepime and daptomycin with follow up MRI.   - Blood cultures 7/21,7/25, and 7/27 NGTD.  - Continue empiric Cefepime and Daptomycin   - PT/OT, planning for return to ARU. OK to discharge once afebrile, awaiting ARU availability - no beds on weekend  - PICC placed 7/29     Anorexia. Reports poor appetite over past few weeks with difficulty taking substantial po.   -  nutrition consult  - start boost      Cervical myelopathy d/t high grade cervical stenosis s/p urgent C3-5 anterior cervical diskectomy and fusion (7/14/21)  Hx C5-C7 anterior fusion (1993)  Hospital adm 7/14-7/18 for urgent surgical decompression. Postoperative course complicated by DONOVAN and acute respiratory failure from possible community acquired pneumonia. L>R UE and LE weakness.  - PT/OT to continue treatments.  - SW for discharge planning hopeful back to ARU.     Neurogenic bowel and bladder  Davis during recent adm d/t retention, removed 7/26 with scheduled straight cath. No sensation bladder. Starting to get some sensation with BMs, but mostly incontinent.  - Follow I&Os.  - Bladder scan and straight cath QID and PRN.  - PTA Flomax 0.4mg daily.  - PTA PRN senna, miralax, dulcolax suppository.     Suspected CKD-III with proteinuria  Noted during recent hospitalization, baseline Cr unknown as pt does not doctor regularly. Cr has been 1.3-1.4. Stable on admission.   - Monitor 7/31 creat 1.24     Hypertension  Appears diagnosed during recent adm, but not started on meds.  - PRN hydralazine.     Thrombocytopenia  Noted during recent admission, platelet values stable in low 100s, have since trended up to 130s-140s. Possibly 2/2 infection.  - Monitor.     PILI  - PTA CPAP     Obesity, BMI 34  Increased risk of all-cause mortality, morbidity.  - Aggressive lifestyle management as outpatient    Diet: Regular Diet Adult  Snacks/Supplements Adult: Ensure Enlive; Between Meals    DVT Prophylaxis: Pneumatic Compression Devices  Davis Catheter: PRESENT, indication: Neurogenic Bladder  Central Lines: None  Code Status: Full Code      Disposition Plan   Expected discharge: 08/02/2021 recommended to ARU if they have bed availability. SW will be calling Monday.     The patient's care was discussed with the Bedside Nurse, Patient and Patient's Family.    Joshua Palacio MD  Hospitalist Service  [facility]  Hospital  Securely message with the Vocera Web Console (learn more here)  Text page via [employer] Paging/Directory      Clinically Significant Risk Factors Present on Admission                ______________________________________________________________________    Interval History   Seen and examined late morning. Wife on speaker phone. No new issues or concerns, feeling better and less pain he says. No new fevers/chills. No sob or new pain.  Awaiting ARU availability. Appreciate SW help.    Data reviewed today: I reviewed all medications, new labs and imaging results over the last 24 hours. I personally reviewed no images or EKG's today.    Physical Exam   Vital Signs: Temp: 98.7  F (37.1  C) Temp src: Oral BP: 116/67 Pulse: 108   Resp: 18 SpO2: 90 % O2 Device: None (Room air)    Weight: 0 lbs 0 oz    Gen: NAD, pleasant  HEENT: Normocephalic, EOMI, MMM  Resp: no crackles,  no wheezes, no increased work of resp  CV: S1S2 heard, reg rhythm, reg rate  Abdo: soft, nontender, nondistended, bowel sounds present  Ext: calves nontender, well perfused  Neuro: AAOx3, CN grossly intact, no facial asymmetry      Data   Recent Labs   Lab 08/01/21  1202 08/01/21  0849 07/31/21  1538 07/31/21  0712 07/30/21  0638 07/29/21  0835 07/28/21  0813 07/27/21  0703 07/26/21  0806   WBC  --   --   --   --   --  6.4  --  10.3 12.1*   HGB  --   --   --   --   --  12.3*  --  12.3* 12.4*   MCV  --   --   --   --   --  99  --  100 100   PLT  --   --   --   --   --  136*  --  141* 139*   INR  --   --   --   --   --   --  1.14  --   --    NA  --   --   --   --   --  141 140 141  --    POTASSIUM  --   --   --   --   --  3.7 3.9 4.0  --    CHLORIDE  --   --   --   --   --  113* 112* 112*  --    CO2  --   --   --   --   --  24 25 19*  --    BUN  --   --   --   --   --  20 19 17  --    CR 1.25  --   --  1.24 1.34* 1.25 1.46* 1.38*  --    ANIONGAP  --   --   --   --   --  4 3 10  --    GARRISON  --   --   --   --   --  8.6 8.7 8.4*  --    GLC  --  84 122*   --   --  106* 86 81  --    ALBUMIN  --   --   --   --   --   --  2.5*  --   --    PROTTOTAL  --   --   --   --   --   --  7.1  --   --    BILITOTAL  --   --   --   --   --   --  0.8  --   --    ALKPHOS  --   --   --   --   --   --  88  --   --    ALT  --   --   --   --   --   --  32  --   --    AST  --   --   --   --   --   --  25  --   --      No results found for this or any previous visit (from the past 24 hour(s)).

## 2021-08-01 NOTE — INTERIM SUMMARY
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    Justification for Acute Inpatient Rehabilitation  70 year old male with PMHx PILI, HTN, and recent hospitalization for cervical myelopathy s/p urgent surgical decompression (DOS: 7/14/21) who was discharged to ARU on 7/18. Patient presents as a direct adm from ARU on 7/27/21 with sepsis 2/2 prevertebral C3-T1 abscess.  Fevers starting 7/19/21 with increasing leukocytosis, CRP. CT C-spine and subsequent MRI showing prevertebral fluid collection along C3-T1 with anterior extension to subcutaneous area within right side of neck concerning for seroma/abscess. No surgical intervention recommended after contacting neurosurgery as they do not believe fluid collection is large enough to warrant IR aspiration. CT C/A/P 7/23 without explanation for fever. Abx discontinued 7/24 by ID and subsequently patient with fevers, tachycardia, lactic acid elevation and otherwise negative workup prompting transfer to [facility] for neurosurg intervention. UA on admission negative. LA 2.1>1.3 after IVF. Patient with cervical collar for comfort.     Patient requires an intensive inpatient rehab program to address the following acute impairments:impaired ROM, impaired strength, impaired activity tolerance, impaired tone, impaired balance, impaired coordination and impaired judgement and safety awareness, fatigue, post surgical spinal precautions. These impairments are impacting his safety and functional independence with transfers, gait, stairs, ADL's and IADL's.     Current Active Medical Management Needs/Risks for Clinical Complications  The patient requires the high level of  rehabilitation physician supervision that accompanies the provision of intensive rehabilitation therapy.  The patient needs the services of the rehabilitation physician to assess the patient medically and functionally and to modify the course of treatment as needed to maximize the patient's capacity to benefit from the rehabilitation process.    Sepsis, suspect secondary to prevertebral fluid collection: In setting of new cervical abscess. Blood cultures 7/21,7/25, and 7/27 NGTD Recommending 2 week empiric course of abx, currently on cefepime and daptomycin with follow up MRI. Will need ongoing assessment.   Neurology: In setting of cervical myelopathy. Continue to assess for changes in neuro status. L>R UE and LE weakness.   Integumentary: Anterior neck incision C/D/I - monitor for s/s of infection.    Renal: Suspected CKD-III with proteinuria. Baseline Cr unknown as pt does not doctor regularly. Cr has been 1.3-1.4. Stable on admission; most recently 1.24 on 7/31/21. Will need ongoing management.   Hypertension: PRN hydralazine. Will need ongoing assessment.   Thrombocytopenia: Noted during recent admission, platelet values stable in low 100s, have since trended up to 130s-140s. Possibly 2/2 infection.  Will need ongoing assessment. F/u with PCP at discharge.  Neurogenic bowel and bladder: Removed torres 7/26 with scheduled straight cath. No sensation bladder. Starting to get some sensation with BM's, but mostly incontinent. Bladder scan & straight cath QID and PRN. PTA Flomax 0.4mg daily. PTA PRN senna, miralax, dulcolax suppository. Will need ongoing assessment; patient at risk for UTI's.   Pain: Acetaminophen, Oxycodone PRN. Will need ongoing assessment for optimal participation in therapies.   DVT prophylaxis:  Pneumatic Compression Devices     Patient will continue to need ongoing medical management as patient is at risk for falls with or without injury, due to gait impairments, impaired balance, impaired  muscle tone, impaired sensation, muscle weakness, and deconditioning. Patient is also at risk for infection and wound dehiscence. BMI 34    Past Medical/Surgical History   Surgery in the past 100 days: Yes S/P urgent C3-5 anterior cervical diskectomy and fusion (7/14/21)   Additional relevant past medical history: Neurogenic bowel/bladder, PILI, HTN    C5-7 anterior fusion 1993 in [place]    Level of Functioning Prior to Admission:    LIVING ENVIRONMENT   People in home: spouse   Current Living Arrangements: house   Home Accessibility: stairs to enter home   Number of Stairs, Main Entrance: 3   Stair Railings, Main Entrance: railings safe and in good condition   Number of Stairs, Within Home, Primary: other (see comments)   Stair Railings, Within Home, Primary: railings on both sides of stairs   Transportation Anticipated: family or friend will provide   Living Environment Comments: from ARU, hopeful to return to ARU for continued rehab     SELF-CARE   Usual Activity Tolerance: good   Regular Exercise: No     Equipment Currently Used at Home: none   Activity/Exercise/Self-Care Comment: Ind at baseline with all IADLs/ADLs. Ambulating without device and able to access community. Has FWW as needed. Normally stairs and walking 1 block is not difficulty. Pt has felt weaker since coming to hospital from [facility], had progressed at ARU to walking 100 feet broken into 20 feet bouts.     DISABILITY/FUNCTION   Concentrating, Remembering or Making Decisions Difficulty: no   Difficulty Communicating: no   Difficulty Eating/Swallowing: no   Walking or Climbing Stairs Difficulty: yes   Walking or Climbing Stairs: ambulation difficulty, assistance 1 person, ambulation difficulty, requires equipment   Mobility Management: walker   Dressing/Bathing Difficulty: yes   Dressing/Bathing: bathing difficulty, assistance 1 person   Dressing/Bathing Management: assistance   Toileting issues: yes   Toileting Management: incontinent  pad   Toileting Assistance: toileting difficulty, assistance 1 person   Doing Errands Independently Difficulty (such as shopping): no        Fall history within last six months: no   Change in Functional Status Since Onset of Current Illness/Injury: yes    Level of Function: GG Scale (Section GG Functional Ability and Goals; CMS's CHING Version 3.0 Manual effective 10.1.2019):  PT Current Function Goals for Rehab   Bed Rolling 4 Supervision or touching assitance 6 Independent   Supine to Sit 4 Supervision or touching assitance 6 Independent   Sit to Stand 3 Partial/moderate assistance 6 Independent   Transfer 3 Partial/moderate assistance 6 Independent   Ambulation 1 Dependent Naeem x2 w/ FWW 6 Independent   Stairs Not completed 6 Independent     OT Current Function Goals for Rehab   Feeding 5 Setup or clean-up assistance 6 Independent   Grooming 5 Setup or clean-up assistance 6 Independent   Bathing Not completed 6 Independent   Upper Body Dressing 3 Partial/moderate assistance 6 Independent   Lower Body Dressing 3 Partial/moderate assistance 6 Independent   Toileting 1 Dependent 4 Supervision or touching assitance   Toilet Transfer 3 Partial/moderate assistance 4 Supervision or touching assitance   Tub/Shower Transfer Not completed 6 Independent   Cognition Not Impaired Not applicable     SLP Current Function Goals for Rehab   Swallow Not Impaired Not applicable   Communication Not Impaired Not applicable       Current Diet:  0-Thin and 7-Regular/easy to chew with supplements    Summary Statement:  Patient currently requires SBA with bed mobility and Naeem x1 with FWW for transfers. Pivot transfers requires Naeem x2 using FWW. Patient has ambulated ~3 feet with FWW and Naeem x2. G/H tasks require set up assist and  UB/LB dressing both require modA.     Expected Therapies and Services Required During Inpatient Rehab Admission  Intensity of Therapy: Patient requires intensive therapies not available in a lesser level of  care. Patient is motivated, making gains, and can tolerate 3 hours of therapy a day.  Physical Therapy: 90 minutes per day, 7 days a week for 14 days  Occupational Therapy: 90 minutes per day, 7 days a week for 14 days  Speech and Language Therapy: no anticipated speech needs.   Rehabilitation Nursing Needs: Patient requires 24 hour Rehab Nursing to manage bowel program, bladder program, vitals, medication education, tone management, positioning, carryover of new rehab techniques, care coordination, skin integrity, pain management, provide safe environment for patient at falls risk and monitor nutritional intake.    Precautions/restrictions/special needs:   Precautions: fall precautions and spinal precautions   Restrictions: N/A   Special Needs: N/A    Expected Level of Improvement: It is anticipated that with intensive skilled therapy intervention, adequate medical management, as well as rehabilitative nursing care, the patient should be able to discharge home with family support and may require ongoing physical assist and OP therapies.   Expected Length of time to achieve: 14 days    Anticipated Discharge Needs:  Anticipated Discharge Destination: Home  Anticipated Discharge Support: Family member and Friends  24/7 support available : Yes; has 24/7 assist  Identified caregiver(s):  Family   Anticipated Discharge Needs: Home with homecare and Home with outpatient therapy    Identified challenges/barriers: Prolonged hospital course      Rehab Liaison Signature/Date/Time:     Physician statement of review and agreement:  I have reviewed and am in agreement of the need for IRF stay to address above functional and medical needs. In addition to above statements address, Patient requires intensive active and ongoing therapeutic intervention and multiple therapies; Patient requires medical supervision; Expected to actively participate in the intensive rehab program; Sufficiently stable to actively participate; Expectation  for measurable improvement in functional capacity or adaption to impairments.    Physician Signature/Date/Time:

## 2021-08-02 ENCOUNTER — HOSPITAL ENCOUNTER (INPATIENT)
Facility: CLINIC | Age: 71
LOS: 38 days | Discharge: HOME HEALTH | DRG: 949 | End: 2021-09-09
Attending: PHYSICAL MEDICINE & REHABILITATION | Admitting: PHYSICAL MEDICINE & REHABILITATION
Payer: COMMERCIAL

## 2021-08-02 ENCOUNTER — APPOINTMENT (OUTPATIENT)
Dept: OCCUPATIONAL THERAPY | Facility: CLINIC | Age: 71
DRG: 919 | End: 2021-08-02
Attending: HOSPITALIST
Payer: COMMERCIAL

## 2021-08-02 VITALS
DIASTOLIC BLOOD PRESSURE: 74 MMHG | OXYGEN SATURATION: 94 % | TEMPERATURE: 98.8 F | SYSTOLIC BLOOD PRESSURE: 121 MMHG | HEART RATE: 79 BPM | RESPIRATION RATE: 18 BRPM

## 2021-08-02 DIAGNOSIS — Z98.890 H/O CERVICAL SPINE SURGERY: ICD-10-CM

## 2021-08-02 DIAGNOSIS — K26.4 GASTROINTESTINAL HEMORRHAGE ASSOCIATED WITH DUODENAL ULCER: ICD-10-CM

## 2021-08-02 DIAGNOSIS — M62.838 MUSCLE SPASM: ICD-10-CM

## 2021-08-02 DIAGNOSIS — M48.02 CERVICAL STENOSIS OF SPINAL CANAL: ICD-10-CM

## 2021-08-02 DIAGNOSIS — G47.01 INSOMNIA DUE TO MEDICAL CONDITION: ICD-10-CM

## 2021-08-02 DIAGNOSIS — G95.9 CERVICAL MYELOPATHY (H): Primary | ICD-10-CM

## 2021-08-02 DIAGNOSIS — R19.7 DIARRHEA, UNSPECIFIED TYPE: ICD-10-CM

## 2021-08-02 DIAGNOSIS — I95.1 ORTHOSTATIC HYPOTENSION: ICD-10-CM

## 2021-08-02 DIAGNOSIS — N31.9 NEUROGENIC BLADDER: ICD-10-CM

## 2021-08-02 DIAGNOSIS — K59.2 NEUROGENIC BOWEL: ICD-10-CM

## 2021-08-02 LAB — B BURGDOR IGG+IGM SER QL: 0.05

## 2021-08-02 PROCEDURE — 97535 SELF CARE MNGMENT TRAINING: CPT | Mod: GO

## 2021-08-02 PROCEDURE — 258N000003 HC RX IP 258 OP 636: Performed by: PHYSICAL MEDICINE & REHABILITATION

## 2021-08-02 PROCEDURE — 99223 1ST HOSP IP/OBS HIGH 75: CPT | Mod: 24 | Performed by: PHYSICAL MEDICINE & REHABILITATION

## 2021-08-02 PROCEDURE — 128N000003 HC R&B REHAB

## 2021-08-02 PROCEDURE — 5A09557 ASSISTANCE WITH RESPIRATORY VENTILATION, GREATER THAN 96 CONSECUTIVE HOURS, CONTINUOUS POSITIVE AIRWAY PRESSURE: ICD-10-PCS | Performed by: PHYSICAL MEDICINE & REHABILITATION

## 2021-08-02 PROCEDURE — 250N000013 HC RX MED GY IP 250 OP 250 PS 637: Performed by: STUDENT IN AN ORGANIZED HEALTH CARE EDUCATION/TRAINING PROGRAM

## 2021-08-02 PROCEDURE — 99239 HOSP IP/OBS DSCHRG MGMT >30: CPT | Performed by: HOSPITALIST

## 2021-08-02 PROCEDURE — 250N000011 HC RX IP 250 OP 636: Performed by: INTERNAL MEDICINE

## 2021-08-02 PROCEDURE — 250N000011 HC RX IP 250 OP 636: Performed by: PHYSICAL MEDICINE & REHABILITATION

## 2021-08-02 PROCEDURE — 250N000013 HC RX MED GY IP 250 OP 250 PS 637: Performed by: PHYSICIAN ASSISTANT

## 2021-08-02 RX ORDER — CEFEPIME HYDROCHLORIDE 1 G/1
2 INJECTION, POWDER, FOR SOLUTION INTRAMUSCULAR; INTRAVENOUS EVERY 8 HOURS
Status: DISCONTINUED | OUTPATIENT
Start: 2021-08-02 | End: 2021-08-02

## 2021-08-02 RX ORDER — SACCHAROMYCES BOULARDII 250 MG
250 CAPSULE ORAL 2 TIMES DAILY
Status: DISCONTINUED | OUTPATIENT
Start: 2021-08-02 | End: 2021-08-02

## 2021-08-02 RX ORDER — BISACODYL 10 MG
10 SUPPOSITORY, RECTAL RECTAL DAILY PRN
Status: DISCONTINUED | OUTPATIENT
Start: 2021-08-02 | End: 2021-08-06

## 2021-08-02 RX ORDER — TRAZODONE HYDROCHLORIDE 50 MG/1
50 TABLET, FILM COATED ORAL AT BEDTIME
Status: DISCONTINUED | OUTPATIENT
Start: 2021-08-02 | End: 2021-08-02

## 2021-08-02 RX ORDER — CEFEPIME HYDROCHLORIDE 1 G/1
2 INJECTION, POWDER, FOR SOLUTION INTRAMUSCULAR; INTRAVENOUS EVERY 12 HOURS
Qty: 560 ML | Refills: 0 | Status: ON HOLD | DISCHARGE
Start: 2021-08-02 | End: 2021-09-08

## 2021-08-02 RX ORDER — ACETAMINOPHEN 325 MG/1
650 TABLET ORAL EVERY 6 HOURS PRN
Status: DISCONTINUED | OUTPATIENT
Start: 2021-08-02 | End: 2021-09-09 | Stop reason: HOSPADM

## 2021-08-02 RX ORDER — TAMSULOSIN HYDROCHLORIDE 0.4 MG/1
0.4 CAPSULE ORAL DAILY
Status: DISCONTINUED | OUTPATIENT
Start: 2021-08-03 | End: 2021-08-13

## 2021-08-02 RX ORDER — TRAZODONE HYDROCHLORIDE 50 MG/1
50 TABLET, FILM COATED ORAL AT BEDTIME
Status: DISCONTINUED | OUTPATIENT
Start: 2021-08-02 | End: 2021-08-06

## 2021-08-02 RX ORDER — SACCHAROMYCES BOULARDII 250 MG
250 CAPSULE ORAL 2 TIMES DAILY
Status: DISCONTINUED | OUTPATIENT
Start: 2021-08-02 | End: 2021-09-09 | Stop reason: HOSPADM

## 2021-08-02 RX ORDER — AMOXICILLIN 250 MG
1 CAPSULE ORAL 2 TIMES DAILY PRN
Status: DISCONTINUED | OUTPATIENT
Start: 2021-08-02 | End: 2021-09-09 | Stop reason: HOSPADM

## 2021-08-02 RX ORDER — CEFEPIME HYDROCHLORIDE 1 G/1
2 INJECTION, POWDER, FOR SOLUTION INTRAMUSCULAR; INTRAVENOUS EVERY 12 HOURS
Qty: 280 ML | Refills: 0 | DISCHARGE
Start: 2021-08-02 | End: 2021-08-02

## 2021-08-02 RX ORDER — METHOCARBAMOL 750 MG/1
750 TABLET, FILM COATED ORAL EVERY 6 HOURS PRN
Status: DISCONTINUED | OUTPATIENT
Start: 2021-08-02 | End: 2021-08-18

## 2021-08-02 RX ORDER — ONDANSETRON 4 MG/1
4 TABLET, FILM COATED ORAL EVERY 6 HOURS PRN
Status: DISCONTINUED | OUTPATIENT
Start: 2021-08-02 | End: 2021-09-09 | Stop reason: HOSPADM

## 2021-08-02 RX ADMIN — TAMSULOSIN HYDROCHLORIDE 0.4 MG: 0.4 CAPSULE ORAL at 08:05

## 2021-08-02 RX ADMIN — CEFEPIME HYDROCHLORIDE 1 G: 1 INJECTION, POWDER, FOR SOLUTION INTRAMUSCULAR; INTRAVENOUS at 08:05

## 2021-08-02 RX ADMIN — CEFEPIME HYDROCHLORIDE 2 G: 2 INJECTION, POWDER, FOR SOLUTION INTRAVENOUS at 17:33

## 2021-08-02 RX ADMIN — Medication 250 MG: at 21:25

## 2021-08-02 RX ADMIN — TRAZODONE HYDROCHLORIDE 50 MG: 50 TABLET ORAL at 21:26

## 2021-08-02 RX ADMIN — DAPTOMYCIN 650 MG: 500 INJECTION, POWDER, LYOPHILIZED, FOR SOLUTION INTRAVENOUS at 16:25

## 2021-08-02 RX ADMIN — PSYLLIUM HUSK 1 PACKET: 3.4 POWDER ORAL at 08:05

## 2021-08-02 ASSESSMENT — ACTIVITIES OF DAILY LIVING (ADL)
ADLS_ACUITY_SCORE: 19

## 2021-08-02 ASSESSMENT — MIFFLIN-ST. JEOR: SCORE: 1831.74

## 2021-08-02 NOTE — PROGRESS NOTES
Care Management Discharge Note    Discharge Date: 08/02/2021  Expected Time of Departure: 1345 via w/c ride    Discharge Disposition: Acute Rehab    Discharge Services: None    Discharge DME: None    Discharge Transportation: agency    Private pay costs discussed: transportation costs    PAS Confirmation Code:    Patient/family educated on Medicare website which has current facility and service quality ratings: no    Education Provided on the Discharge Plan:    Persons Notified of Discharge Plans: care team/pt  Patient/Family in Agreement with the Plan: yes    Handoff Referral Completed: Yes    Additional Information:     ARU can admit pt today.  Jimbo scheduled w/c ride today for 1345; updated pt on transport costs.  Jimbo retrieved [facility] [employer] ins auth:  [id number] (case number:  [id number]).  Jimbo provided ins auth info to [facility] along with discharge date/time.  discharge orders are complete.          Autumn Higgins, JENNIFERSW

## 2021-08-02 NOTE — PROGRESS NOTES
DATE & TIME: 8/1/2021 Night  Cognitive Concerns/ Orientation : Alert/Oriented x 4, somnolent/lethargic overnight   BEHAVIOR & AGGRESSION TOOL COLOR: Green     ABNL VS/O2: VSS, room air  MOBILITY: Assist-1 gait belt/walker   PAIN MANAGMENT: Denies pain; cervical collar in place for comfort and support  DIET: Regular diet; Ensure shakes   BOWEL/BLADDER: Davis catheter for neurogenic bladder   ABNL LAB/BG: WNL  DRAIN/DEVICES: L PICC saline locked-single lumen, PCU collect   SKIN: pale, dry/redness/scabbing to face, rash/excoriated groin/buttocks area; +1-2 BLE/compression stockings in place; anterior neck incision dressing   TESTS/PROCEDURES: N/A  D/C DATE: Pending, awaiting placement-possibly 8/2   OTHER IMPORTANT INFO: Surgery/ID/PT/OT

## 2021-08-02 NOTE — PROGRESS NOTES
[facility]    Infectious Disease Progress Note    Date of Service (when I saw the patient): 08/02/2021     Assessment & Plan   Rigo Johns is a 70 year old male who was admitted on 7/27/2021.     Impression:  1. 70 y.o male with HTN, Spinal stenosis.   2. Recent admission with cervical myelopathy s/p urgent surgical decompression and fusion done on 7/ 14.   3. Presenting now with Fevers starting 7/19/21 with increasing leukocytosis, CRP.   4. CT C-spine and subsequent MRI showing prevertebral fluid collection along C3-T1 with anterior extension to subcutaneous area within right side of neck concerning for seroma/abscess.   5. No surgical intervention recommended after contacting neurosurg.   6. CT C/A/P 7/23 without any other explanation for fever.   7. Abx discontinued 7/24 by ID at the ARU  and subsequently patient with fevers, tachycardia, lactic acid elevation and otherwise negative workup prompting transfer to [facility] for neurosurg intervention.   8. UA on admission negative.   9. Imaging today: IMPRESSION:   1.  Prevertebral fluid and fluid within the surgical tract in the right neck are regressing.  2.  Robust enhancement along the periphery of these fluid collections is again identified.  3.  Both resolving abscess and evolving postsurgical change could have this appearance.  4.  Diminished T2 prolongation in the cord.  5.  No new abnormality.  10. DONOVAN   11. Currently on vancomycin and cefepime.      Recommendations:   Discussed with NS to consider aspiration of the fluid collection if possible, discussed with Dr. Hanley who think there is no fluid that can be aspirated and the only way to get to the fluid is through another surgery.   He thinks since the fluid collection is already improving this most likely implies resolving post operative fluid and then anther surgery when he is improving will not be beneficial for diagnostic purposes as patient has already been on antibiotics for 6  days... I agree its possible that antibiotics will given us false negative results on the fluid, I also agree I and D is not needed just for diagnostic purposes as is an invasive procedure and carries risk.   If indeed this is a a deep post surgical infections that is when I and D will be indicated.      Plan:   Antibiotics empiric 2 weeks course. Continue cefepime, switched from vancomycin to daptomycin given CKD and elevated creat.   Followed by repeat MRI and FOLLOW UP with NS.   If no concern for infection stop antibiotics   If concern for infection will reassess for need for further antibiotics alone vs surgical intervention + antibiotics.      S/p  PICC   Orders for 2 weeks of daptomycin and cefepime are in the chart     Merna Donaldson MD    Interval History   Afebrile   No new micro   No new  complaints   Discharge today     Physical Exam   Temp: 98.8  F (37.1  C) Temp src: Oral BP: 121/74 Pulse: 79   Resp: 18 SpO2: 94 % O2 Device: None (Room air)    There were no vitals filed for this visit.  Vital Signs with Ranges  Temp:  [98.7  F (37.1  C)-99.1  F (37.3  C)] 98.8  F (37.1  C)  Pulse:  [] 79  Resp:  [18-20] 18  BP: (116-128)/(52-74) 121/74  SpO2:  [94 %-95 %] 94 %    Constitutional: Awake, alert, cooperative, no apparent distress  Lungs: Clear to auscultation bilaterally, no crackles or wheezing  Cardiovascular: Regular rate and rhythm, normal S1 and S2, and no murmur noted  Abdomen: Normal bowel sounds, soft, non-distended, non-tender  Skin: No rashes, no cyanosis, no edema  Other:    Medications       ceFEPIme (MAXIPIME) IV  2 g Intravenous Q12H    DAPTOmycin (CUBICIN) intermittent infusion  6 mg/kg Intravenous Q24H    psyllium  1 packet Oral Daily    sodium chloride (PF)  10-40 mL Intracatheter Q8H    sodium chloride (PF)  3 mL Intracatheter Q8H    tamsulosin  0.4 mg Oral Daily    traZODone  50 mg Oral At Bedtime       Data   All microbiology laboratory data reviewed.  Recent Labs   Lab Test  07/29/21  0835 07/27/21  0703 07/26/21  0806   WBC 6.4 10.3 12.1*   HGB 12.3* 12.3* 12.4*   HCT 37.0* 37.1* 38.3*   MCV 99 100 100   * 141* 139*     Recent Labs   Lab Test 08/01/21  1202 07/31/21  0712 07/30/21  0638   CR 1.25 1.24 1.34*     No lab results found.  No lab results found.    Invalid input(s): UC

## 2021-08-02 NOTE — PLAN OF CARE
DATE & TIME:8/1/2021; [id number]   Cognitive Concerns/ Orientation : A & O x 4, calm and cooperative    BEHAVIOR & AGGRESSION TOOL COLOR: Green   CIWA SCORE: NA    ABNL VS/O2: VSS on RA except Low grade temp 99.1   MOBILITY: 1-2 assist GB/walker, up with PT today   PAIN MANAGMENT: reporting mild pain in neck, PRN tylenol available and cervical collar in place for support/comfort   DIET: Regular + Ensure drinks   BOWEL/BLADDER: BS active x 4, incontinent of bowel, torres in place/patent   ABNL LAB/BG: WDL   DRAIN/DEVICES: L. PICC/SL, Single lumen, PCU collect   TELEMETRY RHYTHM: NA   SKIN: pale, dry/redness/scabbing to face; rash/excoriated groin/buttock area; +1-2 edema BLE/compression stock; neck incision dressing (CDI)  TESTS/PROCEDURES:NA  D/C DATE: pending discharge 8/2  Discharge Barriers: placement to ARU- following   OTHER IMPORTANT INFO: numbness/tingling resolved in UE per pt. Report; ID/PT/OT consulted

## 2021-08-02 NOTE — PROGRESS NOTES
Care Management Follow Up    Length of Stay (days): 6    Expected Discharge Date: 08/02/2021     Concerns to be Addressed: discharge planning     Patient plan of care discussed at interdisciplinary rounds: Yes    Anticipated Discharge Disposition: Acute Rehab     Anticipated Discharge Services: None  Anticipated Discharge DME: None    Patient/family educated on Medicare website which has current facility and service quality ratings: no  Education Provided on the Discharge Plan:    Patient/Family in Agreement with the Plan:      Referrals Placed by CM/SW:    Private pay costs discussed: Not applicable    Additional Information:    Per [facility] they are reviewing pt for admission today; if they have bed and pt is appropriate auth will be initiated.        Autumn Higgins, LICSW

## 2021-08-02 NOTE — PROGRESS NOTES
"Pt returns to ARU today after being readmitted to the [place] for further medical w/u. SWer completed initial SW assessment (copied below). Chart review completed. No changes to assessment below indicated or reported. Pt's original ELOS 14 days.     SWer will discuss the case further with ARU team, A with coordinating discharge plans and remain available as needs arise.     14/15 on BIMs.     MELA Aguilar   [facility]   Direct Phone: [phone]  I   Pager: [phone]  I  Fax: [phone]  -------------------------------------------------------------------------------------------    21 1000   Living Arrangements   People in home spouse   Able to Return to Prior Arrangements yes   Home Safety   Patient Feels Safe Living in Home? yes   CM/SW Discharge Planning   Patient/Family Anticipates Transition to home with family   Concerns to be Addressed no discharge needs identified;denies needs/concerns at this time   Patient/family educated on Medicare website which has current facility and service quality ratings no   Transportation Anticipated family or friend will provide   Anticipated Discharge Disposition (CM/SW) Home;Home Care;Outpatient Rehab (PT, OT, SLP, Cardiac or Pulmonary)   Patient/Family in Agreement with Plan yes         Social Work: Initial Assessment with Discharge Plan     Patient Name: Rigo Johns  : 1950  Age: 70 year old  MRN: [id number]  Completed assessment with: Chart review and interview with pt at bedside   Admitted to ARU: 2021     Presenting Information   Date of SW assessment: 2021  Health Care Directive: Copy in Chart and Health Care Directive Agent (if patient not able to make decisions)  Primary Health Care Agent: Patient/self  Secondary Health Care Agent: Pt spouse, JOSE ROBERTOK.   Living Situation: Lives in a house w/ spouse in [place], [place]. 2+1 stairs to enter and all needs are met on the main level. Walk-in shower with 4\" " "threshold, shower seat. Has [employer] tub with seat and cutout door, only 6\" to step into tub. No pets in the home.   Previous Functional Status: Indep with ADLs and IADLs. Manage own medications, finances, was driving and retired. Denied falls at home. Left handed.   DME available: grab bar, tub/shower, shower chair (shower seat)  Patient and family understanding of hospitalization: Appropriate   Cultural/Language/Spiritual Considerations: 69 y/o  male, english-speaking and Jainism-angel luis.         Physical Health  Reason for admission: Cervical myelopathy s/p C3-4, C4-5 ACDF     HISTORY OF PRESENT ILLNESS  Rigo Johns is a 70 year old L hand dominant male with a PMH of HTN, PILI, and prior cervical surgery (C5-7 anterior fusion in 1993 per notes) who initially presented to the ED at [facility] on 7/12/21 with dyspnea and acute onset of neck and shoulder pain, and bilateral arm weakness.  A CT of the chest demonstrated bibasilar ATX vs. Infiltrate, and he was started on Zosyn for likely PNA.  A CT of the C-spine was also done and showed central stenosis at the C3-4 and C4-5 levels, with a subsequent MRI confirming this and also demonstrating severe b/l foraminal stenosis and abnormal T2 signal bilaterally, L>R.  He was started on IV steroids and transferred to [facility] for a C3-4, C4-5 ACDF which occurred on 7/14/21 by Dr. Hanley.  Hospital course has been complicated by DONOVAN with improvement with IV fluids, urinary retention with a Davis currently in place, steroid induced hyperglycemia, thrombocytopenia, and steroid induced leukocytosis.       Provider Information   Primary Care Physician:[facility]-   [address] [place] [id number]  PH: [phone]  : None reported      Mental Health/Chemical Dependency:   Diagnosis: None reported   Alcohol/Tobacco/Narcotis: None reported. Reported 1 drink/month.   Support/Services in Place: None reported   Services " Needed/Recommended: Supportive services available by consult (Health Psychology and [place] services)   Sexuality/Intimacy: Not discussed      Support System  Marital Status: Wife supportive, able to A at discharge. Wife retired, does a lot of gardening. Pt reported that his wife is healthy and well.   Family support: Niece who lives close and is a PT at [employer]. No children. No other family in the area.   Other support available: Pt reported some close friends that he can rely on.      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   Income Source: [employer].   Education: Not discussed   Financial Concerns:  None reported   Insurance: BCBS Med Advantage         Discharge Plan   Patient and family discharge goal: Home with family A and HC vs OP pending progress   Provided Education on discharge plan: Yes  Patient agreeable to discharge plan:  Yes  Provided education and attained signature for Medicare IM and IRF Patient Rights and Privacy Information provided to patient : YES  Provided patient with [place] [facility] Resources: N/A  Barriers to discharge: None identified      Discharge Recommendations   Disposition: See above   Transportation Needs: Family assistance   Name of Transportation Company and Phone: N/A      Additional comments   ELOS 14 days. Discharge needs pending progress. SW role and team rounds explained. Pt denied any immediate needs or concerns. SW available as needs arise.      Please invite to Care Conference:  N/A      MELA Aguilar, [facility]   Phone: [phone]  I   Pager: [phone]

## 2021-08-02 NOTE — H&P
[facility]   Acute Rehabilitation Unit  Admission History and Physical    CHIEF COMPLAINT   weakness    REHAB DIAGNOSIS  Cervical myelopathy s/p C3-4, C4-5 ACDF complicated by post-operative abscess    HISTORY OF PRESENT ILLNESS  Rigo Johns is a 70 year old male who is well known to the rehabilitation service as he was admitted to the [facility] [place] [facility]U from 7/18/21 to 7/27/21 after his C3-4 and C4-5 ACDF.  Recall H+P from that admission:    Rigo Johns is a 70 year old L hand dominant male with a PMH of HTN, PILI, and prior cervical surgery (C5-7 anterior fusion in 1993 per notes) who initially presented to the ED at [facility] on 7/12/21 with dyspnea and acute onset of neck and shoulder pain, and bilateral arm weakness.  A CT of the chest demonstrated bibasilar ATX vs. Infiltrate, and he was started on Zosyn for likely PNA.  A CT of the C-spine was also done and showed central stenosis at the C3-4 and C4-5 levels, with a subsequent MRI confirming this and also demonstrating severe b/l foraminal stenosis and abnormal T2 signal bilaterally, L>R.  He was started on IV steroids and transferred to [facility] for a C3-4, C4-5 ACDF which occurred on 7/14/21 by Dr. Hanley.  Hospital course has been complicated by DONOVAN with improvement with IV fluids, urinary retention with a Davis currently in place, steroid induced hyperglycemia, thrombocytopenia, and steroid induced leukocytosis.      During his rehabilitation stay, Mr. Johns developed fevers for which he was started on Cefepime empirically, but was ultimately found to have a prevertebral fluid collection which was suggestive of an abscess or seroma.  This was discussed with NSGY team and initially recommended monitoring only off of antibiotics, but he continued to be febrile with signs of sepsis, so he was transferred back to the [facility] for further management.  He was re-started on IV Cefepime and Vancomycin (switched to Daptomycin due to  "CKD and elevated Cr), and a repeat MRI showed improvement in the fluid collection.  No surgical intervention was recommended and it was not felt there was enough fluid to aspirate for cultures.  Per ID recommendations, he will complete a 2 week course of IV antibiotics for empiric treatment of post-operative infection.  Further hospital complications include ongoing urinary retention with a Davis re-placed.     Currently he feels most weak when standing up and pivoting. He is very happy/excited to be back to rehab and with the same team. He was becoming exhausted in having to repeat his whole history \"over and over.\" He endorses having improvement with his bowel movements, in being able to feel when he needs to have one, can call for help in time and can be transferred to the commode on time, rather than being completely incontinent. He is very happy about this \"step in the right direction.\" His last BM was at noon today. Additionally, the pain in his neck feels much better since he was discharged last week and rates it at only 1/10. He also feels he has increased ROM with his neck. He has some muscle strains following therapies, but nothing intolerable. Otherwise has no pain. Jackson does note he gets a bit winded with transfers, but does not feel short of breath. He also denies fevers, chills, nausea, vomiting, and chest pain.      FUNCTIONAL HISTORY  Current Functional Status:  Patient currently requires SBA with bed mobility and Naeem x1 with FWW for transfers. Pivot transfers requires Naeem x2 using FWW. Patient has ambulated ~3 feet with FWW and Naeem x2. G/H tasks require set up assist and  UB/LB dressing both require modA.     Prior Functional Status:  Independent with ambulation, mobility, and ADLs. Wife perform any IADLs around the home.    PAST MEDICAL HISTORY   Reviewed and updated in Epic.  Past Medical History:   Diagnosis Date     Cervical stenosis of spinal canal      Hypertension      Sleep apnea     uses " cpap       SURGICAL HISTORY  Reviewed and updated in Epic.  Past Surgical History:   Procedure Laterality Date     BACK SURGERY      cervical c5-6 fusion     FUSION CERVICAL ANTERIOR TWO LEVELS N/A 7/14/2021    Procedure: C3-C4, C4-C5 anterior cervical discetomy and fusion;  Surgeon: Tee Hanley MD;  Location:  OR       SOCIAL HISTORY  Reviewed and updated in Epic.  Marital Status:   Living situation: Lives in a house with 2+1 stairs to enter and all needs are met on the main level  Family support: Wife is very supportive and also has a niece who is a physical therapist  Tobacco use: Denies  Alcohol use: 1 alcoholic beverage per week    Social History     Socioeconomic History     Marital status:      Spouse name: Not on file     Number of children: Not on file     Years of education: Not on file     Highest education level: Not on file   Occupational History     Not on file   Tobacco Use     Smoking status: Never Smoker     Smokeless tobacco: Never Used   Vaping Use     Vaping Use: Never assessed   Substance and Sexual Activity     Alcohol use: Yes     Comment: once a week     Drug use: Never     Sexual activity: Not on file   Other Topics Concern     Parent/sibling w/ CABG, MI or angioplasty before 65F 55M? Not Asked   Social History Narrative     Not on file     Social Determinants of Health     Financial Resource Strain:      Difficulty of Paying Living Expenses:    Food Insecurity:      Worried About Running Out of Food in the Last Year:      Ran Out of Food in the Last Year:    Transportation Needs:      Lack of Transportation (Medical):      Lack of Transportation (Non-Medical):    Physical Activity:      Days of Exercise per Week:      Minutes of Exercise per Session:    Stress:      Feeling of Stress :    Social Connections:      Frequency of Communication with Friends and Family:      Frequency of Social Gatherings with Friends and Family:      Attends Buddhist Services:      Active  "Member of Clubs or Organizations:      Attends Club or Organization Meetings:      Marital Status:    Intimate Partner Violence:      Fear of Current or Ex-Partner:      Emotionally Abused:      Physically Abused:      Sexually Abused:        FAMILY HISTORY  Reviewed and updated in Epic.  Family History   Problem Relation Age of Onset     Lymphoma Mother        MEDICATIONS  Scheduled meds  No medications prior to admission.       ALLERGIES     Allergies   Allergen Reactions     Shrimp GI Disturbance       REVIEW OF SYSTEMS  A 10 point ROS was performed and negative unless otherwise noted in HPI.     PHYSICAL EXAM  VITAL SIGNS:  There were no vitals taken for this visit.  BMI:  Estimated body mass index is 33.29 kg/m  as calculated from the following:    Height as of 7/18/21: 1.803 m (5' 11\").    Weight as of 7/25/21: 108.3 kg (238 lb 11.2 oz).     General: NAD, pleasant and cooperative   HEENT: NC/AT   Pulmonary: Non-labored breathing, CTA b/l, no w/r/r   Cardiovascular: RRR, S1+S2, no m/r/g  Abdominal: Soft, NT/ND, BS+  Upper Extremities: tender in LUE, none in RUE, no edema in UE b/l  Lower Extremities: No LE edema or calf tenderness b/l, wraps present b/l  MSK/neuro:   Mental Status:  alert and oriented x3    Cranial Nerves: grossly normal   1. 2nd CN: Pupils equal, round, reactive to light and accomodation. and visual fields intact to confrontation.   2. 3rd,4th,6th CN:  EOMI, appropriate pupillary responses  3. 5th CN: facial sensation intact   4. 7th CN: face symmetrical   5. 8th CN: functional hearing bilaterally  6. 9th, 10th CN: palate elevates symmetrically   7. 11th CN: sternocleidomastoids and trapezii strong   8. 12th CN: tongue midline and without fasciculations    Sensory: Normal to light touch in bilateral upper and lower extremities   Strength:   Shoulder abd  EF  WE  EE    Finger abd  R 4 5 5 5 5 5  L 3 5 5 5 4 5     HF  KE  DF  EHL  PF   R  4 5 5 5 5  L  3  4 5 5 5     Reflexes: Unable to " elicit biceps, BR, triceps, achilles b/l, patella present b/l      Epstein's test: negative bilaterally    Babinski reflex: downgoing bilaterally      Tone per modified Oniel Scale: 0   Abnormal movements: None    Coordination: Slight dysmetria on finger to nose b/l, heal to shin intact b/l    Speech: no deficits   Cognition: no deficits  Orientation: x4  President: intact  WORLD <--> DLROW: intact  Abstract thinking: intact  Memory: 3 word recall intact    Speech: understand and repeats   Comprehension: intact   Repetition: intact      LABS  Yesterday's labs:      Ref. Range 8/1/2021 12:02   Creatinine Latest Ref Range: 0.66 - 1.25 mg/dL 1.25   GFR Estimate Latest Ref Range: >60 mL/min/1.73m2 58 (L)   Lyme Disease Antibodies Serum Latest Ref Range: <0.90  0.05       IMAGING    EXAM: MR SOFT TISSUE NECK W/O and W CONTRAST  [facility]   7/28/2021 4:43 AM  1.  Prevertebral fluid and fluid within the surgical tract in the right neck are regressing.  2.  Robust enhancement along the periphery of these fluid collections is again identified.  3.  Both resolving abscess and evolving postsurgical change could have this appearance.  4.  Diminished T2 prolongation in the cord.  5.  No new abnormality.      XR GASTROGRAFIN ESOPHAGRAM 7/28/2021 9:05 AM   No esophageal leak.      IMPRESSION  Rigo Johns is a 70 year old L hand dominant male with a PMH of HTN, PILI, and prior cervical surgery (C5-7 anterior fusion in 1993 per notes) who is now status post a C3-4, C4-5 ACDF on 7/14/2021 for cervical myelopathy.  He was admitted to the [place] [facility]U from 7/18-7/27/21 but transferred back to the medical floor for fevers and prevertebral fluid collection suggestive of abscess.  Now improving with IV antbiotics and no surgical intervention was needed.  He is now being admitted to the ARU on 8/2/21.      Impairment group code: 04.1211 Quadriplegia, Incomplete C1-4 - s/p C3-5  ACDF.      PLAN  Rehabilitation     1. PT and OT 90 minutes of each on a daily basis, in addition to rehab nursing and close management of physiatrist.       2. Impairment of ADL's: Noted to have impaired ROM, impaired strength, impaired activity tolerance, edema management, impaired balance, and impaired coordination, all affecting his ability to safely and independently perform basic ADLs.  Goal for mod I with basic ADLs.     3. Impairment of mobility:  Noted to have impaired ROM, impaired strength, impaired activity tolerance, edema management, impaired balance and impaired coordination, all affecting his ability to safely and independently ambulate and perform basic mobility.  Goal for mod I with basic mobility.     Medical  1)Neurology  #Cervical Myelopathy s/p C3-4, C4-5 ACDF on 7/14/21 complicated by fevers and prevertebral fluid collection, suggestive of abscess  -ID evaluated the patient, recommended Cefepime 2g q12h and Daptomycin 650 mg q24 hours for 2 weeks (will end 8/16).    -Weekly CBC, CMP, CRP, and CPK while on antibiotics.  Results to be faxed to Dr. Merna Donaldson's office.    -MRI 7/28 showed improved fluid collection and no surgical intervention was needed  -Maintain postoperative spinal precautions. No need for cervical collar.  -Follow-up with neurosurgery and infectious disease after discharge  -Follow up MRI in~1.5 weeks (ie 8/12) to evaluate progress.  Will plan to complete prior to discontinuation of abx and ask ID to review.        2)CVS  -No acute issues  -Monitor blood pressures as he has previously had some high readings, along with some orthostatic hypotensive episodes        3)Pulmonary  #CAP  -Completed course of antibiotics (Zosyn x1 in ED, Azithromycin 7/12-7/16, Ceftriaxone 7/12 - 7/17, and Cefuroxime x1) during initial hospitalization  -Encourage incentive spirometer  -Monitor respiratory status, supplementary oxygen PRN.  Now weaned to room air.      #PILI  -Continue CPAP with home  settings     4)FENGI  #Diet: Regular consistency solids and thin liquids     #Neurogenic bowel  -Incontinence improving, happy to continue current regimen  -Fiber BID  -Monitor, adjust as needed     #Liver  -Abnormal appearance is seen on CAT scan with slight nodularity inferiorly. LFTs within normal limits.  -Follow-up primary care physician     5)  #Urinary Retention  -TOV was done during prior rehab stay, but continued to retain and Davis was re-placed on 7/30.    -Will attempt voiding trial on 8/6 again  -Continue Flomax 0.4 mg daily  -Will avoid increasing Flomax for now, given orthostatic hypotension     #DONOVAN and likely CKD   -Pt with limited physician follow up so baseline creatinine not known. Peak at 2.13, improved with IV hydration  -Avoid nephrotoxic agents, NSAIDs  -Last Cr 1.25  -Will recheck Thursday     #Renal lesion  -Incidental finding on CT scan  -Renal ultrasound showed simple cysts of bilateral kidneys  -Follow up with PCP     6)DVT prophylaxis  -PCDs and ambulation        7)Pain  No concerns at this time.  -Tylenol 650 mg q4h PRN  -Robaxin 750 mg q6h PRN     8)Endo  -Prior steroid induced hyperglycemia.  HbA1c 5.0.   -Now completed course of steroids.  No need for further routine checks        9)Heme   #Acute postoperative blood loss anemia, resolved  -hemoglobin mildly low at 12.3 on 7/29 and stable   -Monitor peripherally     #Leukocytosis   -Thought to be secondary to steroids which are now discontinued. WBCs 6.4 on 7/29  -Will continue to monitor weekly        #Thrombocytopenia  -Noted upon initial admission to the emergency room. Has been stable low 100s, but now improved to 136 on 7/29. Unclear if it was due to acute distress and infection versus possible undiagnosed liver disease.   -Monitor peripherally  -Follow-up with primary care physician        10)Psych  -Monitor mood, will consult health psychology if needed           11)Social/Dispo  -Anticipate discharge home at a modified  independent level for ambulation, mobility, and ADLs.   -ELOS: 14 days  -Rehab prognosis: Good  -Follow up appointments: PCP, NSGY (Dr. Hanley), ID     Code status: Full Code (Discussed with patient upon admission)     Robi Whitehead MD  Department of Rehabilitation Medicine

## 2021-08-02 NOTE — DISCHARGE SUMMARY
[facility]  Hospitalist Discharge Summary      Date of Admission:  7/27/2021  Date of Discharge:  No discharge date for patient encounter.  Discharging Provider: Joshua Palacio MD      Discharge Diagnoses   Prevertebral fluid collection along C3-T1 with anterior extension to subcutaneous area within right side of neck concerning for seroma/abscess  Sepsis secondary to the above (resolved)  Anorexia - improving  Cervical myelopathy d/t high grade cervical stenosis s/p urgent C3-5 anterior cervical diskectomy and fusion (7/14/21)  Hx C5-C7 anterior fusion (1993)  Neurogenic bowel and bladder  Suspected CKD-III with proteinuria  Hypertension  Thrombocytopenia  PILI Obesity  Non severe malnutrition    Follow-ups Needed After Discharge   Follow-up Appointments     Follow Up and recommended labs and tests      ARU care team   Neurosurgery as below  Infectious Disease at their discretion         Follow-up and recommended labs and tests       Follow-up in 2 weeks with cervical MRI prior, with and without contrast.     [phone] for appts.             Unresulted Labs Ordered in the Past 30 Days of this Admission     No orders found from 6/27/2021 to 7/28/2021.      These results will be followed up by NA    Discharge Disposition   Discharged to Acute Rehab   Condition at discharge: Stable      Hospital Course      Rigo Johns is a 70 year old male with PMHx PILI, HTN, and recent hospitalization for cervical myelopathy s/p urgent surgical decompression who was discharged to ARU on 7/18 who presents as a direct adm from ARU with sepsis 2/2 suspected prevertebral C3-T1 abscess.     Sepsis, suspect secondary to prevertebral fluid collection   Fevers starting 7/19/21 with increasing leukocytosis, CRP. Initial etiology unclear with comprehensive workup. CT C-spine and subsequent MRI showing prevertebral fluid collection along C3-T1 with anterior extension to subcutaneous area within right side of  neck concerning for seroma/abscess. No surgical intervention recommended after contacting neurosurg. CT C/A/P 7/23 without explanation for fever. Abx discontinued 7/24 by ID and subsequently patient with fevers, tachycardia, lactic acid elevation and otherwise negative workup prompting transfer to [facility] for neurosurg intervention. UA on admission negative. LA 2.1>1.3 after IVF.  MRI on admission shows enhancing prevertbral fluid collection, regressing from last imaging  Tmax overnight 100.3, VSS. CRP 87 (80, 76). WBC normal   -- Neurosurgery consulted, appreciate assistance. recommending ongoing treatment with abx. No surgical intervention planned and does not believe fluid collection large enough to warrant IR aspiration.   - ID consulted, appreciate assistance. Recommending 2 week empiric course of abx, currently on cefepime and daptomycin with follow up MRI.   - Blood cultures 7/21,7/25, and 7/27 NGTD.  - Continue empiric Cefepime and Daptomycin   - PT/OT completed, rec return to ARU (SW/CM arranged)  - PICC placed 7/29  - 2 weeks dapto and cefepime per ID  - Neurosurgery follow up with MRI     Anorexia  Non severe malnutrition - in context of acute illness/injury.  Reports poor appetite over past few weeks with difficulty taking substantial po.   % Weight Loss:  Up to 5% in 1 month (non-severe malnutrition)  % Intake:  <75% for > 7 days (non-severe malnutrition)  Subcutaneous Fat Loss:  None observed  Muscle Loss:  Temporal region mild depletion, Clavicle bone region mild depletion, Acromion bone region mild depletion and Dorsal hand region mild depletion  Fluid Retention:  Mild BLE   - nutrition consult  - start boost      Cervical myelopathy d/t high grade cervical stenosis s/p urgent C3-5 anterior cervical diskectomy and fusion (7/14/21)  Hx C5-C7 anterior fusion (1993)  Hospital adm 7/14-7/18 for urgent surgical decompression. Postoperative course complicated by DONOVAN and acute respiratory failure from possible  community acquired pneumonia. L>R UE and LE weakness.  - PT/OT to continue treatments.  - Neurosurgery follow up with MRI     Neurogenic bowel and bladder  Davis during recent adm d/t retention, removed 7/26 with scheduled straight cath. No sensation bladder. Starting to get some sensation with BMs, but mostly incontinent.  - Follow I&Os.  - Bladder scan and straight cath QID and PRN. Davis replaced due to retention.  - PTA Flomax 0.4mg daily.  - PTA PRN senna, miralax, dulcolax suppository.     Suspected CKD-III with proteinuria  Noted during recent hospitalization, baseline Cr unknown as pt does not doctor regularly. Cr has been 1.3-1.4. Stable on admission.   - Monitor 7/31 creat 1.24 stable thru 8/1     Hypertension  Appears diagnosed during recent adm, but not started on meds.  - PRN hydralazine.     Thrombocytopenia  Noted during recent admission, platelet values stable in low 100s, have since trended up to 130s-140s. Possibly 2/2 infection.  - Monitor.     PILI  - PTA CPAP     Obesity, BMI 34  Increased risk of all-cause mortality, morbidity.  - Aggressive lifestyle management as outpatient    Diet: Regular Diet Adult  Snacks/Supplements Adult: Ensure Enlive; Between Meals    DVT Prophylaxis: Pneumatic Compression Devices  Davis Catheter: PRESENT, indication: Neurogenic Bladder  Central Lines: None  Code Status: Full Code        Consultations This Hospital Stay   CARE MANAGEMENT / SOCIAL WORK IP CONSULT  NEUROSURGERY IP CONSULT  PHARMACY TO DOSE VANCO  INFECTIOUS DISEASES IP CONSULT  INFECTIOUS DISEASES IP CONSULT  PHYSICAL THERAPY ADULT IP CONSULT  OCCUPATIONAL THERAPY ADULT IP CONSULT  VASCULAR ACCESS ADULT IP CONSULT  VASCULAR ACCESS ADULT IP CONSULT  NUTRITION SERVICES ADULT IP CONSULT    Code Status   Full Code    Time Spent on this Encounter   IJoshua MD, personally saw the patient today and spent greater than 30 minutes discharging this patient.       Joshua Palacio MD  [facility]  [place] [address] [address]  [address] [address]  Phone: [phone]  ______________________________________________________________________    Physical Exam   Vital Signs: Temp: 98.8  F (37.1  C) Temp src: Oral BP: 121/74 Pulse: 79   Resp: 18 SpO2: 94 % O2 Device: None (Room air)    Weight: 0 lbs 0 oz    Gen: NAD, pleasant  HEENT: Normocephalic, EOMI, MMM, collar/brace in place  Resp: no crackles,  no wheezes, no increased work of resp  CV: S1S2 heard, reg rhythm, reg rate, no pedal edema  Abdo: soft, nontender, nondistended, bowel sounds present  Ext: calves nontender, well perfused  Neuro: AAOx3, CN grossly intact, no facial asymmetry         Primary Care Physician   [facility]    Discharge Orders      MRI Cervical spine w & w/o contrast     Follow-up and recommended labs and tests     Follow-up in 2 weeks with cervical MRI prior, with and without contrast.     [phone] for appts.     General info for SNF    Length of Stay Estimate: Short Term Care: Estimated # of Days <30  Condition at Discharge: Improving  Level of care:skilled - ARU  Rehabilitation Potential: Good  Admission H&P remains valid and up-to-date: Yes  Recent Chemotherapy: N/A  Use Nursing Home Standing Orders: Yes     Mantoux instructions    Give two-step Mantoux (PPD) Per Facility Policy Yes     Follow Up and recommended labs and tests    ARU care team   Neurosurgery as below  Infectious Disease at their discretion     Reason for your hospital stay    You developed a fever in the post operative setting while at acute rehab facility and were found to have a prevertebral fluid collection concerning for infection. You have been improving on antibiotics and they will be continued for 2 weeks at the recommendation of infectious disease specialist.     Daily weights    Call Provider for weight gain of more than 2 pounds per day or 5 pounds per week.     Davis catheter    To straight  gravity drainage. Change catheter every 2 weeks and PRN for leaking or decreased uring output with signs of bladder distention. DO NOT change catheter without a specific MD order IF diagnosis of benign prostatic hypertrophy (BPH), neurogenic bladder, or other urological conditions     Activity - Up with assistive device     Fall precautions     Advance Diet as Tolerated    Follow this diet upon discharge: Orders Placed This Encounter      Snacks/Supplements Adult: Ensure Enlive; Between Meals      Regular Diet Adult       Significant Results and Procedures   Most Recent 3 CBC's:Recent Labs   Lab Test 07/29/21  0835 07/27/21  0703 07/26/21  0806   WBC 6.4 10.3 12.1*   HGB 12.3* 12.3* 12.4*   MCV 99 100 100   * 141* 139*     Most Recent 3 BMP's:Recent Labs   Lab Test 08/01/21  1202 08/01/21  0849 07/31/21  1538 07/31/21  0712 07/30/21  0638 07/29/21  0835 07/28/21  0813 07/27/21  0703   NA  --   --   --   --   --  141 140 141   POTASSIUM  --   --   --   --   --  3.7 3.9 4.0   CHLORIDE  --   --   --   --   --  113* 112* 112*   CO2  --   --   --   --   --  24 25 19*   BUN  --   --   --   --   --  20 19 17   CR 1.25  --   --  1.24 1.34* 1.25 1.46* 1.38*   ANIONGAP  --   --   --   --   --  4 3 10   GARRISON  --   --   --   --   --  8.6 8.7 8.4*   GLC  --  84 122*  --   --  106* 86 81   ,   Results for orders placed or performed during the hospital encounter of 07/27/21   MR Soft Tissue Neck w/o & w Contrast    Narrative    EXAM: MR SOFT TISSUE NECK W/O and W CONTRAST  LOCATION: [facility]  DATE/TIME: 7/28/2021 4:43 AM    INDICATION: Recent cervical fusion. Abnormal comparison films. Prevertebral fluid collection postoperatively possibly an abscess.  COMPARISON: Cervical spine MRI 07/23/2021.  CONTRAST: 11 mL Gadavist  TECHNIQUE: Multiplanar multisequence neck MRI performed without and with IV contrast.    FINDINGS:     MUCOSAL SPACES/SOFT TISSUES: Normal mucosal spaces of the upper  aerodigestive tract. No mucosal mass or inflammation identified. Normal vocal cords and infraglottic trachea. Normal parapharyngeal spaces. Normal oral cavity and floor of mouth structures.     C3-C5 anterior cervical discectomy and fusion again noted. Previous C6-C7 interbody fusion and partial ankylosis of the C5-C6 apposing endplates again noted. Prevertebral fluid has significantly diminished. It now measures 11.3 mm in AP dimension and   spans from C3 through C5. It previously measured 13.6 mm in AP dimension and spanning from the mid dens through T2. A surgical tract is identified extending from this fluid collection at the C6-C7 level. The prevertebral fluid and the surgical tract   demonstrate peripheral enhancement. This can be seen with both resolving infection and resolving postoperative change. No evidence of an epidural abscess. Patchy areas of T2 prolongation are noted in the cord from C2-C3 through C3-C4. Abnormal cord   signal has diminished since 07/23/2021.    LYMPH NODES: No pathologic lymph nodes by size or morphology criteria.     SALIVARY GLANDS: Normal parotid and submandibular glands.    THYROID: Normal.     VESSELS: Expected vascular flow voids are identified.    VISUALIZED INTRACRANIAL/ORBITS/SINUSES: No abnormality of the visualized intracranial compartment or orbits. Visualized paranasal sinuses and mastoid air cells are clear.    BONES: Normal marrow signal.       Impression    IMPRESSION:   1.  Prevertebral fluid and fluid within the surgical tract in the right neck are regressing.  2.  Robust enhancement along the periphery of these fluid collections is again identified.  3.  Both resolving abscess and evolving postsurgical change could have this appearance.  4.  Diminished T2 prolongation in the cord.  5.  No new abnormality.   XR Gastrografin Esophagram    Narrative    XR GASTROGRAFIN ESOPHAGRAM 7/28/2021 9:05 AM     HISTORY: fluid collection concerning for prevertebral abscess,  s/p  ACDF  TECHNIQUE: 0.5 minutes fluoroscopy used. One spot image.  COMPARISON: MRI 7/28/2021    FINDINGS: Water soluble esophagram performed. There is no leak in the  cervical esophagus. Normal peristalsis. Small hiatal hernia.      Impression    IMPRESSION:  1.  No esophageal leak.    MARY BARRAZA MD         SYSTEM ID:  [id number]       Discharge Medications   Current Discharge Medication List      START taking these medications    Details   ceFEPIme (MAXIPIME) 1 g SOLR vial Inject 2 g into the vein every 12 hours for 14 days Check weekly cbc, AST, creat while on this and fax results to Dr. Donaldson`s office.  Qty: 560 mL, Refills: 0    Associated Diagnoses: Sepsis following procedure, initial encounter (H); Postoperative infection, unspecified type, initial encounter      DAPTOmycin 650 mg Inject 650 mg into the vein every 24 hours for 14 days Check weekly CPK while on this medication and fax results to Dr. Donaldson`s office.    Associated Diagnoses: Sepsis following procedure, initial encounter (H); Postoperative infection, unspecified type, initial encounter         CONTINUE these medications which have NOT CHANGED    Details   acetaminophen (TYLENOL) 325 MG tablet Take 2 tablets (650 mg) by mouth every 6 hours as needed for mild pain or other (and adjunct with moderate or severe pain or per patient request)    Associated Diagnoses: Cervical stenosis of spinal canal      bisacodyl (DULCOLAX) 10 MG suppository Place 1 suppository (10 mg) rectally daily as needed for constipation    Associated Diagnoses: Constipation, unspecified constipation type; Diarrhea, unspecified type      melatonin 1 MG TABS tablet Take 1 tablet (1 mg) by mouth At Bedtime    Associated Diagnoses: Insomnia, unspecified type      methocarbamol (ROBAXIN) 750 MG tablet Take 1 tablet (750 mg) by mouth every 6 hours as needed for muscle spasms  Qty: 50 tablet, Refills: 0    Associated Diagnoses: Cervical stenosis of spinal canal      ondansetron  (ZOFRAN) 4 MG tablet Take 1 tablet (4 mg) by mouth every 6 hours as needed for nausea or vomiting    Associated Diagnoses: Nausea      psyllium (METAMUCIL/KONSYL) Packet Take 1 packet by mouth daily    Associated Diagnoses: Diarrhea, unspecified type      saccharomyces boulardii (FLORASTOR) 250 MG capsule Take 1 capsule (250 mg) by mouth 2 times daily    Associated Diagnoses: Diarrhea, unspecified type; Nausea      senna-docusate (SENOKOT-S/PERICOLACE) 8.6-50 MG tablet Take 1 tablet by mouth 2 times daily as needed for constipation  Refills: 0    Associated Diagnoses: Cervical stenosis of spinal canal      tamsulosin (FLOMAX) 0.4 MG capsule Take 1 capsule (0.4 mg) by mouth daily  Refills: 0    Associated Diagnoses: Cervical stenosis of spinal canal      traZODone (DESYREL) 50 MG tablet Take 1 tablet (50 mg) by mouth At Bedtime    Associated Diagnoses: Insomnia, unspecified type      TURMERIC PO          STOP taking these medications       ceFEPIme (MAXIPIME) 2 G vial Comments:   Reason for Stopping:             Allergies   Allergies   Allergen Reactions     Shrimp GI Disturbance

## 2021-08-02 NOTE — PLAN OF CARE
Physical Therapy Discharge Summary    Reason for therapy discharge:    Discharged to acute rehabilitation facility.    Progress towards therapy goal(s). See goals on Care Plan in [facility] electronic health record for goal details.  Goals not met.  Barriers to achieving goals:   discharge from facility.    Therapy recommendation(s):    Continued therapy is recommended.  Rationale/Recommendations:  To progress independence and safety with functional mobility.

## 2021-08-02 NOTE — PROGRESS NOTES
Discharge criteria met. Discharge order received. All discharge instructions reviewed with pt. Pt verbalized understanding. All questions answered. PIV removed. All personal belongs returned to pt. Pt left the unit via W/C in stable condition accompanied with staff.

## 2021-08-03 ENCOUNTER — APPOINTMENT (OUTPATIENT)
Dept: OCCUPATIONAL THERAPY | Facility: CLINIC | Age: 71
DRG: 949 | End: 2021-08-03
Attending: PHYSICAL MEDICINE & REHABILITATION
Payer: COMMERCIAL

## 2021-08-03 ENCOUNTER — APPOINTMENT (OUTPATIENT)
Dept: PHYSICAL THERAPY | Facility: CLINIC | Age: 71
DRG: 949 | End: 2021-08-03
Attending: PHYSICAL MEDICINE & REHABILITATION
Payer: COMMERCIAL

## 2021-08-03 PROCEDURE — 250N000011 HC RX IP 250 OP 636: Performed by: PHYSICAL MEDICINE & REHABILITATION

## 2021-08-03 PROCEDURE — 97116 GAIT TRAINING THERAPY: CPT | Mod: GP

## 2021-08-03 PROCEDURE — 128N000003 HC R&B REHAB

## 2021-08-03 PROCEDURE — 97163 PT EVAL HIGH COMPLEX 45 MIN: CPT | Mod: GP

## 2021-08-03 PROCEDURE — 97535 SELF CARE MNGMENT TRAINING: CPT | Mod: GO | Performed by: STUDENT IN AN ORGANIZED HEALTH CARE EDUCATION/TRAINING PROGRAM

## 2021-08-03 PROCEDURE — G0463 HOSPITAL OUTPT CLINIC VISIT: HCPCS

## 2021-08-03 PROCEDURE — 97167 OT EVAL HIGH COMPLEX 60 MIN: CPT | Mod: GO | Performed by: STUDENT IN AN ORGANIZED HEALTH CARE EDUCATION/TRAINING PROGRAM

## 2021-08-03 PROCEDURE — 250N000013 HC RX MED GY IP 250 OP 250 PS 637: Performed by: STUDENT IN AN ORGANIZED HEALTH CARE EDUCATION/TRAINING PROGRAM

## 2021-08-03 PROCEDURE — 250N000013 HC RX MED GY IP 250 OP 250 PS 637: Performed by: PHYSICAL MEDICINE & REHABILITATION

## 2021-08-03 PROCEDURE — 97530 THERAPEUTIC ACTIVITIES: CPT | Mod: GO | Performed by: STUDENT IN AN ORGANIZED HEALTH CARE EDUCATION/TRAINING PROGRAM

## 2021-08-03 PROCEDURE — 258N000003 HC RX IP 258 OP 636: Performed by: PHYSICAL MEDICINE & REHABILITATION

## 2021-08-03 PROCEDURE — 99232 SBSQ HOSP IP/OBS MODERATE 35: CPT | Mod: 24 | Performed by: PHYSICAL MEDICINE & REHABILITATION

## 2021-08-03 RX ORDER — SODIUM CHLORIDE 9 MG/ML
INJECTION, SOLUTION INTRAVENOUS
Status: DISCONTINUED
Start: 2021-08-03 | End: 2021-08-03 | Stop reason: HOSPADM

## 2021-08-03 RX ORDER — NYSTATIN 100000 U/G
CREAM TOPICAL 2 TIMES DAILY
Status: DISCONTINUED | OUTPATIENT
Start: 2021-08-03 | End: 2021-08-06

## 2021-08-03 RX ADMIN — CEFEPIME HYDROCHLORIDE 2 G: 2 INJECTION, POWDER, FOR SOLUTION INTRAVENOUS at 00:53

## 2021-08-03 RX ADMIN — DAPTOMYCIN 650 MG: 500 INJECTION, POWDER, LYOPHILIZED, FOR SOLUTION INTRAVENOUS at 15:47

## 2021-08-03 RX ADMIN — CEFEPIME HYDROCHLORIDE 2 G: 2 INJECTION, POWDER, FOR SOLUTION INTRAVENOUS at 19:57

## 2021-08-03 RX ADMIN — NYSTATIN: 100000 CREAM TOPICAL at 20:50

## 2021-08-03 RX ADMIN — PSYLLIUM HUSK 1 PACKET: 3.4 POWDER ORAL at 07:38

## 2021-08-03 RX ADMIN — NYSTATIN: 100000 CREAM TOPICAL at 11:23

## 2021-08-03 RX ADMIN — Medication 250 MG: at 20:50

## 2021-08-03 RX ADMIN — TRAZODONE HYDROCHLORIDE 50 MG: 50 TABLET ORAL at 20:50

## 2021-08-03 RX ADMIN — Medication 250 MG: at 07:38

## 2021-08-03 RX ADMIN — TAMSULOSIN HYDROCHLORIDE 0.4 MG: 0.4 CAPSULE ORAL at 07:38

## 2021-08-03 RX ADMIN — CEFEPIME HYDROCHLORIDE 2 G: 2 INJECTION, POWDER, FOR SOLUTION INTRAVENOUS at 10:02

## 2021-08-03 ASSESSMENT — MIFFLIN-ST. JEOR: SCORE: 1853.97

## 2021-08-03 NOTE — PHARMACY-MEDICATION REGIMEN REVIEW
Pharmacy Medication Regimen Review  Rigo Johns is a 70 year old male who is currently in the Acute Rehab Unit.    Assessment: All medications have an appropriate indications, durations and no unnecessary use was found    -Patient complaining of IV antibiotics interrupting sleep. If okay, will adjust antibiotic due times by 2-hour increments to reach due times of 0800 and 2000.     Plan:   1. Continue medication therapies as currently ordered.     Attending provider will be sent this note for review.  If there are any emergent issues noted above, pharmacist will contact provider directly by phone.      Pharmacy will periodically review the resident's medication regimen for any PRN medications not administered in > 72 hours and discontinue them. The pharmacist will discuss gradual dose reductions of psychopharmacologic medications with interdisciplinary team on a regular basis.    Please contact pharmacy if the above does not answer specific medication questions/concerns.    Background:  A pharmacist has reviewed all medications and pertinent medical history today.  Medications were reviewed for appropriate use and any irregularities found are listed with recommendations.      Current Facility-Administered Medications:      acetaminophen (TYLENOL) tablet 650 mg, 650 mg, Oral, Q6H PRN, Luis Whitehead MD     bisacodyl (DULCOLAX) Suppository 10 mg, 10 mg, Rectal, Daily PRN, Luis Whitehead MD     ceFEPIme (MAXIPIME) 2 g vial to attach to  ml bag for ADULTS or 50 ml bag for PEDS, 2 g, Intravenous, Q12H, Luis Whitehead MD     DAPTOmycin (CUBICIN) 650 mg in sodium chloride 0.9 % 100 mL intermittent infusion, 6 mg/kg, Intravenous, Q24H, Luis Whitehead MD, 650 mg at 08/02/21 1625     melatonin tablet 1 mg, 1 mg, Oral, At Bedtime PRN, Luis Whitehead MD     methocarbamol (ROBAXIN) tablet 750 mg, 750 mg, Oral, Q6H PRN, Luis Whitehead MD     nystatin (MYCOSTATIN) cream, ,  Topical, BID, Teri Jordan MD     ondansetron (ZOFRAN) tablet 4 mg, 4 mg, Oral, Q6H PRN, Luis Whitehead MD     psyllium (METAMUCIL/KONSYL) Packet 1 packet, 1 packet, Oral, Daily, Luis Whitehead MD, 1 packet at 08/03/21 0738     saccharomyces boulardii (FLORASTOR) capsule 250 mg, 250 mg, Oral, BID, Teri Jordan MD, 250 mg at 08/03/21 0738     senna-docusate (SENOKOT-S/PERICOLACE) 8.6-50 MG per tablet 1 tablet, 1 tablet, Oral, BID PRN, Luis Whitehead MD     sodium chloride (PF) 0.9% PF flush 10 mL, 10 mL, Intracatheter, Q8H, Luis Whitehead MD, 10 mL at 08/03/21 0738     tamsulosin (FLOMAX) capsule 0.4 mg, 0.4 mg, Oral, Daily, Luis Whitehead MD, 0.4 mg at 08/03/21 0738     traZODone (DESYREL) tablet 50 mg, 50 mg, Oral, At Bedtime, Teri Jordan MD, 50 mg at 08/02/21 2126  No current outpatient prescriptions on file.   PMH: HTN, PILI, and prior cervical surgery (C5-7 anterior fusion in 1993 per notes), s/p ACDF 7/14    Takn Ruiz, StacyD

## 2021-08-03 NOTE — CONSULTS
[facility] Nurse Inpatient Pressure Injury Assessment   Reason for consultation: Evaluate and treat meatus      ASSESSMENT  Pressure Injury: on meatus , present on admission ,   This is a Medical Device Related Pressure Injury (MDRPI) due to torres  Pressure Injury is Stage Mucosal   Contributing factor of the pressure injury: immobility  Status: initial assessment  Recommend provider order: Antifungal powder to groin     TREATMENT PLAN  Meatus wound: Daily    Continue cath cares per unit protocol  Make sure cath is secured without tension    Orders Written  [facility] Nurse follow-up plan:weekly  Nursing to notify the Provider(s) and re-consult the [facility] Nurse if wound(s) deteriorates or new skin concern.    Patient History  According to provider note(s):  Rigo Johns is a 70 year old male who is well known to the rehabilitation service [facility] after his C3-4 and C4-5 ACDF.     Objective Data  Containment of urine/stool: Indwelling catheter    Current Diet/ Nutrition:  Orders Placed This Encounter      Combination Diet Regular Diet Adult      Output:   I/O last 3 completed shifts:  In: 240 [P.O.:240]  Out: 700 [Urine:700]    Risk Assessment:   Sensory Perception: 3-->slightly limited  Moisture: 3-->occasionally moist  Activity: 3-->walks occasionally  Mobility: 3-->slightly limited  Nutrition: 2-->probably inadequate  Friction and Shear: 2-->potential problem  Thor Score: 16      Labs:   Recent Labs   Lab 07/29/21  0835 07/28/21  0813   ALBUMIN  --  2.5*   HGB 12.3*  --    INR  --  1.14   WBC 6.4  --    CRP  --  87.0*       Physical Exam  Skin inspection: focused penis    Wound Location:  Meatus        Date of last Photo 8/3  Wound History: Pt admitted previously and seen by [facility] for possible pressure injury on 7/23.  None noted at that time. Pt had been discharged and now readmitted.  Noted meatus has been enlarged which is consistent with catheter being secured with  tension. Also noted erythema in groin consistent with fungal infection. Recommend antifungal powder.  Measurements (length x width x depth, in cm) -see picture  Wound Base:  100 % mucosa  Palpation of the wound bed: normal   Periwound skin: intact  Color: normal and consistent with surrounding tissue  Temperature: normal   Drainage:, none  Description of drainage: none  Odor: none  Pain: denies , none    Interventions  Current support surface: Standard  Atmos Air mattress  Current off-loading measures: Pillows  Repositioning aid: Pillows  Visual inspection of wound(s) completed   Tube Securement: cath securement strap  Wound Care: was done per plan of care.  Supplies: floor stock, discussed with RN and discussed with patient  Educated provided: importance of repositioning, plan of care and wound progress  Education provided to: patient   Discussed importance of:repositioning every 2 hours, off-loading pressure to wound and their role in pressure injury prevention  Discussed plan of care with Patient and Nurse    Lyric Collado RN

## 2021-08-03 NOTE — PROGRESS NOTES
"CLINICAL NUTRITION SERVICES - ASSESSMENT NOTE     Nutrition Prescription    RECOMMENDATIONS FOR MDs/PROVIDERS TO ORDER:  None today    Malnutrition Status:    Non-severe malnutrition in the context of acute illness     Recommendations already ordered by Registered Dietitian (RD):  Nutrition Education  Ensure q meal and PRN  PM snack: cheese, crackers and butter      Future/Additional Recommendations:  Monitor intakes, wt trends and tolerance to supplements  Consider recommending appetite stimulant as needed      REASON FOR ASSESSMENT  Rigo Johns is a 70 year old male assessed by the dietitian for MST score of 3: positive  for weight loss of unsure and positive  for poor oral intake related to decreased appetite   and Provider Order - poor appetite  PMH significant for L hand dominant male with a PMH of HTN, PILI, and prior cervical surgery (C5-7 anterior fusion in 1993 per notes) who initially presented to the ED at [facility] on 7/12/21 with dyspnea and acute onset of neck and shoulder pain, and bilateral arm weakness.  A CT of the chest demonstrated bibasilar ATX vs. Infiltrate, and he was started on Zosyn for likely PNA.  A CT of the C-spine was also done and showed central stenosis at the C3-4 and C4-5 levels, with a subsequent MRI confirming this and also demonstrating severe b/l foraminal stenosis and abnormal T2 signal bilaterally, L>R.  He was started on IV steroids and transferred to [facility] for a C3-4, C4-5 ACDF which occurred on 7/14/21 by Dr. Hanley.  Hospital course has been complicated by DONOVAN with improvement with IV fluids, urinary retention with a Davis currently in place, steroid induced hyperglycemia, thrombocytopenia, and steroid induced leukocytosis.   NUTRITION HISTORY  Pt stated his appetite was \"normal\" prior to admit. He typically eats 3 meals/day at home. Describes himself as a \"meat and potatoes elizabeth\". He doesn't like very flavorful/spicy foods however he is particular about taste. Reports a " "UBW of 250#. Was consuming % of meals while at [facility]    CURRENT NUTRITION ORDERS  Diet: Regular  Allergies: shrimp  Supplement: ensure BID (10am and 2pm)  Intake/Tolerance: Per flowsheet pt has been consuming 50-75% of meals however he is ordering lower calorie food options. Had a side salad, juice, applesauce and jello on lunch tray today. Stated his appetite has been poor since surgery and he has dislikes the taste of the hospital food. Discussed various flavors/condiments that are available that might help improve taste. He has found a few items he likes better than others but has mostly been relying on ensure. Currently has an order for ensure BID however pts goal is to drink 5 per day. Discussed updating order to ensure q meal and PRN, pt agreeable. Pt is also agreeable to a snack of cheese and crackers (requested butter in addition).   5 ensures per day would provide 1750kcal (82% minimum needs) and 100g protein (116% minimum needs).     LABS  Labs reviewed    MEDICATIONS  Medications reviewed:  Metamucil  florastor  PRN: dulcolax, zofran, senokot    ANTHROPOMETRICS  Height: 180.3 cm (5' 11\")  Most Recent Weight: 107.7 kg (237 lb 6.4 oz)    IBW: 78.2 kg  BMI: 33.11 kg/m^2, Obesity Grade I BMI 30-34.9  Weight History: 12# (4.8%) wt loss in 1 month  Wt Readings from Last 10 Encounters:   08/03/21 107.7 kg (237 lb 6.4 oz)   07/25/21 108.3 kg (238 lb 11.2 oz)   07/12/21 113 kg (249 lb 3.2 oz)   04/18/17 117.7 kg (259 lb)     Dosing Weight: 85.6 kg (adjusted using current wt of 107.7 kg and ideal wt of 78.2 kg)    ASSESSED NUTRITION NEEDS  Estimated Energy Needs: 6923-9056 kcals/day (25 - 30 kcals/kg)  Justification: Maintenance  Estimated Protein Needs: 111-128 grams protein/day (1.3-1.5 grams of pro/kg)  Justification: Wound healing  Estimated Fluid Needs: 1 mL/kcal   Justification: Maintenance    PHYSICAL FINDINGS  See malnutrition section below.  Per 8/3 WOC RN note  Pressure Injury: on meatus , " present on admission ,   This is a Medical Device Related Pressure Injury (MDRPI) due to torres  Pressure Injury is Stage Mucosal   Contributing factor of the pressure injury: immobility  Status: initial assessment    MALNUTRITION  % Intake: Decreased intake does not meet criteria  % Weight Loss: 5% in 1 month (non-severe/moderate)  Subcutaneous Fat Loss: None observed  Muscle Loss: Temporal:  Mild,Thoracic region (clavicle, acromium bone, deltoid, trapezius, pectoral):  Mild and dorsal hand:mild  Fluid Accumulation/Edema: 1+ BLE  Malnutrition Diagnosis: Non-severe malnutrition in the context of acute illness     NUTRITION DIAGNOSIS  Inadequate oral intake related to increased needs for wound healing, decreased appetite and disliking hospital food as evidenced by documented intakes and wt loss of  12# (4.8%) wt loss in 1 month.     INTERVENTIONS  Implementation  Nutrition Education: Discussed role of RD, menu ordering and available snacks/supplements. Encouraged intakes. Discussed increased protein needs for wound healing. Discussed higher calorie/higher protein menu items to choose to better meet needs.    Ensure q meal and PRN  PM snack: cheese, crackers and butter      Goals  Patient to consume % of nutritionally adequate meal trays TID, or the equivalent with supplements/snacks.     Monitoring/Evaluation  Progress toward goals will be monitored and evaluated per protocol.    Deonna Medel MS, RD, LDN  Unit Pager [phone]

## 2021-08-03 NOTE — PLAN OF CARE
Occupational Therapy Discharge Summary    Reason for therapy discharge:    Discharged to acute rehabilitation facility.    Progress towards therapy goal(s). See goals on Care Plan in [facility] electronic health record for goal details.  Goals partially met.  Barriers to achieving goals:   discharge from facility.    Therapy recommendation(s):    Continued therapy is recommended.  Rationale/Recommendations:  Recommend contd OT at TCU to increase ADL/IADL and functional mobility independence and safety to PLOF.

## 2021-08-03 NOTE — PLAN OF CARE
"Denisse, SN: [id number]  FOCUS/GOAL  Bladder management, Medication management, Pain management, Wound care management, Equipment/Assistive devices, and Safety management    ASSESSMENT, INTERVENTIONS AND CONTINUING PLAN FOR GOAL:  Pt A&O x 4. Pt denies pain, CP, SOB, N/V, and dizziness. Baseline N/T of R fingertips. Ax2 stand pivot w/ walker and GB. Pt was up to chair today. Davis patent and draining adequately. Cont and incont of bowel occasionally and can get up to commode with assistance. LBM 8/3. Reg diet. Single lumen L PICC SL in between IV ABX. Cefepime and Daptomycin completed. Anterior cervical incision approximated with steri strips SATNAM. Cervical collar was in place this AM. Pt able to make needs known and use call light accordingly. Continue POC.     /71 (BP Location: Right arm)   Pulse 80   Temp 98.5  F (36.9  C) (Oral)   Resp 18   Ht 1.803 m (5' 11\")   Wt 107.7 kg (237 lb 6.4 oz)   SpO2 99%   BMI 33.11 kg/m    "

## 2021-08-03 NOTE — PLAN OF CARE
"FOCUS/GOAL  Medical management    ASSESSMENT, INTERVENTIONS AND CONTINUING PLAN FOR GOAL:  Patient was admitted yesterday. He slept well, did not call for assistance. No c/o pain. He has a torres, a single lumen PICC left upper arm for antibiotics. No bed alarms.  He appeared to have been sleeping between cares, but this AM, he said he did not sleep well because nursing staff disturbed him with checking VS in the middle of the night, explained to him that VS  are taken in the middle of the night for new patient, that staff will not wake him up to check from now on, unless there are abnormal once that need to be monitored. He said: \" No one told me\".   He also c/o of the IV antibiotics keeping him awake. Plan to cluster cares to minimize patient disturbance.       "

## 2021-08-03 NOTE — PROGRESS NOTES
"  [facility]   Acute Rehabilitation Unit  Daily progress note    INTERVAL HISTORY  No acute events overnight. This am Jackson is sitting in bed feeling well. He did not sleep so well last night as he got his vitals taken at midnight and had trouble falling back asleep. Other than that, no concerns. Rash in gluteal/inner leg folds noted and pt states the rash has been present for a long time. He denies SOB, chest pain, nausea, vomiting, fevers, and chills.     He will have his therapies today and will update tomorrow's note accordingly.        MEDICATIONS  Scheduled meds    ceFEPIme  2 g Intravenous Q8H     DAPTOmycin (CUBICIN) intermittent infusion  6 mg/kg Intravenous Q24H     psyllium  1 packet Oral Daily     saccharomyces boulardii  250 mg Oral BID     sodium chloride (PF)  10 mL Intracatheter Q8H     tamsulosin  0.4 mg Oral Daily     traZODone  50 mg Oral At Bedtime       PRN meds:  acetaminophen, bisacodyl, melatonin, methocarbamol, ondansetron, senna-docusate      PHYSICAL EXAM  /58 (BP Location: Right arm)   Pulse 90   Temp 97.3  F (36.3  C) (Axillary)   Resp 18   Ht 1.803 m (5' 11\")   Wt 107.7 kg (237 lb 6.4 oz)   SpO2 94%   BMI 33.11 kg/m    Gen: Awake, coooperative, no distress  HEENT: atraumatic, no discharge from nares or ears, EOM intact  Cardio: RRR, no murmur auscultated  Pulm: Non-labored breathing, CTAB   Abd: Soft, non-tender to palpation, bowel sounds present  Ext: Mild edema in LE, none in UE b/l, no calf tenderness, wraps present b/l in LE  Neuro/MSK: alert, oriented, no new slurring of words, answers questions appropriately, follows commands      LABS  No results found for this or any previous visit (from the past 24 hour(s)).    ASSESSMENT AND PLAN    Rigo Johns is a 70 year old L hand dominant male with a PMH of HTN, PILI, and prior cervical surgery (C5-7 anterior fusion in 1993 per notes) who is now status post a C3-4, C4-5 ACDF on 7/14/2021 " for cervical myelopathy.  He was admitted to the [place] ARU from 7/18-7/27/21 but transferred back to the medical floor for fevers and prevertebral fluid collection suggestive of abscess.  Now improving with IV antbiotics and no surgical intervention was needed.  He is now being admitted to the ARU on 8/2/21.  Impairment group code: 04.1211 Quadriplegia, Incomplete C1-4 - s/p C3-5 ACDF.   PLAN  Rehabilitation  1. PT and OT 90 minutes of each on a daily basis, in addition to rehab nursing and close management of physiatrist.    2. Impairment of ADL's: Noted to have impaired ROM, impaired strength, impaired activity tolerance, edema management, impaired balance, and impaired coordination, all affecting his ability to safely and independently perform basic ADLs.  Goal for mod I with basic ADLs.  3. Impairment of mobility:  Noted to have impaired ROM, impaired strength, impaired activity tolerance, edema management, impaired balance and impaired coordination, all affecting his ability to safely and independently ambulate and perform basic mobility.  Goal for mod I with basic mobility.  Medical  1)Neurology  #Cervical Myelopathy s/p C3-4, C4-5 ACDF on 7/14/21 complicated by fevers and prevertebral fluid collection, suggestive of abscess  -ID evaluated the patient, recommended Cefepime 2g q12h and Daptomycin 650 mg q24 hours for 2 weeks (will end 8/16).   -Weekly CBC, CMP, CRP, and CPK while on antibiotics.  Results to be faxed to Dr. Merna Donaldson's office.    -MRI 7/28 showed improved fluid collection and no surgical intervention was needed  -Maintain postoperative spinal precautions. No need for cervical collar.  -Follow-up with neurosurgery and infectious disease after discharge  -Follow up MRI in~1.5 weeks (ie 8/12) to evaluate progress.  Will plan to complete prior to discontinuation of abx and ask ID to review.     2)CVS  -No acute issues  -Monitor blood pressures as he has previously had some high readings, along  with some orthostatic hypotensive episodes     3)Pulmonary  #CAP  -Completed course of antibiotics (Zosyn x1 in ED, Azithromycin 7/12-7/16, Ceftriaxone 7/12 - 7/17, and Cefuroxime x1) during initial hospitalization  -Encourage incentive spirometer  -Monitor respiratory status, supplementary oxygen PRN.  Now weaned to room air.     #PILI  -Continue CPAP with home settings     4)FENGI  #Diet: Regular consistency solids and thin liquids  #Neurogenic bowel  -Incontinence improving, happy to continue current regimen  -Fiber BID  -Monitor, adjust as needed     #Liver Lesion  -Abnormal appearance is seen on CAT scan with slight nodularity inferiorly. LFTs within normal limits.  -Follow-up primary care physician     5)  #Urinary Retention  -TOV was done during prior rehab stay, but continued to retain and Davis was re-placed on 7/30.    -Will attempt voiding trial on 8/6 again  -Continue Flomax 0.4 mg daily  -Will avoid increasing Flomax for now, given orthostatic hypotension     #DONOVAN and likely CKD   -Pt with limited physician follow up so baseline creatinine not known. Peak at 2.13, improved with IV hydration  -Avoid nephrotoxic agents, NSAIDs  -Last Cr 1.25  -Will recheck Thursday     #Renal lesion  -Incidental finding on CT scan  -Renal ultrasound showed simple cysts of bilateral kidneys  -Follow up with PCP     6)DVT prophylaxis  -PCDs and ambulation     7)Pain  No concerns at this time.  -Tylenol 650 mg q4h PRN  -Robaxin 750 mg q6h PRN    8)Endo  -Prior steroid induced hyperglycemia.  HbA1c 5.0.   -Now completed course of steroids.  No need for further routine checks     9)Heme   #Acute postoperative blood loss anemia, resolved  -hemoglobin mildly low at 12.3 on 7/29 and stable   -Monitor peripherally     #Leukocytosis   -Thought to be secondary to steroids which are now discontinued. WBCs 6.4 on 7/29  -Will continue to monitor weekly    #Thrombocytopenia  -Noted upon initial admission to the emergency room. Has been  stable low 100s, but now improved to 136 on 7/29. Unclear if it was due to acute distress and infection versus possible undiagnosed liver disease.   -Monitor peripherally  -Follow-up with primary care physician     10)Psych  -Monitor mood, will consult health psychology if needed     11)Social/Dispo  -Anticipate discharge home at a modified independent level for ambulation, mobility, and ADLs.   -ELOS: 14 days  -Rehab prognosis: Good  -Follow up appointments: PCP, PRASHANT (Dr. Hanley), ID     Code status: Full Code (Discussed with patient upon admission)    Teri Jordan MD  Physical Medicine & Rehabilitation

## 2021-08-03 NOTE — PLAN OF CARE
Discharge Planner Post-Acute Rehab PT:     Discharge Plan: Home with family assist, mixed mobility likely (w/c and walker)    Precautions: Falls, cervical, PICC line    Current Status:  Bed Mobility: MOD A   Transfer: MIN A with WW  Gait: Lift pants with WW, 25 ft  Stairs: Unsafe  Balance: Very Impaired d/t weakness    Assessment:  Patient is below baseline for functional mobility s/p prolonged hospitalization for ACDF. He presents with BLE weakness, endurance deficit, and difficulty walking. ELOS 3 weeks on ARU to achieve safety within the home. May require mixed mobility between w/c and walker for energy conservation once home.     Other Barriers to Discharge (DME, Family Training, etc): Medical complexity, family training, DME needs

## 2021-08-03 NOTE — PLAN OF CARE
FOCUS/GOAL  Bladder management, Pain management, Mobility, and Safety management    ASSESSMENT, INTERVENTIONS AND CONTINUING PLAN FOR GOAL:  Patient is alert and oriented x 4. Uses his call light appropriately. Wears cervical collar for comfort. Incision on neck with steri strips and is open to air. Single lumen PICC on left arm is patent. Davis catheter draining well. Assist of 1 using walker for transfers but stayed in bed this shift. Denied pain. Bed bath and oral cares done. Vital signs stable.

## 2021-08-03 NOTE — PROGRESS NOTES
Received vm from pt wife [place], PH: [phone] asking about a 'notary for DNR'. SWer called back, no answer and unable to leave a vm. SWer went to discuss with pt, pt was on the phone with his wife at the time. SWer spoke with both pt and pt wife about request. Pt wife stated that in the next 1-2 days, she will bring in the complete (but unsigned/unnotarized) HCD that lists request for DNR/DNI and would like assistance with getting it completed. SWer encouraged pt/pt wife to ask for SWer when they have the forms on the unit. SWer also left SW business card in pt room so pt wife has SW direct information.     SW will help get notary, get HCD scanned into pt chart and notified Resident to put in DNR/DNI order.     SW will remain available if additional needs arise.     MELA Aguilar   [facility]   Direct Phone: [phone]  I   Pager: [phone]  I  Fax: [phone]

## 2021-08-03 NOTE — PROGRESS NOTES
"   08/03/21 0900   Quick Adds   Type of Visit Initial PT Evaluation      Language English   Living Environment   People in home spouse   Home Accessibility stairs to enter home   Number of Stairs, Main Entrance 3   Living Environment Comments Lives in a house w/ spouse in [place], [place]. 2+1 stairs to enter and all needs are met on the main level. Walk-in shower with 4\" threshold, shower seat. Has jacuzzi tub with seat and cutout door, only 6\" to step into tub. No pets in the home.    Self-Care   Usual Activity Tolerance good   Current Activity Tolerance poor   Regular Exercise No   Equipment Currently Used at Home none   Activity/Exercise/Self-Care Comment Indep with ADLs and IADLs. Manage own medications, finances, was driving and retired. Denied falls at home. Left handed.    Disability/Function   Hearing Difficulty or Deaf no   Wear Glasses or Blind yes   Vision Management glasses   Concentrating, Remembering or Making Decisions Difficulty no   Difficulty Communicating no   Difficulty Eating/Swallowing no   Walking or Climbing Stairs Difficulty no   Dressing/Bathing Difficulty no   Toileting issues no   Doing Errands Independently Difficulty (such as shopping) no   Fall history within last six months no   Change in Functional Status Since Onset of Current Illness/Injury yes   General Information   Onset of Illness/Injury or Date of Surgery 07/21/21   Referring Physician Dr. Robi Whitehead   Patient/Family Therapy Goals Statement (PT) To get stronger and go home   Pertinent History of Current Problem (include personal factors and/or comorbidities that impact the POC) Per chart: 70 year old L hand dominant male with a PMH of HTN, PILI, and prior cervical surgery (C5-7 anterior fusion in 1993 per notes) who is now status post a C3-4, C4-5 ACDF on 7/14/2021 for cervical myelopathy.  He was admitted to the [place] ARU from 7/18-7/27/21 but transferred back to the medical floor for fevers and prevertebral fluid " collection suggestive of abscess.  Now improving with IV antbiotics and no surgical intervention was needed.  He is now being admitted to the ARU on 8/2/21.   Existing Precautions/Restrictions spinal;fall  (cervical)   Cognition   Cognitive Status Comments Appropriate   Pain Assessment   Patient Currently in Pain No   Integumentary/Edema   Integumentary/Edema Comments Non-pitting edema to BLE. size G tubigrip in place distal to knee.    Range of Motion (ROM)   ROM Comment All WFL with exception of cervical precautions.    Strength   Strength Comments Generally strong 4/5 with exception of 3/5 hip flexion.    ARC Assessment Only   Acute Rehab Functional Assessment See IP Rehab Daily Documentation Flowsheet for Functional Mobility/ADL Assessment   Sensory Examination   Sensory Perception Comments Protective sensation absent in bottom of feet, hot/cold impaired bilateral lower legs, and light touch discrimination impaired in BLE. Vibration, pain, proprioception, and light touch intact.   Coordination   Coordination no deficits were identified   Muscle Tone   Muscle Tone no deficits were identified   Clinical Impression   Criteria for Skilled Therapeutic Intervention yes, treatment indicated   PT Diagnosis (PT) impaired force production, difficulty walking   Influenced by the following impairments see clinical impression comments   Functional limitations due to impairments see clinical impression comments   Clinical Presentation Unstable/Unpredictable   Clinical Presentation Rationale highly medically complex, backslide since re-admission to hospital   Clinical Decision Making (Complexity) high complexity   Therapy Frequency (PT) Daily   Predicted Duration of Therapy Intervention (days/wks) 3 weeks   Planned Therapy Interventions (PT) balance training;bed mobility training;gait training;home exercise program;orthotic fitting/training;stair training;strengthening;stretching;ROM (range of motion);patient/family  education;home program guidelines;risk factor education;progressive activity/exercise;transfer training;wheelchair management/propulsion training   Anticipated Equipment Needs at Discharge (PT) walker, rolling;wheelchair   Risk & Benefits of therapy have been explained evaluation/treatment results reviewed;care plan/treatment goals reviewed;risks/benefits reviewed;current/potential barriers reviewed;participants voiced agreement with care plan;participants included;patient   Clinical Impression Comments Patient is below baseline for functional mobility s/p prolonged hospitalization for ACDF. He presents with BLE weakness, endurance deficit, and difficulty walking. ELOS 3 weeks on ARU to achieve safety within the home. May require mixed mobility between w/c and walker for energy conservation once home.    Total Evaluation Time   Total Evaluation Time (Minutes) 30

## 2021-08-03 NOTE — PROGRESS NOTES
"   08/03/21 0635   Quick Adds   Type of Visit Initial Occupational Therapy Evaluation   Living Environment   People in home spouse   Current Living Arrangements house   Transportation Anticipated family or friend will provide   Living Environment Comments rambler with stairs to basement to train room. Pt has a walk in shower, no GB but does have a shower chair.    Self-Care   Usual Activity Tolerance good   Current Activity Tolerance poor   Equipment Currently Used at Home none   Activity/Exercise/Self-Care Comment IND at baseline, pt builds model trains   Disability/Function   Hearing Difficulty or Deaf no   Wear Glasses or Blind yes   Concentrating, Remembering or Making Decisions Difficulty no   Difficulty Communicating no   Difficulty Eating/Swallowing no   Walking or Climbing Stairs Difficulty no   Dressing/Bathing Difficulty no   Toileting issues no   Doing Errands Independently Difficulty (such as shopping) no   Fall history within last six months no   Change in Functional Status Since Onset of Current Illness/Injury yes   General Information   Onset of Illness/Injury or Date of Surgery 08/02/21   Referring Physician Dr Robi Whitehead   Patient/Family Therapy Goal Statement (OT) to walk household distances   Additional Occupational Profile Info/Pertinent History of Current Problem Per chart \"70 year old man well known to me, was previously admitted s/p C3-4 and 4-5 ACDF for cervical myelopathy.  Was transferred back to the medical floor for fevers and concerns of sepsis, with imaging demonstrating pre-vertebral fluid, concerning for abscess.  Now on IV Daptomycin and Cefepime, planning for 2 week course, with repeat MRI showing decrease in size and no surgical intervention needed\"   Existing Precautions/Restrictions fall;spinal  (cervical spinal, c collar for comfort only)   Cognitive Status Examination   Cognitive Status Comments cognition intact   Visual Perception   Impact of Vision Impairment on Function " (Vision) wears glasses at baseline, no changes   Pain Assessment   Patient Currently in Pain Yes, see Vital Sign flowsheet   Integumentary/Edema   Integumentary/Edema Comments pt has edema garments but was not wearing them   Range of Motion Comprehensive   Comment, General Range of Motion shoulder ROM impaired bilaterally. L side 35 degrees, R shoulder to 80   Strength Comprehensive (MMT)   Comment, General Manual Muscle Testing (MMT) Assessment not formally assessed due to spinal precautions, overal deconditioned. Left arm weaker then right   Hand Strength   Left hand  (pounds) 55   Right hand  (pounds) 78   Coordination   Left hand, nine hole peg test (seconds) 47   Right hand, nine hole peg test (seconds) 34   Left hand, Box and Block test (cubes transferred in 1 minute) 28   Right hand, Box and Block test (cubes transerred in 1 minute) 42   Coordination Comments coordination intact but impacted by hand and arm weakness   ARC Assessment Only   Acute Rehab Functional Assessment See IP Rehab Daily Documentation Flowsheet for Functional Mobility/ADL Assessment   Clinical Impression   Criteria for Skilled Therapeutic Interventions Met (OT) yes   OT Diagnosis ADL and mobility deficits   OT Problem List-Impairments impacting ADL activity tolerance impaired;balance;coordination;mobility;strength;range of motion (ROM);pain;post-surgical precautions   ADL comments/analysis ADL and mobility deficits   Assessment of Occupational Performance 5 or more Performance Deficits   Identified Performance Deficits defiits inmpact independence with ADL/IADL and mobility   Planned Therapy Interventions (OT) ADL retraining;fine motor coordination training;neuromuscular re-education;strengthening;transfer training;progressive activity/exercise   Clinical Decision Making Complexity (OT) high complexity   Therapy Frequency (OT) Daily   Predicted Duration of Therapy 3 weeks   Anticipated Equipment Needs Upon Discharge (OT)   (TBD)    Risk & Benefits of therapy have been explained evaluation/treatment results reviewed;care plan/treatment goals reviewed   Comment-Clinical Impression Pt is below baseline, deficits impact perfornmance of ADL ad mobility. Pt will benefit from skilled OT.   Total Evaluation Time (Minutes)   Total Evaluation Time (Minutes) 10

## 2021-08-03 NOTE — PLAN OF CARE
"RN [id number]    FOCUS/GOAL  Bowel management, Bladder management, Medication management, Wound care management, Medical management, Mobility, Skin integrity, and Safety management    ASSESSMENT, INTERVENTIONS AND CONTINUING PLAN FOR GOAL:  A/Ox4. On RA. Denies pain, CP, SOB, N/V, N/T, dizziness. Ax1-2 with gb/walker s/p to commode/wc. Incontinent of bowel. LBM today. Davis patent and draining adequately. Cervical incision approximated with steri strips, SATNAM. Cervical collar worn during the day. Penis pressure ulcer cares done, SATNAM. Blanchable redness in groin area and gluteal folds, scheduled nystatin cream and barrier applied per plan of care. Groin/thigh/buttocks red discoloration is pt baseline (birthmark). +1 edema BLE ankles/feet, tubigrips on. Regular thin diet, good appetite. L PICC SL in between IV ABX. Daptomycin x1 and Cefepime x2 completed today. Pt able to make needs known and call light within reach. Continue POC.     /71 (BP Location: Right arm)   Pulse 80   Temp 98.5  F (36.9  C) (Oral)   Resp 18   Ht 1.803 m (5' 11\")   Wt 107.7 kg (237 lb 6.4 oz)   SpO2 99%   BMI 33.11 kg/m      "

## 2021-08-03 NOTE — PLAN OF CARE
Discharge Planner Post-Acute Rehab OT:     Discharge Plan: home with assist for IADL and HH     Precautions: fall, cervical spinal    Current Status:  ADLs: MOD A for UB dressing, MAX A for LB dressing, total assist for toileting due to incontinence.   IADLs:not tested, wife can assist  Vision/Cognition: WFL    Assessment: Eval completed. Pt is currently below baseline, requiring TONI for transfer using walker, MAX A for dressing and doing sinkside ADL WC based. Hope is that pt is ambulatory and can manage his ADL. Incontinence of BM is still a challenge. Pt has good family support and accessible home. ELOS is 3 weeks pending progress.     Other Barriers to Discharge (DME, Family Training, etc): need to complete family training, determine DME and improve ADL performance

## 2021-08-04 ENCOUNTER — APPOINTMENT (OUTPATIENT)
Dept: PHYSICAL THERAPY | Facility: CLINIC | Age: 71
DRG: 949 | End: 2021-08-04
Attending: PHYSICAL MEDICINE & REHABILITATION
Payer: COMMERCIAL

## 2021-08-04 ENCOUNTER — APPOINTMENT (OUTPATIENT)
Dept: OCCUPATIONAL THERAPY | Facility: CLINIC | Age: 71
DRG: 949 | End: 2021-08-04
Attending: PHYSICAL MEDICINE & REHABILITATION
Payer: COMMERCIAL

## 2021-08-04 LAB — GLUCOSE BLDC GLUCOMTR-MCNC: 96 MG/DL (ref 70–99)

## 2021-08-04 PROCEDURE — 250N000013 HC RX MED GY IP 250 OP 250 PS 637: Performed by: STUDENT IN AN ORGANIZED HEALTH CARE EDUCATION/TRAINING PROGRAM

## 2021-08-04 PROCEDURE — 250N000013 HC RX MED GY IP 250 OP 250 PS 637: Performed by: PHYSICAL MEDICINE & REHABILITATION

## 2021-08-04 PROCEDURE — 250N000011 HC RX IP 250 OP 636: Performed by: PHYSICAL MEDICINE & REHABILITATION

## 2021-08-04 PROCEDURE — 128N000003 HC R&B REHAB

## 2021-08-04 PROCEDURE — 99232 SBSQ HOSP IP/OBS MODERATE 35: CPT | Mod: 24 | Performed by: PHYSICAL MEDICINE & REHABILITATION

## 2021-08-04 PROCEDURE — 97110 THERAPEUTIC EXERCISES: CPT | Mod: GP

## 2021-08-04 PROCEDURE — 97535 SELF CARE MNGMENT TRAINING: CPT | Mod: GO

## 2021-08-04 PROCEDURE — 258N000003 HC RX IP 258 OP 636: Performed by: PHYSICAL MEDICINE & REHABILITATION

## 2021-08-04 RX ADMIN — Medication 250 MG: at 09:25

## 2021-08-04 RX ADMIN — PSYLLIUM HUSK 1 PACKET: 3.4 POWDER ORAL at 09:25

## 2021-08-04 RX ADMIN — TRAZODONE HYDROCHLORIDE 50 MG: 50 TABLET ORAL at 21:08

## 2021-08-04 RX ADMIN — Medication 250 MG: at 20:13

## 2021-08-04 RX ADMIN — CEFEPIME HYDROCHLORIDE 2 G: 2 INJECTION, POWDER, FOR SOLUTION INTRAVENOUS at 20:18

## 2021-08-04 RX ADMIN — NYSTATIN: 100000 CREAM TOPICAL at 07:36

## 2021-08-04 RX ADMIN — TAMSULOSIN HYDROCHLORIDE 0.4 MG: 0.4 CAPSULE ORAL at 09:25

## 2021-08-04 RX ADMIN — CEFEPIME HYDROCHLORIDE 2 G: 2 INJECTION, POWDER, FOR SOLUTION INTRAVENOUS at 09:24

## 2021-08-04 RX ADMIN — NYSTATIN: 100000 CREAM TOPICAL at 20:19

## 2021-08-04 RX ADMIN — DAPTOMYCIN 650 MG: 500 INJECTION, POWDER, LYOPHILIZED, FOR SOLUTION INTRAVENOUS at 15:59

## 2021-08-04 NOTE — PLAN OF CARE
Discharge Planner Post-Acute Rehab PT:     Discharge Plan: Home with family assist, mixed mobility likely (w/c and walker)    Precautions: Falls, cervical, PICC line    Current Status:  Bed Mobility: MOD A   Transfer: MIN A with WW  Gait: 30 ft with CGA and WW  Stairs: Unsafe  Balance: Very Impaired d/t weakness    Assessment: OK to amb in room with RN staff and WW. Tolerance to therapies improved today. Working on self management skills around catheter, walker, and w/c.     Frequently incontinent of bowel during PT sessions. Needs education to ask for commode and alert staff to toileting needs. Commode overlay setup in bathroom.     Other Barriers to Discharge (DME, Family Training, etc): Medical complexity, incontinence, family training, DME needs

## 2021-08-04 NOTE — PLAN OF CARE
Discharge Planner Post-Acute Rehab OT:     Discharge Plan: home with assist for IADL and HH     Precautions: fall, cervical spinal    Current Status:  ADLs: MOD A for UB dressing, MAX A for LB dressing, total assist for toileting due to incontinence.   IADLs:not tested, wife can assist  Vision/Cognition: WFL    Assessment: Focus on full shower and full body dressing. Increased time spent for setup, transfers, and full body dressing d/t incontinent of loose stool and limited mobility requiring dep rolling shower chair. Pt demod good use of RUE to assist with LUE during shower task d/t LUE weakness. Pt will benefit from increased practice with AE for full body dressing to promote increased IND with ADL performance. Overall, pt engaged in session, tolerated full session with great effort of compensatory strategies to increase IND and ease with tasks. Pt has good family support and accessible home. ELOS is 3 weeks pending progress.     Other Barriers to Discharge (DME, Family Training, etc): need to complete family training, determine DME and improve ADL performance

## 2021-08-04 NOTE — PROGRESS NOTES
"  [facility]   Acute Rehabilitation Unit  Daily progress note    INTERVAL HISTORY  No acute events overnight.  He woke up twice on his own accord, but was able to fall back asleep without difficulty and was very happy that he was not otherwise interrupted.  Today he has no new concerns or complaints.  He is asking if a prior singles flare is related to his current conditions, which I re-iterated is not.  Will follow up with PCP regarding timing of shingles vaccine.  Rates neck pain 0.5-1/10, and yesterday was able to ambulate 25 ft x3 with a harness, and is happy to be making progress.          MEDICATIONS  Scheduled meds    ceFEPIme  2 g Intravenous Q12H     DAPTOmycin (CUBICIN) intermittent infusion  6 mg/kg Intravenous Q24H     nystatin   Topical BID     psyllium  1 packet Oral Daily     saccharomyces boulardii  250 mg Oral BID     sodium chloride (PF)  10 mL Intracatheter Q8H     tamsulosin  0.4 mg Oral Daily     traZODone  50 mg Oral At Bedtime       PRN meds:  acetaminophen, bisacodyl, melatonin, methocarbamol, ondansetron, senna-docusate      PHYSICAL EXAM  /63 (BP Location: Right arm)   Pulse 80   Temp 98.6  F (37  C) (Oral)   Resp 16   Ht 1.803 m (5' 11\")   Wt 107.7 kg (237 lb 6.4 oz)   SpO2 90%   BMI 33.11 kg/m    Gen: Awake, coooperative, no distress  HEENT: atraumatic, no discharge from nares or ears, EOM intact  Cardio: RRR, no murmur auscultated  Pulm: Non-labored breathing, CTAB   Abd: Soft, non-tender to palpation, bowel sounds present  Ext: Mild edema in LE, none in UE b/l, no calf tenderness, wraps present b/l in LE  Neuro/MSK: alert, oriented, no new slurring of words, answers questions appropriately, follows commands      LABS  Results for orders placed or performed during the hospital encounter of 08/02/21 (from the past 24 hour(s))   Glucose by meter   Result Value Ref Range    GLUCOSE BY METER POCT 96 70 - 99 mg/dL       ASSESSMENT AND " PLAN    Rigo Johns is a 70 year old L hand dominant male with a PMH of HTN, PILI, and prior cervical surgery (C5-7 anterior fusion in 1993 per notes) who is now status post a C3-4, C4-5 ACDF on 7/14/2021 for cervical myelopathy.  He was admitted to the [place] ARU from 7/18-7/27/21 but transferred back to the medical floor for fevers and prevertebral fluid collection suggestive of abscess.  Now improving with IV antbiotics and no surgical intervention was needed.  He is now being admitted to the ARU on 8/2/21.  Impairment group code: 04.1211 Quadriplegia, Incomplete C1-4 - s/p C3-5 ACDF.   PLAN  Rehabilitation  1. PT and OT 90 minutes of each on a daily basis, in addition to rehab nursing and close management of physiatrist.    2. Impairment of ADL's: Noted to have impaired ROM, impaired strength, impaired activity tolerance, edema management, impaired balance, and impaired coordination, all affecting his ability to safely and independently perform basic ADLs.  Goal for mod I with basic ADLs.  3. Impairment of mobility:  Noted to have impaired ROM, impaired strength, impaired activity tolerance, edema management, impaired balance and impaired coordination, all affecting his ability to safely and independently ambulate and perform basic mobility.  Goal for mod I with basic mobility.  Medical  1)Neurology  #Cervical Myelopathy s/p C3-4, C4-5 ACDF on 7/14/21 complicated by fevers and prevertebral fluid collection, suggestive of abscess  -ID evaluated the patient, recommended Cefepime 2g q12h and Daptomycin 650 mg q24 hours for 2 weeks (will end 8/16).   -Weekly CBC, CMP, CRP, and CPK while on antibiotics.  Results to be faxed to Dr. Merna Donaldson's office.    -MRI 7/28 showed improved fluid collection and no surgical intervention was needed  -Maintain postoperative spinal precautions. No need for cervical collar.  -Follow-up with neurosurgery and infectious disease after discharge  -Follow up MRI in~1.5 weeks (ie  8/12) to evaluate progress.  Will plan to complete prior to discontinuation of abx and ask ID to review.     2)CVS  -No acute issues  -Monitor blood pressures as he has previously had some high readings, along with some orthostatic hypotensive episodes     3)Pulmonary  #CAP  -Completed course of antibiotics (Zosyn x1 in ED, Azithromycin 7/12-7/16, Ceftriaxone 7/12 - 7/17, and Cefuroxime x1) during initial hospitalization  -Encourage incentive spirometer  -Monitor respiratory status, supplementary oxygen PRN.  Now weaned to room air.     #PILI  -Continue CPAP with home settings     4)FENGI  #Diet: Regular consistency solids and thin liquids  #Neurogenic bowel  -Incontinence improving, happy to continue current regimen  -Fiber BID  -Monitor, adjust as needed     #Liver Lesion  -Abnormal appearance is seen on CAT scan with slight nodularity inferiorly. LFTs within normal limits.  -Follow-up primary care physician     5)  #Urinary Retention  -TOV was done during prior rehab stay, but continued to retain and Davis was re-placed on 7/30.    -Will attempt voiding trial on 8/6 again  -Continue Flomax 0.4 mg daily  -Will avoid increasing Flomax for now, given orthostatic hypotension     #DONOVAN and likely CKD   -Pt with limited physician follow up so baseline creatinine not known. Peak at 2.13, improved with IV hydration  -Avoid nephrotoxic agents, NSAIDs  -Last Cr 1.25  -Will recheck Tomorrow     #Renal lesion  -Incidental finding on CT scan  -Renal ultrasound showed simple cysts of bilateral kidneys  -Follow up with PCP     6)DVT prophylaxis  -PCDs and ambulation     7)Pain  No concerns at this time.  -Tylenol 650 mg q4h PRN  -Robaxin 750 mg q6h PRN    8)Endo  -Prior steroid induced hyperglycemia.  HbA1c 5.0.   -Now completed course of steroids.  No need for further routine checks     9)Heme   #Acute postoperative blood loss anemia, resolved  -hemoglobin mildly low at 12.3 on 7/29 and stable   -Re-check tomorrow with weekly  CBC     #Leukocytosis   -Thought to be secondary to steroids which are now discontinued. WBCs 6.4 on 7/29  -Will continue to monitor weekly    #Thrombocytopenia  -Noted upon initial admission to the emergency room. Has been stable low 100s, but now improved to 136 on 7/29. Unclear if it was due to acute distress and infection versus possible undiagnosed liver disease.   -Monitor peripherally  -Follow-up with primary care physician     10)Psych  -Monitor mood, will consult health psychology if needed     11)Social/Dispo  -Anticipate discharge home at a modified independent level for ambulation, mobility, and ADLs.   -ELOS: 3 weeks  -Rehab prognosis: Good  -Follow up appointments: PCP, NSGY (Dr. Hanley), ID     Code status: Full Code (Discussed with patient upon admission).  This was confirmed upon re-admit.  Pt does not want extraordinary measures should prognosis be poor, however does want attempts at resuscitation and therefore will remain full code.      Robi Whitehead MD  Department of Rehabilitation Medicine    Time Spent on this Encounter   I, Robi Whitehead, spent a total of 25 minutes face-to-face or managing the care of Rigo Johns. Over 50% of my time on the unit was spent counseling the patient and coordinating care. See note for details.

## 2021-08-04 NOTE — PLAN OF CARE
"  VS: /63 (BP Location: Right arm)   Pulse 80   Temp 98.6  F (37  C) (Oral)   Resp 16   Ht 1.803 m (5' 11\")   Wt 107.7 kg (237 lb 6.4 oz)   SpO2 90%   BMI 33.11 kg/m     O2: 92% on RA, denies SOB or respiratory distress    Output: Davis catheter in place,  draining clear, yellow urine    Last BM: Had BM this AM during OT session    Activity: Needs assist of one with walker and gait belt    Up for meals? Up in chair for meals    Skin: Fungal rash to perirectal areas, buttocks, groin areas, and scrotum   Pain: Denies pain or discomfort    CMS: Alert & oriented    Dressing: PICC drsg CDI    Diet: Regular, appetite 100% for meals    LDA: Left hand single lumen PICC    Equipment: IV pump, walker and personal belongings    Plan: Will continue plan of care   Additional Info:        "

## 2021-08-04 NOTE — PLAN OF CARE
Alert and oriented. Able to make needs known. Call light in reach. Offers no C/O pain/discomfort. Davis patent, draining adequately.  Requests not to be woke up from 10 PM to 6 AM, Appears to be sleeping during rounds. Continue with plan of care.

## 2021-08-05 ENCOUNTER — APPOINTMENT (OUTPATIENT)
Dept: OCCUPATIONAL THERAPY | Facility: CLINIC | Age: 71
DRG: 949 | End: 2021-08-05
Attending: PHYSICAL MEDICINE & REHABILITATION
Payer: COMMERCIAL

## 2021-08-05 ENCOUNTER — APPOINTMENT (OUTPATIENT)
Dept: PHYSICAL THERAPY | Facility: CLINIC | Age: 71
DRG: 949 | End: 2021-08-05
Attending: PHYSICAL MEDICINE & REHABILITATION
Payer: COMMERCIAL

## 2021-08-05 LAB
ALBUMIN SERPL-MCNC: 2.6 G/DL (ref 3.4–5)
ALP SERPL-CCNC: 95 U/L (ref 40–150)
ALT SERPL W P-5'-P-CCNC: 32 U/L (ref 0–70)
ANION GAP SERPL CALCULATED.3IONS-SCNC: 2 MMOL/L (ref 3–14)
AST SERPL W P-5'-P-CCNC: 35 U/L (ref 0–45)
BILIRUB SERPL-MCNC: 0.5 MG/DL (ref 0.2–1.3)
BUN SERPL-MCNC: 25 MG/DL (ref 7–30)
CALCIUM SERPL-MCNC: 8.6 MG/DL (ref 8.5–10.1)
CHLORIDE BLD-SCNC: 108 MMOL/L (ref 94–109)
CK SERPL-CCNC: 103 U/L (ref 30–300)
CO2 SERPL-SCNC: 28 MMOL/L (ref 20–32)
CREAT SERPL-MCNC: 1.51 MG/DL (ref 0.66–1.25)
CRP SERPL-MCNC: 34 MG/L (ref 0–8)
ERYTHROCYTE [DISTWIDTH] IN BLOOD BY AUTOMATED COUNT: 11.6 % (ref 10–15)
GFR SERPL CREATININE-BSD FRML MDRD: 46 ML/MIN/1.73M2
GLUCOSE BLD-MCNC: 103 MG/DL (ref 70–99)
HCT VFR BLD AUTO: 36.6 % (ref 40–53)
HGB BLD-MCNC: 12 G/DL (ref 13.3–17.7)
MCH RBC QN AUTO: 32.7 PG (ref 26.5–33)
MCHC RBC AUTO-ENTMCNC: 32.8 G/DL (ref 31.5–36.5)
MCV RBC AUTO: 100 FL (ref 78–100)
PLATELET # BLD AUTO: 192 10E3/UL (ref 150–450)
POTASSIUM BLD-SCNC: 4 MMOL/L (ref 3.4–5.3)
PROT SERPL-MCNC: 7.5 G/DL (ref 6.8–8.8)
RBC # BLD AUTO: 3.67 10E6/UL (ref 4.4–5.9)
SODIUM SERPL-SCNC: 138 MMOL/L (ref 133–144)
WBC # BLD AUTO: 8.8 10E3/UL (ref 4–11)

## 2021-08-05 PROCEDURE — 97110 THERAPEUTIC EXERCISES: CPT | Mod: GP

## 2021-08-05 PROCEDURE — 97530 THERAPEUTIC ACTIVITIES: CPT | Mod: GP | Performed by: REHABILITATION PRACTITIONER

## 2021-08-05 PROCEDURE — 97116 GAIT TRAINING THERAPY: CPT | Mod: GP | Performed by: REHABILITATION PRACTITIONER

## 2021-08-05 PROCEDURE — 258N000003 HC RX IP 258 OP 636: Performed by: INTERNAL MEDICINE

## 2021-08-05 PROCEDURE — 97535 SELF CARE MNGMENT TRAINING: CPT | Mod: GO | Performed by: OCCUPATIONAL THERAPIST

## 2021-08-05 PROCEDURE — 99232 SBSQ HOSP IP/OBS MODERATE 35: CPT | Mod: 24 | Performed by: PHYSICAL MEDICINE & REHABILITATION

## 2021-08-05 PROCEDURE — 85027 COMPLETE CBC AUTOMATED: CPT | Performed by: STUDENT IN AN ORGANIZED HEALTH CARE EDUCATION/TRAINING PROGRAM

## 2021-08-05 PROCEDURE — 36415 COLL VENOUS BLD VENIPUNCTURE: CPT | Performed by: STUDENT IN AN ORGANIZED HEALTH CARE EDUCATION/TRAINING PROGRAM

## 2021-08-05 PROCEDURE — 250N000011 HC RX IP 250 OP 636: Performed by: PHYSICAL MEDICINE & REHABILITATION

## 2021-08-05 PROCEDURE — 258N000003 HC RX IP 258 OP 636: Performed by: PHYSICAL MEDICINE & REHABILITATION

## 2021-08-05 PROCEDURE — 97110 THERAPEUTIC EXERCISES: CPT | Mod: GP | Performed by: REHABILITATION PRACTITIONER

## 2021-08-05 PROCEDURE — 128N000003 HC R&B REHAB

## 2021-08-05 PROCEDURE — 97110 THERAPEUTIC EXERCISES: CPT | Mod: GO | Performed by: OCCUPATIONAL THERAPIST

## 2021-08-05 PROCEDURE — 82550 ASSAY OF CK (CPK): CPT | Performed by: STUDENT IN AN ORGANIZED HEALTH CARE EDUCATION/TRAINING PROGRAM

## 2021-08-05 PROCEDURE — 82040 ASSAY OF SERUM ALBUMIN: CPT | Performed by: STUDENT IN AN ORGANIZED HEALTH CARE EDUCATION/TRAINING PROGRAM

## 2021-08-05 PROCEDURE — 250N000013 HC RX MED GY IP 250 OP 250 PS 637: Performed by: STUDENT IN AN ORGANIZED HEALTH CARE EDUCATION/TRAINING PROGRAM

## 2021-08-05 PROCEDURE — 86140 C-REACTIVE PROTEIN: CPT | Performed by: STUDENT IN AN ORGANIZED HEALTH CARE EDUCATION/TRAINING PROGRAM

## 2021-08-05 PROCEDURE — 250N000013 HC RX MED GY IP 250 OP 250 PS 637: Performed by: PHYSICAL MEDICINE & REHABILITATION

## 2021-08-05 RX ORDER — SODIUM CHLORIDE 9 MG/ML
INJECTION, SOLUTION INTRAVENOUS CONTINUOUS
Status: DISCONTINUED | OUTPATIENT
Start: 2021-08-05 | End: 2021-08-06

## 2021-08-05 RX ADMIN — CEFEPIME HYDROCHLORIDE 2 G: 2 INJECTION, POWDER, FOR SOLUTION INTRAVENOUS at 22:20

## 2021-08-05 RX ADMIN — NYSTATIN: 100000 CREAM TOPICAL at 07:50

## 2021-08-05 RX ADMIN — NYSTATIN: 100000 CREAM TOPICAL at 20:38

## 2021-08-05 RX ADMIN — DAPTOMYCIN 650 MG: 500 INJECTION, POWDER, LYOPHILIZED, FOR SOLUTION INTRAVENOUS at 16:19

## 2021-08-05 RX ADMIN — CEFEPIME HYDROCHLORIDE 2 G: 2 INJECTION, POWDER, FOR SOLUTION INTRAVENOUS at 13:54

## 2021-08-05 RX ADMIN — SODIUM CHLORIDE: 9 INJECTION, SOLUTION INTRAVENOUS at 22:24

## 2021-08-05 RX ADMIN — Medication 250 MG: at 07:44

## 2021-08-05 RX ADMIN — CEFEPIME HYDROCHLORIDE 2 G: 2 INJECTION, POWDER, FOR SOLUTION INTRAVENOUS at 07:43

## 2021-08-05 RX ADMIN — TAMSULOSIN HYDROCHLORIDE 0.4 MG: 0.4 CAPSULE ORAL at 07:44

## 2021-08-05 RX ADMIN — TRAZODONE HYDROCHLORIDE 50 MG: 50 TABLET ORAL at 20:36

## 2021-08-05 RX ADMIN — Medication 250 MG: at 20:36

## 2021-08-05 RX ADMIN — PSYLLIUM HUSK 1 PACKET: 3.4 POWDER ORAL at 07:44

## 2021-08-05 RX ADMIN — PSYLLIUM HUSK 1 PACKET: 3.4 POWDER ORAL at 20:37

## 2021-08-05 NOTE — PLAN OF CARE
Discharge Planner Post-Acute Rehab OT:      Discharge Plan: home with assist for IADL and HH      Precautions: fall, cervical spinal     Current Status:  ADLs: Nalini for UB dressing, Min/mod A for LB dressing, total assist for toileting due to incontinence.   IADLs:not tested, wife can assist  Vision/Cognition: WFL     Assessment:  am ADLs completed; CGA with bed-w/c transf. Using FWW.  See flowsheet for details.      Other Barriers to Discharge (DME, Family Training, etc): Pt has good family support and accessible home. family training, determine DME and improve ADL performance

## 2021-08-05 NOTE — PLAN OF CARE
Individualized Overall Plan Of Care (IOPOC)      Rehab diagnosis/Impairment Group Code: 04.1211 quadriplegia, incomplete c1-4 - s/p c3-5 acdf.  H/o cervical spine surgery       Expected functional outcome: Mod I for ambulation, mobility, ADLs    Clinical Impression Comments: Improving    Mobility: Patient is below baseline for functional mobility s/p prolonged hospitalization for ACDF. He presents with BLE weakness, endurance deficit, and difficulty walking. ELOS 3 weeks on ARU to achieve safety within the home. May require mixed mobility between w/c and walker for energy conservation once home.     ADL: Pt is below baseline, deficits impact perfornmance of ADL ad mobility. Pt will benefit from skilled OT.    Intensity of therapy:   PT 90 minutes, Daily, for 3 weeks  OT 90 minutes, Daily, for 3 weeks    Orthotics Soft cervical collar PRN  Education wound care  Neuropsychology Testing: No  Other:  None      Medical Prognosis: Good      Physician summary statement: Neck infection now being treated and able to resume therapies.  Has been feeling well and able to participate, and making progress.     Discharge destination: prior home  Discharge rehabilitation needs: home care, RN, PT and OT      Estimated length of stay: 3 weeks      Rehabilitation Physician Luis Whitehead MD

## 2021-08-05 NOTE — PLAN OF CARE
Discharge Planner Post-Acute Rehab PT:     Discharge Plan: Home with family assist, mixed mobility likely (w/c and walker)    Precautions: Falls, cervical, PICC line    Current Status:  Bed Mobility: MOD A   Transfer: MIN A with WW  Gait: 30 ft with CGA and WW  Stairs: Unsafe  Balance: Very Impaired d/t weakness    Assessment: Patient making steady progress in PT. Now ambulating with therapies and nursing staff in room. Requires setup assist and cues for catheter management. Still very weak and with poor durability. Will trial stairs in next few days. Biggest barrier to safe discharge is incontinence as pt requires total A to change brief. Anticipate w/c rental for home, but pt's goal is to be fully ambulatory.    Other Barriers to Discharge (DME, Family Training, etc): Medical complexity, incontinence, family training, DME needs

## 2021-08-05 NOTE — PROGRESS NOTES
"  [facility]   Acute Rehabilitation Unit  Daily progress note    INTERVAL HISTORY  No acute events overnight.  He says he tried sleeping on his side with the soft collar on, but this caused increased soreness in his neck.  After some therapy this morning, this has now resolved.  Currently has no new complaints.  Remains afebrile and denies chest pain or shortness of breath.  He continues to be incontinent of bowel, but says he can control it sometimes and feels it is \"progressing\".  Functionally he is Min A for UBD, Min to mod A for LBD, dependent for toileting due to incontinence, and was able to ambulate 30 ft with a WW yesterday.  Labs ordered for today but not yet drawn.  He reports they were not able to pull blood from his PICC earlier today and would return later.      MEDICATIONS  Scheduled meds    ceFEPIme  2 g Intravenous Q8H     DAPTOmycin (CUBICIN) intermittent infusion  6 mg/kg Intravenous Q24H     nystatin   Topical BID     psyllium  1 packet Oral BID     saccharomyces boulardii  250 mg Oral BID     sodium chloride (PF)  10 mL Intracatheter Q8H     tamsulosin  0.4 mg Oral Daily     traZODone  50 mg Oral At Bedtime       PRN meds:  acetaminophen, bisacodyl, melatonin, methocarbamol, ondansetron, senna-docusate      PHYSICAL EXAM  /67 (BP Location: Right arm)   Pulse 95   Temp 99.3  F (37.4  C) (Oral)   Resp 16   Ht 1.803 m (5' 11\")   Wt 107.7 kg (237 lb 6.4 oz)   SpO2 93%   BMI 33.11 kg/m    Gen: Awake, coooperative, no distress, participating in PT  HEENT: atraumatic, no discharge from nares or ears, EOM intact.  Anterior incision covered with steristrips, c/d/i.  Cardio: RRR, no murmur auscultated  Pulm: Non-labored breathing, CTAB   Abd: Soft, non-tender to palpation, bowel sounds present  : Davis catheter in place  Ext: Mild edema in LE, none in UE b/l, no calf tenderness, wraps present b/l in LE  Neuro/MSK: alert, oriented, no new slurring of " words, answers questions appropriately, follows commands      LABS  No results found for this or any previous visit (from the past 24 hour(s)).    ASSESSMENT AND PLAN    Rigo Johns is a 70 year old L hand dominant male with a PMH of HTN, PILI, and prior cervical surgery (C5-7 anterior fusion in 1993 per notes) who is now status post a C3-4, C4-5 ACDF on 7/14/2021 for cervical myelopathy.  He was admitted to the [facility] from 7/18-7/27/21 but transferred back to the medical floor for fevers and prevertebral fluid collection suggestive of abscess.  Now improving with IV antbiotics and no surgical intervention was needed.  He is now being admitted to the ARU on 8/2/21.  Impairment group code: 04.1211 Quadriplegia, Incomplete C1-4 - s/p C3-5 ACDF.   PLAN  Rehabilitation  1. PT and OT 90 minutes of each on a daily basis, in addition to rehab nursing and close management of physiatrist.    2. Impairment of ADL's: Noted to have impaired ROM, impaired strength, impaired activity tolerance, edema management, impaired balance, and impaired coordination, all affecting his ability to safely and independently perform basic ADLs.  Goal for mod I with basic ADLs.  3. Impairment of mobility:  Noted to have impaired ROM, impaired strength, impaired activity tolerance, edema management, impaired balance and impaired coordination, all affecting his ability to safely and independently ambulate and perform basic mobility.  Goal for mod I with basic mobility.  Medical  1)Neurology  #Cervical Myelopathy s/p C3-4, C4-5 ACDF on 7/14/21 complicated by fevers and prevertebral fluid collection, suggestive of abscess  -ID evaluated the patient, recommended Cefepime 2g q12h and Daptomycin 650 mg q24 hours for 2 weeks (will end 8/16).   -Weekly CBC, CMP, CRP, and CPK while on antibiotics.  Results to be faxed to Dr. Merna Donaldson's office.    -MRI 7/28 showed improved fluid collection and no surgical intervention was needed  -Maintain  postoperative spinal precautions. No need for cervical collar.  -Follow-up with neurosurgery and infectious disease after discharge  -Follow up MRI in~1.5 weeks (ie 8/12) to evaluate progress.  Will plan to complete prior to discontinuation of abx and ask ID to review.     2)CVS  -No acute issues  -Monitor blood pressures as he has previously had some high readings, along with some orthostatic hypotensive episodes     3)Pulmonary  #CAP  -Completed course of antibiotics (Zosyn x1 in ED, Azithromycin 7/12-7/16, Ceftriaxone 7/12 - 7/17, and Cefuroxime x1) during initial hospitalization  -Encourage incentive spirometer  -Monitor respiratory status, supplementary oxygen PRN.  Now weaned to room air.     #PILI  -Continue CPAP with home settings     4)FENGI  #Diet: Regular consistency solids and thin liquids  #Neurogenic bowel  -Incontinence improving, happy to continue current regimen  -Fiber BID (increased to BID today, previously was ordered daily)  -Monitor, adjust as needed     #Liver Lesion  -Abnormal appearance is seen on CAT scan with slight nodularity inferiorly. LFTs within normal limits.  -Follow-up primary care physician     5)  #Urinary Retention  -TOV was done during prior rehab stay, but continued to retain and Davis was re-placed on 7/30.    -Will attempt voiding trial early next week  -Continue Flomax 0.4 mg daily.  Consider increasing if still retaining and if orthostasis has resolved.     #DONOVAN and likely CKD   -Pt with limited physician follow up so baseline creatinine not known. Peak at 2.13, improved with IV hydration  -Avoid nephrotoxic agents, NSAIDs  -Last Cr 1.25  -Recheck today pending     #Renal lesion  -Incidental finding on CT scan  -Renal ultrasound showed simple cysts of bilateral kidneys  -Follow up with PCP     6)DVT prophylaxis  -PCDs and ambulation     7)Pain  No concerns at this time.  -Tylenol 650 mg q4h PRN  -Robaxin 750 mg q6h PRN    8)Endo  -Prior steroid induced hyperglycemia.   HbA1c 5.0.   -Now completed course of steroids.  No need for further routine checks     9)Heme   #Acute postoperative blood loss anemia, resolved  -hemoglobin mildly low at 12.3 on 7/29 and stable   -Re-check today with weekly CBC, pending     #Leukocytosis   -Thought to be secondary to steroids which are now discontinued. WBCs 6.4 on 7/29  -Will continue to monitor weekly    #Thrombocytopenia  -Noted upon initial admission to the emergency room. Has been stable low 100s, but now improved to 136 on 7/29. Unclear if it was due to acute distress and infection versus possible undiagnosed liver disease.   -Monitor peripherally  -Follow-up with primary care physician     10)Psych  -Monitor mood, will consult health psychology if needed     11)Social/Dispo  -Anticipate discharge home at a modified independent level for ambulation, mobility, and ADLs.   -ELOS: 3 weeks  -Rehab prognosis: Good  -Follow up appointments: PCP, NSGY (Dr. Hanley), ID     Code status: Full Code (Discussed with patient upon admission).  This was confirmed upon re-admit.  Pt does not want extraordinary measures should prognosis be poor, however does want attempts at resuscitation and therefore will remain full code.      Robi Whitehead MD  Department of Rehabilitation Medicine    Time Spent on this Encounter   I, Robi Whitehead, spent a total of 25 minutes face-to-face or managing the care of Rigo Johns. Over 50% of my time on the unit was spent counseling the patient and coordinating care. See note for details.

## 2021-08-05 NOTE — PROGRESS NOTES
"VS: /63 (BP Location: Right arm)   Pulse 93   Temp 98.8  F (37.1  C) (Oral)   Resp 16   Ht 1.803 m (5' 11\")   Wt 107.7 kg (237 lb 6.4 oz)   SpO2 93%   BMI 33.11 kg/m  Pt denies chest pain, SOB, dizziness, lightheadedness and nausea and vomiting.    O2: Room air sat. > 90%.    Output: Voiding adequate amount in Davis, patent. 900cc output.   Last BM: 8/4/21. Passing flatus.   Activity: AX 1 with walker and galt belt.   Skin: Intact.Buttocks, groin, and sandra area with fungal rash. Cream applied.   Pain: Denies pain.   CMS: CMS and neuro intact.A/O X 4. Ld to make needs known. Denies N/T.   Dressing: PICC CDI and Neck CDI   Diet: Tolerating regular diet. I&O   LDA: PICC Single lumen in place.   Equipment: IV pole, walker and personal belongings.   Plan: Continue with plan of care. Call light within reach.   Additional Info:  CPAP on for the night.     "

## 2021-08-05 NOTE — PLAN OF CARE
"  VS: /64 (BP Location: Right arm)   Pulse 97   Temp 98.8  F (37.1  C) (Oral)   Resp 20   Ht 1.803 m (5' 11\")   Wt 107.7 kg (237 lb 6.4 oz)   SpO2 92%   BMI 33.11 kg/m     O2: 92% on RA, denies SOB or respiratory distress    Output: Davis catheter patent, draining clear, yellow urine     Last BM: 8/5   Activity: Needs assist of one with transfers with assist of walker    Up for meals? Up in chair for meals    Skin: Anterior cervical incision    Pain: Denies pain or discomfort    CMS: Alert & oriented, uses call light appropriately to make needs known. Denies numbness or tingling    Dressing: PICC drsg changed    Diet: Regular with thin, appetite 100% for meals   LDA: Left arm PICC   Equipment: Walker and personal belongings    Plan: Will continue plan of care   Additional Info:       "

## 2021-08-05 NOTE — PROGRESS NOTES
SW notified that pt wife brought in HCD. Randellr met with pt and pt wife at bedside. Made plan for notary to witness tomorrow, Fri 08/06 at 12:15pm. SW will remain available as additional needs arise.     [facility]ara Braga [facility]   [facility]   Direct Phone: [phone]  I   Pager: [phone]  I  Fax: [phone]

## 2021-08-06 ENCOUNTER — APPOINTMENT (OUTPATIENT)
Dept: PHYSICAL THERAPY | Facility: CLINIC | Age: 71
DRG: 949 | End: 2021-08-06
Attending: PHYSICAL MEDICINE & REHABILITATION
Payer: COMMERCIAL

## 2021-08-06 ENCOUNTER — APPOINTMENT (OUTPATIENT)
Dept: OCCUPATIONAL THERAPY | Facility: CLINIC | Age: 71
DRG: 949 | End: 2021-08-06
Attending: PHYSICAL MEDICINE & REHABILITATION
Payer: COMMERCIAL

## 2021-08-06 ENCOUNTER — DOCUMENTATION ONLY (OUTPATIENT)
Dept: OTHER | Facility: CLINIC | Age: 71
End: 2021-08-06

## 2021-08-06 LAB
ANION GAP SERPL CALCULATED.3IONS-SCNC: 4 MMOL/L (ref 3–14)
BUN SERPL-MCNC: 26 MG/DL (ref 7–30)
CALCIUM SERPL-MCNC: 8.6 MG/DL (ref 8.5–10.1)
CHLORIDE BLD-SCNC: 110 MMOL/L (ref 94–109)
CO2 SERPL-SCNC: 26 MMOL/L (ref 20–32)
CREAT SERPL-MCNC: 1.31 MG/DL (ref 0.66–1.25)
GFR SERPL CREATININE-BSD FRML MDRD: 54 ML/MIN/1.73M2
GLUCOSE BLD-MCNC: 100 MG/DL (ref 70–99)
POTASSIUM BLD-SCNC: 4.1 MMOL/L (ref 3.4–5.3)
SODIUM SERPL-SCNC: 140 MMOL/L (ref 133–144)

## 2021-08-06 PROCEDURE — 128N000003 HC R&B REHAB

## 2021-08-06 PROCEDURE — 97530 THERAPEUTIC ACTIVITIES: CPT | Mod: GP | Performed by: PHYSICAL THERAPIST

## 2021-08-06 PROCEDURE — 258N000003 HC RX IP 258 OP 636: Performed by: PHYSICAL MEDICINE & REHABILITATION

## 2021-08-06 PROCEDURE — 258N000003 HC RX IP 258 OP 636: Performed by: INTERNAL MEDICINE

## 2021-08-06 PROCEDURE — 97530 THERAPEUTIC ACTIVITIES: CPT | Mod: GP | Performed by: REHABILITATION PRACTITIONER

## 2021-08-06 PROCEDURE — 250N000013 HC RX MED GY IP 250 OP 250 PS 637: Performed by: PHYSICAL MEDICINE & REHABILITATION

## 2021-08-06 PROCEDURE — 999N000150 HC STATISTIC PT MED CONFERENCE < 30 MIN

## 2021-08-06 PROCEDURE — 97116 GAIT TRAINING THERAPY: CPT | Mod: GP | Performed by: REHABILITATION PRACTITIONER

## 2021-08-06 PROCEDURE — 250N000011 HC RX IP 250 OP 636: Performed by: PHYSICAL MEDICINE & REHABILITATION

## 2021-08-06 PROCEDURE — 97535 SELF CARE MNGMENT TRAINING: CPT | Mod: GO | Performed by: STUDENT IN AN ORGANIZED HEALTH CARE EDUCATION/TRAINING PROGRAM

## 2021-08-06 PROCEDURE — 250N000011 HC RX IP 250 OP 636: Performed by: STUDENT IN AN ORGANIZED HEALTH CARE EDUCATION/TRAINING PROGRAM

## 2021-08-06 PROCEDURE — 80048 BASIC METABOLIC PNL TOTAL CA: CPT | Performed by: INTERNAL MEDICINE

## 2021-08-06 PROCEDURE — 97110 THERAPEUTIC EXERCISES: CPT | Mod: GO | Performed by: STUDENT IN AN ORGANIZED HEALTH CARE EDUCATION/TRAINING PROGRAM

## 2021-08-06 PROCEDURE — 97530 THERAPEUTIC ACTIVITIES: CPT | Mod: GO | Performed by: STUDENT IN AN ORGANIZED HEALTH CARE EDUCATION/TRAINING PROGRAM

## 2021-08-06 PROCEDURE — 97110 THERAPEUTIC EXERCISES: CPT | Mod: GP | Performed by: PHYSICAL THERAPIST

## 2021-08-06 PROCEDURE — 99233 SBSQ HOSP IP/OBS HIGH 50: CPT | Mod: 24 | Performed by: PHYSICAL MEDICINE & REHABILITATION

## 2021-08-06 PROCEDURE — 36415 COLL VENOUS BLD VENIPUNCTURE: CPT | Performed by: INTERNAL MEDICINE

## 2021-08-06 PROCEDURE — 97110 THERAPEUTIC EXERCISES: CPT | Mod: GP | Performed by: REHABILITATION PRACTITIONER

## 2021-08-06 PROCEDURE — 999N000125 HC STATISTIC PATIENT MED CONFERENCE < 30 MIN: Performed by: STUDENT IN AN ORGANIZED HEALTH CARE EDUCATION/TRAINING PROGRAM

## 2021-08-06 PROCEDURE — 250N000013 HC RX MED GY IP 250 OP 250 PS 637: Performed by: STUDENT IN AN ORGANIZED HEALTH CARE EDUCATION/TRAINING PROGRAM

## 2021-08-06 RX ORDER — TRAZODONE HYDROCHLORIDE 50 MG/1
100 TABLET, FILM COATED ORAL AT BEDTIME
Status: DISCONTINUED | OUTPATIENT
Start: 2021-08-06 | End: 2021-09-09 | Stop reason: HOSPADM

## 2021-08-06 RX ORDER — SENNOSIDES 8.6 MG
2 TABLET ORAL DAILY
Status: DISCONTINUED | OUTPATIENT
Start: 2021-08-06 | End: 2021-08-17

## 2021-08-06 RX ORDER — BISACODYL 10 MG
10 SUPPOSITORY, RECTAL RECTAL DAILY
Status: DISCONTINUED | OUTPATIENT
Start: 2021-08-06 | End: 2021-08-20

## 2021-08-06 RX ADMIN — CEFEPIME HYDROCHLORIDE 2 G: 2 INJECTION, POWDER, FOR SOLUTION INTRAVENOUS at 18:48

## 2021-08-06 RX ADMIN — Medication 1 MG: at 20:57

## 2021-08-06 RX ADMIN — BISACODYL 10 MG: 10 SUPPOSITORY RECTAL at 18:48

## 2021-08-06 RX ADMIN — Medication 250 MG: at 20:57

## 2021-08-06 RX ADMIN — TRAZODONE HYDROCHLORIDE 100 MG: 50 TABLET ORAL at 20:57

## 2021-08-06 RX ADMIN — SENNOSIDES 2 TABLET: 8.6 TABLET, FILM COATED ORAL at 13:37

## 2021-08-06 RX ADMIN — PSYLLIUM HUSK 1 PACKET: 3.4 POWDER ORAL at 08:12

## 2021-08-06 RX ADMIN — TAMSULOSIN HYDROCHLORIDE 0.4 MG: 0.4 CAPSULE ORAL at 08:11

## 2021-08-06 RX ADMIN — ALTEPLASE 2 MG: 2.2 INJECTION, POWDER, LYOPHILIZED, FOR SOLUTION INTRAVENOUS at 12:44

## 2021-08-06 RX ADMIN — SODIUM CHLORIDE: 9 INJECTION, SOLUTION INTRAVENOUS at 02:44

## 2021-08-06 RX ADMIN — Medication 250 MG: at 08:11

## 2021-08-06 RX ADMIN — PSYLLIUM HUSK 1 PACKET: 3.4 POWDER ORAL at 20:57

## 2021-08-06 RX ADMIN — CEFEPIME HYDROCHLORIDE 2 G: 2 INJECTION, POWDER, FOR SOLUTION INTRAVENOUS at 11:03

## 2021-08-06 RX ADMIN — NYSTATIN: 100000 CREAM TOPICAL at 08:19

## 2021-08-06 RX ADMIN — DAPTOMYCIN 650 MG: 500 INJECTION, POWDER, LYOPHILIZED, FOR SOLUTION INTRAVENOUS at 16:46

## 2021-08-06 RX ADMIN — SODIUM CHLORIDE: 9 INJECTION, SOLUTION INTRAVENOUS at 13:36

## 2021-08-06 NOTE — PROGRESS NOTES
Adebayo witnessed HCD. HCD scanned and emailed to Yosvany Garsia. They will complete the document, mail the original to pt home and scan HCD into pt EMR. Pt denied additional questions. SW will make a copy for pt early next week. Pt expressed appreciation.     Sara Braga [facility]   Direct Phone: [phone]  I   Pager: [phone]  I  Fax: [phone]

## 2021-08-06 NOTE — PROGRESS NOTES
"FOCUS/GOAL  Bladder management, Medication management, Skin integrity and Safety management    ASSESSMENT, INTERVENTIONS AND CONTINUING PLAN FOR GOAL:  Pt AOx4, denies CP, SOB, N/V/D. Pt complains of a \"hard to explain\" feeling in his feet, denies N/T but states the feeling is due to the fluid in his feet- assessment finds +2 pitting edema on BLE. Pt has PICC that do not have blood return noted, tPa stick left for provider to possibly order. PICC flushes with some resistance, ABX running, IV fluids running 100 mL/ hour d/t creatinine 1.51, abx adjusted for CrCl via pharmacy, now Q12 hours. Anterior surgical site CDI with steri strips in place. Davis cath in place, adequate output.    "

## 2021-08-06 NOTE — PROGRESS NOTES
Care Coordination:    Writer introduced self and role of care coordination during rounds today. Current nursing needs, which are subject to change prior to discharge, are: Neurogenic bowel and bladder management, wound care, LUE PICC management, IV antibiotics, medication management, and assist with ADLs. Tentative discharge date of 8/23. Writer will continue to follow progress and assist with care coordination needs.     Deonna Oliveros RN, BSN, CRRN  Patient Care Management Coordinator  Acute Rehabilitation Unit/Transitional Care Unit  PH: [phone]  Pager: [phone]

## 2021-08-06 NOTE — PLAN OF CARE
Acute Rehab Care Conference/Team Rounds      Type: Team Rounds    Present: Dr. Luis Whitehead, Teri Jordan Resident, Nelson Fonseca PT, Patti Prescott OT, Sara Braga SW, Susan Weiss RD, Deonna Oliveros RN CC, Wagner Ricardo RN and Patient Rigo Johns        Discharge Barriers/Treatment/Education    Rehab Diagnosis: Cervical myelopathy s/p C3-4, C4-5 ACDF complicated by prevertebral abscess     Active Medical Co-morbidities/Prognosis: HTN, PILI, urinary retention with Torres, neurogenic bowel with incontinence, thrombocytopenia, DONOVAN on CKD, ABLA, PILI    Safety: Pt is alert and oriented. Uses the call light appropriately. Does not want to be disturbed at night between 10pm to 6am.     Pain: Denied.     Medications, Skin, Tubes/Lines: Takes medications whole. Throat incision has adhesive strips, healing well. Wound on penis is red and moist, SATNAM. Denied pain. Picc line on the LUE is patent, with continuous IV running at 100ml/hr. However no blood return, note was left to request for TPA.     Swallowing/Nutrition:    Bowel/Bladder: Incontinent of bowel, LBM 8/5. Has a torres catheter in place draining adequately.     Psychosocial: , lives with wife in a home. No children. Niece who lives close and is a PT. Wife and pt both retired. No MH, SA or financial concerns reported.     ADLs/IADLs: Pt is currently Naeem for UB dressing, Min/mod A for LB dressing, total assist for toileting due to incontinence. Started ambulating to bathroom with walker but needs to improve endurance and durability of routine. Incontinence of BM is still limiting factor  During therapies and will be at home as pt will need assist managing this. Wife is able to assist with IADL but as pt gets closer to discharge will need to see how much assist she can give with ADL. Will need to determine DME and train family.     Mobility: Pt demo sup<>sit modA, STS FWW Naeem, and amb 30 ft FWW CGA. Need to progress overall strength, balance, and BLE  coordination to initiate stairs and become more independent with stairs for home. Also limited by incontinence and lightheadedness. Will need manual w/c rental and FWW for use at home.    Cognition/Language:    Community Re-Entry: w/c based    Transportation: requires assist from family/friends.    Decision maker: self    Plan of Care and goals reviewed and updated.    Discharge Plan/Recommendations    Fall Precautions: continue    Overall plan for the patient:   No further fevers and remains on IV antibiotics.  Still with bowel incontinence and will be initiating bowel program.  Functionally is able to ambulate up to 30 ft with a walker, but fatigues and with weakness and will likely be mixed mobility upon discharge although his goal is to be fully ambulatory.  Had orthostasis with OT yesterday which limited his function.  Assist of 1 with stand pivot transfers.  Is progressing, but slow.  Tentative discharge date set for 8/23/21.  Has 3 stairs to enter his home.        Utilization Review and Continued Stay Justification    Medical Necessity Criteria:    For any criteria that is not met, please document reason and plan for discharge, transfer, or modification of plan of care to address.    Requires intensive rehabilitation program to treat functional deficits?: Yes    Requires 3x per week or greater involvement of rehabilitation physician to oversee rehabilitation program?: Yes    Requires rehabilitation nursing interventions?: Yes    Patient is making functional progress?: Yes    There is a potential for additional functional progress? Yes    Patient is participating in therapy 3 hours per day a minimum of 5 days per week or 15 hours per week in 7 day period?:Yes    Has discharge needs that require coordinated discharge planning approach?:Yes      Barriers/Concerns related to meeting medical necessity criteria:  None    Team Plan to Address Concern:  N/A      Final Physician Sign off    Statement of Approval: I  have reviewed and agree with the recommendations and documentation in this care conference note.       Patient Goals  Social Work Goals: SW remains available as needs arise. Coordinated a notary for this afternoon for HCD. RN CC following and assisting with discharge plans.      OT Frequency: daily 75-90 min  OT goal: hygiene/grooming: modified independent  OT goal: upper body dressing: Modified independent  OT goal: lower body dressing: Modified independent  OT goal: upper body bathing: Supervision/stand-by assist  OT goal: lower body bathing: Supervision/stand-by assist  OT Goal: transfer: Supervision/stand-by assist (walk in shower transfer)  OT goal: toilet transfer/toileting: Minimal assist, cleaning and garment management, toilet transfer  OT goal 1: Pt willbe IND  with UE HEP to improve stength and function in BUE for ADL and hobbies such as building trains    PT Frequency: daily 60-90 minutes  PT goal: bed mobility: Modified independent, Supine to/from sit, Within precautions  PT goal: transfers: Sit to/from stand, Bed to/from chair, Modified independent  PT goal: gait: Modified independent, 50 feet, Rolling walker  PT goal: stairs: 3 stairs, Modified independent (per home setup)  PT goal 1: Car transfer SBA                                                                             Patient/Family Goal: Medication Management: Patient will participate in MAP by 8/9/21.                                         Goal: Falls: Patient will remain free from falls while in ARU.  Individualized Goal 1: Patient will demonstrate ability to protect his skin to prevent skin breakdown.

## 2021-08-06 NOTE — PROGRESS NOTES
I was called for creatinine 1.5. Order was placed to be done in AM but got delayed and it was done now.   It seems to be routine check that happened to be done at this hour.   Creatinine level is slightly higher than prior values  Vital signs are stable.   Will give  ml/hr continuous  Recheck in AM  Avoid Nephrotoxins  Renal dose medications  Avoid hypovolemia    Discussed with OLGA LIDIA Cramer MD  [facility]  Contact information available via [facility] Paging/Directory

## 2021-08-06 NOTE — PLAN OF CARE
Discharge Planner Post-Acute Rehab PT:     Discharge Plan: Home with family assist, mixed mobility likely (w/c and walker)     Precautions: Falls, cervical, PICC line    Current Status:  Bed Mobility: mod I   Transfer: CGA with WW   Gait: CGA with WW up to 50   Stairs: NT today   Balance: CGA and WW for standing     Assessment:  pt cont to progress with all functional mob skilles. Cont to be limited by LLE and fatigue.   Biggest barrier to safe discharge is incontinence as pt requires total A to change brief. Anticipate w/c rental for home, but pt's goal is to be fully ambulatory.  Other Barriers to Discharge (DME, Family Training, etc):   Medical complexity, incontinence, family training, DME needs

## 2021-08-06 NOTE — PLAN OF CARE
Denies pain, continent of soft BM x 3 contained in his brief, able to tell if he has a BM, may start a bowel program today. Picc  line TPA succeeded, blood return after first dose. Anterior incision cdi, adhesive strips intact. Continuous IV saline discontinued, will continue POC

## 2021-08-06 NOTE — PROGRESS NOTES
"  [facility]   Acute Rehabilitation Unit  Daily progress note    INTERVAL HISTORY  No acute events overnight.  He endorses feeling more fatigued than normal the last couple of days. States he will still continue to do his best with therapies. He was woken up last night with switching over his IV fluids and found it difficult to fall back asleep. He also states feeling a bit dizzy/light-headed this am. Remains afebrile and denies chest pain or shortness of breath.  He continues to be incontinent of bowel, but says he can control it sometimes and feels it is \"progressing\".      For a functional update, please refer to today's note from team rounds.      MEDICATIONS  Scheduled meds    bisacodyl  10 mg Rectal Daily     ceFEPIme  2 g Intravenous Q12H     DAPTOmycin (CUBICIN) intermittent infusion  6 mg/kg Intravenous Q24H     melatonin  1 mg Oral At Bedtime     psyllium  1 packet Oral BID     saccharomyces boulardii  250 mg Oral BID     sennosides  2 tablet Oral Daily     sodium chloride (PF)  10 mL Intracatheter Q8H     tamsulosin  0.4 mg Oral Daily     traZODone  100 mg Oral At Bedtime       PRN meds:  acetaminophen, methocarbamol, miconazole, ondansetron, senna-docusate      PHYSICAL EXAM  /63 (BP Location: Right arm)   Pulse 87   Temp 98.7  F (37.1  C) (Oral)   Resp 18   Ht 1.803 m (5' 11\")   Wt 107.7 kg (237 lb 6.4 oz)   SpO2 95%   BMI 33.11 kg/m    Gen: Awake, coooperative, no distress, participating in PT  HEENT: atraumatic, no discharge from nares or ears, EOM intact.  Anterior incision covered with steristrips, c/d/i.  Cardio: Regular rate  Pulm: Non-labored breathing   : Davis catheter in place  Ext: Mild edema in LE, none in UE b/l, no calf tenderness, wraps present b/l in LE  Neuro/MSK: alert, oriented, no new slurring of words, answers questions appropriately, follows commands      LABS  Results for orders placed or performed during the hospital encounter " of 08/02/21 (from the past 24 hour(s))   CBC with platelets   Result Value Ref Range    WBC Count 8.8 4.0 - 11.0 10e3/uL    RBC Count 3.67 (L) 4.40 - 5.90 10e6/uL    Hemoglobin 12.0 (L) 13.3 - 17.7 g/dL    Hematocrit 36.6 (L) 40.0 - 53.0 %     78 - 100 fL    MCH 32.7 26.5 - 33.0 pg    MCHC 32.8 31.5 - 36.5 g/dL    RDW 11.6 10.0 - 15.0 %    Platelet Count 192 150 - 450 10e3/uL   CK total   Result Value Ref Range     30 - 300 U/L   Comprehensive metabolic panel   Result Value Ref Range    Sodium 138 133 - 144 mmol/L    Potassium 4.0 3.4 - 5.3 mmol/L    Chloride 108 94 - 109 mmol/L    Carbon Dioxide (CO2) 28 20 - 32 mmol/L    Anion Gap 2 (L) 3 - 14 mmol/L    Urea Nitrogen 25 7 - 30 mg/dL    Creatinine 1.51 (H) 0.66 - 1.25 mg/dL    Calcium 8.6 8.5 - 10.1 mg/dL    Glucose 103 (H) 70 - 99 mg/dL    Alkaline Phosphatase 95 40 - 150 U/L    AST 35 0 - 45 U/L    ALT 32 0 - 70 U/L    Protein Total 7.5 6.8 - 8.8 g/dL    Albumin 2.6 (L) 3.4 - 5.0 g/dL    Bilirubin Total 0.5 0.2 - 1.3 mg/dL    GFR Estimate 46 (L) >60 mL/min/1.73m2   CRP inflammation   Result Value Ref Range    CRP Inflammation 34.0 (H) 0.0 - 8.0 mg/L   Basic metabolic panel   Result Value Ref Range    Sodium 140 133 - 144 mmol/L    Potassium 4.1 3.4 - 5.3 mmol/L    Chloride 110 (H) 94 - 109 mmol/L    Carbon Dioxide (CO2) 26 20 - 32 mmol/L    Anion Gap 4 3 - 14 mmol/L    Urea Nitrogen 26 7 - 30 mg/dL    Creatinine 1.31 (H) 0.66 - 1.25 mg/dL    Calcium 8.6 8.5 - 10.1 mg/dL    Glucose 100 (H) 70 - 99 mg/dL    GFR Estimate 54 (L) >60 mL/min/1.73m2       ASSESSMENT AND PLAN    Rigo Johns is a 70 year old L hand dominant male with a PMH of HTN, PILI, and prior cervical surgery (C5-7 anterior fusion in 1993 per notes) who is now status post a C3-4, C4-5 ACDF on 7/14/2021 for cervical myelopathy.  He was admitted to the [place] ARU from 7/18-7/27/21 but transferred back to the medical floor for fevers and prevertebral fluid collection suggestive of  abscess.  Now improving with IV antbiotics and no surgical intervention was needed.  He is now being admitted to the ARU on 8/2/21.  Impairment group code: 04.1211 Quadriplegia, Incomplete C1-4 - s/p C3-5 ACDF.   PLAN  Rehabilitation  1. PT and OT 90 minutes of each on a daily basis, in addition to rehab nursing and close management of physiatrist.    2. Impairment of ADL's: Noted to have impaired ROM, impaired strength, impaired activity tolerance, edema management, impaired balance, and impaired coordination, all affecting his ability to safely and independently perform basic ADLs.  Goal for mod I with basic ADLs.  3. Impairment of mobility:  Noted to have impaired ROM, impaired strength, impaired activity tolerance, edema management, impaired balance and impaired coordination, all affecting his ability to safely and independently ambulate and perform basic mobility.  Goal for mod I with basic mobility.  Medical  1)Neurology  #Cervical Myelopathy s/p C3-4, C4-5 ACDF on 7/14/21 complicated by fevers and prevertebral fluid collection, suggestive of abscess  -ID evaluated the patient, recommended Cefepime 2g q12h and Daptomycin 650 mg q24 hours for 2 weeks (will end 8/16).   -Weekly CBC, CMP, CRP, and CPK while on antibiotics.  Results to be faxed to Dr. Merna Donaldson's office.    -MRI 7/28 showed improved fluid collection and no surgical intervention was needed  -Maintain postoperative spinal precautions. No need for cervical collar.  -Follow-up with neurosurgery and infectious disease after discharge  -Follow up MRI in~1.5 weeks (ie 8/12) to evaluate progress.  Will plan to complete prior to discontinuation of abx and ask ID to review.     2)CVS  -No acute issues  -Monitor blood pressures as he has previously had some high readings, along with some orthostatic hypotensive episodes     3)Pulmonary  #CAP, improved  -Completed course of antibiotics (Zosyn x1 in ED, Azithromycin 7/12-7/16, Ceftriaxone 7/12 - 7/17, and  Cefuroxime x1) during initial hospitalization  -Encourage incentive spirometer  -Monitor respiratory status, supplementary oxygen PRN.  Now weaned to room air.     #PILI  -Continue CPAP with home settings     4)FENGI  #Diet: Regular consistency solids and thin liquids  #Neurogenic bowel  -Incontinence improving, happy to continue current regimen  -Fiber BID (increased to BID 8/5, previously was ordered daily)  - Scheduling 2 tabs of Senna at noon and suppository 30 minutes after dinner to regulate his BMs further on 8/6  -Monitor, adjust as needed     #Liver Lesion  -Abnormal appearance is seen on CAT scan with slight nodularity inferiorly. LFTs within normal limits.  -Follow-up primary care physician     5)  #Urinary Retention  -TOV was done during prior rehab stay, but continued to retain and Davis was re-placed on 7/30.    -Will attempt voiding trial early next week  -Continue Flomax 0.4 mg daily.  Consider increasing if still retaining and if orthostasis has resolved.     #DONOVAN and likely CKD   -Pt with limited physician follow up so baseline creatinine not known. Peak at 2.13, improved with IV hydration  -Avoid nephrotoxic agents, NSAIDs  -Cr 1.31 (8/6) < 1.51 yesterday (improved with 100mL IVNS started 8/5 and stopped 8/6)     #Renal lesion  -Incidental finding on CT scan  -Renal ultrasound showed simple cysts of bilateral kidneys  -Follow up with PCP     6)DVT prophylaxis  -PCDs and ambulation     7)Pain  No concerns at this time.  -Tylenol 650 mg q4h PRN  -Robaxin 750 mg q6h PRN    8)Endo  -Prior steroid induced hyperglycemia.  HbA1c 5.0.   -Now completed course of steroids.  No need for further routine checks     9)Heme   #Acute postoperative blood loss anemia, resolved  -hemoglobin mildly low at 12.3 on 7/29 and stable   - Hemoglobin 12.0 8/5, stable     #Leukocytosis   -Thought to be secondary to steroids which are now discontinued. WBCs 8.8 on 8/5  -Will continue to monitor  weekly    #Thrombocytopenia  -Noted upon initial admission to the emergency room. Has been stable low 100s, but now improved to 136 on 7/29. Unclear if it was due to acute distress and infection versus possible undiagnosed liver disease.   -Monitor peripherally  -Follow-up with primary care physician     10)Psych  -Monitor mood, will consult health psychology if needed     11)Social/Dispo  -Anticipate discharge home at a modified independent level for ambulation, mobility, and ADLs.   -ELOS: 3 weeks  -Rehab prognosis: Good  -Follow up appointments: PCP, NSGY (Dr. Hanley), ID     Code status: Full Code (Discussed with patient upon admission).  This was confirmed upon re-admit.  Pt does not want extraordinary measures should prognosis be poor, however does want attempts at resuscitation and therefore will remain full code.    Teri Jordan MD    Department of Rehabilitation Medicine

## 2021-08-07 ENCOUNTER — APPOINTMENT (OUTPATIENT)
Dept: OCCUPATIONAL THERAPY | Facility: CLINIC | Age: 71
DRG: 949 | End: 2021-08-07
Attending: PHYSICAL MEDICINE & REHABILITATION
Payer: COMMERCIAL

## 2021-08-07 ENCOUNTER — APPOINTMENT (OUTPATIENT)
Dept: PHYSICAL THERAPY | Facility: CLINIC | Age: 71
DRG: 949 | End: 2021-08-07
Attending: PHYSICAL MEDICINE & REHABILITATION
Payer: COMMERCIAL

## 2021-08-07 PROCEDURE — 258N000003 HC RX IP 258 OP 636: Performed by: PHYSICAL MEDICINE & REHABILITATION

## 2021-08-07 PROCEDURE — 97530 THERAPEUTIC ACTIVITIES: CPT | Mod: GO

## 2021-08-07 PROCEDURE — 250N000013 HC RX MED GY IP 250 OP 250 PS 637: Performed by: STUDENT IN AN ORGANIZED HEALTH CARE EDUCATION/TRAINING PROGRAM

## 2021-08-07 PROCEDURE — 97535 SELF CARE MNGMENT TRAINING: CPT | Mod: GO

## 2021-08-07 PROCEDURE — 250N000011 HC RX IP 250 OP 636: Performed by: PHYSICAL MEDICINE & REHABILITATION

## 2021-08-07 PROCEDURE — 128N000003 HC R&B REHAB

## 2021-08-07 PROCEDURE — 99232 SBSQ HOSP IP/OBS MODERATE 35: CPT | Mod: 24 | Performed by: PHYSICAL MEDICINE & REHABILITATION

## 2021-08-07 PROCEDURE — 250N000013 HC RX MED GY IP 250 OP 250 PS 637: Performed by: PHYSICAL MEDICINE & REHABILITATION

## 2021-08-07 PROCEDURE — 97110 THERAPEUTIC EXERCISES: CPT | Mod: GP

## 2021-08-07 PROCEDURE — 250N000011 HC RX IP 250 OP 636: Performed by: STUDENT IN AN ORGANIZED HEALTH CARE EDUCATION/TRAINING PROGRAM

## 2021-08-07 RX ADMIN — TRAZODONE HYDROCHLORIDE 100 MG: 50 TABLET ORAL at 21:43

## 2021-08-07 RX ADMIN — CEFEPIME HYDROCHLORIDE 2 G: 2 INJECTION, POWDER, FOR SOLUTION INTRAVENOUS at 15:24

## 2021-08-07 RX ADMIN — PSYLLIUM HUSK 1 PACKET: 3.4 POWDER ORAL at 21:13

## 2021-08-07 RX ADMIN — CEFEPIME HYDROCHLORIDE 2 G: 2 INJECTION, POWDER, FOR SOLUTION INTRAVENOUS at 07:06

## 2021-08-07 RX ADMIN — Medication 250 MG: at 08:18

## 2021-08-07 RX ADMIN — BISACODYL 10 MG: 10 SUPPOSITORY RECTAL at 19:36

## 2021-08-07 RX ADMIN — Medication 1 MG: at 21:13

## 2021-08-07 RX ADMIN — TAMSULOSIN HYDROCHLORIDE 0.4 MG: 0.4 CAPSULE ORAL at 08:18

## 2021-08-07 RX ADMIN — Medication 250 MG: at 21:13

## 2021-08-07 RX ADMIN — DAPTOMYCIN 650 MG: 500 INJECTION, POWDER, LYOPHILIZED, FOR SOLUTION INTRAVENOUS at 16:12

## 2021-08-07 RX ADMIN — PSYLLIUM HUSK 1 PACKET: 3.4 POWDER ORAL at 08:18

## 2021-08-07 NOTE — PLAN OF CARE
Denies pain, had a temp of 99.2 this morning, re-checked and it was 98.3. Had one small soft inc BM contained in his brief, assisted with sandra care.Picc line on right arm patent,IV abx administered, will continue POC

## 2021-08-07 NOTE — PLAN OF CARE
FOCUS/GOAL  Bowel management, Bladder management, Nutrition/Feeding/Swallowing precautions, Mobility, Skin integrity, and Cognition/Memory/Judgment/Problem solving    ASSESSMENT, INTERVENTIONS AND CONTINUING PLAN FOR GOAL:    A&O x4. Denies numbness, tingling, pain, SOB, chest pain, nausea, and headache. Regular diet, thin liquids. Ax1 with walker and gait belt. PICC on LUE for antibiotics. Patient on bowel program, BM during shift after suppository. Torres catheter cleaned and sandra-area cleaned and dried. 800 ml from torres catheter. Blanchable redness to buttocks bilaterally. VSS. Continue POC.

## 2021-08-07 NOTE — PLAN OF CARE
Discharge Planner Post-Acute Rehab OT:      Discharge Plan: home with assist for IADL and HH      Precautions: fall, cervical spinal     Current Status:  ADLs: Nalini for UB dressing, Min/mod A for LB dressing, total assist for toileting due to incontinence.   IADLs:not tested, wife can assist  Vision/Cognition: WFL     Assessment: AM session focused on ADLs including amb to/from EOS from EOB with FWW an CGA. Pt uses compensatory strategies and takes frequent rest breaks d/t UE fatigue/weakness, especially LUE. PM session focused on proximal shoulder strengthening from supine gravity eliminated. Pt tolerated well. Cont with POC.     Other Barriers to Discharge (DME, Family Training, etc): Pt has good family support and accessible home. family training, determine DME and improve ADL performance

## 2021-08-07 NOTE — PLAN OF CARE
FOCUS/GOAL  Medical management    ASSESSMENT, INTERVENTIONS AND CONTINUING PLAN FOR GOAL:  Patient slept well, he did not call for assistance. No c/o pain. He has a single lumen PICC left upper arm for antibiotics. Has a torres.

## 2021-08-07 NOTE — PROGRESS NOTES
[facility]   Physical Medicine and Rehabilitation Daily Note           Assessment and Plan of Care:   Rigo Johns is a 70 year old L hand dominant male with a PMH of HTN, PILI, and prior cervical surgery (C5-7 anterior fusion in 1993 per notes) who is now status post a C3-4, C4-5 ACDF on 7/14/2021 for cervical myelopathy.  He was admitted to the [place] ARU from 7/18-7/27/21 but transferred back to the medical floor for fevers and prevertebral fluid collection suggestive of abscess.  Now improving with IV antbiotics and no surgical intervention was needed.  He was admitted to the ARU on 8/2/21 for ongoing medical management and multidisciplinary rehab.     --Vitals stable - Tmax 99.2 over the last 24 hours. No labs today.  --Continue IV antibiotics, cefepime for suspected infection  --Continue ongoing medical management.  --Continue therapies and plan of care.             Interval history:   Patient seen and examined at bedside.  Today, the patient states that he woke up at 2 AM and had some difficulty falling back asleep.  He did have 2 uncontrolled bowel movements yesterday after bowel program, but this is improving.  He continues to have a Davis in place.  States that appetite is good, but he does not eat much because he does not like the food at the hospital.  His wife is been bringing some food in for him and he enjoys that.  Denies fever, chills, CP, SOB, N/V, abdominal pain, new pain or weakness/numbness/tingling.             Physical Exam:   VS:   Vitals:    08/06/21 0809 08/06/21 1500 08/07/21 0756 08/07/21 1001   BP: 119/63 133/70 135/66    BP Location: Right arm Right arm Right arm    Pulse: 87 89 82    Resp: 18 18 20    Temp: 98.7  F (37.1  C) 98.9  F (37.2  C) 99.2  F (37.3  C) 98.3  F (36.8  C)   TempSrc: Oral Oral Oral Oral   SpO2: 95% 96% 93%    Weight:       Height:         Gen: NAD, resting comfortably in bed  HEENT: Anterior neck incision covered with  Steri-Strips, clean/dry/intact  Lungs: breathing unlabored on room air  Abd: soft and non-tender, no suprapubic tenderness  : Davis catheter in place, draining clear juan urine  Ext: 1+ pitting edema in BLE, compression stockings in place, no calf tenderness  MSK/neuro: Alert and oriented, speech fluent, moves all four extremities volitionally, although left upper and lower extremity weaker than the right side.         Data:   Scheduled meds    bisacodyl  10 mg Rectal Daily     ceFEPIme  2 g Intravenous Q8H     DAPTOmycin (CUBICIN) intermittent infusion  6 mg/kg Intravenous Q24H     melatonin  1 mg Oral At Bedtime     psyllium  1 packet Oral BID     saccharomyces boulardii  250 mg Oral BID     sennosides  2 tablet Oral Daily     sodium chloride (PF)  10 mL Intracatheter Q8H     tamsulosin  0.4 mg Oral Daily     traZODone  100 mg Oral At Bedtime       PRN meds:  acetaminophen, methocarbamol, miconazole, ondansetron, senna-docusate, sodium chloride (PF)      Mary Bingham MD  Physical Medicine and Rehabilitation     I spent a total of 25 minutes face-to-face and managing the care of Rigo Johns. Over 50% of my time on the unit was spent counseling the patient and coordinating care. Please see note for details.

## 2021-08-08 ENCOUNTER — APPOINTMENT (OUTPATIENT)
Dept: OCCUPATIONAL THERAPY | Facility: CLINIC | Age: 71
DRG: 949 | End: 2021-08-08
Attending: PHYSICAL MEDICINE & REHABILITATION
Payer: COMMERCIAL

## 2021-08-08 ENCOUNTER — APPOINTMENT (OUTPATIENT)
Dept: PHYSICAL THERAPY | Facility: CLINIC | Age: 71
DRG: 949 | End: 2021-08-08
Attending: PHYSICAL MEDICINE & REHABILITATION
Payer: COMMERCIAL

## 2021-08-08 PROCEDURE — 250N000011 HC RX IP 250 OP 636: Performed by: PHYSICAL MEDICINE & REHABILITATION

## 2021-08-08 PROCEDURE — 250N000013 HC RX MED GY IP 250 OP 250 PS 637: Performed by: PHYSICAL MEDICINE & REHABILITATION

## 2021-08-08 PROCEDURE — 250N000013 HC RX MED GY IP 250 OP 250 PS 637: Performed by: STUDENT IN AN ORGANIZED HEALTH CARE EDUCATION/TRAINING PROGRAM

## 2021-08-08 PROCEDURE — 97110 THERAPEUTIC EXERCISES: CPT | Mod: GO

## 2021-08-08 PROCEDURE — 128N000003 HC R&B REHAB

## 2021-08-08 PROCEDURE — 97535 SELF CARE MNGMENT TRAINING: CPT | Mod: GO

## 2021-08-08 PROCEDURE — 258N000003 HC RX IP 258 OP 636: Performed by: PHYSICAL MEDICINE & REHABILITATION

## 2021-08-08 PROCEDURE — 97530 THERAPEUTIC ACTIVITIES: CPT | Mod: GP

## 2021-08-08 PROCEDURE — 97530 THERAPEUTIC ACTIVITIES: CPT | Mod: GO

## 2021-08-08 RX ADMIN — BISACODYL 10 MG: 10 SUPPOSITORY RECTAL at 19:29

## 2021-08-08 RX ADMIN — ACETAMINOPHEN 650 MG: 325 TABLET, FILM COATED ORAL at 20:55

## 2021-08-08 RX ADMIN — CEFEPIME HYDROCHLORIDE 2 G: 2 INJECTION, POWDER, FOR SOLUTION INTRAVENOUS at 07:13

## 2021-08-08 RX ADMIN — Medication 250 MG: at 08:21

## 2021-08-08 RX ADMIN — PSYLLIUM HUSK 1 PACKET: 3.4 POWDER ORAL at 21:00

## 2021-08-08 RX ADMIN — ALTEPLASE 2 MG: 2.2 INJECTION, POWDER, LYOPHILIZED, FOR SOLUTION INTRAVENOUS at 09:40

## 2021-08-08 RX ADMIN — SENNOSIDES 2 TABLET: 8.6 TABLET, FILM COATED ORAL at 12:52

## 2021-08-08 RX ADMIN — TRAZODONE HYDROCHLORIDE 100 MG: 50 TABLET ORAL at 21:00

## 2021-08-08 RX ADMIN — TAMSULOSIN HYDROCHLORIDE 0.4 MG: 0.4 CAPSULE ORAL at 08:22

## 2021-08-08 RX ADMIN — CEFEPIME HYDROCHLORIDE 2 G: 2 INJECTION, POWDER, FOR SOLUTION INTRAVENOUS at 13:55

## 2021-08-08 RX ADMIN — Medication 250 MG: at 20:55

## 2021-08-08 RX ADMIN — Medication 1 MG: at 21:00

## 2021-08-08 RX ADMIN — DAPTOMYCIN 650 MG: 500 INJECTION, POWDER, LYOPHILIZED, FOR SOLUTION INTRAVENOUS at 16:58

## 2021-08-08 RX ADMIN — PSYLLIUM HUSK 1 PACKET: 3.4 POWDER ORAL at 08:22

## 2021-08-08 RX ADMIN — CEFEPIME HYDROCHLORIDE 2 G: 2 INJECTION, POWDER, FOR SOLUTION INTRAVENOUS at 22:00

## 2021-08-08 NOTE — PLAN OF CARE
Denies pain, had incontinent smear BM's he thought was just gas. Also inc of soft medium green bowel about 20 minutes after his evening suppository, assisted on the commode and he had a medium soft BM. Buttocks appear red but blanchable, barrier cream applied. Patient was disappointed with his Maxipime IV dose schedule change to 2300.. Patient stated that he does not want to be woken up after 2200 and therefore not happy. Pharmacist was notified and she changed the 2300 dose to tomorrow at 0700. Will continue POC

## 2021-08-08 NOTE — PLAN OF CARE
"Discharge Planner Post-Acute Rehab PT:     Discharge Plan: Home with family assist, mixed mobility likely (w/c and walker)     Precautions: Falls, cervical, PICC line    Current Status:  Bed Mobility: mod I   Transfer: CGA with WW   Gait: CGA with WW up to 50   Stairs: 4\" stairs, CGA  Balance: CGA and WW for standing     Assessment: Progressed to stair climbing this weekend on 4\" stairs. Continues to be limited by bowel incontinence, but was receptive to trial of commode today when he felt the urge to go.    Other Barriers to Discharge (DME, Family Training, etc): medical complexity, incontinence, DME needs       "

## 2021-08-08 NOTE — PLAN OF CARE
Denies pain, smear inc bowel movement x 1, assisted with sandra care. Buttocks continue to be red but seem to be improving, jerome cream applied. Davis patent and draining clear urine, no redness or drainage noted on meatus. Patient picc line sluggish this morning, an order for PTA obtained, no patent and there is blood return. Patient is currently happy about his current IV abx schedule, will continue POC

## 2021-08-08 NOTE — PLAN OF CARE
FOCUS/GOAL  Medical management    ASSESSMENT, INTERVENTIONS AND CONTINUING PLAN FOR GOAL:  Patient slept well with CPAP on. No c/o pain. He has a PICC left upper arm for antibiotic. Has a Davis.

## 2021-08-09 ENCOUNTER — APPOINTMENT (OUTPATIENT)
Dept: OCCUPATIONAL THERAPY | Facility: CLINIC | Age: 71
DRG: 949 | End: 2021-08-09
Attending: PHYSICAL MEDICINE & REHABILITATION
Payer: COMMERCIAL

## 2021-08-09 ENCOUNTER — APPOINTMENT (OUTPATIENT)
Dept: PHYSICAL THERAPY | Facility: CLINIC | Age: 71
DRG: 949 | End: 2021-08-09
Attending: PHYSICAL MEDICINE & REHABILITATION
Payer: COMMERCIAL

## 2021-08-09 LAB
HEMOCCULT STL QL: POSITIVE
HGB BLD-MCNC: 10.9 G/DL (ref 13.3–17.7)

## 2021-08-09 PROCEDURE — 97530 THERAPEUTIC ACTIVITIES: CPT | Mod: GO

## 2021-08-09 PROCEDURE — 128N000003 HC R&B REHAB

## 2021-08-09 PROCEDURE — 250N000013 HC RX MED GY IP 250 OP 250 PS 637: Performed by: PHYSICAL MEDICINE & REHABILITATION

## 2021-08-09 PROCEDURE — 97535 SELF CARE MNGMENT TRAINING: CPT | Mod: GO

## 2021-08-09 PROCEDURE — 36592 COLLECT BLOOD FROM PICC: CPT | Performed by: PHYSICAL MEDICINE & REHABILITATION

## 2021-08-09 PROCEDURE — 99233 SBSQ HOSP IP/OBS HIGH 50: CPT | Mod: 24 | Performed by: PHYSICAL MEDICINE & REHABILITATION

## 2021-08-09 PROCEDURE — 97110 THERAPEUTIC EXERCISES: CPT | Mod: GP | Performed by: STUDENT IN AN ORGANIZED HEALTH CARE EDUCATION/TRAINING PROGRAM

## 2021-08-09 PROCEDURE — 97110 THERAPEUTIC EXERCISES: CPT | Mod: GO

## 2021-08-09 PROCEDURE — 258N000003 HC RX IP 258 OP 636: Performed by: PHYSICAL MEDICINE & REHABILITATION

## 2021-08-09 PROCEDURE — 250N000013 HC RX MED GY IP 250 OP 250 PS 637: Performed by: STUDENT IN AN ORGANIZED HEALTH CARE EDUCATION/TRAINING PROGRAM

## 2021-08-09 PROCEDURE — 97530 THERAPEUTIC ACTIVITIES: CPT | Mod: GP | Performed by: STUDENT IN AN ORGANIZED HEALTH CARE EDUCATION/TRAINING PROGRAM

## 2021-08-09 PROCEDURE — 250N000011 HC RX IP 250 OP 636: Performed by: PHYSICAL MEDICINE & REHABILITATION

## 2021-08-09 PROCEDURE — 82272 OCCULT BLD FECES 1-3 TESTS: CPT | Performed by: PHYSICAL MEDICINE & REHABILITATION

## 2021-08-09 PROCEDURE — 85018 HEMOGLOBIN: CPT | Performed by: PHYSICAL MEDICINE & REHABILITATION

## 2021-08-09 RX ADMIN — CEFEPIME HYDROCHLORIDE 2 G: 2 INJECTION, POWDER, FOR SOLUTION INTRAVENOUS at 21:33

## 2021-08-09 RX ADMIN — DAPTOMYCIN 650 MG: 500 INJECTION, POWDER, LYOPHILIZED, FOR SOLUTION INTRAVENOUS at 17:01

## 2021-08-09 RX ADMIN — PSYLLIUM HUSK 1 PACKET: 3.4 POWDER ORAL at 21:33

## 2021-08-09 RX ADMIN — CEFEPIME HYDROCHLORIDE 2 G: 2 INJECTION, POWDER, FOR SOLUTION INTRAVENOUS at 06:25

## 2021-08-09 RX ADMIN — SENNOSIDES 2 TABLET: 8.6 TABLET, FILM COATED ORAL at 11:55

## 2021-08-09 RX ADMIN — PSYLLIUM HUSK 1 PACKET: 3.4 POWDER ORAL at 08:09

## 2021-08-09 RX ADMIN — Medication 250 MG: at 21:33

## 2021-08-09 RX ADMIN — TAMSULOSIN HYDROCHLORIDE 0.4 MG: 0.4 CAPSULE ORAL at 08:09

## 2021-08-09 RX ADMIN — CEFEPIME HYDROCHLORIDE 2 G: 2 INJECTION, POWDER, FOR SOLUTION INTRAVENOUS at 16:02

## 2021-08-09 RX ADMIN — Medication 250 MG: at 08:09

## 2021-08-09 RX ADMIN — Medication 1 MG: at 21:33

## 2021-08-09 RX ADMIN — TRAZODONE HYDROCHLORIDE 100 MG: 50 TABLET ORAL at 21:33

## 2021-08-09 NOTE — PLAN OF CARE
Discharge Planner Post-Acute Rehab OT:      Discharge Plan: home with assist for IADL and HH      Precautions: fall, cervical spinal     Current Status:  ADLs: Nalini for UB dressing, Min/mod A for LB dressing, total assist for toileting due to incontinence. Seated for EOS face g/h tasks, pt not able to tolerate standing yet, continue to encourage standing ADLs.  IADLs:not tested, wife can assist  Vision/Cognition: WFL     Assessment: AM and PT session focused on stabilizing BP w/ increased activity in sitting and standing w/ BP checks. Pt did demo'd orthostatic hypotension in BP standing in AM sessions 90s/50s w/ dizzyness noted. In PM session, pt had improved BP following standing tolerance activity for 1-2 mins w/ BP in 130s/70s. Pt also had shorter recovery w/ symptoms. Pt encouraged to increase sitting tolerance in w/c and increased standing activity tolerance to improve BP. Pt was receptive to education. Had improved standing tolerance in PM session up to 1-2 mins vs. AM session only 15 seconds. Continue to progress OT goals and OT POC.      Other Barriers to Discharge (DME, Family Training, etc): Pt has good family support and accessible home. family training, determine DME and improve ADL performance

## 2021-08-09 NOTE — PLAN OF CARE
FOCUS/GOAL  Bladder management, Pain management, and Skin integrity    ASSESSMENT, INTERVENTIONS AND CONTINUING PLAN FOR GOAL:    Pt. Is A&Ox4. Denies pain, numbness and tingling this shift. Assist w/1 and ww. Davis is patent and draining tea colored urine. Patient is on bowel program- no BM this shift. Last BM: 8/8/2021. Abdominal binder ordered and measured, not yet received this shift. VSS. Will continue w/ POC.

## 2021-08-09 NOTE — PLAN OF CARE
"Discharge Planner Post-Acute Rehab PT:     Discharge Plan: Home with family assist, mixed mobility likely (w/c and walker)     Precautions: Falls, cervical, PICC line    Current Status:  Bed Mobility: mod I   Transfer: CGA with WW   Gait: CGA with WW up to 50   Stairs: 4\" stairs, CGA  Balance: CGA and WW for standing     Assessment: sessions limited today d/t orthostatic BP. Went down in size to F tubigrip for improved edema mgmt, provided with \"ottomon\" for elevating BLE, ordered abdominal binder in attempts to minimize orthostatic BP. Encouraged fluids- suggested pt keep a chart of intake.       Other Barriers to Discharge (DME, Family Training, etc): medical complexity, incontinence, DME needs, orthostatic BP       "

## 2021-08-09 NOTE — PLAN OF CARE
FOCUS/GOAL  Medical management    ASSESSMENT, INTERVENTIONS AND CONTINUING PLAN FOR GOAL:  Patient slept well tonight with CPAP on. No c/o pain. He has a single lumen PICC left upper arm and a torres.

## 2021-08-09 NOTE — PROGRESS NOTES
"  [facility]   Acute Rehabilitation Unit  Daily progress note    INTERVAL HISTORY  Nursing reports some ortho stasis. Abdominal binder ordered.  Apparently had a Large black soft BM  after a suppository     He hasendorsed feeling more fatigued than normal the last couple of days. Remains afebrile and denies chest pain or shortness of breath.  He continues to be incontinent of bowel. Poor appetite. Has ensure.      ADLs: Nalini for UB dressing, Min/mod A for LB dressing, total assist for toileting due to incontinence. Seated for EOS face g/h tasks, pt not able to tolerate standing yet, continue to encourage standing ADLs.  IADLs:not tested, wife can assist  Vision/Cognition: WFL    Bed Mobility: mod I   Transfer: CGA with WW   Gait: CGA with WW up to 50   Stairs: 4\" stairs, CGA  Balance: CGA and WW for standing      Progressed to stair climbing this weekend on 4\" stairs. Continues to be limited by bowel incontinence, but was receptive to trial of commode today when he felt the urge to go.       MEDICATIONS  Scheduled meds    bisacodyl  10 mg Rectal Daily     ceFEPIme  2 g Intravenous Q8H     DAPTOmycin (CUBICIN) intermittent infusion  6 mg/kg Intravenous Q24H     melatonin  1 mg Oral At Bedtime     psyllium  1 packet Oral BID     saccharomyces boulardii  250 mg Oral BID     sennosides  2 tablet Oral Daily     sodium chloride (PF)  10 mL Intracatheter Q8H     tamsulosin  0.4 mg Oral Daily     traZODone  100 mg Oral At Bedtime       PRN meds:  acetaminophen, methocarbamol, miconazole, ondansetron, senna-docusate, sodium chloride (PF)      PHYSICAL EXAM  /55 (BP Location: Right arm)   Pulse 107   Temp 97.4  F (36.3  C) (Oral)   Resp 18   Ht 1.803 m (5' 11\")   Wt 107.7 kg (237 lb 6.4 oz)   SpO2 97%   BMI 33.11 kg/m    Gen: Awake, coooperative, no distress.  HEENT: atraumatic, no discharge from nares or ears, EOM intact.  Anterior incision covered with steristrips, " c/d/i.  Cardio: Regular rate  Pulm: Non-labored breathing   : Davis catheter in place  Ext: Mild edema in LE, none in UE b/l, no calf tenderness, wraps present b/l in LE  Neuro/MSK: alert, oriented, answers questions appropriately, follows commands      LABS  No results found for this or any previous visit (from the past 24 hour(s)).  CBC RESULTS: Recent Labs   Lab Test 08/05/21 2020   WBC 8.8   RBC 3.67*   HGB 12.0*   HCT 36.6*      MCH 32.7   MCHC 32.8   RDW 11.6        Recent Labs   Lab Test 08/06/21  1109 08/05/21 2020    138   POTASSIUM 4.1 4.0   CHLORIDE 110* 108   CO2 26 28   ANIONGAP 4 2*   * 103*   BUN 26 25   CR 1.31* 1.51*   GARRISON 8.6 8.6     Liver Function Studies - Recent Labs   Lab Test 08/05/21 2020   PROTTOTAL 7.5   ALBUMIN 2.6*   BILITOTAL 0.5   ALKPHOS 95   AST 35   ALT 32       ASSESSMENT AND PLAN    Rigo Johns is a 70 year old L hand dominant male with a PMH of HTN, PILI, and prior cervical surgery (C5-7 anterior fusion in 1993 per notes) who is now status post a C3-4, C4-5 ACDF on 7/14/2021 for cervical myelopathy.  He was admitted to the [place] [facility] from 7/18-7/27/21 but transferred back to the medical floor for fevers and prevertebral fluid collection suggestive of abscess.  Now improving with IV antbiotics and no surgical intervention was needed.  He is now being admitted to the ARU on 8/2/21.  Impairment group code: 04.1211 Quadriplegia, Incomplete C1-4 - s/p C3-5 ACDF.   PLAN  Rehabilitation  1. PT and OT 90 minutes of each on a daily basis, in addition to rehab nursing and close management of physiatrist.    2. Impairment of ADL's: Noted to have impaired ROM, impaired strength, impaired activity tolerance, edema management, impaired balance, and impaired coordination, all affecting his ability to safely and independently perform basic ADLs.  Goal for mod I with basic ADLs.  3. Impairment of mobility:  Noted to have impaired ROM, impaired strength, impaired  activity tolerance, edema management, impaired balance and impaired coordination, all affecting his ability to safely and independently ambulate and perform basic mobility.  Goal for mod I with basic mobility.  Medical  1)Neurology  #Cervical Myelopathy s/p C3-4, C4-5 ACDF on 7/14/21 complicated by fevers and prevertebral fluid collection, suggestive of abscess  -ID evaluated the patient, recommended Cefepime 2g q12h and Daptomycin 650 mg q24 hours for 2 weeks (will end 8/16).   -Weekly CBC, CMP, CRP, and CPK while on antibiotics.  Results to be faxed to Dr. Merna Donaldson's office.    -MRI 7/28 showed improved fluid collection and no surgical intervention was needed  -Maintain postoperative spinal precautions. No need for cervical collar.  -Follow-up with neurosurgery and infectious disease after discharge  -Follow up MRI in~1.5 weeks (ie 8/12) to evaluate progress.  Will plan to complete prior to discontinuation of abx and ask ID to review.     2)CVS  -Binder, encourage liquids, consider IV fluids if needed  -Monitor blood pressures as he has previously had some high readings, along with some orthostatic hypotensive episodes     3)Pulmonary  #CAP, improved  -Completed course of antibiotics (Zosyn x1 in ED, Azithromycin 7/12-7/16, Ceftriaxone 7/12 - 7/17, and Cefuroxime x1) during initial hospitalization  -Encourage incentive spirometer  -Monitor respiratory status, supplementary oxygen PRN.  Now weaned to room air.     #PILI  -Continue CPAP with home settings     4)FENGI  #Diet: Regular consistency solids and thin liquids  #Neurogenic bowel  -Incontinence improving, happy to continue current regimen  -Fiber BID (increased to BID 8/5, previously was ordered daily)  - Scheduling 2 tabs of Senna at noon and suppository 30 minutes after dinner to regulate his BMs further on 8/6  -Large black soft BM  after a suppository. Check HB,   -Monitor, adjust as needed     #Liver Lesion  -Abnormal appearance is seen on CAT scan  with slight nodularity inferiorly. LFTs within normal limits.  -Follow-up primary care physician     5)  #Urinary Retention  -TOV was done during prior rehab stay, but continued to retain and Davis was re-placed on 7/30.    -Will attempt voiding trial early next week  -Continue Flomax 0.4 mg daily.  Consider increasing if still retaining and if orthostasis has resolved.     #DONOVAN and likely CKD   -Pt with limited physician follow up so baseline creatinine not known. Peak at 2.13, improved with IV hydration  -Avoid nephrotoxic agents, NSAID     #Renal lesion  -Incidental finding on CT scan  -Renal ultrasound showed simple cysts of bilateral kidneys  -Follow up with PCP     6)DVT prophylaxis  -PCDs and ambulation     7)Pain  No concerns at this time.  -Tylenol 650 mg q4h PRN  -Robaxin 750 mg q6h PRN    8)Endo  -Prior steroid induced hyperglycemia.  HbA1c 5.0.   -Now completed course of steroids.  No need for further routine checks     9)Heme   #Acute postoperative blood loss anemia, resolved  -hemoglobin mildly low at 12.3 on 7/29 and stable   - Hemoglobin 12.0 8/5, recheck Hb again due to black stool.     #Leukocytosis   -Thought to be secondary to steroids which are now discontinued. WBCs 8.8 on 8/5  -Will continue to monitor weekly    #Thrombocytopenia  -Noted upon initial admission to the emergency room. Has been stable low 100s, but now improved to 136 on 7/29. Unclear if it was due to acute distress and infection versus possible undiagnosed liver disease.   -Monitor peripherally  -Follow-up with primary care physician     10)Psych  -Monitor mood, will consult health psychology if needed     11)Social/Dispo  -Anticipate discharge home at a modified independent level for ambulation, mobility, and ADLs.   -ELOS: 3 weeks  -Rehab prognosis: Good  -Follow up appointments: PCP, NSGY (Dr. Hanley), ID     Code status: Full Code (Discussed with patient upon admission).  This was confirmed upon re-admit.  Pt does not want  extraordinary measures should prognosis be poor, however does want attempts at resuscitation and therefore will remain full code.    Saad Diaz MD

## 2021-08-09 NOTE — PLAN OF CARE
"/70 (BP Location: Right arm)   Pulse 102   Temp 99.4  F (37.4  C) (Oral)   Resp 18   Ht 1.803 m (5' 11\")   Wt 107.7 kg (237 lb 6.4 oz)   SpO2 96%   BMI 33.11 kg/m    Patient is A&O x 4, able to make needs known. Davis patent with 750 ml of urine. Large black soft BM  after a suppository. Sandra and Davis cares done. Reddened sandra area, fungal like . Left a sticky note to MD for powder. Single lumen PICC patent with flushes. Up with assist of one using a walker. Tylenol 650 mg given with relief. CPAP on. Continue with POC.  "

## 2021-08-09 NOTE — PLAN OF CARE
"Discharge Planner Post-Acute Rehab OT:      Discharge Plan: home with assist for IADL and HH      Precautions: fall, cervical spinal, involuntary twitching in lower extremities, orthostatic hypotension w/ mainly standing.      Current Status:  ADLs: Nalini for UB dressing, SBA-mod A w/ LB ADLs, total assist for toileting due to incontinence. Seated for EOS face g/h tasks, pt not able to tolerate standing yet, continue to encourage standing ADLs.   IADLs:not tested, wife can assist  Vision/Cognition: WFL     Assessment: Pt is demonstrating improving BP orthostatics and PT has ordered an abdominal binder/ compression for legs to assist w/ normalizing BP. Pt is symptomatic mainly in standing but tolerating longer sitting duration up in the w/c.   Pt continues to experience only mild symptoms of what he describes as \"light headedness\" and quicker recovery of symptoms this date. Continue to progress functional mobility, standing tolerance, BUE strength and ADLs. Pt sometimes is self limiting and continues to have low activity tolerance for some tasks. Cont. OT POC and goals.      Other Barriers to Discharge (DME, Family Training, etc): Pt has good family support and accessible home. family training, determine DME and improve ADL performance   "

## 2021-08-10 ENCOUNTER — APPOINTMENT (OUTPATIENT)
Dept: PHYSICAL THERAPY | Facility: CLINIC | Age: 71
DRG: 949 | End: 2021-08-10
Attending: PHYSICAL MEDICINE & REHABILITATION
Payer: COMMERCIAL

## 2021-08-10 ENCOUNTER — APPOINTMENT (OUTPATIENT)
Dept: OCCUPATIONAL THERAPY | Facility: CLINIC | Age: 71
DRG: 949 | End: 2021-08-10
Attending: PHYSICAL MEDICINE & REHABILITATION
Payer: COMMERCIAL

## 2021-08-10 ENCOUNTER — APPOINTMENT (OUTPATIENT)
Dept: MRI IMAGING | Facility: CLINIC | Age: 71
DRG: 949 | End: 2021-08-10
Attending: PHYSICAL MEDICINE & REHABILITATION
Payer: COMMERCIAL

## 2021-08-10 LAB
HGB BLD-MCNC: 10.5 G/DL (ref 13.3–17.7)
SARS-COV-2 RNA RESP QL NAA+PROBE: NEGATIVE

## 2021-08-10 PROCEDURE — 72156 MRI NECK SPINE W/O & W/DYE: CPT | Mod: 26 | Performed by: RADIOLOGY

## 2021-08-10 PROCEDURE — 258N000003 HC RX IP 258 OP 636: Performed by: PHYSICAL MEDICINE & REHABILITATION

## 2021-08-10 PROCEDURE — 72156 MRI NECK SPINE W/O & W/DYE: CPT

## 2021-08-10 PROCEDURE — A9585 GADOBUTROL INJECTION: HCPCS | Performed by: PHYSICAL MEDICINE & REHABILITATION

## 2021-08-10 PROCEDURE — 97110 THERAPEUTIC EXERCISES: CPT | Mod: GP

## 2021-08-10 PROCEDURE — 128N000003 HC R&B REHAB

## 2021-08-10 PROCEDURE — 250N000013 HC RX MED GY IP 250 OP 250 PS 637: Performed by: STUDENT IN AN ORGANIZED HEALTH CARE EDUCATION/TRAINING PROGRAM

## 2021-08-10 PROCEDURE — 97116 GAIT TRAINING THERAPY: CPT | Mod: GP

## 2021-08-10 PROCEDURE — 87635 SARS-COV-2 COVID-19 AMP PRB: CPT | Performed by: PHYSICAL MEDICINE & REHABILITATION

## 2021-08-10 PROCEDURE — 250N000011 HC RX IP 250 OP 636: Performed by: PHYSICAL MEDICINE & REHABILITATION

## 2021-08-10 PROCEDURE — 99233 SBSQ HOSP IP/OBS HIGH 50: CPT | Mod: 24 | Performed by: PHYSICAL MEDICINE & REHABILITATION

## 2021-08-10 PROCEDURE — 97530 THERAPEUTIC ACTIVITIES: CPT | Mod: GP

## 2021-08-10 PROCEDURE — 97110 THERAPEUTIC EXERCISES: CPT | Mod: GO

## 2021-08-10 PROCEDURE — 255N000002 HC RX 255 OP 636: Performed by: PHYSICAL MEDICINE & REHABILITATION

## 2021-08-10 PROCEDURE — 36592 COLLECT BLOOD FROM PICC: CPT | Performed by: STUDENT IN AN ORGANIZED HEALTH CARE EDUCATION/TRAINING PROGRAM

## 2021-08-10 PROCEDURE — 97535 SELF CARE MNGMENT TRAINING: CPT | Mod: GO

## 2021-08-10 PROCEDURE — 250N000013 HC RX MED GY IP 250 OP 250 PS 637: Performed by: PHYSICAL MEDICINE & REHABILITATION

## 2021-08-10 PROCEDURE — G0463 HOSPITAL OUTPT CLINIC VISIT: HCPCS

## 2021-08-10 PROCEDURE — 85018 HEMOGLOBIN: CPT | Performed by: STUDENT IN AN ORGANIZED HEALTH CARE EDUCATION/TRAINING PROGRAM

## 2021-08-10 RX ORDER — PANTOPRAZOLE SODIUM 40 MG/1
40 TABLET, DELAYED RELEASE ORAL
Status: DISCONTINUED | OUTPATIENT
Start: 2021-08-11 | End: 2021-08-11

## 2021-08-10 RX ORDER — GADOBUTROL 604.72 MG/ML
10 INJECTION INTRAVENOUS ONCE
Status: COMPLETED | OUTPATIENT
Start: 2021-08-10 | End: 2021-08-10

## 2021-08-10 RX ADMIN — PSYLLIUM HUSK 1 PACKET: 3.4 POWDER ORAL at 21:55

## 2021-08-10 RX ADMIN — PSYLLIUM HUSK 1 PACKET: 3.4 POWDER ORAL at 08:13

## 2021-08-10 RX ADMIN — SODIUM CHLORIDE 30 ML: 9 INJECTION, SOLUTION INTRAVENOUS at 16:55

## 2021-08-10 RX ADMIN — Medication 250 MG: at 08:13

## 2021-08-10 RX ADMIN — TRAZODONE HYDROCHLORIDE 100 MG: 50 TABLET ORAL at 21:55

## 2021-08-10 RX ADMIN — MICONAZOLE NITRATE: 20 POWDER TOPICAL at 21:55

## 2021-08-10 RX ADMIN — Medication 250 MG: at 19:26

## 2021-08-10 RX ADMIN — SENNOSIDES 2 TABLET: 8.6 TABLET, FILM COATED ORAL at 12:35

## 2021-08-10 RX ADMIN — Medication 1 MG: at 21:55

## 2021-08-10 RX ADMIN — CEFEPIME HYDROCHLORIDE 2 G: 2 INJECTION, POWDER, FOR SOLUTION INTRAVENOUS at 06:08

## 2021-08-10 RX ADMIN — CEFEPIME HYDROCHLORIDE 2 G: 2 INJECTION, POWDER, FOR SOLUTION INTRAVENOUS at 21:52

## 2021-08-10 RX ADMIN — CEFEPIME HYDROCHLORIDE 2 G: 2 INJECTION, POWDER, FOR SOLUTION INTRAVENOUS at 14:40

## 2021-08-10 RX ADMIN — GADOBUTROL 10 ML: 604.72 INJECTION INTRAVENOUS at 16:54

## 2021-08-10 RX ADMIN — TAMSULOSIN HYDROCHLORIDE 0.4 MG: 0.4 CAPSULE ORAL at 08:13

## 2021-08-10 RX ADMIN — DAPTOMYCIN 650 MG: 500 INJECTION, POWDER, LYOPHILIZED, FOR SOLUTION INTRAVENOUS at 19:04

## 2021-08-10 NOTE — PLAN OF CARE
Discharge Planner Post-Acute Rehab OT:      Discharge Plan: home with assist for IADL and HH      Precautions: fall, cervical spinal, involuntary twitching in lower extremities, orthostatic hypotension w/ mainly standing- Abdominal binder/LE stockinette.     Current Status:  ADLs: SBA w/ dressing stick for UB dressing, SBA-min A w/ LB ADLs edu w/ use of AE able to thread and unthread catheter, total assist for toileting due to incontinence. Seated for EOS face g/h tasks, pt not able to tolerate standing, continue to encourage standing ADLs EOS.   Dependent of bladder, catheter in place.  IADLs:not tested, wife can assist  Vision/Cognition: WFL     Assessment: Pt is demonstrating improving BP orthostatics. Abdominal ordered and placed on pt. Pt is making progress w/ ADLs but still limited w/ standing tolerance and functional mobility, anxiety noted w/ involuntary twitching in BLE during standing/sitting but no LOB occurrences.     Other Barriers to Discharge (DME, Family Training, etc): Pt has good family support and accessible home. family training, determine DME and improve ADL performance

## 2021-08-10 NOTE — PLAN OF CARE
FOCUS/GOAL  Bowel management, Bladder management, Pain management, Mobility, Skin integrity, and Safety management    ASSESSMENT, INTERVENTIONS AND CONTINUING PLAN FOR GOAL:  A/O, VS stable. Regular/thin, pills whole. Ate most of meal. Continent of bowel, torres in place for bladder. No complaints of pain this shift. Assist of 1 with walker to transfer. No new skin concerns, blanchable redness to coccyx, encouraged weight shifting. Calls appropriately with light. Occult stool came back positive, MD notified. Discussed with MD about plan of care and MD stated no immediate concerns. Pt primary team will assess in AM. Continue POC.

## 2021-08-10 NOTE — PLAN OF CARE
"  VS: /65 (BP Location: Right arm)   Pulse 101   Temp 98.1  F (36.7  C) (Oral)   Resp 16   Ht 1.803 m (5' 11\")   Wt 107.7 kg (237 lb 6.4 oz)   SpO2 95%   BMI 33.11 kg/m     O2: Room air, no complaints of SoB. Encouraging use of IS.    Output: Order to removed torres completed. Trial to void initiated at 1200, bladder scan at 1400 was 286 mL. Pt unable to void at this time. Will re-check in 2 hrs per order.   Last BM: 8/9/2021 with bowel program   Activity: Up w/ SBA using gait belt and walker    Skin: Incision on neck, SATNAM   Pain: Denies   CMS: Intact ex pt says that it feels like the sensation around his urethra is absent   Dressing: Neck dressing (steri strips) cover incision, CDI - no drainage   Diet: Regular diet thin liquids and pills whole   LDA: PICC in LUE   Equipment: Personal belongings, call light within reach   Plan: Tentative discharge 8/23/2021   Additional Info:        "

## 2021-08-10 NOTE — PLAN OF CARE
"FOCUS/GOAL  Bowel management, Bladder management, Pain management and Cognition/Memory/Judgment/Problem solving    ASSESSMENT, INTERVENTIONS AND CONTINUING PLAN FOR GOAL:  Pt is alert and oriented, slept well overnight using CPAP, PICC is patent with blood return, torres in place and patent, no reports of pain, sob, fever, chills, n/v, or new numbness and tingling, steri strips covering neck incision, no further care concerns at this time.     Vitals: /65 (BP Location: Right arm)   Pulse 86   Temp 98.1  F (36.7  C) (Oral)   Resp 16   Ht 1.803 m (5' 11\")   Wt 107.7 kg (237 lb 6.4 oz)   SpO2 95%   BMI 33.11 kg/m    BMI= Body mass index is 33.11 kg/m .    "

## 2021-08-10 NOTE — PROGRESS NOTES
CLINICAL NUTRITION SERVICES - REASSESSMENT NOTE     Nutrition Prescription    RECOMMENDATIONS FOR MDs/PROVIDERS TO ORDER:  None today     Malnutrition Status:    Non-severe malnutrition in the context of acute illness    Recommendations already ordered by Registered Dietitian (RD):  None today - continue supplements and snack as ordered     Future/Additional Recommendations:  Continue to monitor meal/supplement intakes and weight trends      EVALUATION OF THE PROGRESS TOWARD GOALS   Diet: Regular  Supplement: Chocolate Ensure Enlive q meal and PRN + PM snack: cheese, crackers and butter  Intake: % per flow sheets        NEW FINDINGS   MAR reviewed. Labs reviewed. WOC RN notes reviewed, pt's device/pressure injury healed. RD visited pt at bedside, pt reports between supplements as ordered and wife brining in food, pt has good appetite/intakes, pt has dislike of some of menu items, but does still order liquids/sides. Weight trends reviewed, reviewed fluid status changes likely affecting trends acutely.     08/03/21 0700 107.7 kg (237 lb 6.4 oz)   08/02/21 1400 105.5 kg (232 lb 8 oz)     Wt Readings from Last 20 Encounters:   08/03/21 107.7 kg (237 lb 6.4 oz)   07/25/21 108.3 kg (238 lb 11.2 oz)   07/12/21 113 kg (249 lb 3.2 oz)     Weight assessment: Weight up 5 lbs from ARU admit, down 12 lbs/4.8% in not quite 1 month. Pt reports a more UBW of around 250 lbs prior to hospitalization.     MALNUTRITION  % Intake: No decreased intake noted  % Weight Loss: 5% in 1 month (non-severe/moderate)  Subcutaneous Fat Loss: None observed  Muscle Loss: Temporal: mild  Fluid Accumulation/Edema: Trace per flow sheets   Malnutrition Diagnosis: Non-severe malnutrition in the context of acute illness     Previous Goals   Patient to consume % of nutritionally adequate meal trays TID, or the equivalent with supplements/snacks  Evaluation: Likely met    Previous Nutrition Diagnosis  Inadequate oral intake related to increased  needs for wound healing, decreased appetite and disliking hospital food as evidenced by documented intakes and wt loss of  12# (4.8%) wt loss in 1 month  Evaluation: Improving    CURRENT NUTRITION DIAGNOSIS  Predicted inadequate nutrient intake related to varying appetite/food preferences as evidenced by almost significant weight loss in 1 month.       INTERVENTIONS  Implementation  Medical food supplement therapy - continue as ordered   Modify composition of meals/snacks - continue as ordered     Goals  Patient to consume % of nutritionally adequate meal trays TID, or the equivalent with supplements/snacks.    Monitoring/Evaluation  Progress toward goals will be monitored and evaluated per protocol.    Susan Weiss RD, CNSC, LD  ARU RD pager: [phone]

## 2021-08-10 NOTE — PROGRESS NOTES
"  [facility]   Acute Rehabilitation Unit  Daily progress note    INTERVAL HISTORY  Mr. Johns's occult stool test returned positive, but per nursing note, has been continent of bowel. This am his denies/is \"unaware\" of having any further bloody or dark stools. He continues to feel weaker in the last few days as well. He endorses having some neck pain, along with pain across the shoulders, but nothing at this time. He usually feels the pain after therapies or when changing positions. He continues to have a low appetite and mentions not liking the meat that served at the hospital. His wife has been bringing in food for him, like roast beef, which he likes much more. He also states having several muscle twitches in both lower extremities, which he attributes to his nerves and hopes it is a good sign. Finally, he also has noticed that his toes feel cold lately, but he has no pain associated with the coldness. Mr. Johns does feel like he is getting better control of his bowels and having less accidents, which he is happy about. He denies SOB and chest pain.     Functionally, pt is demonstrating improving BP orthostatics and PT has ordered an abdominal binder/ compression for legs to assist w/ normalizing BP. Pt is symptomatic mainly in standing but tolerating longer sitting duration up in the w/c. Pt continues to experience onlly mild symptoms of what he describes as \"light headedness\" and quicker recovery of symptoms this date. Continue to progress functional mobility, standing tolerance, BUE strength and ADLs. Pt sometimes is self limiting and continue to have low activity tolerance for some tasks. He can ambulate up to 50 feet with contact guard assist and WW and also climbs 4\" stairs with contact guard assist. Contact guard assist and WW for balance while standing .       MEDICATIONS  Scheduled meds    bisacodyl  10 mg Rectal Daily     ceFEPIme  2 g Intravenous Q8H     DAPTOmycin " "(CUBICIN) intermittent infusion  6 mg/kg Intravenous Q24H     melatonin  1 mg Oral At Bedtime     psyllium  1 packet Oral BID     saccharomyces boulardii  250 mg Oral BID     sennosides  2 tablet Oral Daily     sodium chloride (PF)  10 mL Intracatheter Q8H     tamsulosin  0.4 mg Oral Daily     traZODone  100 mg Oral At Bedtime       PRN meds:  acetaminophen, methocarbamol, miconazole, ondansetron, senna-docusate, sodium chloride (PF)      PHYSICAL EXAM  /65 (BP Location: Right arm)   Pulse 86   Temp 98.1  F (36.7  C) (Oral)   Resp 16   Ht 1.803 m (5' 11\")   Wt 107.7 kg (237 lb 6.4 oz)   SpO2 95%   BMI 33.11 kg/m    Gen: Awake, coooperative, no distress.  HEENT: atraumatic, no discharge from nares or ears, EOM intact.  Anterior incision covered with steristrips, c/d/i.  Cardio: RRR, no murmurs auscultated, capillary fill <2s in toes b/l  Pulm: Non-labored breathing, CTAB  : Davis catheter in place  Ext: Mild edema in LE b/l, none in UE b/l, no calf tenderness, wraps present b/l in LE  Neuro/MSK: alert, oriented, answers questions appropriately, follows commands      LABS  Results for orders placed or performed during the hospital encounter of 08/02/21 (from the past 24 hour(s))   Hemoglobin   Result Value Ref Range    Hemoglobin 10.9 (L) 13.3 - 17.7 g/dL   Occult blood stool   Result Value Ref Range    Occult Blood Positive (A) Negative     CBC RESULTS: Recent Labs   Lab Test 08/05/21 2020   WBC 8.8   RBC 3.67*   HGB 12.0*   HCT 36.6*      MCH 32.7   MCHC 32.8   RDW 11.6        Recent Labs   Lab Test 08/06/21  1109 08/05/21 2020    138   POTASSIUM 4.1 4.0   CHLORIDE 110* 108   CO2 26 28   ANIONGAP 4 2*   * 103*   BUN 26 25   CR 1.31* 1.51*   GARRISON 8.6 8.6     Liver Function Studies - Recent Labs   Lab Test 08/05/21 2020   PROTTOTAL 7.5   ALBUMIN 2.6*   BILITOTAL 0.5   ALKPHOS 95   AST 35   ALT 32       ASSESSMENT AND PLAN    Rigo Johns is a 70 year old L hand dominant " male with a PMH of HTN, PILI, and prior cervical surgery (C5-7 anterior fusion in 1993 per notes) who is now status post a C3-4, C4-5 ACDF on 7/14/2021 for cervical myelopathy.  He was admitted to the [facility] from 7/18-7/27/21 but transferred back to the medical floor for fevers and prevertebral fluid collection suggestive of abscess.  Now improving with IV antbiotics and no surgical intervention was needed.  He is now being admitted to the ARU on 8/2/21.  Impairment group code: 04.1211 Quadriplegia, Incomplete C1-4 - s/p C3-5 ACDF.   PLAN  Rehabilitation  1. PT and OT 90 minutes of each on a daily basis, in addition to rehab nursing and close management of physiatrist.    2. Impairment of ADL's: Noted to have impaired ROM, impaired strength, impaired activity tolerance, edema management, impaired balance, and impaired coordination, all affecting his ability to safely and independently perform basic ADLs.  Goal for mod I with basic ADLs.  3. Impairment of mobility:  Noted to have impaired ROM, impaired strength, impaired activity tolerance, edema management, impaired balance and impaired coordination, all affecting his ability to safely and independently ambulate and perform basic mobility.  Goal for mod I with basic mobility.  Medical  1)Neurology  #Cervical Myelopathy s/p C3-4, C4-5 ACDF on 7/14/21 complicated by fevers and prevertebral fluid collection, suggestive of abscess  -ID evaluated the patient, recommended Cefepime 2g q12h and Daptomycin 650 mg q24 hours for 2 weeks (will end 8/16).   -Weekly CBC, CMP, CRP, and CPK while on antibiotics.  Results to be faxed to Dr. Merna Donaldson's office.    -MRI 7/28 showed improved fluid collection and no surgical intervention was needed  -Maintain postoperative spinal precautions. No need for cervical collar.  -Follow-up with neurosurgery and infectious disease after discharge  -Follow up MRI in~1.5 weeks (ie tomorrow) to evaluate progress.  Will plan to complete  prior to discontinuation of abx and ask ID to review.     2)CVS  -Binder, encourage liquids, consider IV fluids if needed  -Monitor blood pressures as he has previously had some high readings, along with some orthostatic hypotensive episodes     3)Pulmonary  #CAP, resolved  -Completed course of antibiotics (Zosyn x1 in ED, Azithromycin 7/12-7/16, Ceftriaxone 7/12 - 7/17, and Cefuroxime x1) during initial hospitalization, abx for cervical fluid collection as above  -Encourage incentive spirometer  -Monitor respiratory status, supplementary oxygen PRN.  Now weaned to room air.     #PILI  -Continue CPAP with home settings     4)FENGI  #Diet: Regular consistency solids and thin liquids  #Neurogenic bowel  -Incontinence improving, happy to continue current regimen  -Fiber BID (increased to BID 8/5, previously was ordered daily)  -Scheduling 2 tabs of Senna at noon and suppository 30 minutes after dinner to regulate his BMs further on 8/6  -Large black soft BM after a suppositoryon evening of 8/8. Hgb 12.0 (8/5) > 10.9 yesterday > pending today). If Hgb dropping or he becomes hemodynamically unstable, will consult GI. If Hgb and clinical status remains stable will f/u with GI as an OP.  -Monitor, adjust as needed     #Liver Lesion  -Abnormal appearance is seen on CAT scan with slight nodularity inferiorly. LFTs within normal limits.  -Follow-up primary care physician     5)  #Urinary Retention  -TOV was done during prior rehab stay, but continued to retain and Davis was re-placed on 7/30.    -Will attempt voiding trial today  -Continue Flomax 0.4 mg daily.  Consider increasing if still retaining and if orthostasis has resolved.     #DONOVAN and likely CKD   -Pt with limited physician follow up so baseline creatinine not known. Peak at 2.13, improved with IV hydration  -Avoid nephrotoxic agents, NSAID     #Renal lesion  -Incidental finding on CT scan  -Renal ultrasound showed simple cysts of bilateral kidneys  -Follow up with  PCP     6)DVT prophylaxis  -PCDs and ambulation     7)Pain  Spasms  Pain is tolerable at this time. If spasms continue, will consider scheduling Robaxin.   -Tylenol 650 mg q4h PRN  -Robaxin 750 mg q6h PRN  -Continue to monitor spasms    8)Endo  -Prior steroid induced hyperglycemia.  HbA1c 5.0.   -Now completed course of steroids.  No need for further routine checks     9)Heme   #Black stool on 8/8  - Continue to monitor hemoglobin as above     #Leukocytosis   -Thought to be secondary to steroids which are now discontinued. WBCs 8.8 on 8/5  -Will continue to monitor weekly    #Thrombocytopenia  -Noted upon initial admission to the emergency room. Has been stable low 100s, but now improved to 192 on 8/5. Unclear if it was due to acute distress and infection versus possible undiagnosed liver disease.   -Monitor peripherally  -Follow-up with primary care physician     10)Psych  -Monitor mood, will consult health psychology if needed     11)Social/Dispo  -Anticipate discharge home at a modified independent level for ambulation, mobility, and ADLs.   -ELOS: 3 weeks  -Rehab prognosis: Good  -Follow up appointments: PCP, NSGY (Dr. Hanley), ID     Code status: Full Code (Discussed with patient upon admission).  This was confirmed upon re-admit.  Pt does not want extraordinary measures should prognosis be poor, however does want attempts at resuscitation and therefore will remain full code.    Teri Jordan MD

## 2021-08-10 NOTE — PLAN OF CARE
"Discharge Planner Post-Acute Rehab PT:     Discharge Plan: Home with family assist, mixed mobility likely (w/c and walker)     Precautions: Falls, cervical, PICC line    Current Status:  Bed Mobility: mod I   Transfer: CGA with WW   Gait: CGA with WW up to 50   Stairs: 4\" stairs, CGA  Balance: CGA and WW for standing     Assessment: Making slow gains, now with reports of \"twitching\" in BLE that cause knee buckling in standing intermittently. Close CGA and walker at all times.    Other Barriers to Discharge (DME, Family Training, etc): medical complexity, incontinence, DME needs, orthostatic BP       "

## 2021-08-10 NOTE — PROGRESS NOTES
[facility] Nurse Inpatient Pressure Injury Assessment   Reason for consultation: Evaluate and treat meatus      ASSESSMENT  Pressure Injury: on meatus , present on admission ,   This is a Medical Device Related Pressure Injury (MDRPI) due to torres  Pressure Injury is Stage Mucosal   Contributing factor of the pressure injury: immobility  Status: healed, torres now discontinued     TREATMENT PLAN  Meatus wound: No wound care indicated    Orders Updated  WOC Nurse follow-up plan:signing off  Nursing to notify the Provider(s) and re-consult the WO Nurse if wound(s) deteriorates or new skin concern.    Patient History  According to provider note(s):  Rigo Johns is a 70 year old male who is well known to the rehabilitation service [facility] after his C3-4 and C4-5 ACDF.     Objective Data  Containment of urine/stool: Indwelling catheter    Current Diet/ Nutrition:  Orders Placed This Encounter      Combination Diet Regular Diet Adult      Output:   I/O last 3 completed shifts:  In: -   Out: 2900 [Urine:2900]    Risk Assessment:   Sensory Perception: 3-->slightly limited  Moisture: 3-->occasionally moist  Activity: 3-->walks occasionally  Mobility: 3-->slightly limited  Nutrition: 3-->adequate  Friction and Shear: 2-->potential problem  Thor Score: 17      Labs:   Recent Labs   Lab 08/09/21  1647 08/05/21 2020   ALBUMIN  --  2.6*   HGB 10.9* 12.0*   WBC  --  8.8   CRP  --  34.0*       Physical Exam  Skin inspection: focused penis    Wound Location:  Meatus        Date of last Photo 8/3  Wound History: Pt admitted previously and seen by [facility] for possible pressure injury on 7/23.  None noted at that time. Pt had been discharged and now readmitted.  Noted meatus has been enlarged which is consistent with catheter being secured with tension. Also noted erythema in groin consistent with fungal infection. Recommend antifungal powder.  Measurements (length x width x depth, in  cm) -see picture  Healed on assessment 8/10/2021    Interventions  Current support surface: Standard  Atmos Air mattress  Current off-loading measures: Pillows  Repositioning aid: Pillows  Visual inspection of wound(s) completed   Tube Securement: cath securement strap  Wound Care: was done per plan of care.  Supplies: floor stock, discussed with RN and discussed with patient  Educated provided: importance of repositioning, plan of care and wound progress  Education provided to: patient   Discussed importance of:repositioning every 2 hours, off-loading pressure to wound and their role in pressure injury prevention  Discussed plan of care with Patient and Nurse    Gabby Merrill RN, CWOCN

## 2021-08-11 ENCOUNTER — APPOINTMENT (OUTPATIENT)
Dept: OCCUPATIONAL THERAPY | Facility: CLINIC | Age: 71
DRG: 949 | End: 2021-08-11
Attending: PHYSICAL MEDICINE & REHABILITATION
Payer: COMMERCIAL

## 2021-08-11 ENCOUNTER — APPOINTMENT (OUTPATIENT)
Dept: PHYSICAL THERAPY | Facility: CLINIC | Age: 71
DRG: 949 | End: 2021-08-11
Attending: PHYSICAL MEDICINE & REHABILITATION
Payer: COMMERCIAL

## 2021-08-11 LAB
ERYTHROCYTE [DISTWIDTH] IN BLOOD BY AUTOMATED COUNT: 11.7 % (ref 10–15)
HCT VFR BLD AUTO: 29.3 % (ref 40–53)
HGB BLD-MCNC: 10 G/DL (ref 13.3–17.7)
HGB BLD-MCNC: 9.6 G/DL (ref 13.3–17.7)
MCH RBC QN AUTO: 32.2 PG (ref 26.5–33)
MCHC RBC AUTO-ENTMCNC: 32.8 G/DL (ref 31.5–36.5)
MCV RBC AUTO: 98 FL (ref 78–100)
PLATELET # BLD AUTO: 144 10E3/UL (ref 150–450)
RBC # BLD AUTO: 2.98 10E6/UL (ref 4.4–5.9)
WBC # BLD AUTO: 8.4 10E3/UL (ref 4–11)

## 2021-08-11 PROCEDURE — 99207 PR NON-BILLABLE SERV PER CHARTING: CPT | Performed by: INTERNAL MEDICINE

## 2021-08-11 PROCEDURE — 250N000013 HC RX MED GY IP 250 OP 250 PS 637: Performed by: STUDENT IN AN ORGANIZED HEALTH CARE EDUCATION/TRAINING PROGRAM

## 2021-08-11 PROCEDURE — 85018 HEMOGLOBIN: CPT | Performed by: PHYSICAL MEDICINE & REHABILITATION

## 2021-08-11 PROCEDURE — 97535 SELF CARE MNGMENT TRAINING: CPT | Mod: GO

## 2021-08-11 PROCEDURE — 128N000003 HC R&B REHAB

## 2021-08-11 PROCEDURE — 250N000013 HC RX MED GY IP 250 OP 250 PS 637: Performed by: PHYSICAL MEDICINE & REHABILITATION

## 2021-08-11 PROCEDURE — 97530 THERAPEUTIC ACTIVITIES: CPT | Mod: GP

## 2021-08-11 PROCEDURE — 250N000011 HC RX IP 250 OP 636: Performed by: PHYSICAL MEDICINE & REHABILITATION

## 2021-08-11 PROCEDURE — 99233 SBSQ HOSP IP/OBS HIGH 50: CPT | Mod: 24 | Performed by: PHYSICAL MEDICINE & REHABILITATION

## 2021-08-11 PROCEDURE — 36592 COLLECT BLOOD FROM PICC: CPT | Performed by: PHYSICAL MEDICINE & REHABILITATION

## 2021-08-11 PROCEDURE — 85027 COMPLETE CBC AUTOMATED: CPT | Performed by: PHYSICAL MEDICINE & REHABILITATION

## 2021-08-11 PROCEDURE — 258N000003 HC RX IP 258 OP 636: Performed by: PHYSICAL MEDICINE & REHABILITATION

## 2021-08-11 PROCEDURE — C9113 INJ PANTOPRAZOLE SODIUM, VIA: HCPCS | Performed by: PHYSICAL MEDICINE & REHABILITATION

## 2021-08-11 RX ADMIN — SENNOSIDES 2 TABLET: 8.6 TABLET, FILM COATED ORAL at 11:28

## 2021-08-11 RX ADMIN — SODIUM CHLORIDE 8 MG/HR: 9 INJECTION, SOLUTION INTRAVENOUS at 23:45

## 2021-08-11 RX ADMIN — CEFEPIME HYDROCHLORIDE 2 G: 2 INJECTION, POWDER, FOR SOLUTION INTRAVENOUS at 21:17

## 2021-08-11 RX ADMIN — PANTOPRAZOLE SODIUM 40 MG: 40 TABLET, DELAYED RELEASE ORAL at 06:30

## 2021-08-11 RX ADMIN — PSYLLIUM HUSK 1 PACKET: 3.4 POWDER ORAL at 09:17

## 2021-08-11 RX ADMIN — TAMSULOSIN HYDROCHLORIDE 0.4 MG: 0.4 CAPSULE ORAL at 09:17

## 2021-08-11 RX ADMIN — CEFEPIME HYDROCHLORIDE 2 G: 2 INJECTION, POWDER, FOR SOLUTION INTRAVENOUS at 06:27

## 2021-08-11 RX ADMIN — CEFEPIME HYDROCHLORIDE 2 G: 2 INJECTION, POWDER, FOR SOLUTION INTRAVENOUS at 13:29

## 2021-08-11 RX ADMIN — PSYLLIUM HUSK 1 PACKET: 3.4 POWDER ORAL at 21:18

## 2021-08-11 RX ADMIN — ACETAMINOPHEN 650 MG: 325 TABLET, FILM COATED ORAL at 21:29

## 2021-08-11 RX ADMIN — PANTOPRAZOLE SODIUM 40 MG: 40 INJECTION, POWDER, FOR SOLUTION INTRAVENOUS at 09:53

## 2021-08-11 RX ADMIN — BISACODYL 10 MG: 10 SUPPOSITORY RECTAL at 19:36

## 2021-08-11 RX ADMIN — SODIUM CHLORIDE 8 MG/HR: 9 INJECTION, SOLUTION INTRAVENOUS at 10:06

## 2021-08-11 RX ADMIN — Medication 250 MG: at 19:36

## 2021-08-11 RX ADMIN — TRAZODONE HYDROCHLORIDE 100 MG: 50 TABLET ORAL at 21:18

## 2021-08-11 RX ADMIN — DAPTOMYCIN 650 MG: 500 INJECTION, POWDER, LYOPHILIZED, FOR SOLUTION INTRAVENOUS at 16:27

## 2021-08-11 RX ADMIN — Medication 1 MG: at 21:18

## 2021-08-11 RX ADMIN — Medication 250 MG: at 09:17

## 2021-08-11 NOTE — PROGRESS NOTES
Brief Medicine Triage Note    70 year old man with history of hypertension, PILI and cervical fusion now s/p multilevel cervical spinal fusion for cervical myelopathy complicated by fluid collection and concern for infection on cefepime and daptomycin now with decreasing hemoglobin and dark stool; has been having melena and hemoglobin slowly drifting down.  (12 --> 9.6)    Hemoglobin   Date Value Ref Range Status   08/11/2021 9.6 (L) 13.3 - 17.7 g/dL Final   ]    Patient needs upper endoscopy in the coming days, has been discussed with GI.    Will work to arrange.    Joshua Alcala MD

## 2021-08-11 NOTE — CONSULTS
Brief GI note:    Consulted by PMR for acute hemoglobin drop with reported melenic stools.  Hemodynamically stable. Not on anti-coagulants/anti-platelets. No known history of liver disease.    Recommendations:  - 2 large bore IVs  - Clear liquid diet today - NPO at midnight  - IV PPI  - trend hemoglobin q12h - transfuse for hemoglobin <7  - Plan for transfer to [facility] for endoscopic evaluation

## 2021-08-11 NOTE — PLAN OF CARE
FOCUS/GOAL  Medication management, Skin integrity, and Safety management    ASSESSMENT, INTERVENTIONS AND CONTINUING PLAN FOR GOAL:  Pt was A/O, able to use call light and make needs known to staff. Denies pain, SOB, chest pain nor dizziness. A1 stand pivot with transfers, w/c based. On regular diet, thin liquids, takes medicines whole. Had MRI this afternoon, Dr. Soto updated with result. Not voiding this shift, random scan was performed with 999ml, st catheterization done and drained 930ml urine, cont of BM, LBM-8/10/21, black stool noted, Dr. Soto updated, protonix to start in AM tomorrow. PICC line in place, patent, dressing CDI. PRN micatin powder applied to groin and butt. VSS. CHG bath completed. Will continue with current POC.

## 2021-08-11 NOTE — PLAN OF CARE
Discharge Planner Post-Acute Rehab OT:      Discharge Plan: home with assist for IADL and HH      Precautions: fall, cervical spinal, involuntary twitching in lower extremities, orthostatic hypotension w/ mainly standing- Abdominal binder/LE stockinette.     Current Status:  ADLs: SBA w/ dressing stick for UB dressing, SBA-min A w/ LB ADLs edu w/ use of AE, total assist for toileting due to incontinence. Seated for EOS face g/h tasks, pt minimally tolerates standing d/t orthostatic hypotension  IADLs:not tested, wife can assist  Vision/Cognition: WFL     Assessment: trialed standing with g/h tasks this am, pt motivated but limited by symptomatic orthostatic hypotension in standing with reading at 83/44. Pt did have abdominal binder and compression stockings on. Pt able to recover within a few minutes seated.      PM tx cancelled d/t pt Hgb, pt fatigued, and awaiting tsf to [facility] for further work up.      Other Barriers to Discharge (DME, Family Training, etc): Pt has good family support and accessible home. family training, determine DME and improve ADL performance

## 2021-08-11 NOTE — PROGRESS NOTES
Target discharge date Mon 08/23. Team rounds scheduled for Friday to discuss pt needs, recommendations and discharge further. Currently on IV abx. SW received call from MD, pt will go to [facility] for EGD and will plan to come directly back to ARU unit after procedure. Per MD, no more IV abx after today. RN CC updated via email. Therapy schedulers and Charge RN updated as well. SW will continue to follow when pt returns and A with coordinating discharge plans as needed.     ADDENDUM: Per MD, no therapy in the morning since pt will go to EGD in the AM. Therapy and Charge RN updated.     MELA Aguilar   [facility]   Direct Phone: [phone]  I   Pager: [phone]  I  Fax: [phone]

## 2021-08-11 NOTE — PLAN OF CARE
"FOCUS/GOAL  Medical management    ASSESSMENT, INTERVENTIONS AND CONTINUING PLAN FOR GOAL:  Pt is alert and oriented. No complaints of pain. Assist of 1 with walker stand pivot. Unable to void overnight. ISC x2. Educated pt on the importance of regularly emptying bladder and referencing ISC on PM shift. Verbalized he understood \"I know that's what the doctor wants\". CPAP worn part of the night. PICC WDL and abx infused without difficulty. Appeared to be sleeping between cares.     "

## 2021-08-11 NOTE — PLAN OF CARE
FOCUS/GOAL  Medication management and Medical management    ASSESSMENT, INTERVENTIONS AND CONTINUING PLAN FOR GOAL:  Pt Aox4 able to make needs known.  Denies pain, cough, sob, chest pain, dizziness, N/V.  Pt has edema to BLE with tubi- on.  Was continent of bowel today after going on the toilet to try and void.  Pt unable to void and was SIC for 600.  Iv protonix push given per order then started on protonix drip at 10ml/hr, MD gave okay for writer to stop protonix when giving ABX and then start again.  Clear liquids diet started at 1215 with order to be NPO after midnight, okay to take medications with water, as pt is going to be going out for upper endoscopy.  Pt will not be discharging from unit per AVS only going out for procedure then coming back.  Working with therapies.  Nursing will continue to monitor.

## 2021-08-11 NOTE — PROGRESS NOTES
"  [facility]   Acute Rehabilitation Unit  Daily progress note    INTERVAL HISTORY  Overnight Mr. Johns needed to be straight cathed, but was able to fall back asleep.  He has not been able to void since removal of his Davis, and he also does not have the sensation to void.  For bowels however he is having increased sensation and ability to push.  He declined suppository for the past 2 days, but notes increased difficulty with the bowel movement last night.  Stools continue to be dark.  He otherwise feels quite well this morning.  Denies chest pain, shortness of breath, and notes improved range of motion in the neck.  Still with intermittent lightheadedness and dizziness.  Hgb today dropped to 9.6, discussed with GI and will plan for upper endoscopy in the next 1-2 days.  Twitching in the legs still present, but decreased today.  MRI of the C-spine done yesterday shows continued improvement of prevertebral fluid.  Functionally, ambulating 50 ft CGA with a WW, dependent for toileting, SBA to Min A with LBD.        MEDICATIONS  Scheduled meds    bisacodyl  10 mg Rectal Daily     ceFEPIme  2 g Intravenous Q8H     DAPTOmycin (CUBICIN) intermittent infusion  6 mg/kg Intravenous Q24H     melatonin  1 mg Oral At Bedtime     psyllium  1 packet Oral BID     saccharomyces boulardii  250 mg Oral BID     sennosides  2 tablet Oral Daily     sodium chloride (PF)  10 mL Intracatheter Q8H     tamsulosin  0.4 mg Oral Daily     traZODone  100 mg Oral At Bedtime       PRN meds:  acetaminophen, methocarbamol, miconazole, ondansetron, senna-docusate, sodium chloride (PF)      PHYSICAL EXAM  /64 (BP Location: Right arm)   Pulse 88   Temp 98.8  F (37.1  C) (Oral)   Resp 16   Ht 1.803 m (5' 11\")   Wt 107.7 kg (237 lb 6.4 oz)   SpO2 94%   BMI 33.11 kg/m    Gen: Awake, coooperative, no distress.  HEENT: atraumatic, no discharge from nares or ears, EOM intact.  Anterior incision covered with " steristrips, c/d/i.  Cardio: RRR, no murmurs auscultated  Pulm: Non-labored breathing, CTAB  : Davis now removed  Ext: Mild edema in LE b/l, none in UE b/l, no calf tenderness  Neuro/MSK: alert, oriented, answers questions appropriately, follows commands      LABS  Results for orders placed or performed during the hospital encounter of 08/02/21 (from the past 24 hour(s))   Asymptomatic COVID-19 Virus (Coronavirus) by PCR Nasopharyngeal    Specimen: Nasopharyngeal; Swab    Narrative    The following orders were created for panel order Asymptomatic COVID-19 Virus (Coronavirus) by PCR Nasopharyngeal.  Procedure                               Abnormality         Status                     ---------                               -----------         ------                     SARS-COV2 (COVID-19) Vir...[[id number]]  Normal              Final result                 Please view results for these tests on the individual orders.   SARS-COV2 (COVID-19) Virus RT-PCR    Specimen: Nasopharyngeal; Swab   Result Value Ref Range    SARS CoV2 PCR Negative Negative    Narrative    Testing was performed using the ivonne  SARS-CoV-2 & Influenza A/B Assay on the ivonne  [employer]  System.  This test should be ordered for the detection of SARS-COV-2 in individuals who meet SARS-CoV-2 clinical and/or epidemiological criteria. Test performance is unknown in asymptomatic patients.  This test is for in vitro diagnostic use under the FDA EUA for laboratories certified under CLIA to perform moderate and/or high complexity testing. This test has not been FDA cleared or approved.  A negative test does not rule out the presence of PCR inhibitors in the specimen or target RNA in concentration below the limit of detection for the assay. The possibility of a false negative should be considered if the patient's recent exposure or clinical presentation suggests COVID-19.  [facility] are certified under the Clinical Laboratory Improvement  Amendments of 1988 (CLIA-88) as qualified to perform moderate and/or high complexity laboratory testing.   Hemoglobin   Result Value Ref Range    Hemoglobin 10.5 (L) 13.3 - 17.7 g/dL   MR Cervical Spine w/o & w Contrast    Narrative    MR CERVICAL SPINE W/O & W CONTRAST 8/10/2021 5:31 PM    Provided History: Neck pain, acute, no red flags  ICD-10:    Comparison: Cervical spine MRI 7/23/2021, cervical spine CT 7/23/2021    Technique: Sagittal T1-weighted, sagittal T2-weighted, sagittal  diffusion weighted, axial T2-weighted, and axial T2* gradient echo  images of the cervical spine were obtained without intravenous  contrast. Following intravenous administration of gadolinium, axial  and sagittal T1-weighted images with fat saturation were also  obtained.    Contrast: 9 mL Gadavist    Findings:  Postsurgical changes of anterior spinal fusion from C3 through C5 with  intervertebral disc spacers. Similar extent of increased T2 signal  within the spinal cord from C3 through C4. The cervical vertebrae are  normally aligned.  There is moderate to severe disc height narrowing  at C5-6.  There is no abnormal contrast enhancement within the  cervical spinal cord, thecal sac or vertebral column.  The findings on  a level by level basis are as follows:    C2-3:  No spinal canal or neural foraminal narrowing.    C3-4:  Uncal Hypertrophy and left facet arthropathy with mild to  moderate left neural foraminal stenosis. No right neural foraminal  stenosis or spinal canal stenosis    C4-5:  Bilateral facet arthropathy and uncovertebral hypertrophy.  There is mild to moderate bilateral neural foraminal stenosis. Minimal  spinal canal stenosis    C5-6:  Left greater than right facet arthropathy with bilateral  uncovertebral hypertrophy. There is moderate right and mild to  moderate left neural foraminal stenosis. No spinal canal stenosis    C6-7:  Mild neural foraminal stenosis bilaterally. Spinal canal is  patent.    C7-T1:  Mild  neural foraminal stenosis bilaterally. Spinal canal is  patent.     There is minimal residual prevertebral fluid collection remaining.      Impression    Impression:   1. Significant reduction in prevertebral fluid collection with minimal  residual remaining. No definite abscess is identified.  2. Stable postsurgical changes of ACDF from C3-5.  3. Multilevel cervical spondylosis, unchanged compared to exam  7/23/2021.  4. Stable T2 hyperintense signal within the cervical cord from C3  through C4, likely representing myelomalacia.    I have personally reviewed the examination and initial interpretation  and I agree with the findings.    MARY GRUBER MD         SYSTEM ID:  [id number]   CBC with platelets   Result Value Ref Range    WBC Count 8.4 4.0 - 11.0 10e3/uL    RBC Count 2.98 (L) 4.40 - 5.90 10e6/uL    Hemoglobin 9.6 (L) 13.3 - 17.7 g/dL    Hematocrit 29.3 (L) 40.0 - 53.0 %    MCV 98 78 - 100 fL    MCH 32.2 26.5 - 33.0 pg    MCHC 32.8 31.5 - 36.5 g/dL    RDW 11.7 10.0 - 15.0 %    Platelet Count 144 (L) 150 - 450 10e3/uL       ASSESSMENT AND PLAN    Rigo Johns is a 70 year old L hand dominant male with a PMH of HTN, PILI, and prior cervical surgery (C5-7 anterior fusion in 1993 per notes) who is now status post a C3-4, C4-5 ACDF on 7/14/2021 for cervical myelopathy.  He was admitted to the [place] ARU from 7/18-7/27/21 but transferred back to the medical floor for fevers and prevertebral fluid collection suggestive of abscess.  Now improving with IV antbiotics and no surgical intervention was needed.  He is now being admitted to the ARU on 8/2/21.  Impairment group code: 04.1211 Quadriplegia, Incomplete C1-4 - s/p C3-5 ACDF.   PLAN  Rehabilitation  1. PT and OT 90 minutes of each on a daily basis, in addition to rehab nursing and close management of physiatrist.    2. Impairment of ADL's: Noted to have impaired ROM, impaired strength, impaired activity tolerance, edema management, impaired balance, and  impaired coordination, all affecting his ability to safely and independently perform basic ADLs.  Goal for mod I with basic ADLs.  3. Impairment of mobility:  Noted to have impaired ROM, impaired strength, impaired activity tolerance, edema management, impaired balance and impaired coordination, all affecting his ability to safely and independently ambulate and perform basic mobility.  Goal for mod I with basic mobility.  Medical  1)Neurology  #Cervical Myelopathy s/p C3-4, C4-5 ACDF on 7/14/21 complicated by fevers and prevertebral fluid collection, suggestive of abscess  -Follow up MRI completed 8/10 which shows near resolution of the prevertebral fluid.  Discussed with ID who also discussed with NSGY and images reviewed.  Will discontinue antibiotics after today (complete 2 week course)  -Weekly CBC, CMP, CRP, and CPK while on antibiotics.  Results to be faxed to Dr. Merna Donaldson's office.    -MRI 7/28 showed improved fluid collection and no surgical intervention was needed  -Maintain postoperative spinal precautions. No need for cervical collar.  -Follow-up with neurosurgery and infectious disease after discharge     2)CVS  -Binder, encourage liquids, consider IV fluids if needed  -Monitor blood pressures as he has previously had some high readings, along with some orthostatic hypotensive episodes     3)Pulmonary  #CAP, resolved  -Completed course of antibiotics (Zosyn x1 in ED, Azithromycin 7/12-7/16, Ceftriaxone 7/12 - 7/17, and Cefuroxime x1) during initial hospitalization, abx for cervical fluid collection as above  -Encourage incentive spirometer  -Monitor respiratory status, supplementary oxygen PRN.  Now weaned to room air.     #PILI  -Continue CPAP with home settings     4)FENGI  #Diet: Regular consistency solids and thin liquids  #Neurogenic bowel  -Incontinence improving, happy to continue current regimen  -Fiber BID  -Scheduling 2 tabs of Senna at noon and suppository 30 minutes after dinner to regulate  his BMs further on 8/6  #Melena  #Acute anemia  -Hgb continues to decrease, today 9.6.  Discussed with GI and will plan to do an endoscopy in the next 1-2 days.  Details for getting the procedure done on [place] are being arranged.  Also will start IV Protonix with 40 mg bolus followed by drip.     #Liver Lesion  -Abnormal appearance is seen on CAT scan with slight nodularity inferiorly. LFTs within normal limits.  -Follow-up primary care physician     5)  #Urinary Retention  -TOV was done during prior rehab stay, but continued to retain and Davis was re-placed on 7/30.    -Voiding trial done again on 8/10.  Thus far has not been able to void and is needing straight caths.  -Continue Flomax 0.4 mg daily.  Consider increasing if still retaining and if orthostasis has resolved.     #DONOVAN and likely CKD   -Pt with limited physician follow up so baseline creatinine not known. Peak at 2.13, improved with IV hydration  -Avoid nephrotoxic agents, NSAID     #Renal lesion  -Incidental finding on CT scan  -Renal ultrasound showed simple cysts of bilateral kidneys  -Follow up with PCP     6)DVT prophylaxis  -PCDs and ambulation     7)Pain  Spasms  Pain is tolerable at this time. If spasms continue, will consider scheduling Robaxin.   -Tylenol 650 mg q4h PRN  -Robaxin 750 mg q6h PRN  -Continue to monitor spasms    8)Endo  -Prior steroid induced hyperglycemia.  HbA1c 5.0.   -Now completed course of steroids.  No need for further routine checks     9)Heme   #Acute anemia  -GI workup as above     #Leukocytosis   -Thought to be secondary to steroids which are now discontinued. WBCs 8.4 on 8/11, stable    #Thrombocytopenia  -Noted upon initial admission to the emergency room. Had been stable low 100s, but now improving.  144 on 8/11. Unclear if it was due to acute distress and infection versus possible undiagnosed liver disease.   -Monitor peripherally  -Follow-up with primary care physician     10)Psych  -Monitor mood, will  consult health psychology if needed     11)Social/Dispo  -Anticipate discharge home at a modified independent level for ambulation, mobility, and ADLs.   -ELOS: 3 weeks  -Rehab prognosis: Good  -Follow up appointments: PCP, NSGY (Dr. Hanley), ID     Code status: Full Code (Discussed with patient upon admission).  This was confirmed upon re-admit.  Pt does not want extraordinary measures should prognosis be poor, however does want attempts at resuscitation and therefore will remain full code.      Robi Whitehead MD  Department of Rehabilitation Medicine    Time Spent on this Encounter   I, Robi Whitehead, spent a total of 35 minutes face-to-face or managing the care of Rigo Johns. Over 50% of my time on the unit was spent counseling the patient and coordinating care. See note for details.

## 2021-08-12 LAB
ALBUMIN SERPL-MCNC: 2.7 G/DL (ref 3.4–5)
ALP SERPL-CCNC: 77 U/L (ref 40–150)
ALT SERPL W P-5'-P-CCNC: 23 U/L (ref 0–70)
ANION GAP SERPL CALCULATED.3IONS-SCNC: 3 MMOL/L (ref 3–14)
AST SERPL W P-5'-P-CCNC: 31 U/L (ref 0–45)
BILIRUB SERPL-MCNC: 0.5 MG/DL (ref 0.2–1.3)
BUN SERPL-MCNC: 25 MG/DL (ref 7–30)
CALCIUM SERPL-MCNC: 8.8 MG/DL (ref 8.5–10.1)
CHLORIDE BLD-SCNC: 111 MMOL/L (ref 94–109)
CO2 SERPL-SCNC: 25 MMOL/L (ref 20–32)
CREAT SERPL-MCNC: 1.31 MG/DL (ref 0.66–1.25)
CRP SERPL-MCNC: 66 MG/L (ref 0–8)
ERYTHROCYTE [DISTWIDTH] IN BLOOD BY AUTOMATED COUNT: 11.7 % (ref 10–15)
GFR SERPL CREATININE-BSD FRML MDRD: 54 ML/MIN/1.73M2
GLUCOSE BLD-MCNC: 83 MG/DL (ref 70–99)
HCT VFR BLD AUTO: 31 % (ref 40–53)
HGB BLD-MCNC: 10.4 G/DL (ref 13.3–17.7)
MCH RBC QN AUTO: 33.1 PG (ref 26.5–33)
MCHC RBC AUTO-ENTMCNC: 33.5 G/DL (ref 31.5–36.5)
MCV RBC AUTO: 99 FL (ref 78–100)
PLATELET # BLD AUTO: 152 10E3/UL (ref 150–450)
POTASSIUM BLD-SCNC: 4 MMOL/L (ref 3.4–5.3)
PROT SERPL-MCNC: 7.4 G/DL (ref 6.8–8.8)
RBC # BLD AUTO: 3.14 10E6/UL (ref 4.4–5.9)
SODIUM SERPL-SCNC: 139 MMOL/L (ref 133–144)
WBC # BLD AUTO: 8.3 10E3/UL (ref 4–11)

## 2021-08-12 PROCEDURE — 85027 COMPLETE CBC AUTOMATED: CPT | Performed by: STUDENT IN AN ORGANIZED HEALTH CARE EDUCATION/TRAINING PROGRAM

## 2021-08-12 PROCEDURE — 88305 TISSUE EXAM BY PATHOLOGIST: CPT | Mod: TC | Performed by: INTERNAL MEDICINE

## 2021-08-12 PROCEDURE — 0DJ08ZZ INSPECTION OF UPPER INTESTINAL TRACT, VIA NATURAL OR ARTIFICIAL OPENING ENDOSCOPIC: ICD-10-PCS | Performed by: INTERNAL MEDICINE

## 2021-08-12 PROCEDURE — 250N000013 HC RX MED GY IP 250 OP 250 PS 637: Performed by: PHYSICAL MEDICINE & REHABILITATION

## 2021-08-12 PROCEDURE — 43239 EGD BIOPSY SINGLE/MULTIPLE: CPT | Performed by: INTERNAL MEDICINE

## 2021-08-12 PROCEDURE — 82040 ASSAY OF SERUM ALBUMIN: CPT | Performed by: STUDENT IN AN ORGANIZED HEALTH CARE EDUCATION/TRAINING PROGRAM

## 2021-08-12 PROCEDURE — 250N000013 HC RX MED GY IP 250 OP 250 PS 637: Performed by: STUDENT IN AN ORGANIZED HEALTH CARE EDUCATION/TRAINING PROGRAM

## 2021-08-12 PROCEDURE — 250N000011 HC RX IP 250 OP 636: Performed by: INTERNAL MEDICINE

## 2021-08-12 PROCEDURE — G0500 MOD SEDAT ENDO SERVICE >5YRS: HCPCS | Performed by: INTERNAL MEDICINE

## 2021-08-12 PROCEDURE — 86140 C-REACTIVE PROTEIN: CPT | Performed by: STUDENT IN AN ORGANIZED HEALTH CARE EDUCATION/TRAINING PROGRAM

## 2021-08-12 PROCEDURE — 128N000003 HC R&B REHAB

## 2021-08-12 PROCEDURE — 36592 COLLECT BLOOD FROM PICC: CPT | Performed by: STUDENT IN AN ORGANIZED HEALTH CARE EDUCATION/TRAINING PROGRAM

## 2021-08-12 PROCEDURE — 99232 SBSQ HOSP IP/OBS MODERATE 35: CPT | Mod: GC | Performed by: PHYSICAL MEDICINE & REHABILITATION

## 2021-08-12 RX ORDER — FENTANYL CITRATE 50 UG/ML
INJECTION, SOLUTION INTRAMUSCULAR; INTRAVENOUS PRN
Status: COMPLETED | OUTPATIENT
Start: 2021-08-12 | End: 2021-08-12

## 2021-08-12 RX ORDER — PANTOPRAZOLE SODIUM 40 MG/1
40 TABLET, DELAYED RELEASE ORAL
Status: DISCONTINUED | OUTPATIENT
Start: 2021-08-15 | End: 2021-08-16

## 2021-08-12 RX ADMIN — ACETAMINOPHEN 650 MG: 325 TABLET, FILM COATED ORAL at 06:33

## 2021-08-12 RX ADMIN — FENTANYL CITRATE 50 MCG: 50 INJECTION, SOLUTION INTRAMUSCULAR; INTRAVENOUS at 09:32

## 2021-08-12 RX ADMIN — Medication 1 MG: at 20:31

## 2021-08-12 RX ADMIN — Medication 250 MG: at 07:39

## 2021-08-12 RX ADMIN — BISACODYL 10 MG: 10 SUPPOSITORY RECTAL at 18:39

## 2021-08-12 RX ADMIN — TRAZODONE HYDROCHLORIDE 100 MG: 50 TABLET ORAL at 20:56

## 2021-08-12 RX ADMIN — FENTANYL CITRATE 25 MCG: 50 INJECTION, SOLUTION INTRAMUSCULAR; INTRAVENOUS at 09:44

## 2021-08-12 RX ADMIN — Medication 250 MG: at 20:31

## 2021-08-12 RX ADMIN — PSYLLIUM HUSK 1 PACKET: 3.4 POWDER ORAL at 20:31

## 2021-08-12 RX ADMIN — MIDAZOLAM 2 MG: 1 INJECTION INTRAMUSCULAR; INTRAVENOUS at 09:32

## 2021-08-12 RX ADMIN — MIDAZOLAM 1 MG: 1 INJECTION INTRAMUSCULAR; INTRAVENOUS at 09:42

## 2021-08-12 RX ADMIN — TAMSULOSIN HYDROCHLORIDE 0.4 MG: 0.4 CAPSULE ORAL at 07:39

## 2021-08-12 RX ADMIN — FENTANYL CITRATE 25 MCG: 50 INJECTION, SOLUTION INTRAMUSCULAR; INTRAVENOUS at 09:35

## 2021-08-12 RX ADMIN — MIDAZOLAM 1 MG: 1 INJECTION INTRAMUSCULAR; INTRAVENOUS at 09:35

## 2021-08-12 ASSESSMENT — MIFFLIN-ST. JEOR: SCORE: 1829.48

## 2021-08-12 NOTE — PLAN OF CARE
PT Aox4, able to make needs known, using call light appropriately.  NPO this morning other then few morning meds with sips of water.  Disconnected from IV protonix this morning at 0800 when pt picked up to go to procedure.  Returned from procedure at 1140, helped into bed, started back on Protonix drip per order. Bladder scan was 147, to scan again before end of shift.  Pulled R PIV.  Picc in LUE flushing with blood return.  Had small black incontinent stool when got back.  Clear liquid lunch ordered then got okay for reg/thin diet to continue but pt is going to take it slow.  Edema to BLE with Tubi-stockings on.  Working with therapies. Nursing will continue to monitor.

## 2021-08-12 NOTE — PLAN OF CARE
"FOCUS/GOAL  Medical management    ASSESSMENT, INTERVENTIONS AND CONTINUING PLAN FOR GOAL:  Pt is alert and oriented. Complained of neck pain at end of shift. PRN tylenol given and repositioned pt to sitting almost upright which he states is helpful. Assist of 1 with walker per report, but pt not oob this shift. Unable to void overnight. ISC x1. Continuous protonix running per order. L PICC and R PIV WDL. Pt not very happy with R PIV placement and states that he felt as if he couldn't move his arm due to it's location. Pt stated he could \"feel the needle in my arm\". RN educated pt that there was no needle in his arm, but rather a catheter. NPO status maintained overnight. CPAP worn most of the night.    "

## 2021-08-12 NOTE — PROGRESS NOTES
"  [facility]   Acute Rehabilitation Unit  Daily progress note    INTERVAL HISTORY  Overnight Mr. Johns endorses having a \"bad night\" with sleep and pain in the neck. He had 3/10 pain in his neck last night, which was only partially relieved with tylenol (ie down to a 1-2/10). He also had discomfort of the new PIV inserted in the antecubital region of his right arm and trying not to bend at that elbow, while unable to reposition his pillow with another PIV in his right as well. He hopes he can have the PIV removed after his EGD today. His neck pain did completely resolve this am when he was sat up in bed. He does feel like he has been twitching less in the past day, less in frequency and amount of twitches when they do happen. He continues to have dark stools, with last BM being last night. He denies being light-headed, although he had not moved since last night at time of interview. He does mention having large volume catheterizations and continued retention. Denies fevers, chills, nausea, vomiting, SOB, and chest pain.      Functionally, he is stand by assist with dressing stick for UB dressing, SBA-min A w/ LB ADLs edu w/ use of AE, total assist for toileting due to incontinence. Seated for EOS face g/h tasks, pt minimally tolerates standing d/t orthostatic hypotension. He is modified independent with bed mobility, contact guard assist with walker for transfers, contact guard assist with walker for ambulation up to 50 feet, and completes 4 inch stairs with contact guard assist.       MEDICATIONS  Scheduled meds    [Auto Hold] bisacodyl  10 mg Rectal Daily     [Auto Hold] melatonin  1 mg Oral At Bedtime     [Auto Hold] psyllium  1 packet Oral BID     [Auto Hold] saccharomyces boulardii  250 mg Oral BID     [Auto Hold] sennosides  2 tablet Oral Daily     [Auto Hold] sodium chloride (PF)  10 mL Intracatheter Q8H     [Auto Hold] tamsulosin  0.4 mg Oral Daily     [Auto Hold] " "traZODone  100 mg Oral At Bedtime       PRN meds:  [Auto Hold] acetaminophen, [Auto Hold] methocarbamol, [Auto Hold] miconazole, [Auto Hold] ondansetron, [Auto Hold] senna-docusate, [Auto Hold] sodium chloride (PF)      PHYSICAL EXAM  /72   Pulse 92   Temp 98  F (36.7  C) (Oral)   Resp 18   Ht 1.803 m (5' 11\")   Wt 105.2 kg (232 lb)   SpO2 97%   BMI 32.36 kg/m    Gen: Awake, coooperative, no distress.  HEENT: atraumatic, no discharge from nares or ears, EOM intact.  Anterior incision covered with steristrips, c/d/i.  Cardio: RRR, no murmurs auscultated  Pulm: Non-labored breathing, CTAB  Ext: Mild edema in LE b/l, none in UE b/l, no calf tenderness  Neuro/MSK: alert, oriented, answers questions appropriately, follows commands      LABS  Results for orders placed or performed during the hospital encounter of 08/02/21 (from the past 24 hour(s))   Hemoglobin   Result Value Ref Range    Hemoglobin 10.0 (L) 13.3 - 17.7 g/dL   CBC with platelets   Result Value Ref Range    WBC Count 8.3 4.0 - 11.0 10e3/uL    RBC Count 3.14 (L) 4.40 - 5.90 10e6/uL    Hemoglobin 10.4 (L) 13.3 - 17.7 g/dL    Hematocrit 31.0 (L) 40.0 - 53.0 %    MCV 99 78 - 100 fL    MCH 33.1 (H) 26.5 - 33.0 pg    MCHC 33.5 31.5 - 36.5 g/dL    RDW 11.7 10.0 - 15.0 %    Platelet Count 152 150 - 450 10e3/uL   Comprehensive metabolic panel   Result Value Ref Range    Sodium 139 133 - 144 mmol/L    Potassium 4.0 3.4 - 5.3 mmol/L    Chloride 111 (H) 94 - 109 mmol/L    Carbon Dioxide (CO2) 25 20 - 32 mmol/L    Anion Gap 3 3 - 14 mmol/L    Urea Nitrogen 25 7 - 30 mg/dL    Creatinine 1.31 (H) 0.66 - 1.25 mg/dL    Calcium 8.8 8.5 - 10.1 mg/dL    Glucose 83 70 - 99 mg/dL    Alkaline Phosphatase 77 40 - 150 U/L    AST 31 0 - 45 U/L    ALT 23 0 - 70 U/L    Protein Total 7.4 6.8 - 8.8 g/dL    Albumin 2.7 (L) 3.4 - 5.0 g/dL    Bilirubin Total 0.5 0.2 - 1.3 mg/dL    GFR Estimate 54 (L) >60 mL/min/1.73m2   CRP inflammation   Result Value Ref Range    CRP " Inflammation 66.0 (H) 0.0 - 8.0 mg/L       ASSESSMENT AND PLAN    Rigo Johns is a 70 year old L hand dominant male with a PMH of HTN, PILI, and prior cervical surgery (C5-7 anterior fusion in 1993 per notes) who is now status post a C3-4, C4-5 ACDF on 7/14/2021 for cervical myelopathy.  He was admitted to the [place] ARU from 7/18-7/27/21 but transferred back to the medical floor for fevers and prevertebral fluid collection suggestive of abscess.  Now improving with IV antbiotics and no surgical intervention was needed.  He is now being admitted to the ARU on 8/2/21.  Impairment group code: 04.1211 Quadriplegia, Incomplete C1-4 - s/p C3-5 ACDF.   PLAN  Rehabilitation  1. PT and OT 90 minutes of each on a daily basis, in addition to rehab nursing and close management of physiatrist.    2. Impairment of ADL's: Noted to have impaired ROM, impaired strength, impaired activity tolerance, edema management, impaired balance, and impaired coordination, all affecting his ability to safely and independently perform basic ADLs.  Goal for mod I with basic ADLs.  3. Impairment of mobility:  Noted to have impaired ROM, impaired strength, impaired activity tolerance, edema management, impaired balance and impaired coordination, all affecting his ability to safely and independently ambulate and perform basic mobility.  Goal for mod I with basic mobility.  Medical  1)Neurology  #Cervical Myelopathy s/p C3-4, C4-5 ACDF on 7/14/21 complicated by fevers and prevertebral fluid collection, suggestive of abscess  -Follow up MRI completed 8/10 which shows near resolution of the prevertebral fluid.  Discussed with ID who also discussed with NSGY and images reviewed.  Antibiotics discontinued after 8/11 (completed 2 week course)  -Maintain postoperative spinal precautions. No need for cervical collar  -Follow-up with neurosurgery and infectious disease after discharge     2)CVS  -Binder, encourage liquids, consider IV fluids if  needed  -Monitor blood pressures as he has previously had some high readings, along with some orthostatic hypotensive episodes     3)Pulmonary  #CAP, resolved  -Completed course of antibiotics (Zosyn x1 in ED, Azithromycin 7/12-7/16, Ceftriaxone 7/12 - 7/17, and Cefuroxime x1) during initial hospitalization, abx for cervical fluid collection completed after 8/11  -Encourage incentive spirometer  -Monitor respiratory status, supplementary oxygen PRN.  Now weaned to room air.     #PILI  -Continue CPAP with home settings     4)FENGI  #Diet: Liquids today and advance as able  #Neurogenic bowel  -Incontinence improving, happy to continue current regimen  -Fiber BID  -Scheduling 2 tabs of Senna at noon and suppository 30 minutes after dinner to regulate his BMs further on 8/6  #Melena  #Acute anemia  -Hgb uptrending from last night and today.   - Endoscopy today , 8/11 demonstrated duodenal ulcers that are no longer bleeding, which were likely the cause of his melena and anemia.   - IV Protonix with 40 mg for 3 additional days, then PO BID x 8 weeks (if H pylori neg, then PO daily indefinitely)     #Liver Lesion  -Abnormal appearance is seen on CAT scan with slight nodularity inferiorly. LFTs within normal limits.  -Follow-up primary care physician     5)  #Urinary Retention   -Voiding trial done again on 8/10.  Thus far has not been able to void and is needing straight caths.  - Straight caths scheduled Q4H, will discuss with pt options for plan of torres vs straight cath going home  -Continue Flomax 0.4 mg daily.  Consider increasing if still retaining and if orthostasis has resolved.     #DONOVAN and likely CKD   -Pt with limited physician follow up so baseline creatinine not known. Peak at 2.13, improved with IV hydration  -Avoid nephrotoxic agents, NSAID     #Renal lesion  -Incidental finding on CT scan  -Renal ultrasound showed simple cysts of bilateral kidneys  -Follow up with PCP     6)DVT prophylaxis  -PCDs and  ambulation     7)Pain  Spasms  Pain is tolerable at this time. If spasms continue, will consider scheduling Robaxin.   -Tylenol 650 mg q4h PRN  -Robaxin 750 mg q6h PRN  -Continue to monitor spasms    8)Endo  -Prior steroid induced hyperglycemia.  HbA1c 5.0.   -Now completed course of steroids.  No need for further routine checks     9)Heme   #Acute anemia  -GI workup as above     #Leukocytosis   -Thought to be secondary to steroids which are now discontinued. WBCs 8.4 on 8/11, stable    #Thrombocytopenia  -Noted upon initial admission to the emergency room. Had been stable low 100s, but now improving.  144 on 8/11. Unclear if it was due to acute distress and infection versus possible undiagnosed liver disease.   -Monitor peripherally  -Follow-up with primary care physician     10)Psych  -Monitor mood, will consult health psychology if needed     11)Social/Dispo  -Anticipate discharge home at a modified independent level for ambulation, mobility, and ADLs.   -ELOS: 3 weeks  -Rehab prognosis: Good  -Follow up appointments: PCP, NSGY (Dr. Hanley), ID     Code status: Full Code (Discussed with patient upon admission).  This was confirmed upon re-admit.  Pt does not want extraordinary measures should prognosis be poor, however does want attempts at resuscitation and therefore will remain full code.      Teri Jordan MD    Department of Rehabilitation Medicine

## 2021-08-12 NOTE — OR NURSING
Pt underwent EGD with biopsies under conscious sedation. Specimens sent to lab via endo tech. Pt transferred to  and report given to recovery RN.       Ivania Oneil RN

## 2021-08-12 NOTE — BRIEF OP NOTE
[facility] Brief Operative Note    Procedure:  Upper endoscopy   Post-operative diagnosis:  Duodenal ulcers   Staff Physician:  Dr. Mamie Najera   Fellow/Assistant(s):  Raulito Celis MD    Specimens:  Please see final procedure note for further details.   Findings: - Normal esophagus  - Normal stomach  - 2 duodenal ulcers in bulb/first portion, clean based, no high risk stigmata, no endoscopic treatment employed, these were likely the cause of the patient's melena and anemia   Complications:  None.   Condition:  Stable   Recommendations  Diet:  Full liquid diet then ADAT  PPI:  -- IV PPI BID x 72 hours from procedure  -- then PO BID x 8 weeks (if H. Pylori is negative, then PO daily indefinitely)  Anti-coagulants/platelets:  Hold anticoagulation  Octreotide:  N/A  Discharge Planning:   - to be determined by rehab service  Recommendations:  - we will follow up H. Pylori biopsies and contact your team with the results

## 2021-08-12 NOTE — OR NURSING
Spoke with OLGA LIDIA Mas from the Acute Inpatient Rehab Unit and updated him on pt's plan of care and that pt left with EMS transport.

## 2021-08-13 ENCOUNTER — APPOINTMENT (OUTPATIENT)
Dept: PHYSICAL THERAPY | Facility: CLINIC | Age: 71
DRG: 949 | End: 2021-08-13
Attending: PHYSICAL MEDICINE & REHABILITATION
Payer: COMMERCIAL

## 2021-08-13 ENCOUNTER — APPOINTMENT (OUTPATIENT)
Dept: OCCUPATIONAL THERAPY | Facility: CLINIC | Age: 71
DRG: 949 | End: 2021-08-13
Attending: PHYSICAL MEDICINE & REHABILITATION
Payer: COMMERCIAL

## 2021-08-13 LAB
HGB BLD-MCNC: 10.2 G/DL (ref 13.3–17.7)
PATH REPORT.COMMENTS IMP SPEC: NORMAL
PATH REPORT.FINAL DX SPEC: NORMAL
PATH REPORT.GROSS SPEC: NORMAL
PATH REPORT.MICROSCOPIC SPEC OTHER STN: NORMAL
PATH REPORT.RELEVANT HX SPEC: NORMAL
PHOTO IMAGE: NORMAL
UPPER GI ENDOSCOPY: NORMAL

## 2021-08-13 PROCEDURE — 999N000150 HC STATISTIC PT MED CONFERENCE < 30 MIN

## 2021-08-13 PROCEDURE — 250N000009 HC RX 250

## 2021-08-13 PROCEDURE — 99233 SBSQ HOSP IP/OBS HIGH 50: CPT | Mod: 24 | Performed by: PHYSICAL MEDICINE & REHABILITATION

## 2021-08-13 PROCEDURE — 97535 SELF CARE MNGMENT TRAINING: CPT | Mod: GO

## 2021-08-13 PROCEDURE — C9113 INJ PANTOPRAZOLE SODIUM, VIA: HCPCS | Performed by: STUDENT IN AN ORGANIZED HEALTH CARE EDUCATION/TRAINING PROGRAM

## 2021-08-13 PROCEDURE — 250N000011 HC RX IP 250 OP 636: Performed by: STUDENT IN AN ORGANIZED HEALTH CARE EDUCATION/TRAINING PROGRAM

## 2021-08-13 PROCEDURE — 999N000125 HC STATISTIC PATIENT MED CONFERENCE < 30 MIN: Performed by: OCCUPATIONAL THERAPIST

## 2021-08-13 PROCEDURE — 97116 GAIT TRAINING THERAPY: CPT | Mod: GP

## 2021-08-13 PROCEDURE — 97530 THERAPEUTIC ACTIVITIES: CPT | Mod: GP

## 2021-08-13 PROCEDURE — 128N000003 HC R&B REHAB

## 2021-08-13 PROCEDURE — 250N000013 HC RX MED GY IP 250 OP 250 PS 637: Performed by: STUDENT IN AN ORGANIZED HEALTH CARE EDUCATION/TRAINING PROGRAM

## 2021-08-13 PROCEDURE — 258N000003 HC RX IP 258 OP 636: Performed by: STUDENT IN AN ORGANIZED HEALTH CARE EDUCATION/TRAINING PROGRAM

## 2021-08-13 PROCEDURE — 250N000009 HC RX 250: Performed by: STUDENT IN AN ORGANIZED HEALTH CARE EDUCATION/TRAINING PROGRAM

## 2021-08-13 PROCEDURE — 36592 COLLECT BLOOD FROM PICC: CPT | Performed by: STUDENT IN AN ORGANIZED HEALTH CARE EDUCATION/TRAINING PROGRAM

## 2021-08-13 PROCEDURE — 250N000013 HC RX MED GY IP 250 OP 250 PS 637: Performed by: PHYSICAL MEDICINE & REHABILITATION

## 2021-08-13 PROCEDURE — 85018 HEMOGLOBIN: CPT | Performed by: STUDENT IN AN ORGANIZED HEALTH CARE EDUCATION/TRAINING PROGRAM

## 2021-08-13 PROCEDURE — 97110 THERAPEUTIC EXERCISES: CPT | Mod: GO | Performed by: OCCUPATIONAL THERAPIST

## 2021-08-13 PROCEDURE — 97110 THERAPEUTIC EXERCISES: CPT | Mod: GP

## 2021-08-13 PROCEDURE — 88305 TISSUE EXAM BY PATHOLOGIST: CPT | Mod: 26 | Performed by: PATHOLOGY

## 2021-08-13 RX ORDER — LIDOCAINE HYDROCHLORIDE 20 MG/ML
JELLY TOPICAL ONCE
Status: COMPLETED | OUTPATIENT
Start: 2021-08-13 | End: 2021-08-13

## 2021-08-13 RX ORDER — LIDOCAINE HYDROCHLORIDE 20 MG/ML
JELLY TOPICAL
Status: DISPENSED
Start: 2021-08-13 | End: 2021-08-13

## 2021-08-13 RX ADMIN — Medication 250 MG: at 20:24

## 2021-08-13 RX ADMIN — SODIUM CHLORIDE 8 MG/HR: 9 INJECTION, SOLUTION INTRAVENOUS at 00:44

## 2021-08-13 RX ADMIN — TAMSULOSIN HYDROCHLORIDE 0.4 MG: 0.4 CAPSULE ORAL at 08:57

## 2021-08-13 RX ADMIN — SODIUM CHLORIDE 8 MG/HR: 9 INJECTION, SOLUTION INTRAVENOUS at 21:24

## 2021-08-13 RX ADMIN — SENNOSIDES 2 TABLET: 8.6 TABLET, FILM COATED ORAL at 15:20

## 2021-08-13 RX ADMIN — SODIUM CHLORIDE 8 MG/HR: 9 INJECTION, SOLUTION INTRAVENOUS at 11:53

## 2021-08-13 RX ADMIN — PSYLLIUM HUSK 1 PACKET: 3.4 POWDER ORAL at 20:24

## 2021-08-13 RX ADMIN — Medication 1 MG: at 20:24

## 2021-08-13 RX ADMIN — TRAZODONE HYDROCHLORIDE 100 MG: 50 TABLET ORAL at 20:24

## 2021-08-13 RX ADMIN — Medication 250 MG: at 08:57

## 2021-08-13 RX ADMIN — PSYLLIUM HUSK 1 PACKET: 3.4 POWDER ORAL at 08:57

## 2021-08-13 RX ADMIN — LIDOCAINE HYDROCHLORIDE: 20 JELLY TOPICAL at 11:19

## 2021-08-13 RX ADMIN — BISACODYL 10 MG: 10 SUPPOSITORY RECTAL at 19:19

## 2021-08-13 NOTE — PLAN OF CARE
Discharge Planner Post-Acute Rehab OT:      Discharge Plan: home with assist for IADL and HH      Precautions: fall, cervical spinal, involuntary twitching in lower extremities, orthostatic hypotension w/ mainly standing- Abdominal binder/LE stockinette.     Current Status:  ADLs: SBA w/ dressing stick for UB dressing, CGA don shorts EOB, total assist for toileting due to incontinence. Seated for EOS face g/h tasks, pt minimally tolerates standing d/t orthostatic hypotension  IADLs:not tested, wife can assist  Vision/Cognition: WFL     Assessment:  Adjusted abdominal binder, BP monitored seated then standing, with symptomatic decrease in BP to 86/41. Pt able to recover in sitting position within a few minutes. Pt ambulated w/fww close SBA and w/c follow to bathroom, completed g/h tasks in sitting d/t orthostatic BP. Pt demo'd improvement with donning shorts EOB CGA and no AE within precautions. Cont per POC.      Other Barriers to Discharge (DME, Family Training, etc): Pt has good family support and accessible home. family training, determine DME and improve ADL performance

## 2021-08-13 NOTE — PROGRESS NOTES
"  [facility]   Acute Rehabilitation Unit  Daily progress note    INTERVAL HISTORY  Pt seen in team rounds with wife present via telephone.  Was cathed for >1L overnight, and as such we discussed re-placing the Davis due to retention and allowing bladder rest given the large volumes.  He also continues to have orthostasis and will try to drink more water, but will also discontinue Flomax.  TEDs and abd binder already ordered.  He has also noted mild swelling of the R knee but not painful and does not affect his ROM or therapy.  Hbg now stable.  For full functional updates, see team rounds note from today.         MEDICATIONS  Scheduled meds    bisacodyl  10 mg Rectal Daily     lidocaine         melatonin  1 mg Oral At Bedtime     [START ON 8/15/2021] pantoprazole  40 mg Oral QAM AC     psyllium  1 packet Oral BID     saccharomyces boulardii  250 mg Oral BID     sennosides  2 tablet Oral Daily     sodium chloride (PF)  10 mL Intracatheter Q8H     traZODone  100 mg Oral At Bedtime       PRN meds:  acetaminophen, methocarbamol, miconazole, ondansetron, senna-docusate, sodium chloride (PF)      PHYSICAL EXAM  /59 (BP Location: Right arm)   Pulse 92   Temp 97.9  F (36.6  C) (Oral)   Resp 20   Ht 1.803 m (5' 11\")   Wt 105.2 kg (232 lb)   SpO2 97%   BMI 32.36 kg/m    Gen: Awake, coooperative, no distress.  HEENT: atraumatic, no discharge from nares or ears, EOM intact.  Anterior incision c/d/i.  Pulm: Non-labored breathing  Ext: Mild edema in LE b/l, none in UE b/l, no calf tenderness.  Mild swelling at the lateral aspect of the R knee, no erythema and not warm to palpation, FROM, and no tenderness  Neuro/MSK: alert, oriented, answers questions appropriately, follows commands      LABS  Results for orders placed or performed during the hospital encounter of 08/02/21 (from the past 24 hour(s))   Hemoglobin   Result Value Ref Range    Hemoglobin 10.2 (L) 13.3 - 17.7 g/dL "       ASSESSMENT AND PLAN    Rigo Johns is a 70 year old L hand dominant male with a PMH of HTN, PILI, and prior cervical surgery (C5-7 anterior fusion in 1993 per notes) who is now status post a C3-4, C4-5 ACDF on 7/14/2021 for cervical myelopathy.  He was admitted to the [facility] from 7/18-7/27/21 but transferred back to the medical floor for fevers and prevertebral fluid collection suggestive of abscess.  Now improving with IV antbiotics and no surgical intervention was needed.  He is now being admitted to the ARU on 8/2/21.  Impairment group code: 04.1211 Quadriplegia, Incomplete C1-4 - s/p C3-5 ACDF.   PLAN  Rehabilitation  1. PT and OT 90 minutes of each on a daily basis, in addition to rehab nursing and close management of physiatrist.    2. Impairment of ADL's: Noted to have impaired ROM, impaired strength, impaired activity tolerance, edema management, impaired balance, and impaired coordination, all affecting his ability to safely and independently perform basic ADLs.  Goal for mod I with basic ADLs.  3. Impairment of mobility:  Noted to have impaired ROM, impaired strength, impaired activity tolerance, edema management, impaired balance and impaired coordination, all affecting his ability to safely and independently ambulate and perform basic mobility.  Goal for mod I with basic mobility.  Medical  1)Neurology  #Cervical Myelopathy s/p C3-4, C4-5 ACDF on 7/14/21 complicated by fevers and prevertebral fluid collection, suggestive of abscess  -Follow up MRI completed 8/10 which shows near resolution of the prevertebral fluid.  Discussed with ID who also discussed with NSGY and images reviewed.  Antibiotics discontinued after 8/11 (completed 2 week course)  -Maintain postoperative spinal precautions. No need for cervical collar  -Follow-up with neurosurgery and infectious disease after discharge     2)CVS  #Orthostasis  -Abd Binder, TEDs, encourage liquids, consider IV fluids if needed  -Discontinue  Flomax today     3)Pulmonary  #CAP, resolved  -Completed course of antibiotics (Zosyn x1 in ED, Azithromycin 7/12-7/16, Ceftriaxone 7/12 - 7/17, and Cefuroxime x1) during initial hospitalization, abx for cervical fluid collection completed after 8/11  -Encourage incentive spirometer  -Monitor respiratory status, supplementary oxygen PRN.  Now weaned to room air.     #PILI  -Continue CPAP with home settings     4)FENGI  #Diet: Advance to regular diet as able after EGD 8/12  #Neurogenic bowel  -Incontinence improving, happy to continue current regimen  -Fiber BID  -Scheduling 2 tabs of Senna at noon and suppository 30 minutes after dinner to regulate his BMs further on 8/6  #Melena  #Acute anemia  -Hgb now stable in the 10s, 10.2 this morning   - Endoscopy 8/12 demonstrated duodenal ulcers that are no longer bleeding, which were likely the cause of his melena and anemia.   - IV Protonix with 40 mg for 2 additional days, then PO BID x 8 weeks (if H pylori neg, then PO daily indefinitely)     #Liver Lesion  -Abnormal appearance is seen on CAT scan with slight nodularity inferiorly. LFTs within normal limits.  -Follow-up primary care physician     5)  #Urinary Retention   -Voiding trial done again on 8/10.  Thus far has not been able to void and is needing straight caths with >1L removed overnight.  Will re-place Davis catheter today.  -Discontinue Flomax today due to ongoing orthostasis    #DONOVAN and likely CKD   -Pt with limited physician follow up so baseline creatinine not known. Peak at 2.13, improved with IV hydration  -Avoid nephrotoxic agents, NSAID  -Cr 1.31 on 8/12 (stable from prior)     #Renal lesion  -Incidental finding on CT scan  -Renal ultrasound showed simple cysts of bilateral kidneys  -Follow up with PCP     6)DVT prophylaxis  -PCDs and ambulation     7)Pain  Spasms  Pain is tolerable at this time. If spasms continue, will consider scheduling Robaxin.   -Tylenol 650 mg q4h PRN  -Robaxin 750 mg q6h  PRN  -Continue to monitor spasms    8)Endo  -Prior steroid induced hyperglycemia.  HbA1c 5.0.   -Now completed course of steroids.  No need for further routine checks     9)Heme   #Acute anemia  -GI workup as above     #Leukocytosis   -Thought to be secondary to steroids which are now discontinued. WBCs 8.3 on 8/12, stable    #Thrombocytopenia  -Noted upon initial admission to the emergency room. Had been stable low 100s, but now improving.  152 on 8/12. Unclear if it was due to acute distress and infection versus possible undiagnosed liver disease.   -Monitor peripherally  -Follow-up with primary care physician     10)Psych  -Monitor mood, will consult health psychology if needed     11)Social/Dispo  -Anticipate discharge home at a modified independent level for ambulation, mobility, and ADLs.   -ELOS: 3 weeks  -Rehab prognosis: Good  -Follow up appointments: PCP, NSGY (Dr. Hanley), ID     Code status: Full Code (Discussed with patient upon admission).  This was confirmed upon re-admit.  Pt does not want extraordinary measures should prognosis be poor, however does want attempts at resuscitation and therefore will remain full code.    Robi Whitehead MD  Department of Rehabilitation Medicine    Time Spent on this Encounter   I, Robi Whitehead, spent a total of 35 minutes face-to-face or managing the care of Rigo Johns. Over 50% of my time on the unit was spent counseling the patient and coordinating care. See note for details.

## 2021-08-13 NOTE — CARE CONFERENCE
Acute Rehab Care Conference/Team Rounds      Type: Team Rounds    Present: Dr. Luis Whitehead, Teri Jordan Resident, Micaela Osman RN, Sara Sandoval [facility], Noemy Mosquera OT, Jackson aPce PT, Anastacia Berry , Ssuan Weiss Dietician, Patient Rigo Johns      Discharge Barriers/Treatment/Education    Rehab Diagnosis: Cervical myelopathy s/p C3-4, C4-5 ACDF complicated by prevertebral abscess     Active Medical Co-morbidities/Prognosis: HTN, PILI, urinary retention with Davis, neurogenic bowel with incontinence, thrombocytopenia, DONOVAN on CKD, ABLA, PILI     Safety: Pt is alert and oriented. Uses the call light appropriately. Does not want to be disturbed at night between 10pm to 6am.     Pain: Mild, intermittent neck pain    Medications, Skin, Tubes/Lines: L PICC line    Swallowing/Nutrition: Encourage fluids    Bowel/Bladder: Neurogenic bladder and bowel.    Psychosocial:  , lives with wife in a home. No children. Niece who lives close and is a PT. Wife and pt both retired. No MH, SA or financial concerns reported.     ADLs/IADLs: Pt making gradual progress with ADLs and functional mobility. Requiring close CGA transfers/mobility with fww. Pt requiring SBA w/ dressing stick for UB dressing, SBA-min A w/ LB dressing with AE, supervision seated grooming, and total assist for toileting due to incontinence. IADLs not assessed - spouse is able to assist. Limited standing tolerance due to deconditioning, involuntary twitching, and orthostasis - wears compression stockings and abdominal binder. Also limited by medical complexity and ongoing infection. Goals to advance to Mod IND ADLs and progress functional mobility with fww - may need a wheelchair for energy conservation. May benefit from extension of POC date to reach these goals. Recommend caregiver training and follow up HC. Equipment: TBD pending progress.     Mobility: Pt is making slow progress due to medical complexity, but continues to work very hard  "in therapies. Pt has been limited by orthostatics limiting ability to ambulate at times. Pt is currently mod-I with bed mobility, CGA with walker for transfers. At best, ambulates 50' CGA with FWW. Recently starting to perform stairs CGA on 4\". Pt is very hopeful to be ambulatory at discharge, however mixed mobility looking most appropriate given fluctuations. HH PT at discharge, will need FWW and w/c for home navigation.    Cognition/Language: No concerns. Not a barrier to discharge.    Transportation: Will need family to provide.    Decision maker: self    Plan of Care and goals reviewed and updated.    Discharge Plan/Recommendations    Fall Precautions: continue    Overall plan for the patient:   EGD completed for melena and decrease in Hgb with 2 duodenal ulcers noted, but not actively bleeding.  Hgb now stable and will remain on PPI.  Still with urinary retention and will re-place Davis, but with orthostasis which is limiting therapies at times will discontinue Flomax.  Functionally his function varies based on orthostasis, fatigue levels, and leg twitching.  Was only able to stand for 20 seconds today, but at best able to ambulate 50 ft CGA with a WW.  ADLs ranging from CGA to Min A.  His goal is to be fully ambulatory upon discharge, however we discussed that he will likely be mixed mobility at discharge and can progress further with ambulation with home therapies.  Will push back tentative discharge date to 8/27 with  PT, OT, RN, and HHA.        Utilization Review and Continued Stay Justification    Medical Necessity Criteria:    For any criteria that is not met, please document reason and plan for discharge, transfer, or modification of plan of care to address.    Requires intensive rehabilitation program to treat functional deficits?: Yes    Requires 3x per week or greater involvement of rehabilitation physician to oversee rehabilitation program?: Yes    Requires rehabilitation nursing interventions?: " Yes    Patient is making functional progress?: Yes    There is a potential for additional functional progress? Yes    Patient is participating in therapy 3 hours per day a minimum of 5 days per week or 15 hours per week in 7 day period?:Yes    Has discharge needs that require coordinated discharge planning approach?:Yes      Barriers/Concerns related to meeting medical necessity criteria:  Multiple medical issues    Team Plan to Address Concern:  Medical specialty consultations, medication adjustments      Final Physician Sign off    Statement of Approval: I have reviewed and agree with the recommendations and documentation in this care conference note.       Patient Goals  Social Work Goals: Confirm discharge recommendations with therapy, coordinate safe discharge plan and remain available to support and assist as needed.    OT Frequency: daily 75-90 min  OT goal: hygiene/grooming: modified independent  OT goal: upper body dressing: Modified independent  OT goal: lower body dressing: Modified independent  OT goal: upper body bathing: Supervision/stand-by assist  OT goal: lower body bathing: Supervision/stand-by assist  OT Goal: transfer: Supervision/stand-by assist (walk in shower transfer)  OT goal: toilet transfer/toileting: Minimal assist, cleaning and garment management, toilet transfer  OT goal 1: Pt willbe IND  with UE HEP to improve stength and function in BUE for ADL and hobbies such as building trains       PT Frequency: daily 60-90 minutes  PT goal: bed mobility: Modified independent, Supine to/from sit, Within precautions  PT goal: transfers: Sit to/from stand, Bed to/from chair, Modified independent  PT goal: gait: Modified independent, 50 feet, Rolling walker  PT goal: stairs: 3 stairs, Modified independent (per home setup)        PT goal 1: Car transfer SBA             Patient/Family Goal: Medication Management: Pt will participate in MAP as needed to ensure safe medication administration before  discharge.                Goal: Falls: Patient will remain free from falls while in ARU.  Individualized Goal 1: Patient will demonstrate ability to protect his skin to prevent skin breakdown.

## 2021-08-13 NOTE — PLAN OF CARE
FOCUS/GOAL  Bowel management, Bladder management, Nutrition/Feeding/Swallowing precautions, Pain management, Wound care management, Mobility, Skin integrity, and Cognition/Memory/Judgment/Problem solving    ASSESSMENT, INTERVENTIONS AND CONTINUING PLAN FOR GOAL:    Patient A&Ox4. Denies pain, headache, numbness, tingling, chest pain, SOB, and nausea. Regular diet, thin liquids. Ax1 from Bed to BSC. Unable to void, cath for 500 at 2100. Cont Bm during shift. Blanchable redness to coccyx. Continuous Protonix drip running to Left single lumin powered PICC. Edema noted to the feet bilaterally. CPaP used at night. VSS. Continue POC.

## 2021-08-13 NOTE — PLAN OF CARE
FOCUS/GOAL  Bladder management, Medication management, and Medical management    ASSESSMENT, INTERVENTIONS AND CONTINUING PLAN FOR GOAL:  A&O, A1 walker. Makes needs known.  Pt denied pain during night.  Continent of bowel on 8/12. Per order, bladder scan at 0600 for 999+. Straight cathed for 1050cc. Educated pt that we need to straight cath more often at night.  Continuous protonix IV running via L PICC, bag replaced during night.

## 2021-08-13 NOTE — PLAN OF CARE
"FOCUS/GOAL  Bladder management, Medical management, Skin integrity and Reinforcement of self-care/ADL    ASSESSMENT, INTERVENTIONS AND CONTINUING PLAN FOR GOAL:  Patient is alert/oriented x4, is able to use call light appropriately for care needs.  Patient is able to ambulate with SBAo1 and walker, reported \"lightheadedness\" x1 getting up with therapy first thing this am.  Patient denies issues later in the shift, wearing abdominal binder and roberto stockings, has been good with drinking fluids.  Patient has been unable to void, discussed bladder status in rounds today and torres was placed at 11:20am, is draining adequately, urojet requested to assist with placing because of patient's enlarged prostate and procedure was tolerated well.  Patient has some groin redness and blanchable redness to the coccyx area as well, antifungal powder applied  Patient denies pain on this shift, appetite is good, no bm yet at this time, continue with POC.       "

## 2021-08-14 ENCOUNTER — APPOINTMENT (OUTPATIENT)
Dept: PHYSICAL THERAPY | Facility: CLINIC | Age: 71
DRG: 949 | End: 2021-08-14
Attending: PHYSICAL MEDICINE & REHABILITATION
Payer: COMMERCIAL

## 2021-08-14 ENCOUNTER — APPOINTMENT (OUTPATIENT)
Dept: OCCUPATIONAL THERAPY | Facility: CLINIC | Age: 71
DRG: 949 | End: 2021-08-14
Attending: PHYSICAL MEDICINE & REHABILITATION
Payer: COMMERCIAL

## 2021-08-14 PROCEDURE — 97535 SELF CARE MNGMENT TRAINING: CPT | Mod: GO

## 2021-08-14 PROCEDURE — 258N000003 HC RX IP 258 OP 636: Performed by: STUDENT IN AN ORGANIZED HEALTH CARE EDUCATION/TRAINING PROGRAM

## 2021-08-14 PROCEDURE — 250N000013 HC RX MED GY IP 250 OP 250 PS 637: Performed by: STUDENT IN AN ORGANIZED HEALTH CARE EDUCATION/TRAINING PROGRAM

## 2021-08-14 PROCEDURE — 128N000003 HC R&B REHAB

## 2021-08-14 PROCEDURE — 97530 THERAPEUTIC ACTIVITIES: CPT | Mod: GP

## 2021-08-14 PROCEDURE — 250N000011 HC RX IP 250 OP 636: Performed by: STUDENT IN AN ORGANIZED HEALTH CARE EDUCATION/TRAINING PROGRAM

## 2021-08-14 PROCEDURE — 250N000013 HC RX MED GY IP 250 OP 250 PS 637: Performed by: PHYSICAL MEDICINE & REHABILITATION

## 2021-08-14 PROCEDURE — 97110 THERAPEUTIC EXERCISES: CPT | Mod: GP

## 2021-08-14 PROCEDURE — 97110 THERAPEUTIC EXERCISES: CPT | Mod: GO

## 2021-08-14 PROCEDURE — 99232 SBSQ HOSP IP/OBS MODERATE 35: CPT | Mod: 24 | Performed by: PHYSICAL MEDICINE & REHABILITATION

## 2021-08-14 PROCEDURE — C9113 INJ PANTOPRAZOLE SODIUM, VIA: HCPCS | Performed by: STUDENT IN AN ORGANIZED HEALTH CARE EDUCATION/TRAINING PROGRAM

## 2021-08-14 RX ORDER — LIDOCAINE HYDROCHLORIDE 20 MG/ML
JELLY TOPICAL EVERY 4 HOURS PRN
Status: DISCONTINUED | OUTPATIENT
Start: 2021-08-14 | End: 2021-08-20

## 2021-08-14 RX ADMIN — BISACODYL 10 MG: 10 SUPPOSITORY RECTAL at 18:45

## 2021-08-14 RX ADMIN — Medication 1 MG: at 19:53

## 2021-08-14 RX ADMIN — SENNOSIDES 2 TABLET: 8.6 TABLET, FILM COATED ORAL at 14:21

## 2021-08-14 RX ADMIN — Medication 250 MG: at 08:51

## 2021-08-14 RX ADMIN — SODIUM CHLORIDE 8 MG/HR: 9 INJECTION, SOLUTION INTRAVENOUS at 07:02

## 2021-08-14 RX ADMIN — PSYLLIUM HUSK 1 PACKET: 3.4 POWDER ORAL at 19:56

## 2021-08-14 RX ADMIN — Medication 250 MG: at 19:53

## 2021-08-14 RX ADMIN — TRAZODONE HYDROCHLORIDE 100 MG: 50 TABLET ORAL at 21:45

## 2021-08-14 RX ADMIN — PSYLLIUM HUSK 1 PACKET: 3.4 POWDER ORAL at 08:51

## 2021-08-14 NOTE — PLAN OF CARE
"Discharge Planner Post-Acute Rehab PT:      Discharge Plan: Home with family assist, mixed mobility likely (w/c and walker)     Precautions: Falls, cervical, PICC line     Current Status:  Bed Mobility: mod I   Transfer: CGA with WW   Gait: CGA with WW up to 50   Stairs: 4\" stairs, CGA  Balance: CGA and WW for standing      Assessment: Continues to demonstrate orthostatic BP with STS from EOB/MWC, with onset of lightheadedness. PT encouraged continued B LE ROM while in seated position to attempt to increase BP, as well as to maintain adequate fluid intake; pt verbalized understanding. Quality of transfers improving, with demonstration of safe hand placement and SBA/CGAx1 needed with use of 2WW.       Other Barriers to Discharge (DME, Family Training, etc): medical complexity, incontinence, DME needs, orthostatic BP  "

## 2021-08-14 NOTE — PLAN OF CARE
"Discharge Planner Post-Acute Rehab OT:      Discharge Plan: home with assist for IADL and HH      Precautions: fall, cervical spinal, involuntary twitching in lower extremities, orthostatic hypotension w/ mainly standing- Abdominal binder/LE stockinette.     Current Status:  ADLs: SBA w/ dressing stick for UB dressing, CGA don shorts EOB, total assist for toileting due to incontinence/torres catheter. SBA at sink for face g/h tasks,minimally tolerates standing d/t orthostatic hypotension  Tsfers: CGA w/ FWW   IADLs: not tested, wife can assist  Vision/Cognition: WFL     Assessment:  AM session: Pt had abdominal binder/LE stockinette but still experiencing increased lightheaded and less recovery time than last week. BP slightly dropped from sit to stand. Less tolerance for standing or to attempt ambulating to BR for standing ADLs. Pt engaged in BUE AAROM in supine. Still limited by proximal strength and standing tolerance w/ orthostatic hypotension and sypmtomatic. Continue to progress standing tolerance, improve and normal BP and UE strengthening.     Other Barriers to Discharge (DME, Family Training, etc): Pt has good family support and accessible home. family training, determine DME and improve ADL performance.   Barriers: Orthostatic BP, symptomatic w/ c/o of \"lightheadness.\" Minimal tolerance for standing activities.  "

## 2021-08-14 NOTE — PLAN OF CARE
Patient is A&Ox4, calm, and cooperative. Assist of 1 with walker and gait belt. Denies pain during shift. Has torres in place, patent. Incontinent of bowel once during shift, continent of bowel once after suppository. Regular/thin diet, takes pills whole. Has left PICC line running IV Protonix at 10ml/hr. Patient refused shower today. VS stable. Continue POC.

## 2021-08-14 NOTE — PROGRESS NOTES
"  [facility]   Acute Rehabilitation Unit  Daily progress note    INTERVAL HISTORY  Overnight, patient had torres in place with adequate output. No acute events to report.     This am he endorses ongoing orthostatic hypotension. Wife on phone listening to visit. Increased fluid intake strongly encouraged. He was also updated on path results from endoscopy bx, which was negative for H pylori and no metaplasia, dysplasia or other abnormality seen. He was happy to receive the news.     Of note, in the afternoon of 8/14, RN did notice quite a bit of tension on his torres catheter. The tension was relieved and petroleum barrier cream and lidocaine for discomfort was added. Continued to monitor but pt did ok. No blood from meatus. Also an order to be sure to relieve the tension from torres catheter was entered for nursing.     He did mention being able to do much more therapies lately and his muscle aches following therapies improving in a quicker manner.    He denied SOB, chest pain, n/v, fevers, and chills.        MEDICATIONS  Scheduled meds    bisacodyl  10 mg Rectal Daily     melatonin  1 mg Oral At Bedtime     [START ON 8/15/2021] pantoprazole  40 mg Oral QAM AC     psyllium  1 packet Oral BID     saccharomyces boulardii  250 mg Oral BID     sennosides  2 tablet Oral Daily     sodium chloride (PF)  10 mL Intracatheter Q8H     traZODone  100 mg Oral At Bedtime       PRN meds:  acetaminophen, methocarbamol, miconazole, ondansetron, senna-docusate, sodium chloride (PF)      PHYSICAL EXAM  /59 (BP Location: Right arm)   Pulse 98   Temp 97.1  F (36.2  C) (Oral)   Resp 16   Ht 1.803 m (5' 11\")   Wt 105.2 kg (232 lb)   SpO2 97%   BMI 32.36 kg/m    Gen: Awake, coooperative, no distress.  HEENT: atraumatic, no discharge from nares or ears, EOM intact.  Anterior incision c/d/i.  Pulm: Non-labored breathing  Ext: Mild edema in LE b/l, none in UE b/l, no calf tenderness  Neuro/MSK: " alert, oriented, answers questions appropriately, follows commands      LABS  No results found for this or any previous visit (from the past 24 hour(s)).    ASSESSMENT AND PLAN    --Vitals stable. No lab today.  --Continue ongoing medical management.  --Continue therapies and plan of care.  Teri Jordan MD

## 2021-08-14 NOTE — PLAN OF CARE
"FOCUS/GOAL  Medication management, Medical management and Reinforcement of self-care/ADL    ASSESSMENT, INTERVENTIONS AND CONTINUING PLAN FOR GOAL:  Patient is alert/oriented x4 has been able to use call light appropriately for care needs.  Patient had an early therapy with OT this am and did report symptomatic hypotension again, reports \"lightheadedness only.\"  Patient did eat breakfast then and still had some symptoms though with next therapy, MD was updated that this is still an issue even with Vishal stockings and abdominal binder on.  Flomax was cancelled yesterday, also to encourage fluids still, ordered some extra fluids for patient to try to hydrate more along with encouraging water.  Patient denies other issues, no SOB and vitals are stable.  Torres is intact and draining adequately, patient has been up in w/c between therapies.  Encouraged patient to lay down before last therapy at 1420 to assess torres site, was noted to have some pulling at the site, patient did not notice but with touching it did report some tenderness.  Site is red, and seems to be an area that is indented.  Moved the securement site to be closer to the groin so that it might not pull as much, MD notified and also that there is some pink tinged to the urine, next shift aware, continue to monitor.    "

## 2021-08-14 NOTE — PLAN OF CARE
VSS on RA. Denies SOB/chest pain/dizziness/headache/pain. CPAP on overnight. Davis in place with adequate output, LBM 8/13. Transfers with 1A using walker/GB. Blanchable redness/rash to groin/coccyx, neck incision SATNAM. Denies N/T. Regular diet, thin liquids. Single lumen PICC line L arm infusing IV protonix at 10 mL/hr. Sleeping most of night, continue POC.

## 2021-08-15 ENCOUNTER — APPOINTMENT (OUTPATIENT)
Dept: PHYSICAL THERAPY | Facility: CLINIC | Age: 71
DRG: 949 | End: 2021-08-15
Attending: PHYSICAL MEDICINE & REHABILITATION
Payer: COMMERCIAL

## 2021-08-15 ENCOUNTER — APPOINTMENT (OUTPATIENT)
Dept: OCCUPATIONAL THERAPY | Facility: CLINIC | Age: 71
DRG: 949 | End: 2021-08-15
Attending: PHYSICAL MEDICINE & REHABILITATION
Payer: COMMERCIAL

## 2021-08-15 LAB
HGB BLD-MCNC: 10.6 G/DL (ref 13.3–17.7)
HOLD SPECIMEN: NORMAL

## 2021-08-15 PROCEDURE — 85018 HEMOGLOBIN: CPT | Performed by: STUDENT IN AN ORGANIZED HEALTH CARE EDUCATION/TRAINING PROGRAM

## 2021-08-15 PROCEDURE — 36415 COLL VENOUS BLD VENIPUNCTURE: CPT | Performed by: STUDENT IN AN ORGANIZED HEALTH CARE EDUCATION/TRAINING PROGRAM

## 2021-08-15 PROCEDURE — 250N000013 HC RX MED GY IP 250 OP 250 PS 637: Performed by: STUDENT IN AN ORGANIZED HEALTH CARE EDUCATION/TRAINING PROGRAM

## 2021-08-15 PROCEDURE — 97530 THERAPEUTIC ACTIVITIES: CPT | Mod: GP

## 2021-08-15 PROCEDURE — 97535 SELF CARE MNGMENT TRAINING: CPT | Mod: GO

## 2021-08-15 PROCEDURE — 128N000003 HC R&B REHAB

## 2021-08-15 PROCEDURE — 97110 THERAPEUTIC EXERCISES: CPT | Mod: GP

## 2021-08-15 PROCEDURE — 250N000013 HC RX MED GY IP 250 OP 250 PS 637: Performed by: PHYSICAL MEDICINE & REHABILITATION

## 2021-08-15 RX ADMIN — BISACODYL 10 MG: 10 SUPPOSITORY RECTAL at 19:06

## 2021-08-15 RX ADMIN — Medication 1 MG: at 20:47

## 2021-08-15 RX ADMIN — TRAZODONE HYDROCHLORIDE 100 MG: 50 TABLET ORAL at 20:47

## 2021-08-15 RX ADMIN — MICONAZOLE NITRATE: 20 POWDER TOPICAL at 12:52

## 2021-08-15 RX ADMIN — Medication 250 MG: at 09:57

## 2021-08-15 RX ADMIN — Medication 250 MG: at 20:47

## 2021-08-15 RX ADMIN — PSYLLIUM HUSK 1 PACKET: 3.4 POWDER ORAL at 09:57

## 2021-08-15 RX ADMIN — SENNOSIDES 2 TABLET: 8.6 TABLET, FILM COATED ORAL at 12:51

## 2021-08-15 RX ADMIN — PSYLLIUM HUSK 1 PACKET: 3.4 POWDER ORAL at 20:46

## 2021-08-15 RX ADMIN — PANTOPRAZOLE SODIUM 40 MG: 40 TABLET, DELAYED RELEASE ORAL at 06:36

## 2021-08-15 NOTE — PLAN OF CARE
Discharge Planner Post-Acute Rehab OT:      Discharge Plan: home with assist for IADL and HH      Precautions: fall, cervical spinal, involuntary twitching in lower extremities, orthostatic hypotension- Abdominal binder/LE stockinette.     Current Status:  ADLs: SBA w/ dressing stick for UB dressing, CGA don shorts EOB, total assist for toileting due to incontinence/torres catheter. SBA at sink for face g/h tasks,minimally tolerates standing d/t orthostatic hypotension  Dep A w/ LB hygiene as pt still incontinent.   Mod-max A w/ LB dressing w/ pt assist w/ threading catheter.  Tsfers: CGA w/ FWW    Dep A in rolling shower chair due to Orthostatic BP  IADLs: not tested, wife can assist  Vision/Cognition: WFL     Assessment:   Pt had abdominal binder/LE stockinette but still experiencing increased lightheaded and less recovery time than last week. Pt had LOB descending w/ mod A down to commmode w/ longer standing and pivoting w/ FWW. BP 93/65 noted and later 77/44 in standing. Pt reported lightheadedness and weakness were contributing factors. BP orthostatic continues to be major barrier at this time.    Other Barriers to Discharge (DME, Family Training, etc): Pt has good family support and accessible home. family training, determine DME and improve ADL performance.   Barriers: Orthostatic BP, symptomatic. Minimal tolerance for standing activities. LE involuntary twitching.

## 2021-08-15 NOTE — PLAN OF CARE
Patient is A&Ox4, calm, and cooperative. Assist of 1 with walker and gait belt. Denies pain during shift. Has torres in place, patent. Continent of bowel after suppository. Regular/thin diet, takes pills whole. Has left PICC line. VS stable. Continue POC.

## 2021-08-15 NOTE — PLAN OF CARE
"FOCUS/GOAL  Bowel management, Bladder management, Medical management, Cognition/Memory/Judgment/Problem solving and Reinforcement of self-care/ADL    ASSESSMENT, INTERVENTIONS AND CONTINUING PLAN FOR GOAL:  Patient is alert/oriented x4, is able to use call light appropriately for care needs.  Received in report that patient had a black stool on evening shift yestereday, MD was notified of this first thing this am at 0745 and Hgb level was checked, came back still 10.6.  Patient had early therapy this am and again had symptomatic hypotension with standing, reports feeling \"lightheadedness only, and it resolves in a few minutes.\"  Patient increasing fluid intake with encouragement, does not like to eat much for breakfast and encouraged to try more of a substantial breakfast as it could help prevent some of those events.  Still wearing the abdominal binder and roberto stockings.  Patient noted to have some redness and small amount of blood to the urethral opening on the underside yesterday, securement site was changed at that time and appears better today but still red, MD was notified yesterday but may want a WOC consult- left sticky note for f/u.  Patient denies any pain, VSS (except for orthos in am).  No bm on this shift.  No additional care concerns at this time, continue with POC.      "

## 2021-08-15 NOTE — PLAN OF CARE
VSS on RA, CPAP on at bed. Denies SOB/chest pain/dizziness/headache/pain. Davis in place with adequate output, LBM 8/15 this shift. Not OOB this shift, repositioning independently. Blanchable redness/rash to groin/coccyx, neck incision SATNAM. Denies N/T. Regular diet, thin liquids. Single lumen PICC line L arm SL. Able to make needs known, bed alarm on for safety. Sleeping most of night, continue POC.

## 2021-08-15 NOTE — PROGRESS NOTES
Brief Progress Note    In am of 8/15, RN reported a dark BM. His IV Protonix was stopped on 8/14 and PO Protonix started 8/15.     Hemoglobin on morning of 8/15 was 10.6, which is increased from previous of 10.2 on 8/13. Will continue to monitor. Repeat blood work ordered for am of 8/16.    Teri Jordan MD

## 2021-08-15 NOTE — PLAN OF CARE
"Discharge Planner Post-Acute Rehab PT:      Discharge Plan: Home with family assist, mixed mobility likely (w/c and walker)     Precautions: Falls, cervical, PICC line     Current Status:  Bed Mobility: mod I   Transfer: CGA with WW   Gait: CGA with WW up to 50   Stairs: 4\" stairs, CGA  Balance: CGA and WW for standing      Assessment: Pt orthostatic during morning session, with BP dec to 88/42 with STS transfer with FWW and CGAx1. Pt reports lightheadedness with standing; tolerated 1min 10s at most. Reporting no symptoms of dec BP with return to sit. Performed seated LE TE to inc BP with success; educated pt to continue t/o AM for inc BP. Discussed need for adequate fluid intake/eating to maintain BP. Created log for pt to track fluid intake t/o day.         Other Barriers to Discharge (DME, Family Training, etc): medical complexity, incontinence, DME needs, orthostatic BP  " omeprazole (PRILOSEC) 20 MG capsule    Sprinkle 1 capsule on yogurt or pudding once daily 30-60 minutes before a meal.     Quantity: 30

## 2021-08-16 ENCOUNTER — APPOINTMENT (OUTPATIENT)
Dept: OCCUPATIONAL THERAPY | Facility: CLINIC | Age: 71
DRG: 949 | End: 2021-08-16
Attending: PHYSICAL MEDICINE & REHABILITATION
Payer: COMMERCIAL

## 2021-08-16 ENCOUNTER — APPOINTMENT (OUTPATIENT)
Dept: PHYSICAL THERAPY | Facility: CLINIC | Age: 71
DRG: 949 | End: 2021-08-16
Attending: PHYSICAL MEDICINE & REHABILITATION
Payer: COMMERCIAL

## 2021-08-16 LAB
ANION GAP SERPL CALCULATED.3IONS-SCNC: 2 MMOL/L (ref 3–14)
BASOPHILS # BLD AUTO: 0.1 10E3/UL (ref 0–0.2)
BASOPHILS NFR BLD AUTO: 1 %
BUN SERPL-MCNC: 20 MG/DL (ref 7–30)
CALCIUM SERPL-MCNC: 8.6 MG/DL (ref 8.5–10.1)
CHLORIDE BLD-SCNC: 109 MMOL/L (ref 94–109)
CO2 SERPL-SCNC: 27 MMOL/L (ref 20–32)
CREAT SERPL-MCNC: 1.24 MG/DL (ref 0.66–1.25)
EOSINOPHIL # BLD AUTO: 0.1 10E3/UL (ref 0–0.7)
EOSINOPHIL NFR BLD AUTO: 1 %
ERYTHROCYTE [DISTWIDTH] IN BLOOD BY AUTOMATED COUNT: 11.9 % (ref 10–15)
GFR SERPL CREATININE-BSD FRML MDRD: 58 ML/MIN/1.73M2
GLUCOSE BLD-MCNC: 88 MG/DL (ref 70–99)
HCT VFR BLD AUTO: 31.4 % (ref 40–53)
HGB BLD-MCNC: 10.2 G/DL (ref 13.3–17.7)
IMM GRANULOCYTES # BLD: 0 10E3/UL
IMM GRANULOCYTES NFR BLD: 0 %
LYMPHOCYTES # BLD AUTO: 2.5 10E3/UL (ref 0.8–5.3)
LYMPHOCYTES NFR BLD AUTO: 25 %
MAGNESIUM SERPL-MCNC: 2 MG/DL (ref 1.6–2.3)
MCH RBC QN AUTO: 32.4 PG (ref 26.5–33)
MCHC RBC AUTO-ENTMCNC: 32.5 G/DL (ref 31.5–36.5)
MCV RBC AUTO: 100 FL (ref 78–100)
MONOCYTES # BLD AUTO: 1 10E3/UL (ref 0–1.3)
MONOCYTES NFR BLD AUTO: 10 %
NEUTROPHILS # BLD AUTO: 6.1 10E3/UL (ref 1.6–8.3)
NEUTROPHILS NFR BLD AUTO: 63 %
NRBC # BLD AUTO: 0 10E3/UL
NRBC BLD AUTO-RTO: 0 /100
PLATELET # BLD AUTO: 146 10E3/UL (ref 150–450)
POTASSIUM BLD-SCNC: 3.8 MMOL/L (ref 3.4–5.3)
RBC # BLD AUTO: 3.15 10E6/UL (ref 4.4–5.9)
SODIUM SERPL-SCNC: 138 MMOL/L (ref 133–144)
WBC # BLD AUTO: 9.8 10E3/UL (ref 4–11)

## 2021-08-16 PROCEDURE — 85025 COMPLETE CBC W/AUTO DIFF WBC: CPT | Performed by: STUDENT IN AN ORGANIZED HEALTH CARE EDUCATION/TRAINING PROGRAM

## 2021-08-16 PROCEDURE — 128N000003 HC R&B REHAB

## 2021-08-16 PROCEDURE — 97530 THERAPEUTIC ACTIVITIES: CPT | Mod: GP

## 2021-08-16 PROCEDURE — 97116 GAIT TRAINING THERAPY: CPT | Mod: GP

## 2021-08-16 PROCEDURE — 250N000013 HC RX MED GY IP 250 OP 250 PS 637: Performed by: PHYSICAL MEDICINE & REHABILITATION

## 2021-08-16 PROCEDURE — 80048 BASIC METABOLIC PNL TOTAL CA: CPT | Performed by: STUDENT IN AN ORGANIZED HEALTH CARE EDUCATION/TRAINING PROGRAM

## 2021-08-16 PROCEDURE — 250N000013 HC RX MED GY IP 250 OP 250 PS 637: Performed by: STUDENT IN AN ORGANIZED HEALTH CARE EDUCATION/TRAINING PROGRAM

## 2021-08-16 PROCEDURE — 97535 SELF CARE MNGMENT TRAINING: CPT | Mod: GO | Performed by: STUDENT IN AN ORGANIZED HEALTH CARE EDUCATION/TRAINING PROGRAM

## 2021-08-16 PROCEDURE — 36592 COLLECT BLOOD FROM PICC: CPT | Performed by: STUDENT IN AN ORGANIZED HEALTH CARE EDUCATION/TRAINING PROGRAM

## 2021-08-16 PROCEDURE — 83735 ASSAY OF MAGNESIUM: CPT | Performed by: PHYSICIAN ASSISTANT

## 2021-08-16 PROCEDURE — 99232 SBSQ HOSP IP/OBS MODERATE 35: CPT | Mod: 24 | Performed by: PHYSICAL MEDICINE & REHABILITATION

## 2021-08-16 RX ORDER — PANTOPRAZOLE SODIUM 40 MG/1
40 TABLET, DELAYED RELEASE ORAL
Status: DISCONTINUED | OUTPATIENT
Start: 2021-08-16 | End: 2021-08-16

## 2021-08-16 RX ORDER — MIDODRINE HYDROCHLORIDE 2.5 MG/1
2.5 TABLET ORAL 2 TIMES DAILY
Status: DISCONTINUED | OUTPATIENT
Start: 2021-08-16 | End: 2021-08-17

## 2021-08-16 RX ORDER — PANTOPRAZOLE SODIUM 40 MG/1
40 TABLET, DELAYED RELEASE ORAL
Status: DISCONTINUED | OUTPATIENT
Start: 2021-08-16 | End: 2021-09-09 | Stop reason: HOSPADM

## 2021-08-16 RX ADMIN — Medication 250 MG: at 09:01

## 2021-08-16 RX ADMIN — CARBIDOPA AND LEVODOPA 2.5 MG: 50; 200 TABLET, EXTENDED RELEASE ORAL at 11:59

## 2021-08-16 RX ADMIN — TRAZODONE HYDROCHLORIDE 100 MG: 50 TABLET ORAL at 20:54

## 2021-08-16 RX ADMIN — Medication 250 MG: at 20:54

## 2021-08-16 RX ADMIN — PANTOPRAZOLE SODIUM 40 MG: 40 TABLET, DELAYED RELEASE ORAL at 06:30

## 2021-08-16 RX ADMIN — PSYLLIUM HUSK 1 PACKET: 3.4 POWDER ORAL at 20:54

## 2021-08-16 RX ADMIN — METHOCARBAMOL 750 MG: 750 TABLET ORAL at 11:59

## 2021-08-16 RX ADMIN — SENNOSIDES 2 TABLET: 8.6 TABLET, FILM COATED ORAL at 11:59

## 2021-08-16 RX ADMIN — PANTOPRAZOLE SODIUM 40 MG: 40 TABLET, DELAYED RELEASE ORAL at 15:35

## 2021-08-16 RX ADMIN — Medication 1 MG: at 20:54

## 2021-08-16 RX ADMIN — CARBIDOPA AND LEVODOPA 2.5 MG: 50; 200 TABLET, EXTENDED RELEASE ORAL at 09:10

## 2021-08-16 RX ADMIN — BISACODYL 10 MG: 10 SUPPOSITORY RECTAL at 19:27

## 2021-08-16 RX ADMIN — PSYLLIUM HUSK 1 PACKET: 3.4 POWDER ORAL at 09:01

## 2021-08-16 NOTE — PROGRESS NOTES
"  [facility]   Acute Rehabilitation Unit  Daily progress note    INTERVAL HISTORY  Weekend notes reviewed.  Last night Mr. Johns reports returns of muscle twitching in the left leg, prior to that had been improving for four consecutive days.  Also associated with pain on the lateral aspect of the ankle, but denies any trauma or ankle sprains that he can recall.  Also continues to be orthostatic which has limited his therapies.  Denies chest pain or shortness of breath, and he says his fluid intake is good and he is tracking this.  Functionally able to ambulate 50 ft x2 with a WW, CGA for transfers with a WW.  Ankle pain did not limit his participation with PT today.       MEDICATIONS  Scheduled meds    bisacodyl  10 mg Rectal Daily     melatonin  1 mg Oral At Bedtime     midodrine  2.5 mg Oral BID 09 12     pantoprazole  40 mg Oral BID AC     psyllium  1 packet Oral BID     saccharomyces boulardii  250 mg Oral BID     sennosides  2 tablet Oral Daily     sodium chloride (PF)  10 mL Intracatheter Q8H     traZODone  100 mg Oral At Bedtime       PRN meds:  acetaminophen, lidocaine, methocarbamol, miconazole, ondansetron, senna-docusate, sodium chloride (PF), White Petrolatum      PHYSICAL EXAM  /68 (BP Location: Right arm)   Pulse 110   Temp 98.9  F (37.2  C) (Oral)   Resp 16   Ht 1.803 m (5' 11\")   Wt 105.2 kg (232 lb)   SpO2 95%   BMI 32.36 kg/m    Gen: Awake, coooperative, no distress.  HEENT: atraumatic, no discharge from nares or ears, EOM intact.  Anterior incision c/d/i.  Pulm: Non-labored breathing  Ext: Mild edema in LE b/l, none in UE b/l, no calf tenderness.  +TTP over the left ATF ligament with positive lateral stress test.   Neuro/MSK: alert, oriented, answers questions appropriately, follows commands      LABS  No results found for this or any previous visit (from the past 24 hour(s)).    ASSESSMENT AND PLAN    Rigo Johns is a 70 year old L hand dominant " male with a PMH of HTN, PILI, and prior cervical surgery (C5-7 anterior fusion in 1993 per notes) who is now status post a C3-4, C4-5 ACDF on 7/14/2021 for cervical myelopathy.  He was admitted to the [facility] from 7/18-7/27/21 but transferred back to the medical floor for fevers and prevertebral fluid collection suggestive of abscess.  Now improving with IV antbiotics and no surgical intervention was needed.  He is now being admitted to the ARU on 8/2/21.  Impairment group code: 04.1211 Quadriplegia, Incomplete C1-4 - s/p C3-5 ACDF.   PLAN  Rehabilitation  1. PT and OT 90 minutes of each on a daily basis, in addition to rehab nursing and close management of physiatrist.    2. Impairment of ADL's: Noted to have impaired ROM, impaired strength, impaired activity tolerance, edema management, impaired balance, and impaired coordination, all affecting his ability to safely and independently perform basic ADLs.  Goal for mod I with basic ADLs.  3. Impairment of mobility:  Noted to have impaired ROM, impaired strength, impaired activity tolerance, edema management, impaired balance and impaired coordination, all affecting his ability to safely and independently ambulate and perform basic mobility.  Goal for mod I with basic mobility.  Medical  1)Neurology  #Cervical Myelopathy s/p C3-4, C4-5 ACDF on 7/14/21 complicated by fevers and prevertebral fluid collection, suggestive of abscess  -Follow up MRI completed 8/10 which shows near resolution of the prevertebral fluid.  Discussed with ID who also discussed with NSGY and images reviewed.  Antibiotics discontinued after 8/11 (completed 2 week course)  -Maintain postoperative spinal precautions. No need for cervical collar  -Follow-up with neurosurgery and infectious disease after discharge     2)CVS  #Orthostasis  -Abd Binder, TEDs, encourage liquids  -Discontinued Flomax  -Start Midodrine 2.5 mg BID today, titrate up if needed     3)Pulmonary  #CAP, resolved  -Completed  course of antibiotics (Zosyn x1 in ED, Azithromycin 7/12-7/16, Ceftriaxone 7/12 - 7/17, and Cefuroxime x1) during initial hospitalization, abx for cervical fluid collection completed after 8/11  -Encourage incentive spirometer  -Monitor respiratory status, supplementary oxygen PRN.  Now weaned to room air.     #PILI  -Continue CPAP with home settings     4)FENGI  #Diet: Regular diet  #Neurogenic bowel  -Incontinence improving, happy to continue current regimen  -Fiber BID  -Scheduling 2 tabs of Senna at noon and suppository 30 minutes after dinner to regulate his BMs further on 8/6  #Melena  #Acute anemia  -Hgb now stable in the 10s, 10.6 on last check 8/15.  Re-check today is pending.   - Endoscopy 8/12 demonstrated duodenal ulcers that are no longer bleeding, which were likely the cause of his melena and anemia.   - Now completed course of IV Protonix.  Will initiate PO BID x 8 weeks (starting 8/16) and likely PO daily indefinitely there after.     #Liver Lesion  -Abnormal appearance is seen on CAT scan with slight nodularity inferiorly. LFTs within normal limits.  -Follow-up primary care physician     5)  #Urinary Retention   -Voiding trial done again on 8/10 but was unable to void and had large IC volumes, therefore Davis was re-placed on 8/13.    #DONOVAN and likely CKD   -Pt with limited physician follow up so baseline creatinine not known. Peak at 2.13, improved with IV hydration  -Avoid nephrotoxic agents, NSAID  -Cr 1.31 on 8/12 (stable from prior).  BMP today pending.  Consider removing PICC pending labs today.     #Renal lesion  -Incidental finding on CT scan  -Renal ultrasound showed simple cysts of bilateral kidneys  -Follow up with PCP     6)DVT prophylaxis  -PCDs and ambulation     7)Pain  #Spasms  Pain is tolerable at this time. If spasms continue, will consider scheduling Robaxin.   -Tylenol 650 mg q4h PRN  -Robaxin 750 mg q6h PRN  -Continue to monitor spasms  #Likely ATF sprain  -Ice PRN and will  order Active ankle brace    8)Endo  -Prior steroid induced hyperglycemia.  HbA1c 5.0.   -Now completed course of steroids.  No need for further routine checks     9)Heme   #Acute anemia  -GI workup as above     #Leukocytosis   -Thought to be secondary to steroids which are now discontinued. WBCs 8.3 on 8/12, stable    #Thrombocytopenia  -Noted upon initial admission to the emergency room. Had been stable low 100s, but now improving.  152 on 8/12. Unclear if it was due to acute distress and infection versus possible undiagnosed liver disease.   -Monitor peripherally  -Follow-up with primary care physician     10)Psych  -Monitor mood, will consult health psychology if needed     11)Social/Dispo  -Anticipate discharge home at a modified independent level for ambulation, mobility, and ADLs.   -ELOS: 3 weeks  -Rehab prognosis: Good  -Follow up appointments: PCP, PRASHANT (Dr. Hanley), ID     Code status: Full Code (Discussed with patient upon admission).  This was confirmed upon re-admit.  Pt does not want extraordinary measures should prognosis be poor, however does want attempts at resuscitation and therefore will remain full code.    Robi Whitehead MD  Department of Rehabilitation Medicine    Time Spent on this Encounter   I, Robi Whitehead, spent a total of 30 minutes face-to-face or managing the care of Rigo Johns. Over 50% of my time on the unit was spent counseling the patient and coordinating care. See note for details.

## 2021-08-16 NOTE — PLAN OF CARE
"Discharge Planner Post-Acute Rehab PT:      Discharge Plan: Home with family assist, mixed mobility likely (w/c and walker)     Precautions: Falls, cervical, PICC line     Current Status:  Bed Mobility: mod I   Transfer: CGA with WW   Gait: CGA with WW 2x50'   Stairs: 4\" stairs, CGA  Balance: CGA and WW for standing      Assessment: Pt slightly but improvedorthostatic during morning session, with 25 point SBP drop supine>stand but SBP remained >100, self reports slight light headedness, but improved from OT earlier and yesterday. Able to progress back on track with PT with functional transfers and ambulation FWW CGA. Pt does report R ankle pain from muscle spasms overnight, slightly painful during session but not limiting activity today. BPs monitored closely throughout session, other than initial drop with standing remained WNL 130s-140s/60s.        Other Barriers to Discharge (DME, Family Training, etc): medical complexity, incontinence, DME needs, orthostatic BP  "

## 2021-08-16 NOTE — PLAN OF CARE
Patient is A&Ox4, calm, and cooperative. Assist of 1 with walker and gait belt. Denies pain during shift. Has torres in place, patent. Continent of bowel after suppository. Note left for treatment to assess black stools. Regular/thin diet, takes pills whole. Has left PICC line, dressing changed. VS stable. Continue POC.

## 2021-08-16 NOTE — PLAN OF CARE
VSS on RA, CPAP on overnight. Denies SOB/chest pain/dizziness/headache/pain. Davis in place, adequate output. LBM 8/15. Not OOB this shift, repositioning independently in bed. Blanchable redness/rash to coccyx/groin, neck incision SATNAM, wound on penis SATNAM. Denies N/T. Regular diet, thin liquids. Single lumen PICC line L arm SL. Sleeping most of night. Continue POC.

## 2021-08-16 NOTE — PLAN OF CARE
Patient is alert and oriented x4, complains of L ankle pain. PRN Robaxin given x1 with good effect per patient report. Is CGA with walker for transfers and ambulation. Davis is patent and draining adequately. Davis cares completed. Was incontinent of black/tarry stool x1 this shift. MD notified. Is tolerating diet well with good appetite. Fluids encouraged throughout shift. Abdominal binder on and tubigrips in place on BLE. PICC to LUE is patent and saline locked. Alarms no longer indicated per patient whiteboard. Continue with POC.

## 2021-08-16 NOTE — PLAN OF CARE
Discharge Planner Post-Acute Rehab OT:      Discharge Plan: home with assist for IADL and HH      Precautions: fall, cervical spinal, involuntary twitching in lower extremities, orthostatic hypotension- Abdominal binder/LE stockinette.     Current Status:  ADLs: SBA or UB dressing, CGA don shorts EOB, total assist for toileting due to incontinence/torres catheter. SBA at sink for face g/h tasks,minimally tolerates standing d/t orthostatic hypotension  Dep A w/ LB hygiene as pt still incontinent.   Mod-max A w/ LB dressing w/ pt assist w/ threading catheter.  Tsfers: CGA w/ FWW    Dep A in rolling shower chair due to Orthostatic BP  IADLs: not tested, wife can assist  Vision/Cognition: WFL     Assessment:   Pt orthostatic this AM even with abdominal binder and roberto hose on. Modified ADL to be [place] based. TONI for transfer due to legs buckling slightly at end of transfer and pt's lacking control with descent. Pt is frustrated with his lack of progress with mobility. Pt has however made some progress with UE function and could get shirt on without assist or use of AE.     Other Barriers to Discharge (DME, Family Training, etc): Pt has good family support and accessible home. family training, determine DME and improve ADL performance.   Barriers: Orthostatic BP, symptomatic. Minimal tolerance for standing activities. LE involuntary twitching

## 2021-08-16 NOTE — PROGRESS NOTES
S: Order received to fit patient with a Air cast sturrip as ordered by .  O/G: Support and stabilize ankle stability   A:  Patient fit with universal air cast sturrip.  The fit of the sturrip is adequate.  P: contact orthotics if any issues arise.   RAMON Bryson.

## 2021-08-17 ENCOUNTER — APPOINTMENT (OUTPATIENT)
Dept: PHYSICAL THERAPY | Facility: CLINIC | Age: 71
DRG: 949 | End: 2021-08-17
Attending: PHYSICAL MEDICINE & REHABILITATION
Payer: COMMERCIAL

## 2021-08-17 ENCOUNTER — APPOINTMENT (OUTPATIENT)
Dept: OCCUPATIONAL THERAPY | Facility: CLINIC | Age: 71
DRG: 949 | End: 2021-08-17
Attending: PHYSICAL MEDICINE & REHABILITATION
Payer: COMMERCIAL

## 2021-08-17 PROCEDURE — 250N000013 HC RX MED GY IP 250 OP 250 PS 637: Performed by: PHYSICIAN ASSISTANT

## 2021-08-17 PROCEDURE — 99232 SBSQ HOSP IP/OBS MODERATE 35: CPT | Mod: 24 | Performed by: PHYSICIAN ASSISTANT

## 2021-08-17 PROCEDURE — 250N000013 HC RX MED GY IP 250 OP 250 PS 637: Performed by: PHYSICAL MEDICINE & REHABILITATION

## 2021-08-17 PROCEDURE — 97535 SELF CARE MNGMENT TRAINING: CPT | Mod: GO | Performed by: STUDENT IN AN ORGANIZED HEALTH CARE EDUCATION/TRAINING PROGRAM

## 2021-08-17 PROCEDURE — 128N000003 HC R&B REHAB

## 2021-08-17 PROCEDURE — 97530 THERAPEUTIC ACTIVITIES: CPT | Mod: GO | Performed by: STUDENT IN AN ORGANIZED HEALTH CARE EDUCATION/TRAINING PROGRAM

## 2021-08-17 PROCEDURE — 97110 THERAPEUTIC EXERCISES: CPT | Mod: GP

## 2021-08-17 PROCEDURE — 250N000013 HC RX MED GY IP 250 OP 250 PS 637: Performed by: STUDENT IN AN ORGANIZED HEALTH CARE EDUCATION/TRAINING PROGRAM

## 2021-08-17 PROCEDURE — 97110 THERAPEUTIC EXERCISES: CPT | Mod: GO | Performed by: STUDENT IN AN ORGANIZED HEALTH CARE EDUCATION/TRAINING PROGRAM

## 2021-08-17 PROCEDURE — 97530 THERAPEUTIC ACTIVITIES: CPT | Mod: GP

## 2021-08-17 RX ORDER — MIDODRINE HYDROCHLORIDE 5 MG/1
5 TABLET ORAL
Status: DISCONTINUED | OUTPATIENT
Start: 2021-08-17 | End: 2021-08-20

## 2021-08-17 RX ADMIN — Medication 250 MG: at 20:01

## 2021-08-17 RX ADMIN — PANTOPRAZOLE SODIUM 40 MG: 40 TABLET, DELAYED RELEASE ORAL at 16:40

## 2021-08-17 RX ADMIN — Medication 250 MG: at 08:42

## 2021-08-17 RX ADMIN — PSYLLIUM HUSK 1 PACKET: 3.4 POWDER ORAL at 20:01

## 2021-08-17 RX ADMIN — METHOCARBAMOL 750 MG: 750 TABLET ORAL at 08:42

## 2021-08-17 RX ADMIN — TRAZODONE HYDROCHLORIDE 100 MG: 50 TABLET ORAL at 22:05

## 2021-08-17 RX ADMIN — SENNOSIDES 2 TABLET: 8.6 TABLET, FILM COATED ORAL at 12:18

## 2021-08-17 RX ADMIN — BISACODYL 10 MG: 10 SUPPOSITORY RECTAL at 20:01

## 2021-08-17 RX ADMIN — PANTOPRAZOLE SODIUM 40 MG: 40 TABLET, DELAYED RELEASE ORAL at 06:48

## 2021-08-17 RX ADMIN — Medication 1 MG: at 20:01

## 2021-08-17 RX ADMIN — PSYLLIUM HUSK 1 PACKET: 3.4 POWDER ORAL at 08:42

## 2021-08-17 RX ADMIN — MIDODRINE HYDROCHLORIDE 5 MG: 5 TABLET ORAL at 16:40

## 2021-08-17 RX ADMIN — CARBIDOPA AND LEVODOPA 2.5 MG: 50; 200 TABLET, EXTENDED RELEASE ORAL at 08:42

## 2021-08-17 RX ADMIN — CARBIDOPA AND LEVODOPA 2.5 MG: 50; 200 TABLET, EXTENDED RELEASE ORAL at 12:18

## 2021-08-17 ASSESSMENT — MIFFLIN-ST. JEOR: SCORE: 1793.19

## 2021-08-17 NOTE — PROGRESS NOTES
"  [facility]   Acute Rehabilitation Unit  Daily progress note    INTERVAL HISTORY  Seen working in OT remains orthostatic, not significantly symptomatic tolerating some supported standing and taking rest breaks, denies n/v stools remain black in color 1-2 per day with bowel incontinence this am.  No fevers, ab pain, n/v, sob, or chest pain.  Davis in place tolerating ok some positional discomfort.  Wearing left ankle brace reports feels supported.     Discussed plan to increase midodrine, monitor stools, hgb, and vital signs closely, he feels he is keeping up with hydration Therapy has been limited by orthostatic hypotension.     MEDICATIONS  Scheduled meds    bisacodyl  10 mg Rectal Daily     melatonin  1 mg Oral At Bedtime     midodrine  5 mg Oral TID w/meals     pantoprazole  40 mg Oral BID AC     psyllium  1 packet Oral BID     saccharomyces boulardii  250 mg Oral BID     sodium chloride (PF)  10 mL Intracatheter Q8H     traZODone  100 mg Oral At Bedtime       PRN meds:  acetaminophen, lidocaine, methocarbamol, miconazole, ondansetron, senna-docusate, sodium chloride (PF), White Petrolatum      PHYSICAL EXAM  /60 (BP Location: Right arm)   Pulse 86   Temp 99.4  F (37.4  C) (Axillary)   Resp 20   Ht 1.803 m (5' 11\")   Wt 101.6 kg (224 lb)   SpO2 94%   BMI 31.24 kg/m    Gen: Awake, coooperative, no distress.  HEENT: atraumatic, no discharge from nares or ears, EOM intact.  Anterior incision c/d/i.  Pulm: Non-labored breathing clear on room air.   CV: rrr  Ext: Mild edema in LE b/l with compression in place, none in UE b/l, no calf tenderness.  Left ankle brace in place  Neuro/MSK: alert, oriented, answers questions appropriately, follows commands      LABS  Results for orders placed or performed during the hospital encounter of 08/02/21 (from the past 24 hour(s))   Basic metabolic panel   Result Value Ref Range    Sodium 138 133 - 144 mmol/L    Potassium 3.8 3.4 - " 5.3 mmol/L    Chloride 109 94 - 109 mmol/L    Carbon Dioxide (CO2) 27 20 - 32 mmol/L    Anion Gap 2 (L) 3 - 14 mmol/L    Urea Nitrogen 20 7 - 30 mg/dL    Creatinine 1.24 0.66 - 1.25 mg/dL    Calcium 8.6 8.5 - 10.1 mg/dL    Glucose 88 70 - 99 mg/dL    GFR Estimate 58 (L) >60 mL/min/1.73m2   CBC with Platelets & Differential    Narrative    The following orders were created for panel order CBC with Platelets & Differential.  Procedure                               Abnormality         Status                     ---------                               -----------         ------                     CBC with platelets and d...[[id number]]  Abnormal            Final result                 Please view results for these tests on the individual orders.   Magnesium   Result Value Ref Range    Magnesium 2.0 1.6 - 2.3 mg/dL   CBC with platelets and differential   Result Value Ref Range    WBC Count 9.8 4.0 - 11.0 10e3/uL    RBC Count 3.15 (L) 4.40 - 5.90 10e6/uL    Hemoglobin 10.2 (L) 13.3 - 17.7 g/dL    Hematocrit 31.4 (L) 40.0 - 53.0 %     78 - 100 fL    MCH 32.4 26.5 - 33.0 pg    MCHC 32.5 31.5 - 36.5 g/dL    RDW 11.9 10.0 - 15.0 %    Platelet Count 146 (L) 150 - 450 10e3/uL    % Neutrophils 63 %    % Lymphocytes 25 %    % Monocytes 10 %    % Eosinophils 1 %    % Basophils 1 %    % Immature Granulocytes 0 %    NRBCs per 100 WBC 0 <1 /100    Absolute Neutrophils 6.1 1.6 - 8.3 10e3/uL    Absolute Lymphocytes 2.5 0.8 - 5.3 10e3/uL    Absolute Monocytes 1.0 0.0 - 1.3 10e3/uL    Absolute Eosinophils 0.1 0.0 - 0.7 10e3/uL    Absolute Basophils 0.1 0.0 - 0.2 10e3/uL    Absolute Immature Granulocytes 0.0 <=0.0 10e3/uL    Absolute NRBCs 0.0 10e3/uL       ASSESSMENT AND PLAN    Rigo Johns is a 70 year old L hand dominant male with a PMH of HTN, PILI, and prior cervical surgery (C5-7 anterior fusion in 1993 per notes) who is now status post a C3-4, C4-5 ACDF on 7/14/2021 for cervical myelopathy.  He was admitted to the [place]  ARU from 7/18-7/27/21 but transferred back to the medical floor for fevers and prevertebral fluid collection suggestive of abscess.  Now improving with IV antbiotics and no surgical intervention was needed.  He is now being admitted to the ARU on 8/2/21.  Impairment group code: 04.1211 Quadriplegia, Incomplete C1-4 - s/p C3-5 ACDF.   PLAN  Rehabilitation  1. PT and OT 90 minutes of each on a daily basis, in addition to rehab nursing and close management of physiatrist.    2. Impairment of ADL's: Noted to have impaired ROM, impaired strength, impaired activity tolerance, edema management, impaired balance, and impaired coordination, all affecting his ability to safely and independently perform basic ADLs.  Goal for mod I with basic ADLs.  3. Impairment of mobility:  Noted to have impaired ROM, impaired strength, impaired activity tolerance, edema management, impaired balance and impaired coordination, all affecting his ability to safely and independently ambulate and perform basic mobility.  Goal for mod I with basic mobility.  Medical  1)Neurology  #Cervical Myelopathy s/p C3-4, C4-5 ACDF on 7/14/21 complicated by fevers and prevertebral fluid collection, suggestive of abscess  -Follow up MRI completed 8/10 which shows near resolution of the prevertebral fluid.  Discussed with ID who also discussed with NSGY and images reviewed.  Antibiotics discontinued after 8/11 (completed 2 week course)  -Maintain postoperative spinal precautions. No need for cervical collar  -Follow-up with neurosurgery and infectious disease after discharge     2)CVS  #Orthostasis  -Abd Binder, TEDs, encourage liquids  -Discontinued Flomax  -increase Midodrine 5 mg tID today, continue to titrate up if needed     3)Pulmonary  #CAP, resolved  -Completed course of antibiotics (Zosyn x1 in ED, Azithromycin 7/12-7/16, Ceftriaxone 7/12 - 7/17, and Cefuroxime x1) during initial hospitalization, abx for cervical fluid collection completed after  8/11  -Encourage incentive spirometer  -Monitor respiratory status, supplementary oxygen PRN.  Now weaned to room air.     #PILI  -Continue CPAP with home settings     4)FENGI  #Diet: Regular diet  #Neurogenic bowel  -ongoing Incontinence though improving  -discontinue senna as three soft stools today, continue fiber, continue prn suppository.     #Melena  #Acute anemia  -Hgb now stable in the 10s, 10.2 8/16/21  - Endoscopy 8/12 demonstrated duodenal ulcers that are no longer bleeding, which were likely the cause of his melena and anemia.   - Now completed course of IV Protonix.  Will initiate PO BID x 8 weeks (starting 8/16) and per GI recs PO daily indefinitely there after.     #Liver Lesion  -Abnormal appearance is seen on CAT scan with slight nodularity inferiorly. LFTs within normal limits.  -Follow-up primary care physician     5)  #Urinary Retention   -Voiding trial done again on 8/10 but was unable to void and had large IC volumes, therefore Davis was re-placed on 8/13.  -monitor.     #DONOVAN and likely CKD   -Pt with limited physician follow up so baseline creatinine not known. Peak at 2.13, improved with IV hydration.  Stab le 8/16 1.24 8/16  -Avoid nephrotoxic agents, NSAID     #Renal lesion  -Incidental finding on CT scan  -Renal ultrasound showed simple cysts of bilateral kidneys  -Follow up with PCP     6)DVT prophylaxis  -PCDs and ambulation     7)Pain  #Spasms  Pain is tolerable at this time. If spasms continue, will consider scheduling Robaxin.   -Tylenol 650 mg q4h PRN  -Robaxin 750 mg q6h PRN  -Continue to monitor spasms  #Likely ATF sprain  -Ice PRN and  Active ankle brace (reports supportive)     8)Endo  -Prior steroid induced hyperglycemia.  HbA1c 5.0.   -Now completed course of steroids.  No need for further routine checks     9)Heme   #Acute anemia  -GI workup as above     #Leukocytosis   -Thought to be secondary to steroids which are now discontinued. WBCs 9.8 8/16    #Thrombocytopenia  -Noted  upon initial admission to the emergency room. Had been stable low 100s, but now improving.  152 on 8/12. Stable 146 8/16.    -trend  -Follow-up with primary care physician     10)Psych  -Monitor mood, will consult health psychology if needed     11)Social/Dispo  -Anticipate discharge home at a modified independent level for ambulation, mobility, and ADLs.   -ELOS: 3 weeks  -Rehab prognosis: Good  -Follow up appointments: PCP, PRASHANT (Dr. Hanley), ID     Code status: Full Code (Discussed with patient upon admission).  This was confirmed upon re-admit.  Pt does not want extraordinary measures should prognosis be poor, however does want attempts at resuscitation and therefore will remain full code.      Renae Caballero PA-C  PM&R

## 2021-08-17 NOTE — PLAN OF CARE
Patient is A&Ox4, calm, and cooperative. Assist of 1 with walker and gait belt. Denies pain during shift. Has torres in place, patent. Continent of bowel after suppository. Regular/thin diet, takes pills whole. Has left PICC line CDI. Received bed bath, denied going to shower room. Labs resulted during shift, MD paged. VS stable. Continue POC.

## 2021-08-17 NOTE — PLAN OF CARE
Discharge Planner Post-Acute Rehab OT:      Discharge Plan: home with assist for IADL and HH      Precautions: fall, cervical spinal, involuntary twitching in lower extremities, orthostatic hypotension- Abdominal binder/LE stockinette.     Current Status:  ADLs: SBA or UB dressing, CGA don shorts EOB, total assist for toileting due to incontinence/torres catheter. SBA at sink for face g/h tasks,minimally tolerates standing d/t orthostatic hypotension  Dep A w/ LB hygiene as pt still incontinent.   Mod-max A w/ LB dressing w/ pt assist w/ threading catheter.  Tsfers: CGA w/ FWW    Dep A in rolling shower chair due to Orthostatic BP  IADLs: not tested, wife can assist  Vision/Cognition: WFL     Assessment:  Pt was orthostatic during both sessions of OT today despite having a later start and wearing compression. BP is limiting progression of activity. Pt did work on UB strength and standing tolerance and is making slow gains.      Other Barriers to Discharge (DME, Family Training, etc): Pt has good family support and accessible home. family training, determine DME and improve ADL performance.   Barriers: Orthostatic BP, symptomatic. Minimal tolerance for standing activities. LE involuntary twitching

## 2021-08-17 NOTE — PLAN OF CARE
"Discharge Planner Post-Acute Rehab PT:      Discharge Plan: Home with family assist, mixed mobility likely (w/c and walker)     Precautions: Falls, cervical, PICC line     Current Status:  Bed Mobility: mod I   Transfer: CGA with WW   Gait: CGA with WW 2x50'   Stairs: 4\" stairs, CGA  Balance: CGA and WW for standing      Assessment: Pt reports air cast to L ankle is providing good support and has less pain then yesterday. Initially orthostatic during afternoon session but after seated floor bike standing BP was up to 108/59, in which able to demo short distance amb in room ~15' with FWW, mild lightheadedness, step to pattern and short step length due to L ankle pain during stance.    -15  minutes during AM session due to commode     Other Barriers to Discharge (DME, Family Training, etc): medical complexity, incontinence, DME needs, orthostatic BP  "

## 2021-08-17 NOTE — PLAN OF CARE
Patient is alert and oriented x4, complains of L ankle pain. PRN Robaxin given x1 with good effect per patient report. Is CGA with walker for transfers and ambulation. Davis is patent and draining adequately. Davis cares completed. Incontinent of BM x2 on this shift. Is tolerating diet well with good appetite. Fluids encouraged throughout shift. Abdominal binder on and tubigrips in place on BLE. Is wearing brace on L ankle as well. PICC to LUE is patent and saline locked. Alarms no longer indicated per patient whiteboard. Continue with POC.

## 2021-08-17 NOTE — PROGRESS NOTES
Brief GI Pathology Note:    08/17/21    Biopsies from EGD on 8/12/21.    Final Diagnosis   A. STOMACH, ANTRUM AND BODY BIOPSIES:   - Gastric body type mucosa with no significant inflammation or other histopathologic abnormality  - Negative for H. pylori-like organisms  - Negative for intestinal metaplasia or dysplasia       - No change to PPI recs.   - BID x 8 weeks, then daily indefinitely.     Raulito Celis MD, PGY4  Gastroenterology Fellow  [facility]  See [facility] for GI on-call information

## 2021-08-17 NOTE — PLAN OF CARE
FOCUS/GOAL  Medical management    ASSESSMENT, INTERVENTIONS AND CONTINUING PLAN FOR GOAL:    Pt does not want to be disturbed between 10 pm to 6am. Appears sleeping during hourly safety checks. A1 with the walker in transfers. Continent in bowel. No bm this shift. Has a torres catheter in place, patent. PICC line on the left arm has cdi dressing. Has mild discomfort on the left ankle caused by spasms. Did not want anything for it at this time but would like to take PRN Robaxin prior to therapy. Passed on the am nurse. Will continue POC.

## 2021-08-18 ENCOUNTER — APPOINTMENT (OUTPATIENT)
Dept: OCCUPATIONAL THERAPY | Facility: CLINIC | Age: 71
DRG: 949 | End: 2021-08-18
Attending: PHYSICAL MEDICINE & REHABILITATION
Payer: COMMERCIAL

## 2021-08-18 ENCOUNTER — APPOINTMENT (OUTPATIENT)
Dept: PHYSICAL THERAPY | Facility: CLINIC | Age: 71
DRG: 949 | End: 2021-08-18
Attending: PHYSICAL MEDICINE & REHABILITATION
Payer: COMMERCIAL

## 2021-08-18 ENCOUNTER — APPOINTMENT (OUTPATIENT)
Dept: GENERAL RADIOLOGY | Facility: CLINIC | Age: 71
DRG: 949 | End: 2021-08-18
Attending: PHYSICAL MEDICINE & REHABILITATION
Payer: COMMERCIAL

## 2021-08-18 LAB
ALBUMIN SERPL-MCNC: 2.8 G/DL (ref 3.4–5)
ALBUMIN UR-MCNC: 30 MG/DL
ALP SERPL-CCNC: 86 U/L (ref 40–150)
ALT SERPL W P-5'-P-CCNC: 23 U/L (ref 0–70)
ANION GAP SERPL CALCULATED.3IONS-SCNC: 2 MMOL/L (ref 3–14)
APPEARANCE UR: CLEAR
AST SERPL W P-5'-P-CCNC: 26 U/L (ref 0–45)
BACTERIA #/AREA URNS HPF: ABNORMAL /HPF
BASOPHILS # BLD AUTO: 0.1 10E3/UL (ref 0–0.2)
BASOPHILS NFR BLD AUTO: 1 %
BILIRUB SERPL-MCNC: 0.8 MG/DL (ref 0.2–1.3)
BILIRUB UR QL STRIP: NEGATIVE
BUN SERPL-MCNC: 20 MG/DL (ref 7–30)
CALCIUM SERPL-MCNC: 8.7 MG/DL (ref 8.5–10.1)
CHLORIDE BLD-SCNC: 108 MMOL/L (ref 94–109)
CO2 SERPL-SCNC: 28 MMOL/L (ref 20–32)
COLOR UR AUTO: ABNORMAL
CREAT SERPL-MCNC: 1.3 MG/DL (ref 0.66–1.25)
CRP SERPL-MCNC: 71 MG/L (ref 0–8)
EOSINOPHIL # BLD AUTO: 0.2 10E3/UL (ref 0–0.7)
EOSINOPHIL NFR BLD AUTO: 1 %
ERYTHROCYTE [DISTWIDTH] IN BLOOD BY AUTOMATED COUNT: 11.9 % (ref 10–15)
GFR SERPL CREATININE-BSD FRML MDRD: 55 ML/MIN/1.73M2
GLUCOSE BLD-MCNC: 91 MG/DL (ref 70–99)
GLUCOSE UR STRIP-MCNC: NEGATIVE MG/DL
HCT VFR BLD AUTO: 26.3 % (ref 40–53)
HGB BLD-MCNC: 8.6 G/DL (ref 13.3–17.7)
HGB UR QL STRIP: ABNORMAL
HYALINE CASTS: 1 /LPF
IMM GRANULOCYTES # BLD: 0.1 10E3/UL
IMM GRANULOCYTES NFR BLD: 1 %
KETONES UR STRIP-MCNC: NEGATIVE MG/DL
LACTATE SERPL-SCNC: 1.1 MMOL/L (ref 0.7–2)
LEUKOCYTE ESTERASE UR QL STRIP: NEGATIVE
LYMPHOCYTES # BLD AUTO: 3.2 10E3/UL (ref 0.8–5.3)
LYMPHOCYTES NFR BLD AUTO: 26 %
MCH RBC QN AUTO: 32.5 PG (ref 26.5–33)
MCHC RBC AUTO-ENTMCNC: 32.7 G/DL (ref 31.5–36.5)
MCV RBC AUTO: 99 FL (ref 78–100)
MONOCYTES # BLD AUTO: 1.6 10E3/UL (ref 0–1.3)
MONOCYTES NFR BLD AUTO: 13 %
MUCOUS THREADS #/AREA URNS LPF: PRESENT /LPF
NEUTROPHILS # BLD AUTO: 7.3 10E3/UL (ref 1.6–8.3)
NEUTROPHILS NFR BLD AUTO: 58 %
NITRATE UR QL: NEGATIVE
NRBC # BLD AUTO: 0 10E3/UL
NRBC BLD AUTO-RTO: 0 /100
PH UR STRIP: 5.5 [PH] (ref 5–7)
PLATELET # BLD AUTO: 161 10E3/UL (ref 150–450)
POTASSIUM BLD-SCNC: 3.9 MMOL/L (ref 3.4–5.3)
PROCALCITONIN SERPL-MCNC: <0.05 NG/ML
PROT SERPL-MCNC: 7.4 G/DL (ref 6.8–8.8)
RBC # BLD AUTO: 2.65 10E6/UL (ref 4.4–5.9)
RBC URINE: 2 /HPF
SODIUM SERPL-SCNC: 138 MMOL/L (ref 133–144)
SP GR UR STRIP: 1.01 (ref 1–1.03)
SQUAMOUS EPITHELIAL: <1 /HPF
UROBILINOGEN UR STRIP-MCNC: NORMAL MG/DL
WBC # BLD AUTO: 12.4 10E3/UL (ref 4–11)
WBC URINE: 1 /HPF

## 2021-08-18 PROCEDURE — 85025 COMPLETE CBC W/AUTO DIFF WBC: CPT | Performed by: STUDENT IN AN ORGANIZED HEALTH CARE EDUCATION/TRAINING PROGRAM

## 2021-08-18 PROCEDURE — 80053 COMPREHEN METABOLIC PANEL: CPT | Performed by: STUDENT IN AN ORGANIZED HEALTH CARE EDUCATION/TRAINING PROGRAM

## 2021-08-18 PROCEDURE — 250N000013 HC RX MED GY IP 250 OP 250 PS 637: Performed by: STUDENT IN AN ORGANIZED HEALTH CARE EDUCATION/TRAINING PROGRAM

## 2021-08-18 PROCEDURE — 86140 C-REACTIVE PROTEIN: CPT | Performed by: STUDENT IN AN ORGANIZED HEALTH CARE EDUCATION/TRAINING PROGRAM

## 2021-08-18 PROCEDURE — 258N000003 HC RX IP 258 OP 636: Performed by: STUDENT IN AN ORGANIZED HEALTH CARE EDUCATION/TRAINING PROGRAM

## 2021-08-18 PROCEDURE — 97535 SELF CARE MNGMENT TRAINING: CPT | Mod: GO

## 2021-08-18 PROCEDURE — 99232 SBSQ HOSP IP/OBS MODERATE 35: CPT | Mod: 24 | Performed by: PHYSICAL MEDICINE & REHABILITATION

## 2021-08-18 PROCEDURE — 87040 BLOOD CULTURE FOR BACTERIA: CPT | Performed by: STUDENT IN AN ORGANIZED HEALTH CARE EDUCATION/TRAINING PROGRAM

## 2021-08-18 PROCEDURE — 97530 THERAPEUTIC ACTIVITIES: CPT | Mod: GP

## 2021-08-18 PROCEDURE — 81001 URINALYSIS AUTO W/SCOPE: CPT | Performed by: STUDENT IN AN ORGANIZED HEALTH CARE EDUCATION/TRAINING PROGRAM

## 2021-08-18 PROCEDURE — 36592 COLLECT BLOOD FROM PICC: CPT | Performed by: STUDENT IN AN ORGANIZED HEALTH CARE EDUCATION/TRAINING PROGRAM

## 2021-08-18 PROCEDURE — 250N000013 HC RX MED GY IP 250 OP 250 PS 637: Performed by: PHYSICAL MEDICINE & REHABILITATION

## 2021-08-18 PROCEDURE — 36415 COLL VENOUS BLD VENIPUNCTURE: CPT | Performed by: STUDENT IN AN ORGANIZED HEALTH CARE EDUCATION/TRAINING PROGRAM

## 2021-08-18 PROCEDURE — 128N000003 HC R&B REHAB

## 2021-08-18 PROCEDURE — 97110 THERAPEUTIC EXERCISES: CPT | Mod: GO

## 2021-08-18 PROCEDURE — 83605 ASSAY OF LACTIC ACID: CPT | Performed by: STUDENT IN AN ORGANIZED HEALTH CARE EDUCATION/TRAINING PROGRAM

## 2021-08-18 PROCEDURE — 71046 X-RAY EXAM CHEST 2 VIEWS: CPT | Mod: 26 | Performed by: RADIOLOGY

## 2021-08-18 PROCEDURE — 250N000013 HC RX MED GY IP 250 OP 250 PS 637: Performed by: PHYSICIAN ASSISTANT

## 2021-08-18 PROCEDURE — 71046 X-RAY EXAM CHEST 2 VIEWS: CPT

## 2021-08-18 PROCEDURE — 84145 PROCALCITONIN (PCT): CPT | Performed by: STUDENT IN AN ORGANIZED HEALTH CARE EDUCATION/TRAINING PROGRAM

## 2021-08-18 PROCEDURE — 97110 THERAPEUTIC EXERCISES: CPT | Mod: GP

## 2021-08-18 RX ORDER — CYCLOBENZAPRINE HCL 5 MG
10 TABLET ORAL 3 TIMES DAILY PRN
Status: DISCONTINUED | OUTPATIENT
Start: 2021-08-18 | End: 2021-08-24

## 2021-08-18 RX ADMIN — MIDODRINE HYDROCHLORIDE 5 MG: 5 TABLET ORAL at 15:49

## 2021-08-18 RX ADMIN — PANTOPRAZOLE SODIUM 40 MG: 40 TABLET, DELAYED RELEASE ORAL at 15:49

## 2021-08-18 RX ADMIN — Medication 250 MG: at 08:50

## 2021-08-18 RX ADMIN — MIDODRINE HYDROCHLORIDE 5 MG: 5 TABLET ORAL at 08:50

## 2021-08-18 RX ADMIN — TRAZODONE HYDROCHLORIDE 100 MG: 50 TABLET ORAL at 20:55

## 2021-08-18 RX ADMIN — METHOCARBAMOL 750 MG: 750 TABLET ORAL at 11:12

## 2021-08-18 RX ADMIN — PANTOPRAZOLE SODIUM 40 MG: 40 TABLET, DELAYED RELEASE ORAL at 08:50

## 2021-08-18 RX ADMIN — PSYLLIUM HUSK 1 PACKET: 3.4 POWDER ORAL at 08:50

## 2021-08-18 RX ADMIN — SODIUM CHLORIDE, POTASSIUM CHLORIDE, SODIUM LACTATE AND CALCIUM CHLORIDE 1000 ML: 600; 310; 30; 20 INJECTION, SOLUTION INTRAVENOUS at 22:49

## 2021-08-18 RX ADMIN — PSYLLIUM HUSK 1 PACKET: 3.4 POWDER ORAL at 20:55

## 2021-08-18 RX ADMIN — ACETAMINOPHEN 650 MG: 325 TABLET, FILM COATED ORAL at 15:53

## 2021-08-18 RX ADMIN — MIDODRINE HYDROCHLORIDE 5 MG: 5 TABLET ORAL at 11:59

## 2021-08-18 RX ADMIN — Medication 250 MG: at 20:55

## 2021-08-18 RX ADMIN — Medication 1 MG: at 20:55

## 2021-08-18 NOTE — PLAN OF CARE
Alert and oriented X 4. Able to make needs known. Call light in reach. Offers no C/O pain/discomfort. Requests not to be disturbed between 2200 and 0600. Davis patent, draining sufficiently. Last BM on 8/17.  Appears to be sleeping during rounds. Continue with plan of care.

## 2021-08-18 NOTE — PLAN OF CARE
FOCUS/GOAL  Bowel management, Bladder management, Nutrition/Feeding/Swallowing precautions, Pain management, Mobility, Skin integrity, and Cognition/Memory/Judgment/Problem solving    ASSESSMENT, INTERVENTIONS AND CONTINUING PLAN FOR GOAL:    Patient A&Ox4. Denies headache, numbness tingling, SOB, CP, nausea, fever and chills. CGA with walker. Regular diet, thin liquids. Hypotension per report, fluids promoted. Complained of pain to left ankle, ice relieved pain. Inc of BM today. Davis Catheter, output 900cc. Baseline rash to perineum. Continue POC.

## 2021-08-18 NOTE — PLAN OF CARE
Patient is alert and oriented x4, complains of L ankle pain. PRN Robaxin given x1 with no effect per patient report. Is CGA with walker for transfers and ambulation. Davis is patent and draining adequately. Davis cares completed. Has not had a BM on this shift. Is tolerating diet well with good appetite. Fluids encouraged throughout shift. Abdominal binder on and tubigrips in place on BLE. Is wearing brace on L ankle as well. PICC to LUE is patent and saline locked. Alarms no longer indicated per patient whiteboard. Continue with POC.

## 2021-08-18 NOTE — PROGRESS NOTES
"  [facility]   Acute Rehabilitation Unit  Daily progress note    INTERVAL HISTORY  No acute events overnight, but did not sleep well in the latter half of the night, and is feeling tired this morning.  Temperatures have been in the 99s and he does feel warm, but denies feeling febrile.  Had 3 BMs yesterday as well as one after his evening suppository, which have now returned to brown color.  He is having sensation, but still lacks control for BMs.  Twitching and pain in the left leg are improved and he feels his stability is better with the ankle brace.  However, lightheadedness persists, mostly with sit to stands, and is his biggest barrier to progressing his ambulation which is frustrating to him.  Functionally, ambulating WW 2x50 ft, CGA for transfers with a FWW, dependent for toileting due to incontinence.     MEDICATIONS  Scheduled meds    bisacodyl  10 mg Rectal Daily     melatonin  1 mg Oral At Bedtime     midodrine  5 mg Oral TID w/meals     pantoprazole  40 mg Oral BID AC     psyllium  1 packet Oral BID     saccharomyces boulardii  250 mg Oral BID     sodium chloride (PF)  10 mL Intracatheter Q8H     traZODone  100 mg Oral At Bedtime       PRN meds:  acetaminophen, lidocaine, methocarbamol, miconazole, ondansetron, senna-docusate, sodium chloride (PF), White Petrolatum      PHYSICAL EXAM  /46 (BP Location: Right arm)   Pulse 107   Temp 99.3  F (37.4  C) (Axillary)   Resp 20   Ht 1.803 m (5' 11\")   Wt 101.6 kg (224 lb)   SpO2 93%   BMI 31.24 kg/m    Gen: Awake, coooperative, no distress.  HEENT: atraumatic, no discharge from nares or ears, EOM intact.  Anterior incision c/d/i.  Pulm: Non-labored breathing clear on room air.   CV: rrr  Ext: Mild edema in LE b/l with compression in place, none in UE b/l, no calf tenderness.   Neuro/MSK: alert, oriented, answers questions appropriately, follows commands      LABS  No results found for this or any previous visit " (from the past 24 hour(s)).    ASSESSMENT AND PLAN    Rigo Johns is a 70 year old L hand dominant male with a PMH of HTN, PILI, and prior cervical surgery (C5-7 anterior fusion in 1993 per notes) who is now status post a C3-4, C4-5 ACDF on 7/14/2021 for cervical myelopathy.  He was admitted to the [place] [facility] from 7/18-7/27/21 but transferred back to the medical floor for fevers and prevertebral fluid collection suggestive of abscess.  Now improving with IV antbiotics and no surgical intervention was needed.  He is now being admitted to the ARU on 8/2/21.  Impairment group code: 04.1211 Quadriplegia, Incomplete C1-4 - s/p C3-5 ACDF.   PLAN  Rehabilitation  1. PT and OT 90 minutes of each on a daily basis, in addition to rehab nursing and close management of physiatrist.    2. Impairment of ADL's: Noted to have impaired ROM, impaired strength, impaired activity tolerance, edema management, impaired balance, and impaired coordination, all affecting his ability to safely and independently perform basic ADLs.  Goal for mod I with basic ADLs.  3. Impairment of mobility:  Noted to have impaired ROM, impaired strength, impaired activity tolerance, edema management, impaired balance and impaired coordination, all affecting his ability to safely and independently ambulate and perform basic mobility.  Goal for mod I with basic mobility.  Medical  1)Neurology  #Cervical Myelopathy s/p C3-4, C4-5 ACDF on 7/14/21 complicated by fevers and prevertebral fluid collection, suggestive of abscess  -Follow up MRI completed 8/10 which shows near resolution of the prevertebral fluid.  Discussed with ID who also discussed with NSGY and images reviewed.  Antibiotics discontinued after 8/11 (completed 2 week course)  -Maintain postoperative spinal precautions. No need for cervical collar  -Follow-up with neurosurgery and infectious disease after discharge  -Temps have been in the 99s, will need to continue to monitor closely.  Re-check  CBC, CMP, and CRP in the morning.  If spikes a fever >100.4, will draw blood cultures and full infectious workup.     2)CVS  #Orthostasis  -Abd Binder, TEDs, encourage liquids  -Discontinued Flomax  -increased Midodrine 5 mg TID 8/17, continue to titrate up if needed     3)Pulmonary  #CAP, resolved  -Completed course of antibiotics (Zosyn x1 in ED, Azithromycin 7/12-7/16, Ceftriaxone 7/12 - 7/17, and Cefuroxime x1) during initial hospitalization, abx for cervical fluid collection completed after 8/11  -Encourage incentive spirometer  -Monitor respiratory status, supplementary oxygen PRN.  Now weaned to room air.     #PILI  -Continue CPAP with home settings     4)FENGI  #Diet: Regular diet  #Neurogenic bowel  -ongoing Incontinence though improving  -discontinue senna as three soft stools today, continue fiber, continue prn suppository.     #Melena  #Acute anemia  -Hgb now stable in the 10s, 10.2 8/16/21  - Endoscopy 8/12 demonstrated duodenal ulcers that are no longer bleeding, which were likely the cause of his melena and anemia.   - Now completed course of IV Protonix.  Continue PO BID x 8 weeks (starting 8/16) per GI recs and PO daily indefinitely there after.     #Liver Lesion  -Abnormal appearance is seen on CAT scan with slight nodularity inferiorly. LFTs within normal limits.  -Follow-up primary care physician     5)  #Urinary Retention   -Voiding trial done again on 8/10 but was unable to void and had large IC volumes, therefore Davis was re-placed on 8/13.  -monitor.     #DONOVAN and likely CKD   -Pt with limited physician follow up so baseline creatinine not known. Peak at 2.13, improved with IV hydration.  Stable 8/16 1.24 8/16  -Avoid nephrotoxic agents, NSAID     #Renal lesion  -Incidental finding on CT scan  -Renal ultrasound showed simple cysts of bilateral kidneys  -Follow up with PCP     6)DVT prophylaxis  -PCDs and ambulation     7)Pain  #Spasms  Pain is tolerable at this time. If spasms continue, will  consider scheduling Robaxin.   -Tylenol 650 mg q4h PRN  -Robaxin 750 mg q6h PRN  -Continue to monitor spasms  #Likely ATF ligament sprain  -Ice PRN and  Active ankle brace (reports supportive)     8)Endo  -Prior steroid induced hyperglycemia.  HbA1c 5.0.   -Now completed course of steroids.  No need for further routine checks     9)Heme   #Acute anemia  -GI workup as above     #Leukocytosis   -Thought to be secondary to steroids which are now discontinued. WBCs 9.8 8/16    #Thrombocytopenia  -Noted upon initial admission to the emergency room. Had been stable low 100s, but now improving.  152 on 8/12. Stable 146 8/16.    -trend  -Follow-up with primary care physician     10)Psych  -Monitor mood, will consult health psychology if needed     11)Social/Dispo  -Anticipate discharge home at a modified independent level for ambulation, mobility, and ADLs.   -ELOS: 3 weeks  -Rehab prognosis: Good  -Follow up appointments: PCP, NSGY (Dr. Hanley), ID     Code status: Full Code (Discussed with patient upon admission).  This was confirmed upon re-admit.  Pt does not want extraordinary measures should prognosis be poor, however does want attempts at resuscitation and therefore will remain full code.      Robi Whitehead MD  Department of Rehabilitation Medicine    Time Spent on this Encounter   I, Robi Whitehead, spent a total of 30 minutes face-to-face or managing the care of Rigo Johns. Over 50% of my time on the unit was spent counseling the patient and coordinating care. See note for details.

## 2021-08-18 NOTE — PLAN OF CARE
"Discharge Planner Post-Acute Rehab PT:      Discharge Plan: Home with family assist, mixed mobility likely (w/c and walker)     Precautions: Falls, cervical, PICC line     Current Status:  Bed Mobility: mod I   Transfer: CGA with WW   Gait: CGA with WW 2x50'   Stairs: 4\" stairs, CGA  Balance: CGA and WW for standing      Assessment: Pt reports L ankle pain is worse today and pt does not think mm relaxant is helping with mm spasms/twitching which could be contributing factor to pain. Pt continues to be orthostatic and symptomatic with standing which is very limiting to progressing functional mobility. Continuing to wear L aircast for transfers.      Other Barriers to Discharge (DME, Family Training, etc): medical complexity, incontinence, DME needs, orthostatic BP  "

## 2021-08-18 NOTE — PROGRESS NOTES
Brief Progress Note    Paged at 1830 regarding patient spiking a fever of 101.4. Nursing administered tylenol and temp lowered to 99.8 at 1830. Patient's RN states despite feeling feverish, the patient denies feeling ill.     Although he has documented episodes of orthostatic hypotension earlier today, at rest, his pressures remain stable 123/63. Of note he is currently on midodrine 5mg TID. His O2 Sat 92% on RA. Almost meets SIRS criteria at this time with fever and HR of 98, although technically not tachycardic at this time.     CRP, CMP, CBC, procalc, CXR, UA with reflex microscopy and UC, blood cx x2, and lactate have been ordered STAT.    UPDATE    Pt has a leukocytosis with WBC 12.4 (9.8 on 8/16) and CRP 71 (66.0 on 8/12). Otherwise elevated Cr 1.30 (Cr 1.24 on 8/16) does not meet DONOVAN criteria; Lactate 1.1 and procalc <0.05 both WNL. Other labs unremarkable.     Spoke with medicine team on call who recommended adding a c diff and administering 1L fluids. Nursing staff aware to keep monitoring patient and will page medicine (per med team rec) if pt spikes a fever again. If spikes fever again, may consider starting IV cefepime and vanc. Pt currently has PICC line in place.      Teri Jordan MD  [phone]

## 2021-08-18 NOTE — PLAN OF CARE
Discharge Planner Post-Acute Rehab OT:      Discharge Plan: home with assist for IADL and HH      Precautions: fall, cervical spinal, involuntary twitching in lower extremities, orthostatic hypotension- Abdominal binder/LE stockinette. L brace prior to standing.     Current Status:  ADLs: SBA or UB dressing, CGA don shorts EOB, total assist for toileting due to incontinence/torres catheter. SBA at sink for face g/h tasks,minimally tolerates standing d/t orthostatic hypotension  Dep A w/ LB hygiene as pt still incontinent.   Mod-max A w/ LB dressing w/ pt assist w/ threading catheter.  Tsfers: CGA w/ FWW    Dep A in rolling shower chair due to Orthostatic BP  IADLs: not tested, wife can assist  Vision/Cognition: WFL     Assessment:  Pt continues to be limited by orthostatic but additional barriers this week with weak left ankle. Pt expressed lacking any sleep last night in AM session. Will need to talk to MD if pt is getting any sleep meds at this time, pt was not sure and expressed interest in receiving sleep aid intervention.   Pt had more difficulty coming from sit to stand from usual height at EOB and required elevated bed height w/ mod A following 3 attempts. Lightedheadness coming on quicker and pt not able to stand for more than 1-2 mins this date.     PM session, was shorter. Pt declined OOB and agreeable to UE bed exercises. Pt expressed concerns w/ standing due to L ankle feeling weaker and unstable. Discussed need for longer rehabilitation due to several barriers and limited progress and pt was in agreemnt. Planned discharge was for 8/27 but will need to extend this discharge date.      Other Barriers to Discharge (DME, Family Training, etc): Pt has good family support and accessible home. family training, determine DME and improve ADL performance.   Barriers: Orthostatic BP, symptomatic, new L ankle instability as of 8.17.21.    -21 mins in OT PM session, pt declined w/ expressing anxiety over instability  and weakness of L ankle/fatigue.

## 2021-08-19 ENCOUNTER — APPOINTMENT (OUTPATIENT)
Dept: OCCUPATIONAL THERAPY | Facility: CLINIC | Age: 71
DRG: 949 | End: 2021-08-19
Attending: PHYSICAL MEDICINE & REHABILITATION
Payer: COMMERCIAL

## 2021-08-19 ENCOUNTER — APPOINTMENT (OUTPATIENT)
Dept: PHYSICAL THERAPY | Facility: CLINIC | Age: 71
DRG: 949 | End: 2021-08-19
Attending: PHYSICAL MEDICINE & REHABILITATION
Payer: COMMERCIAL

## 2021-08-19 ENCOUNTER — APPOINTMENT (OUTPATIENT)
Dept: MRI IMAGING | Facility: CLINIC | Age: 71
DRG: 949 | End: 2021-08-19
Attending: PHYSICAL MEDICINE & REHABILITATION
Payer: COMMERCIAL

## 2021-08-19 LAB
ALBUMIN SERPL-MCNC: 2.6 G/DL (ref 3.4–5)
ALP SERPL-CCNC: 75 U/L (ref 40–150)
ALT SERPL W P-5'-P-CCNC: 21 U/L (ref 0–70)
ANION GAP SERPL CALCULATED.3IONS-SCNC: 3 MMOL/L (ref 3–14)
AST SERPL W P-5'-P-CCNC: 22 U/L (ref 0–45)
BASOPHILS # BLD AUTO: 0.1 10E3/UL (ref 0–0.2)
BASOPHILS NFR BLD AUTO: 1 %
BILIRUB SERPL-MCNC: 0.6 MG/DL (ref 0.2–1.3)
BUN SERPL-MCNC: 19 MG/DL (ref 7–30)
C DIFF TOX B STL QL: NEGATIVE
CALCIUM SERPL-MCNC: 8.6 MG/DL (ref 8.5–10.1)
CHLORIDE BLD-SCNC: 108 MMOL/L (ref 94–109)
CO2 SERPL-SCNC: 30 MMOL/L (ref 20–32)
CREAT SERPL-MCNC: 1.24 MG/DL (ref 0.66–1.25)
CRP SERPL-MCNC: 81 MG/L (ref 0–8)
EOSINOPHIL # BLD AUTO: 0.2 10E3/UL (ref 0–0.7)
EOSINOPHIL NFR BLD AUTO: 2 %
ERYTHROCYTE [DISTWIDTH] IN BLOOD BY AUTOMATED COUNT: 12 % (ref 10–15)
GFR SERPL CREATININE-BSD FRML MDRD: 58 ML/MIN/1.73M2
GLUCOSE BLD-MCNC: 87 MG/DL (ref 70–99)
HCT VFR BLD AUTO: 30.6 % (ref 40–53)
HGB BLD-MCNC: 10.1 G/DL (ref 13.3–17.7)
IMM GRANULOCYTES # BLD: 0 10E3/UL
IMM GRANULOCYTES NFR BLD: 1 %
LYMPHOCYTES # BLD AUTO: 1.8 10E3/UL (ref 0.8–5.3)
LYMPHOCYTES NFR BLD AUTO: 24 %
MCH RBC QN AUTO: 33.1 PG (ref 26.5–33)
MCHC RBC AUTO-ENTMCNC: 33 G/DL (ref 31.5–36.5)
MCV RBC AUTO: 100 FL (ref 78–100)
MONOCYTES # BLD AUTO: 0.9 10E3/UL (ref 0–1.3)
MONOCYTES NFR BLD AUTO: 12 %
NEUTROPHILS # BLD AUTO: 4.7 10E3/UL (ref 1.6–8.3)
NEUTROPHILS NFR BLD AUTO: 60 %
NRBC # BLD AUTO: 0 10E3/UL
NRBC BLD AUTO-RTO: 0 /100
PLATELET # BLD AUTO: 126 10E3/UL (ref 150–450)
POTASSIUM BLD-SCNC: 4.2 MMOL/L (ref 3.4–5.3)
PROT SERPL-MCNC: 6.9 G/DL (ref 6.8–8.8)
RBC # BLD AUTO: 3.05 10E6/UL (ref 4.4–5.9)
SODIUM SERPL-SCNC: 141 MMOL/L (ref 133–144)
WBC # BLD AUTO: 7.7 10E3/UL (ref 4–11)

## 2021-08-19 PROCEDURE — 128N000003 HC R&B REHAB

## 2021-08-19 PROCEDURE — 99233 SBSQ HOSP IP/OBS HIGH 50: CPT | Mod: 24 | Performed by: PHYSICAL MEDICINE & REHABILITATION

## 2021-08-19 PROCEDURE — 72156 MRI NECK SPINE W/O & W/DYE: CPT | Mod: 26 | Performed by: RADIOLOGY

## 2021-08-19 PROCEDURE — 250N000013 HC RX MED GY IP 250 OP 250 PS 637: Performed by: STUDENT IN AN ORGANIZED HEALTH CARE EDUCATION/TRAINING PROGRAM

## 2021-08-19 PROCEDURE — A9585 GADOBUTROL INJECTION: HCPCS | Performed by: PHYSICAL MEDICINE & REHABILITATION

## 2021-08-19 PROCEDURE — 97110 THERAPEUTIC EXERCISES: CPT | Mod: GP

## 2021-08-19 PROCEDURE — 97110 THERAPEUTIC EXERCISES: CPT | Mod: GO | Performed by: STUDENT IN AN ORGANIZED HEALTH CARE EDUCATION/TRAINING PROGRAM

## 2021-08-19 PROCEDURE — 87493 C DIFF AMPLIFIED PROBE: CPT | Performed by: STUDENT IN AN ORGANIZED HEALTH CARE EDUCATION/TRAINING PROGRAM

## 2021-08-19 PROCEDURE — 99222 1ST HOSP IP/OBS MODERATE 55: CPT | Mod: 24 | Performed by: INTERNAL MEDICINE

## 2021-08-19 PROCEDURE — 72156 MRI NECK SPINE W/O & W/DYE: CPT

## 2021-08-19 PROCEDURE — 255N000002 HC RX 255 OP 636: Performed by: PHYSICAL MEDICINE & REHABILITATION

## 2021-08-19 PROCEDURE — 97535 SELF CARE MNGMENT TRAINING: CPT | Mod: GO | Performed by: STUDENT IN AN ORGANIZED HEALTH CARE EDUCATION/TRAINING PROGRAM

## 2021-08-19 PROCEDURE — 97530 THERAPEUTIC ACTIVITIES: CPT | Mod: GP

## 2021-08-19 PROCEDURE — 250N000013 HC RX MED GY IP 250 OP 250 PS 637: Performed by: PHYSICIAN ASSISTANT

## 2021-08-19 PROCEDURE — 86140 C-REACTIVE PROTEIN: CPT | Performed by: PHYSICAL MEDICINE & REHABILITATION

## 2021-08-19 PROCEDURE — 250N000013 HC RX MED GY IP 250 OP 250 PS 637: Performed by: PHYSICAL MEDICINE & REHABILITATION

## 2021-08-19 PROCEDURE — 97110 THERAPEUTIC EXERCISES: CPT | Mod: GO | Performed by: OCCUPATIONAL THERAPIST

## 2021-08-19 PROCEDURE — 97140 MANUAL THERAPY 1/> REGIONS: CPT | Mod: GP

## 2021-08-19 PROCEDURE — 82040 ASSAY OF SERUM ALBUMIN: CPT | Performed by: PHYSICAL MEDICINE & REHABILITATION

## 2021-08-19 PROCEDURE — 85025 COMPLETE CBC W/AUTO DIFF WBC: CPT | Performed by: PHYSICAL MEDICINE & REHABILITATION

## 2021-08-19 PROCEDURE — 36415 COLL VENOUS BLD VENIPUNCTURE: CPT | Performed by: PHYSICAL MEDICINE & REHABILITATION

## 2021-08-19 RX ORDER — GADOBUTROL 604.72 MG/ML
10 INJECTION INTRAVENOUS ONCE
Status: COMPLETED | OUTPATIENT
Start: 2021-08-19 | End: 2021-08-19

## 2021-08-19 RX ADMIN — PANTOPRAZOLE SODIUM 40 MG: 40 TABLET, DELAYED RELEASE ORAL at 06:38

## 2021-08-19 RX ADMIN — CYCLOBENZAPRINE 10 MG: 5 TABLET, FILM COATED ORAL at 21:51

## 2021-08-19 RX ADMIN — MIDODRINE HYDROCHLORIDE 5 MG: 5 TABLET ORAL at 13:04

## 2021-08-19 RX ADMIN — TRAZODONE HYDROCHLORIDE 100 MG: 50 TABLET ORAL at 21:51

## 2021-08-19 RX ADMIN — ACETAMINOPHEN 650 MG: 325 TABLET, FILM COATED ORAL at 13:04

## 2021-08-19 RX ADMIN — Medication 1 MG: at 21:51

## 2021-08-19 RX ADMIN — Medication 250 MG: at 07:51

## 2021-08-19 RX ADMIN — PANTOPRAZOLE SODIUM 40 MG: 40 TABLET, DELAYED RELEASE ORAL at 17:17

## 2021-08-19 RX ADMIN — MIDODRINE HYDROCHLORIDE 5 MG: 5 TABLET ORAL at 07:51

## 2021-08-19 RX ADMIN — BISACODYL 10 MG: 10 SUPPOSITORY RECTAL at 19:25

## 2021-08-19 RX ADMIN — PSYLLIUM HUSK 1 PACKET: 3.4 POWDER ORAL at 07:51

## 2021-08-19 RX ADMIN — CYCLOBENZAPRINE 10 MG: 5 TABLET, FILM COATED ORAL at 07:57

## 2021-08-19 RX ADMIN — MIDODRINE HYDROCHLORIDE 5 MG: 5 TABLET ORAL at 17:17

## 2021-08-19 RX ADMIN — GADOBUTROL 10 ML: 604.72 INJECTION INTRAVENOUS at 20:36

## 2021-08-19 RX ADMIN — PSYLLIUM HUSK 1 PACKET: 3.4 POWDER ORAL at 21:50

## 2021-08-19 RX ADMIN — Medication 250 MG: at 19:25

## 2021-08-19 NOTE — PLAN OF CARE
"Discharge Planner Post-Acute Rehab PT:      Discharge Plan: Home with family assist, mixed mobility likely (w/c and walker)     Precautions: Falls, cervical, PICC line  Lymph wraps on during day, off at night    Current Status:  Bed Mobility: mod I   Transfer: CGA with WW   Gait: CGA with WW 2x50'   Stairs: 4\" stairs, CGA  Balance: CGA and WW for standing      Assessment: Ongoing L ankle pain from reported sprain. Increasing LE edema noted with tubigrip, transitioned to GCB for improved edema management, and to provide some ankle support. Functional mobility varies based on orthostatics, encourage increased time up in chair.    Other Barriers to Discharge (DME, Family Training, etc): medical complexity, incontinence, DME needs, orthostatic BP  "

## 2021-08-19 NOTE — PLAN OF CARE
Patient is alert and oriented x4, complains of L ankle pain. PRN Flexeril given x1 and PRN Tylenol given x1 with no effect per patient report. VSS and has been afebrile on this shift. Is CGA with walker for transfers and ambulation. Davis is patent and draining adequately. Davis cares completed. Was incontinent of BM on this shift. Stool sample sent to lab to r/o C.Diff. Results are pending. Is tolerating diet well with good appetite. Fluids encouraged throughout shift. Abdominal binder on and tubigrips in place on BLE. Is wearing brace on L ankle as well. PICC to LUE is patent and saline locked. MRI to be completed this evening after therapies. Alarms no longer indicated per patient whiteboard. Continue with POC.

## 2021-08-19 NOTE — PROGRESS NOTES
CLINICAL NUTRITION SERVICES - REASSESSMENT NOTE     Nutrition Prescription    RECOMMENDATIONS FOR MDs/PROVIDERS TO ORDER:  None today    Malnutrition Status:    Severe malnutrition in the context of acute illness     Recommendations already ordered by Registered Dietitian (RD):  RD will re order supplement order: Ensure as pt requests PRN (pt will ask when pt calls for meals)    Future/Additional Recommendations:  Continue to monitor meal/supplement intakes and weight trends      EVALUATION OF THE PROGRESS TOWARD GOALS   Diet: Regular  Supplement: Prior supplement orders discontinued (like related to diet hold for procedure)   Intake: % per flow sheets        NEW FINDINGS   MAR reviewed. Labs reviewed. RD visited pt at bedside, pt continue to take mostly home foods at meals and continues to ask for supplement as needed, continues to have some dislike/decreased appetite for hospital foods, RD noted supplement orders in chart were discontinued (likely due to diet hold for procedure), so RD will replace orders for supplement as above as PRN as pt continues to ask for supplement when needed and has stock in room. Reviewed weight trends noting changes likely multifactorial and some changes related to fluid status changes. RD continues to give pt encouragement to increase meal/supplement intakes for improve health status, pt agreeing.     08/17/21 0644 101.6 kg (224 lb)   08/12/21 0843 105.2 kg (232 lb)   08/03/21 0700 107.7 kg (237 lb 6.4 oz)   08/02/21 1400 105.5 kg (232 lb 8 oz)     07/25/21 108.3 kg (238 lb 11.2 oz)   07/12/21 113 kg (249 lb 3.2 oz)     Weight assessment: If current weight accurate pt with significant 10% weight loss in 1 month, pt confirms a UBW of around 250 lbs prior to hospitalization. Weight changes may also be affected by fluid status.     MALNUTRITION  % Intake: No decreased intake noted  % Weight Loss: > 5% in 1 month (severe)  Subcutaneous Fat Loss: None observed  Muscle Loss: Temporal:  mild to mild per flow sheets   Fluid Accumulation/Edema: Trace per flow sheets   Malnutrition Diagnosis: Severe malnutrition in the context of acute illness     Previous Goals   Patient to consume % of nutritionally adequate meal trays TID, or the equivalent with supplements/snacks  Evaluation: Likely met/stable     Previous Nutrition Diagnosis  Predicted inadequate nutrient intake related to varying appetite/food preferences as evidenced by almost significant weight loss in 1 month  Evaluation: No change/diagnosis slightly adjusted to below     CURRENT NUTRITION DIAGNOSIS  Predicted inadequate nutrient intake related to varying appetite/food preferences as evidenced by significant weight loss in 1 month    INTERVENTIONS  Implementation  Medical food supplement therapy - re-ordered as above     Goals  Patient to consume % of nutritionally adequate meal trays TID, or the equivalent with supplements/snacks.    Monitoring/Evaluation  Progress toward goals will be monitored and evaluated per protocol.    Susan Weiss RD, CNSC, LD  [facility] pager: [phone]

## 2021-08-19 NOTE — PROGRESS NOTES
"  [facility]   Acute Rehabilitation Unit  Daily progress note    INTERVAL HISTORY  Yesterday Mr. Johns spiked a fever to 101.3, at which time he also felt subjectively febrile and overall tired.  Workup done including CXR and UA which were negative, and blood cultures pending.  WBCs were elevated at 12.4, but decreased this morning to 7.7, however CRP is climbing.  Received 1L of fluids.  This morning he does not note any new symptoms.  Continues to be orthostatic with LH, and having twitches in the legs, although this is now better in the left leg but has moved more to the right.  His neck pain continues to improve and he feels his ROM is better.      MEDICATIONS  Scheduled meds    bisacodyl  10 mg Rectal Daily     melatonin  1 mg Oral At Bedtime     midodrine  5 mg Oral TID w/meals     pantoprazole  40 mg Oral BID AC     psyllium  1 packet Oral BID     saccharomyces boulardii  250 mg Oral BID     sodium chloride (PF)  10 mL Intracatheter Q8H     traZODone  100 mg Oral At Bedtime       PRN meds:  acetaminophen, cyclobenzaprine, lidocaine, miconazole, ondansetron, senna-docusate, sodium chloride (PF), White Petrolatum      PHYSICAL EXAM  /59 (BP Location: Right arm)   Pulse 82   Temp 98.2  F (36.8  C) (Oral)   Resp 16   Ht 1.803 m (5' 11\")   Wt 101.6 kg (224 lb)   SpO2 92%   BMI 31.24 kg/m    Gen: Awake, coooperative, no distress.  HEENT: atraumatic, no discharge from nares or ears, EOM intact. Moist mucus membranes.   Anterior incision c/d/i.  No TTP in the cervical spine area.  Pulm: Non-labored breathing clear on room air.   CV: RRR, S1+S2, no m/r/g  : Davis in place, draining clear yellow urine  Ext: Mild edema in LE, especially feet, b/l with compression in place.  No calf tenderness b/l and no tenderness in upper extremities.  PICC line in LUE non-erythematous and non-tender to palpation.  Neuro/MSK: alert, oriented, answers questions appropriately, follows " commands      LABS  Results for orders placed or performed during the hospital encounter of 08/02/21 (from the past 24 hour(s))   XR Chest 2 Views    Narrative    EXAM: XR CHEST 2 VW  8/18/2021 7:26 PM     HISTORY:  Spiking fever       COMPARISON:  Chest x-ray 7/19/2021    FINDINGS:   Frontal and lateral radiograph the chest. Left upper extremity PICC  line tip projects over the high right atrium. Mild rightward deviation  of the trachea. Cardiac silhouette is within normal limits. No  airspace opacities. No pleural effusion or pneumothorax. Osseous  structures are within normal limits.      Impression    IMPRESSION:   No acute cardiopulmonary findings.    I have personally reviewed the examination and initial interpretation  and I agree with the findings.    SUGAR GEIGER MD         SYSTEM ID:  [id number]   Comprehensive metabolic panel   Result Value Ref Range    Sodium 138 133 - 144 mmol/L    Potassium 3.9 3.4 - 5.3 mmol/L    Chloride 108 94 - 109 mmol/L    Carbon Dioxide (CO2) 28 20 - 32 mmol/L    Anion Gap 2 (L) 3 - 14 mmol/L    Urea Nitrogen 20 7 - 30 mg/dL    Creatinine 1.30 (H) 0.66 - 1.25 mg/dL    Calcium 8.7 8.5 - 10.1 mg/dL    Glucose 91 70 - 99 mg/dL    Alkaline Phosphatase 86 40 - 150 U/L    AST 26 0 - 45 U/L    ALT 23 0 - 70 U/L    Protein Total 7.4 6.8 - 8.8 g/dL    Albumin 2.8 (L) 3.4 - 5.0 g/dL    Bilirubin Total 0.8 0.2 - 1.3 mg/dL    GFR Estimate 55 (L) >60 mL/min/1.73m2   CBC with Platelets & Differential    Narrative    The following orders were created for panel order CBC with Platelets & Differential.  Procedure                               Abnormality         Status                     ---------                               -----------         ------                     CBC with platelets and d...[[id number]]  Abnormal            Final result                 Please view results for these tests on the individual orders.   Blood Culture Peripheral Blood    Specimen: Peripheral Blood    Result Value Ref Range    Culture No growth after 12 hours    Procalcitonin   Result Value Ref Range    Procalcitonin <0.05 <5.00 ng/mL   CRP inflammation   Result Value Ref Range    CRP Inflammation 71.0 (H) 0.0 - 8.0 mg/L   CBC with platelets and differential   Result Value Ref Range    WBC Count 12.4 (H) 4.0 - 11.0 10e3/uL    RBC Count 2.65 (L) 4.40 - 5.90 10e6/uL    Hemoglobin 8.6 (L) 13.3 - 17.7 g/dL    Hematocrit 26.3 (L) 40.0 - 53.0 %    MCV 99 78 - 100 fL    MCH 32.5 26.5 - 33.0 pg    MCHC 32.7 31.5 - 36.5 g/dL    RDW 11.9 10.0 - 15.0 %    Platelet Count 161 150 - 450 10e3/uL    % Neutrophils 58 %    % Lymphocytes 26 %    % Monocytes 13 %    % Eosinophils 1 %    % Basophils 1 %    % Immature Granulocytes 1 %    NRBCs per 100 WBC 0 <1 /100    Absolute Neutrophils 7.3 1.6 - 8.3 10e3/uL    Absolute Lymphocytes 3.2 0.8 - 5.3 10e3/uL    Absolute Monocytes 1.6 (H) 0.0 - 1.3 10e3/uL    Absolute Eosinophils 0.2 0.0 - 0.7 10e3/uL    Absolute Basophils 0.1 0.0 - 0.2 10e3/uL    Absolute Immature Granulocytes 0.1 (H) <=0.0 10e3/uL    Absolute NRBCs 0.0 10e3/uL   Lactic acid whole blood   Result Value Ref Range    Lactic Acid 1.1 0.7 - 2.0 mmol/L   UA reflex to Microscopic and Culture    Specimen: Urine, Davis Catheter   Result Value Ref Range    Color Urine Light Yellow Colorless, Straw, Light Yellow, Yellow    Appearance Urine Clear Clear    Glucose Urine Negative Negative mg/dL    Bilirubin Urine Negative Negative    Ketones Urine Negative Negative mg/dL    Specific Gravity Urine 1.015 1.003 - 1.035    Blood Urine Small (A) Negative    pH Urine 5.5 5.0 - 7.0    Protein Albumin Urine 30  (A) Negative mg/dL    Urobilinogen Urine Normal Normal, 2.0 mg/dL    Nitrite Urine Negative Negative    Leukocyte Esterase Urine Negative Negative    Bacteria Urine Few (A) None Seen /HPF    Mucus Urine Present (A) None Seen /LPF    RBC Urine 2 <=2 /HPF    WBC Urine 1 <=5 /HPF    Squamous Epithelials Urine <1 <=1 /HPF    Hyaline  Casts Urine 1 <=2 /LPF    Narrative    Urine Culture not indicated   CBC with platelets differential    Narrative    The following orders were created for panel order CBC with platelets differential.  Procedure                               Abnormality         Status                     ---------                               -----------         ------                     CBC with platelets and d...[[id number]]  Abnormal            Final result                 Please view results for these tests on the individual orders.   Comprehensive metabolic panel   Result Value Ref Range    Sodium 141 133 - 144 mmol/L    Potassium 4.2 3.4 - 5.3 mmol/L    Chloride 108 94 - 109 mmol/L    Carbon Dioxide (CO2) 30 20 - 32 mmol/L    Anion Gap 3 3 - 14 mmol/L    Urea Nitrogen 19 7 - 30 mg/dL    Creatinine 1.24 0.66 - 1.25 mg/dL    Calcium 8.6 8.5 - 10.1 mg/dL    Glucose 87 70 - 99 mg/dL    Alkaline Phosphatase 75 40 - 150 U/L    AST 22 0 - 45 U/L    ALT 21 0 - 70 U/L    Protein Total 6.9 6.8 - 8.8 g/dL    Albumin 2.6 (L) 3.4 - 5.0 g/dL    Bilirubin Total 0.6 0.2 - 1.3 mg/dL    GFR Estimate 58 (L) >60 mL/min/1.73m2   CRP inflammation   Result Value Ref Range    CRP Inflammation 81.0 (H) 0.0 - 8.0 mg/L   CBC with platelets and differential   Result Value Ref Range    WBC Count 7.7 4.0 - 11.0 10e3/uL    RBC Count 3.05 (L) 4.40 - 5.90 10e6/uL    Hemoglobin 10.1 (L) 13.3 - 17.7 g/dL    Hematocrit 30.6 (L) 40.0 - 53.0 %     78 - 100 fL    MCH 33.1 (H) 26.5 - 33.0 pg    MCHC 33.0 31.5 - 36.5 g/dL    RDW 12.0 10.0 - 15.0 %    Platelet Count 126 (L) 150 - 450 10e3/uL    % Neutrophils 60 %    % Lymphocytes 24 %    % Monocytes 12 %    % Eosinophils 2 %    % Basophils 1 %    % Immature Granulocytes 1 %    NRBCs per 100 WBC 0 <1 /100    Absolute Neutrophils 4.7 1.6 - 8.3 10e3/uL    Absolute Lymphocytes 1.8 0.8 - 5.3 10e3/uL    Absolute Monocytes 0.9 0.0 - 1.3 10e3/uL    Absolute Eosinophils 0.2 0.0 - 0.7 10e3/uL    Absolute Basophils 0.1  0.0 - 0.2 10e3/uL    Absolute Immature Granulocytes 0.0 <=0.0 10e3/uL    Absolute NRBCs 0.0 10e3/uL       ASSESSMENT AND PLAN    Rigo Johns is a 70 year old L hand dominant male with a PMH of HTN, PILI, and prior cervical surgery (C5-7 anterior fusion in 1993 per notes) who is now status post a C3-4, C4-5 ACDF on 7/14/2021 for cervical myelopathy.  He was admitted to the [facility] from 7/18-7/27/21 but transferred back to the medical floor for fevers and prevertebral fluid collection suggestive of abscess.  Now improving with IV antbiotics and no surgical intervention was needed.  He is now being admitted to the ARU on 8/2/21.  Impairment group code: 04.1211 Quadriplegia, Incomplete C1-4 - s/p C3-5 ACDF.   PLAN  Rehabilitation  1. PT and OT 90 minutes of each on a daily basis, in addition to rehab nursing and close management of physiatrist.    2. Impairment of ADL's: Noted to have impaired ROM, impaired strength, impaired activity tolerance, edema management, impaired balance, and impaired coordination, all affecting his ability to safely and independently perform basic ADLs.  Goal for mod I with basic ADLs.  3. Impairment of mobility:  Noted to have impaired ROM, impaired strength, impaired activity tolerance, edema management, impaired balance and impaired coordination, all affecting his ability to safely and independently ambulate and perform basic mobility.  Goal for mod I with basic mobility.  Medical  1)Neurology  #Cervical Myelopathy s/p C3-4, C4-5 ACDF on 7/14/21 complicated by fevers and prevertebral fluid collection, suggestive of abscess  -Follow up MRI completed 8/10 which shows near resolution of the prevertebral fluid.  Discussed with ID who also discussed with NSGY and images reviewed.  Antibiotics discontinued after 8/11 (completed 2 week course)  -Maintain postoperative spinal precautions. No need for cervical collar  -Follow-up with neurosurgery and infectious disease after discharge  -Fever  of 101.3 yesterday, now back into the 98-99 range but is receiving Tylenol.  Workup including CXR, UA, procal, and lactic acid was negative.  Blood cultures drawn.  WBCs elevated yesterday but today WNL after 1L of fluids.  CRP is increasing, this morning 81.  This is a similar presentation to prior episode when the pre-vertebral abscess was found.  Will place ID consult and re-image neck with MRI c-spine.     2)CVS  #Orthostasis  -Abd Binder, TEDs, encourage liquids  -Discontinued Flomax  -increased Midodrine 5 mg TID 8/17, continue to titrate up if needed     3)Pulmonary  #CAP, resolved  -Completed course of antibiotics (Zosyn x1 in ED, Azithromycin 7/12-7/16, Ceftriaxone 7/12 - 7/17, and Cefuroxime x1) during initial hospitalization, abx for cervical fluid collection completed after 8/11  -Encourage incentive spirometer  -Monitor respiratory status, supplementary oxygen PRN.  Now weaned to room air.     #PILI  -Continue CPAP with home settings     4)FENGI  #Diet: Regular diet  #Neurogenic bowel  -ongoing Incontinence though improving  -discontinue senna as three soft stools today, continue fiber, continue prn suppository.     #Melena  #Acute anemia  -Hgb now stable in the 10s, 10.1 8/19/21  - Endoscopy 8/12 demonstrated duodenal ulcers that are no longer bleeding, which were likely the cause of his melena and anemia.   - Now completed course of IV Protonix.  Continue PO BID x 8 weeks (starting 8/16) per GI recs and PO daily indefinitely there after.     #Liver Lesion  -Abnormal appearance is seen on CAT scan with slight nodularity inferiorly. LFTs within normal limits.  -Follow-up primary care physician     5)  #Urinary Retention   -Voiding trial done again on 8/10 but was unable to void and had large IC volumes, therefore Davis was re-placed on 8/13.  -monitor.     #DONOVAN and likely CKD   -Pt with limited physician follow up so baseline creatinine not known. Peak at 2.13, improved with IV hydration.  Stable at  1.24 8/19  -Avoid nephrotoxic agents, NSAID     #Renal lesion  -Incidental finding on CT scan  -Renal ultrasound showed simple cysts of bilateral kidneys  -Follow up with PCP     6)DVT prophylaxis  -PCDs and ambulation     7)Pain  #Spasms  -Tylenol 650 mg q4h PRN  -Changed Robaxin to Flexeril 10 mg TID PRN  -Continue to monitor spasms  #Likely ATF ligament sprain  -Ice PRN and  Active ankle brace (reports supportive)     8)Endo  -Prior steroid induced hyperglycemia.  HbA1c 5.0.   -Now completed course of steroids.  No need for further routine checks     9)Heme   #Acute anemia  -GI workup as above     #Leukocytosis   -Thought to be secondary to steroids which are now discontinued. WBCs 12.4 8/18, improved to 7.7 on 8/19 and received 1L of fluids.    #Thrombocytopenia  -Noted upon initial admission to the emergency room. Had been stable low 100s, but now improved.  126 on 8/19.    -trend  -Follow-up with primary care physician     10)Psych  -Monitor mood, will consult health psychology if needed     11)Social/Dispo  -Anticipate discharge home at a modified independent level for ambulation, mobility, and ADLs.   -ELOS: 3 weeks  -Rehab prognosis: Fair  -Follow up appointments: PCP, NSGY (Dr. Hanley), ID     Code status: Full Code (Discussed with patient upon admission).  This was confirmed upon re-admit.  Pt does not want extraordinary measures should prognosis be poor, however does want attempts at resuscitation and therefore will remain full code.      Robi Whitehead MD  Department of Rehabilitation Medicine    Time Spent on this Encounter   I, Robi Whitehead, spent a total of 35 minutes face-to-face or managing the care of Rigo Johns. Over 50% of my time on the unit was spent counseling the patient and coordinating care. See note for details.

## 2021-08-19 NOTE — PLAN OF CARE
"[phone]    FOCUS/GOAL  Medication management and Medical management    ASSESSMENT, INTERVENTIONS AND CONTINUING PLAN FOR GOAL:  A&O, A1 walker. Makes needs known, no alarms on.  Pt denied pain during evening. Temp elevated during beginning of evening shift. He reported to be \"feverish\" but no major symptoms. PRN tylenol given and resident notified. Temp decreased throughout the evening shift and was 97.9 at the start of night shift. Pt reported that he felt much better than the beginning of evening shift. Orders for stool sample still needs to be collected for possible C-diff.  Incontinent of bowel 8/18. Davis cath patent and draining.  Continue to monitor.    "

## 2021-08-20 ENCOUNTER — APPOINTMENT (OUTPATIENT)
Dept: PHYSICAL THERAPY | Facility: CLINIC | Age: 71
DRG: 949 | End: 2021-08-20
Attending: PHYSICAL MEDICINE & REHABILITATION
Payer: COMMERCIAL

## 2021-08-20 ENCOUNTER — APPOINTMENT (OUTPATIENT)
Dept: OCCUPATIONAL THERAPY | Facility: CLINIC | Age: 71
DRG: 949 | End: 2021-08-20
Attending: PHYSICAL MEDICINE & REHABILITATION
Payer: COMMERCIAL

## 2021-08-20 LAB
BASOPHILS # BLD AUTO: 0.1 10E3/UL (ref 0–0.2)
BASOPHILS NFR BLD AUTO: 1 %
CRP SERPL-MCNC: 81 MG/L (ref 0–8)
EOSINOPHIL # BLD AUTO: 0.2 10E3/UL (ref 0–0.7)
EOSINOPHIL NFR BLD AUTO: 3 %
ERYTHROCYTE [DISTWIDTH] IN BLOOD BY AUTOMATED COUNT: 11.9 % (ref 10–15)
HCT VFR BLD AUTO: 30.5 % (ref 40–53)
HGB BLD-MCNC: 9.9 G/DL (ref 13.3–17.7)
IMM GRANULOCYTES # BLD: 0 10E3/UL
IMM GRANULOCYTES NFR BLD: 1 %
LYMPHOCYTES # BLD AUTO: 2 10E3/UL (ref 0.8–5.3)
LYMPHOCYTES NFR BLD AUTO: 26 %
MCH RBC QN AUTO: 32.4 PG (ref 26.5–33)
MCHC RBC AUTO-ENTMCNC: 32.5 G/DL (ref 31.5–36.5)
MCV RBC AUTO: 100 FL (ref 78–100)
MONOCYTES # BLD AUTO: 0.8 10E3/UL (ref 0–1.3)
MONOCYTES NFR BLD AUTO: 11 %
NEUTROPHILS # BLD AUTO: 4.6 10E3/UL (ref 1.6–8.3)
NEUTROPHILS NFR BLD AUTO: 58 %
NRBC # BLD AUTO: 0 10E3/UL
NRBC BLD AUTO-RTO: 0 /100
PLATELET # BLD AUTO: 134 10E3/UL (ref 150–450)
RBC # BLD AUTO: 3.06 10E6/UL (ref 4.4–5.9)
WBC # BLD AUTO: 7.8 10E3/UL (ref 4–11)

## 2021-08-20 PROCEDURE — 128N000003 HC R&B REHAB

## 2021-08-20 PROCEDURE — 250N000013 HC RX MED GY IP 250 OP 250 PS 637: Performed by: PHYSICIAN ASSISTANT

## 2021-08-20 PROCEDURE — 97535 SELF CARE MNGMENT TRAINING: CPT | Mod: GO

## 2021-08-20 PROCEDURE — 99233 SBSQ HOSP IP/OBS HIGH 50: CPT | Mod: 24 | Performed by: PHYSICAL MEDICINE & REHABILITATION

## 2021-08-20 PROCEDURE — 85025 COMPLETE CBC W/AUTO DIFF WBC: CPT | Performed by: PHYSICAL MEDICINE & REHABILITATION

## 2021-08-20 PROCEDURE — 250N000013 HC RX MED GY IP 250 OP 250 PS 637: Performed by: PHYSICAL MEDICINE & REHABILITATION

## 2021-08-20 PROCEDURE — 250N000013 HC RX MED GY IP 250 OP 250 PS 637: Performed by: STUDENT IN AN ORGANIZED HEALTH CARE EDUCATION/TRAINING PROGRAM

## 2021-08-20 PROCEDURE — 999N000125 HC STATISTIC PATIENT MED CONFERENCE < 30 MIN: Performed by: STUDENT IN AN ORGANIZED HEALTH CARE EDUCATION/TRAINING PROGRAM

## 2021-08-20 PROCEDURE — 97530 THERAPEUTIC ACTIVITIES: CPT | Mod: GP

## 2021-08-20 PROCEDURE — 86140 C-REACTIVE PROTEIN: CPT | Performed by: PHYSICAL MEDICINE & REHABILITATION

## 2021-08-20 PROCEDURE — 97116 GAIT TRAINING THERAPY: CPT | Mod: GP

## 2021-08-20 PROCEDURE — 999N000150 HC STATISTIC PT MED CONFERENCE < 30 MIN

## 2021-08-20 PROCEDURE — 97530 THERAPEUTIC ACTIVITIES: CPT | Mod: GO

## 2021-08-20 PROCEDURE — 97110 THERAPEUTIC EXERCISES: CPT | Mod: GP

## 2021-08-20 PROCEDURE — 258N000003 HC RX IP 258 OP 636: Performed by: PHYSICIAN ASSISTANT

## 2021-08-20 PROCEDURE — 36592 COLLECT BLOOD FROM PICC: CPT | Performed by: PHYSICAL MEDICINE & REHABILITATION

## 2021-08-20 RX ORDER — LIDOCAINE 40 MG/G
CREAM TOPICAL
Status: DISCONTINUED | OUTPATIENT
Start: 2021-08-20 | End: 2021-09-09 | Stop reason: HOSPADM

## 2021-08-20 RX ADMIN — PSYLLIUM HUSK 1 PACKET: 3.4 POWDER ORAL at 21:30

## 2021-08-20 RX ADMIN — Medication 250 MG: at 08:09

## 2021-08-20 RX ADMIN — CYCLOBENZAPRINE 10 MG: 5 TABLET, FILM COATED ORAL at 21:33

## 2021-08-20 RX ADMIN — DOCUSATE SODIUM 1 ENEMA: 283 LIQUID RECTAL at 19:06

## 2021-08-20 RX ADMIN — TRAZODONE HYDROCHLORIDE 100 MG: 50 TABLET ORAL at 21:30

## 2021-08-20 RX ADMIN — PANTOPRAZOLE SODIUM 40 MG: 40 TABLET, DELAYED RELEASE ORAL at 17:16

## 2021-08-20 RX ADMIN — CARBIDOPA AND LEVODOPA 7.5 MG: 50; 200 TABLET, EXTENDED RELEASE ORAL at 17:16

## 2021-08-20 RX ADMIN — CARBIDOPA AND LEVODOPA 7.5 MG: 50; 200 TABLET, EXTENDED RELEASE ORAL at 12:46

## 2021-08-20 RX ADMIN — MIDODRINE HYDROCHLORIDE 5 MG: 5 TABLET ORAL at 08:09

## 2021-08-20 RX ADMIN — SODIUM CHLORIDE 1000 ML: 9 INJECTION, SOLUTION INTRAVENOUS at 10:33

## 2021-08-20 RX ADMIN — PSYLLIUM HUSK 1 PACKET: 3.4 POWDER ORAL at 08:09

## 2021-08-20 RX ADMIN — Medication 250 MG: at 21:30

## 2021-08-20 RX ADMIN — Medication 1 MG: at 21:30

## 2021-08-20 RX ADMIN — PANTOPRAZOLE SODIUM 40 MG: 40 TABLET, DELAYED RELEASE ORAL at 06:34

## 2021-08-20 NOTE — PLAN OF CARE
"Discharge Planner Post-Acute Rehab PT:      Discharge Plan: Home with family assist, mixed mobility likely (w/c and walker)     Precautions: Falls, cervical, PICC line  Lymph wraps on during day, off at night     Current Status:  Bed Mobility: mod I   Transfer: CGA with WW   Gait: CGA with WW x 60 ft, 55 ft.   Stairs: 4\" stairs, CGA  Balance: CGA and WW for standing      Assessment: Ongoing L ankle pain from reported sprain. Still present but did not significantly impair performance. Rewrapped LEs in AM, pt with good tolerance. Slightly orthostatic in AM but still able to progress standing tolerance. PM session focused on increasing ambulation distance (pt still slightly lightheaded but able to complete bouts up to 60 ft with walker, CGA), LE strength, activity tolerance.     Other Barriers to Discharge (DME, Family Training, etc): medical complexity, incontinence, DME needs, orthostatic BP  "

## 2021-08-20 NOTE — PROGRESS NOTES
"  [facility]   Acute Rehabilitation Unit  Daily progress note    INTERVAL HISTORY  Seen sitting up in bed during team rounds reports ongoing fatigue, intermittent ankle pain/ spasms improved today, ongoing dizziness with standing does not think he drank well last few days.  No n/v/ ongoing loose incontinent stool, torres in place.  No recurrent fevers, no sob, no new pain.      Discussed MRI, CRP, and other labs, no recurrent fevers, ID to consult today given recent abscess and similar presentation.      Is CGA and ambulating up to 50 feet at his best, limited tolerance due to above. OT has done limited adls.      See rounds note by Dr. Whitehead for further details.     MEDICATIONS  Scheduled meds    sodium chloride 0.9%  1,000 mL Intravenous Once     docusate sodium  1 enema Rectal QPM     melatonin  1 mg Oral At Bedtime     midodrine  7.5 mg Oral TID w/meals     pantoprazole  40 mg Oral BID AC     psyllium  1 packet Oral BID     saccharomyces boulardii  250 mg Oral BID     sodium chloride (PF)  10 mL Intracatheter Q8H     sodium chloride (PF)  3 mL Intracatheter Q8H     traZODone  100 mg Oral At Bedtime       PRN meds:  acetaminophen, cyclobenzaprine, lidocaine 4%, lidocaine, lidocaine (buffered or not buffered), miconazole, ondansetron, senna-docusate, sodium chloride (PF), sodium chloride (PF), White Petrolatum      PHYSICAL EXAM  /72 (BP Location: Right arm)   Pulse 87   Temp 99.2  F (37.3  C) (Oral)   Resp 16   Ht 1.803 m (5' 11\")   Wt 101.6 kg (224 lb)   SpO2 94%   BMI 31.24 kg/m    Gen: Awake, coooperative, no distress.  HEENT: atraumatic. Moist mucus membranes.   Anterior incision c/d/i.    Pulm: Non-labored breathing clear on room air.   CV: RRR, S1+S2, no m/r/g  : Torres in place, draining clear yellow urine  Ext: Mild edema in LE, especially feet, b/l with compression in place.  No calf tenderness b/l and no tenderness in upper extremities.  PICC line in LUE " non-erythematous and non-tender to palpation.  Neuro/MSK: alert, oriented, answers questions appropriately, follows commands      LABS  CBC RESULTS: Recent Labs   Lab Test 08/20/21 0627 08/19/21 0731 08/18/21 2045   WBC 7.8 7.7 12.4*   RBC 3.06* 3.05* 2.65*   HGB 9.9* 10.1* 8.6*   HCT 30.5* 30.6* 26.3*    100 99   MCH 32.4 33.1* 32.5   MCHC 32.5 33.0 32.7   RDW 11.9 12.0 11.9   * 126* 161     Last Basic Metabolic Panel:  Recent Labs   Lab Test 08/19/21 0731 08/18/21 2045 08/16/21  1631    138 138   POTASSIUM 4.2 3.9 3.8   CHLORIDE 108 108 109   CO2 30 28 27   ANIONGAP 3 2* 2*   GLC 87 91 88   BUN 19 20 20   CR 1.24 1.30* 1.24   GFRESTIMATED 58* 55* 58*   GARRISON 8.6 8.7 8.6       ASSESSMENT AND PLAN    Rigo Johns is a 70 year old L hand dominant male with a PMH of HTN, PILI, and prior cervical surgery (C5-7 anterior fusion in 1993 per notes) who is now status post a C3-4, C4-5 ACDF on 7/14/2021 for cervical myelopathy.  He was admitted to the [facility] from 7/18-7/27/21 but transferred back to the medical floor for fevers and prevertebral fluid collection suggestive of abscess.  Now improving with IV antbiotics and no surgical intervention was needed.  He is now being admitted to the ARU on 8/2/21.  Impairment group code: 04.1211 Quadriplegia, Incomplete C1-4 - s/p C3-5 ACDF.   PLAN  Rehabilitation  1. PT and OT 90 minutes of each on a daily basis, in addition to rehab nursing and close management of physiatrist.    2. Impairment of ADL's: Noted to have impaired ROM, impaired strength, impaired activity tolerance, edema management, impaired balance, and impaired coordination, all affecting his ability to safely and independently perform basic ADLs.  Goal for mod I with basic ADLs.  3. Impairment of mobility:  Noted to have impaired ROM, impaired strength, impaired activity tolerance, edema management, impaired balance and impaired coordination, all affecting his ability to safely and  independently ambulate and perform basic mobility.  Goal for mod I with basic mobility.  Medical  1)Neurology  #Cervical Myelopathy s/p C3-4, C4-5 ACDF on 7/14/21 complicated by fevers and prevertebral fluid collection, suggestive of abscess  -Follow up MRI completed 8/10 which shows near resolution of the prevertebral fluid.  Discussed with ID who also discussed with NSGY and images reviewed.  Antibiotics discontinued after 8/11 (completed 2 week course)  -Maintain postoperative spinal precautions. No need for cervical collar  -Follow-up with neurosurgery and infectious disease after discharge  -Fever of 101.3 8/18- not recurrent, WBC stable, CRP elevated stable, MRI stable.   -consult ID- further eval    2)CVS  #Orthostasis  -Abd Binder, TEDs, encourage liquids  -Discontinued Flomax  -increase Midodrine 7.5 mg TID 8/20, continue to titrate up if needed  -repeat 1 liter fluid bolus     3)Pulmonary  #CAP, resolved  -Completed course of antibiotics (Zosyn x1 in ED, Azithromycin 7/12-7/16, Ceftriaxone 7/12 - 7/17, and Cefuroxime x1) during initial hospitalization, abx for cervical fluid collection completed after 8/11  -Encourage incentive spirometer  -Monitor respiratory status, supplementary oxygen PRN.  Now weaned to room air.     #PILI  -Continue CPAP with home settings  -consult respiratory therapy- having some issue with home cpap and supplies.      4)FENGI  #Diet: Regular diet  #Neurogenic bowel  -ongoing Incontinence with standing 2-3 times per day.   -try enemeeze at bedtime- to promote improved bowel continence-     #Melena  #Acute anemia  -Hgb stable 9.9 8/20  - Endoscopy 8/12 demonstrated duodenal ulcers that are no longer bleeding, which were likely the cause of his melena and anemia.   - Now completed course of IV Protonix.  Continue PO BID x 8 weeks (started 8/16) per GI recs and PO daily indefinitely there after.     #Liver Lesion  -Abnormal appearance is seen on CAT scan with slight nodularity  inferiorly. LFTs within normal limits.  -Follow-up primary care physician     5)  #Urinary Retention   -Voiding trial done again on 8/10 but was unable to void and had large IC volumes, therefore Davis was re-placed on 8/13.  -monitor.     #DONOVAN and likely CKD   -Pt with limited physician follow up so baseline creatinine not known. Peak at 2.13, improved with IV hydration.  Stable at 1.24 8/19  -Avoid nephrotoxic agents, NSAID     #Renal lesion  -Incidental finding on CT scan  -Renal ultrasound showed simple cysts of bilateral kidneys  -Follow up with PCP     6)DVT prophylaxis  -PCDs and ambulation     7)Pain  #Spasms  -Tylenol 650 mg q4h PRN  - Flexeril 10 mg TID PRN  -Continue to monitor spasms  #Likely ATF ligament sprain  -Ice PRN and  Active ankle brace (reports supportive)     8)Endo  -Prior steroid induced hyperglycemia.  HbA1c 5.0.   -Now completed course of steroids.  No need for further routine checks     9)Heme   #Acute anemia  -GI workup as above Hgb stable 9.9     #Leukocytosis   -Thought to be secondary to steroids which are now discontinued. WBCs wnl, crp 81.0, fever no clear source  -trend  -follow up ID    #Thrombocytopenia  -Noted upon initial admission to the emergency room. Had been stable low 100s, but now improved. Stable 134 8/20    -trend  -Follow-up with primary care physician     10)Psych  -Monitor mood, will consult health psychology if needed     11)Social/Dispo  -Anticipate discharge home at a modified independent level for ambulation, mobility, and ADLs.   -ELOS: 3 weeks  -Rehab prognosis: Fair  -Follow up appointments: PCP, PRASHANT (Dr. Hanley), ID     Code status: Full Code (Discussed with patient upon admission).  This was confirmed upon re-admit.  Pt does not want extraordinary measures should prognosis be poor, however does want attempts at resuscitation and therefore will remain full code.      I spent a total of 40 minutes face-to-face or managing the care of Jackson Johns. Over 50% of  my time on the unit was spent counseling the patient and coordinating care. See note for details.       Renae Caballero PA-C  PM&R

## 2021-08-20 NOTE — PLAN OF CARE
Discharge Planner Post-Acute Rehab OT:      Discharge Plan: home with assist for IADL and HH      Precautions: fall, cervical spinal, involuntary twitching in lower extremities, orthostatic hypotension- Abdominal binder/LE stockinette. L brace prior to standing.     Current Status:  ADLs: SBA or UB dressing, CGA don shorts EOB, total assist for toileting due to incontinence/torres catheter. SBA at sink for face g/h tasks,minimally tolerates standing d/t orthostatic hypotension  Dep A w/ LB hygiene as pt still incontinent.   Mod-max A w/ LB dressing w/ pt assist w/ threading catheter.  Tsfers: CGA w/ FWW    Dep A in rolling shower chair due to Orthostatic BP  IADLs: not tested, wife can assist  Vision/Cognition: WFL     Assessment: Pt. was lying in bed upon arrival, Pt. sat EOB with SBA using bed railing. BP in sitting with the abdominal binder on was 118/63. Pt. stated that he felt light headed. Pt. doffed hospital gown with min A and donned t-shirt overhead with min A. Pt. doffed socks with independence and required Total A to don compression socks and  socks. Pt. completed SPT using FWW from bed to  with CGA. BP after transfer was 131/60. Pt. wheeled to bathroom and completed grooming at hygiene routine at wheelchair level with set-up. Pt. Continues to make limited progress due to orthostatic and incontinence.      Other Barriers to Discharge (DME, Family Training, etc): Pt has good family support and accessible home. family training, determine DME and improve ADL performance.   Barriers: Orthostatic BP, symptomatic, new L ankle instability as of 8.17.21.

## 2021-08-20 NOTE — PLAN OF CARE
PT Aox4, able to make needs known, using call light appropriately.  A1 w/w and GB.   Picc in LUE flushing with blood return, 0.9% NS bolus started today at 1040 per order in but through PICC, okay given by PA to not put PIV in and use picc instead.  Pt on Reg/thin diet, taking pills whole with water. Davis draining yellow urine. Edema to BLE with lymph wraps to BLE.  Working with therapies. Nursing will continue to monitor.

## 2021-08-20 NOTE — CARE CONFERENCE
Acute Rehab Care Conference/Team Rounds      Type: Team Rounds    Present: Dr. Luis Whitehead, Renae Caballero PA, Chace Mi RN, Jonathan Chilel SW, Patti Prescott OT, Jackson Pace PT, Susan Weiss Dietician, Patient Jackson Johns      Discharge Barriers/Treatment/Education    Rehab Diagnosis: Cervical myelopathy s/p C3-4, C4-5 ACDF complicated by prevertebral abscess     Active Medical Co-morbidities/Prognosis: HTN, PILI, urinary retention with Torres, neurogenic bowel with incontinence, thrombocytopenia, DONOVAN on CKD, ABLA, PILI, orthostatic hypotension    Safety: Patient is alert and oriented x 4. Able to make his needs known by using the call light appropriately.    Pain: Left ankle spasm. PRN Flexeril.     Medications, Skin, Tubes/Lines: takes meds whole. Post-op site on anterior neck is healing and is open to air. Red blanchable buttocks, rash on groin. Pressure sore at the tip of the penis. Has torres cath and a single lumen PICC.     Swallowing/Nutrition:    Bowel/Bladder: Torres catheter. Continent and incontinent of bowel. Gets daily suppository and uses the bedside commode.     Psychosocial: Lives in a house w/ spouse in [place], [place]. 69 y/o  male, english-speaking and [employer]. No mental or chemical health concerns.  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      ADLs/IADLs: Pt has been making slow progress and has been limited by incontinence and orthostatic BP. Limited in functional mobility and progressing activity. May need longer stay at TCU if pt does not progress to MOD I as pt does not want to burden wife with assist.     Mobility: Pt is making slow progress due to medical complexity, but continues to work very hard in therapies. Pt has been limited by orthostatics limiting ability to ambulate at times. Pt is currently mod-I with bed mobility, CGA with walker for transfers. At best, ambulates 50' CGA with FWW, however not able to consistently perform this due to orthostatics,  incontinence, or L ankle pain from reported sprain. Pt is very hopeful to be ambulatory at discharge, however mixed mobility looking most appropriate given fluctuations with increased time in w/c. Will need FWW and w/c for home navigation. Will need heavy support from spouse, or may benefit from TCU for longer rehab stay to reduce burden of care.    Transportation: Pt not a , will need family assist and family training    Decision maker: self    Plan of Care and goals reviewed and updated.    Discharge Plan/Recommendations    Fall Precautions: continue    Overall plan for the patient:   Ongoing orthostasis, left ankle pain, and bowel incontinence has limited progress.  Also with 1 time fever of 101.3, workup including repeat MRI c-spine has been negative.  Continue to monitor closely.  Will need to continue to work on medical barriers and re-evaluate if any progress made in the upcoming week.  Currently would need a significant amount of assistance and cares in order to discharge home.  If progress is made and discharge home seems feasible, will need to extend time.  If not, then would plan for TCU.  Re-evaluate in 1 week.        Utilization Review and Continued Stay Justification    Medical Necessity Criteria:    For any criteria that is not met, please document reason and plan for discharge, transfer, or modification of plan of care to address.    Requires intensive rehabilitation program to treat functional deficits?: Yes    Requires 3x per week or greater involvement of rehabilitation physician to oversee rehabilitation program?: Yes    Requires rehabilitation nursing interventions?: Yes    Patient is making functional progress?: Limited, but medical issues have been the major barrier    There is a potential for additional functional progress? Yes    Patient is participating in therapy 3 hours per day a minimum of 5 days per week or 15 hours per week in 7 day period?:Yes    Has discharge needs that require  coordinated discharge planning approach?:Yes      Barriers/Concerns related to meeting medical necessity criteria:  Orthostasis, bowel incontinence, left ankle pain    Team Plan to Address Concern:  Pharmacological and non-pharmacological management of all of the above      Final Physician Sign off    Statement of Approval: I have reviewed and agree with the recommendations and documentation in this care conference note.       Patient Goals  SW: Confirm discharge recommendations with therapy, coordinate safe discharge plan and remain available to support and assist as needed.  OT goal: hygiene/grooming: modified independent  OT goal: upper body dressing: Modified independent  OT goal: lower body dressing: Modified independent  OT goal: upper body bathing: Supervision/stand-by assist  OT goal: lower body bathing: Supervision/stand-by assist  OT Goal: transfer: Supervision/stand-by assist (walk in shower transfer)  OT goal: toilet transfer/toileting: Minimal assist, cleaning and garment management, toilet transfer  OT goal 1: Pt willbe IND  with UE HEP to improve stength and function in BUE for ADL and hobbies such as building trains  OT/SATNAM face-to-face collaboration occurred, patient progress and plan of care reviewed.                      PT Frequency: daily 60-90 minutes  PT goal: bed mobility: Modified independent, Supine to/from sit, Within precautions  PT goal: transfers: Sit to/from stand, Bed to/from chair, Modified independent  PT goal: gait: Modified independent, 50 feet, Rolling walker  PT goal: stairs: 3 stairs, Modified independent (per home setup)        PT goal 1: Car transfer SBA                 RN Goal: Pt will participate in MAP as needed to ensure safe medication administration before discharge.  RN Goal: Patient will remain free from falls while in ARU.  RN Goal: Patient will demonstrate ability to protect his skin to prevent skin breakdown.

## 2021-08-20 NOTE — PLAN OF CARE
FOCUS/GOAL  Bowel management, Bladder management, Pain management, Mobility, and Safety management    ASSESSMENT, INTERVENTIONS AND CONTINUING PLAN FOR GOAL:  Patient is alert and oriented x 4. Left ankle pain/spasm he rated 4/10. PRN Flexeril given with hs meds per request. Davis catheter draining well. Incontinent of stool x 1 and was continent using bedside commode after scheduled supp was given. Single lumen PICC on left arm is patent. Assist of 1 using walker for transfers. Had MRI of his cervical spine this evening.  CPAP not in use since mask is broken. Per pt, his wife will bring a new one tomorrow. HOB is elevated at 45 degrees.     Addendum 8/20/21 0700  Patient slept good. No complaints made. Davis catheter draining well.

## 2021-08-20 NOTE — CONSULTS
[facility] ID SERVICE: NEW CONSULTATION     Patient:  Rigo Johns, Date of birth 1950, Medical record number [id number]  Date of Admission: 8/2/2021  Date of Visit:  8/19/2021  Requesting Provider: Desmond Whitehead         Assessment and Recommendations:   Problem List:  1. Fever and elevated CRP of unclear etiology - fever resolved and CRP is now stable  2.  Urgent decompression and fusion of cervical spine 7/14 due to spinal stenosis. CT C-spine and subsequent MRI showing prevertebral fluid collection along C3-T1 with anterior extension to subcutaneous area within right side of neck concerning for seroma/abscess. This had nearly resolved on 8/10 MRI and had not worsened on 8/18 MRI.     Discussion:   Rigo Johns is a 70 year old man with complicated post-operative course after spinal decompression and fusion that has included recurrent brief episodes of fever and elevated CRP that may have been related to a seroma/abscess in his anterior neck which has remained resolved on MRI >1 week after stopping antibiotics. At this time there is no clear evidence of infection, though the MRI does not definitively rule out ongoing infection related to his surgery. It is reassuring that his fever and leukocytosis seem to have improved without intervention. For now, would recommend just close observation for any new localizing signs or symptoms as well as recurrence of fever.     Recommendations:  1. Ok to discontinue isolation as C diff was negative   2. No further investigation if he remains afebrile without localizing symptoms and if CRP eventually trends back down. Consider checking CRP again in 48-72 hours to monitor.   3. If fever occurs again, please recheck blood cultures and let ID know.     Discussed with PM&R Team. ID will sign off for now, but please don't hesitate to call the [facility] ID team if he becomes febrile again or more questions arise.     Molly Nieves MD  Infectious Diseases  [phone]   "       History of Present Illness:   Rigo Johns is a 70 year old male  with PMHx PILI, HTN, and recent hospitalization for cervical myelopathy s/p urgent surgical decompression and fusion done emergently on 7/14 due to cervical spine stenosis with complicated post-op course as follows:     7/18/21: He was discharged to ARU on 7/18 but developed fevers on 7/19/21 with fever, elevated CRP, and leukocytosis.  Work up included:  C diff from 7/20 negative, blood cultures (7/12, 7/19, 7/21, 7/25) negative to date,  COVID test negative. UA showed moderated blood, 27 RBC, few bacteria, but just 2 wbcs and negative LE and nitrite. Repeat CXR from 7/19 showed \"Streaky left midlung opacity likely represents atelectasis. Previously seen hazy opacities in the bilateral bases are improved\". US doppler of lower right extremity and upper left extremity was negative for DVT. He was started on empiric antibiotics (cefepime on 7/22 and Vancomycin on 7/23) given aurelio fever. CT of the C spine and MRI of the C spine were performed and showed  prevertebral collection along the anterior vertebral bodies of C3-T1. Neurosurgery was consulted on 7/23 and they were more suspicious that the collections were seroma so they suggested a trial of stopping antibiotics on 7/24 (patient was afebrile at that time). Since the patient had become afebrile and was hemodynamically stable everyone agreed with the trial.    7/27/21: Almost 72h after stopping antibiotics the patient had 102 of fever. Neurosurgery was re-consulted and they recommended then admitted to [facility] on 7/28/21 due to sepsis 2/2 suspected prevertebral C3-T1 abscess seen on MRI in the setting of fever, elevated CRP, and leukocytosis. After discussions between neurosurgery and ID at [facility], it was determined that the risks outweighed potential benefit of accessing the fluid collection for diagnostic or therapeutic purposes. He was treated with 2 weeks of cefepime and vancomycin " ending 8/11. 8/10 repeat MRI showed only minimal residual abnormalities. His CRP improved from 90s to 30s while on antibiotics, but was back up in the 60s at the end of therapy.     8/18/21: Mr. Johns then had another fever spike to 101 on 8/18 as well as WBC of 12.4. His CRP had increased to 71. From 8/18-8/20 his fever came down to 99, WBC returned to normal at 7.7, and CRP went from 71-->81-->81. Blood cultures and C diff negative. CXR normal. He reports no new neck pain. His main concerns are an episode of twitching in his leg and sleep deprivation from a combination of early awakening and frequent interruptions. Repeat MRI of his neck on 8/19 showed no recurrence of the fluid collection.          Review of Systems:   CONSTITUTIONAL:  No further fevers or chills.   EYES: Negative for icterus  ENT:  Negative for oral lesions and sore throat  RESPIRATORY:  Negative for cough and dyspnea  CARDIOVASCULAR:  Negative for chest pain, palpitations  GASTROINTESTINAL:  Negative for nausea, vomiting, diarrhea and constipation  GENITOURINARY:  Negative for dysuria  INTEGUMENT:  Negative for rash and pruritus  NEURO:  Negative for headache. Positive for insomnia.        Past Medical History:     Past Medical History:   Diagnosis Date     Cervical stenosis of spinal canal      Hypertension      Sleep apnea     uses cpap         Allergies:      Allergies   Allergen Reactions     Shrimp GI Disturbance           Recent Antimicrobials::        Family History:     Family History   Problem Relation Age of Onset     Lymphoma Mother             Social History:     Social History     Socioeconomic History     Marital status:      Spouse name: Not on file     Number of children: Not on file     Years of education: Not on file     Highest education level: Not on file   Occupational History     Not on file   Tobacco Use     Smoking status: Never Smoker     Smokeless tobacco: Never Used   Vaping Use     Vaping Use: Never assessed  "  Substance and Sexual Activity     Alcohol use: Yes     Comment: once a week     Drug use: Never     Sexual activity: Not on file   Other Topics Concern     Parent/sibling w/ CABG, MI or angioplasty before 65F 55M? Not Asked   Social History Narrative     Not on file     Social Determinants of Health     Financial Resource Strain:      Difficulty of Paying Living Expenses:    Food Insecurity:      Worried About Running Out of Food in the Last Year:      Ran Out of Food in the Last Year:    Transportation Needs:      Lack of Transportation (Medical):      Lack of Transportation (Non-Medical):    Physical Activity:      Days of Exercise per Week:      Minutes of Exercise per Session:    Stress:      Feeling of Stress :    Social Connections:      Frequency of Communication with Friends and Family:      Frequency of Social Gatherings with Friends and Family:      Attends Anabaptism Services:      Active Member of Clubs or Organizations:      Attends Club or Organization Meetings:      Marital Status:    Intimate Partner Violence:      Fear of Current or Ex-Partner:      Emotionally Abused:      Physically Abused:      Sexually Abused:           Physical Exam:   /72 (BP Location: Right arm)   Pulse 87   Temp 99.2  F (37.3  C) (Oral)   Resp 16   Ht 1.803 m (5' 11\")   Wt 101.6 kg (224 lb)   SpO2 94%   BMI 31.24 kg/m     Exam:  GENERAL:  Well-developed, well-nourished, sitting in bed in no acute distress.   ENT:  Head is normocephalic, atraumatic. Oropharynx is moist without exudates or ulcers.  EYES:  Eyes have anicteric sclerae.    NECK:  Supple. Anterior surgical incision well approximated and barely visible with no erythema, drainage, or dehiscence.   LUNGS:  Clear to auscultation.  CARDIOVASCULAR:  Regular rate and rhythm with no murmurs, gallops or rubs.  ABDOMEN:  Normal bowel sounds, soft, nontender.  EXT: Extremities warm and without edema.  SKIN:  No acute rashes.  Line is in place without any " surrounding erythema.  NEUROLOGIC:  Grossly nonfocal.         Laboratory Data:     Creatinine   Date Value Ref Range Status   2021 1.24 0.66 - 1.25 mg/dL Final   2021 1.30 (H) 0.66 - 1.25 mg/dL Final   2021 1.24 0.66 - 1.25 mg/dL Final   2021 1.31 (H) 0.66 - 1.25 mg/dL Final   2021 1.31 (H) 0.66 - 1.25 mg/dL Final     WBC Count   Date Value Ref Range Status   2021 7.8 4.0 - 11.0 10e3/uL Final   2021 7.7 4.0 - 11.0 10e3/uL Final   2021 12.4 (H) 4.0 - 11.0 10e3/uL Final   2021 9.8 4.0 - 11.0 10e3/uL Final   2021 8.3 4.0 - 11.0 10e3/uL Final     Hemoglobin   Date Value Ref Range Status   2021 9.9 (L) 13.3 - 17.7 g/dL Final     Platelet Count   Date Value Ref Range Status   2021 134 (L) 150 - 450 10e3/uL Final     Lab Results   Component Value Date     2021    BUN 19 2021    CO2 30 2021     CRP Inflammation   Date Value Ref Range Status   2021 81.0 (H) 0.0 - 8.0 mg/L Final   2021 81.0 (H) 0.0 - 8.0 mg/L Final   2021 71.0 (H) 0.0 - 8.0 mg/L Final   2021 66.0 (H) 0.0 - 8.0 mg/L Final   2021 34.0 (H) 0.0 - 8.0 mg/L Final         Pertinent Recent Microbiology Data:    Blood cultures x 2 with NGTD   C diff negative         Imagin21 C Spine MRI:   Impression:   1. Grossly unchanged abnormal T2 hyperintense cord signal from C3  through C4, consistent with myelomalacia.  2. Stable postsurgical changes of ACDF from C3 to C5 with no  significant residual prevertebral fluid remaining.  3. Multilevel cervical spondylosis, unchanged from comparison MRI on  8/10/2021.    21 CXR:   IMPRESSION:   No acute cardiopulmonary findings.

## 2021-08-21 ENCOUNTER — APPOINTMENT (OUTPATIENT)
Dept: OCCUPATIONAL THERAPY | Facility: CLINIC | Age: 71
DRG: 949 | End: 2021-08-21
Attending: PHYSICAL MEDICINE & REHABILITATION
Payer: COMMERCIAL

## 2021-08-21 ENCOUNTER — APPOINTMENT (OUTPATIENT)
Dept: PHYSICAL THERAPY | Facility: CLINIC | Age: 71
DRG: 949 | End: 2021-08-21
Attending: PHYSICAL MEDICINE & REHABILITATION
Payer: COMMERCIAL

## 2021-08-21 PROCEDURE — 97116 GAIT TRAINING THERAPY: CPT | Mod: GP | Performed by: PHYSICAL THERAPIST

## 2021-08-21 PROCEDURE — 250N000013 HC RX MED GY IP 250 OP 250 PS 637: Performed by: PHYSICAL MEDICINE & REHABILITATION

## 2021-08-21 PROCEDURE — 250N000013 HC RX MED GY IP 250 OP 250 PS 637: Performed by: STUDENT IN AN ORGANIZED HEALTH CARE EDUCATION/TRAINING PROGRAM

## 2021-08-21 PROCEDURE — 99232 SBSQ HOSP IP/OBS MODERATE 35: CPT | Mod: 24 | Performed by: PHYSICAL MEDICINE & REHABILITATION

## 2021-08-21 PROCEDURE — 97110 THERAPEUTIC EXERCISES: CPT | Mod: GP | Performed by: PHYSICAL THERAPIST

## 2021-08-21 PROCEDURE — 97530 THERAPEUTIC ACTIVITIES: CPT | Mod: GP | Performed by: PHYSICAL THERAPIST

## 2021-08-21 PROCEDURE — 250N000013 HC RX MED GY IP 250 OP 250 PS 637: Performed by: PHYSICIAN ASSISTANT

## 2021-08-21 PROCEDURE — 97530 THERAPEUTIC ACTIVITIES: CPT | Mod: GP

## 2021-08-21 PROCEDURE — 128N000003 HC R&B REHAB

## 2021-08-21 PROCEDURE — 97535 SELF CARE MNGMENT TRAINING: CPT | Mod: GO | Performed by: STUDENT IN AN ORGANIZED HEALTH CARE EDUCATION/TRAINING PROGRAM

## 2021-08-21 PROCEDURE — 97110 THERAPEUTIC EXERCISES: CPT | Mod: GO | Performed by: STUDENT IN AN ORGANIZED HEALTH CARE EDUCATION/TRAINING PROGRAM

## 2021-08-21 RX ORDER — CYCLOBENZAPRINE HCL 5 MG
10 TABLET ORAL AT BEDTIME
Status: DISCONTINUED | OUTPATIENT
Start: 2021-08-21 | End: 2021-08-22

## 2021-08-21 RX ADMIN — PANTOPRAZOLE SODIUM 40 MG: 40 TABLET, DELAYED RELEASE ORAL at 17:15

## 2021-08-21 RX ADMIN — CYCLOBENZAPRINE 10 MG: 5 TABLET, FILM COATED ORAL at 20:01

## 2021-08-21 RX ADMIN — PSYLLIUM HUSK 1 PACKET: 3.4 POWDER ORAL at 09:08

## 2021-08-21 RX ADMIN — CYCLOBENZAPRINE 10 MG: 5 TABLET, FILM COATED ORAL at 09:16

## 2021-08-21 RX ADMIN — DICLOFENAC SODIUM 2 G: 10 GEL TOPICAL at 19:25

## 2021-08-21 RX ADMIN — CARBIDOPA AND LEVODOPA 7.5 MG: 50; 200 TABLET, EXTENDED RELEASE ORAL at 12:19

## 2021-08-21 RX ADMIN — ACETAMINOPHEN 650 MG: 325 TABLET, FILM COATED ORAL at 20:03

## 2021-08-21 RX ADMIN — DOCUSATE SODIUM 1 ENEMA: 283 LIQUID RECTAL at 19:25

## 2021-08-21 RX ADMIN — PANTOPRAZOLE SODIUM 40 MG: 40 TABLET, DELAYED RELEASE ORAL at 09:08

## 2021-08-21 RX ADMIN — TRAZODONE HYDROCHLORIDE 100 MG: 50 TABLET ORAL at 20:01

## 2021-08-21 RX ADMIN — CARBIDOPA AND LEVODOPA 7.5 MG: 50; 200 TABLET, EXTENDED RELEASE ORAL at 17:15

## 2021-08-21 RX ADMIN — Medication 250 MG: at 09:08

## 2021-08-21 RX ADMIN — PSYLLIUM HUSK 1 PACKET: 3.4 POWDER ORAL at 20:01

## 2021-08-21 RX ADMIN — CARBIDOPA AND LEVODOPA 7.5 MG: 50; 200 TABLET, EXTENDED RELEASE ORAL at 09:08

## 2021-08-21 RX ADMIN — ACETAMINOPHEN 650 MG: 325 TABLET, FILM COATED ORAL at 10:11

## 2021-08-21 RX ADMIN — Medication 250 MG: at 19:25

## 2021-08-21 RX ADMIN — Medication 1 MG: at 20:01

## 2021-08-21 NOTE — PLAN OF CARE
"Discharge Planner Post-Acute Rehab PT:      Discharge Plan: Home with family assist, mixed mobility likely (w/c and walker)     Precautions: Falls, cervical, PICC line  Lymph wraps on during day, off at night     Current Status:  Bed Mobility: mod I   Transfer: CGA with WW   Gait: CGA with WW x 60 ft, 55 ft.   Stairs: 4\" stairs, CGA  Balance: CGA and WW for standing      Assessment: pt pleasant and motivated, has good self awareness regarding needs/ability to perform transfers.  No c/o pain, lightheadedness resolves within minutes of standing and almost immediately upon sitting.      Other Barriers to Discharge (DME, Family Training, etc): medical complexity, incontinence, DME needs, orthostatic BP        "

## 2021-08-21 NOTE — PLAN OF CARE
FOCUS/GOAL  Bowel management, Bladder management, Pain management, Mobility, Skin integrity, and Safety management    ASSESSMENT, INTERVENTIONS AND CONTINUING PLAN FOR GOAL:  [id number]  A/O x4, VSS on RA but still had an episode of orthostatic hypotension w/ therapy today. Abdominal binder on when up.Temp of 100 this am, prn tylenol give for left ankle pain, headache and temp.  Temp recheck was 99.1 and decrease in pain. PRN flexeril given x1 this shift as well.  Up w/ assist one w/ gait belt and walker to BSC.  Continent of bowel today, had a BM on the BSC this am.  Davis intact draining wnl.  Regular thin diet, takes his pills well whole.  BLE edema continues +2-3.  Bilateral lymphedema leg wraps on.  PICC to left arm, inact wnl, SL. Cont w/ POC.     [id number]     Low grade temps continue.HS was 99.1, PRN tylenol given for generalized pain and temp.  C/o minimal pain in left ankle, scheduled voltaren cream applied during hour break from bilateral lymphedema leg wraps.  Had results on BSC after bowel program this evening.  Davis draining wnl.  CPAP on at HS. Cont w/ POC.

## 2021-08-21 NOTE — PLAN OF CARE
FOCUS/GOAL  Medical management    ASSESSMENT, INTERVENTIONS AND CONTINUING PLAN FOR GOAL:  Pt is alert and oriented. No complaints of pain. Assist of 1 with walker. Davis in place and draining adequate output. L PICC WDL. Appeared to be sleeping on rounds and staff limiting disturbances as able. Pt very upset when RN brought protonix in to room at 0655. Per communication in chart and sign on door, okay to wake pt after 0600 for cares. Pt adamant about medication being scheduled with AM meds.

## 2021-08-21 NOTE — PLAN OF CARE
Discharge Planner Post-Acute Rehab OT:      Discharge Plan: home with assist for IADL and HH      Precautions: fall, cervical spinal, involuntary twitching in lower extremities, orthostatic hypotension- Abdominal binder/LE stockinette. L brace prior to standing.     Current Status:  ADLs: SBA or UB dressing, CGA don shorts EOB, total assist for toileting due to incontinence/torres catheter. SBA at sink for face g/h tasks,minimally tolerates standing d/t orthostatic hypotension  Dep A w/ LB hygiene as pt still incontinent.   Mod-max A w/ LB dressing w/ pt assist w/ threading catheter.  Tsfers: CGA w/ FWW    Dep A in rolling shower chair due to Orthostatic BP  IADLs: not tested, wife can assist  Vision/Cognition: WFL     Assessment: BP continues to be low in standing. Today pt's transfer was also not as good, usually he is CGA today he had balance issues requiring mod A. BP was low once he got to the commode (86/46) but recovered with seated break. Initiated sandra cares today and pt did well seated leaning forward.      Other Barriers to Discharge (DME, Family Training, etc): Pt has good family support and accessible home. family training, determine DME and improve ADL performance.   Barriers: Orthostatic BP, symptomatic, new L ankle instability as of 8.17.21.

## 2021-08-21 NOTE — PROGRESS NOTES
"  [facility]   Acute Rehabilitation Unit  Daily progress note    INTERVAL HISTORY  Seen sitting up in bed. Sleep is interrupted and frustrating. Back to using CPAP. Did good in therapies yesterday - endurance improving. He reports continued L ankle pain associated with \"sprain\" that occurred after a prolonged leg spasm that pulled the foot into dorsiflexion. Has had leg spasms for the past week and they were worse last night, which may have contributed to poor sleep. Ankle does not limit him in therapies Was started on prn flexeril for spasms. Denies rolling the ankle with therapies or lightheadedness today.       MEDICATIONS  Scheduled meds    cyclobenzaprine  10 mg Oral At Bedtime     diclofenac  2 g Topical 4x Daily     docusate sodium  1 enema Rectal QPM     melatonin  1 mg Oral At Bedtime     midodrine  7.5 mg Oral TID w/meals     pantoprazole  40 mg Oral BID AC     psyllium  1 packet Oral BID     saccharomyces boulardii  250 mg Oral BID     sodium chloride (PF)  10 mL Intracatheter Q8H     traZODone  100 mg Oral At Bedtime       PRN meds:  acetaminophen, cyclobenzaprine, lidocaine 4%, lidocaine (buffered or not buffered), miconazole, ondansetron, senna-docusate, sodium chloride (PF), sodium chloride (PF), White Petrolatum      PHYSICAL EXAM  /58 (BP Location: Right arm)   Pulse 89   Temp 99.1  F (37.3  C) (Oral)   Resp 16   Ht 1.803 m (5' 11\")   Wt 101.6 kg (224 lb)   SpO2 91%   BMI 31.24 kg/m    Gen: Awake, coooperative, no distress.  HEENT: atraumatic. Moist mucus membranes. Anterior incision c/d/i.    Pulm: Non-labored breathing clear on room air.   CV: RRR  : Davis in place, draining clear yellow urine  Ext: Mild edema in LE, especially feet, b/l with compression in place.  No calf tenderness b/l and no tenderness in upper extremities.  PICC line in LUE non-erythematous and non-tender to palpation.  Neuro/MSK: alert, oriented, answers questions " appropriately, follows commands. L ankle TTP inferior to fibular head. Neg anterior drawer, pain with talar tilt with neutral ankle. Pain with active eversion      LABS  CBC RESULTS:   Recent Labs   Lab Test 08/20/21 0627 08/19/21 0731 08/18/21 2045   WBC 7.8 7.7 12.4*   RBC 3.06* 3.05* 2.65*   HGB 9.9* 10.1* 8.6*   HCT 30.5* 30.6* 26.3*    100 99   MCH 32.4 33.1* 32.5   MCHC 32.5 33.0 32.7   RDW 11.9 12.0 11.9   * 126* 161     Last Basic Metabolic Panel:  Recent Labs   Lab Test 08/19/21 0731 08/18/21 2045 08/16/21  1631    138 138   POTASSIUM 4.2 3.9 3.8   CHLORIDE 108 108 109   CO2 30 28 27   ANIONGAP 3 2* 2*   GLC 87 91 88   BUN 19 20 20   CR 1.24 1.30* 1.24   GFRESTIMATED 58* 55* 58*   GARRISON 8.6 8.7 8.6       ASSESSMENT AND PLAN    Rigo Johns is a 70 year old L hand dominant male with a PMH of HTN, PILI, and prior cervical surgery (C5-7 anterior fusion in 1993 per notes) who is now status post a C3-4, C4-5 ACDF on 7/14/2021 for cervical myelopathy.  He was admitted to the [facility] ARU from 7/18-7/27/21 but transferred back to the medical floor for fevers and prevertebral fluid collection suggestive of abscess.  Now improving with IV antbiotics and no surgical intervention was needed.  He is now being admitted to the ARU on 8/2/21.  Impairment group code: 04.1211 Quadriplegia, Incomplete C1-4 - s/p C3-5 ACDF.     L ankle continues to be bothersome and was aggravated overnight by another spasm. Seems to be soft tissue, likely CFL vs peroneal tendons.     --Voltaren Gel qid for L ankle pain and scheduled one dose of flexeril at night time for spasms   --Vitals stable. No lab today.  --Continue ongoing medical management.  --Continue therapies and plan of care.    Austin Klein MD  Physical Medicine and Rehabilitation PGY2        I, Aixa Castillo, saw this patient with my resident Dr. Klein and agree with her findings and plan of care as documented in her note.     I personally  reviewed the chart (vitals signs, medications, and labs).     My key findings and candelaria manegement decisions made by me:   Reported 5 day h/o L ankle pain likely due to spasms in LLE. No imaging needed at this point. Medication management as above for pain and spasms. Consider CAM boot if pain and sense of instability continue to affect his function.   Was orthostatic again today. Midodrine dose was just increased yesterday. Will continue the same plan and consider another bolus tomorrow if needed.     I spent a total of 30 minutes face-to-face and managing the care of the patient. Over 50% of my time on the unit was spent counseling the patient and coordinating care. See note for details.

## 2021-08-21 NOTE — PLAN OF CARE
FOCUS/GOAL  Bowel management, Bladder management, Pain management, Mobility, Skin integrity, and Safety management    ASSESSMENT, INTERVENTIONS AND CONTINUING PLAN FOR GOAL:  A/O x4, VSS on RA.  Up w/ assist one w/ gait belt and walker.  Had results of BM tonight on BSC after bowel program.  Had a shower tonight.  Regular thin diet, takes his pills well whole.  Davis intact, draining wnl. BLE lymphedema wraps placed back on after his shower.  +2-3 edema in BLE.  PRN flexeril given at HS.  Cont w/ POC.

## 2021-08-21 NOTE — PLAN OF CARE
"Discharge Planner Post-Acute Rehab PT:      Discharge Plan: Home with family assist, mixed mobility likely (w/c and walker)     Precautions: Falls, cervical, PICC line  Lymph wraps on during day, off at night     Current Status:  Bed Mobility: mod I   Transfer: CGA with WW   Gait: CGA with WW up to 80'   Stairs: 4\" stairs, CGA  Balance: CGA and WW for standing      Assessment: Overall ankle pain was minimal-trace today during upright position Pt had moderate orthostatic hypotension during AM session, but better for PM with only slight symptoms noted.  Pt BP was 117/57 sitting and 90/52 in standing.  Pt able to increased amb distance to max of 80' with 195' total over 3 bouts of amb.     Other Barriers to Discharge (DME, Family Training, etc): medical complexity, incontinence, DME needs, orthostatic BP  "

## 2021-08-22 ENCOUNTER — APPOINTMENT (OUTPATIENT)
Dept: PHYSICAL THERAPY | Facility: CLINIC | Age: 71
DRG: 949 | End: 2021-08-22
Attending: PHYSICAL MEDICINE & REHABILITATION
Payer: COMMERCIAL

## 2021-08-22 ENCOUNTER — HEALTH MAINTENANCE LETTER (OUTPATIENT)
Age: 71
End: 2021-08-22

## 2021-08-22 ENCOUNTER — APPOINTMENT (OUTPATIENT)
Dept: OCCUPATIONAL THERAPY | Facility: CLINIC | Age: 71
DRG: 949 | End: 2021-08-22
Attending: PHYSICAL MEDICINE & REHABILITATION
Payer: COMMERCIAL

## 2021-08-22 PROCEDURE — 97530 THERAPEUTIC ACTIVITIES: CPT | Mod: GO | Performed by: STUDENT IN AN ORGANIZED HEALTH CARE EDUCATION/TRAINING PROGRAM

## 2021-08-22 PROCEDURE — 250N000013 HC RX MED GY IP 250 OP 250 PS 637: Performed by: PHYSICIAN ASSISTANT

## 2021-08-22 PROCEDURE — 99233 SBSQ HOSP IP/OBS HIGH 50: CPT | Mod: 24 | Performed by: PHYSICAL MEDICINE & REHABILITATION

## 2021-08-22 PROCEDURE — 250N000013 HC RX MED GY IP 250 OP 250 PS 637: Performed by: STUDENT IN AN ORGANIZED HEALTH CARE EDUCATION/TRAINING PROGRAM

## 2021-08-22 PROCEDURE — 97535 SELF CARE MNGMENT TRAINING: CPT | Mod: GO | Performed by: STUDENT IN AN ORGANIZED HEALTH CARE EDUCATION/TRAINING PROGRAM

## 2021-08-22 PROCEDURE — 250N000013 HC RX MED GY IP 250 OP 250 PS 637: Performed by: PHYSICAL MEDICINE & REHABILITATION

## 2021-08-22 PROCEDURE — 128N000003 HC R&B REHAB

## 2021-08-22 PROCEDURE — 97530 THERAPEUTIC ACTIVITIES: CPT | Mod: GP

## 2021-08-22 RX ORDER — MULTIVITAMIN,THERAPEUTIC
1 TABLET ORAL DAILY
Status: DISCONTINUED | OUTPATIENT
Start: 2021-08-22 | End: 2021-09-09 | Stop reason: HOSPADM

## 2021-08-22 RX ORDER — CYCLOBENZAPRINE HCL 5 MG
10 TABLET ORAL 3 TIMES DAILY
Status: DISCONTINUED | OUTPATIENT
Start: 2021-08-22 | End: 2021-08-31

## 2021-08-22 RX ADMIN — Medication 250 MG: at 21:15

## 2021-08-22 RX ADMIN — TRAZODONE HYDROCHLORIDE 100 MG: 50 TABLET ORAL at 21:15

## 2021-08-22 RX ADMIN — CARBIDOPA AND LEVODOPA 7.5 MG: 50; 200 TABLET, EXTENDED RELEASE ORAL at 12:34

## 2021-08-22 RX ADMIN — CARBIDOPA AND LEVODOPA 7.5 MG: 50; 200 TABLET, EXTENDED RELEASE ORAL at 16:25

## 2021-08-22 RX ADMIN — CYCLOBENZAPRINE 10 MG: 5 TABLET, FILM COATED ORAL at 21:14

## 2021-08-22 RX ADMIN — PANTOPRAZOLE SODIUM 40 MG: 40 TABLET, DELAYED RELEASE ORAL at 08:49

## 2021-08-22 RX ADMIN — CARBIDOPA AND LEVODOPA 7.5 MG: 50; 200 TABLET, EXTENDED RELEASE ORAL at 08:49

## 2021-08-22 RX ADMIN — PSYLLIUM HUSK 1 PACKET: 3.4 POWDER ORAL at 21:16

## 2021-08-22 RX ADMIN — CYCLOBENZAPRINE 10 MG: 5 TABLET, FILM COATED ORAL at 14:16

## 2021-08-22 RX ADMIN — CYCLOBENZAPRINE 10 MG: 5 TABLET, FILM COATED ORAL at 09:06

## 2021-08-22 RX ADMIN — Medication 250 MG: at 08:49

## 2021-08-22 RX ADMIN — Medication 1 MG: at 21:15

## 2021-08-22 RX ADMIN — ACETAMINOPHEN 650 MG: 325 TABLET, FILM COATED ORAL at 09:06

## 2021-08-22 RX ADMIN — DICLOFENAC SODIUM 2 G: 10 GEL TOPICAL at 08:49

## 2021-08-22 RX ADMIN — PSYLLIUM HUSK 1 PACKET: 3.4 POWDER ORAL at 08:50

## 2021-08-22 RX ADMIN — PANTOPRAZOLE SODIUM 40 MG: 40 TABLET, DELAYED RELEASE ORAL at 16:25

## 2021-08-22 RX ADMIN — THERA TABS 1 TABLET: TAB at 12:34

## 2021-08-22 ASSESSMENT — ACTIVITIES OF DAILY LIVING (ADL): DEPENDENT_IADLS:: INDEPENDENT

## 2021-08-22 NOTE — PLAN OF CARE
Discharge Planner Post-Acute Rehab OT:      Discharge Plan: home with assist for IADL and HH      Precautions: fall, cervical spinal, involuntary twitching in lower extremities, orthostatic hypotension- Abdominal binder/LE stockinette. L brace prior to standing.     Current Status:  ADLs: SBA or UB dressing, CGA don shorts EOB, total assist for toileting due to incontinence/torres catheter. SBA at sink for face g/h tasks,minimally tolerates standing d/t orthostatic hypotension  Dep A w/ LB hygiene as pt still incontinent.   Mod-max A w/ LB dressing w/ pt assist w/ threading catheter.  Tsfers: CGA w/ FWW    Dep A in rolling shower chair due to Orthostatic BP  IADLs: not tested, wife can assist  Vision/Cognition: WFL     Assessment: BP continues to be low in standing. Unable to tolerate standing at the sink and thus continue to modify tasks to WC based.      Other Barriers to Discharge (DME, Family Training, etc): Pt has good family support and accessible home. family training, determine DME and improve ADL performance.   Barriers: Orthostatic BP, symptomatic, new L ankle instability as of 8.17.21.

## 2021-08-22 NOTE — PROGRESS NOTES
"  [facility]   Acute Rehabilitation Unit  Daily progress note    INTERVAL HISTORY  Seen sitting up in bed with wife Odessa on his cellphone. Had some orthostasis last night and acknowledges that he has been behind on fluid intake.  Discussed multiple mechanisms of his orthostasis including deconditioning, low intake, cervical myelopathy. He continues to have LE spasms, improved slightly with a dose of flexeril., which he has had as a prn but was not taking. He hopes that this could be scheduled 3x daily to help \"keep the muscles relaxed all day despite therapies\". He and his wife express concern about the low grade temperatures he has had and concerns for infection. We discuss that his infectious workup had been reassuring and that ID is also not concerned for infection at this time. His temperatures are still within normal limits and we will continue to monitor his labs for infection. Continues to have sleep interrupted; feels he would be better rested if he did not have vitals until 8am as therapies have pushed back his schedule to also accommodate better sleep schedule.    Continues to make progress with his therapies and said yesterday's sessions were encouraging      MEDICATIONS  Scheduled meds    cyclobenzaprine  10 mg Oral TID     docusate sodium  1 enema Rectal QPM     melatonin  1 mg Oral At Bedtime     midodrine  7.5 mg Oral TID w/meals     multivitamin, therapeutic  1 tablet Oral Daily     pantoprazole  40 mg Oral BID AC     psyllium  1 packet Oral BID     saccharomyces boulardii  250 mg Oral BID     sodium chloride (PF)  10 mL Intracatheter Q8H     traZODone  100 mg Oral At Bedtime       PRN meds:  acetaminophen, cyclobenzaprine, diclofenac, lidocaine 4%, lidocaine (buffered or not buffered), miconazole, ondansetron, senna-docusate, sodium chloride (PF), sodium chloride (PF), White Petrolatum      PHYSICAL EXAM  /52 (BP Location: Right arm)   Pulse 72   Temp " "98.2  F (36.8  C) (Oral)   Resp 20   Ht 1.803 m (5' 11\")   Wt 101.6 kg (224 lb)   SpO2 98%   BMI 31.24 kg/m    Gen: Awake, coooperative, no distress.  HEENT: atraumatic. Moist mucus membranes. Anterior incision c/d/i.    Pulm: Non-labored breathing clear on room air.   CV: RRR  : Davis in place, draining clear yellow urine  Ext: Mild edema in LE, especially feet, b/l with compression in place.  No calf tenderness b/l and no tenderness in upper extremities.  PICC line in LUE non-erythematous and non-tender to palpation.  Neuro/MSK: alert, oriented, answers questions appropriately, follows commands. L ankle TTP inferior to fibular head. Neg anterior drawer, pain with talar tilt with neutral ankle. Pain with active eversion      LABS  CBC RESULTS:   Recent Labs   Lab Test 08/20/21  0627 08/19/21  0731 08/18/21 2045   WBC 7.8 7.7 12.4*   RBC 3.06* 3.05* 2.65*   HGB 9.9* 10.1* 8.6*   HCT 30.5* 30.6* 26.3*    100 99   MCH 32.4 33.1* 32.5   MCHC 32.5 33.0 32.7   RDW 11.9 12.0 11.9   * 126* 161     Last Basic Metabolic Panel:  Recent Labs   Lab Test 08/19/21  0731 08/18/21 2045 08/16/21  1631    138 138   POTASSIUM 4.2 3.9 3.8   CHLORIDE 108 108 109   CO2 30 28 27   ANIONGAP 3 2* 2*   GLC 87 91 88   BUN 19 20 20   CR 1.24 1.30* 1.24   GFRESTIMATED 58* 55* 58*   GARRISON 8.6 8.7 8.6       ASSESSMENT AND PLAN    Rigo Johns is a 70 year old L hand dominant male with a PMH of HTN, PILI, and prior cervical surgery (C5-7 anterior fusion in 1993 per notes) who is now status post a C3-4, C4-5 ACDF on 7/14/2021 for cervical myelopathy.  He was admitted to the [facility] from 7/18-7/27/21 but transferred back to the medical floor for fevers and prevertebral fluid collection suggestive of abscess.  Now improving with IV antbiotics and no surgical intervention was needed.  He is now being admitted to the ARU on 8/2/21.  Impairment group code: 04.1211 Quadriplegia, Incomplete C1-4 - s/p C3-5 " ACDF.     Regarding his orthostatic hypotension, patient agrees to try to spend more time up in chair to address deconditioning and be diligent with fluid intake.  He feels most comfortable having his leg wraps in place overnight as they offer further support for if he does get a spasm.  There is likely a component of his cervical myelopathy at play here as well.     He is on a low-dose of cyclobenzaprine therefore it is reasonable to schedule 3 times daily for him discussed that he may feel fatigued following the increase.  We will also leave a prn dose in place in case he has additional spasms.  Also added multi vitamin at request of patient and his wife.    Will clarify with nursing and modified vital orders to allow to rest longer in the mornings.    --Encouraged fluid intake, up to chair upon waking  --Flexeril scheduled 3 times daily  --Daily multivitamin started  --Vitals stable within normal limits. No lab today.  --Continue ongoing medical management.  --Continue therapies and plan of care.    Austin Klein MD  Physical Medicine and Rehabilitation PGY2        I, Aixa Castillo, saw this patient with my resident Dr. Klein and agree with her findings and plan of care as documented in her note.     I personally reviewed the chart (vitals signs, medications, and labs).     My key findings and candelaria manegement decisions made by me:   Jackson was doing well this morning. His wife Odessa was on the phone. Discussed his ongoing medical issues and the plan as outlined.  --Orthostasis, likely due to SCI, inadequate hydration and deconditioning. Will continue abd binder, ace wrap and slow position change. He also needs to be in chair more often. Consider IVF later today or tomorrow pending his symptoms.   --concerns for infection, clinical picture and labs are not concerning for now. Will monitor closely.   --L ankle pain, improved yesterday and with better control of spasms. Increased flexeril dose and will continue  wraps.     I spent a total of 45 minutes face-to-face and managing the care of the patient. Over 50% of my time on the unit was spent counseling the patient and coordinating care. See note for details.

## 2021-08-22 NOTE — CONSULTS
Care Management Initial Consult    General Information  Assessment completed with: Jackson Younger  Type of CM/SW Visit: Initial Assessment    Primary Care Provider verified and updated as needed:     Readmission within the last 30 days: other (see comments) (See chart for complications related to admissions)         Advance Care Planning: Advance Care Planning Reviewed: present on chart          Communication Assessment  Patient's communication style: spoken language (English or Bilingual)    Hearing Difficulty or Deaf: no   Wear Glasses or Blind: yes    Cognitive  Cognitive/Neuro/Behavioral: WDL  Level of Consciousness: alert  Arousal Level: opens eyes spontaneously  Orientation: oriented x 4  Mood/Behavior: calm, cooperative  Best Language: 0 - No aphasia  Speech: clear, logical    Living Environment:   People in home: spouse  Odessa   Current living Arrangements: house      Able to return to prior arrangements: yes       Family/Social Support:  Care provided by: spouse/significant other  Provides care for: no one  Marital Status:   Wife  Odessa       Description of Support System: Supportive    Support Assessment: Adequate family and caregiver support    Current Resources:   Patient receiving home care services: No     Community Resources: Home Care  Equipment currently used at home: none  Supplies currently used at home: None    Employment/Financial:  Employment Status: retired     Employment/ Comments: none  Financial Concerns: No concerns identified           Lifestyle & Psychosocial Needs: None identified       Functional Status:  Prior to admission patient needed assistance: none prior  Dependent ADLs:: Ambulation-walker  Dependent IADLs:: Independent  Assesssment of Functional Status: Not at baseline with mobility    Mental Health Status:  Mental Health Status: No Current Concerns       Chemical Dependency Status: n/a                Values/Beliefs:  Spiritual, Cultural Beliefs, Zoroastrian  Practices, Values that affect care: no               Additional Information:  Patient will likely need home PT and OT after discharge. RNCC discussed picking home care agency and how process works. PT asked RNCC to email this to wife. RNCC will send email after this note. Patient currently using FWW for ambulation due to decrease in functional status and strength and new onset ankle pain/sprain.   RNCC added PT and OT order that can be send to desired agency upon discharge.     LIZETT Cazares., BSN., RN  Nurse Care Coordinator    [address] [address]  [email or web address]  [email or web address]  Employed by [employer]    To get in touch with the Weekend & Holiday on call RN Care Coordinator:  Pager:  [phone] OR Care Coordinator job code/pager [phone]

## 2021-08-22 NOTE — PLAN OF CARE
"Discharge Planner Post-Acute Rehab PT:      Discharge Plan: Home with family assist, mixed mobility likely (w/c and walker)     Precautions: Falls, cervical, PICC line  Lymph wraps on during day, off at night     Current Status:  Bed Mobility: mod I   Transfer: CGA with WW   Gait: CGA with WW x 60 ft, 55 ft.   Stairs: 4\" stairs, CGA  Balance: CGA and WW for standing      Assessment: pt pleasant and motivated, has good self awareness regarding needs/ability to perform transfers and ambulation.  lightheadedness resolves within minutes of standing and almost immediately upon sitting.  Pt had no c/o L ankle pain/weakness-pt delighted he was able to walk more today.        Other Barriers to Discharge (DME, Family Training, etc): medical complexity, incontinence, DME needs, orthostatic BP               "

## 2021-08-22 NOTE — PLAN OF CARE
FOCUS/GOAL  Medical management    ASSESSMENT, INTERVENTIONS AND CONTINUING PLAN FOR GOAL:  Patient slept well with CPAP on, no c/o pain. He has a single lumen PICC left upper arm and a torres.  Patient seen at 0705, he is still sleeping with CPAP on.

## 2021-08-22 NOTE — PLAN OF CARE
A/O x4, VSS on RA but still had an episode of orthostatic hypotension w/ therapy today. Abdominal binder on when up. Tylenol give for left ankle pain. Flexeril given as well. Up w/ assist one w/ gait belt and walker. Davis intact draining wnl. Last bm 8/21. Regular thin diet, takes his pills well whole.  BLE edema continues +2-3.  Bilateral lymphedema leg wraps on.  PICC to left arm, inact wnl, SL. Cont w/ POC.

## 2021-08-23 ENCOUNTER — APPOINTMENT (OUTPATIENT)
Dept: OCCUPATIONAL THERAPY | Facility: CLINIC | Age: 71
DRG: 949 | End: 2021-08-23
Attending: PHYSICAL MEDICINE & REHABILITATION
Payer: COMMERCIAL

## 2021-08-23 ENCOUNTER — APPOINTMENT (OUTPATIENT)
Dept: PHYSICAL THERAPY | Facility: CLINIC | Age: 71
DRG: 949 | End: 2021-08-23
Attending: PHYSICAL MEDICINE & REHABILITATION
Payer: COMMERCIAL

## 2021-08-23 LAB
ANION GAP SERPL CALCULATED.3IONS-SCNC: 5 MMOL/L (ref 3–14)
BACTERIA BLD CULT: NO GROWTH
BUN SERPL-MCNC: 17 MG/DL (ref 7–30)
CALCIUM SERPL-MCNC: 8.7 MG/DL (ref 8.5–10.1)
CHLORIDE BLD-SCNC: 109 MMOL/L (ref 94–109)
CO2 SERPL-SCNC: 29 MMOL/L (ref 20–32)
CREAT SERPL-MCNC: 1.36 MG/DL (ref 0.66–1.25)
CRP SERPL-MCNC: 72 MG/L (ref 0–8)
ERYTHROCYTE [DISTWIDTH] IN BLOOD BY AUTOMATED COUNT: 11.9 % (ref 10–15)
GFR SERPL CREATININE-BSD FRML MDRD: 52 ML/MIN/1.73M2
GLUCOSE BLD-MCNC: 117 MG/DL (ref 70–99)
HCT VFR BLD AUTO: 31.2 % (ref 40–53)
HGB BLD-MCNC: 10.1 G/DL (ref 13.3–17.7)
MCH RBC QN AUTO: 31.9 PG (ref 26.5–33)
MCHC RBC AUTO-ENTMCNC: 32.4 G/DL (ref 31.5–36.5)
MCV RBC AUTO: 98 FL (ref 78–100)
PLATELET # BLD AUTO: 156 10E3/UL (ref 150–450)
POTASSIUM BLD-SCNC: 4 MMOL/L (ref 3.4–5.3)
RBC # BLD AUTO: 3.17 10E6/UL (ref 4.4–5.9)
SODIUM SERPL-SCNC: 143 MMOL/L (ref 133–144)
WBC # BLD AUTO: 7.7 10E3/UL (ref 4–11)

## 2021-08-23 PROCEDURE — 80048 BASIC METABOLIC PNL TOTAL CA: CPT | Performed by: PHYSICIAN ASSISTANT

## 2021-08-23 PROCEDURE — 250N000013 HC RX MED GY IP 250 OP 250 PS 637: Performed by: PHYSICIAN ASSISTANT

## 2021-08-23 PROCEDURE — 86140 C-REACTIVE PROTEIN: CPT | Performed by: PHYSICIAN ASSISTANT

## 2021-08-23 PROCEDURE — 97530 THERAPEUTIC ACTIVITIES: CPT | Mod: GP | Performed by: REHABILITATION PRACTITIONER

## 2021-08-23 PROCEDURE — 97530 THERAPEUTIC ACTIVITIES: CPT | Mod: GO | Performed by: STUDENT IN AN ORGANIZED HEALTH CARE EDUCATION/TRAINING PROGRAM

## 2021-08-23 PROCEDURE — 128N000003 HC R&B REHAB

## 2021-08-23 PROCEDURE — 97535 SELF CARE MNGMENT TRAINING: CPT | Mod: GO | Performed by: STUDENT IN AN ORGANIZED HEALTH CARE EDUCATION/TRAINING PROGRAM

## 2021-08-23 PROCEDURE — 85014 HEMATOCRIT: CPT | Performed by: PHYSICIAN ASSISTANT

## 2021-08-23 PROCEDURE — 97110 THERAPEUTIC EXERCISES: CPT | Mod: GO | Performed by: STUDENT IN AN ORGANIZED HEALTH CARE EDUCATION/TRAINING PROGRAM

## 2021-08-23 PROCEDURE — 97110 THERAPEUTIC EXERCISES: CPT | Mod: GP | Performed by: REHABILITATION PRACTITIONER

## 2021-08-23 PROCEDURE — 99233 SBSQ HOSP IP/OBS HIGH 50: CPT | Mod: 24 | Performed by: PHYSICAL MEDICINE & REHABILITATION

## 2021-08-23 PROCEDURE — 36592 COLLECT BLOOD FROM PICC: CPT | Performed by: PHYSICIAN ASSISTANT

## 2021-08-23 PROCEDURE — 250N000013 HC RX MED GY IP 250 OP 250 PS 637: Performed by: PHYSICAL MEDICINE & REHABILITATION

## 2021-08-23 PROCEDURE — 250N000013 HC RX MED GY IP 250 OP 250 PS 637: Performed by: STUDENT IN AN ORGANIZED HEALTH CARE EDUCATION/TRAINING PROGRAM

## 2021-08-23 RX ORDER — MIDODRINE HYDROCHLORIDE 5 MG/1
10 TABLET ORAL
Status: DISCONTINUED | OUTPATIENT
Start: 2021-08-23 | End: 2021-09-09 | Stop reason: HOSPADM

## 2021-08-23 RX ADMIN — CARBIDOPA AND LEVODOPA 7.5 MG: 50; 200 TABLET, EXTENDED RELEASE ORAL at 08:22

## 2021-08-23 RX ADMIN — Medication 250 MG: at 08:22

## 2021-08-23 RX ADMIN — ACETAMINOPHEN 650 MG: 325 TABLET, FILM COATED ORAL at 15:18

## 2021-08-23 RX ADMIN — PSYLLIUM HUSK 1 PACKET: 3.4 POWDER ORAL at 20:18

## 2021-08-23 RX ADMIN — THERA TABS 1 TABLET: TAB at 08:22

## 2021-08-23 RX ADMIN — MIDODRINE HYDROCHLORIDE 10 MG: 5 TABLET ORAL at 11:57

## 2021-08-23 RX ADMIN — PANTOPRAZOLE SODIUM 40 MG: 40 TABLET, DELAYED RELEASE ORAL at 08:22

## 2021-08-23 RX ADMIN — TRAZODONE HYDROCHLORIDE 100 MG: 50 TABLET ORAL at 21:51

## 2021-08-23 RX ADMIN — Medication 1 MG: at 20:18

## 2021-08-23 RX ADMIN — CYCLOBENZAPRINE 10 MG: 5 TABLET, FILM COATED ORAL at 20:18

## 2021-08-23 RX ADMIN — PANTOPRAZOLE SODIUM 40 MG: 40 TABLET, DELAYED RELEASE ORAL at 17:03

## 2021-08-23 RX ADMIN — MIDODRINE HYDROCHLORIDE 10 MG: 5 TABLET ORAL at 17:03

## 2021-08-23 RX ADMIN — CYCLOBENZAPRINE 10 MG: 5 TABLET, FILM COATED ORAL at 08:22

## 2021-08-23 RX ADMIN — PSYLLIUM HUSK 1 PACKET: 3.4 POWDER ORAL at 08:22

## 2021-08-23 RX ADMIN — Medication 250 MG: at 20:18

## 2021-08-23 RX ADMIN — CYCLOBENZAPRINE 10 MG: 5 TABLET, FILM COATED ORAL at 13:34

## 2021-08-23 NOTE — PROGRESS NOTES
"  [facility]   Acute Rehabilitation Unit  Daily progress note    INTERVAL HISTORY  Weekend notes reviewed.  He reports having \"the best night of sleep I have had in 43 nights\" because he was note interrupted until later in the morning.  Spasms still persisted, although yesterday L ankle pain was better and was able to ambulate further which he is happy about.  Flexeril changed from PRN to scheduled TID.  Fluid intake was not as much on Saturday and continues to be symptomatically orthostatic.  This morning BP was 69/45 and with repositioning of his abdominal binder increased to 91/50.  Discussed increasing Midodrine to 10 mg TID today.  Regarding bowels, has had 1 morning incontinent episodes for the past few days, typically occurs when he first stands up.  We will try getting up and out of bed to the commode for a BM prior to morning therapy sessions.  Also discussed labs, Hgb stable at 10.1 and CRP decreased to 71.  Functionally he was able to ambulate 60 and 55 ft with CGA and a WW, but today limited by orthostasis.    MEDICATIONS  Scheduled meds    cyclobenzaprine  10 mg Oral TID     docusate sodium  1 enema Rectal QPM     melatonin  1 mg Oral At Bedtime     midodrine  10 mg Oral TID w/meals     multivitamin, therapeutic  1 tablet Oral Daily     pantoprazole  40 mg Oral BID AC     psyllium  1 packet Oral BID     saccharomyces boulardii  250 mg Oral BID     sodium chloride (PF)  10 mL Intracatheter Q8H     traZODone  100 mg Oral At Bedtime       PRN meds:  acetaminophen, cyclobenzaprine, diclofenac, lidocaine 4%, lidocaine (buffered or not buffered), miconazole, ondansetron, senna-docusate, sodium chloride (PF), sodium chloride (PF), White Petrolatum      PHYSICAL EXAM  /62 (BP Location: Right arm)   Pulse 84   Temp 98.5  F (36.9  C) (Oral)   Resp 18   Ht 1.803 m (5' 11\")   Wt 101.6 kg (224 lb)   SpO2 94%   BMI 31.24 kg/m       Gen: Awake, coooperative, no " distress, participating in OT  HEENT: atraumatic.  Anterior incision c/d/i.    Pulm: Non-labored breathing clear on room air.   CV: RRR, S1+S2, no m/r/g  : Davis in place, draining clear yellow urine  Ext: Mild edema in LE, especially feet, b/l with ACE wraps in place.  No calf tenderness b/l and no tenderness in upper extremities.  PICC line in LUE non-erythematous and non-tender to palpation.  Neuro/MSK: alert, oriented, answers questions appropriately, follows commands      LABS  CBC RESULTS:   Recent Labs   Lab Test 08/23/21  0948 08/20/21  0627 08/19/21  0731   WBC 7.7 7.8 7.7   RBC 3.17* 3.06* 3.05*   HGB 10.1* 9.9* 10.1*   HCT 31.2* 30.5* 30.6*   MCV 98 100 100   MCH 31.9 32.4 33.1*   MCHC 32.4 32.5 33.0   RDW 11.9 11.9 12.0    134* 126*     Last Basic Metabolic Panel:  Recent Labs   Lab Test 08/23/21  0948 08/19/21  0731 08/18/21  2045    141 138   POTASSIUM 4.0 4.2 3.9   CHLORIDE 109 108 108   CO2 29 30 28   ANIONGAP 5 3 2*   * 87 91   BUN 17 19 20   CR 1.36* 1.24 1.30*   GFRESTIMATED 52* 58* 55*   GARRISON 8.7 8.6 8.7     CRP Inflammation   Date Value Ref Range Status   08/23/2021 72.0 (H) 0.0 - 8.0 mg/L Final         ASSESSMENT AND PLAN    Rigo Johns is a 70 year old L hand dominant male with a PMH of HTN, PILI, and prior cervical surgery (C5-7 anterior fusion in 1993 per notes) who is now status post a C3-4, C4-5 ACDF on 7/14/2021 for cervical myelopathy.  He was admitted to the [facility]U from 7/18-7/27/21 but transferred back to the medical floor for fevers and prevertebral fluid collection suggestive of abscess.  Now improving with IV antbiotics and no surgical intervention was needed.  He is now being admitted to the ARU on 8/2/21.  Impairment group code: 04.1211 Quadriplegia, Incomplete C1-4 - s/p C3-5 ACDF.   PLAN  Rehabilitation  1. PT and OT 90 minutes of each on a daily basis, in addition to rehab nursing and close management of physiatrist.    2. Impairment of ADL's: Noted  to have impaired ROM, impaired strength, impaired activity tolerance, edema management, impaired balance, and impaired coordination, all affecting his ability to safely and independently perform basic ADLs.  Goal for mod I with basic ADLs.  3. Impairment of mobility:  Noted to have impaired ROM, impaired strength, impaired activity tolerance, edema management, impaired balance and impaired coordination, all affecting his ability to safely and independently ambulate and perform basic mobility.  Goal for mod I with basic mobility.  Medical  1)Neurology  #Cervical Myelopathy s/p C3-4, C4-5 ACDF on 7/14/21 complicated by fevers and prevertebral fluid collection, suggestive of abscess  -Follow up MRI completed 8/10 which shows near resolution of the prevertebral fluid.  Discussed with ID who also discussed with NSGY and images reviewed.  Antibiotics discontinued after 8/11 (completed 2 week course)  -Maintain postoperative spinal precautions. No need for cervical collar  -Follow-up with neurosurgery and infectious disease after discharge  -Fever of 101.3 8/18- not recurrent, WBC stable, CRP elevated but now improving, MRI stable.   -ID assistance greatly appreciated.  CRP now stabilized (improved today to 71) and no further fevers, will continue to monitor only for now.      2)CVS  #Orthostasis  -Abd Binder, TEDs, encourage liquids  -Discontinued Flomax  -increase Midodrine to 10 mg TID today     3)Pulmonary  #CAP, resolved  -Completed course of antibiotics (Zosyn x1 in ED, Azithromycin 7/12-7/16, Ceftriaxone 7/12 - 7/17, and Cefuroxime x1) during initial hospitalization, 2 week course of abx for cervical fluid collection (Cefepime + Daptomycin) completed after 8/11  -Encourage incentive spirometer  -Monitor respiratory status, supplementary oxygen PRN.  Now weaned to room air.     #PILI  -Continue CPAP with home settings  -consult respiratory therapy- having some issue with home cpap and supplies.      4)FENGI  #Diet:  Regular diet  #Neurogenic bowel  -ongoing Incontinence with standing 2-3 times per day.   -try enemeeze at bedtime- to promote improved bowel continence-   -Will ask nursing staff to try and get Mr. Johns up and to the commode in the morning prior to therapy sessions.    #Melena  #Acute anemia  -Hgb stable 10.1 8/23  - Endoscopy 8/12 demonstrated duodenal ulcers that are no longer bleeding, which were likely the cause of his melena and anemia.   - Now completed course of IV Protonix.  Continue PO BID x 8 weeks (started 8/16) per GI recs and PO daily indefinitely there after.     #Liver Lesion  -Abnormal appearance is seen on CAT scan with slight nodularity inferiorly. LFTs within normal limits.  -Follow-up primary care physician     5)  #Urinary Retention   -Voiding trial done again on 8/10 but was unable to void and had large IC volumes, therefore Davis was re-placed on 8/13.  -monitor.     #DONOVAN and likely CKD   -Pt with limited physician follow up so baseline creatinine not known. Peak at 2.13, improved with IV hydration.  Cr 1.36 on 8/23.  Seems to be in line with baseline, but will continue to monitor to ensure does not continue to climb.  -Avoid nephrotoxic agents, NSAID     #Renal lesion  -Incidental finding on CT scan  -Renal ultrasound showed simple cysts of bilateral kidneys  -Follow up with PCP     6)DVT prophylaxis  -PCDs and ambulation     7)Pain  #Spasms  -Tylenol 650 mg q4h PRN  - Flexeril 10 mg TID scheduled  -Continue to monitor spasms  #Likely ATF ligament sprain  -Ice PRN and  Active ankle brace (reports supportive)     8)Endo  -Prior steroid induced hyperglycemia.  HbA1c 5.0.   -Now completed course of steroids.  No need for further routine checks     9)Heme   #Acute anemia  -GI workup as above Hgb stable 9.9     #Leukocytosis   -Thought to be secondary to steroids which are now discontinued. WBCs wnl, crp 71.0 (prior 81) fever no clear source  -Continue to monitor only for  now    #Thrombocytopenia  -Noted upon initial admission to the emergency room. Had been stable low 100s, but now improved. Stable 156 on 8/23  -trend  -Follow-up with primary care physician     10)Psych  -Monitor mood, will consult health psychology if needed     11)Social/Dispo  -Anticipate discharge home at a modified independent level for ambulation, mobility, and ADLs.   -ELOS: 3 weeks  -Rehab prognosis: Fair  -Follow up appointments: PCP, PRASHANT (Dr. Hanley), ID     Code status: Full Code (Discussed with patient upon admission).  This was confirmed upon re-admit.  Pt does not want extraordinary measures should prognosis be poor, however does want attempts at resuscitation and therefore will remain full code.      Robi Whitehead MD  Department of Rehabilitation Medicine    Time Spent on this Encounter   I, Robi Whitehead, spent a total of 35 minutes face-to-face or managing the care of Rigo Johns. Over 50% of my time on the unit was spent counseling the patient and coordinating care. See note for details.

## 2021-08-23 NOTE — PLAN OF CARE
FOCUS/GOAL  Medical management    ASSESSMENT, INTERVENTIONS AND CONTINUING PLAN FOR GOAL:  Patient slept well all night with CPAP on,  no c/o pain. He has a single lumen PICC left upper arm and a torres.

## 2021-08-23 NOTE — PROGRESS NOTES
"[place], pt wife called and left vm RE: call from weekend RN CC (please see note).     SW returned the call and informed pt wife that team plans to discuss during rounds on Friday and better determine pt next steps and discharge plans. Pt wife expressed relief and will plan to be on the phone during rounds. Pt wife asked \"if he needs rehab in another location, can he go without the vaccine\". SWer informed pt wife that he would have to quarantine and that he can still go. Pt wife denied additional needs at this time.     ALEKSANDR will attend rounds on Friday and assist with coordinating discharge plans. RN CC following as well.     Sara Braga [facility]   [facility]   Direct Phone: [phone]  I   Pager: [phone]  I  Fax: [phone]        "

## 2021-08-23 NOTE — PLAN OF CARE
"FOCUS/GOAL  Bowel management, Bladder management, Medication management, Wound care management, Medical management, Mobility, Skin integrity, and Safety management    ASSESSMENT, INTERVENTIONS AND CONTINUING PLAN FOR GOAL:  A/Ox4. On RA. Denies pain, CP, SOB, N/V, N/T, dizziness. Ax1 with gb/walker. BLE spasms managed with scheduled flexeril TID. Incontinent of bowel. LBM 8/22 PM. Davis patent and draining adequately. Cervical incision approximated with steri strips, SATNAM. Abdominal binder worn during the day. Blanchable redness in groin area and gluteal folds. Groin/thigh/buttocks red discoloration is pt baseline (birthmark). +1 edema BLE ankles/feet, lymphedema wraps on during day. Regular thin diet, fair appetite. L PICC SL. Pt able to make needs known and call light within reach. Continue POC.     /62 (BP Location: Right arm)   Pulse 84   Temp 98.5  F (36.9  C) (Oral)   Resp 18   Ht 1.803 m (5' 11\")   Wt 101.6 kg (224 lb)   SpO2 94%   BMI 31.24 kg/m        "

## 2021-08-23 NOTE — PLAN OF CARE
FOCUS/GOAL  Bowel management, Bladder management, Pain management, Mobility, Skin integrity, and Safety management    ASSESSMENT, INTERVENTIONS AND CONTINUING PLAN FOR GOAL:  A/O x4, VSS on RA w/ temps continue around 99.0 this shift.  Up w/ assist one w/ gait belt to the BSC.  Patient was continent of BM this evening and had a successful BM without the need of the enema.  Regular thin diet, encouraging fluids.  BLE edema continues, lymphedema wraps to BLE.  Davis draining wnl, intact.  PICC to left arm wnl SL, dressing changed this shift.  Leg twitching and pain in left ankle improved w/ scheduled flexeril administration.  CPAP @ NOC.  Cont w/ POC.

## 2021-08-23 NOTE — PLAN OF CARE
Discharge Planner Post-Acute Rehab OT:      Discharge Plan: home with assist for IADL and HH      Precautions: fall, cervical spinal, involuntary twitching in lower extremities, orthostatic hypotension- Abdominal binder/LE stockinette. L brace prior to standing.     Current Status:  ADLs: SBA or UB dressing, CGA don shorts EOB, total assist for toileting due to incontinence/torres catheter. SBA at sink for face g/h tasks,minimally tolerates standing d/t orthostatic hypotension  Dep A w/ LB hygiene as pt still incontinent.   Mod-max A w/ LB dressing w/ pt assist w/ threading catheter.  Tsfers: CGA w/ FWW    Dep A in rolling shower chair due to Orthostatic BP  IADLs: not tested, wife can assist  Vision/Cognition: WFL     Assessment: BP continues to be low in standing. Unable to tolerate standing at the sink and thus continue to modify tasks to WC based. Vitals taken in sitting and standing as pt has history of being orthostatic.  First time standing BP was 69/45. We readjusted the abdominal binder down and tighter and then when he stood his BP was 91/50. Otherwise once seated pt recovers quickly.      Other Barriers to Discharge (DME, Family Training, etc): Pt has good family support and accessible home. family training, determine DME and improve ADL performance.   Barriers: Orthostatic BP, symptomatic, new L ankle instability as of 8.17.21.

## 2021-08-24 ENCOUNTER — APPOINTMENT (OUTPATIENT)
Dept: PHYSICAL THERAPY | Facility: CLINIC | Age: 71
DRG: 949 | End: 2021-08-24
Attending: PHYSICAL MEDICINE & REHABILITATION
Payer: COMMERCIAL

## 2021-08-24 ENCOUNTER — APPOINTMENT (OUTPATIENT)
Dept: GENERAL RADIOLOGY | Facility: CLINIC | Age: 71
DRG: 949 | End: 2021-08-24
Attending: PHYSICIAN ASSISTANT
Payer: COMMERCIAL

## 2021-08-24 ENCOUNTER — APPOINTMENT (OUTPATIENT)
Dept: OCCUPATIONAL THERAPY | Facility: CLINIC | Age: 71
DRG: 949 | End: 2021-08-24
Attending: PHYSICAL MEDICINE & REHABILITATION
Payer: COMMERCIAL

## 2021-08-24 LAB — BACTERIA BLD CULT: NO GROWTH

## 2021-08-24 PROCEDURE — 250N000013 HC RX MED GY IP 250 OP 250 PS 637: Performed by: STUDENT IN AN ORGANIZED HEALTH CARE EDUCATION/TRAINING PROGRAM

## 2021-08-24 PROCEDURE — 99232 SBSQ HOSP IP/OBS MODERATE 35: CPT | Mod: 24 | Performed by: PHYSICIAN ASSISTANT

## 2021-08-24 PROCEDURE — 73610 X-RAY EXAM OF ANKLE: CPT | Mod: LT

## 2021-08-24 PROCEDURE — 97110 THERAPEUTIC EXERCISES: CPT | Mod: GP

## 2021-08-24 PROCEDURE — 73610 X-RAY EXAM OF ANKLE: CPT | Mod: 26 | Performed by: RADIOLOGY

## 2021-08-24 PROCEDURE — 97530 THERAPEUTIC ACTIVITIES: CPT | Mod: GP

## 2021-08-24 PROCEDURE — 97535 SELF CARE MNGMENT TRAINING: CPT | Mod: GO | Performed by: STUDENT IN AN ORGANIZED HEALTH CARE EDUCATION/TRAINING PROGRAM

## 2021-08-24 PROCEDURE — 128N000003 HC R&B REHAB

## 2021-08-24 PROCEDURE — 250N000013 HC RX MED GY IP 250 OP 250 PS 637: Performed by: PHYSICAL MEDICINE & REHABILITATION

## 2021-08-24 PROCEDURE — 97110 THERAPEUTIC EXERCISES: CPT | Mod: GO | Performed by: STUDENT IN AN ORGANIZED HEALTH CARE EDUCATION/TRAINING PROGRAM

## 2021-08-24 RX ORDER — FLUDROCORTISONE ACETATE 0.1 MG/1
100 TABLET ORAL DAILY
Status: DISCONTINUED | OUTPATIENT
Start: 2021-08-25 | End: 2021-09-09 | Stop reason: HOSPADM

## 2021-08-24 RX ADMIN — PANTOPRAZOLE SODIUM 40 MG: 40 TABLET, DELAYED RELEASE ORAL at 08:27

## 2021-08-24 RX ADMIN — CYCLOBENZAPRINE 10 MG: 5 TABLET, FILM COATED ORAL at 13:53

## 2021-08-24 RX ADMIN — CYCLOBENZAPRINE 10 MG: 5 TABLET, FILM COATED ORAL at 19:58

## 2021-08-24 RX ADMIN — Medication 250 MG: at 19:59

## 2021-08-24 RX ADMIN — MIDODRINE HYDROCHLORIDE 10 MG: 5 TABLET ORAL at 16:36

## 2021-08-24 RX ADMIN — PSYLLIUM HUSK 1 PACKET: 3.4 POWDER ORAL at 19:58

## 2021-08-24 RX ADMIN — CYCLOBENZAPRINE 10 MG: 5 TABLET, FILM COATED ORAL at 08:27

## 2021-08-24 RX ADMIN — MIDODRINE HYDROCHLORIDE 10 MG: 5 TABLET ORAL at 08:27

## 2021-08-24 RX ADMIN — TRAZODONE HYDROCHLORIDE 100 MG: 50 TABLET ORAL at 19:58

## 2021-08-24 RX ADMIN — ACETAMINOPHEN 650 MG: 325 TABLET, FILM COATED ORAL at 08:30

## 2021-08-24 RX ADMIN — Medication 250 MG: at 08:27

## 2021-08-24 RX ADMIN — THERA TABS 1 TABLET: TAB at 08:27

## 2021-08-24 RX ADMIN — PSYLLIUM HUSK 1 PACKET: 3.4 POWDER ORAL at 08:27

## 2021-08-24 RX ADMIN — PANTOPRAZOLE SODIUM 40 MG: 40 TABLET, DELAYED RELEASE ORAL at 16:36

## 2021-08-24 RX ADMIN — Medication 1 MG: at 19:58

## 2021-08-24 RX ADMIN — MIDODRINE HYDROCHLORIDE 10 MG: 5 TABLET ORAL at 12:11

## 2021-08-24 NOTE — PLAN OF CARE
Tilt Table Note to Stabilize Blood Pressure:     0 minutes:  (0 degrees)  BP: 116/66  HR 78   2 minutes:  (45 degrees)  BP: 105/61  HR 94  4 minutes:  (60 degrees)  BP: 96/62    10 minutes:  (60 degrees)  BP: 88/57    12 minutes:  (45 degrees)  BP: 113/70    15 minutes:  (45 degrees)  BP: 115/62      Assessment: pt tolerated well without symptoms. However pt unstable with decreasing BP and increasing HR at 60 degrees. Once returned to 45 degrees, BP normalized to baseline. Will attempt to progress slowly past 45 degrees tomorrow.

## 2021-08-24 NOTE — PLAN OF CARE
Discharge Planner Post-Acute Rehab OT:      Discharge Plan: home with assist for IADL and HH      Precautions: fall, cervical spinal, involuntary twitching in lower extremities, orthostatic hypotension- Abdominal binder/LE stockinette. L brace prior to standing.     Current Status:  ADLs: SBA or UB dressing, CGA don shorts EOB, total assist for toileting due to incontinence/torres catheter. SBA at sink for face g/h tasks,minimally tolerates standing d/t orthostatic hypotension  Dep A w/ LB hygiene as pt still incontinent.   Mod-max A w/ LB dressing w/ pt assist w/ threading catheter.  Tsfers: CGA w/ FWW    Dep A in rolling shower chair due to Orthostatic BP  IADLs: not tested, wife can assist  Vision/Cognition: WFL     Assessment: Focus on strength and shoulder range. Pt doing well but still limited by l ankle, BP etc.      Other Barriers to Discharge (DME, Family Training, etc): Pt has good family support and accessible home. family training, determine DME and improve ADL performance.   Barriers: Orthostatic BP, symptomatic, new L ankle instability as of 8.17.21.

## 2021-08-24 NOTE — PLAN OF CARE
Pt alert and oriented x4, with complaints of pain on L ankle at startr of shift, medicated with PRN Tylenol once which was effective in reducing pain. Davis catheter in place draining well with light yellow urine. Continent of bowel, had 1 bm this pm shift and refused enema. On regular/thin diet, ate 100%. L arm PICC line patent. L foot boot applied. Wraps on BLE in place and pt comfortable. Sleeping at end of shift.

## 2021-08-24 NOTE — PROGRESS NOTES
Pt strictly forbids anybody entering his room between 10pm-8am thus assessment was very limited..  Due diligence performed by this writer only to see if the patient is breathing.

## 2021-08-24 NOTE — PROGRESS NOTES
"  [facility]   Acute Rehabilitation Unit  Daily progress note    INTERVAL HISTORY  Seen sitting up on foot bike.  Reports feeling pretty well, remarks that had temp around 100 last night but did not feel \"like I usually do when I have a fever\".  This am notes ongoing issues with pain in left ankle and left le spasms, bilateral ankles are swollen though left ankle is tender both medially and laterally and with plantar flexion.   No history of joint inflammation reported.  Denies n/v/, having 2 bowel movements most days agreeable to trial of twice daily bowel regimen to promote continent bowel movement.  No ab pain, appetite maybe a little better.  Ongoing dizziness with standing.     Plan for today,   xr L ankle-  ordered labs for am cbc, cmp, crp, uric acid  Add florinef to start tomorrow am for orthostatic hypotension  Continue to monitor medically insetting of fevers of unclear significance/ source.       MEDICATIONS  Scheduled meds    cyclobenzaprine  10 mg Oral TID     docusate sodium  1 enema Rectal BID     [START ON 8/25/2021] fludrocortisone  100 mcg Oral Daily     melatonin  1 mg Oral At Bedtime     midodrine  10 mg Oral TID w/meals     multivitamin, therapeutic  1 tablet Oral Daily     pantoprazole  40 mg Oral BID AC     psyllium  1 packet Oral BID     saccharomyces boulardii  250 mg Oral BID     sodium chloride (PF)  10 mL Intracatheter Q8H     traZODone  100 mg Oral At Bedtime       PRN meds:  acetaminophen, diclofenac, lidocaine 4%, lidocaine (buffered or not buffered), miconazole, ondansetron, senna-docusate, sodium chloride (PF), sodium chloride (PF), White Petrolatum      PHYSICAL EXAM  /55   Pulse 104   Temp 98.6  F (37  C) (Oral)   Resp 18   Ht 1.803 m (5' 11\")   Wt 101.6 kg (224 lb)   SpO2 95%   BMI 31.24 kg/m       Gen: Awake, coooperative, no distress, participating in PT  HEENT: atraumatic.  Anterior incision c/d/i.    Pulm: Non-labored breathing " clear on room air.   CV: RRR, S1+S2, no m/r/g  : Davis in place, draining clear yellow urine  Ext: Mild edema in LE, greatest in feet, tenderness to left ankle both medial and laterally  No calf tenderness b/l and no tenderness in upper extremities.    Neuro/MSK: alert, oriented, answers questions appropriately, follows commands      LABS  CBC RESULTS:   Recent Labs   Lab Test 08/23/21  0948 08/20/21  0627 08/19/21  0731   WBC 7.7 7.8 7.7   RBC 3.17* 3.06* 3.05*   HGB 10.1* 9.9* 10.1*   HCT 31.2* 30.5* 30.6*   MCV 98 100 100   MCH 31.9 32.4 33.1*   MCHC 32.4 32.5 33.0   RDW 11.9 11.9 12.0    134* 126*     Last Basic Metabolic Panel:  Recent Labs   Lab Test 08/23/21  0948 08/19/21  0731 08/18/21  2045    141 138   POTASSIUM 4.0 4.2 3.9   CHLORIDE 109 108 108   CO2 29 30 28   ANIONGAP 5 3 2*   * 87 91   BUN 17 19 20   CR 1.36* 1.24 1.30*   GFRESTIMATED 52* 58* 55*   GARRISON 8.7 8.6 8.7     CRP Inflammation   Date Value Ref Range Status   08/23/2021 72.0 (H) 0.0 - 8.0 mg/L Final         ASSESSMENT AND PLAN    Rigo Johns is a 70 year old L hand dominant male with a PMH of HTN, PILI, and prior cervical surgery (C5-7 anterior fusion in 1993 per notes) who is now status post a C3-4, C4-5 ACDF on 7/14/2021 for cervical myelopathy.  He was admitted to the [place] [facility] from 7/18-7/27/21 but transferred back to the medical floor for fevers and prevertebral fluid collection suggestive of abscess.  Now improving with IV antbiotics and no surgical intervention was needed.  He is now being admitted to the ARU on 8/2/21.  Impairment group code: 04.1211 Quadriplegia, Incomplete C1-4 - s/p C3-5 ACDF.   PLAN  Rehabilitation  1. PT and OT 90 minutes of each on a daily basis, in addition to rehab nursing and close management of physiatrist.    2. Impairment of ADL's: Noted to have impaired ROM, impaired strength, impaired activity tolerance, edema management, impaired balance, and impaired coordination, all  affecting his ability to safely and independently perform basic ADLs.  Goal for mod I with basic ADLs.  3. Impairment of mobility:  Noted to have impaired ROM, impaired strength, impaired activity tolerance, edema management, impaired balance and impaired coordination, all affecting his ability to safely and independently ambulate and perform basic mobility.  Goal for mod I with basic mobility.  Medical  1)Neurology  #Cervical Myelopathy s/p C3-4, C4-5 ACDF on 7/14/21 complicated by fevers and prevertebral fluid collection, suggestive of abscess  -Follow up MRI completed 8/10 which shows near resolution of the prevertebral fluid.  Discussed with ID who also discussed with NSGY and images reviewed.  Antibiotics discontinued after 8/11 (completed 2 week course)  -Maintain postoperative spinal precautions. No need for cervical collar  -Follow-up with neurosurgery and infectious disease after discharge  -Fever of 101.3 8/18- 100.7 8/23 , WBC stable, CRP elevated but stable/improving, MRI stable.   -ID assistance greatly appreciated.  CRP now stabilized (improved today to 71)     2)CVS  #Orthostasis  -Abd Binder, TEDs, encourage liquids  -increased Midodrine to 10 mg TID 8/23  -start florinef 0.1 mg 8/25     3)Pulmonary  #CAP, resolved  -Completed course of antibiotics (Zosyn x1 in ED, Azithromycin 7/12-7/16, Ceftriaxone 7/12 - 7/17, and Cefuroxime x1) during initial hospitalization, 2 week course of abx for cervical fluid collection (Cefepime + Daptomycin) completed after 8/11  -Encourage incentive spirometer  -Monitor respiratory status, supplementary oxygen PRN.  Now weaned to room air.     #PILI  -Continue CPAP with home settings  -consult respiratory therapy- having some issue with home cpap and supplies.      4)FENGI  #Diet: Regular diet  #Neurogenic bowel  -ongoing Incontinence with standing 2-3 times per day.   -try enemeeze at bedtime- to promote improved bowel continence-   -Will ask nursing staff to try and get  Mr. Johns up and to the commode in the morning prior to therapy sessions.    #Melena  #Acute anemia  -Hgb stable 10.1 8/23  - Endoscopy 8/12 demonstrated duodenal ulcers that are no longer bleeding, which were likely the cause of his melena and anemia.   - Now completed course of IV Protonix.  Continue PO BID x 8 weeks (started 8/16) per GI recs and PO daily indefinitely there after.     #Liver Lesion  -Abnormal appearance is seen on CAT scan with slight nodularity inferiorly. LFTs within normal limits.  -Follow-up primary care physician     5)  #Urinary Retention   -Voiding trial done again on 8/10 but was unable to void and had large IC volumes, therefore Davis was re-placed on 8/13.  -monitor.     #DONOVAN and likely CKD   -Pt with limited physician follow up so baseline creatinine not known. Peak at 2.13, improved with IV hydration.  Cr 1.36 on 8/23.  Seems to be in line with baseline, but will continue to monitor to ensure does not continue to climb.  -Avoid nephrotoxic agents, NSAID     #Renal lesion  -Incidental finding on CT scan  -Renal ultrasound showed simple cysts of bilateral kidneys  -Follow up with PCP     6)DVT prophylaxis  -PCDs and ambulation     7)Pain  #Spasms  -Tylenol 650 mg q4h PRN  - Flexeril 10 mg TID scheduled  -Continue to monitor spasms  #Left ankle pain  -xr left ankle  -Ice PRN and  Active ankle brace (reports supportive)     8)Endo  -Prior steroid induced hyperglycemia.  HbA1c 5.0.   -Now completed course of steroids.  No need for further routine checks     9)Heme   #Acute anemia  -GI workup as above Hgb stable 10.1 8/23     #Leukocytosis   -Thought to be secondary to steroids which are now discontinued. WBCs wnl, crp 71.0 (prior 81) fever no clear source  -Continue to monitor only for now    #Thrombocytopenia  -Noted upon initial admission to the emergency room. Had been stable low 100s, but now improved. Stable 156 on 8/23  -trend  -Follow-up with primary care  physician     10)Psych  -Monitor mood, will consult health psychology if needed    11) Infectious disease  Recent prevertebral abscess- completed iv antibiotics MRI 8/19 with resolution  Fever- intermittent 8/18, 8/23 unclear source. MRI neg, UA neg, Cdiff neg, WBC wnl.  CRP elevated but stable 72.  ROS + left ankle pain, orthostatic hypotension, fatigue.  Discussed with ID 8/18 with recommendation to monitor clinically .  -left ankle xr  -cbc, cmp, crp, uric acid in am  -monitor closely pending trend consider re-consult ID    12)Social/Dispo  -Anticipate discharge home at a modified independent level for ambulation, mobility, and ADLs.   -ELOS: 3 weeks  -Rehab prognosis: Fair  -Follow up appointments: PCP, NSGY (Dr. Hanley), ID     Code status: Full Code (Discussed with patient upon admission).  This was confirmed upon re-admit.  Pt does not want extraordinary measures should prognosis be poor, however does want attempts at resuscitation and therefore will remain full code.      Renae Caballero PA-C  PM&R    Discussed with Dr. Whitehead

## 2021-08-24 NOTE — PLAN OF CARE
"Discharge Planner Post-Acute Rehab PT:      Discharge Plan: Home with family assist, mixed mobility likely (w/c and walker)     Precautions: Falls, cervical, PICC line  Lymph wraps on during day, off at night     Current Status:  Bed Mobility: mod I   Transfer: CGA with WW   Gait: CGA with WW, room distances  Stairs: 4\" stairs, CGA  Balance: CGA and WW for standing      Assessment: Continues to be limited by orthostatic hypotension and limited activity tolerance. L ankle pain appears to fluctuate throughout the day, more painful in the morning.    Other Barriers to Discharge (DME, Family Training, etc): medical complexity, incontinence, DME needs, orthostatic BP        "

## 2021-08-25 ENCOUNTER — APPOINTMENT (OUTPATIENT)
Dept: OCCUPATIONAL THERAPY | Facility: CLINIC | Age: 71
DRG: 949 | End: 2021-08-25
Attending: PHYSICAL MEDICINE & REHABILITATION
Payer: COMMERCIAL

## 2021-08-25 ENCOUNTER — APPOINTMENT (OUTPATIENT)
Dept: PHYSICAL THERAPY | Facility: CLINIC | Age: 71
DRG: 949 | End: 2021-08-25
Attending: PHYSICAL MEDICINE & REHABILITATION
Payer: COMMERCIAL

## 2021-08-25 LAB
ALBUMIN SERPL-MCNC: 2.4 G/DL (ref 3.4–5)
ALP SERPL-CCNC: 84 U/L (ref 40–150)
ALT SERPL W P-5'-P-CCNC: 28 U/L (ref 0–70)
ANION GAP SERPL CALCULATED.3IONS-SCNC: 1 MMOL/L (ref 3–14)
AST SERPL W P-5'-P-CCNC: 29 U/L (ref 0–45)
BILIRUB SERPL-MCNC: 0.4 MG/DL (ref 0.2–1.3)
BUN SERPL-MCNC: 17 MG/DL (ref 7–30)
CALCIUM SERPL-MCNC: 8.3 MG/DL (ref 8.5–10.1)
CHLORIDE BLD-SCNC: 110 MMOL/L (ref 94–109)
CO2 SERPL-SCNC: 31 MMOL/L (ref 20–32)
CREAT SERPL-MCNC: 1.29 MG/DL (ref 0.66–1.25)
CRP SERPL-MCNC: 62 MG/L (ref 0–8)
ERYTHROCYTE [DISTWIDTH] IN BLOOD BY AUTOMATED COUNT: 11.8 % (ref 10–15)
GFR SERPL CREATININE-BSD FRML MDRD: 55 ML/MIN/1.73M2
GLUCOSE BLD-MCNC: 84 MG/DL (ref 70–99)
HCT VFR BLD AUTO: 29.1 % (ref 40–53)
HGB BLD-MCNC: 9.5 G/DL (ref 13.3–17.7)
MCH RBC QN AUTO: 32.4 PG (ref 26.5–33)
MCHC RBC AUTO-ENTMCNC: 32.6 G/DL (ref 31.5–36.5)
MCV RBC AUTO: 99 FL (ref 78–100)
PLATELET # BLD AUTO: 158 10E3/UL (ref 150–450)
POTASSIUM BLD-SCNC: 4.2 MMOL/L (ref 3.4–5.3)
PROT SERPL-MCNC: 6.9 G/DL (ref 6.8–8.8)
RBC # BLD AUTO: 2.93 10E6/UL (ref 4.4–5.9)
SODIUM SERPL-SCNC: 142 MMOL/L (ref 133–144)
URATE SERPL-MCNC: 6.2 MG/DL (ref 3.5–7.2)
WBC # BLD AUTO: 7.3 10E3/UL (ref 4–11)

## 2021-08-25 PROCEDURE — 86140 C-REACTIVE PROTEIN: CPT | Performed by: PHYSICIAN ASSISTANT

## 2021-08-25 PROCEDURE — 85027 COMPLETE CBC AUTOMATED: CPT | Performed by: PHYSICIAN ASSISTANT

## 2021-08-25 PROCEDURE — 250N000013 HC RX MED GY IP 250 OP 250 PS 637: Performed by: PHYSICAL MEDICINE & REHABILITATION

## 2021-08-25 PROCEDURE — 97530 THERAPEUTIC ACTIVITIES: CPT | Mod: GP

## 2021-08-25 PROCEDURE — 250N000013 HC RX MED GY IP 250 OP 250 PS 637: Performed by: STUDENT IN AN ORGANIZED HEALTH CARE EDUCATION/TRAINING PROGRAM

## 2021-08-25 PROCEDURE — 36592 COLLECT BLOOD FROM PICC: CPT | Performed by: PHYSICIAN ASSISTANT

## 2021-08-25 PROCEDURE — 99232 SBSQ HOSP IP/OBS MODERATE 35: CPT | Mod: 24 | Performed by: PHYSICAL MEDICINE & REHABILITATION

## 2021-08-25 PROCEDURE — 250N000013 HC RX MED GY IP 250 OP 250 PS 637: Performed by: PHYSICIAN ASSISTANT

## 2021-08-25 PROCEDURE — 84550 ASSAY OF BLOOD/URIC ACID: CPT | Performed by: PHYSICIAN ASSISTANT

## 2021-08-25 PROCEDURE — 80053 COMPREHEN METABOLIC PANEL: CPT | Performed by: PHYSICIAN ASSISTANT

## 2021-08-25 PROCEDURE — 97110 THERAPEUTIC EXERCISES: CPT | Mod: GO

## 2021-08-25 PROCEDURE — 97110 THERAPEUTIC EXERCISES: CPT | Mod: GP

## 2021-08-25 PROCEDURE — 128N000003 HC R&B REHAB

## 2021-08-25 PROCEDURE — 97535 SELF CARE MNGMENT TRAINING: CPT | Mod: GO

## 2021-08-25 RX ADMIN — ACETAMINOPHEN 650 MG: 325 TABLET, FILM COATED ORAL at 16:17

## 2021-08-25 RX ADMIN — DOCUSATE SODIUM 1 ENEMA: 283 LIQUID RECTAL at 19:44

## 2021-08-25 RX ADMIN — PSYLLIUM HUSK 1 PACKET: 3.4 POWDER ORAL at 09:14

## 2021-08-25 RX ADMIN — Medication 250 MG: at 09:04

## 2021-08-25 RX ADMIN — THERA TABS 1 TABLET: TAB at 09:04

## 2021-08-25 RX ADMIN — MIDODRINE HYDROCHLORIDE 10 MG: 5 TABLET ORAL at 16:16

## 2021-08-25 RX ADMIN — TRAZODONE HYDROCHLORIDE 100 MG: 50 TABLET ORAL at 20:37

## 2021-08-25 RX ADMIN — PSYLLIUM HUSK 1 PACKET: 3.4 POWDER ORAL at 20:37

## 2021-08-25 RX ADMIN — CYCLOBENZAPRINE 10 MG: 5 TABLET, FILM COATED ORAL at 20:37

## 2021-08-25 RX ADMIN — Medication 250 MG: at 20:37

## 2021-08-25 RX ADMIN — CYCLOBENZAPRINE 10 MG: 5 TABLET, FILM COATED ORAL at 14:11

## 2021-08-25 RX ADMIN — PANTOPRAZOLE SODIUM 40 MG: 40 TABLET, DELAYED RELEASE ORAL at 16:16

## 2021-08-25 RX ADMIN — PANTOPRAZOLE SODIUM 40 MG: 40 TABLET, DELAYED RELEASE ORAL at 09:04

## 2021-08-25 RX ADMIN — FLUDROCORTISONE ACETATE 100 MCG: 0.1 TABLET ORAL at 09:03

## 2021-08-25 RX ADMIN — CYCLOBENZAPRINE 10 MG: 5 TABLET, FILM COATED ORAL at 09:04

## 2021-08-25 RX ADMIN — Medication 1 MG: at 20:37

## 2021-08-25 RX ADMIN — MIDODRINE HYDROCHLORIDE 10 MG: 5 TABLET ORAL at 12:28

## 2021-08-25 RX ADMIN — DOCUSATE SODIUM 1 ENEMA: 283 LIQUID RECTAL at 09:14

## 2021-08-25 RX ADMIN — MIDODRINE HYDROCHLORIDE 10 MG: 5 TABLET ORAL at 09:03

## 2021-08-25 NOTE — PLAN OF CARE
FOCUS/GOAL  Bowel management, Bladder management, Pain management and Cognition/Memory/Judgment/Problem solving    ASSESSMENT, INTERVENTIONS AND CONTINUING PLAN FOR GOAL:  Pt is sleeping well throughout the night , no reports of fever, chills, CP, SOB, N/V, abdominal pain, or new weakness/numbness/tingling, continent of bowel, torres patent and draining, transferring with assist of 1 and ww, CPAP functioning well, no further care concerns at this time continue with POC.

## 2021-08-25 NOTE — PROGRESS NOTES
"  [facility]   Acute Rehabilitation Unit  Daily progress note    INTERVAL HISTORY  No acute events overnight.  Had a BM this morning with an enemeez, and says he was having the urge to have one even prior to the mini enema.  Twitching in the legs seem to be less frequent and less severe which he is happy about.  XR done yesterday of the left ankle shows a prior medial maleolar fracture and degenerative changes with soft tissue swelling and a small joint effusion.  Lightheadedness continues to persist, able to get to 45 degrees on the tilt table yesterday before blood pressure began decreasing.  Florinef to start today, and encouraged drinking 500 ml quickly before getting up for therapies.  Also ordered thigh high TEDs yesterday, but I do not see them in the room.  No febrile episodes in the past 24 hrs and denies chest pain, shortness of breath, fevers, or chills.  Labs reviewed with patient, CRP continues to downtrend and uric acid was WNL.  Functionally, transfers with CGA and a WW, ambulating room distances, but orthostasis continues to be the biggest limiting factor.        MEDICATIONS  Scheduled meds    cyclobenzaprine  10 mg Oral TID     docusate sodium  1 enema Rectal BID     fludrocortisone  100 mcg Oral Daily     melatonin  1 mg Oral At Bedtime     midodrine  10 mg Oral TID w/meals     multivitamin, therapeutic  1 tablet Oral Daily     pantoprazole  40 mg Oral BID AC     psyllium  1 packet Oral BID     saccharomyces boulardii  250 mg Oral BID     sodium chloride (PF)  10 mL Intracatheter Q8H     traZODone  100 mg Oral At Bedtime       PRN meds:  acetaminophen, diclofenac, lidocaine 4%, lidocaine (buffered or not buffered), miconazole, ondansetron, senna-docusate, sodium chloride (PF), sodium chloride (PF), White Petrolatum      PHYSICAL EXAM  /57 (BP Location: Right arm)   Pulse 88   Temp 98.3  F (36.8  C) (Oral)   Resp 19   Ht 1.803 m (5' 11\")   Wt 101.6 kg " (224 lb)   SpO2 95%   BMI 31.24 kg/m       Gen: Awake, coooperative, no distress  HEENT: atraumatic.  Anterior incision c/d/i.    Pulm: Non-labored breathing clear on room air.   CV: RRR, S1+S2, no m/r/g  : Davis in place, draining clear yellow urine  Ext: Mild edema in LEs and LUE, greatest in feet.  No calf tenderness b/l and no tenderness in upper extremities.  No erythema or tenderness around PICC line.  Neuro/MSK: alert, oriented, answers questions appropriately, follows commands      LABS  CBC RESULTS:   Recent Labs   Lab Test 08/25/21  0816 08/23/21  0948 08/20/21  0627   WBC 7.3 7.7 7.8   RBC 2.93* 3.17* 3.06*   HGB 9.5* 10.1* 9.9*   HCT 29.1* 31.2* 30.5*   MCV 99 98 100   MCH 32.4 31.9 32.4   MCHC 32.6 32.4 32.5   RDW 11.8 11.9 11.9    156 134*     Last Basic Metabolic Panel:  Recent Labs   Lab Test 08/25/21  0816 08/23/21  0948 08/19/21  0731    143 141   POTASSIUM 4.2 4.0 4.2   CHLORIDE 110* 109 108   CO2 31 29 30   ANIONGAP 1* 5 3   GLC 84 117* 87   BUN 17 17 19   CR 1.29* 1.36* 1.24   GFRESTIMATED 55* 52* 58*   GARRISON 8.3* 8.7 8.6     CRP Inflammation   Date Value Ref Range Status   08/25/2021 62.0 (H) 0.0 - 8.0 mg/L Final     IMAGING  L ankle XR (8/2/21)  Impression:  1. Likely sequela of prior medial malleolar fracture.  2. Soft tissue swelling about the ankle and small joint effusion.     ASSESSMENT AND PLAN    Rigo Johns is a 70 year old L hand dominant male with a PMH of HTN, PILI, and prior cervical surgery (C5-7 anterior fusion in 1993 per notes) who is now status post a C3-4, C4-5 ACDF on 7/14/2021 for cervical myelopathy.  He was admitted to the [place] [facility] from 7/18-7/27/21 but transferred back to the medical floor for fevers and prevertebral fluid collection suggestive of abscess.  Now improving with IV antbiotics and no surgical intervention was needed.  He is now being admitted to the ARU on 8/2/21.  Impairment group code: 04.1211 Quadriplegia, Incomplete C1-4 - s/p C3-5  ACDF.   PLAN  Rehabilitation  1. PT and OT 90 minutes of each on a daily basis, in addition to rehab nursing and close management of physiatrist.    2. Impairment of ADL's: Noted to have impaired ROM, impaired strength, impaired activity tolerance, edema management, impaired balance, and impaired coordination, all affecting his ability to safely and independently perform basic ADLs.  Goal for mod I with basic ADLs.  3. Impairment of mobility:  Noted to have impaired ROM, impaired strength, impaired activity tolerance, edema management, impaired balance and impaired coordination, all affecting his ability to safely and independently ambulate and perform basic mobility.  Goal for mod I with basic mobility.  Medical  1)Neurology  #Cervical Myelopathy s/p C3-4, C4-5 ACDF on 7/14/21 complicated by fevers and prevertebral fluid collection, suggestive of abscess  -Follow up MRI completed 8/10 which shows near resolution of the prevertebral fluid.  Discussed with ID who also discussed with NSGY and images reviewed.  Antibiotics discontinued after 8/11 (completed 2 week course)  -Maintain postoperative spinal precautions. No need for cervical collar  -Follow-up with neurosurgery and infectious disease after discharge  -Fever of 101.3 8/18- 100.7 8/23 , WBC stable, CRP elevated but stable/improving, MRI stable.   -ID assistance greatly appreciated.  CRP now trending down (improved today to 62)     2)CVS  #Orthostasis  -Abd Binder, encourage liquids.  Advised to try drinking 500 ml quickly before getting up with therapies.  Have also ordered longer thigh high TEDs.  -increased Midodrine to 10 mg TID 8/23  -start florinef 0.1 mg 8/25     3)Pulmonary  #CAP, resolved  -Completed course of antibiotics (Zosyn x1 in ED, Azithromycin 7/12-7/16, Ceftriaxone 7/12 - 7/17, and Cefuroxime x1) during initial hospitalization, 2 week course of abx for cervical fluid collection (Cefepime + Daptomycin) completed after 8/11  -Encourage  incentive spirometer  -Monitor respiratory status, supplementary oxygen PRN.  Now weaned to room air.     #PILI  -Continue CPAP with home settings  -consult respiratory therapy- having some issue with home cpap and supplies.      4)FENGI  #Diet: Regular diet  #Neurogenic bowel  -ongoing Incontinence with standing 2-3 times per day.   -Enemeeze BID- to promote improved bowel continence-   -nursing staff to get Mr. Johns up and to the commode in the morning prior to therapy sessions.    #Melena  #Acute anemia  -Hgb 9.5 on 8/25.  Continue to monitor  - Endoscopy 8/12 demonstrated duodenal ulcers that are no longer bleeding, which were likely the cause of his melena and anemia.   - Now completed course of IV Protonix.  Continue PO BID x 8 weeks (started 8/16) per GI recs and PO daily indefinitely there after.     #Liver Lesion  -Abnormal appearance is seen on CAT scan with slight nodularity inferiorly. LFTs within normal limits.  -Follow-up primary care physician     5)  #Urinary Retention   -Voiding trial done again on 8/10 but was unable to void and had large IC volumes, therefore Davis was re-placed on 8/13.  -monitor.     #DONOVAN and likely CKD   -Pt with limited physician follow up so baseline creatinine not known. Peak at 2.13, improved with IV hydration.  Cr 1.29 on 8/25.  Seems to be in line with baseline, but will continue to monitor to ensure does not climb.  -Avoid nephrotoxic agents, NSAID     #Renal lesion  -Incidental finding on CT scan  -Renal ultrasound showed simple cysts of bilateral kidneys  -Follow up with PCP     6)DVT prophylaxis  -PCDs and ambulation     7)Pain  #Spasms  -Tylenol 650 mg q4h PRN  - Flexeril 10 mg TID scheduled  -Continue to monitor spasms  #Left ankle pain  -xr left ankle 8/24 showed prior medial malleolar fracture and degenerative changes  -Presentation not consistent with gout and uric acid was WNL  -Ice PRN and  Active ankle brace (reports supportive)     8)Endo  -Prior steroid  induced hyperglycemia.  HbA1c 5.0.   -Now completed course of steroids.  No need for further routine checks     9)Heme   #Acute anemia  -GI workup as above Hgb 9.5 8/25     #Leukocytosis   -Thought to be secondary to steroids which are now discontinued. WBCs wnl, crp down trendging 62 (prior 71) fever no clear source  -Continue to monitor only for now    #Thrombocytopenia  -Noted upon initial admission to the emergency room. Had been stable low 100s, but now improved. Stable 156 on 8/23  -trend  -Follow-up with primary care physician     10)Psych  -Monitor mood, will consult health psychology if needed    11) Infectious disease  Recent prevertebral abscess- completed iv antibiotics MRI 8/19 with resolution  Fever- intermittent 8/18, 8/23 unclear source. MRI neg, UA neg, Cdiff neg, WBC wnl.  CRP elevated but stable 72.  ROS + left ankle pain, orthostatic hypotension, fatigue.  Discussed with ID 8/18 with recommendation to monitor clinically .  -monitor closely pending trend consider re-consult ID    12)Social/Dispo  -Anticipate discharge home at a modified independent level for ambulation, mobility, and ADLs.   -ELOS: 3 weeks  -Rehab prognosis: Fair  -Follow up appointments: PCP, NSGY (Dr. Hanley), ID     Code status: Full Code (Discussed with patient upon admission).  This was confirmed upon re-admit.  Pt does not want extraordinary measures should prognosis be poor, however does want attempts at resuscitation and therefore will remain full code.      Robi Whitehead MD  Department of Rehabilitation Medicine    Time Spent on this Encounter   I, Robi Whitehead, spent a total of 30 minutes face-to-face or managing the care of Rigo Johns. Over 50% of my time on the unit was spent counseling the patient and coordinating care. See note for details.

## 2021-08-25 NOTE — PLAN OF CARE
"Discharge Planner Post-Acute Rehab OT:      Discharge Plan: home with assist for IADL and HH      Precautions: fall, cervical spinal, involuntary twitching in lower extremities, orthostatic hypotension- Abdominal binder/LE stockinette. L brace prior to standing.     Current Status:  ADLs: SBA or UB dressing, CGA don shorts EOB, total assist for toileting due to incontinence/torres catheter. SBA at sink for face g/h tasks,minimally tolerates standing d/t orthostatic hypotension  Dep A w/ LB hygiene as pt still incontinent.   Mod-max A w/ LB dressing w/ pt assist w/ threading catheter.  Tsfers: CGA w/ FWW    Dep A in rolling shower chair due to Orthostatic BP  IADLs: not tested, wife can assist  Vision/Cognition: WFL     Assessment:  pt ambulated to bathroom CGA w/fww and w/c follow. Able to cristel standing approx 20s then ambulate. Once in bathroom pt stated \"im losing my strength\" and sat in w/c. Pt completed g/h/UB bathing and dressing seated for safety, noted pt using compensatory positioning to improve BUE reach with adls. Pt will benefit from further UB strengthening for ease with reach during adls.     Pm tx: focus on exercises to increase BUE funtional strength for reaching w/daily tasks. Nearing end of tx session attempted standing with LUE supported reach, however pt quickly became light headed and not recovering until seated. Cont per POC     Other Barriers to Discharge (DME, Family Training, etc): Pt has good family support and accessible home. family training, determine DME and improve ADL performance.   Barriers: Orthostatic BP, symptomatic, new L ankle instability as of 8.17.21.  "

## 2021-08-25 NOTE — PLAN OF CARE
Pt is alert and oriented x 4, he denies pain or discomfort. He had mini enema this morning and had a large soft bowel movement on the commode. This was planned to minimize frequent stool incontinence throughout the day. He is also on bowel program every evening which needs to continue according to the plan though he had a bowel movement this shift. He has a torres catheter, it is clean, dry, and intact. It is draining. See I and O flow sheet for details. We will continue with current plan of care.

## 2021-08-25 NOTE — PLAN OF CARE
FOCUS/GOAL  Bowel management, Bladder management, Pain management, Mobility, Skin integrity, and Safety management    ASSESSMENT, INTERVENTIONS AND CONTINUING PLAN FOR GOAL:  A/O x4, VSS on RA.  Up w/ assist one w/ gait belt and walker to the BSC.  Wears abdominal binder when up.  Continent of BM this shift, able to have evening BM without need for enema.  Davis intact, draining wnl.  Regular thin diet, takes his pills well whole.  Left ankle pain controlled w/ scheduled meds.  PICC to left arm wnl, SL.  Cont w/ POC.

## 2021-08-25 NOTE — PLAN OF CARE
"Discharge Planner Post-Acute Rehab PT:      Discharge Plan: TCU vs home w/c based.     Precautions: Falls, cervical, PICC line, *orthostatic  Lymph wraps on during day, off at night     Current Status:  Bed Mobility: mod I   Transfer: CGA with WW   Gait: CGA with WW, room distances  Stairs: 4\" stairs, CGA  Balance: CGA and WW for standing      Assessment: Able to progress tilt table to 70 degrees without significant BP drop. Pt would benefit from ongoing tilt table work to improve hemodynamic response and defer long distance ambulation until orthostasis subsides.    Despite orthostasis, pt still able to perform transfers with CGA and WW.    Other Barriers to Discharge (DME, Family Training, etc): medical complexity, incontinence, DME needs, orthostatic BP        "

## 2021-08-26 ENCOUNTER — APPOINTMENT (OUTPATIENT)
Dept: OCCUPATIONAL THERAPY | Facility: CLINIC | Age: 71
DRG: 949 | End: 2021-08-26
Attending: PHYSICAL MEDICINE & REHABILITATION
Payer: COMMERCIAL

## 2021-08-26 ENCOUNTER — APPOINTMENT (OUTPATIENT)
Dept: PHYSICAL THERAPY | Facility: CLINIC | Age: 71
DRG: 949 | End: 2021-08-26
Attending: PHYSICAL MEDICINE & REHABILITATION
Payer: COMMERCIAL

## 2021-08-26 PROCEDURE — 128N000003 HC R&B REHAB

## 2021-08-26 PROCEDURE — 97535 SELF CARE MNGMENT TRAINING: CPT | Mod: GO | Performed by: STUDENT IN AN ORGANIZED HEALTH CARE EDUCATION/TRAINING PROGRAM

## 2021-08-26 PROCEDURE — 99232 SBSQ HOSP IP/OBS MODERATE 35: CPT | Mod: 24 | Performed by: PHYSICIAN ASSISTANT

## 2021-08-26 PROCEDURE — 97110 THERAPEUTIC EXERCISES: CPT | Mod: GP

## 2021-08-26 PROCEDURE — 97530 THERAPEUTIC ACTIVITIES: CPT | Mod: GO | Performed by: OCCUPATIONAL THERAPIST

## 2021-08-26 PROCEDURE — 97110 THERAPEUTIC EXERCISES: CPT | Mod: GO | Performed by: STUDENT IN AN ORGANIZED HEALTH CARE EDUCATION/TRAINING PROGRAM

## 2021-08-26 PROCEDURE — 250N000013 HC RX MED GY IP 250 OP 250 PS 637: Performed by: PHYSICIAN ASSISTANT

## 2021-08-26 PROCEDURE — 97530 THERAPEUTIC ACTIVITIES: CPT | Mod: GO | Performed by: STUDENT IN AN ORGANIZED HEALTH CARE EDUCATION/TRAINING PROGRAM

## 2021-08-26 PROCEDURE — 250N000013 HC RX MED GY IP 250 OP 250 PS 637: Performed by: STUDENT IN AN ORGANIZED HEALTH CARE EDUCATION/TRAINING PROGRAM

## 2021-08-26 PROCEDURE — 250N000013 HC RX MED GY IP 250 OP 250 PS 637: Performed by: PHYSICAL MEDICINE & REHABILITATION

## 2021-08-26 PROCEDURE — 97530 THERAPEUTIC ACTIVITIES: CPT | Mod: GP

## 2021-08-26 RX ADMIN — PSYLLIUM HUSK 1 PACKET: 3.4 POWDER ORAL at 20:19

## 2021-08-26 RX ADMIN — CYCLOBENZAPRINE 10 MG: 5 TABLET, FILM COATED ORAL at 13:48

## 2021-08-26 RX ADMIN — CYCLOBENZAPRINE 10 MG: 5 TABLET, FILM COATED ORAL at 08:24

## 2021-08-26 RX ADMIN — Medication 250 MG: at 19:51

## 2021-08-26 RX ADMIN — CYCLOBENZAPRINE 10 MG: 5 TABLET, FILM COATED ORAL at 19:51

## 2021-08-26 RX ADMIN — DOCUSATE SODIUM 1 ENEMA: 283 LIQUID RECTAL at 19:51

## 2021-08-26 RX ADMIN — Medication 1 MG: at 20:19

## 2021-08-26 RX ADMIN — PANTOPRAZOLE SODIUM 40 MG: 40 TABLET, DELAYED RELEASE ORAL at 08:24

## 2021-08-26 RX ADMIN — TRAZODONE HYDROCHLORIDE 100 MG: 50 TABLET ORAL at 20:19

## 2021-08-26 RX ADMIN — PSYLLIUM HUSK 1 PACKET: 3.4 POWDER ORAL at 08:27

## 2021-08-26 RX ADMIN — MIDODRINE HYDROCHLORIDE 10 MG: 5 TABLET ORAL at 13:49

## 2021-08-26 RX ADMIN — MIDODRINE HYDROCHLORIDE 10 MG: 5 TABLET ORAL at 16:42

## 2021-08-26 RX ADMIN — MIDODRINE HYDROCHLORIDE 10 MG: 5 TABLET ORAL at 08:24

## 2021-08-26 RX ADMIN — THERA TABS 1 TABLET: TAB at 08:24

## 2021-08-26 RX ADMIN — Medication 250 MG: at 08:24

## 2021-08-26 RX ADMIN — PANTOPRAZOLE SODIUM 40 MG: 40 TABLET, DELAYED RELEASE ORAL at 16:42

## 2021-08-26 RX ADMIN — FLUDROCORTISONE ACETATE 100 MCG: 0.1 TABLET ORAL at 08:24

## 2021-08-26 RX ADMIN — DOCUSATE SODIUM 1 ENEMA: 283 LIQUID RECTAL at 08:36

## 2021-08-26 NOTE — PLAN OF CARE
Tilt Table Note to Stabilize Blood Pressure:      0 minutes:       (0 degrees)      BP: 144/76      HR 82   2 minutes:       (30 degrees)    BP: 121/69      HR 89  4 minutes:       (45 degrees)    BP: 107/63      HR 95  8 minutes:       (60 degrees)    BP: 92/57        HR 98  10 minutes:     (60 degrees)    BP: 95/57          12 minutes:     (50 degrees)    BP: 103/68      HR: 102  14 minutes:     (50 degrees)    BP: 115/66      HR: 96  16 minutes:     (55 degrees)    BP: 106/74      HR: 98  18 minutes:     (55 degrees)    BP: 95/69        HR: 104  20 minutes:     (45 degrees)    BP: 113/70      HR: 94     Assessment: Overall good tolerance with few symptoms, despite noted BP drop at 60 degrees. Backed down to 50 degrees, where BP normalized, then gradually advanced up to 55. Initially BP stable, but then dropped once more. Quick recovery after return to 45 degrees.

## 2021-08-26 NOTE — PLAN OF CARE
C/o light headedness whilst being transferred to the commode after having his enema, resolved after he sat down. Had small incontinent loose stools  contained in his brief  immediately after administering the enema.Maximiliano lower extremity edema persist, thigh high teds stocking applied. Davis patent and draining, had good appetite, will continue poc

## 2021-08-26 NOTE — PROGRESS NOTES
"  [facility]   Acute Rehabilitation Unit  Daily progress note    INTERVAL HISTORY  No acute events overnight. Seen sitting up in chair, reports success with bowel program.  More sitting tolerance but remains quite dizzy with standing which has limited therapy. Has recently started on florinef, is trying to eat more consistently and drinking has improved.  Discussed labs and nutrition and reports now drinking 3 ensures last several days.   Reports less ankle pain, and less left lower extremity spasms.  Denies fevers, nausea, sob, headaches, or other new concerns.     Per PT: Overall good tolerance with few symptoms, despite noted BP drop at 60 degrees. Backed down to 50 degrees, where BP normalized, then gradually advanced up to 55. Initially BP stable, but then dropped once more. Quick recovery after return to 45 degrees.    MEDICATIONS  Scheduled meds    cyclobenzaprine  10 mg Oral TID     docusate sodium  1 enema Rectal BID     fludrocortisone  100 mcg Oral Daily     melatonin  1 mg Oral At Bedtime     midodrine  10 mg Oral TID w/meals     multivitamin, therapeutic  1 tablet Oral Daily     pantoprazole  40 mg Oral BID AC     psyllium  1 packet Oral BID     saccharomyces boulardii  250 mg Oral BID     sodium chloride (PF)  10 mL Intracatheter Q8H     traZODone  100 mg Oral At Bedtime       PRN meds:  acetaminophen, diclofenac, lidocaine 4%, lidocaine (buffered or not buffered), miconazole, ondansetron, senna-docusate, sodium chloride (PF), sodium chloride (PF), White Petrolatum      PHYSICAL EXAM  /59 (BP Location: Right arm)   Pulse 81   Temp 97.8  F (36.6  C)   Resp 16   Ht 1.803 m (5' 11\")   Wt 101.6 kg (224 lb)   SpO2 95%   BMI 31.24 kg/m       Gen: Awake, coooperative, no distress  HEENT: atraumatic.  Anterior incision c/d/i.    Pulm: Non-labored breathing clear on room air.   CV: RRR, S1+S2, no m/r/g  Ab: soft non tender non distended  : Davis in place, " draining clear yellow urine  Ext: Mild edema in LEs and LUE, greatest in feet.  No calf tenderness b/l and no tenderness in upper extremities.  No erythema or tenderness around PICC line.  Neuro/MSK: alert, oriented, answers questions appropriately, follows commands      LABS  CBC RESULTS:   Recent Labs   Lab Test 08/25/21  0816 08/23/21  0948 08/20/21  0627   WBC 7.3 7.7 7.8   RBC 2.93* 3.17* 3.06*   HGB 9.5* 10.1* 9.9*   HCT 29.1* 31.2* 30.5*   MCV 99 98 100   MCH 32.4 31.9 32.4   MCHC 32.6 32.4 32.5   RDW 11.8 11.9 11.9    156 134*     Last Basic Metabolic Panel:  Recent Labs   Lab Test 08/25/21  0816 08/23/21  0948 08/19/21  0731    143 141   POTASSIUM 4.2 4.0 4.2   CHLORIDE 110* 109 108   CO2 31 29 30   ANIONGAP 1* 5 3   GLC 84 117* 87   BUN 17 17 19   CR 1.29* 1.36* 1.24   GFRESTIMATED 55* 52* 58*   GARRISON 8.3* 8.7 8.6     CRP Inflammation   Date Value Ref Range Status   08/25/2021 62.0 (H) 0.0 - 8.0 mg/L Final     IMAGING  L ankle XR (8/2/21)  Impression:  1. Likely sequela of prior medial malleolar fracture.  2. Soft tissue swelling about the ankle and small joint effusion.     ASSESSMENT AND PLAN    Rigo Johns is a 70 year old L hand dominant male with a PMH of HTN, PILI, and prior cervical surgery (C5-7 anterior fusion in 1993 per notes) who is now status post a C3-4, C4-5 ACDF on 7/14/2021 for cervical myelopathy.  He was admitted to the [facility] from 7/18-7/27/21 but transferred back to the medical floor for fevers and prevertebral fluid collection suggestive of abscess.  Now improving with IV antbiotics and no surgical intervention was needed.  He is now being admitted to the ARU on 8/2/21.  Impairment group code: 04.1211 Quadriplegia, Incomplete C1-4 - s/p C3-5 ACDF.   PLAN  Rehabilitation  1. PT and OT 90 minutes of each on a daily basis, in addition to rehab nursing and close management of physiatrist.    2. Impairment of ADL's: Noted to have impaired ROM, impaired strength, impaired  activity tolerance, edema management, impaired balance, and impaired coordination, all affecting his ability to safely and independently perform basic ADLs.  Goal for mod I with basic ADLs.  3. Impairment of mobility:  Noted to have impaired ROM, impaired strength, impaired activity tolerance, edema management, impaired balance and impaired coordination, all affecting his ability to safely and independently ambulate and perform basic mobility.  Goal for mod I with basic mobility.  Medical  1)Neurology  #Cervical Myelopathy s/p C3-4, C4-5 ACDF on 7/14/21 complicated by fevers and prevertebral fluid collection, suggestive of abscess  -Follow up MRI completed 8/10 which shows near resolution of the prevertebral fluid.  Discussed with ID who also discussed with NSGY and images reviewed.  Antibiotics discontinued after 8/11 (completed 2 week course)  -Maintain postoperative spinal precautions. No need for cervical collar  -Follow-up with neurosurgery and infectious disease after discharge  -Fever of 101.3 8/18- 100.7 8/23 , WBC stable, CRP elevated but stable/improving, MRI stable.   -ID assistance greatly appreciated.  CRP now trending down (improved 8/25 to 62)     2)CVS  #Orthostasis  -Abd Binder, encourage liquids.  Advised to try drinking 500 ml quickly before getting up with therapies.  Have also ordered longer thigh high TEDs.  -increased Midodrine to 10 mg TID 8/23  -continue florinef 0.1 mg started 8/25     3)Pulmonary  #CAP, resolved  -Completed course of antibiotics (Zosyn x1 in ED, Azithromycin 7/12-7/16, Ceftriaxone 7/12 - 7/17, and Cefuroxime x1) during initial hospitalization, 2 week course of abx for cervical fluid collection (Cefepime + Daptomycin) completed after 8/11  -Encourage incentive spirometer  -Monitor respiratory status, supplementary oxygen PRN.  Now weaned to room air.     #PILI  -Continue CPAP with home settings  -consult respiratory therapy- having some issue with home cpap and supplies.       4)FENGI  #Diet: Regular diet  #Neurogenic bowel  -ongoing Incontinence with standing 2-3 times per day.   -Enemeeze BID- to promote improved bowel continence-   -nursing staff to get Mr. Johns up and to the commode in the morning prior to therapy sessions.    #Melena  #Acute anemia  -Hgb 9.5 on 8/25.  Continue to monitor  - Endoscopy 8/12 demonstrated duodenal ulcers that are no longer bleeding, which were likely the cause of his melena and anemia.   - Now completed course of IV Protonix.  Continue PO BID x 8 weeks (started 8/16) per GI recs and PO daily indefinitely there after.     #Liver Lesion  -Abnormal appearance is seen on CAT scan with slight nodularity inferiorly. LFTs within normal limits.  -Follow-up primary care physician     5)  #Urinary Retention   -Voiding trial done again on 8/10 but was unable to void and had large IC volumes, therefore Davis was re-placed on 8/13.  -monitor.     #DONOVAN and likely CKD   -Pt with limited physician follow up so baseline creatinine not known. Peak at 2.13, improved with IV hydration.  Cr 1.29 on 8/25.  Seems to be in line with baseline, but will continue to monitor to ensure does not climb.  -Avoid nephrotoxic agents, NSAID     #Renal lesion  -Incidental finding on CT scan  -Renal ultrasound showed simple cysts of bilateral kidneys  -Follow up with PCP     6)DVT prophylaxis  -PCDs and ambulation     7)Pain  #Spasms  -Tylenol 650 mg q4h PRN  - continue Flexeril 10 mg TID scheduled  -Continue to monitor spasms  #Left ankle pain  -xr left ankle 8/24 showed prior medial malleolar fracture and degenerative changes  -Presentation not consistent with gout and uric acid was WNL  -Ice PRN and  Active ankle brace (reports supportive)     8)Endo  -Prior steroid induced hyperglycemia.  HbA1c 5.0.   -Now completed course of steroids.  No need for further routine checks     9)Heme   #Acute anemia  -GI workup as above Hgb 9.5 8/25     #Leukocytosis (resolved)   -Thought to be  secondary to steroids which are now discontinued. WBCs wnl, crp down trendging 62 (prior 71) fever no clear source  -Continue to monitor  for now    #Thrombocytopenia  -Noted upon initial admission to the emergency room. Had been stable low 100s, but now improved. Stable 158 on 8/25  -trend  -Follow-up with primary care physician     10)Psych  -Monitor mood, will consult health psychology if needed    11) Infectious disease  Recent prevertebral abscess- completed iv antibiotics MRI 8/19 with resolution  Fever- intermittent 8/18, 8/23 unclear source. MRI neg, UA neg, Cdiff neg, WBC wnl.  CRP elevated but stable 72.  ROS + left ankle pain, orthostatic hypotension, fatigue.  Discussed with ID 8/18 with recommendation to monitor clinically .  -monitor closely pending trend consider re-consult ID    12)Social/Dispo  -Anticipate discharge home at a modified independent level for ambulation, mobility, and ADLs.   -ELOS: 3 weeks  -Rehab prognosis: Fair  -Follow up appointments: PCP, NSGY (Dr. Hanley), ID     Code status: Full Code (Discussed with patient upon admission).  This was confirmed upon re-admit.  Pt does not want extraordinary measures should prognosis be poor, however does want attempts at resuscitation and therefore will remain full code.      Renae Caballero PA-C  PM&R    Discussed with Dr. Whitehead

## 2021-08-26 NOTE — PLAN OF CARE
FOCUS/GOAL  Medical management    ASSESSMENT, INTERVENTIONS AND CONTINUING PLAN FOR GOAL:  Pt slept through the night. Did not call for staff assistance. Pt has a torres which is draining adequately and L PICC. No complaints overnight. Staff did not wake pt.

## 2021-08-26 NOTE — PLAN OF CARE
"Discharge Planner Post-Acute Rehab PT:      Discharge Plan: TCU vs home w/c based.     Precautions: Falls, cervical, PICC line, *orthostatic  Lymph wraps on during day, off at night     Current Status:  Bed Mobility: mod I   Transfer: CGA with WW   Gait: CGA with WW, multiple bouts of ~30 ft  Stairs: 4\" stairs, CGA  Balance: CGA and WW for standing      Assessment: Please see other care plan note by this writer for tilt table details and pt response. Overall, tolerated up to 60 degrees fairly well, at which point BP began to drop, though symptoms minimal overall. Pt also able to complete 7-8 bouts of ambulation of ~30 ft with minimal symptoms that resolved quickly. Pt had received thigh high compression stockings and wore them during PM session.    Continue to defer long distance ambulation until orthostasis improves.     Other Barriers to Discharge (DME, Family Training, etc): medical complexity, incontinence, DME needs, orthostatic BP  "

## 2021-08-26 NOTE — PLAN OF CARE
Pt is AxOx4. VSS. Pt uses call light appropriately and can vocalize needs. Pt received enema and had a medium formed BM on the commode. Pt is A1 with transfers with FWW and gait belt. Pt received CHG bath. Pt has c/o pain in BLE and received PRN tylenol x1; was effective. Pt takes meds whole with thin liquids. Pt has indwelling torres catheter that is draining well. Pt wearing L heal boot. L PICC is CDI and flushes well. Wearing CPAP. Wore abd binder; currently off.       Patient's most recent vital signs are:     Vital signs:  BP: 121/68  Temp: 98.7  HR: 87  RR: 18  SpO2: 95 %     Patient does not have new respiratory symptoms.  Patient does not have new sore throat.  Patient does not have a fever greater than 99.5.

## 2021-08-27 ENCOUNTER — APPOINTMENT (OUTPATIENT)
Dept: OCCUPATIONAL THERAPY | Facility: CLINIC | Age: 71
DRG: 949 | End: 2021-08-27
Attending: PHYSICAL MEDICINE & REHABILITATION
Payer: COMMERCIAL

## 2021-08-27 ENCOUNTER — APPOINTMENT (OUTPATIENT)
Dept: PHYSICAL THERAPY | Facility: CLINIC | Age: 71
DRG: 949 | End: 2021-08-27
Attending: PHYSICAL MEDICINE & REHABILITATION
Payer: COMMERCIAL

## 2021-08-27 PROCEDURE — 999N000125 HC STATISTIC PATIENT MED CONFERENCE < 30 MIN: Performed by: STUDENT IN AN ORGANIZED HEALTH CARE EDUCATION/TRAINING PROGRAM

## 2021-08-27 PROCEDURE — 999N000150 HC STATISTIC PT MED CONFERENCE < 30 MIN

## 2021-08-27 PROCEDURE — 97110 THERAPEUTIC EXERCISES: CPT | Mod: GO

## 2021-08-27 PROCEDURE — 99233 SBSQ HOSP IP/OBS HIGH 50: CPT | Mod: 24 | Performed by: PHYSICAL MEDICINE & REHABILITATION

## 2021-08-27 PROCEDURE — 250N000013 HC RX MED GY IP 250 OP 250 PS 637: Performed by: PHYSICAL MEDICINE & REHABILITATION

## 2021-08-27 PROCEDURE — 250N000013 HC RX MED GY IP 250 OP 250 PS 637: Performed by: STUDENT IN AN ORGANIZED HEALTH CARE EDUCATION/TRAINING PROGRAM

## 2021-08-27 PROCEDURE — 250N000013 HC RX MED GY IP 250 OP 250 PS 637: Performed by: PHYSICIAN ASSISTANT

## 2021-08-27 PROCEDURE — 97530 THERAPEUTIC ACTIVITIES: CPT | Mod: GO

## 2021-08-27 PROCEDURE — 97530 THERAPEUTIC ACTIVITIES: CPT | Mod: GP

## 2021-08-27 PROCEDURE — 128N000003 HC R&B REHAB

## 2021-08-27 PROCEDURE — 97110 THERAPEUTIC EXERCISES: CPT | Mod: GP

## 2021-08-27 PROCEDURE — 97530 THERAPEUTIC ACTIVITIES: CPT | Mod: GP | Performed by: REHABILITATION PRACTITIONER

## 2021-08-27 RX ADMIN — MIDODRINE HYDROCHLORIDE 10 MG: 5 TABLET ORAL at 16:43

## 2021-08-27 RX ADMIN — TRAZODONE HYDROCHLORIDE 100 MG: 50 TABLET ORAL at 21:04

## 2021-08-27 RX ADMIN — THERA TABS 1 TABLET: TAB at 09:00

## 2021-08-27 RX ADMIN — PANTOPRAZOLE SODIUM 40 MG: 40 TABLET, DELAYED RELEASE ORAL at 16:43

## 2021-08-27 RX ADMIN — Medication 1 MG: at 21:04

## 2021-08-27 RX ADMIN — MIDODRINE HYDROCHLORIDE 10 MG: 5 TABLET ORAL at 13:48

## 2021-08-27 RX ADMIN — CYCLOBENZAPRINE 10 MG: 5 TABLET, FILM COATED ORAL at 13:48

## 2021-08-27 RX ADMIN — FLUDROCORTISONE ACETATE 100 MCG: 0.1 TABLET ORAL at 09:03

## 2021-08-27 RX ADMIN — Medication 250 MG: at 21:04

## 2021-08-27 RX ADMIN — MICONAZOLE NITRATE: 20 POWDER TOPICAL at 08:59

## 2021-08-27 RX ADMIN — MIDODRINE HYDROCHLORIDE 10 MG: 5 TABLET ORAL at 09:03

## 2021-08-27 RX ADMIN — Medication 250 MG: at 09:00

## 2021-08-27 RX ADMIN — PANTOPRAZOLE SODIUM 40 MG: 40 TABLET, DELAYED RELEASE ORAL at 09:00

## 2021-08-27 RX ADMIN — PSYLLIUM HUSK 1 PACKET: 3.4 POWDER ORAL at 21:03

## 2021-08-27 RX ADMIN — CYCLOBENZAPRINE 10 MG: 5 TABLET, FILM COATED ORAL at 09:00

## 2021-08-27 RX ADMIN — PSYLLIUM HUSK 1 PACKET: 3.4 POWDER ORAL at 09:00

## 2021-08-27 RX ADMIN — CYCLOBENZAPRINE 10 MG: 5 TABLET, FILM COATED ORAL at 21:04

## 2021-08-27 ASSESSMENT — MIFFLIN-ST. JEOR: SCORE: 1797.72

## 2021-08-27 NOTE — PLAN OF CARE
Discharge Planner Post-Acute Rehab PT:     Discharge Plan: TCU vs home w/c based.     Precautions: Falls, cervical, PICC line, *orthostatic  Lymph wraps on during day, off at night    Current Status:  Bed Mobility: IND   Transfer: CGA due to increased dizziness and dropping BP when up.   Gait: no amb today due to BP, and L ankle soreness. Will re- eval Monday   Stairs: NT   Balance: IND sitting, CGA with WW standing.     Assessment:  pt demo tilt table today for PT. Pt able to demo up to 45 min total with max tilt 70 degress. Only minor BP drop( 101/56 at 60 degrees but BP elevated to 105/56 on spot check at 70 degress. No C/O L ankle pain while up. PT to try back to STS with WW this W/c. Will address amb Monday.     Other Barriers to Discharge (DME, Family Training, etc): medical complexity, incontinence, DME needs, orthostatic BP

## 2021-08-27 NOTE — PLAN OF CARE
FOCUS/GOAL  Bowel management, Bladder management, Pain management, Mobility, and Cognition/Memory/Judgment/Problem solving    ASSESSMENT, INTERVENTIONS AND CONTINUING PLAN FOR GOAL:    Patient A&Ox4. Denies pain, headache, SOB, CP, nausea, numbness, tingling, and fever. CGA with walker. Patient encouraged to drink fluids. Regular diet, thin liquids. Inc of MD Watkins during shift after bowel program enema. Davis catheter in place- 1350 output during shift, catheter cares complete. Single Lumen PICC to LUE, saline locked. Continue POC.

## 2021-08-27 NOTE — PLAN OF CARE
Discharge Planner Post-Acute Rehab OT:      Discharge Plan: home with assist for IADL and HH      Precautions: fall, cervical spinal, involuntary twitching in lower extremities, orthostatic hypotension- Abdominal binder/LE stockinette. L brace prior to standing.     Current Status:  ADLs: SBA or UB dressing, CGA don shorts EOB, total assist for toileting due to incontinence/torres catheter. SBA at sink for face g/h tasks,minimally tolerates standing d/t orthostatic hypotension  Dep A w/ LB hygiene as pt still incontinent.   Mod-max A w/ LB dressing w/ pt assist w/ threading catheter.  Tsfers: CGA w/ FWW    Dep A in rolling shower chair due to Orthostatic BP  IADLs: not tested, wife can assist  Vision/Cognition: WFL     Assessment:  Pt engaged in BUE ex at EOB. BP was low seated from supine to sit. Engaged in exercises at EOB to increase blood flow to extremities to increase BP for tolerating standing. Pt had abdominal binder and stockinet and exercises prior to standing which assisted temporarily but low ortho stasis in 80s/40s w/ pt being symptomatic. Prior exercises, did not improve tolerance for functional ambulation this date.     Continue to progress standing as able due to BP orthostasis and attempt seated BUE ex or standing UE dynamic exercises to increase functional mobility/functional standing ADLs as pt is able to tolerate. Pt is being referred to TCU for possible discharge placement.      Other Barriers to Discharge (DME, Family Training, etc): Pt has good family support and accessible home. family training, determine DME and improve ADL performance.   Barriers: Orthostatic BP, symptomatic, new L ankle instability

## 2021-08-27 NOTE — PROGRESS NOTES
"CLINICAL NUTRITION SERVICES - REASSESSMENT NOTE     Nutrition Prescription    RECOMMENDATIONS FOR MDs/PROVIDERS TO ORDER:  None today    Malnutrition Status:    Severe malnutrition in the context of acute illness     Recommendations already ordered by Registered Dietitian (RD):  Encouraged intakes of meals and supplements  PM snack: saltines, butter and cheese    Future/Additional Recommendations:  Continue to monitor meal/supplement intakes and weight trends      EVALUATION OF THE PROGRESS TOWARD GOALS   Diet: Regular  Supplements: ensure PRN  Intake: % per flowsheet     NEW FINDINGS   Continues to mainly consume \"lighter\" items (broth, drinks, jello, ice cream, fruit, etc) from the kitchen. Stated he is consistently consuming 3 ensure/day (1050kcal and 60g protein) and his wife is bringing in snacks and 2 meals every 3 days or so. Believes his appetite has been improving however intakes are still limited by menu items. Noted wt loss of 7# since admit which has now stabilized. Encouraged pt to continue working on increasing intakes and adding in an additional ensue as needed.Requested 2pm snack of saltines, butter and cheese Has been working on increasing fluids d/t orthostatic hypotension.     08/27/21 102.1 kg (225 lb)   08/17/21 101.6 kg (224 lb)   08/12/21 105.2 kg (232 lb)   08/03/21 107.7 kg (237 lb 6.4 oz)   07/25/21 108.3 kg (238 lb 11.2 oz)   07/12/21 113 kg (249 lb 3.2 oz)   04/18/17 117.7 kg (259 lb)       MALNUTRITION  % Intake: Decreased intake does not meet criteria  % Weight Loss: > 5% in 1 month (severe)   Subcutaneous Fat Loss: None observed  Muscle Loss: Temporal:  Mild,Thoracic region (clavicle, acromium bone, deltoid, trapezius, pectoral):  Mild and dorsal hand:mild  Fluid Accumulation/Edema: 1-2+ BLE  Malnutrition Diagnosis: Severe malnutrition in the context of acute illness     Previous Goals   Patient to consume % of nutritionally adequate meal trays TID, or the equivalent with " supplements/snacks.  Evaluation: stable    Previous Nutrition Diagnosis  Predicted inadequate nutrient intake related to varying appetite/food preferences as evidenced by significant weight loss in 1 month  Evaluation: No change    CURRENT NUTRITION DIAGNOSIS  Predicted inadequate nutrient intake related to varying appetite/food preferences as evidenced by significant weight loss in 1 month    INTERVENTIONS  Implementation  Encouraged intakes of meals and supplements  PM snack: saltines, butter and cheese    Goals  Patient to consume % of nutritionally adequate meal trays TID, or the equivalent with supplements/snacks.    Monitoring/Evaluation  Progress toward goals will be monitored and evaluated per protocol.    Deonna Medel MS, RD, LDN  Unit Pager [phone]

## 2021-08-27 NOTE — PROGRESS NOTES
"  [facility]   Acute Rehabilitation Unit  Daily progress note    INTERVAL HISTORY  Seen during team rounds wife on phone during visit.  Jackson reports more spasms in left lower extremity overnight,  pain ongoing though seemingly less severe. Reporting improved tolerance on tilt table and now describes orthostatic symptoms and moderate compared to prior when severe, tolerating more time out of bed and in chair, though has not tried ambulating in several days.  Is taking midodrine, florinef started several days ago, drinking water, has thigh high teds, is limiting caffeine.  Motivated to improve, discussed ongoing therapy needs and goals and would like to proceed with TCU for ongoing therapy.      See rounds note by Dr. Whitehead for further details.     MEDICATIONS  Scheduled meds    cyclobenzaprine  10 mg Oral TID     docusate sodium  1 enema Rectal BID     fludrocortisone  100 mcg Oral Daily     melatonin  1 mg Oral At Bedtime     midodrine  10 mg Oral TID w/meals     multivitamin, therapeutic  1 tablet Oral Daily     pantoprazole  40 mg Oral BID AC     psyllium  1 packet Oral BID     saccharomyces boulardii  250 mg Oral BID     sodium chloride (PF)  10 mL Intracatheter Q8H     traZODone  100 mg Oral At Bedtime       PRN meds:  acetaminophen, diclofenac, lidocaine 4%, lidocaine (buffered or not buffered), miconazole, ondansetron, senna-docusate, sodium chloride (PF), sodium chloride (PF), White Petrolatum      PHYSICAL EXAM  /56 (BP Location: Right arm)   Pulse 97   Temp 98.8  F (37.1  C) (Oral)   Resp 16   Ht 1.803 m (5' 11\")   Wt 102.1 kg (225 lb)   SpO2 91%   BMI 31.38 kg/m       Gen: Awake, coooperative, no distress  Pulm: Non-labored on room air.   Ab:  non distended  : Davis in place, draining  yellow urine  Ext: thigh high compression stockings in place  Neuro/MSK: alert, oriented, answers questions appropriately, follows commands      LABS  CBC RESULTS:   Recent " Labs   Lab Test 08/25/21  0816 08/23/21  0948 08/20/21  0627   WBC 7.3 7.7 7.8   RBC 2.93* 3.17* 3.06*   HGB 9.5* 10.1* 9.9*   HCT 29.1* 31.2* 30.5*   MCV 99 98 100   MCH 32.4 31.9 32.4   MCHC 32.6 32.4 32.5   RDW 11.8 11.9 11.9    156 134*     Last Basic Metabolic Panel:  Recent Labs   Lab Test 08/25/21  0816 08/23/21  0948 08/19/21  0731    143 141   POTASSIUM 4.2 4.0 4.2   CHLORIDE 110* 109 108   CO2 31 29 30   ANIONGAP 1* 5 3   GLC 84 117* 87   BUN 17 17 19   CR 1.29* 1.36* 1.24   GFRESTIMATED 55* 52* 58*   GARRISON 8.3* 8.7 8.6     CRP Inflammation   Date Value Ref Range Status   08/25/2021 62.0 (H) 0.0 - 8.0 mg/L Final     IMAGING  L ankle XR (8/2/21)  Impression:  1. Likely sequela of prior medial malleolar fracture.  2. Soft tissue swelling about the ankle and small joint effusion.     ASSESSMENT AND PLAN    Rigo Johns is a 70 year old L hand dominant male with a PMH of HTN, PILI, and prior cervical surgery (C5-7 anterior fusion in 1993 per notes) who is now status post a C3-4, C4-5 ACDF on 7/14/2021 for cervical myelopathy.  He was admitted to the [place] [facility] from 7/18-7/27/21 but transferred back to the medical floor for fevers and prevertebral fluid collection suggestive of abscess.  Now improving with IV antbiotics and no surgical intervention was needed.  He is now being admitted to the ARU on 8/2/21.  Impairment group code: 04.1211 Quadriplegia, Incomplete C1-4 - s/p C3-5 ACDF.   PLAN  Rehabilitation  1. PT and OT 90 minutes of each on a daily basis, in addition to rehab nursing and close management of physiatrist.    2. Impairment of ADL's: Noted to have impaired ROM, impaired strength, impaired activity tolerance, edema management, impaired balance, and impaired coordination, all affecting his ability to safely and independently perform basic ADLs.  Goal for mod I with basic ADLs.  3. Impairment of mobility:  Noted to have impaired ROM, impaired strength, impaired activity tolerance,  edema management, impaired balance and impaired coordination, all affecting his ability to safely and independently ambulate and perform basic mobility.  Goal for mod I with basic mobility.  Medical  1)Neurology  #Cervical Myelopathy s/p C3-4, C4-5 ACDF on 7/14/21 complicated by fevers and prevertebral fluid collection, suggestive of abscess  -Follow up MRI completed 8/10 which shows near resolution of the prevertebral fluid.  Discussed with ID who also discussed with NSGY and images reviewed.  Antibiotics discontinued after 8/11 (completed 2 week course)  -Maintain postoperative spinal precautions. No need for cervical collar  -Follow-up with neurosurgery and infectious disease after discharge  -Fever of 101.3 8/18- 100.7 8/23 , WBC stable, CRP elevated but stable/improving, MRI stable.   -ID assistance greatly appreciated.  CRP now trending down (improved 8/25 to 62)     2)CVS  #Orthostasis  -Abd Binder, encourage liquids.  Advised to try drinking 500 ml quickly before getting up with therapies.  Have also ordered longer thigh high TEDs.  -increased Midodrine to 10 mg TID 8/23  -continue florinef 0.1 mg started 8/25- up titrate ~ weekly  -continues to work on tilt table -appreciate PT support.      3)Pulmonary  #CAP, resolved  -Completed course of antibiotics (Zosyn x1 in ED, Azithromycin 7/12-7/16, Ceftriaxone 7/12 - 7/17, and Cefuroxime x1) during initial hospitalization, 2 week course of abx for cervical fluid collection (Cefepime + Daptomycin) completed after 8/11  -Encourage incentive spirometer  -Monitor respiratory status, supplementary oxygen PRN.  Now weaned to room air.     #PILI  -Continue CPAP with home settings  -consult respiratory therapy- having some issue with home cpap and supplies.      4)FENGI  #Diet: Regular diet  #Neurogenic bowel  -ongoing Incontinence with standing 2-3 times per day.   -Enemeeze BID- to promote improved bowel continence- this is ongoing though improvd.   -nursing staff to  get Mr. Johns up and to the commode in the morning prior to therapy sessions.    #Melena  #Acute anemia  -Hgb 9.5 on 8/25.  Continue to monitor  - Endoscopy 8/12 demonstrated duodenal ulcers that are no longer bleeding, which were likely the cause of his melena and anemia.   - Now completed course of IV Protonix.  Continue PO BID x 8 weeks (started 8/16) per GI recs and PO daily indefinitely there after.     #Liver Lesion  -Abnormal appearance is seen on CAT scan with slight nodularity inferiorly. LFTs within normal limits.  -Follow-up primary care physician     5)  #Urinary Retention   -Has failed TOV most recently Voiding trial done again on 8/10 but was unable to void and had large IC volumes, therefore Torres was re-placed on 8/13.  -now 2 weeks replace torres    #DONOVAN and likely CKD   -Pt with limited physician follow up so baseline creatinine not known. Peak at 2.13, improved with IV hydration.  Cr 1.29 on 8/25.  Seems to be in line with baseline, but will continue to monitor to ensure does not climb.  -Avoid nephrotoxic agents, NSAID     #Renal lesion  -Incidental finding on CT scan  -Renal ultrasound showed simple cysts of bilateral kidneys  -Follow up with PCP     6)DVT prophylaxis  -PCDs and ambulation     7)Pain  #Spasms  -Tylenol 650 mg q4h PRN  - continue Flexeril 10 mg TID scheduled  -Continue to monitor spasms  #Left ankle pain  -xr left ankle 8/24 showed prior medial malleolar fracture and degenerative changes  -Presentation not consistent with gout and uric acid was WNL  -Ice PRN and  Active ankle brace (reports supportive)     8)Endo  -Prior steroid induced hyperglycemia.  HbA1c 5.0.   -Now completed course of steroids.  No need for further routine checks     9)Heme   #Acute anemia  -GI workup as above Hgb 9.5 8/25     #Leukocytosis (resolved)   -Thought to be secondary to steroids which are now discontinued. WBCs wnl, crp down trendging 62 (prior 71) fever no clear source  -Continue to monitor  for  now    #Thrombocytopenia  -Noted upon initial admission to the emergency room. Had been stable low 100s, but now improved. Stable 158 on 8/25  -trend  -Follow-up with primary care physician     10)Psych  -Monitor mood, will consult health psychology if needed    11) Infectious disease  Recent prevertebral abscess- completed iv antibiotics MRI 8/19 with resolution  Fever- intermittent 8/18, 8/23 unclear source. MRI neg, UA neg, Cdiff neg, WBC wnl.  CRP elevated but stable 72.  ROS + left ankle pain, orthostatic hypotension, fatigue.  Discussed with ID 8/18 with recommendation to monitor clinically .  -monitor closely pending trend consider re-consult ID    12)Social/Dispo  -Anticipate discharge home at a modified independent level for ambulation, mobility, and ADLs.   -ELOS: 3 weeks  -Rehab prognosis: Fair  -Follow up appointments: PCP, NSGY (Dr. Hanley), ID     Code status: Full Code (Discussed with patient upon admission).  This was confirmed upon re-admit.  Pt does not want extraordinary measures should prognosis be poor, however does want attempts at resuscitation and therefore will remain full code.      Renae Caballero PA-C  PM&R    Seen & Discussed with Dr. Whitehead    I spent a total of 40 minutes face-to-face or managing the care of Jackson Johns. Over 50% of my time on the unit was spent counseling the patient and coordinating care. See note for details.

## 2021-08-27 NOTE — PLAN OF CARE
FOCUS/GOAL  Medical management    ASSESSMENT, INTERVENTIONS AND CONTINUING PLAN FOR GOAL:  VSS. / 56 /65. Davis draining well. 1500 ml out this shift and no BM. Good appetite, patient  Ate 100% of his meals. Denied pain. Will continue with POC.

## 2021-08-27 NOTE — PLAN OF CARE
Acute Rehab Care Conference/Team Rounds      Type: Team Rounds    Present: Dr. Luis Whitehead, Renae Caballero PA, Jackson Pace PT, Patti Prescott OT, Sara Braga SW, Susan Weiss RD, Sobeida Jensen RN and Patient Jackson Johns and his wife (over the phone).       Discharge Barriers/Treatment/Education    Rehab Diagnosis: Cervical myelopathy s/p C3-4, C4-5 ACDF complicated by prevertebral abscess     Active Medical Co-morbidities/Prognosis: HTN, PILI, urinary retention with Torres, neurogenic bowel with incontinence, thrombocytopenia, DONOVAN on CKD, ABLA, PILI, orthostatic hypotension    Safety: Patient id oriented x4, he called for assistance and communicated his needs, he asked not to be disturb at night. She slept well. For transfer and ambulation, he uses a walker and CGA.    Pain: No c/o pain    Medications, Skin, Tubes/Lines: He takes pills whole with water. He has a single lumen PICC left upper arm. Has a torres.  Blanchable red coccyx, redeemed sandra area. He has a torres.     Swallowing/Nutrition:    Bowel/Bladder: Bowel program, incontinent of stool, LBM on 8/26    Psychosocial: Lives in a house w/ spouse in [place], [place]. 71 y/o  male, english-speaking and [employer]. No mental or chemical health concerns.  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     ADLs/IADLs: Pt is IND with UB cares seated, CGA for LB cares and working towards CGA with toileting. Transfers are CGA with walker Pt has been WC based and significantly limited in advancing his functional mobility due to orthostatic BP and low tolerance for standing. Recommend TCU for continued therapy.     Mobility: Pt is making minimal progress due to medical complexity, but continues to work very hard in therapies. Pt has been limited by orthostatics limiting ability to ambulate at times. Pt is currently mod-I with bed mobility, CGA with walker for transfers. At best, ambulates 50' CGA with FWW with w/c due to orthostatics, however not  able to consistently perform this due to orthostatics, incontinence, or L ankle pain from reported sprain. Working with tilt table to help manage upright tolerance. Will need FWW and w/c for home navigation, but would benefit from TCU for longer rehab stay to reduce burden of care.     Transportation: Pt not a , car transfer not consistently safe to attempt. Will need ride arranged.    Decision maker: self    Plan of Care and goals reviewed and updated.    Discharge Plan/Recommendations    Fall Precautions: continue    Overall plan for the patient:   Ongoing management of orthostasis and left ankle pain with LE spasms, although all have shown slight improvement in the past week.  However, still has been a barrier to faster functional progression.  Has been using tilt table with PT and shown improved tolerance, was able to maintain at 60 degrees for 40 minutes.  Ankle pain limited ambulation over the past 2 days.  With ADLs has shown overall improvement since admission, but progress has been slow over the past week.  Will need longer course of rehab prior to discharging home, therefore recommending TCU and he is agreeable.  Will place application for [facility] which would be ideal given his myelopathy and complications associated with spinal cord conditions.  Have also provided a list of TCUs for him to review should [facility] not be an option.       Utilization Review and Continued Stay Justification    Medical Necessity Criteria:    For any criteria that is not met, please document reason and plan for discharge, transfer, or modification of plan of care to address.    Requires intensive rehabilitation program to treat functional deficits?: Yes    Requires 3x per week or greater involvement of rehabilitation physician to oversee rehabilitation program?: Yes    Requires rehabilitation nursing interventions?: Yes    Patient is making functional progress?: Limited, but medical issues have been the major  barrier    There is a potential for additional functional progress? Yes    Patient is participating in therapy 3 hours per day a minimum of 5 days per week or 15 hours per week in 7 day period?:Yes    Has discharge needs that require coordinated discharge planning approach?:Yes      Barriers/Concerns related to meeting medical necessity criteria:  Orthostasis, bowel incontinence, left ankle pain.  All persist, but has shown improvement over the past week.      Team Plan to Address Concern:  Ongoing pharmacological and non-pharmacological management of all of the above      Final Physician Sign off    Statement of Approval: I have reviewed and agree with the recommendations and documentation in this care conference note.       Patient Goals  Social Work Goals: Confirm discharge recommendations with therapy, coordinate safe discharge plan and remain available to support and assist as needed.     OT goal: hygiene/grooming: modified independent  OT goal: upper body dressing: Modified independent  OT goal: lower body dressing: Modified independent  OT goal: upper body bathing: Supervision/stand-by assist  OT goal: lower body bathing: Supervision/stand-by assist  OT Goal: transfer: Supervision/stand-by assist (walk in shower transfer)  OT goal: toilet transfer/toileting: Minimal assist, cleaning and garment management, toilet transfer           OT goal 1: Pt willbe IND  with UE HEP to improve stength and function in BUE for ADL and hobbies such as building trains          PT Frequency: daily 60-90 minutes  PT goal: bed mobility: Modified independent, Supine to/from sit, Within precautions  PT goal: transfers: Sit to/from stand, Bed to/from chair, Modified independent  PT goal: gait: Modified independent, 50 feet, Rolling walker  PT goal: stairs: 3 stairs, Modified independent (per home setup)  PT goal 1: Car transfer SBA                      Patient/Family Goal: Medication Management: Pt will participate in MAP as needed  to ensure safe medication administration before discharge.                                         Goal: Falls: Patient will remain free from falls while in ARU.  Individualized Goal 1: Patient will demonstrate ability to protect his skin to prevent skin breakdown.

## 2021-08-28 ENCOUNTER — APPOINTMENT (OUTPATIENT)
Dept: OCCUPATIONAL THERAPY | Facility: CLINIC | Age: 71
DRG: 949 | End: 2021-08-28
Attending: PHYSICAL MEDICINE & REHABILITATION
Payer: COMMERCIAL

## 2021-08-28 ENCOUNTER — APPOINTMENT (OUTPATIENT)
Dept: PHYSICAL THERAPY | Facility: CLINIC | Age: 71
DRG: 949 | End: 2021-08-28
Attending: PHYSICAL MEDICINE & REHABILITATION
Payer: COMMERCIAL

## 2021-08-28 PROCEDURE — 250N000013 HC RX MED GY IP 250 OP 250 PS 637: Performed by: STUDENT IN AN ORGANIZED HEALTH CARE EDUCATION/TRAINING PROGRAM

## 2021-08-28 PROCEDURE — 97110 THERAPEUTIC EXERCISES: CPT | Mod: GO

## 2021-08-28 PROCEDURE — 250N000013 HC RX MED GY IP 250 OP 250 PS 637: Performed by: PHYSICIAN ASSISTANT

## 2021-08-28 PROCEDURE — 99232 SBSQ HOSP IP/OBS MODERATE 35: CPT | Mod: 24 | Performed by: PHYSICAL MEDICINE & REHABILITATION

## 2021-08-28 PROCEDURE — 128N000003 HC R&B REHAB

## 2021-08-28 PROCEDURE — 97530 THERAPEUTIC ACTIVITIES: CPT | Mod: GP

## 2021-08-28 PROCEDURE — 250N000013 HC RX MED GY IP 250 OP 250 PS 637: Performed by: PHYSICAL MEDICINE & REHABILITATION

## 2021-08-28 PROCEDURE — 97530 THERAPEUTIC ACTIVITIES: CPT | Mod: GO

## 2021-08-28 RX ADMIN — THERA TABS 1 TABLET: TAB at 08:55

## 2021-08-28 RX ADMIN — CYCLOBENZAPRINE 10 MG: 5 TABLET, FILM COATED ORAL at 20:56

## 2021-08-28 RX ADMIN — MIDODRINE HYDROCHLORIDE 10 MG: 5 TABLET ORAL at 17:03

## 2021-08-28 RX ADMIN — PANTOPRAZOLE SODIUM 40 MG: 40 TABLET, DELAYED RELEASE ORAL at 08:55

## 2021-08-28 RX ADMIN — CYCLOBENZAPRINE 10 MG: 5 TABLET, FILM COATED ORAL at 08:55

## 2021-08-28 RX ADMIN — PSYLLIUM HUSK 1 PACKET: 3.4 POWDER ORAL at 20:56

## 2021-08-28 RX ADMIN — MIDODRINE HYDROCHLORIDE 10 MG: 5 TABLET ORAL at 11:31

## 2021-08-28 RX ADMIN — PANTOPRAZOLE SODIUM 40 MG: 40 TABLET, DELAYED RELEASE ORAL at 17:03

## 2021-08-28 RX ADMIN — PSYLLIUM HUSK 1 PACKET: 3.4 POWDER ORAL at 08:56

## 2021-08-28 RX ADMIN — Medication 250 MG: at 08:55

## 2021-08-28 RX ADMIN — Medication 1 MG: at 20:56

## 2021-08-28 RX ADMIN — TRAZODONE HYDROCHLORIDE 100 MG: 50 TABLET ORAL at 20:56

## 2021-08-28 RX ADMIN — CYCLOBENZAPRINE 10 MG: 5 TABLET, FILM COATED ORAL at 14:21

## 2021-08-28 RX ADMIN — MIDODRINE HYDROCHLORIDE 10 MG: 5 TABLET ORAL at 08:54

## 2021-08-28 RX ADMIN — Medication 250 MG: at 20:56

## 2021-08-28 RX ADMIN — FLUDROCORTISONE ACETATE 100 MCG: 0.1 TABLET ORAL at 08:54

## 2021-08-28 NOTE — PLAN OF CARE
FOCUS/GOAL  Bowel management, Bladder management, Pain management and Cognition/Memory/Judgment/Problem solving    ASSESSMENT, INTERVENTIONS AND CONTINUING PLAN FOR GOAL:  Pt sleeping well, no reports or signs of fever, chills, CP, SOB, N/V, abdominal pain, or new weakness/numbness/tingling, continent of bowel, torres in place and patent, using CPAP overnight, transferring with assist of 1 and ww, sleeping well throughout the night, pt went uninterrupted, per pt care order, no further care concerns at this time continue with POC.

## 2021-08-28 NOTE — PLAN OF CARE
Discharge Planner Post-Acute Rehab OT:      Discharge Plan: home with assist for IADL and HH      Precautions: fall, cervical spinal, involuntary twitching in lower extremities, orthostatic hypotension- Abdominal binder/LE stockinette. L brace prior to standing.     Current Status:  ADLs: SBA or UB dressing, CGA don shorts EOB, total assist for toileting due to incontinence/torres catheter. SBA at sink for face g/h tasks,minimally tolerates standing d/t orthostatic hypotension  Dep A w/ LB hygiene as pt still incontinent.   Mod-max A w/ LB dressing w/ pt assist w/ threading catheter.  Tsfers: CGA w/ FWW    Dep A in rolling shower chair due to Orthostatic BP  IADLs: not tested, wife can assist  Vision/Cognition: WFL     Assessment:  Pt engaged in BUE ex at EOB and EOM.  Pt had abdominal binder and stockinet and exercises prior to standing. Pt's BP continues to drop upon standing from sitting 120s/60s to 90s/45-49. Pt has mild symptoms. Pt continues to be able to tolerate standing while doing light tasks for up to 7 minutes. Constant BP monitoring and symptom checking provided while standing. Pt reports no pain in L ankle this date. Completing SPT with CGA using FWW.      Continue to progress standing as able due to BP orthostasis and attempt seated BUE ex or standing UE dynamic exercises to increase functional mobility/functional standing ADLs as pt is able to tolerate. Pt is being referred to TCU for possible discharge placement.      Other Barriers to Discharge (DME, Family Training, etc): Pt has good family support and accessible home. family training, determine DME and improve ADL performance.   Barriers: Orthostatic BP, symptomatic, new L ankle instability

## 2021-08-28 NOTE — PLAN OF CARE
Patient is alert and oriented x4 & uses call light to make needs known. Denies pain at this time. Transfers with assist w/ 1, walker and gb. PICC in E is patent and saline locked. Davis catheter in place and draining, last BM: 8/27/2021, patient refused enema this shift. Reg/thin liquid diet, takes pills whole. Coccyx area checked, no further concern, barrier cream applied as preventative measure. Red, non blanchable skin observed over nasal bridge and back of both ears due to glasses kept on over night w/ cpap on. MD notified to re-assess skin. Infrequent, dry, nonproductive cough observed, lung sounds clear throughout. Will continue with POC.

## 2021-08-28 NOTE — PROGRESS NOTES
"  [facility]   Acute Rehabilitation Unit  Daily progress note    INTERVAL HISTORY  No acute events overnight.  This morning Mr. Johns reports he had a good night of sleep and had no twitching of the legs.  No concerns or complaints today.  BP slightly low while in bed, but not feeling lightheaded and was able to sit upright without symptoms as well.  Was asked by nursing to evaluate skin on coccyx, nose, and ears.  There is an indentation of the skin on the coccyx and I cleared the barrier cream present and examined it.  The skin is not open at this area.  Regarding the nose and ears, he states he fell asleep with his glasses and CPAP machine on which put pressure on these areas.  On exam there is redness on the bridge of the nose and where the arms of the glasses rest, however it is blanchable and no skin breakdown is seen.  Advised preventative measures including removing glasses when not needed, repositioning, and to not fall asleep with them on.      MEDICATIONS  Scheduled meds    cyclobenzaprine  10 mg Oral TID     docusate sodium  1 enema Rectal BID     fludrocortisone  100 mcg Oral Daily     melatonin  1 mg Oral At Bedtime     midodrine  10 mg Oral TID w/meals     multivitamin, therapeutic  1 tablet Oral Daily     pantoprazole  40 mg Oral BID AC     psyllium  1 packet Oral BID     saccharomyces boulardii  250 mg Oral BID     sodium chloride (PF)  10 mL Intracatheter Q8H     traZODone  100 mg Oral At Bedtime       PRN meds:  acetaminophen, diclofenac, lidocaine 4%, lidocaine (buffered or not buffered), miconazole, ondansetron, senna-docusate, sodium chloride (PF), sodium chloride (PF), White Petrolatum      PHYSICAL EXAM  /64 (BP Location: Right arm)   Pulse 86   Temp 97.2  F (36.2  C) (Oral)   Resp 16   Ht 1.803 m (5' 11\")   Wt 102.1 kg (225 lb)   SpO2 97%   BMI 31.38 kg/m       Gen: Awake, coooperative, no distress  HEENT: Erythema at the bridge of the nose " and behind both ears where glasses rest  CVS: RRR, S1+S2, no m/r/g  Pulm: Non-labored on room air, CTA b/l  Abd:  Soft, NT/ND, BS+  : Davis in place, draining  yellow urine  Ext: Compression stockings not donned this morning  Neuro/MSK: alert, oriented, answers questions appropriately, follows commands      LABS  CBC RESULTS:   Recent Labs   Lab Test 08/25/21  0816 08/23/21  0948 08/20/21  0627   WBC 7.3 7.7 7.8   RBC 2.93* 3.17* 3.06*   HGB 9.5* 10.1* 9.9*   HCT 29.1* 31.2* 30.5*   MCV 99 98 100   MCH 32.4 31.9 32.4   MCHC 32.6 32.4 32.5   RDW 11.8 11.9 11.9    156 134*     Last Basic Metabolic Panel:  Recent Labs   Lab Test 08/25/21  0816 08/23/21  0948 08/19/21  0731    143 141   POTASSIUM 4.2 4.0 4.2   CHLORIDE 110* 109 108   CO2 31 29 30   ANIONGAP 1* 5 3   GLC 84 117* 87   BUN 17 17 19   CR 1.29* 1.36* 1.24   GFRESTIMATED 55* 52* 58*   GARRISON 8.3* 8.7 8.6     CRP Inflammation   Date Value Ref Range Status   08/25/2021 62.0 (H) 0.0 - 8.0 mg/L Final     IMAGING  L ankle XR (8/2/21)  Impression:  1. Likely sequela of prior medial malleolar fracture.  2. Soft tissue swelling about the ankle and small joint effusion.     ASSESSMENT AND PLAN    Rigo Johns is a 70 year old L hand dominant male with a PMH of HTN, PILI, and prior cervical surgery (C5-7 anterior fusion in 1993 per notes) who is now status post a C3-4, C4-5 ACDF on 7/14/2021 for cervical myelopathy.  He was admitted to the [place] [facility]U from 7/18-7/27/21 but transferred back to the medical floor for fevers and prevertebral fluid collection suggestive of abscess.  Now improving with IV antbiotics and no surgical intervention was needed.  He is now being admitted to the ARU on 8/2/21.  Impairment group code: 04.1211 Quadriplegia, Incomplete C1-4 - s/p C3-5 ACDF.   PLAN  Rehabilitation  1. PT and OT 90 minutes of each on a daily basis, in addition to rehab nursing and close management of physiatrist.    2. Impairment of ADL's: Noted to  have impaired ROM, impaired strength, impaired activity tolerance, edema management, impaired balance, and impaired coordination, all affecting his ability to safely and independently perform basic ADLs.  Goal for mod I with basic ADLs.  3. Impairment of mobility:  Noted to have impaired ROM, impaired strength, impaired activity tolerance, edema management, impaired balance and impaired coordination, all affecting his ability to safely and independently ambulate and perform basic mobility.  Goal for mod I with basic mobility.  Medical  1)Neurology  #Cervical Myelopathy s/p C3-4, C4-5 ACDF on 7/14/21 complicated by fevers and prevertebral fluid collection, suggestive of abscess  -Follow up MRI completed 8/10 which shows near resolution of the prevertebral fluid.  Discussed with ID who also discussed with NSGY and images reviewed.  Antibiotics discontinued after 8/11 (completed 2 week course)  -Maintain postoperative spinal precautions. No need for cervical collar  -Follow-up with neurosurgery and infectious disease after discharge  -Fever of 101.3 8/18- 100.7 8/23 , WBC stable, CRP elevated but stable/improving, MRI stable.   -ID assistance greatly appreciated.  CRP now trending down (improved 8/25 to 62).  Re-check labs on Monday    2)CVS  #Orthostasis  -Abd Binder, encourage liquids.  Advised to try drinking 500 ml quickly before getting up with therapies.  Have also ordered longer thigh high TEDs.  Encouraged sodium intake as well.  -increased Midodrine to 10 mg TID 8/23  -continue florinef 0.1 mg started 8/25- up titrate ~ weekly  -continues to work on tilt table -appreciate PT support.      3)Pulmonary  #CAP, resolved  -Completed course of antibiotics (Zosyn x1 in ED, Azithromycin 7/12-7/16, Ceftriaxone 7/12 - 7/17, and Cefuroxime x1) during initial hospitalization, 2 week course of abx for cervical fluid collection (Cefepime + Daptomycin) completed after 8/11  -Encourage incentive spirometer  -Monitor  respiratory status, supplementary oxygen PRN.  Now weaned to room air.     #PILI  -Continue CPAP with home settings  -consult respiratory therapy- having some issue with home cpap and supplies.      4)FENGI  #Diet: Regular diet  #Neurogenic bowel  -ongoing Incontinence with standing 2-3 times per day.   -Enemeeze BID- to promote improved bowel continence- this is ongoing though improvd.   -nursing staff to get Mr. Johns up and to the commode in the morning prior to therapy sessions.    #Melena  #Acute anemia  -Hgb 9.5 on 8/25.  Continue to monitor  - Endoscopy 8/12 demonstrated duodenal ulcers that are no longer bleeding, which were likely the cause of his melena and anemia.   - Now completed course of IV Protonix.  Continue PO BID x 8 weeks (started 8/16) per GI recs and PO daily indefinitely there after.     #Liver Lesion  -Abnormal appearance is seen on CAT scan with slight nodularity inferiorly. LFTs within normal limits.  -Follow-up primary care physician     5)  #Urinary Retention   -Has failed TOV most recently Voiding trial done again on 8/10 but was unable to void and had large IC volumes, therefore Davis was re-placed on 8/13.  Now two weeks post insertion, will exchange.    #DONOVAN and likely CKD   -Pt with limited physician follow up so baseline creatinine not known. Peak at 2.13, improved with IV hydration.  Cr 1.29 on 8/25.  Repeat Monday.  Seems to be in line with baseline, but will continue to monitor to ensure does not climb.  -Avoid nephrotoxic agents, NSAID     #Renal lesion  -Incidental finding on CT scan  -Renal ultrasound showed simple cysts of bilateral kidneys  -Follow up with PCP     6)DVT prophylaxis  -PCDs and ambulation     7)Pain  #Spasms  -Tylenol 650 mg q4h PRN  - continue Flexeril 10 mg TID scheduled  -Continue to monitor spasms  #Left ankle pain  -xr left ankle 8/24 showed prior medial malleolar fracture and degenerative changes  -Presentation not consistent with gout and uric acid was  WNL  -Ice PRN and  Active ankle brace (reports supportive)     8)Endo  -Prior steroid induced hyperglycemia.  HbA1c 5.0.   -Now completed course of steroids.  No need for further routine checks     9)Heme   #Acute anemia  -GI workup as above Hgb 9.5 8/25     #Leukocytosis (resolved)   -Thought to be secondary to steroids which are now discontinued. WBCs wnl, crp down trendging 62 (prior 71) fever no clear source  -Continue to monitor  for now    #Thrombocytopenia  -Noted upon initial admission to the emergency room. Had been stable low 100s, but now improved. Stable 158 on 8/25  -trend  -Follow-up with primary care physician     10)Psych  -Monitor mood, will consult health psychology if needed    11) Infectious disease  Recent prevertebral abscess- completed iv antibiotics MRI 8/19 with resolution  Fever- intermittent 8/18, 8/23 unclear source. MRI neg, UA neg, Cdiff neg, WBC wnl.  CRP elevated but stable 72.  ROS + left ankle pain, orthostatic hypotension, fatigue.  Discussed with ID 8/18 with recommendation to monitor clinically .  -monitor closely pending trend consider re-consult ID    12)Social/Dispo  -Anticipate discharge home at a modified independent level for ambulation, mobility, and ADLs.   -ELOS: TCU applications sent  -Rehab prognosis: Fair  -Follow up appointments: PCP, NSGY (Dr. Hanley), ID     Code status: Full Code (Discussed with patient upon admission).  This was confirmed upon re-admit.  Pt does not want extraordinary measures should prognosis be poor, however does want attempts at resuscitation and therefore will remain full code.      Robi Whitehead MD  Department of Rehabilitation Medicine    Time Spent on this Encounter   I, Robi Whitehead, spent a total of 30 minutes face-to-face or managing the care of Rigo Johns. Over 50% of my time on the unit was spent counseling the patient and coordinating care. See note for details.

## 2021-08-28 NOTE — PROGRESS NOTES
PT A&O. Uses call light to make needs known. Provided shower this evening. Requested compression stockings to stay off after shower. Denies pain, SOB, LLE spasms, n/v. Cont of BM: LBM 8/27 up to BSC. Declined scheduled enema x2. Education provided per bowel program. Davis patent.  CPAP at [facility]. LUE PICC indicates no concern and saline locked. VSS. Call light within reach.

## 2021-08-28 NOTE — PLAN OF CARE
"Discharge Planner Post-Acute Rehab PT:      Discharge Plan: TCU vs home w/c based.     Precautions: Falls, cervical, PICC line, *orthostatic  Lymph wraps on during day, off at night     Current Status:  Bed Mobility: mod I   Transfer: CGA with WW   Gait: CGA with WW, multiple bouts of ~30 ft  Stairs: 4\" stairs, CGA  Balance: CGA and WW for standing      Assessment:  PT: progressing mobility as tolerated; supine to sit; sitting, and transfer stand pivot with FWW to chair;  Progressive sit to stand and static standing as below:    BP monitored and was stable; progressed standing via Evolve standing frame;  tolerated 10 min +5 minutes with one seated rest;  BP was stable in [id number] sitting pre and post;  standing /41-50 in 3/4 and full stands;  5 sit to stands from 1/4 elevation in standing frame (3/4 active sit to stand) cga to min A x 1;  tolerance is improving with careful grading of sit to stand activity and use of standing frame today.      Continue to defer long distance ambulation until orthostasis improves.     Other Barriers to Discharge (DME, Family Training, etc): medical complexity, incontinence, DME needs, orthostatic BP  "

## 2021-08-29 ENCOUNTER — APPOINTMENT (OUTPATIENT)
Dept: OCCUPATIONAL THERAPY | Facility: CLINIC | Age: 71
DRG: 949 | End: 2021-08-29
Attending: PHYSICAL MEDICINE & REHABILITATION
Payer: COMMERCIAL

## 2021-08-29 ENCOUNTER — APPOINTMENT (OUTPATIENT)
Dept: PHYSICAL THERAPY | Facility: CLINIC | Age: 71
DRG: 949 | End: 2021-08-29
Attending: PHYSICAL MEDICINE & REHABILITATION
Payer: COMMERCIAL

## 2021-08-29 PROCEDURE — 97530 THERAPEUTIC ACTIVITIES: CPT | Mod: GP

## 2021-08-29 PROCEDURE — 97535 SELF CARE MNGMENT TRAINING: CPT | Mod: GO | Performed by: OCCUPATIONAL THERAPIST

## 2021-08-29 PROCEDURE — 97110 THERAPEUTIC EXERCISES: CPT | Mod: GO | Performed by: OCCUPATIONAL THERAPIST

## 2021-08-29 PROCEDURE — 97112 NEUROMUSCULAR REEDUCATION: CPT | Mod: GP

## 2021-08-29 PROCEDURE — 250N000013 HC RX MED GY IP 250 OP 250 PS 637: Performed by: STUDENT IN AN ORGANIZED HEALTH CARE EDUCATION/TRAINING PROGRAM

## 2021-08-29 PROCEDURE — 97116 GAIT TRAINING THERAPY: CPT | Mod: GP

## 2021-08-29 PROCEDURE — 97110 THERAPEUTIC EXERCISES: CPT | Mod: GP

## 2021-08-29 PROCEDURE — 128N000003 HC R&B REHAB

## 2021-08-29 PROCEDURE — 97535 SELF CARE MNGMENT TRAINING: CPT | Mod: GO

## 2021-08-29 PROCEDURE — 250N000013 HC RX MED GY IP 250 OP 250 PS 637: Performed by: PHYSICAL MEDICINE & REHABILITATION

## 2021-08-29 PROCEDURE — 97110 THERAPEUTIC EXERCISES: CPT | Mod: GO

## 2021-08-29 PROCEDURE — 250N000013 HC RX MED GY IP 250 OP 250 PS 637: Performed by: PHYSICIAN ASSISTANT

## 2021-08-29 RX ADMIN — Medication 250 MG: at 20:49

## 2021-08-29 RX ADMIN — Medication 1 MG: at 20:50

## 2021-08-29 RX ADMIN — MIDODRINE HYDROCHLORIDE 10 MG: 5 TABLET ORAL at 12:32

## 2021-08-29 RX ADMIN — CYCLOBENZAPRINE 10 MG: 5 TABLET, FILM COATED ORAL at 13:41

## 2021-08-29 RX ADMIN — PSYLLIUM HUSK 1 PACKET: 3.4 POWDER ORAL at 20:50

## 2021-08-29 RX ADMIN — DOCUSATE SODIUM 1 ENEMA: 283 LIQUID RECTAL at 08:36

## 2021-08-29 RX ADMIN — Medication 250 MG: at 08:34

## 2021-08-29 RX ADMIN — TRAZODONE HYDROCHLORIDE 100 MG: 50 TABLET ORAL at 20:50

## 2021-08-29 RX ADMIN — CYCLOBENZAPRINE 10 MG: 5 TABLET, FILM COATED ORAL at 20:50

## 2021-08-29 RX ADMIN — PANTOPRAZOLE SODIUM 40 MG: 40 TABLET, DELAYED RELEASE ORAL at 08:34

## 2021-08-29 RX ADMIN — PSYLLIUM HUSK 1 PACKET: 3.4 POWDER ORAL at 08:32

## 2021-08-29 RX ADMIN — FLUDROCORTISONE ACETATE 100 MCG: 0.1 TABLET ORAL at 08:34

## 2021-08-29 RX ADMIN — DOCUSATE SODIUM 1 ENEMA: 283 LIQUID RECTAL at 20:50

## 2021-08-29 RX ADMIN — MIDODRINE HYDROCHLORIDE 10 MG: 5 TABLET ORAL at 08:34

## 2021-08-29 RX ADMIN — CYCLOBENZAPRINE 10 MG: 5 TABLET, FILM COATED ORAL at 08:34

## 2021-08-29 RX ADMIN — THERA TABS 1 TABLET: TAB at 08:33

## 2021-08-29 RX ADMIN — MIDODRINE HYDROCHLORIDE 10 MG: 5 TABLET ORAL at 16:54

## 2021-08-29 RX ADMIN — PANTOPRAZOLE SODIUM 40 MG: 40 TABLET, DELAYED RELEASE ORAL at 16:54

## 2021-08-29 NOTE — PLAN OF CARE
FOCUS/GOAL  Bowel management, Bladder management, and Skin integrity    ASSESSMENT, INTERVENTIONS AND CONTINUING PLAN FOR GOAL:    Patient is aox4, uses call light to make needs known. Denies pain. States aggravation felt due to caregiver coming in at 0700 to empty torres and slammed door on their way out. Ax1 ww, gb to bedside commode/wc. Continent of bowel, last BM: 8/29/2021 w/ enema. Torres catheter in place and draining. PICC in LUE CDI, patent and saline locked. VSS. Pulsate bed discussed and ordered as preventative. Call light in reach. Will continue w/ POC.

## 2021-08-29 NOTE — PLAN OF CARE
FOCUS/GOAL  Bowel management, Bladder management, Skin integrity and Cognition/Memory/Judgment/Problem solving    ASSESSMENT, INTERVENTIONS AND CONTINUING PLAN FOR GOAL:  Pt is sleeping well throughout the night, CPAP on with regular breathing rate, no reports of pain, sob, fever, chills, n/v, or new numbness and tingling, pt is not turning or repositioning during the night with HOB elevated between 30-45%. Pt is not being woke per pt care order but may benefit from pulsate matris due to resistance to repositioning during the night and the presence of sensation deficits. Davis in place and patent, PICC in LUE, no further care concerns at this time continue with POC.

## 2021-08-29 NOTE — PLAN OF CARE
Discharge Planner Post-Acute Rehab OT:      Discharge Plan: home with assist for IADL and HH      Precautions: fall, cervical spinal, involuntary twitching in lower extremities, orthostatic hypotension- Abdominal binder/LE stockinette. L brace prior to standing.     Current Status:  ADLs: SBA or UB dressing, CGA don shorts EOB, toilet transfer using BSC/overlay in bathroom using FWW CGA,  total assist for toileting due to incontinence/torres catheter. CGA standing at sink with WC behind for grooming  Dep A w/ LB hygiene as pt still incontinent.   Mod-max A w/ LB dressing w/ pt assist w/ threading catheter.  Tsfers: CGA w/ FWW    Dep A in rolling shower chair due to Orthostatic BP  IADLs: not tested, wife can assist  Vision/Cognition: WFL     Assessment:  Pt had abdominal binder and stockinet  prior to standing. Pt tolerated ambulating to bathroom for toilet transfer and grooming at sink with WC follow for safety due to orthostatic BP at times. Pt's BP continues to drop upon standing from sitting slightly but once engaged in ambulating and activity BP going back up during AM OT session. Pt has mild symptoms and able to report when pt feeling significant drop of BP.  Constant BP monitoring and symptom checking provided while standing. Pt reports no pain in L ankle this date. Completing SPT with CGA using FWW.      Continue to progress standing as able due to BP orthostasis and attempt seated BUE ex or standing UE dynamic exercises to increase functional mobility/functional standing ADLs as pt is able to tolerate. Pt is being referred to TCU for possible discharge placement.      Other Barriers to Discharge (DME, Family Training, etc): Pt has good family support and accessible home. family training, determine DME and improve ADL performance.   Barriers: Orthostatic BP, symptomatic, new L ankle instability

## 2021-08-29 NOTE — PLAN OF CARE
"Discharge Planner Post-Acute Rehab PT:      Discharge Plan: TCU vs home w/c based.     Precautions: Falls, cervical, PICC line, *orthostatic; jobst stockings, abdominal binder.     wraps on during day, off at night     Current Status:  Bed Mobility: mod I   Transfer: CGA with WW   Gait: CGA with WW, multiple bouts of ~25-50 ft  Stairs: 4\" stairs, CGA  Balance: CGA and WW for standing      Assessment: Tolerated progressive gait in am and pm:  W/c follow for safety in reps of 25-50 feet;  Static standing tolerance is improving; tolerated standing balance progression this pm;  BP monitored throughout and occasionally low 80-90/50, but recovers and typically >110/60.      Tolerated 20 min on the Nustep L4-5 this pm, with managed L ankle pain and no excessive fatigue.       Continue to carefully progress longer distance ambulation with w/cfollow due to hypotension.      Other Barriers to Discharge (DME, Family Training, etc): medical complexity, incontinence, DME needs, orthostatic BP          "

## 2021-08-29 NOTE — PROGRESS NOTES
"Pt A&O x4. Uses call light to make needs known. Denies chest pain, numbness/tingling, new weakness, n/v, cough. Pt prefers to have compression stockings removed at HS. Continent of BM; LBM 8/28 to BSC; declined sched enema. Education provided and pt reports, \"If I have one on my own daily I can use it as prn.\" Davis cath changed this evening and patent draining clear, light yellow urine. Barrier to buttocks. L PICC saline locked and + blood return. Pt wearing his CPAP at HS without glasses. No alarms in place. Call light within reach.   " No

## 2021-08-30 ENCOUNTER — APPOINTMENT (OUTPATIENT)
Dept: PHYSICAL THERAPY | Facility: CLINIC | Age: 71
DRG: 949 | End: 2021-08-30
Attending: PHYSICAL MEDICINE & REHABILITATION
Payer: COMMERCIAL

## 2021-08-30 ENCOUNTER — APPOINTMENT (OUTPATIENT)
Dept: OCCUPATIONAL THERAPY | Facility: CLINIC | Age: 71
DRG: 949 | End: 2021-08-30
Attending: PHYSICAL MEDICINE & REHABILITATION
Payer: COMMERCIAL

## 2021-08-30 LAB
ANION GAP SERPL CALCULATED.3IONS-SCNC: 1 MMOL/L (ref 3–14)
BUN SERPL-MCNC: 21 MG/DL (ref 7–30)
CALCIUM SERPL-MCNC: 8.6 MG/DL (ref 8.5–10.1)
CHLORIDE BLD-SCNC: 109 MMOL/L (ref 94–109)
CO2 SERPL-SCNC: 31 MMOL/L (ref 20–32)
CREAT SERPL-MCNC: 1.3 MG/DL (ref 0.66–1.25)
CRP SERPL-MCNC: 35 MG/L (ref 0–8)
ERYTHROCYTE [DISTWIDTH] IN BLOOD BY AUTOMATED COUNT: 11.9 % (ref 10–15)
GFR SERPL CREATININE-BSD FRML MDRD: 55 ML/MIN/1.73M2
GLUCOSE BLD-MCNC: 82 MG/DL (ref 70–99)
HCT VFR BLD AUTO: 29.2 % (ref 40–53)
HGB BLD-MCNC: 9.7 G/DL (ref 13.3–17.7)
MCH RBC QN AUTO: 32.4 PG (ref 26.5–33)
MCHC RBC AUTO-ENTMCNC: 33.2 G/DL (ref 31.5–36.5)
MCV RBC AUTO: 98 FL (ref 78–100)
PLATELET # BLD AUTO: 183 10E3/UL (ref 150–450)
POTASSIUM BLD-SCNC: 3.8 MMOL/L (ref 3.4–5.3)
RBC # BLD AUTO: 2.99 10E6/UL (ref 4.4–5.9)
SODIUM SERPL-SCNC: 141 MMOL/L (ref 133–144)
WBC # BLD AUTO: 6.9 10E3/UL (ref 4–11)

## 2021-08-30 PROCEDURE — 97535 SELF CARE MNGMENT TRAINING: CPT | Mod: GO | Performed by: STUDENT IN AN ORGANIZED HEALTH CARE EDUCATION/TRAINING PROGRAM

## 2021-08-30 PROCEDURE — 97110 THERAPEUTIC EXERCISES: CPT | Mod: GO | Performed by: STUDENT IN AN ORGANIZED HEALTH CARE EDUCATION/TRAINING PROGRAM

## 2021-08-30 PROCEDURE — 99232 SBSQ HOSP IP/OBS MODERATE 35: CPT | Mod: 24 | Performed by: PHYSICAL MEDICINE & REHABILITATION

## 2021-08-30 PROCEDURE — 86140 C-REACTIVE PROTEIN: CPT | Performed by: PHYSICAL MEDICINE & REHABILITATION

## 2021-08-30 PROCEDURE — 80048 BASIC METABOLIC PNL TOTAL CA: CPT | Performed by: PHYSICIAN ASSISTANT

## 2021-08-30 PROCEDURE — 250N000013 HC RX MED GY IP 250 OP 250 PS 637: Performed by: PHYSICIAN ASSISTANT

## 2021-08-30 PROCEDURE — 97110 THERAPEUTIC EXERCISES: CPT | Mod: GP | Performed by: REHABILITATION PRACTITIONER

## 2021-08-30 PROCEDURE — 250N000013 HC RX MED GY IP 250 OP 250 PS 637: Performed by: PHYSICAL MEDICINE & REHABILITATION

## 2021-08-30 PROCEDURE — 85027 COMPLETE CBC AUTOMATED: CPT | Performed by: PHYSICIAN ASSISTANT

## 2021-08-30 PROCEDURE — 250N000013 HC RX MED GY IP 250 OP 250 PS 637: Performed by: STUDENT IN AN ORGANIZED HEALTH CARE EDUCATION/TRAINING PROGRAM

## 2021-08-30 PROCEDURE — 36592 COLLECT BLOOD FROM PICC: CPT | Performed by: PHYSICIAN ASSISTANT

## 2021-08-30 PROCEDURE — 97530 THERAPEUTIC ACTIVITIES: CPT | Mod: GP | Performed by: REHABILITATION PRACTITIONER

## 2021-08-30 PROCEDURE — 128N000003 HC R&B REHAB

## 2021-08-30 RX ADMIN — Medication 1 MG: at 20:56

## 2021-08-30 RX ADMIN — CYCLOBENZAPRINE 10 MG: 5 TABLET, FILM COATED ORAL at 20:55

## 2021-08-30 RX ADMIN — MIDODRINE HYDROCHLORIDE 10 MG: 5 TABLET ORAL at 08:44

## 2021-08-30 RX ADMIN — Medication 250 MG: at 08:44

## 2021-08-30 RX ADMIN — PSYLLIUM HUSK 1 PACKET: 3.4 POWDER ORAL at 20:55

## 2021-08-30 RX ADMIN — PSYLLIUM HUSK 1 PACKET: 3.4 POWDER ORAL at 08:43

## 2021-08-30 RX ADMIN — MICONAZOLE NITRATE: 20 POWDER TOPICAL at 08:43

## 2021-08-30 RX ADMIN — MIDODRINE HYDROCHLORIDE 10 MG: 5 TABLET ORAL at 12:52

## 2021-08-30 RX ADMIN — Medication 250 MG: at 20:56

## 2021-08-30 RX ADMIN — DOCUSATE SODIUM 1 ENEMA: 283 LIQUID RECTAL at 08:43

## 2021-08-30 RX ADMIN — TRAZODONE HYDROCHLORIDE 100 MG: 50 TABLET ORAL at 20:56

## 2021-08-30 RX ADMIN — CYCLOBENZAPRINE 10 MG: 5 TABLET, FILM COATED ORAL at 08:44

## 2021-08-30 RX ADMIN — CYCLOBENZAPRINE 10 MG: 5 TABLET, FILM COATED ORAL at 13:02

## 2021-08-30 RX ADMIN — FLUDROCORTISONE ACETATE 100 MCG: 0.1 TABLET ORAL at 08:43

## 2021-08-30 RX ADMIN — DOCUSATE SODIUM 1 ENEMA: 283 LIQUID RECTAL at 19:44

## 2021-08-30 RX ADMIN — PANTOPRAZOLE SODIUM 40 MG: 40 TABLET, DELAYED RELEASE ORAL at 16:52

## 2021-08-30 RX ADMIN — MIDODRINE HYDROCHLORIDE 10 MG: 5 TABLET ORAL at 16:51

## 2021-08-30 RX ADMIN — THERA TABS 1 TABLET: TAB at 08:44

## 2021-08-30 RX ADMIN — PANTOPRAZOLE SODIUM 40 MG: 40 TABLET, DELAYED RELEASE ORAL at 08:44

## 2021-08-30 NOTE — PROGRESS NOTES
PT A&O x4. Pulsate mattress in place. L PICC dressing changed this shift, patent, saline locked. Davis patent. Continent of BM; LBM 8/29. No resuls after enema this evening. Barrier cream to buttocks. SBA with gait belt and walker. CPAP at HS. [facility] wipes admin. No alarms per order. Call light within reach.

## 2021-08-30 NOTE — PLAN OF CARE
FOCUS/GOAL  Bowel management, Bladder management, Pain management, Skin integrity and Cognition/Memory/Judgment/Problem solving    ASSESSMENT, INTERVENTIONS AND CONTINUING PLAN FOR GOAL:  Pt is sleeping well throughout the night, CPAP on with regular breathing rate, no reports of pain, sob, fever, chills, n/v, or new numbness and tingling, HOB lower than previous night with pulsate matris in place. Davis in place and patent, PICC in LUE, no further care concerns at this time continue with POC.

## 2021-08-30 NOTE — PROGRESS NOTES
"  [facility]   Acute Rehabilitation Unit  Daily progress note    INTERVAL HISTORY  Overnight Mr. Johns says he slept on his side, which he thinks triggered some of his leg twitches and pain.  This morning he feels these have resolved nicely, and he is feeling confident about putting weight on the ankle.  Lightheadedness has not reached \"severe\" levels, and he is progressing with the tilt table and tolerance in the stander.  Denies chest pain, shortness of breath, fevers, or chills.  Urine dark juan in the Davis today, encouraged to drink more fluids.  Feels bowels are improved with current regimen, has been taking morning suppository consistently but only about half the time at night depending on if he feels he is able to have a bowel movement on his own.  Functionally was able to ambulate in bouts of 25-50 feet yesterday with  follow for safety, improved standing tolerance and standing balance.      MEDICATIONS  Scheduled meds    cyclobenzaprine  10 mg Oral TID     docusate sodium  1 enema Rectal BID     fludrocortisone  100 mcg Oral Daily     melatonin  1 mg Oral At Bedtime     midodrine  10 mg Oral TID w/meals     multivitamin, therapeutic  1 tablet Oral Daily     pantoprazole  40 mg Oral BID AC     psyllium  1 packet Oral BID     saccharomyces boulardii  250 mg Oral BID     sodium chloride (PF)  10 mL Intracatheter Q8H     traZODone  100 mg Oral At Bedtime       PRN meds:  acetaminophen, diclofenac, lidocaine 4%, lidocaine (buffered or not buffered), miconazole, ondansetron, senna-docusate, sodium chloride (PF), sodium chloride (PF), White Petrolatum      PHYSICAL EXAM  /62 (BP Location: Right arm)   Pulse 88   Temp 97  F (36.1  C) (Oral)   Resp 20   Ht 1.803 m (5' 11\")   Wt 102.1 kg (225 lb)   SpO2 93%   BMI 31.38 kg/m       Gen: Awake, coooperative, no distress  HEENT: NC/AT  CVS: RRR, S1+S2, no m/r/g  Pulm: Non-labored on room air, CTA b/l  Abd:  Soft, " NT/ND, BS+  : Davis in place, draining dark yellow urine  Ext: Thigh high compression stockings in place  Neuro/MSK: alert, oriented, answers questions appropriately, follows commands      LABS  CBC RESULTS:   Recent Labs   Lab Test 08/30/21  0812 08/25/21  0816 08/23/21  0948   WBC 6.9 7.3 7.7   RBC 2.99* 2.93* 3.17*   HGB 9.7* 9.5* 10.1*   HCT 29.2* 29.1* 31.2*   MCV 98 99 98   MCH 32.4 32.4 31.9   MCHC 33.2 32.6 32.4   RDW 11.9 11.8 11.9    158 156     Last Basic Metabolic Panel:  Recent Labs   Lab Test 08/30/21  0812 08/25/21  0816 08/23/21  0948    142 143   POTASSIUM 3.8 4.2 4.0   CHLORIDE 109 110* 109   CO2 31 31 29   ANIONGAP 1* 1* 5   GLC 82 84 117*   BUN 21 17 17   CR 1.30* 1.29* 1.36*   GFRESTIMATED 55* 55* 52*   GARRISON 8.6 8.3* 8.7     CRP Inflammation   Date Value Ref Range Status   08/30/2021 35.0 (H) 0.0 - 8.0 mg/L Final     IMAGING  L ankle XR (8/2/21)  Impression:  1. Likely sequela of prior medial malleolar fracture.  2. Soft tissue swelling about the ankle and small joint effusion.     ASSESSMENT AND PLAN    Rigo Johns is a 70 year old L hand dominant male with a PMH of HTN, PILI, and prior cervical surgery (C5-7 anterior fusion in 1993 per notes) who is now status post a C3-4, C4-5 ACDF on 7/14/2021 for cervical myelopathy.  He was admitted to the [facility] from 7/18-7/27/21 but transferred back to the medical floor for fevers and prevertebral fluid collection suggestive of abscess.  Now improving with IV antbiotics and no surgical intervention was needed.  He is now being admitted to the ARU on 8/2/21.  Impairment group code: 04.1211 Quadriplegia, Incomplete C1-4 - s/p C3-5 ACDF.   PLAN  Rehabilitation  1. PT and OT 90 minutes of each on a daily basis, in addition to rehab nursing and close management of physiatrist.    2. Impairment of ADL's: Noted to have impaired ROM, impaired strength, impaired activity tolerance, edema management, impaired balance, and impaired  coordination, all affecting his ability to safely and independently perform basic ADLs.  Goal for mod I with basic ADLs.  3. Impairment of mobility:  Noted to have impaired ROM, impaired strength, impaired activity tolerance, edema management, impaired balance and impaired coordination, all affecting his ability to safely and independently ambulate and perform basic mobility.  Goal for mod I with basic mobility.  Medical  1)Neurology  #Cervical Myelopathy s/p C3-4, C4-5 ACDF on 7/14/21 complicated by fevers and prevertebral fluid collection, suggestive of abscess  -Follow up MRI completed 8/10 which shows near resolution of the prevertebral fluid.  Discussed with ID who also discussed with NSGY and images reviewed.  Antibiotics discontinued after 8/11 (completed 2 week course)  -Maintain postoperative spinal precautions. No need for cervical collar  -Follow-up with neurosurgery and infectious disease after discharge  -Fever of 101.3 8/18- 100.7 8/23 , WBC stable, CRP elevated but stable/improving, MRI stable.   -ID assistance greatly appreciated.  CRP now trending down (improved 8/30 to 35 from 62).    2)CVS  #Orthostasis  -Abd Binder, encourage liquids.  Advised to try drinking 500 ml quickly before getting up with therapies.  Have also ordered longer thigh high TEDs.  Encouraged sodium intake as well.  -increased Midodrine to 10 mg TID 8/23  -continue florinef 0.1 mg started 8/25- up titrate ~ weekly.  Consider increasing further in the next 1-2 days.   -continues to work on tilt table -appreciate PT support.      3)Pulmonary  #CAP, resolved  -Completed course of antibiotics (Zosyn x1 in ED, Azithromycin 7/12-7/16, Ceftriaxone 7/12 - 7/17, and Cefuroxime x1) during initial hospitalization, 2 week course of abx for cervical fluid collection (Cefepime + Daptomycin) completed after 8/11  -Encourage incentive spirometer  -Monitor respiratory status, supplementary oxygen PRN.  Now weaned to room air.      #PILI  -Continue CPAP with home settings  -consult respiratory therapy- having some issue with home cpap and supplies.      4)FENGI  #Diet: Regular diet  #Neurogenic bowel  -Enemeeze BID- to promote improved bowel continence- this is ongoing though improved.  Overall incontinence improved with this regimen.  -nursing staff to get Mr. Johns up and to the commode in the morning prior to therapy sessions.    #Melena  #Acute anemia  -Hgb 9.7 on 8/30.  Overall stable.  Continue to monitor  - Endoscopy 8/12 demonstrated duodenal ulcers that are no longer bleeding, which were likely the cause of his melena and anemia.   - Now completed course of IV Protonix.  Continue PO BID x 8 weeks (started 8/16) per GI recs and PO daily indefinitely there after.     #Liver Lesion  -Abnormal appearance is seen on CAT scan with slight nodularity inferiorly. LFTs within normal limits.  -Follow-up primary care physician     5)  #Urinary Retention   -Has failed TOV most recently Voiding trial done again on 8/10 but was unable to void and had large IC volumes, therefore Davis was re-placed on 8/13.  Now two weeks post insertion, will exchange (order placed).    #DONOVAN and likely CKD   -Pt with limited physician follow up so baseline creatinine not known. Peak at 2.13, improved with IV hydration.  Cr today stable at 1.30.  Seems to be in line with baseline, but will continue to monitor to ensure does not climb.  -Avoid nephrotoxic agents, NSAID     #Renal lesion  -Incidental finding on CT scan  -Renal ultrasound showed simple cysts of bilateral kidneys  -Follow up with PCP     6)DVT prophylaxis  -PCDs and ambulation     7)Pain  #Spasms  -Tylenol 650 mg q4h PRN  - continue Flexeril 10 mg TID scheduled  -Continue to monitor spasms  #Left ankle pain  -xr left ankle 8/24 showed prior medial malleolar fracture and degenerative changes  -Presentation not consistent with gout and uric acid was WNL  -Ice PRN and  Active ankle brace (reports  supportive)     8)Endo  -Prior steroid induced hyperglycemia.  HbA1c 5.0.   -Now completed course of steroids.  No need for further routine checks     9)Heme   #Acute anemia  -GI workup as above Hgb 9.7 8/30     #Leukocytosis (resolved)   -Thought to be secondary to steroids which are now discontinued. WBCs wnl, crp down trendging 62 (prior 71) fever no clear source  -Continue to monitor  for now    #Thrombocytopenia  -Noted upon initial admission to the emergency room. Had been stable low 100s, but now improved. 183 on 8/30.  -trend  -Follow-up with primary care physician     10)Psych  -Monitor mood, will consult health psychology if needed    11) Infectious disease  Recent prevertebral abscess- completed iv antibiotics MRI 8/19 with resolution  Fever- intermittent 8/18, 8/23 unclear source. MRI neg, UA neg, Cdiff neg, WBC wnl.  CRP elevated but now downtrending without acute intervention.  ROS + left ankle pain, orthostatic hypotension, fatigue.  Discussed with ID 8/18 with recommendation to monitor clinically .  -monitor closely pending trend consider re-consult ID    12)Social/Dispo  -Anticipate discharge home at a modified independent level for ambulation, mobility, and ADLs.   -ELOS: TCU applications sent  -Rehab prognosis: Fair  -Follow up appointments: PCP, NSGY (Dr. Hanley), ID     Code status: Full Code (Discussed with patient upon admission).  This was confirmed upon re-admit.  Pt does not want extraordinary measures should prognosis be poor, however does want attempts at resuscitation and therefore will remain full code.      Robi Whitehead MD  Department of Rehabilitation Medicine    Time Spent on this Encounter   I, Robi Whitehead, spent a total of 25 minutes face-to-face or managing the care of Rigo Johns. Over 50% of my time on the unit was spent counseling the patient and coordinating care. See note for details.

## 2021-08-30 NOTE — PLAN OF CARE
Discharge Planner Post-Acute Rehab PT:     Discharge Plan: TCU     Precautions: fall, cervical spinal, involuntary twitching in lower extremities, orthostatic hypotension- Abdominal binder/LE stockinette. L brace prior to standing.    Current Status:  Bed Mobility: IND   Transfer: CGA, with WW   Gait: short amb with WW, limited by increased in dizziness whenup.   Stairs: NT today.   Balance: CGA with WW     Assessment:  good day today. BP low but PT not limited to large extent. L ankle pain improved in PM too. PT to cont progressing all functional mob.     Other Barriers to Discharge (DME, Family Training, etc): Pt has good family support and accessible home. family training, determine DME and improve ADL performance.   Barriers: Orthostatic BP, symptomatic, new L ankle instability

## 2021-08-30 NOTE — PLAN OF CARE
Pt is alert and oriented x 4, he denies pain but has spasm, he is on scheduled Flexeril for that. He calls for assistance using the call light appropriately. He needed assist of one person using a walker for transfer and ambulation to short distances. He has bilateral lower extremities  job stockings and abdominal binder to prevent low blood pressure. He has a torres catheter, it is clean, dry, and intact. See I and O flow sheet for more details.

## 2021-08-30 NOTE — PLAN OF CARE
Discharge Planner Post-Acute Rehab OT:      Discharge Plan: TCU     Precautions: fall, cervical spinal, involuntary twitching in lower extremities, orthostatic hypotension- Abdominal binder/LE stockinette. L brace prior to standing.     Current Status:  ADLs: SBA or UB dressing, CGA don shorts EOB, toilet transfer using BSC/overlay in bathroom using FWW CGA,  total assist for toileting due to incontinence/torres catheter. CGA standing at sink with WC behind for grooming  Dep A w/ LB hygiene as pt still incontinent.   Mod-max A w/ LB dressing w/ pt assist w/ threading catheter.  Tsfers: CGA w/ FWW    Dep A in rolling shower chair due to Orthostatic BP  IADLs: not tested, wife can assist  Vision/Cognition: WFL     Assessment:  Pt still orthostatic and c/o dizziness in standing. See vital flow sheet. Working on building strength but still limited in advancing functional mobility.      Other Barriers to Discharge (DME, Family Training, etc): Pt has good family support and accessible home. family training, determine DME and improve ADL performance.   Barriers: Orthostatic BP, symptomatic, new L ankle instability

## 2021-08-30 NOTE — PROGRESS NOTES
Updated notes faxed to [facility] this morning. Waiting to hear back RE: clinical acceptance and bed avail. If able to accept, do not anticipate a bed open this week. Pt, pt wife and ARU team aware of this. SW will update pt, pt wife and ARU team once more information has been determined. If so, will f/u with pt and send additional TCU referrals. SW continue to follow.     MELA Aguilar   [facility]   Direct Phone: [phone]  I   Pager: [phone]  I  Fax: [phone]

## 2021-08-31 ENCOUNTER — APPOINTMENT (OUTPATIENT)
Dept: PHYSICAL THERAPY | Facility: CLINIC | Age: 71
DRG: 949 | End: 2021-08-31
Attending: PHYSICAL MEDICINE & REHABILITATION
Payer: COMMERCIAL

## 2021-08-31 ENCOUNTER — APPOINTMENT (OUTPATIENT)
Dept: OCCUPATIONAL THERAPY | Facility: CLINIC | Age: 71
DRG: 949 | End: 2021-08-31
Attending: PHYSICAL MEDICINE & REHABILITATION
Payer: COMMERCIAL

## 2021-08-31 PROCEDURE — 97110 THERAPEUTIC EXERCISES: CPT | Mod: GO

## 2021-08-31 PROCEDURE — 97116 GAIT TRAINING THERAPY: CPT | Mod: GP

## 2021-08-31 PROCEDURE — 128N000003 HC R&B REHAB

## 2021-08-31 PROCEDURE — 250N000013 HC RX MED GY IP 250 OP 250 PS 637: Performed by: PHYSICIAN ASSISTANT

## 2021-08-31 PROCEDURE — 97530 THERAPEUTIC ACTIVITIES: CPT | Mod: GP

## 2021-08-31 PROCEDURE — 250N000013 HC RX MED GY IP 250 OP 250 PS 637: Performed by: PHYSICAL MEDICINE & REHABILITATION

## 2021-08-31 PROCEDURE — 99232 SBSQ HOSP IP/OBS MODERATE 35: CPT | Mod: 24 | Performed by: PHYSICAL MEDICINE & REHABILITATION

## 2021-08-31 PROCEDURE — 250N000013 HC RX MED GY IP 250 OP 250 PS 637: Performed by: STUDENT IN AN ORGANIZED HEALTH CARE EDUCATION/TRAINING PROGRAM

## 2021-08-31 PROCEDURE — 97535 SELF CARE MNGMENT TRAINING: CPT | Mod: GO

## 2021-08-31 PROCEDURE — 97110 THERAPEUTIC EXERCISES: CPT | Mod: GP

## 2021-08-31 RX ORDER — CYCLOBENZAPRINE HCL 5 MG
10 TABLET ORAL 2 TIMES DAILY
Status: DISCONTINUED | OUTPATIENT
Start: 2021-08-31 | End: 2021-09-02

## 2021-08-31 RX ORDER — CYCLOBENZAPRINE HCL 5 MG
10 TABLET ORAL DAILY PRN
Status: DISCONTINUED | OUTPATIENT
Start: 2021-08-31 | End: 2021-09-02

## 2021-08-31 RX ADMIN — CYCLOBENZAPRINE 10 MG: 5 TABLET, FILM COATED ORAL at 08:47

## 2021-08-31 RX ADMIN — MIDODRINE HYDROCHLORIDE 10 MG: 5 TABLET ORAL at 08:48

## 2021-08-31 RX ADMIN — PANTOPRAZOLE SODIUM 40 MG: 40 TABLET, DELAYED RELEASE ORAL at 08:47

## 2021-08-31 RX ADMIN — TRAZODONE HYDROCHLORIDE 100 MG: 50 TABLET ORAL at 20:42

## 2021-08-31 RX ADMIN — PSYLLIUM HUSK 1 PACKET: 3.4 POWDER ORAL at 08:47

## 2021-08-31 RX ADMIN — THERA TABS 1 TABLET: TAB at 08:48

## 2021-08-31 RX ADMIN — MIDODRINE HYDROCHLORIDE 10 MG: 5 TABLET ORAL at 16:58

## 2021-08-31 RX ADMIN — Medication 250 MG: at 20:42

## 2021-08-31 RX ADMIN — DOCUSATE SODIUM 1 ENEMA: 283 LIQUID RECTAL at 08:48

## 2021-08-31 RX ADMIN — Medication 1 MG: at 20:42

## 2021-08-31 RX ADMIN — FLUDROCORTISONE ACETATE 100 MCG: 0.1 TABLET ORAL at 08:48

## 2021-08-31 RX ADMIN — MIDODRINE HYDROCHLORIDE 10 MG: 5 TABLET ORAL at 12:12

## 2021-08-31 RX ADMIN — DOCUSATE SODIUM 1 ENEMA: 283 LIQUID RECTAL at 20:42

## 2021-08-31 RX ADMIN — Medication 250 MG: at 08:48

## 2021-08-31 RX ADMIN — PANTOPRAZOLE SODIUM 40 MG: 40 TABLET, DELAYED RELEASE ORAL at 16:58

## 2021-08-31 RX ADMIN — CYCLOBENZAPRINE HYDROCHLORIDE 10 MG: 5 TABLET, FILM COATED ORAL at 20:42

## 2021-08-31 RX ADMIN — PSYLLIUM HUSK 1 PACKET: 3.4 POWDER ORAL at 20:42

## 2021-08-31 ASSESSMENT — MIFFLIN-ST. JEOR: SCORE: 1790.01

## 2021-08-31 NOTE — PLAN OF CARE
Discharge Planner Post-Acute Rehab PT:      Discharge Plan: TCU     Precautions: fall, cervical spinal, involuntary twitching in lower extremities, orthostatic hypotension- Abdominal binder/LE stockinette. L brace prior to standing.     Current Status:  Bed Mobility: IND   Transfer: CGA, with WW   Gait: short amb with WW, limited by increased in dizziness whenup.   Stairs: NT today.   Balance: CGA with WW      Assessment:  Pt continues to make progress, some dizziness today but BP stable at 120/70.  Ambulating and completing stairs with CGA.     Other Barriers to Discharge (DME, Family Training, etc): Pt has good family support and accessible home. family training, determine DME and improve ADL performance.   Barriers: Orthostatic BP, symptomatic, new L ankle instability

## 2021-08-31 NOTE — PLAN OF CARE
"  VS: /64 (BP Location: Right arm)   Pulse 87   Temp 97.7  F (36.5  C) (Oral)   Resp 18   Ht 1.803 m (5' 11\")   Wt 101.3 kg (223 lb 4.8 oz)   SpO2 97%   BMI 31.14 kg/m       O2: -Breathing comfortably on RA  -Denies SOB/chest pain   Output: Urinary output via torres catheter   Last BM: 8/31/21 (post enema)   Activity: Assist x1 with walker and gait belt   Skin: -Anterior and posterior cervical incisions (open to air)  -Blanchable redness to buttocks   Pain: Denies pain   CMS: A&O x4   Dressing: PICC dressing is CDI   Diet: Regular diet, thin liquids    LDA: PICC to R UE is patent   Equipment: Commode, walker, gait belt, miami J collar, compression stockings, abdominal binder   Plan: Pending acceptance to TCU   Additional Info: -Pt prefers to not be disturbed from 10pm-8am  -Use of abdominal binder and thigh-high compression stockings due to orthostasis        "

## 2021-08-31 NOTE — PROGRESS NOTES
Received secure email this morning from [facility] admissions. Pt looks good and they can clinically accept. No work on bed avail at this time, will know more in the next one-two days. [facility] admissions expressed concerns RE: pt BCBS Med plan and getting auth. Will submit for auth once bed avail is confirmed and closer to discharge.     Jamar updated MD this morning. SW met with pt at bedside. Updated pt on clinical acceptance, pending bed, [facility] insurance concerns, process for [id number] (if needed) and coordinating rest of transfer. Pt expressed understanding and appreciation. Pt plans to update his wife and aware that SW will f/u in the next 1-2 days. Pt denied additional needs at this time. Will continue to follow.     NEED: bed avail, insurance auth ([employer]), indep day, PAS, transport and IMM.     Sara Braga, [facility]   Direct Phone: [phone]  I   Pager: [phone]  I  Fax: [phone]

## 2021-08-31 NOTE — PLAN OF CARE
Discharge Planner Post-Acute Rehab OT:      Discharge Plan: TCU     Precautions: fall, cervical spinal, involuntary twitching in lower extremities, orthostatic hypotension- Abdominal binder/LE stockinette. L brace prior to standing.     Current Status:  ADLs: SBA or UB dressing, CGA don shorts EOB, toilet transfer using BSC/overlay in bathroom using FWW CGA,  total assist for toileting due to incontinence/torres catheter. CGA standing at sink with WC behind for grooming  Dep A w/ LB hygiene as pt still incontinent.   Mod-max A w/ LB dressing w/ pt assist w/ threading catheter.  Tsfers: CGA w/ FWW    Dep A in rolling shower chair due to Orthostatic BP  IADLs: not tested, wife can assist  Vision/Cognition: WFL     Assessment: pt improved with activity tolerance, standing, and functional mobility during tx session today. Focus on LUE strengthening in clinic for daily tasks, upon return to room pt had BM incontinence. Pt fatigued and required assist for clothing management and hygeine.      Other Barriers to Discharge (DME, Family Training, etc): Pt has good family support and accessible home. family training, determine DME and improve ADL performance.   Barriers: Orthostatic BP, symptomatic, new L ankle instability

## 2021-08-31 NOTE — PLAN OF CARE
FOCUS/GOAL  Bowel management, Bladder management, Pain management, Mobility, Skin integrity, and Safety management    ASSESSMENT, INTERVENTIONS AND CONTINUING PLAN FOR GOAL:  A/O, VS stable. Regular/thin, pills whole. Ate most of meal. Continent of bowel and bladder. No complaints of pain this shift. Assist of 1 with walker to transfer. No new skin concerns, blanchable redness to coccyx. Calls appropriately with light. Continue POC.

## 2021-08-31 NOTE — PROGRESS NOTES
Pt A&O. Uses call light to make needs known. PICC to LUE. Davis patent and draining. Changed catheter to stat lock on other leg to alleviate pressure. Pt originally declined scheduled enema and attempted to have BM up to BSC; unsuccessful. Enema admin with medium, incontinent results. CPAP at HS and gave reminder to remove glasses prior. Pt has low grade temp this shift; 99.2 and 99.8. pt reports no ill affect. Oncoming nurse updated to continue to monitor.encouraging fluid intake. No alarms in place per order. Call light within reach.

## 2021-08-31 NOTE — PLAN OF CARE
Sleeping throughout the night. Davis present and patent. LBM 8/30 with bowel program. No complaints of pain overnight. Assist of 1 with walker. Regular thin diet. PICC line LUE.Call light within reach, Ok to continue with care plan.

## 2021-08-31 NOTE — PROGRESS NOTES
"  [facility]   Acute Rehabilitation Unit  Daily progress note    INTERVAL HISTORY  No acute events overnight and Mr. Johns was able to sleep well without any leg twitching or ankle pain.  Today he says his ankle feels good and he is ready for weight bearing and working with therapies.  His lightheadedness continues, but has been improving and rates it a 1 (out of 3 on his own subjective scale), which he says he is able to continue with therapy at this level.  He would like to continue with his current medications and we will hold off from increasing Florinef.  However, will try weaning down from Flexeril.  Had a bowel movement this morning with enema.  Functionally he is ambulating short distances and completing stairs with CGA, mod to max A for LBD, CGA with a FWW for transfers, dependent for toileting.    MEDICATIONS  Scheduled meds    cyclobenzaprine  10 mg Oral TID     docusate sodium  1 enema Rectal BID     fludrocortisone  100 mcg Oral Daily     melatonin  1 mg Oral At Bedtime     midodrine  10 mg Oral TID w/meals     multivitamin, therapeutic  1 tablet Oral Daily     pantoprazole  40 mg Oral BID AC     psyllium  1 packet Oral BID     saccharomyces boulardii  250 mg Oral BID     sodium chloride (PF)  10 mL Intracatheter Q8H     traZODone  100 mg Oral At Bedtime       PRN meds:  acetaminophen, diclofenac, lidocaine 4%, lidocaine (buffered or not buffered), miconazole, ondansetron, senna-docusate, sodium chloride (PF), sodium chloride (PF), White Petrolatum      PHYSICAL EXAM  BP (!) 146/78 (BP Location: Left arm)   Pulse 87   Temp 97.7  F (36.5  C) (Oral)   Resp 18   Ht 1.803 m (5' 11\")   Wt 101.3 kg (223 lb 4.8 oz)   SpO2 97%   BMI 31.14 kg/m       Gen: Awake, coooperative, no distress  HEENT: NC/AT  CVS: RRR, S1+S2, no m/r/g  Pulm: Non-labored on room air, CTA b/l  Abd:  Soft, NT/ND, BS+  : Davis in place, draining light yellow urine  Ext: Thigh high compression " stockings in place  Neuro/MSK: alert, oriented, answers questions appropriately, follows commands      LABS  CBC RESULTS:   Recent Labs   Lab Test 08/30/21  0812 08/25/21  0816 08/23/21  0948   WBC 6.9 7.3 7.7   RBC 2.99* 2.93* 3.17*   HGB 9.7* 9.5* 10.1*   HCT 29.2* 29.1* 31.2*   MCV 98 99 98   MCH 32.4 32.4 31.9   MCHC 33.2 32.6 32.4   RDW 11.9 11.8 11.9    158 156     Last Basic Metabolic Panel:  Recent Labs   Lab Test 08/30/21  0812 08/25/21  0816 08/23/21  0948    142 143   POTASSIUM 3.8 4.2 4.0   CHLORIDE 109 110* 109   CO2 31 31 29   ANIONGAP 1* 1* 5   GLC 82 84 117*   BUN 21 17 17   CR 1.30* 1.29* 1.36*   GFRESTIMATED 55* 55* 52*   GARRISON 8.6 8.3* 8.7     CRP Inflammation   Date Value Ref Range Status   08/30/2021 35.0 (H) 0.0 - 8.0 mg/L Final     IMAGING  L ankle XR (8/2/21)  Impression:  1. Likely sequela of prior medial malleolar fracture.  2. Soft tissue swelling about the ankle and small joint effusion.     ASSESSMENT AND PLAN    Rigo Johns is a 70 year old L hand dominant male with a PMH of HTN, PILI, and prior cervical surgery (C5-7 anterior fusion in 1993 per notes) who is now status post a C3-4, C4-5 ACDF on 7/14/2021 for cervical myelopathy.  He was admitted to the [facility] from 7/18-7/27/21 but transferred back to the medical floor for fevers and prevertebral fluid collection suggestive of abscess.  Now improving with IV antbiotics and no surgical intervention was needed.  He is now being admitted to the ARU on 8/2/21.  Impairment group code: 04.1211 Quadriplegia, Incomplete C1-4 - s/p C3-5 ACDF.   PLAN  Rehabilitation  1. PT and OT 90 minutes of each on a daily basis, in addition to rehab nursing and close management of physiatrist.    2. Impairment of ADL's: Noted to have impaired ROM, impaired strength, impaired activity tolerance, edema management, impaired balance, and impaired coordination, all affecting his ability to safely and independently perform basic ADLs.  Goal for  mod I with basic ADLs.  3. Impairment of mobility:  Noted to have impaired ROM, impaired strength, impaired activity tolerance, edema management, impaired balance and impaired coordination, all affecting his ability to safely and independently ambulate and perform basic mobility.  Goal for mod I with basic mobility.  Medical  1)Neurology  #Cervical Myelopathy s/p C3-4, C4-5 ACDF on 7/14/21 complicated by fevers and prevertebral fluid collection, suggestive of abscess  -Follow up MRI completed 8/10 which shows near resolution of the prevertebral fluid.  Discussed with ID who also discussed with NSGY and images reviewed.  Antibiotics discontinued after 8/11 (completed 2 week course)  -Maintain postoperative spinal precautions. No need for cervical collar  -Follow-up with neurosurgery and infectious disease after discharge  -Fever of 101.3 8/18- 100.7 8/23 , WBC stable, CRP elevated but stable/improving, MRI stable.   -ID assistance greatly appreciated.  CRP now trending down (improved 8/30 to 35 from 62).    2)CVS  #Orthostasis  -Abd Binder, encourage liquids.  Advised to try drinking 500 ml quickly before getting up with therapies.  Have also ordered longer thigh high TEDs.  Encouraged sodium intake as well.  -increased Midodrine to 10 mg TID 8/23  -continue florinef 0.1 mg started 8/25- up titrate ~ weekly.  Consider increasing further if needed.  Pt declining at this time.  -continues to work on tilt table -appreciate PT support.      3)Pulmonary  #CAP, resolved  -Completed course of antibiotics (Zosyn x1 in ED, Azithromycin 7/12-7/16, Ceftriaxone 7/12 - 7/17, and Cefuroxime x1) during initial hospitalization, 2 week course of abx for cervical fluid collection (Cefepime + Daptomycin) completed after 8/11  -Encourage incentive spirometer  -Monitor respiratory status, supplementary oxygen PRN.  Now weaned to room air.     #PILI  -Continue CPAP with home settings  -consult respiratory therapy- having some issue with  home cpap and supplies.      4)FENGI  #Diet: Regular diet  #Neurogenic bowel  -Enemeeze BID- to promote improved bowel continence- this is ongoing though improved.  Overall incontinence improved with this regimen.  -nursing staff to get Mr. Johns up and to the commode in the morning prior to therapy sessions.    #Melena  #Acute anemia  -Hgb 9.7 on 8/30.  Overall stable.  Continue to monitor  - Endoscopy 8/12 demonstrated duodenal ulcers that are no longer bleeding, which were likely the cause of his melena and anemia.   - Now completed course of IV Protonix.  Continue PO BID x 8 weeks (started 8/16) per GI recs and PO daily indefinitely there after.     #Liver Lesion  -Abnormal appearance is seen on CAT scan with slight nodularity inferiorly. LFTs within normal limits.  -Follow-up primary care physician     5)  #Urinary Retention   -Has failed TOV most recently Voiding trial done again on 8/10 but was unable to void and had large IC volumes, therefore Davis was re-placed on 8/13.  Exchanged on 8/28.    #DONOVAN and likely CKD   -Pt with limited physician follow up so baseline creatinine not known. Peak at 2.13, improved with IV hydration.  Cr 8/30 stable at 1.30.  Seems to be in line with baseline, but will continue to monitor to ensure does not climb.  -Avoid nephrotoxic agents, NSAID     #Renal lesion  -Incidental finding on CT scan  -Renal ultrasound showed simple cysts of bilateral kidneys  -Follow up with PCP     6)DVT prophylaxis  -PCDs and ambulation     7)Pain  #Spasms  -Tylenol 650 mg q4h PRN  -Decrease Flexeril to 10 mg BID scheduled, but will continue to allow one dose as PRN if needed.  -Continue to monitor spasms  #Left ankle pain  -xr left ankle 8/24 showed prior medial malleolar fracture and degenerative changes  -Presentation not consistent with gout and uric acid was WNL  -Ice PRN and  Active ankle brace (reports supportive)     8)Endo  -Prior steroid induced hyperglycemia.  HbA1c 5.0.   -Now completed  course of steroids.  No need for further routine checks     9)Heme   #Acute anemia  -GI workup as above Hgb 9.7 8/30     #Leukocytosis (resolved)   -Thought to be secondary to steroids which are now discontinued. WBCs wnl, crp down trendging 62 (prior 71) fever no clear source  -Continue to monitor  for now    #Thrombocytopenia  -Noted upon initial admission to the emergency room. Had been stable low 100s, but now improved. 183 on 8/30.  -trend  -Follow-up with primary care physician     10)Psych  -Monitor mood, will consult health psychology if needed    11) Infectious disease  Recent prevertebral abscess- completed iv antibiotics MRI 8/19 with resolution  Fever- intermittent 8/18, 8/23 unclear source. MRI neg, UA neg, Cdiff neg, WBC wnl.  CRP elevated but now downtrending without acute intervention.  ROS + left ankle pain, orthostatic hypotension, fatigue.  Discussed with ID 8/18 with recommendation to monitor clinically .  -monitor closely pending trend consider re-consult ID if concerns arise    12)Social/Dispo  -Anticipate discharge home at a modified independent level for ambulation, mobility, and ADLs.   -ELOS: TCU applications sent  -Rehab prognosis: Fair  -Follow up appointments: PCP, NSGY (Dr. Hanley), ID     Code status: Full Code (Discussed with patient upon admission).  This was confirmed upon re-admit.  Pt does not want extraordinary measures should prognosis be poor, however does want attempts at resuscitation and therefore will remain full code.      Robi Whitehead MD  Department of Rehabilitation Medicine    Time Spent on this Encounter   I, Robi Whitehead, spent a total of 25 minutes face-to-face or managing the care of Rigo Johns. Over 50% of my time on the unit was spent counseling the patient and coordinating care. See note for details.

## 2021-09-01 ENCOUNTER — APPOINTMENT (OUTPATIENT)
Dept: OCCUPATIONAL THERAPY | Facility: CLINIC | Age: 71
DRG: 949 | End: 2021-09-01
Attending: PHYSICAL MEDICINE & REHABILITATION
Payer: COMMERCIAL

## 2021-09-01 ENCOUNTER — APPOINTMENT (OUTPATIENT)
Dept: PHYSICAL THERAPY | Facility: CLINIC | Age: 71
DRG: 949 | End: 2021-09-01
Attending: PHYSICAL MEDICINE & REHABILITATION
Payer: COMMERCIAL

## 2021-09-01 PROCEDURE — 128N000003 HC R&B REHAB

## 2021-09-01 PROCEDURE — 97110 THERAPEUTIC EXERCISES: CPT | Mod: GO

## 2021-09-01 PROCEDURE — 250N000013 HC RX MED GY IP 250 OP 250 PS 637: Performed by: STUDENT IN AN ORGANIZED HEALTH CARE EDUCATION/TRAINING PROGRAM

## 2021-09-01 PROCEDURE — 97110 THERAPEUTIC EXERCISES: CPT | Mod: GP | Performed by: REHABILITATION PRACTITIONER

## 2021-09-01 PROCEDURE — 97535 SELF CARE MNGMENT TRAINING: CPT | Mod: GO

## 2021-09-01 PROCEDURE — 97116 GAIT TRAINING THERAPY: CPT | Mod: GP | Performed by: REHABILITATION PRACTITIONER

## 2021-09-01 PROCEDURE — 97530 THERAPEUTIC ACTIVITIES: CPT | Mod: GP

## 2021-09-01 PROCEDURE — 250N000013 HC RX MED GY IP 250 OP 250 PS 637: Performed by: PHYSICAL MEDICINE & REHABILITATION

## 2021-09-01 PROCEDURE — 250N000013 HC RX MED GY IP 250 OP 250 PS 637: Performed by: PHYSICIAN ASSISTANT

## 2021-09-01 PROCEDURE — 99232 SBSQ HOSP IP/OBS MODERATE 35: CPT | Mod: 24 | Performed by: PHYSICIAN ASSISTANT

## 2021-09-01 PROCEDURE — 97110 THERAPEUTIC EXERCISES: CPT | Mod: GP

## 2021-09-01 RX ADMIN — MIDODRINE HYDROCHLORIDE 10 MG: 5 TABLET ORAL at 08:33

## 2021-09-01 RX ADMIN — CYCLOBENZAPRINE HYDROCHLORIDE 10 MG: 5 TABLET, FILM COATED ORAL at 20:58

## 2021-09-01 RX ADMIN — PANTOPRAZOLE SODIUM 40 MG: 40 TABLET, DELAYED RELEASE ORAL at 08:34

## 2021-09-01 RX ADMIN — Medication 1 MG: at 20:58

## 2021-09-01 RX ADMIN — MIDODRINE HYDROCHLORIDE 10 MG: 5 TABLET ORAL at 16:35

## 2021-09-01 RX ADMIN — Medication 250 MG: at 20:58

## 2021-09-01 RX ADMIN — THERA TABS 1 TABLET: TAB at 08:34

## 2021-09-01 RX ADMIN — PSYLLIUM HUSK 1 PACKET: 3.4 POWDER ORAL at 08:34

## 2021-09-01 RX ADMIN — MIDODRINE HYDROCHLORIDE 10 MG: 5 TABLET ORAL at 11:56

## 2021-09-01 RX ADMIN — Medication 250 MG: at 08:34

## 2021-09-01 RX ADMIN — FLUDROCORTISONE ACETATE 100 MCG: 0.1 TABLET ORAL at 08:34

## 2021-09-01 RX ADMIN — CYCLOBENZAPRINE HYDROCHLORIDE 10 MG: 5 TABLET, FILM COATED ORAL at 08:34

## 2021-09-01 RX ADMIN — PANTOPRAZOLE SODIUM 40 MG: 40 TABLET, DELAYED RELEASE ORAL at 16:35

## 2021-09-01 RX ADMIN — DOCUSATE SODIUM 1 ENEMA: 283 LIQUID RECTAL at 08:34

## 2021-09-01 RX ADMIN — TRAZODONE HYDROCHLORIDE 100 MG: 50 TABLET ORAL at 20:58

## 2021-09-01 RX ADMIN — PSYLLIUM HUSK 1 PACKET: 3.4 POWDER ORAL at 20:57

## 2021-09-01 NOTE — PLAN OF CARE
Discharge Planner Post-Acute Rehab OT:      Discharge Plan: TCU     Precautions: fall, cervical spinal, involuntary twitching in lower extremities, orthostatic hypotension- Abdominal binder/LE stockinette. L brace prior to standing.     Current Status:  ADLs: SBA or UB dressing, CGA don shorts EOB, toilet transfer using BSC/overlay in bathroom using FWW CGA,  total assist for toileting due to incontinence/torres catheter. CGA standing at sink with WC behind for grooming  Dep A w/ LB hygiene as pt still incontinent.   Mod-max A w/ LB dressing w/ pt assist w/ threading catheter.  Tsfers: CGA w/ FWW    Dep A in rolling shower chair due to Orthostatic BP  IADLs: not tested, wife can assist  Vision/Cognition: WFL     Assessment: Pt continues to improve with LUE strengthening, demod with decreased use of RUE as gross assist for LUE during seated g/h. Pt continues to have symptomatic orthostatic BP's during standing/ambution with FWW. Pt able to stand for long enough durations without symptoms to complete transfers/short distance mobility to/from BR with FWW.     Other Barriers to Discharge (DME, Family Training, etc): Pt has good family support and accessible home. family training, determine DME and improve ADL performance.   Barriers: Orthostatic BP, symptomatic, new L ankle instability

## 2021-09-01 NOTE — PROGRESS NOTES
"  [facility]   Acute Rehabilitation Unit  Daily progress note    INTERVAL HISTORY  No acute events overnight notes temp max 99.8 no symptoms, no new n/v/d, sob, headache, dizziness, fevers, ankle pain overall improving.  Ongoing dizziness with orthostatic hypotension with slight improvement in activity tolerance.  Denies other concerns.     OT: ADLs: SBA or UB dressing, CGA don shorts EOB, toilet transfer using BSC/overlay in bathroom using FWW CGA,  total assist for toileting due to incontinence/torres catheter. CGA standing at sink with WC behind for grooming  Dep A w/ LB hygiene as pt still incontinent.   Mod-max A w/ LB dressing w/ pt assist w/ threading catheter.  Tsfers: CGA w/ FWW    Dep A in rolling shower chair due to Orthostatic BP  IADLs: not tested, wife can assist  Vision/Cognition: WFL    MEDICATIONS  Scheduled meds    cyclobenzaprine  10 mg Oral BID     docusate sodium  1 enema Rectal BID     fludrocortisone  100 mcg Oral Daily     melatonin  1 mg Oral At Bedtime     midodrine  10 mg Oral TID w/meals     multivitamin, therapeutic  1 tablet Oral Daily     pantoprazole  40 mg Oral BID AC     psyllium  1 packet Oral BID     saccharomyces boulardii  250 mg Oral BID     sodium chloride (PF)  10 mL Intracatheter Q8H     traZODone  100 mg Oral At Bedtime       PRN meds:  acetaminophen, cyclobenzaprine, diclofenac, lidocaine 4%, lidocaine (buffered or not buffered), miconazole, ondansetron, senna-docusate, sodium chloride (PF), sodium chloride (PF), White Petrolatum      PHYSICAL EXAM  /66 (BP Location: Right arm)   Pulse 77   Temp 99.8  F (37.7  C) (Oral)   Resp 16   Ht 1.803 m (5' 11\")   Wt 101.3 kg (223 lb 4.8 oz)   SpO2 96%   BMI 31.14 kg/m       Gen: Awake, coooperative, no distress  HEENT: NC/AT  CVS: RRR, S1+S2, no m/r/g  Pulm: Non-labored clear on room air  Abd:  Soft, NT/ND, BS+  : Torres in place, draining light yellow urine  Ext: Thigh high " compression stockings in place  Neuro/MSK: alert, oriented, answers questions appropriately, follows commands      LABS  CBC RESULTS:   Recent Labs   Lab Test 08/30/21  0812 08/25/21  0816 08/23/21  0948   WBC 6.9 7.3 7.7   RBC 2.99* 2.93* 3.17*   HGB 9.7* 9.5* 10.1*   HCT 29.2* 29.1* 31.2*   MCV 98 99 98   MCH 32.4 32.4 31.9   MCHC 33.2 32.6 32.4   RDW 11.9 11.8 11.9    158 156     Last Basic Metabolic Panel:  Recent Labs   Lab Test 08/30/21  0812 08/25/21  0816 08/23/21  0948    142 143   POTASSIUM 3.8 4.2 4.0   CHLORIDE 109 110* 109   CO2 31 31 29   ANIONGAP 1* 1* 5   GLC 82 84 117*   BUN 21 17 17   CR 1.30* 1.29* 1.36*   GFRESTIMATED 55* 55* 52*   GARRISON 8.6 8.3* 8.7     CRP Inflammation   Date Value Ref Range Status   08/30/2021 35.0 (H) 0.0 - 8.0 mg/L Final     IMAGING  L ankle XR (8/2/21)  Impression:  1. Likely sequela of prior medial malleolar fracture.  2. Soft tissue swelling about the ankle and small joint effusion.     ASSESSMENT AND PLAN    Rigo Johns is a 70 year old L hand dominant male with a PMH of HTN, PILI, and prior cervical surgery (C5-7 anterior fusion in 1993 per notes) who is now status post a C3-4, C4-5 ACDF on 7/14/2021 for cervical myelopathy.  He was admitted to the [facility] from 7/18-7/27/21 but transferred back to the medical floor for fevers and prevertebral fluid collection suggestive of abscess.  Now improving with IV antbiotics and no surgical intervention was needed.  He is now being admitted to the ARU on 8/2/21.  Impairment group code: 04.1211 Quadriplegia, Incomplete C1-4 - s/p C3-5 ACDF.   PLAN  Rehabilitation  1. PT and OT 90 minutes of each on a daily basis, in addition to rehab nursing and close management of physiatrist.    2. Impairment of ADL's: Noted to have impaired ROM, impaired strength, impaired activity tolerance, edema management, impaired balance, and impaired coordination, all affecting his ability to safely and independently perform basic ADLs.   Goal for mod I with basic ADLs.  3. Impairment of mobility:  Noted to have impaired ROM, impaired strength, impaired activity tolerance, edema management, impaired balance and impaired coordination, all affecting his ability to safely and independently ambulate and perform basic mobility.  Goal for mod I with basic mobility.  Medical  1)Neurology  #Cervical Myelopathy s/p C3-4, C4-5 ACDF on 7/14/21 complicated by fevers and prevertebral fluid collection, suggestive of abscess  -Follow up MRI completed 8/10 which shows near resolution of the prevertebral fluid.  Discussed with ID who also discussed with NSGY and images reviewed.  Antibiotics discontinued after 8/11 (completed 2 week course)  -Maintain postoperative spinal precautions. No need for cervical collar  -Follow-up with neurosurgery and infectious disease after discharge  -Fever of 101.3 8/18- 100.7 8/23 , WBC stable, CRP elevated but stable/improving, MRI stable.   -ID assistance greatly appreciated.  CRP now trending down (improved 8/30 to 35 from 62).    2)CVS  #Orthostasis  -Abd Binder, encourage liquids.  Advised to try drinking 500 ml quickly before getting up with therapies.  Have also ordered longer thigh high TEDs.  Encouraged sodium intake as well.  -increased Midodrine to 10 mg TID 8/23  -continue florinef 0.1 mg started 8/25- up titrate ~ weekly.  Consider increasing further if needed.  Pt declining at this time.  -continues to work on tilt table -appreciate PT support.      3)Pulmonary  #CAP, resolved  -Completed course of antibiotics (Zosyn x1 in ED, Azithromycin 7/12-7/16, Ceftriaxone 7/12 - 7/17, and Cefuroxime x1) during initial hospitalization, 2 week course of abx for cervical fluid collection (Cefepime + Daptomycin) completed after 8/11  -Encourage incentive spirometer  -Monitor respiratory status, supplementary oxygen PRN.  Now weaned to room air.     #PILI  -Continue CPAP with home settings  -consult respiratory therapy- having some  issue with home cpap and supplies.      4)FENGI  #Diet: Regular diet  #Neurogenic bowel  -Enemeeze BID- to promote improved bowel continence- this is ongoing though improved.  Overall incontinence improved with this regimen.  -nursing staff to get Mr. Johns up and to the commode in the morning prior to therapy sessions.    #Melena  #Acute anemia  -Hgb 9.7 on 8/30.  Overall stable.  Continue to monitor  - Endoscopy 8/12 demonstrated duodenal ulcers that are no longer bleeding, which were likely the cause of his melena and anemia.   - Now completed course of IV Protonix.  Continue PO BID x 8 weeks (started 8/16) per GI recs and PO daily indefinitely there after.     #Liver Lesion  -Abnormal appearance is seen on CAT scan with slight nodularity inferiorly. LFTs within normal limits.  -Follow-up primary care physician     5)  #Urinary Retention   -Has failed TOV most recently Voiding trial done again on 8/10 but was unable to void and had large IC volumes, therefore Davis was re-placed on 8/13.  Exchanged on 8/28.    #DONOVAN and likely CKD   -Pt with limited physician follow up so baseline creatinine not known. Peak at 2.13, improved with IV hydration.  Cr 8/30 stable at 1.30.  Seems to be in line with baseline, but will continue to monitor to ensure does not climb.  -Avoid nephrotoxic agents, NSAID     #Renal lesion  -Incidental finding on CT scan  -Renal ultrasound showed simple cysts of bilateral kidneys  -Follow up with PCP     6)DVT prophylaxis  -PCDs and ambulation     7)Pain  #Spasms  -Tylenol 650 mg q4h PRN  -continue Flexeril dose decreased to 10 mg BID scheduled will continue to taper off as able in upcoming days, but will continue to allow one dose as PRN if needed.  -Continue to monitor spasms  #Left ankle pain  -xr left ankle 8/24 showed prior medial malleolar fracture and degenerative changes  -Presentation not consistent with gout and uric acid was WNL  -Ice PRN and  Active ankle brace (reports supportive)      8)Endo  -Prior steroid induced hyperglycemia.  HbA1c 5.0.   -Now completed course of steroids.  No need for further routine checks     9)Heme   #Acute anemia  -GI workup as above Hgb 9.7 8/30     #Leukocytosis (resolved)   -Thought to be secondary to steroids which are now discontinued. WBCs wnl, crp down trendging 62 (prior 70) fever no clear source  -Continue to monitor  for now    #Thrombocytopenia  -Noted upon initial admission to the emergency room. Had been stable low 100s, but now improved. 183 on 8/30.  -trend  -Follow-up with primary care physician     10)Psych  -Monitor mood, will consult health psychology if needed    11) Infectious disease  Recent prevertebral abscess- completed iv antibiotics MRI 8/19 with resolution  Fever- intermittent 8/18, 8/23 unclear source. MRI neg, UA neg, Cdiff neg, WBC wnl.  CRP elevated but now downtrending without acute intervention.  ROS + left ankle pain, orthostatic hypotension, fatigue.  Discussed with ID 8/18 with recommendation to monitor clinically .  -monitor closely pending trend consider re-consult ID if concerns arise    12)Social/Dispo  -Anticipate discharge home at a modified independent level for ambulation, mobility, and ADLs.   -ELOS: TCU applications sent  -Rehab prognosis: Fair  -Follow up appointments: PCP, PRASHANT (Dr. Hanley), ID     Code status: Full Code (Discussed with patient upon admission).  This was confirmed upon re-admit.  Pt does not want extraordinary measures should prognosis be poor, however does want attempts at resuscitation and therefore will remain full code.    Renae Caballero PA-C  PM&R    Discussed with Dr. Whitehead

## 2021-09-01 NOTE — PLAN OF CARE
Discharge Planner Post-Acute Rehab PT:     Discharge Plan: Plan: TCU     Precautions: fall, cervical spinal, involuntary twitching in lower extremities, orthostatic hypotension- Abdominal binder/LE stockinette. L brace prior to standing.      Current Status:  Bed Mobility: IDN   Transfer: SBA 2WW and or 4WW   Gait: 4ww- up to 200' x 2   Stairs: NT this PM - AM pt demo 12 steps with Maximiliano rails needing close SBA   Balance: SBA     Assessment:  pt has make great progress with all functional mobility. Pt able to tell when and if BP  starting to get low and is now able to sit with 4WW for short rest and recover. ( only needing 1-3 min) BP not taken this PM due to pt very self aware of dizziness  and able to manage IND. Pt may be able to met all set PT goals for IND mob in next few days even through pt still having increased dizziness when up.     Other Barriers to Discharge (DME, Family Training, etc): Pt has good family support and accessible home. family training, determine DME and improve ADL performance.   Barriers: Orthostatic BP, symptomatic, new L ankle instability

## 2021-09-01 NOTE — PROGRESS NOTES
Emailed [facility] admissions ([facility]) and requested update on bed avail for discharge. Waiting response.     [facility]aga [facility]   [facility]   Direct Phone: [phone]  I   Pager: [phone]  I  Fax: [phone]

## 2021-09-01 NOTE — PLAN OF CARE
FOCUS/GOAL  Bowel management, Bladder management, Pain management and Cognition/Memory/Judgment/Problem solving    ASSESSMENT, INTERVENTIONS AND CONTINUING PLAN FOR GOAL:  Pt slept through the night using CPAP without issue, no complaints of pain, sob, fever, chills, n/v or chest pain, PICC dressing intact, torres patent and draining well, no further care concerns at this time.

## 2021-09-01 NOTE — PLAN OF CARE
"VS: /66 (BP Location: Right arm)   Pulse 77   Temp 99.8  F (37.7  C) (Oral)   Resp 16   Ht 1.803 m (5' 11\")   Wt 101.3 kg (223 lb 4.8 oz)   SpO2 96%   BMI 31.14 kg/m        O2: -Breathing comfortably on RA  -Denies SOB/chest pain   Output: Urinary output via torres catheter   Last BM: 9/01/21 (post morning enema)   Activity: -Assist x1 with walker and gait belt  -Transfer slowly to reduce orthostatic hypotension   Skin: -Anterior and posterior cervical incisions, approximated, open to air  -Blanchable redness to buttocks   Pain: Denies pain   CMS: A&O x4   Dressing: PICC dressing is CDI   Diet: Regular diet, thin liquids    LDA: PICC to L UE is patent   Equipment: Commode, walker, gait belt, TESS compression stockings, abdominal binder   Plan: Pending discharge to [facility], once insurance approves     Additional Info: -Pt prefers to not be disturbed from 10pm-8am  -Use of abdominal binder and thigh-high compression stockings when up due to orthostasis         "

## 2021-09-02 ENCOUNTER — APPOINTMENT (OUTPATIENT)
Dept: OCCUPATIONAL THERAPY | Facility: CLINIC | Age: 71
DRG: 949 | End: 2021-09-02
Attending: PHYSICAL MEDICINE & REHABILITATION
Payer: COMMERCIAL

## 2021-09-02 ENCOUNTER — APPOINTMENT (OUTPATIENT)
Dept: PHYSICAL THERAPY | Facility: CLINIC | Age: 71
DRG: 949 | End: 2021-09-02
Attending: PHYSICAL MEDICINE & REHABILITATION
Payer: COMMERCIAL

## 2021-09-02 PROCEDURE — 250N000013 HC RX MED GY IP 250 OP 250 PS 637: Performed by: PHYSICAL MEDICINE & REHABILITATION

## 2021-09-02 PROCEDURE — 999N000040 HC STATISTIC CONSULT NO CHARGE VASC ACCESS

## 2021-09-02 PROCEDURE — 128N000003 HC R&B REHAB

## 2021-09-02 PROCEDURE — 97116 GAIT TRAINING THERAPY: CPT | Mod: GP | Performed by: PHYSICAL THERAPIST

## 2021-09-02 PROCEDURE — 97110 THERAPEUTIC EXERCISES: CPT | Mod: GO

## 2021-09-02 PROCEDURE — 97112 NEUROMUSCULAR REEDUCATION: CPT | Mod: GP | Performed by: PHYSICAL THERAPIST

## 2021-09-02 PROCEDURE — 97535 SELF CARE MNGMENT TRAINING: CPT | Mod: GO | Performed by: STUDENT IN AN ORGANIZED HEALTH CARE EDUCATION/TRAINING PROGRAM

## 2021-09-02 PROCEDURE — 97110 THERAPEUTIC EXERCISES: CPT | Mod: GP | Performed by: PHYSICAL THERAPIST

## 2021-09-02 PROCEDURE — 99232 SBSQ HOSP IP/OBS MODERATE 35: CPT | Mod: 24 | Performed by: PHYSICAL MEDICINE & REHABILITATION

## 2021-09-02 PROCEDURE — 250N000013 HC RX MED GY IP 250 OP 250 PS 637: Performed by: PHYSICIAN ASSISTANT

## 2021-09-02 PROCEDURE — 250N000013 HC RX MED GY IP 250 OP 250 PS 637: Performed by: STUDENT IN AN ORGANIZED HEALTH CARE EDUCATION/TRAINING PROGRAM

## 2021-09-02 PROCEDURE — 97535 SELF CARE MNGMENT TRAINING: CPT | Mod: GO

## 2021-09-02 RX ORDER — CYCLOBENZAPRINE HCL 5 MG
10 TABLET ORAL 2 TIMES DAILY PRN
Status: DISCONTINUED | OUTPATIENT
Start: 2021-09-02 | End: 2021-09-09 | Stop reason: HOSPADM

## 2021-09-02 RX ORDER — CYCLOBENZAPRINE HCL 5 MG
10 TABLET ORAL AT BEDTIME
Status: DISCONTINUED | OUTPATIENT
Start: 2021-09-02 | End: 2021-09-09 | Stop reason: HOSPADM

## 2021-09-02 RX ADMIN — CYCLOBENZAPRINE HYDROCHLORIDE 10 MG: 5 TABLET, FILM COATED ORAL at 20:33

## 2021-09-02 RX ADMIN — THERA TABS 1 TABLET: TAB at 08:29

## 2021-09-02 RX ADMIN — PSYLLIUM HUSK 1 PACKET: 3.4 POWDER ORAL at 08:29

## 2021-09-02 RX ADMIN — MIDODRINE HYDROCHLORIDE 10 MG: 5 TABLET ORAL at 08:29

## 2021-09-02 RX ADMIN — Medication 250 MG: at 20:33

## 2021-09-02 RX ADMIN — PANTOPRAZOLE SODIUM 40 MG: 40 TABLET, DELAYED RELEASE ORAL at 16:07

## 2021-09-02 RX ADMIN — Medication 1 MG: at 20:33

## 2021-09-02 RX ADMIN — DOCUSATE SODIUM 1 ENEMA: 283 LIQUID RECTAL at 08:27

## 2021-09-02 RX ADMIN — TRAZODONE HYDROCHLORIDE 100 MG: 50 TABLET ORAL at 20:33

## 2021-09-02 RX ADMIN — CYCLOBENZAPRINE HYDROCHLORIDE 10 MG: 5 TABLET, FILM COATED ORAL at 08:29

## 2021-09-02 RX ADMIN — PANTOPRAZOLE SODIUM 40 MG: 40 TABLET, DELAYED RELEASE ORAL at 08:29

## 2021-09-02 RX ADMIN — FLUDROCORTISONE ACETATE 100 MCG: 0.1 TABLET ORAL at 08:29

## 2021-09-02 RX ADMIN — Medication 250 MG: at 08:29

## 2021-09-02 RX ADMIN — MIDODRINE HYDROCHLORIDE 10 MG: 5 TABLET ORAL at 16:07

## 2021-09-02 RX ADMIN — MIDODRINE HYDROCHLORIDE 10 MG: 5 TABLET ORAL at 11:53

## 2021-09-02 RX ADMIN — PSYLLIUM HUSK 1 PACKET: 3.4 POWDER ORAL at 20:32

## 2021-09-02 NOTE — PLAN OF CARE
"FOCUS/GOAL  Medication management, Skin integrity, and Safety management    ASSESSMENT, INTERVENTIONS AND CONTINUING PLAN FOR GOAL:  BP (!) 147/61   Pulse 78   Temp 98.6  F (37  C) (Oral)   Resp 18   Ht 1.803 m (5' 11\")   Wt 101.3 kg (223 lb 4.8 oz)   SpO2 96%   BMI 31.14 kg/m   Pt VSS. Denies SOB, cough, chest pain, headache or nausea. Using CPAP at night, sats stable. Regular diet/thin liquids,takes pills whole without difficulties. Good appetite. Davis catheter patent with adequate output. Continent of BM x1. Refused scheduled enema. Single lumen PICC line in place left arm, patent with blood return. Pt transfers with 1 assists with a walker. Alert and oriented x4, able to make needs known and using call light appropriately.      "

## 2021-09-02 NOTE — PROGRESS NOTES
Peds VAS consulted to assist with removal of SecurAcath device.     Per RN, half of device removed with PICC removal but unable to remove the second half.    VAS RN able to remove second piece of SecurAcath without any complications. Bacitracin ointment applied to site and transparent dressing applied. Instructed patient to leave dressing in place for 24 hours. Bedside RN present throughout. All questions answered.

## 2021-09-02 NOTE — PLAN OF CARE
"  VS: /64 (BP Location: Right arm)   Pulse 90   Temp 98.3  F (36.8  C) (Oral)   Resp 16   Ht 1.803 m (5' 11\")   Wt 101.3 kg (223 lb 4.8 oz)   SpO2 95%   BMI 31.14 kg/m     O2: O2 sat 95% on RA, denies SOB or respiratory distress   Output: Davis catheter removed at 1150, due to void, fluid intake encouraged    Last BM: Had large BM after scheduled enema this AM    Activity: Needs assist of one with walker for transfers and toileting    Up for meals? Up in the chair for meals    Skin: Anterior incisions SATNAM, slightly redness to buttocks    Pain: Denies pain or discomfort    CMS: Alert & oriented, denies numbness or tingling    Dressing: Dry drsg to left upper arm (old PICC site)   Diet: Regular with thin    LDA: PICC was removed this AM    Equipment: Walker and personal belongings    Plan: Pending discharge home vs TCU   Additional Info:       "

## 2021-09-02 NOTE — PLAN OF CARE
FOCUS/GOAL  Bowel management, Bladder management, Pain management and Cognition/Memory/Judgment/Problem solving    ASSESSMENT, INTERVENTIONS AND CONTINUING PLAN FOR GOAL:  Pt slept through the night using CPAP until approximately 4 am , no complaints of pain, sob, fever, chills, n/v or chest pain, PICC dressing intact to LUE, torres patent and draining well, no further care concerns at this time.

## 2021-09-02 NOTE — PLAN OF CARE
Discharge Planner Post-Acute Rehab OT:      Discharge Plan: TCU     Precautions: fall, cervical spinal, involuntary twitching in lower extremities, orthostatic hypotension- Abdominal binder/LE stockinette. L brace prior to standing.     Current Status:  ADLs: SBA or UB dressing, CGA don shorts EOB, toilet transfer using BSC/overlay in bathroom using FWW CGA,  total assist for toileting due to incontinence/torres catheter. CGA standing at sink with WC behind for grooming  Dep A w/ LB hygiene as pt still incontinent.   Mod-max A w/ LB dressing w/ pt assist w/ threading catheter.  Tsfers: CGA w/ FWW    Dep A in rolling shower chair due to Orthostatic BP  IADLs: not tested, wife can assist  Vision/Cognition: WFL     Assessment: Pt able to walk to bathroom today managing symptoms and sitting once he felt too dizzy. Making good progress.      Other Barriers to Discharge (DME, Family Training, etc): Pt has good family support and accessible home. family training, determine DME and improve ADL performance.   Barriers: Orthostatic BP, symptomatic, new L ankle instability

## 2021-09-02 NOTE — PROGRESS NOTES
"Pt A&O. Uses call light to make needs known. Denies chest pain, SOB, cough, numbness/tingling, n/v. Reports dizziness when up with therapy. VSS. Up with min assist of one, gait belt and walker. LUE PICC removed on days and dressing c/d/i. Pt unable to void via urinal after torres removal at 1200. At 1620 random bladder scan indicated 525 mL present. Pt ISC for 700 mL clear, straw colored urine. encourage increase fluids and attempt to void. Continues with no urine output and called nurse at 2000 reporting, \"pressure, pain\" nurse ISC for 1500 mL urine; left sticky note as FYI for providers. pt okay with staff assessing retention during middle of noc; [facility] staff updated. CPAP at [facility]. Barrier to buttocks. No alarms per order. Call light within reach.  "

## 2021-09-02 NOTE — PROGRESS NOTES
"  [facility]   Acute Rehabilitation Unit  Daily progress note    INTERVAL HISTORY  No acute events overnight.  No ankle pain and feels stable while putting weigh through the left foot, and subsequently has been able to increase ambulation tolerance to 200 ft x2.  He utilized a 4WW which he felt worked well for him.  Twitching in the left leg also continues to improve and we will wean down Flexeril further today.  Continues to be lightheaded, but he is able to manage his symptoms well. He would like to try taking the Davis out again and seeing if he can urinate.  Given progress over the past few days, will re-discuss in team rounds tomorrow regarding shifting discharge plans back to home.      MEDICATIONS  Scheduled meds    cyclobenzaprine  10 mg Oral At Bedtime     docusate sodium  1 enema Rectal BID     fludrocortisone  100 mcg Oral Daily     melatonin  1 mg Oral At Bedtime     midodrine  10 mg Oral TID w/meals     multivitamin, therapeutic  1 tablet Oral Daily     pantoprazole  40 mg Oral BID AC     psyllium  1 packet Oral BID     saccharomyces boulardii  250 mg Oral BID     sodium chloride (PF)  10 mL Intracatheter Q8H     traZODone  100 mg Oral At Bedtime       PRN meds:  acetaminophen, cyclobenzaprine, diclofenac, lidocaine 4%, lidocaine (buffered or not buffered), miconazole, ondansetron, senna-docusate, sodium chloride (PF), sodium chloride (PF), White Petrolatum      PHYSICAL EXAM  /71 (BP Location: Right arm)   Pulse 72   Temp 97.6  F (36.4  C) (Oral)   Resp 15   Ht 1.803 m (5' 11\")   Wt 101.3 kg (223 lb 4.8 oz)   SpO2 96%   BMI 31.14 kg/m       Gen: Awake, coooperative, no distress  HEENT: NC/AT  CVS: RRR, S1+S2, no m/r/g  Pulm: Non-labored clear on room air  Abd:  Soft, NT/ND, BS+  : Davis in place, draining light yellow urine  Ext: Compression stockings not donned  Neuro/MSK: alert, oriented, answers questions appropriately, follows " commands      LABS  CBC RESULTS:   Recent Labs   Lab Test 08/30/21  0812 08/25/21  0816 08/23/21  0948   WBC 6.9 7.3 7.7   RBC 2.99* 2.93* 3.17*   HGB 9.7* 9.5* 10.1*   HCT 29.2* 29.1* 31.2*   MCV 98 99 98   MCH 32.4 32.4 31.9   MCHC 33.2 32.6 32.4   RDW 11.9 11.8 11.9    158 156     Last Basic Metabolic Panel:  Recent Labs   Lab Test 08/30/21  0812 08/25/21  0816 08/23/21  0948    142 143   POTASSIUM 3.8 4.2 4.0   CHLORIDE 109 110* 109   CO2 31 31 29   ANIONGAP 1* 1* 5   GLC 82 84 117*   BUN 21 17 17   CR 1.30* 1.29* 1.36*   GFRESTIMATED 55* 55* 52*   GARRISON 8.6 8.3* 8.7     CRP Inflammation   Date Value Ref Range Status   08/30/2021 35.0 (H) 0.0 - 8.0 mg/L Final     IMAGING  L ankle XR (8/2/21)  Impression:  1. Likely sequela of prior medial malleolar fracture.  2. Soft tissue swelling about the ankle and small joint effusion.     ASSESSMENT AND PLAN    Rigo Johns is a 70 year old L hand dominant male with a PMH of HTN, PILI, and prior cervical surgery (C5-7 anterior fusion in 1993 per notes) who is now status post a C3-4, C4-5 ACDF on 7/14/2021 for cervical myelopathy.  He was admitted to the [facility] from 7/18-7/27/21 but transferred back to the medical floor for fevers and prevertebral fluid collection suggestive of abscess.  Now improving with IV antbiotics and no surgical intervention was needed.  He is now being admitted to the ARU on 8/2/21.  Impairment group code: 04.1211 Quadriplegia, Incomplete C1-4 - s/p C3-5 ACDF.   PLAN  Rehabilitation  1. PT and OT 90 minutes of each on a daily basis, in addition to rehab nursing and close management of physiatrist.    2. Impairment of ADL's: Noted to have impaired ROM, impaired strength, impaired activity tolerance, edema management, impaired balance, and impaired coordination, all affecting his ability to safely and independently perform basic ADLs.  Goal for mod I with basic ADLs.  3. Impairment of mobility:  Noted to have impaired ROM, impaired  strength, impaired activity tolerance, edema management, impaired balance and impaired coordination, all affecting his ability to safely and independently ambulate and perform basic mobility.  Goal for mod I with basic mobility.  Medical  1)Neurology  #Cervical Myelopathy s/p C3-4, C4-5 ACDF on 7/14/21 complicated by fevers and prevertebral fluid collection, suggestive of abscess  -Follow up MRI completed 8/10 which shows near resolution of the prevertebral fluid.  Discussed with ID who also discussed with NSGY and images reviewed.  Antibiotics discontinued after 8/11 (completed 2 week course)  -Maintain postoperative spinal precautions. No need for cervical collar  -Follow-up with neurosurgery and infectious disease after discharge  -Fever of 101.3 8/18- 100.7 8/23 , WBC stable, CRP elevated but improving, MRI stable.   -ID assistance greatly appreciated.  CRP now trending down (improved 8/30 to 35 from 62).  -Remove PICC line today    2)CVS  #Orthostasis  -Abd Binder, encourage liquids.  Advised to try drinking 500 ml quickly before getting up with therapies.  Have also ordered longer thigh high TEDs.  Encouraged sodium intake as well.  -increased Midodrine to 10 mg TID 8/23  -continue florinef 0.1 mg started 8/25- up titrate ~ weekly.  Consider increasing further if needed.  Pt declining at this time.  -continues to work on tilt table -appreciate PT support.   -Still orthostatic, but able to manage symptoms and is not as severe as it previously was and no longer a major hindrance to participation with therapies     3)Pulmonary  #CAP, resolved  -Completed course of antibiotics (Zosyn x1 in ED, Azithromycin 7/12-7/16, Ceftriaxone 7/12 - 7/17, and Cefuroxime x1) during initial hospitalization, 2 week course of abx for cervical fluid collection (Cefepime + Daptomycin) completed after 8/11  -Encourage incentive spirometer  -Monitor respiratory status, supplementary oxygen PRN.  Now weaned to room air.      #PILI  -Continue CPAP with home settings  -consulted respiratory therapy- having some issue with home cpap and supplies.      4)FENGI  #Diet: Regular diet  #Neurogenic bowel  -Enemeeze BID- to promote improved bowel continence- this is ongoing though improved.  Overall incontinence improved with this regimen.  -nursing staff to get Mr. Johns up and to the commode in the morning prior to therapy sessions.    #Melena  #Acute anemia  -Hgb 9.7 on 8/30.  Overall stable.  Continue to monitor  - Endoscopy 8/12 demonstrated duodenal ulcers that are no longer bleeding, which were likely the cause of his melena and anemia.   - Now completed course of IV Protonix.  Continue PO BID x 8 weeks (started 8/16) per GI recs and PO daily indefinitely there after.     #Liver Lesion  -Abnormal appearance is seen on CAT scan with slight nodularity inferiorly. LFTs within normal limits.  -Follow-up primary care physician     5)  #Urinary Retention   -Has failed TOV most recently Voiding trial done again on 8/10 but was unable to void and had large IC volumes, therefore Davis was re-placed on 8/13.  Exchanged on 8/28.  Pt would like to do another voiding trial prior to discharge.  Will remove Davis today and monitor PVRs if voiding, or bladder scan at least q4h to ensure he is not over distending his bladder.  IC for PVRs >300 or random bladder scan >400.    #DONOVAN and likely CKD   -Pt with limited physician follow up so baseline creatinine not known. Peak at 2.13, improved with IV hydration.  Cr 8/30 stable at 1.30.  Seems to be in line with baseline, but will continue to monitor to ensure does not climb.  -Avoid nephrotoxic agents, NSAID     #Renal lesion  -Incidental finding on CT scan  -Renal ultrasound showed simple cysts of bilateral kidneys  -Follow up with PCP     6)DVT prophylaxis  -PCDs and ambulation     7)Pain  #Spasms  -Tylenol 650 mg q4h PRN  -Decrease Flexeril further to 10 mg at bedtime only, but will maintain BID PRN if  needed.  -Continue to monitor spasms  #Left ankle pain  -xr left ankle 8/24 showed prior medial malleolar fracture and degenerative changes  -Presentation not consistent with gout and uric acid was WNL  -Ice PRN and  Active ankle brace (reports supportive)     8)Endo  -Prior steroid induced hyperglycemia.  HbA1c 5.0.   -Now completed course of steroids.  No need for further routine checks     9)Heme   #Acute anemia  -GI workup as above Hgb 9.7 8/30     #Leukocytosis (resolved)   -Thought to be secondary to steroids which are now discontinued. WBCs wnl, crp down trendging 62 (prior 71) fever no clear source  -Continue to monitor  for now    #Thrombocytopenia  -Noted upon initial admission to the emergency room. Had been stable low 100s, but now improved. 183 on 8/30.  -trend  -Follow-up with primary care physician     10)Psych  -Monitor mood, will consult health psychology if needed    11) Infectious disease  Recent prevertebral abscess- completed iv antibiotics MRI 8/19 with resolution  Fever- intermittent 8/18, 8/23 unclear source. MRI neg, UA neg, Cdiff neg, WBC wnl.  CRP elevated but now downtrending without acute intervention.  ROS + left ankle pain, orthostatic hypotension, fatigue.  Discussed with ID 8/18 with recommendation to monitor clinically .  -monitor closely pending trend consider re-consult ID if concerns arise    12)Social/Dispo  -Anticipate discharge home at a modified independent level for ambulation, mobility, and ADLs.   -ELOS: TCU applications sent, however with improvement over the past few days may change goals back to home discharge.  Will discuss further in team rounds tomorrow.  -Rehab prognosis: Fair  -Follow up appointments: PCP, NSGY (Dr. Hanley), ID     Code status: Full Code (Discussed with patient upon admission).  This was confirmed upon re-admit.  Pt does not want extraordinary measures should prognosis be poor, however does want attempts at resuscitation and therefore will remain  full code.    Robi Whitehead MD  Department of Rehabilitation Medicine    Time Spent on this Encounter   I, Robi Whitehead, spent a total of 30 minutes face-to-face or managing the care of Rigo Johns. Over 50% of my time on the unit was spent counseling the patient and coordinating care. See note for details.

## 2021-09-03 ENCOUNTER — APPOINTMENT (OUTPATIENT)
Dept: PHYSICAL THERAPY | Facility: CLINIC | Age: 71
DRG: 949 | End: 2021-09-03
Attending: PHYSICAL MEDICINE & REHABILITATION
Payer: COMMERCIAL

## 2021-09-03 ENCOUNTER — APPOINTMENT (OUTPATIENT)
Dept: OCCUPATIONAL THERAPY | Facility: CLINIC | Age: 71
DRG: 949 | End: 2021-09-03
Attending: PHYSICAL MEDICINE & REHABILITATION
Payer: COMMERCIAL

## 2021-09-03 PROCEDURE — 97530 THERAPEUTIC ACTIVITIES: CPT | Mod: GP

## 2021-09-03 PROCEDURE — 97110 THERAPEUTIC EXERCISES: CPT | Mod: GP

## 2021-09-03 PROCEDURE — 128N000003 HC R&B REHAB

## 2021-09-03 PROCEDURE — 999N000150 HC STATISTIC PT MED CONFERENCE < 30 MIN

## 2021-09-03 PROCEDURE — 97110 THERAPEUTIC EXERCISES: CPT | Mod: GO | Performed by: STUDENT IN AN ORGANIZED HEALTH CARE EDUCATION/TRAINING PROGRAM

## 2021-09-03 PROCEDURE — 250N000013 HC RX MED GY IP 250 OP 250 PS 637: Performed by: PHYSICIAN ASSISTANT

## 2021-09-03 PROCEDURE — 250N000013 HC RX MED GY IP 250 OP 250 PS 637: Performed by: STUDENT IN AN ORGANIZED HEALTH CARE EDUCATION/TRAINING PROGRAM

## 2021-09-03 PROCEDURE — 97535 SELF CARE MNGMENT TRAINING: CPT | Mod: GO | Performed by: STUDENT IN AN ORGANIZED HEALTH CARE EDUCATION/TRAINING PROGRAM

## 2021-09-03 PROCEDURE — 250N000013 HC RX MED GY IP 250 OP 250 PS 637: Performed by: PHYSICAL MEDICINE & REHABILITATION

## 2021-09-03 PROCEDURE — 99233 SBSQ HOSP IP/OBS HIGH 50: CPT | Mod: 24 | Performed by: PHYSICAL MEDICINE & REHABILITATION

## 2021-09-03 PROCEDURE — 999N000125 HC STATISTIC PATIENT MED CONFERENCE < 30 MIN: Performed by: STUDENT IN AN ORGANIZED HEALTH CARE EDUCATION/TRAINING PROGRAM

## 2021-09-03 RX ADMIN — DOCUSATE SODIUM 1 ENEMA: 283 LIQUID RECTAL at 08:35

## 2021-09-03 RX ADMIN — PSYLLIUM HUSK 1 PACKET: 3.4 POWDER ORAL at 21:22

## 2021-09-03 RX ADMIN — PSYLLIUM HUSK 1 PACKET: 3.4 POWDER ORAL at 08:26

## 2021-09-03 RX ADMIN — Medication 250 MG: at 21:21

## 2021-09-03 RX ADMIN — FLUDROCORTISONE ACETATE 100 MCG: 0.1 TABLET ORAL at 08:25

## 2021-09-03 RX ADMIN — MIDODRINE HYDROCHLORIDE 10 MG: 5 TABLET ORAL at 12:04

## 2021-09-03 RX ADMIN — Medication 1 MG: at 21:22

## 2021-09-03 RX ADMIN — CYCLOBENZAPRINE 10 MG: 5 TABLET, FILM COATED ORAL at 21:22

## 2021-09-03 RX ADMIN — THERA TABS 1 TABLET: TAB at 08:25

## 2021-09-03 RX ADMIN — PANTOPRAZOLE SODIUM 40 MG: 40 TABLET, DELAYED RELEASE ORAL at 08:25

## 2021-09-03 RX ADMIN — TRAZODONE HYDROCHLORIDE 100 MG: 50 TABLET ORAL at 21:22

## 2021-09-03 RX ADMIN — MIDODRINE HYDROCHLORIDE 10 MG: 5 TABLET ORAL at 08:25

## 2021-09-03 RX ADMIN — PANTOPRAZOLE SODIUM 40 MG: 40 TABLET, DELAYED RELEASE ORAL at 17:22

## 2021-09-03 RX ADMIN — Medication 250 MG: at 08:25

## 2021-09-03 ASSESSMENT — MIFFLIN-ST. JEOR: SCORE: 1797.72

## 2021-09-03 NOTE — CARE CONFERENCE
Acute Rehab Care Conference/Team Rounds      Type: Team Rounds    Present: Dr. Luis Whitehead, Renae Caballero PA, Jesi Castellon PT, Patti Prescott OT, Marianna Crandall SLP, Sara BRANDON, Susan Weiss RD, Georgie Ruth RN and Patient Jackson Jonhs and wife (over the phone).         Discharge Barriers/Treatment/Education    Rehab Diagnosis: Cervical myelopathy s/p C3-4, C4-5 ACDF complicated by prevertebral abscess     Active Medical Co-morbidities/Prognosis: HTN, PILI, urinary retention with Torres, neurogenic bowel with incontinence, thrombocytopenia, DONOVAN on CKD, ABLA, PILI, orthostatic hypotension, L ankle pain, muscle spasms    Safety: Pt is alert and oriented, calling for needs during the day, CGA ww to transfer, abm binder and roman stockings on due to orthostatic BP.     Pain: Pt reports muscle spasms intermittently and received prn flexeril.     Medications, Skin, Tubes/Lines: Pt is taking medications whole with water, incision on right anterior neck healed, PICC removal dressing in place, no tubes/lines in place    Swallowing/Nutrition: No issues    Bowel/Bladder: Pt is on bowel program with LBM 9/2, unable to void since torres was removed and is being intermittently straight catheterized every 6 hours.     Psychosocial: Lives in a house w/ spouse in [place], [place]. 71 y/o  male, english-speaking and Zoroastrianism-angel luis. No mental or chemical health concerns.  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     ADLs/IADLs: Pt is Setup assist for grooming at the sink, UB dressing and LB dressing except for socks and shoes. Pt still needs to work on toileting as he still has some incontinence and has not been doing his own cares when continent. Pt is improving and is now walking more and managing his orthostatic symptoms independently. If pt can progress to MOD I with toileting and mobility then he would be able to discharge home with assist for IADL. At this time pt does not want assist for ADL when he goes home.  If pt discharges home then bathroom DME would need to be secured.     Mobility: Making steady progress despite low BPs. Is able to manage his dizziness INDly, and has had no adverse events in last week. Pt close to MOD I in room. Consider extending stay to discharge to home from ARU as pt's progress has picked up considerably in last week.     Cognition/Language: In tact    Transportation: Family to provide - car transfer TBA    Decision maker: self    Plan of Care and goals reviewed and updated.    Discharge Plan/Recommendations    Fall Precautions: continue    Overall plan for the patient:   L ankle pain improved and although still orthostatic, this has also improved and he is able to manage his symptoms.  Given these improvements, he has been able to make substantial functional improvements over the past week.  Now ambulating 200+ ft x2 with a 4WW, able to navigate stairs, and without any near syncopal episodes.  Will need to work on independence for toileting, and today will be made Mod I which should help with getting to the bathroom.  TOV ongoing but still retaining.  Will likely need  Given progress, will now plan for discharge home instead of TCU.  Will need family training and equipment in place prior to discharge.  Have set tentative discharge date of 9/9/21 with  PT, OT, RN, and HHA.        Utilization Review and Continued Stay Justification    Medical Necessity Criteria:    For any criteria that is not met, please document reason and plan for discharge, transfer, or modification of plan of care to address.    Requires intensive rehabilitation program to treat functional deficits?: Yes    Requires 3x per week or greater involvement of rehabilitation physician to oversee rehabilitation program?: Yes    Requires rehabilitation nursing interventions?: Yes    Patient is making functional progress?: Yes    There is a potential for additional functional progress? Yes    Patient is participating in therapy 3  hours per day a minimum of 5 days per week or 15 hours per week in 7 day period?:Yes    Has discharge needs that require coordinated discharge planning approach?:Yes      Barriers/Concerns related to meeting medical necessity criteria:  Lightheadedness and Ankle pain improved    Team Plan to Address Concern:  Ongoing awareness, pharmacological, and non-pharmacological management      Final Physician Sign off    Statement of Approval: I have reviewed and agree with the recommendations and documentation in this care conference note.         Patient Goals  Social Work Goals: Confirm discharge recommendations with therapy, coordinate safe discharge plan and remain available to support and assist as needed.          OT goal: hygiene/grooming: modified independent  OT goal: upper body dressing: Modified independent  OT goal: lower body dressing: Modified independent  OT goal: upper body bathing: Supervision/stand-by assist  OT goal: lower body bathing: Supervision/stand-by assist  OT Goal: transfer: Supervision/stand-by assist (walk in shower transfer)  OT goal: toilet transfer/toileting: Minimal assist, cleaning and garment management, toilet transfer  OT goal 1: Pt willbe IND  with UE HEP to improve stength and function in BUE for ADL and hobbies such as building trains     PT Frequency: daily 60-90 minutes  PT goal: bed mobility: Modified independent, Supine to/from sit, Within precautions  PT goal: transfers: Sit to/from stand, Bed to/from chair, Modified independent  PT goal: gait: Modified independent, 50 feet, Rolling walker  PT goal: stairs: 3 stairs, Modified independent (per home setup)  PT goal 1: Car transfer SBA                                                                                           Patient/Family Goal: Medication Management: Pt will participate in MAP as needed to ensure safe medication administration before discharge.                                         Goal: Falls: Patient will remain  free from falls while in ARU.  Individualized Goal 1: Patient will demonstrate ability to protect his skin to prevent skin breakdown.

## 2021-09-03 NOTE — PROGRESS NOTES
Team rounds this morning. Team met with pt at bedside, wife on the phone. Discussed care and discharge recommendations. Pt making great progress, recommend switching gears and focusing on discharge home. Pt and pt wife agreeable. HC recommended, SW discussed referral process, SOC, frequency and insurance coverage. SW offered choice, pt denied preference and gave SW permission to send referral to [facility] for RN, PT, OT and HA. Referral sent. Will confirm and add contact information to AVS. Once confirmed, will also bring pt flyer (as discussed with pt). Pt will most likely go home with melissa. Will update RN CC in the case that additional discharge needs arise RE: supplies and set up. HC aware of this as well.     SW emailed [facility] and provided discharge update. Cancelled the referral.     Pt wife planning to come in Sunday for family training. NEED to confirm if pt wife plans to transport pt home at discharge. Will ask therapy to follow-up. ALEKSANDR will plan to meet with pt before discharge and present him with IMM. At this time, no other immediate needs.     Home Health Care:   [facility] PH: [phone] (previously known as: [facility])  Nurse, physical therapy, occupational therapy, home health aide     ADDENDUM: [facility] accepted. Will determine SOC next week once more information RE: staffing has been determined. Dropped off a flyer for pt with contact information.     MELA Aguilar   [facility]   Direct Phone: [phone]  I   Pager: [phone]  I  Fax: [phone]

## 2021-09-03 NOTE — PROGRESS NOTES
"  [facility]   Acute Rehabilitation Unit  Daily progress note    INTERVAL HISTORY  Torres removed yesterday with ongoing urinary retention some sensation that his bladder is filling but unable to initiate any urination, he has required straight cath consistently with some large volumes as he is being encouraged to drink appears he will need to cath every 2-3 hours recommendation to replace torres, Jackson would like to give it another day of TOV prior to torres replacement.  Will need to continue to monitor with goal to keep volumes <500 ml.  PT/OT discussed improved mobility and activity tolerance in last several days, ambulating with walker up to 300 feet and completing majority of adls more independently ongoing orthostasis but able to anticipate symptoms and sit when needed. Given progress will work toward discharge home next week, will have wife come in for family training.      Made MOD I during day after application of compression stockings, stand by assist at night.    See rounds note by  for further details.     MEDICATIONS  Scheduled meds    cyclobenzaprine  10 mg Oral At Bedtime     docusate sodium  1 enema Rectal BID     fludrocortisone  100 mcg Oral Daily     melatonin  1 mg Oral At Bedtime     midodrine  10 mg Oral TID w/meals     multivitamin, therapeutic  1 tablet Oral Daily     pantoprazole  40 mg Oral BID AC     psyllium  1 packet Oral BID     saccharomyces boulardii  250 mg Oral BID     traZODone  100 mg Oral At Bedtime       PRN meds:  acetaminophen, cyclobenzaprine, diclofenac, lidocaine 4%, lidocaine (buffered or not buffered), miconazole, ondansetron, senna-docusate, sodium chloride (PF), sodium chloride (PF), White Petrolatum      PHYSICAL EXAM  /66 (BP Location: Right arm)   Pulse 85   Temp 98  F (36.7  C) (Oral)   Resp 16   Ht 1.803 m (5' 11\")   Wt 101.3 kg (223 lb 4.8 oz)   SpO2 94%   BMI 31.14 kg/m       Gen: Awake, coooperative, no " distress  HEENT: NC/AT  Pulm: Non-labored on room air  Ext: Compression stockings in place.   Neuro/MSK: alert, oriented, answers questions appropriately, follows commands      LABS  CBC RESULTS:   Recent Labs   Lab Test 08/30/21  0812 08/25/21  0816 08/23/21  0948   WBC 6.9 7.3 7.7   RBC 2.99* 2.93* 3.17*   HGB 9.7* 9.5* 10.1*   HCT 29.2* 29.1* 31.2*   MCV 98 99 98   MCH 32.4 32.4 31.9   MCHC 33.2 32.6 32.4   RDW 11.9 11.8 11.9    158 156     Last Basic Metabolic Panel:  Recent Labs   Lab Test 08/30/21  0812 08/25/21  0816 08/23/21  0948    142 143   POTASSIUM 3.8 4.2 4.0   CHLORIDE 109 110* 109   CO2 31 31 29   ANIONGAP 1* 1* 5   GLC 82 84 117*   BUN 21 17 17   CR 1.30* 1.29* 1.36*   GFRESTIMATED 55* 55* 52*   GARRISON 8.6 8.3* 8.7     CRP Inflammation   Date Value Ref Range Status   08/30/2021 35.0 (H) 0.0 - 8.0 mg/L Final     IMAGING  L ankle XR (8/2/21)  Impression:  1. Likely sequela of prior medial malleolar fracture.  2. Soft tissue swelling about the ankle and small joint effusion.     ASSESSMENT AND PLAN    Rigo Johns is a 70 year old L hand dominant male with a PMH of HTN, PILI, and prior cervical surgery (C5-7 anterior fusion in 1993 per notes) who is now status post a C3-4, C4-5 ACDF on 7/14/2021 for cervical myelopathy.  He was admitted to the [facility] from 7/18-7/27/21 but transferred back to the medical floor for fevers and prevertebral fluid collection suggestive of abscess.  Now improving with IV antbiotics and no surgical intervention was needed.  He is now being admitted to the ARU on 8/2/21.  Impairment group code: 04.1211 Quadriplegia, Incomplete C1-4 - s/p C3-5 ACDF.   PLAN  Rehabilitation  1. PT and OT 90 minutes of each on a daily basis, in addition to rehab nursing and close management of physiatrist.    2. Impairment of ADL's: Noted to have impaired ROM, impaired strength, impaired activity tolerance, edema management, impaired balance, and impaired coordination, all  affecting his ability to safely and independently perform basic ADLs.  Goal for mod I with basic ADLs.  3. Impairment of mobility:  Noted to have impaired ROM, impaired strength, impaired activity tolerance, edema management, impaired balance and impaired coordination, all affecting his ability to safely and independently ambulate and perform basic mobility.  Goal for mod I with basic mobility.  Medical  1)Neurology  #Cervical Myelopathy s/p C3-4, C4-5 ACDF on 7/14/21 complicated by fevers and prevertebral fluid collection, suggestive of abscess  -Follow up MRI completed 8/10 which shows near resolution of the prevertebral fluid.  Discussed with ID who also discussed with NSGY and images reviewed.  Antibiotics discontinued after 8/11 (completed 2 week course)  -Maintain postoperative spinal precautions. No need for cervical collar  -Follow-up with neurosurgery and infectious disease after discharge  -Fever of 101.3 8/18- 100.7 8/23 , WBC stable, CRP elevated but improving, MRI stable. -ID assistance greatly appreciated.  CRP now trending down (improved 8/30 to 35 from 62).    2)CVS  #Orthostasis  -Abd Binder, encourage liquids.  Advised to try drinking 500 ml quickly before getting up with therapies.  Have also ordered longer thigh high TEDs.  Encouraged sodium intake as well.  -continue Midodrine to 10 mg TID 8/23  -continue florinef 0.1 mg started 8/25- Consider increasing further if needed.  Pt declining at this time.  -Still orthostatic, but able to manage symptoms and is not as severe as it previously was and no longer a major hindrance to participation with therapies     3)Pulmonary  #CAP, resolved  -Completed course of antibiotics (Zosyn x1 in ED, Azithromycin 7/12-7/16, Ceftriaxone 7/12 - 7/17, and Cefuroxime x1) during initial hospitalization, 2 week course of abx for cervical fluid collection (Cefepime + Daptomycin) completed after 8/11  -Encourage incentive spirometer  -Monitor respiratory status,  supplementary oxygen PRN.  Now weaned to room air.     #PILI  -Continue CPAP with home settings  -consulted respiratory therapy- having some issue with home cpap and supplies.      4)FENGI  #Diet: Regular diet  #Neurogenic bowel  -Enemeeze BID- to promote improved bowel continence- Overall incontinence improved with this regimen.  -nursing staff to get Mr. Johns up and to the commode in the morning prior to therapy sessions.    #Melena  #Acute anemia  -Hgb 9.7 on 8/30.  Overall stable.  Continue to monitor  - Endoscopy 8/12 demonstrated duodenal ulcers that are no longer bleeding, which were likely the cause of his melena and anemia.   - Now completed course of IV Protonix.  Continue PO BID x 8 weeks (started 8/16) per GI recs and PO daily indefinitely there after.     #Liver Lesion  -Abnormal appearance is seen on CAT scan with slight nodularity inferiorly. LFTs within normal limits.  -Follow-up primary care physician     5)  #Urinary Retention   -Has failed TOV most recently Voiding trial done again on 8/10 but was unable to void and had large IC volumes, therefore Davis was re-placed on 8/13.  Exchanged on 8/28.   Davis removed with repeat trial of void 9/2- with current inability to void patient would like to continue st cath and void attempts  - monitor PVRs if voiding, or bladder scan at least q4h to ensure he is not over distending his bladder.  IC for PVRs >300 or random bladder scan >400.    #DONOVAN and likely CKD   -Pt with limited physician follow up so baseline creatinine not known. Peak at 2.13, improved with IV hydration.  Cr 8/30 stable at 1.30.  Seems to be in line with baseline, but will continue to monitor to ensure does not climb.  -Avoid nephrotoxic agents, NSAID     #Renal lesion  -Incidental finding on CT scan  -Renal ultrasound showed simple cysts of bilateral kidneys  -Follow up with PCP     6)DVT prophylaxis  -PCDs and ambulation     7)Pain  #Spasms  -Tylenol 650 mg q4h PRN  -continue Flexeril  10 mg at bedtime only (scheduled daytime doses discontinued), but will maintain BID PRN if needed.  -Continue to monitor spasms  #Left ankle pain  -xr left ankle 8/24 showed prior medial malleolar fracture and degenerative changes  -Presentation not consistent with gout and uric acid was WNL  -Ice PRN and  Active ankle brace (reports supportive)     8)Endo  -Prior steroid induced hyperglycemia.  HbA1c 5.0.   -Now completed course of steroids.  No need for further routine checks     9)Heme   #Acute anemia  -GI workup as above Hgb 9.7 8/30     #Leukocytosis (resolved)   -Thought to be secondary to steroids which are now discontinued. WBCs wnl, crp down trendging 62 (prior 71) fever no clear source  -Continue to monitor  for now    #Thrombocytopenia  -Noted upon initial admission to the emergency room. Had been stable low 100s, but now improved. 183 on 8/30.  -trend  -Follow-up with primary care physician     10)Psych  -Monitor mood, will consult health psychology if needed    11) Infectious disease  Recent prevertebral abscess- completed iv antibiotics MRI 8/19 with resolution  Fever- intermittent 8/18, 8/23 unclear source. MRI neg, UA neg, Cdiff neg, WBC wnl.  CRP elevated but now downtrending without acute intervention.  ROS + left ankle pain, orthostatic hypotension, fatigue.  Discussed with ID 8/18 with recommendation to monitor clinically .  -monitor closely pending trend consider re-consult ID if concerns arise    12)Social/Dispo  -Anticipate discharge home at a modified independent level for ambulation, mobility, and ADLs.   -ELOS:  with improvement over the past few days  Goals for home discharge.   -Rehab prognosis: Fair  -Follow up appointments: PCP, NSGY (Dr. Hanley), ID     Renae Caballero PATRUDY  PM&R    I spent a total of 35 minutes face-to-face or managing the care of Jackson Johns. Over 50% of my time on the unit was spent counseling the patient and coordinating care. See note for details.

## 2021-09-03 NOTE — PLAN OF CARE
FOCUS/GOAL  Bowel management, Bladder management, Pain management and Skin integrity    ASSESSMENT, INTERVENTIONS AND CONTINUING PLAN FOR GOAL:  Pt is alert and oriented, willing and compliant with PVR and ISC overnight, has not produced urine independently since torres was removed, CPAP on overnight, no further care concerns at this time.

## 2021-09-03 NOTE — PROGRESS NOTES
CLINICAL NUTRITION SERVICES - REASSESSMENT NOTE     Nutrition Prescription    RECOMMENDATIONS FOR MDs/PROVIDERS TO ORDER:  None today - pt reviewed face to face during team rounds     Malnutrition Status:    Non-severe malnutrition in the context of acute on chronic illness or injury    Recommendations already ordered by Registered Dietitian (RD):  None today - continue diet/supplement/snack as ordered     Future/Additional Recommendations:  Continue to monitor meal/supplement/snack intakes, weight and lab trends      EVALUATION OF THE PROGRESS TOWARD GOALS   Diet: Regular  Supplement: Ensure PRN, PM snack of saltines, butter and cheese  Intake: 100% per flow sheets        NEW FINDINGS   MAR reviewed. Labs reviewed. Pt reviewed during care team rounds and visited at bedside, RD reviewed current interventions and pt agreeing with continuing, pt with no other nutritional concerns at this time.     08/31/21 0845 101.3 kg (223 lb 4.8 oz)   08/27/21 0700 102.1 kg (225 lb)   08/17/21 0644 101.6 kg (224 lb)   08/12/21 0843 105.2 kg (232 lb)   08/03/21 0700 107.7 kg (237 lb 6.4 oz)   08/02/21 1400 105.5 kg (232 lb 8 oz)     Wt Readings from Last 20 Encounters:   08/31/21 101.3 kg (223 lb 4.8 oz)   07/25/21 108.3 kg (238 lb 11.2 oz)   07/12/21 113 kg (249 lb 3.2 oz)     Weight assessment: No new wt to assess this week - notified RN to obtain new weight as able. Weight fairly stable x1 week prior, significant 6.3% weight loss in 1 month.     MALNUTRITION  % Intake: No decreased intake noted  % Weight Loss: > 5% in 1 month (severe)  Subcutaneous Fat Loss: None observed  Muscle Loss: Temporal: mild   Fluid Accumulation/Edema: Trace to mild per flow sheets   Malnutrition Diagnosis: Non-severe malnutrition in the context of acute on chronic illness or injury     Previous Goals   Patient to consume % of nutritionally adequate meal trays TID, or the equivalent with supplements/snacks  Evaluation: Met    Previous Nutrition  Diagnosis  Predicted inadequate nutrient intake related to varying appetite/food preferences as evidenced by significant weight loss in 1 month  Evaluation: No change    CURRENT NUTRITION DIAGNOSIS  Predicted inadequate nutrient intake related to varying appetite/food preferences as evidenced by significant weight loss in 1 month    INTERVENTIONS  Implementation  Medical food supplement therapy - continue as ordered   Modify composition of meals/snacks - continue as ordered     Goals  Patient to consume % of nutritionally adequate meal trays TID, or the equivalent with supplements/snacks.    Monitoring/Evaluation  Progress toward goals will be monitored and evaluated per protocol.    Susan Weiss RD, CNSC, LD  ARU RD pager: [phone]

## 2021-09-03 NOTE — PLAN OF CARE
Discharge Planner Post-Acute Rehab OT:      Discharge Plan: Home with HH OT and HHA     Precautions: fall, cervical spinal, involuntary twitching in lower extremities, orthostatic hypotension- Abdominal binder/LE stockinette. L brace prior to standing.     Current Status:  ADLs: IND UB dressing, SBA don shorts EOB, toilet transfer in bathroom using 4WW MOD I, MOD I toileting and standing at sink with WC behind for grooming  IADLs: not tested, wife can assist  Vision/Cognition: WFL     Assessment: Advanced pt to mod I with 4WW during the day after compression is donned and SBA at night. Recommend GB by shower and toilet for home.      Other Barriers to Discharge (DME, Family Training, etc): Pt has good family support and accessible home. family training, determine DME and improve ADL performance.   Barriers: Orthostatic BP, symptomatic, new L ankle instability

## 2021-09-03 NOTE — PLAN OF CARE
FOCUS/GOAL  Bowel management, Bladder management, Medication management, Pain management, Medical management, Mobility, Skin integrity, and Safety management    ASSESSMENT, INTERVENTIONS AND CONTINUING PLAN FOR GOAL:      Pt is alert and oriented x 4. Continent of bowel with enema, unable to void, PVR and intermittent st cath performed. Mod-I with 4WW during the day after compression is donned and SBA at night. Reg diet, thin liquids. Denied pain, nausea and vomiting, numbness or tingling, SOB. Will continue with plan of care.

## 2021-09-04 ENCOUNTER — APPOINTMENT (OUTPATIENT)
Dept: OCCUPATIONAL THERAPY | Facility: CLINIC | Age: 71
DRG: 949 | End: 2021-09-04
Attending: PHYSICAL MEDICINE & REHABILITATION
Payer: COMMERCIAL

## 2021-09-04 ENCOUNTER — APPOINTMENT (OUTPATIENT)
Dept: PHYSICAL THERAPY | Facility: CLINIC | Age: 71
DRG: 949 | End: 2021-09-04
Attending: PHYSICAL MEDICINE & REHABILITATION
Payer: COMMERCIAL

## 2021-09-04 PROCEDURE — 250N000013 HC RX MED GY IP 250 OP 250 PS 637: Performed by: PHYSICIAN ASSISTANT

## 2021-09-04 PROCEDURE — 99232 SBSQ HOSP IP/OBS MODERATE 35: CPT | Mod: 24 | Performed by: PHYSICAL MEDICINE & REHABILITATION

## 2021-09-04 PROCEDURE — 97110 THERAPEUTIC EXERCISES: CPT | Mod: GO

## 2021-09-04 PROCEDURE — 97535 SELF CARE MNGMENT TRAINING: CPT | Mod: GO

## 2021-09-04 PROCEDURE — 250N000013 HC RX MED GY IP 250 OP 250 PS 637: Performed by: PHYSICAL MEDICINE & REHABILITATION

## 2021-09-04 PROCEDURE — 250N000013 HC RX MED GY IP 250 OP 250 PS 637: Performed by: STUDENT IN AN ORGANIZED HEALTH CARE EDUCATION/TRAINING PROGRAM

## 2021-09-04 PROCEDURE — 128N000003 HC R&B REHAB

## 2021-09-04 PROCEDURE — 97116 GAIT TRAINING THERAPY: CPT | Mod: GP | Performed by: PHYSICAL THERAPIST

## 2021-09-04 PROCEDURE — 97530 THERAPEUTIC ACTIVITIES: CPT | Mod: GP | Performed by: PHYSICAL THERAPIST

## 2021-09-04 PROCEDURE — 97530 THERAPEUTIC ACTIVITIES: CPT | Mod: GO

## 2021-09-04 RX ADMIN — Medication 250 MG: at 20:41

## 2021-09-04 RX ADMIN — FLUDROCORTISONE ACETATE 100 MCG: 0.1 TABLET ORAL at 09:13

## 2021-09-04 RX ADMIN — PSYLLIUM HUSK 1 PACKET: 3.4 POWDER ORAL at 09:13

## 2021-09-04 RX ADMIN — PANTOPRAZOLE SODIUM 40 MG: 40 TABLET, DELAYED RELEASE ORAL at 09:13

## 2021-09-04 RX ADMIN — PANTOPRAZOLE SODIUM 40 MG: 40 TABLET, DELAYED RELEASE ORAL at 16:47

## 2021-09-04 RX ADMIN — DICLOFENAC SODIUM 4 G: 10 GEL TOPICAL at 13:37

## 2021-09-04 RX ADMIN — TRAZODONE HYDROCHLORIDE 100 MG: 50 TABLET ORAL at 20:41

## 2021-09-04 RX ADMIN — Medication 250 MG: at 09:13

## 2021-09-04 RX ADMIN — MIDODRINE HYDROCHLORIDE 10 MG: 5 TABLET ORAL at 09:13

## 2021-09-04 RX ADMIN — CYCLOBENZAPRINE 10 MG: 5 TABLET, FILM COATED ORAL at 20:41

## 2021-09-04 RX ADMIN — Medication 1 MG: at 20:41

## 2021-09-04 RX ADMIN — PSYLLIUM HUSK 1 PACKET: 3.4 POWDER ORAL at 20:41

## 2021-09-04 RX ADMIN — THERA TABS 1 TABLET: TAB at 09:13

## 2021-09-04 RX ADMIN — ACETAMINOPHEN 650 MG: 325 TABLET, FILM COATED ORAL at 13:03

## 2021-09-04 RX ADMIN — MIDODRINE HYDROCHLORIDE 10 MG: 5 TABLET ORAL at 16:47

## 2021-09-04 RX ADMIN — MIDODRINE HYDROCHLORIDE 10 MG: 5 TABLET ORAL at 12:58

## 2021-09-04 NOTE — PLAN OF CARE
"Discharge Planner Post-Acute Rehab PT:     Discharge Plan: Plan: TCU     Precautions: fall, cervical spinal, involuntary twitching in lower extremities, orthostatic hypotension- Abdominal binder/LE stockinette. L brace prior to standing.      Current Status:  Bed Mobility: IDN   Transfer: SBA  4WW   Gait: 4ww- >20' at CGA-SBA  Stairs:4 x 4 6\" steps with B railings and reciprocal pattern with CGA.  Balance: SBA     Assessment:  Completed prolonged gait outside over uneven terrain using 4ww.  Pt has good awareness of BP and able to manage Ind.   Pt continues to progress with stairs using reciprocal pattern for increased LE symmetrical strengthening.  Pt noted mild L knee pain, but relieved with stretching.    Other Barriers to Discharge (DME, Family Training, etc): Pt has good family support and accessible home. family training, determine DME and improve ADL performance.   Barriers: Orthostatic BP, symptomatic, new L ankle instability    "

## 2021-09-04 NOTE — PLAN OF CARE
FOCUS/GOAL  Bowel management, Bladder management, Pain management, Mobility, and Safety management    ASSESSMENT, INTERVENTIONS AND CONTINUING PLAN FOR GOAL:  Patient is alert and oriented x 4. Denied pain. Able to make his needs known. Mod I with walker during the day and needs SBA at night. No bed alarm needed. Scheduled Midodrine held due to SBP. Unable to void. Straight catheterized twice this shift (see I & O).  Refused Docusate enema. CPAP at hs. Refused shower.

## 2021-09-04 NOTE — PROGRESS NOTES
"  [facility]   Acute Rehabilitation Unit  Daily progress note    INTERVAL HISTORY    No acute events overnight.     Jackson is seen this am while he is sitting in his chair. He had his torres removed on 9/2 with ongoing urinary retention. He states he would like to continue his voiding trial today, but ok with getting his torres back in this evening. He would like to get a couple of solid nights of sleep in and maybe attempt another voiding trial before discharging. He endorses some right neck and trap pain, may be from sleeping in an awkward position or from therapies. Open to using voltaren gel, but the pain is tolerable. No other concerns at this time. Denies n/v, fevers, chills, SOB, and chest pain.       MEDICATIONS  Scheduled meds    cyclobenzaprine  10 mg Oral At Bedtime     docusate sodium  1 enema Rectal BID     fludrocortisone  100 mcg Oral Daily     melatonin  1 mg Oral At Bedtime     midodrine  10 mg Oral TID w/meals     multivitamin, therapeutic  1 tablet Oral Daily     pantoprazole  40 mg Oral BID AC     psyllium  1 packet Oral BID     saccharomyces boulardii  250 mg Oral BID     traZODone  100 mg Oral At Bedtime       PRN meds:  acetaminophen, cyclobenzaprine, diclofenac, lidocaine 4%, lidocaine (buffered or not buffered), miconazole, ondansetron, senna-docusate, sodium chloride (PF), sodium chloride (PF), White Petrolatum      PHYSICAL EXAM  /58 (BP Location: Right arm)   Pulse 84   Temp 97.1  F (36.2  C) (Oral)   Resp 20   Ht 1.803 m (5' 11\")   Wt 102.1 kg (225 lb)   SpO2 97%   BMI 31.38 kg/m       Gen: Awake, coooperative, no distress  HEENT: NC/AT  Cardio: RRR, radial pulses 2+ b/l  Pulm: Non-labored on room air, CTAB  Ext: Compression stockings in place.   Neuro/MSK: alert, oriented, answers questions appropriately, follows commands      LABS  CBC RESULTS:   Recent Labs   Lab Test 08/30/21  0812 08/25/21  0816 08/23/21  0948   WBC 6.9 7.3 7.7   RBC " 2.99* 2.93* 3.17*   HGB 9.7* 9.5* 10.1*   HCT 29.2* 29.1* 31.2*   MCV 98 99 98   MCH 32.4 32.4 31.9   MCHC 33.2 32.6 32.4   RDW 11.9 11.8 11.9    158 156     Last Basic Metabolic Panel:  Recent Labs   Lab Test 08/30/21  0812 08/25/21  0816 08/23/21  0948    142 143   POTASSIUM 3.8 4.2 4.0   CHLORIDE 109 110* 109   CO2 31 31 29   ANIONGAP 1* 1* 5   GLC 82 84 117*   BUN 21 17 17   CR 1.30* 1.29* 1.36*   GFRESTIMATED 55* 55* 52*   GARRISON 8.6 8.3* 8.7     CRP Inflammation   Date Value Ref Range Status   08/30/2021 35.0 (H) 0.0 - 8.0 mg/L Final         ASSESSMENT AND PLAN    --Vitals stable. No labs today.  --Will replace torres tonight at 9p with continued retention. May want to have another TOV before his discharge date. Continue other ongoing medical management.  --Continue therapies and plan of care.

## 2021-09-04 NOTE — PLAN OF CARE
FOCUS/GOAL  Bowel management, Bladder management, Medication management, Pain management, Medical management, Mobility, Skin integrity, and Safety management     ASSESSMENT, INTERVENTIONS AND CONTINUING PLAN FOR GOAL:        Pt is alert and oriented x 4. Continent of bowel with enema, unable to void, PVR and intermittent st cath performed. Pt wants to wait until evening to have torres catheter placed. Mod-I with 4WW during the day after compression is donned and SBA at night. Reg diet, thin liquids. Reported pain in right shoulder, managed with diclofenac gel and tylenol. Denied nausea and vomiting, numbness or tingling, SOB. Will continue with plan of care.

## 2021-09-04 NOTE — PLAN OF CARE
FOCUS/GOAL  Medical management    ASSESSMENT, INTERVENTIONS AND CONTINUING PLAN FOR GOAL:  Patient slept well between cares, no C/O pain. No void, at 0220, bladder scan was 271 ml, patient would like to be rechecked at 0400.  He is modified independent in the room using a walker during the day, SBA at night, he did not get OOB tonight. At 0425, bladder scan was between 264 and 310 ml. Patient said he does not trust the bladder scanner, he feels he has more urine than what the scanner is showing. He denied urgency or feeling pressure in lower abdomen, even when he was applying pressure on the lower abdomen himself. At 0530, still no void, bladder scan for 273 ml, he still does not believe the bladder scan results. ISC  Ordered to be performed if bladder scan after no void for 4 hours is above 450 ml. Davis was removed on 9/2.

## 2021-09-04 NOTE — PLAN OF CARE
Discharge Planner Post-Acute Rehab OT:      Discharge Plan: Home with HH OT and HHA     Precautions: fall, cervical spinal, involuntary twitching in lower extremities, orthostatic hypotension- Abdominal binder/LE stockinette. L brace prior to standing.     Current Status:  ADLs: IND UB dressing, SBA don shorts EOB, toilet transfer in bathroom using 4WW MOD I, MOD I toileting and standing at sink with WC behind for grooming  IADLs: not tested, wife can assist  Vision/Cognition: WFL     Assessment: Pt progressing with functional mobility using 4WW(BP more stable in afternoon session). Patient demonstrated good kitchen safety during kitchen task. Will continue POC.     Other Barriers to Discharge (DME, Family Training, etc): Pt has good family support and accessible home. family training, determine DME and improve ADL performance.   Barriers: Orthostatic BP, symptomatic, new L ankle instability

## 2021-09-05 ENCOUNTER — APPOINTMENT (OUTPATIENT)
Dept: PHYSICAL THERAPY | Facility: CLINIC | Age: 71
DRG: 949 | End: 2021-09-05
Attending: PHYSICAL MEDICINE & REHABILITATION
Payer: COMMERCIAL

## 2021-09-05 ENCOUNTER — APPOINTMENT (OUTPATIENT)
Dept: OCCUPATIONAL THERAPY | Facility: CLINIC | Age: 71
DRG: 949 | End: 2021-09-05
Attending: PHYSICAL MEDICINE & REHABILITATION
Payer: COMMERCIAL

## 2021-09-05 PROCEDURE — 128N000003 HC R&B REHAB

## 2021-09-05 PROCEDURE — 97535 SELF CARE MNGMENT TRAINING: CPT | Mod: GO

## 2021-09-05 PROCEDURE — 97530 THERAPEUTIC ACTIVITIES: CPT | Mod: GP | Performed by: PHYSICAL THERAPIST

## 2021-09-05 PROCEDURE — 250N000013 HC RX MED GY IP 250 OP 250 PS 637: Performed by: PHYSICIAN ASSISTANT

## 2021-09-05 PROCEDURE — 250N000013 HC RX MED GY IP 250 OP 250 PS 637: Performed by: PHYSICAL MEDICINE & REHABILITATION

## 2021-09-05 PROCEDURE — 97116 GAIT TRAINING THERAPY: CPT | Mod: GP | Performed by: PHYSICAL THERAPIST

## 2021-09-05 PROCEDURE — 250N000013 HC RX MED GY IP 250 OP 250 PS 637: Performed by: STUDENT IN AN ORGANIZED HEALTH CARE EDUCATION/TRAINING PROGRAM

## 2021-09-05 PROCEDURE — 97110 THERAPEUTIC EXERCISES: CPT | Mod: GP | Performed by: PHYSICAL THERAPIST

## 2021-09-05 RX ADMIN — MIDODRINE HYDROCHLORIDE 10 MG: 5 TABLET ORAL at 16:09

## 2021-09-05 RX ADMIN — Medication 250 MG: at 09:17

## 2021-09-05 RX ADMIN — Medication 250 MG: at 20:57

## 2021-09-05 RX ADMIN — Medication 1 MG: at 20:56

## 2021-09-05 RX ADMIN — FLUDROCORTISONE ACETATE 100 MCG: 0.1 TABLET ORAL at 09:17

## 2021-09-05 RX ADMIN — PSYLLIUM HUSK 1 PACKET: 3.4 POWDER ORAL at 09:17

## 2021-09-05 RX ADMIN — PANTOPRAZOLE SODIUM 40 MG: 40 TABLET, DELAYED RELEASE ORAL at 16:09

## 2021-09-05 RX ADMIN — PANTOPRAZOLE SODIUM 40 MG: 40 TABLET, DELAYED RELEASE ORAL at 09:17

## 2021-09-05 RX ADMIN — PSYLLIUM HUSK 1 PACKET: 3.4 POWDER ORAL at 20:56

## 2021-09-05 RX ADMIN — TRAZODONE HYDROCHLORIDE 100 MG: 50 TABLET ORAL at 20:57

## 2021-09-05 RX ADMIN — CYCLOBENZAPRINE 10 MG: 5 TABLET, FILM COATED ORAL at 20:56

## 2021-09-05 RX ADMIN — THERA TABS 1 TABLET: TAB at 09:17

## 2021-09-05 RX ADMIN — MIDODRINE HYDROCHLORIDE 10 MG: 5 TABLET ORAL at 09:17

## 2021-09-05 RX ADMIN — MIDODRINE HYDROCHLORIDE 10 MG: 5 TABLET ORAL at 12:33

## 2021-09-05 RX ADMIN — DOCUSATE SODIUM 1 ENEMA: 283 LIQUID RECTAL at 09:17

## 2021-09-05 NOTE — PLAN OF CARE
Discharge Planner Post-Acute Rehab OT:      Discharge Plan: Home with HH OT and HHA     Precautions: fall, cervical spinal, involuntary twitching in lower extremities, orthostatic hypotension- Abdominal binder/LE stockinette. L brace prior to standing.     Current Status:  ADLs: IND UB dressing, SBA don shorts EOB, toilet transfer in bathroom using 4WW MOD I, MOD I toileting and standing at sink with WC behind for grooming  IADLs: not tested, wife can assist  Vision/Cognition: WFL     Assessment: Family training completed with pt wife.   Determine placement for grab bars in shower and toilet for home.   Complete kitchen mobility with wife providing recommendations for safety at home.    Other Barriers to Discharge (DME, Family Training, etc): Pt has good family support and accessible home. family training, determine DME and improve ADL performance.   Barriers: Orthostatic BP, symptomatic, new L ankle instability

## 2021-09-05 NOTE — PLAN OF CARE
FOCUS/GOAL  Bladder management, Pain management, Mobility, and Safety management    ASSESSMENT, INTERVENTIONS AND CONTINUING PLAN FOR GOAL:  Patient is alert and oriented x 4. Denied pain. Modified independent using walker for transfers. SBA at night. Unable to void. Bladder scan this pm was 470 and urine output after straight catheterization was 800cc. Davis catheter inserted at 2030 as ordered. Tolerated procedure well. CPAP in use at hs. Refused Docusate enema.

## 2021-09-05 NOTE — PLAN OF CARE
FOCUS/GOAL  Medical management    ASSESSMENT, INTERVENTIONS AND CONTINUING PLAN FOR GOAL:  Patient slept well, no c/o pain. He is modified independent in the room using a walker during the day, SBA at night. Davis was reinserted yesterday.  Continue to promote patient independency.

## 2021-09-05 NOTE — PLAN OF CARE
Alert and oriented x4. Continent of bowel and Davis present draining yellow urine. LBM 9/5. Denies pain. MOD I with walker in room once compression stockings and abdominal binder are on. Regular thin diet. Skin bruised. Bed alarm on for safety, call light within reach, Ok to continue with care plan.

## 2021-09-05 NOTE — PLAN OF CARE
"Discharge Planner Post-Acute Rehab PT:     Discharge Plan: Plan: TCU     Precautions: fall, cervical spinal, involuntary twitching in lower extremities, orthostatic hypotension- Abdominal binder/LE stockinette. L brace prior to standing.      Current Status:  Bed Mobility: IDN   Transfer: SBA  4WW   Gait: 4ww- >20' at CGA-SBA  Stairs:4 x 4 6\" steps with B railings and reciprocal pattern with CGA.  Balance: SBA     Assessment:  Family training completed with wife present.  Pt will needed to navigate platform steps x2 or 2 standard steps to enter home with 4ww.  PT to assess on safest approach to entering home prior to discharge.  Wife and Pt had no further PT discharge questions.      Other Barriers to Discharge (DME, Family Training, etc): Pt has good family support and accessible home. family training, determine DME and improve ADL performance.   Barriers: Orthostatic BP, symptomatic, new L ankle instability    "

## 2021-09-06 LAB
ANION GAP SERPL CALCULATED.3IONS-SCNC: 3 MMOL/L (ref 3–14)
BUN SERPL-MCNC: 19 MG/DL (ref 7–30)
CALCIUM SERPL-MCNC: 8.6 MG/DL (ref 8.5–10.1)
CHLORIDE BLD-SCNC: 110 MMOL/L (ref 94–109)
CO2 SERPL-SCNC: 27 MMOL/L (ref 20–32)
CREAT SERPL-MCNC: 1.23 MG/DL (ref 0.66–1.25)
ERYTHROCYTE [DISTWIDTH] IN BLOOD BY AUTOMATED COUNT: 12.3 % (ref 10–15)
GFR SERPL CREATININE-BSD FRML MDRD: 59 ML/MIN/1.73M2
GLUCOSE BLD-MCNC: 92 MG/DL (ref 70–99)
HCT VFR BLD AUTO: 33 % (ref 40–53)
HGB BLD-MCNC: 10.6 G/DL (ref 13.3–17.7)
MCH RBC QN AUTO: 31.7 PG (ref 26.5–33)
MCHC RBC AUTO-ENTMCNC: 32.1 G/DL (ref 31.5–36.5)
MCV RBC AUTO: 99 FL (ref 78–100)
PLATELET # BLD AUTO: 172 10E3/UL (ref 150–450)
POTASSIUM BLD-SCNC: 3.6 MMOL/L (ref 3.4–5.3)
RBC # BLD AUTO: 3.34 10E6/UL (ref 4.4–5.9)
SODIUM SERPL-SCNC: 140 MMOL/L (ref 133–144)
WBC # BLD AUTO: 5.6 10E3/UL (ref 4–11)

## 2021-09-06 PROCEDURE — 85027 COMPLETE CBC AUTOMATED: CPT | Performed by: PHYSICIAN ASSISTANT

## 2021-09-06 PROCEDURE — 36415 COLL VENOUS BLD VENIPUNCTURE: CPT | Performed by: PHYSICIAN ASSISTANT

## 2021-09-06 PROCEDURE — 80048 BASIC METABOLIC PNL TOTAL CA: CPT | Performed by: PHYSICIAN ASSISTANT

## 2021-09-06 PROCEDURE — 250N000013 HC RX MED GY IP 250 OP 250 PS 637: Performed by: PHYSICIAN ASSISTANT

## 2021-09-06 PROCEDURE — 128N000003 HC R&B REHAB

## 2021-09-06 PROCEDURE — 250N000013 HC RX MED GY IP 250 OP 250 PS 637: Performed by: PHYSICAL MEDICINE & REHABILITATION

## 2021-09-06 PROCEDURE — 250N000013 HC RX MED GY IP 250 OP 250 PS 637: Performed by: STUDENT IN AN ORGANIZED HEALTH CARE EDUCATION/TRAINING PROGRAM

## 2021-09-06 RX ADMIN — PANTOPRAZOLE SODIUM 40 MG: 40 TABLET, DELAYED RELEASE ORAL at 08:14

## 2021-09-06 RX ADMIN — TRAZODONE HYDROCHLORIDE 100 MG: 50 TABLET ORAL at 20:46

## 2021-09-06 RX ADMIN — MIDODRINE HYDROCHLORIDE 10 MG: 5 TABLET ORAL at 12:22

## 2021-09-06 RX ADMIN — Medication 1 MG: at 20:05

## 2021-09-06 RX ADMIN — MIDODRINE HYDROCHLORIDE 10 MG: 5 TABLET ORAL at 08:14

## 2021-09-06 RX ADMIN — Medication 250 MG: at 20:05

## 2021-09-06 RX ADMIN — PSYLLIUM HUSK 1 PACKET: 3.4 POWDER ORAL at 20:05

## 2021-09-06 RX ADMIN — THERA TABS 1 TABLET: TAB at 08:14

## 2021-09-06 RX ADMIN — PANTOPRAZOLE SODIUM 40 MG: 40 TABLET, DELAYED RELEASE ORAL at 15:57

## 2021-09-06 RX ADMIN — Medication 250 MG: at 08:13

## 2021-09-06 RX ADMIN — FLUDROCORTISONE ACETATE 100 MCG: 0.1 TABLET ORAL at 08:14

## 2021-09-06 RX ADMIN — CYCLOBENZAPRINE 10 MG: 5 TABLET, FILM COATED ORAL at 20:05

## 2021-09-06 RX ADMIN — MIDODRINE HYDROCHLORIDE 10 MG: 5 TABLET ORAL at 15:57

## 2021-09-06 RX ADMIN — PSYLLIUM HUSK 1 PACKET: 3.4 POWDER ORAL at 08:13

## 2021-09-06 NOTE — PLAN OF CARE
Patient is A&OX4, makes needs known. SARAH during day. SBA at [facility]. Torres in place. Torres care training was not completed with family today. Writer gave educational pamphlet on torres cares. Will need to start hands on demonstration of torres cares tomorrow. LBM per patient was today. VSS. Call light within reach.

## 2021-09-06 NOTE — PROGRESS NOTES
"  [facility]   Acute Rehabilitation Unit  Daily progress note    INTERVAL HISTORY    No acute events overnight. Davis was replaced evening of 9/4 as he continued to retain.    Jackson today is feeling well and happy he is \"getting some mileage\" as he is able to better mobilize. He mentions he may want to retry his voiding trial again tomorrow. Suggested if he does attempt TOV again, that his bladder retention volume can be set at a lower level to trigger a straight cath for the nurses, as he is quite sure the bladder scan underestimates the bladder volume. Other than that, he has no concerns and looking forward to discharging later this week. Denies n/v, fevers, chills, SOB, and chest pain.       MEDICATIONS  Scheduled meds    cyclobenzaprine  10 mg Oral At Bedtime     docusate sodium  1 enema Rectal BID     fludrocortisone  100 mcg Oral Daily     melatonin  1 mg Oral At Bedtime     midodrine  10 mg Oral TID w/meals     multivitamin, therapeutic  1 tablet Oral Daily     pantoprazole  40 mg Oral BID AC     psyllium  1 packet Oral BID     saccharomyces boulardii  250 mg Oral BID     traZODone  100 mg Oral At Bedtime       PRN meds:  acetaminophen, cyclobenzaprine, diclofenac, lidocaine 4%, lidocaine (buffered or not buffered), miconazole, ondansetron, senna-docusate, sodium chloride (PF), sodium chloride (PF), White Petrolatum      PHYSICAL EXAM  /61 (BP Location: Right arm)   Pulse 80   Temp (!) 96.7  F (35.9  C) (Oral)   Resp 20   Ht 1.803 m (5' 11\")   Wt 102.1 kg (225 lb)   SpO2 93%   BMI 31.38 kg/m       Gen: Awake, coooperative, no distress  HEENT: NC/AT  Cardio: RRR, radial pulses 2+ b/l  Pulm: Non-labored on room air, CTAB  Abd: binder in place, no tenderness to palpation, bowel sounds present  Ext: Compression stockings in place.   Neuro/MSK: alert, oriented, answers questions appropriately, follows commands      LABS  CBC RESULTS:   Recent Labs   Lab Test " 08/30/21  0812 08/25/21  0816 08/23/21  0948   WBC 6.9 7.3 7.7   RBC 2.99* 2.93* 3.17*   HGB 9.7* 9.5* 10.1*   HCT 29.2* 29.1* 31.2*   MCV 98 99 98   MCH 32.4 32.4 31.9   MCHC 33.2 32.6 32.4   RDW 11.9 11.8 11.9    158 156     Last Basic Metabolic Panel:  Recent Labs   Lab Test 08/30/21  0812 08/25/21  0816 08/23/21  0948    142 143   POTASSIUM 3.8 4.2 4.0   CHLORIDE 109 110* 109   CO2 31 31 29   ANIONGAP 1* 1* 5   GLC 82 84 117*   BUN 21 17 17   CR 1.30* 1.29* 1.36*   GFRESTIMATED 55* 55* 52*   GARRISON 8.6 8.3* 8.7     CRP Inflammation   Date Value Ref Range Status   08/30/2021 35.0 (H) 0.0 - 8.0 mg/L Final         ASSESSMENT AND PLAN    --Vitals stable.   --Cr 1.30 today (slight bump from 1.29 on 8/25); CRP continues to down-trend at 35.0 today (62.0 on 8/25 & 81.0 on 8/20); CBC stable.  --Continue ongoing medical management.  --Continue therapies tomorrow and scheduled plan of care today.

## 2021-09-06 NOTE — PLAN OF CARE
FOCUS/GOAL  Medical management    ASSESSMENT, INTERVENTIONS AND CONTINUING PLAN FOR GOAL:  Patient slept well, he did not call for assistance, no c/o pain. He has a torres. Plan to discharge to home Thursday, he still needs family training for torres cares, evening staff gave him torres cares handouts, he needs to practice along with family training.   He is modified independent using a walker in the room during the day and SBA during the night.

## 2021-09-06 NOTE — PLAN OF CARE
Pt denies pain, chest pain or SOB. Mod I w/ walker during the day. Jobst stockings and abdominal binder on. VSS.   Torres catheter in place, draining good amount of clear yellow urine. Pt practiced how to empty torres bag into toilet.   No new care concerns.

## 2021-09-07 ENCOUNTER — APPOINTMENT (OUTPATIENT)
Dept: OCCUPATIONAL THERAPY | Facility: CLINIC | Age: 71
DRG: 949 | End: 2021-09-07
Attending: PHYSICAL MEDICINE & REHABILITATION
Payer: COMMERCIAL

## 2021-09-07 ENCOUNTER — APPOINTMENT (OUTPATIENT)
Dept: PHYSICAL THERAPY | Facility: CLINIC | Age: 71
DRG: 949 | End: 2021-09-07
Attending: PHYSICAL MEDICINE & REHABILITATION
Payer: COMMERCIAL

## 2021-09-07 PROCEDURE — 250N000013 HC RX MED GY IP 250 OP 250 PS 637: Performed by: STUDENT IN AN ORGANIZED HEALTH CARE EDUCATION/TRAINING PROGRAM

## 2021-09-07 PROCEDURE — 250N000013 HC RX MED GY IP 250 OP 250 PS 637: Performed by: PHYSICIAN ASSISTANT

## 2021-09-07 PROCEDURE — 128N000003 HC R&B REHAB

## 2021-09-07 PROCEDURE — 97535 SELF CARE MNGMENT TRAINING: CPT | Mod: GO | Performed by: STUDENT IN AN ORGANIZED HEALTH CARE EDUCATION/TRAINING PROGRAM

## 2021-09-07 PROCEDURE — 250N000013 HC RX MED GY IP 250 OP 250 PS 637: Performed by: PHYSICAL MEDICINE & REHABILITATION

## 2021-09-07 PROCEDURE — 97530 THERAPEUTIC ACTIVITIES: CPT | Mod: GP

## 2021-09-07 PROCEDURE — 99232 SBSQ HOSP IP/OBS MODERATE 35: CPT | Mod: 24 | Performed by: PHYSICAL MEDICINE & REHABILITATION

## 2021-09-07 PROCEDURE — 97110 THERAPEUTIC EXERCISES: CPT | Mod: GO | Performed by: STUDENT IN AN ORGANIZED HEALTH CARE EDUCATION/TRAINING PROGRAM

## 2021-09-07 PROCEDURE — 97110 THERAPEUTIC EXERCISES: CPT | Mod: GP

## 2021-09-07 RX ADMIN — MIDODRINE HYDROCHLORIDE 10 MG: 5 TABLET ORAL at 08:30

## 2021-09-07 RX ADMIN — PANTOPRAZOLE SODIUM 40 MG: 40 TABLET, DELAYED RELEASE ORAL at 08:30

## 2021-09-07 RX ADMIN — Medication 250 MG: at 21:32

## 2021-09-07 RX ADMIN — Medication 1 MG: at 21:32

## 2021-09-07 RX ADMIN — PSYLLIUM HUSK 1 PACKET: 3.4 POWDER ORAL at 08:29

## 2021-09-07 RX ADMIN — CYCLOBENZAPRINE 10 MG: 5 TABLET, FILM COATED ORAL at 21:31

## 2021-09-07 RX ADMIN — FLUDROCORTISONE ACETATE 100 MCG: 0.1 TABLET ORAL at 08:29

## 2021-09-07 RX ADMIN — THERA TABS 1 TABLET: TAB at 08:30

## 2021-09-07 RX ADMIN — PANTOPRAZOLE SODIUM 40 MG: 40 TABLET, DELAYED RELEASE ORAL at 16:54

## 2021-09-07 RX ADMIN — TRAZODONE HYDROCHLORIDE 100 MG: 50 TABLET ORAL at 21:31

## 2021-09-07 RX ADMIN — Medication 250 MG: at 08:29

## 2021-09-07 RX ADMIN — MIDODRINE HYDROCHLORIDE 10 MG: 5 TABLET ORAL at 11:56

## 2021-09-07 RX ADMIN — PSYLLIUM HUSK 1 PACKET: 3.4 POWDER ORAL at 21:31

## 2021-09-07 NOTE — PLAN OF CARE
FOCUS/GOAL  Bowel management and Bladder management    ASSESSMENT, INTERVENTIONS AND CONTINUING PLAN FOR GOAL:  Pt denies pain, chest pain, SOB or dizziness. VSS. Mod I w/ walker. Continent of bm today, used toilet. Educated pt how to change torres standard bag to leg bag, verbalized understanding. Pt declined enemeez today, informed PM shift RN to educate how to use prn enemeez per POC.

## 2021-09-07 NOTE — PROGRESS NOTES
"  [facility]   Acute Rehabilitation Unit  Daily progress note    INTERVAL HISTORY  Seen sitting up in chair reports weekend went well, denies n/v/, having more sensation with bowel movements and trying to taper himself off enemeeze- he states he feels sensation and is starting to have continent bowel movements though agreeable to take home for as needed.  Jackson reports he and his wife had training on torres catheter and wife has taken care of family member with torres in past so feeling good about this.  Jackson is happy to report he is tolerating more upright activity, he is having minimal left ankle spasms.  He denies shortness of breath, fevers or other concerns.  Planning for home 9/9.     PT: Amb progression to outdoor ambulation on uneven surfaces. Tolerating all functional mobility well with 4WW. No ongoing PT barriers to home. On track for discharge to home on Thursday.       OT: Pt doing very well managing symptoms, energy and building activity endurance. Pt completing simple kitchen tasks using 4WW. On track for discharge.       MEDICATIONS  Scheduled meds    cyclobenzaprine  10 mg Oral At Bedtime     docusate sodium  1 enema Rectal BID     fludrocortisone  100 mcg Oral Daily     melatonin  1 mg Oral At Bedtime     midodrine  10 mg Oral TID w/meals     multivitamin, therapeutic  1 tablet Oral Daily     pantoprazole  40 mg Oral BID AC     psyllium  1 packet Oral BID     saccharomyces boulardii  250 mg Oral BID     traZODone  100 mg Oral At Bedtime       PRN meds:  acetaminophen, cyclobenzaprine, diclofenac, lidocaine 4%, lidocaine (buffered or not buffered), miconazole, ondansetron, senna-docusate, sodium chloride (PF), sodium chloride (PF), White Petrolatum      PHYSICAL EXAM  /68 (BP Location: Left arm)   Pulse 88   Temp 97  F (36.1  C) (Oral)   Resp 20   Ht 1.803 m (5' 11\")   Wt 102.1 kg (225 lb)   SpO2 92%   BMI 31.38 kg/m       Gen: Awake, coooperative, no " distress  HEENT: NC/AT  Pulm: Non-labored clear on room air  CV: rrr  Ab: binder in place soft non tender   Ext: thigh high Compression stockings in place.   Neuro/MSK: alert, oriented, answers questions appropriately, follows commands      LABS  CBC RESULTS:   Recent Labs   Lab Test 09/06/21  0847 08/30/21  0812 08/25/21  0816   WBC 5.6 6.9 7.3   RBC 3.34* 2.99* 2.93*   HGB 10.6* 9.7* 9.5*   HCT 33.0* 29.2* 29.1*   MCV 99 98 99   MCH 31.7 32.4 32.4   MCHC 32.1 33.2 32.6   RDW 12.3 11.9 11.8    183 158     Last Basic Metabolic Panel:  Recent Labs   Lab Test 09/06/21  0847 08/30/21  0812 08/25/21  0816    141 142   POTASSIUM 3.6 3.8 4.2   CHLORIDE 110* 109 110*   CO2 27 31 31   ANIONGAP 3 1* 1*   GLC 92 82 84   BUN 19 21 17   CR 1.23 1.30* 1.29*   GFRESTIMATED 59* 55* 55*   GARRISON 8.6 8.6 8.3*     CRP Inflammation   Date Value Ref Range Status   08/30/2021 35.0 (H) 0.0 - 8.0 mg/L Final     IMAGING  L ankle XR (8/2/21)  Impression:  1. Likely sequela of prior medial malleolar fracture.  2. Soft tissue swelling about the ankle and small joint effusion.     ASSESSMENT AND PLAN    Rigo Johns is a 70 year old L hand dominant male with a PMH of HTN, PILI, and prior cervical surgery (C5-7 anterior fusion in 1993 per notes) who is now status post a C3-4, C4-5 ACDF on 7/14/2021 for cervical myelopathy.  He was admitted to the [facility] ARU from 7/18-7/27/21 but transferred back to the medical floor for fevers and prevertebral fluid collection suggestive of abscess.  Now improving with IV antbiotics and no surgical intervention was needed.  He is now being admitted to the ARU on 8/2/21.  Impairment group code: 04.1211 Quadriplegia, Incomplete C1-4 - s/p C3-5 ACDF.   PLAN  Rehabilitation  1. PT and OT 90 minutes of each on a daily basis, in addition to rehab nursing and close management of physiatrist.    2. Impairment of ADL's: Noted to have impaired ROM, impaired strength, impaired activity tolerance, edema  management, impaired balance, and impaired coordination, all affecting his ability to safely and independently perform basic ADLs.  Goal for mod I with basic ADLs.  3. Impairment of mobility:  Noted to have impaired ROM, impaired strength, impaired activity tolerance, edema management, impaired balance and impaired coordination, all affecting his ability to safely and independently ambulate and perform basic mobility.  Goal for mod I with basic mobility.  Medical  1)Neurology  #Cervical Myelopathy s/p C3-4, C4-5 ACDF on 7/14/21 complicated by fevers and prevertebral fluid collection, suggestive of abscess  -Follow up MRI completed 8/10 which shows near resolution of the prevertebral fluid.  Discussed with ID who also discussed with NSGY and images reviewed.  Antibiotics discontinued after 8/11 (completed 2 week course)  -Maintain postoperative spinal precautions. No need for cervical collar  -Follow-up with neurosurgery and infectious disease after discharge  -Fever of 101.3 8/18- 100.7 8/23 , WBC stable, CRP elevated but improving, MRI stable. -ID assistance greatly appreciated.  CRP now trending down (improved 8/30 to 35 from 62).    2)CVS  #Orthostasis  -Abd Binder, encourage liquids.  Advised to try drinking 500 ml quickly before getting up with therapies.  Have also ordered longer thigh high TEDs.  Encouraged sodium intake as well.  -continue Midodrine to 10 mg TID 8/23  -continue florinef 0.1 mg started 8/25- Consider increasing further if needed.  Pt declining at this time.  -Still orthostatic, but able to manage symptoms and is not as severe as it previously was and no longer a major hindrance to participation with therapies     3)Pulmonary  #CAP, resolved  -Completed course of antibiotics (Zosyn x1 in ED, Azithromycin 7/12-7/16, Ceftriaxone 7/12 - 7/17, and Cefuroxime x1) during initial hospitalization, 2 week course of abx for cervical fluid collection (Cefepime + Daptomycin) completed after  8/11  -Encourage incentive spirometer    #PILI  -Continue CPAP with home settings     4)FENGI  #Diet: Regular diet  #Neurogenic bowel  Required bid bowel program with enemeeze, now with improved sensation and patient attempting to taper off enemeeze  -Enemeeze prn- now with  Overall continence     #Melena  #Acute anemia  -Hgb 10.6 9/6.  Overall stable.  Continue to monitor  - Endoscopy 8/12 demonstrated duodenal ulcers that are no longer bleeding, which were likely the cause of his melena and anemia.   - Now completed course of IV Protonix.  Continue PO BID x 8 weeks (started 8/16) per GI recs and PO daily indefinitely there after.     #Liver Lesion  -Abnormal appearance is seen on CAT scan with slight nodularity inferiorly. LFTs within normal limits.  -Follow-up primary care physician     5)  #neurogenic bladder- Urinary Retention   -Has failed multiple trial fo voids.  Voiding trial done again on 8/10. Torres removed with repeat trial of void 9/2- required consistent straight cath and was unable to independently to void during 2 day trial, intermittent straight cath not felt to be option due to large volume fluid consumption to help with orthostatic hypotension would require q2-3 hour sic.  torres replaced 9/4.  Will discharge with torres in place.   -follow up urology    #DONOVAN and likely CKD   -Pt with limited physician follow up so baseline creatinine not known. Peak at 2.13, improved with IV hydration.  Cr 8/30 stable at 1.23 9/6.    -Avoid nephrotoxic agents, NSAID     #Renal lesion  -Incidental finding on CT scan  -Renal ultrasound showed simple cysts of bilateral kidneys  -Follow up with PCP     6)DVT prophylaxis  -PCDs and ambulation     7)Pain  #Spasms (improved)   -Tylenol 650 mg q4h PRN  -continue Flexeril 10 mg at bedtime while inpatient -  but will maintain BID PRN on discharge.     #Left ankle pain (improved)   -xr left ankle 8/24 showed prior medial malleolar fracture and degenerative  changes  -Presentation not consistent with gout and uric acid was WNL  -Ice PRN and  Active ankle brace (reports supportive)     8)Endo  -Prior steroid induced hyperglycemia.  HbA1c 5.0.   -Now completed course of steroids.  No need for further routine checks     9)Heme   #Acute anemia  -GI workup as above Hgb 10.6 9/6.     #Leukocytosis (resolved)   -Thought to be secondary to steroids which are now discontinued. WBCs wnl, crp down trendging 62 (prior 71) fever no clear source    #Thrombocytopenia (resolved)   -Noted upon initial admission to the emergency room. Had been stable low 100s, but now improved. 183 on 8/30.      10) Infectious disease  Recent prevertebral abscess- completed iv antibiotics MRI 8/19 with resolution  Fever- intermittent 8/18, 8/23 unclear source. MRI neg, UA neg, Cdiff neg, WBC wnl.  CRP elevated but now downtrending without acute intervention.  ROS + left ankle pain, orthostatic hypotension, fatigue.  Discussed with ID 8/18 with recommendation to monitor clinically .  -monitor closely pending trend consider re-consult ID if concerns arise    12)Social/Dispo  -Anticipate discharge home at a modified independent level for ambulation, mobility, and ADLs.   -ELOS:  Plan for discharge home 9/9  -Rehab prognosis: Fair  -Follow up appointments: PCP, NSGY (Dr. Hanley), ID     Renae Caballero PA-C  PM&R    Discussed with Dr. Whitehead

## 2021-09-07 NOTE — PROGRESS NOTES
Pt A&O. Uses call light to make needs known. Pt denies questions/concerns regarding torres cath cares. Educated pt on use of prn enemeez. Pt had questions answered and reports that the info is understood. Denies chest pain, SOB, n/v, blurry/double vision, HA, numbness/tingling or new weakness. B/P 165/90, 87 when midodrine to be admin; pt noted to be ambulating in room. Educated pt to update nurse with any symptoms listed above. 135/70, 100 at HS. Torres cath patent and draining. Educated on torres cath cares at insertion site. Transfers Mod-I during day, SBA with 4WW at [facility]. CPAP at HS. No alarms in use per order. . Call light within reach.

## 2021-09-07 NOTE — PLAN OF CARE
Patient alert and oriented x4. Mod I in room with walker during day, SBA at night. No alarms on. Continent of bowel, torres catheter, patient emptying independently. LBM: 9/6. Regular diet, thins, pills whole. Pt using CPAP at night. Refused evening enema. Pt denies pain throughout shift. Will continue with POC.

## 2021-09-07 NOTE — PLAN OF CARE
FOCUS/GOAL  Medical management    ASSESSMENT, INTERVENTIONS AND CONTINUING PLAN FOR GOAL:  Pt does not want to be bothered at night time. He did not call through the night. Seen sleeping w/ his cpap on during safety round checks. Has torres on, collection bag w/ urine. No sign of pain or sob noted. Cont w/ POC.

## 2021-09-07 NOTE — PLAN OF CARE
Discharge Planner Post-Acute Rehab PT:     Discharge Plan: Plan: Home with HCPT on Thursday 9/9     Precautions: fall, cervical spinal, orthostatic hypotension- Abdominal binder, thigh high compression    Current Status:  Bed Mobility: IND   Transfer: IND 4WW   Gait: 4WW up to 300 ft, MOD I  Stairs:12 stairs with B rails, reciprocal MOD I   Balance: Adequate with 4WW    Assessment:  Amb progression to outdoor ambulation on uneven surfaces. Tolerating all functional mobility well with 4WW. No ongoing PT barriers to home. On track for discharge to home on Thursday.    Other Barriers to Discharge (DME, Family Training, etc): 4WW to arrive from [facility] tomorrow or Thursday

## 2021-09-07 NOTE — PLAN OF CARE
Discharge Planner Post-Acute Rehab OT:      Discharge Plan: Home with HH OT and HHA     Precautions: fall, cervical spinal, involuntary twitching in lower extremities, orthostatic hypotension- Abdominal binder/LE stockinette. L brace prior to standing.     Current Status:  ADLs: IND UB dressing, SBA don shorts EOB, toilet transfer in bathroom using 4WW MOD I, MOD I toileting and standing at sink with WC behind for grooming  IADLs: not tested, wife can assist  Vision/Cognition: WFL     Assessment:Pt doing very well managing symptoms, energy and building activity endurance. Pt completing simple kitchen tasks using 4WW. On track for discharge.     Other Barriers to Discharge (DME, Family Training, etc): Pt has good family support and accessible home. family training, determine DME and improve ADL performance.   Barriers: Orthostatic BP, symptomatic, new L ankle instability

## 2021-09-08 ENCOUNTER — APPOINTMENT (OUTPATIENT)
Dept: PHYSICAL THERAPY | Facility: CLINIC | Age: 71
DRG: 949 | End: 2021-09-08
Attending: PHYSICAL MEDICINE & REHABILITATION
Payer: COMMERCIAL

## 2021-09-08 ENCOUNTER — DOCUMENTATION ONLY (OUTPATIENT)
Dept: REHABILITATION | Facility: CLINIC | Age: 71
End: 2021-09-08

## 2021-09-08 ENCOUNTER — APPOINTMENT (OUTPATIENT)
Dept: OCCUPATIONAL THERAPY | Facility: CLINIC | Age: 71
DRG: 949 | End: 2021-09-08
Attending: PHYSICAL MEDICINE & REHABILITATION
Payer: COMMERCIAL

## 2021-09-08 PROCEDURE — 128N000003 HC R&B REHAB

## 2021-09-08 PROCEDURE — 97116 GAIT TRAINING THERAPY: CPT | Mod: GP

## 2021-09-08 PROCEDURE — 97110 THERAPEUTIC EXERCISES: CPT | Mod: GO | Performed by: OCCUPATIONAL THERAPIST

## 2021-09-08 PROCEDURE — 99232 SBSQ HOSP IP/OBS MODERATE 35: CPT | Mod: 24 | Performed by: PHYSICAL MEDICINE & REHABILITATION

## 2021-09-08 PROCEDURE — 250N000013 HC RX MED GY IP 250 OP 250 PS 637: Performed by: PHYSICIAN ASSISTANT

## 2021-09-08 PROCEDURE — 97535 SELF CARE MNGMENT TRAINING: CPT | Mod: GO | Performed by: OCCUPATIONAL THERAPIST

## 2021-09-08 PROCEDURE — 250N000013 HC RX MED GY IP 250 OP 250 PS 637: Performed by: STUDENT IN AN ORGANIZED HEALTH CARE EDUCATION/TRAINING PROGRAM

## 2021-09-08 PROCEDURE — 97530 THERAPEUTIC ACTIVITIES: CPT | Mod: GP

## 2021-09-08 PROCEDURE — 250N000013 HC RX MED GY IP 250 OP 250 PS 637: Performed by: PHYSICAL MEDICINE & REHABILITATION

## 2021-09-08 PROCEDURE — 97750 PHYSICAL PERFORMANCE TEST: CPT | Mod: GP

## 2021-09-08 PROCEDURE — 97110 THERAPEUTIC EXERCISES: CPT | Mod: GP

## 2021-09-08 RX ORDER — PANTOPRAZOLE SODIUM 40 MG/1
40 TABLET, DELAYED RELEASE ORAL DAILY
Qty: 30 TABLET | Refills: 0 | Status: SHIPPED | OUTPATIENT
Start: 2021-10-12 | End: 2022-04-13

## 2021-09-08 RX ORDER — TRAZODONE HYDROCHLORIDE 100 MG/1
100 TABLET ORAL AT BEDTIME
Qty: 30 TABLET | Refills: 0 | Status: SHIPPED | OUTPATIENT
Start: 2021-09-08 | End: 2021-11-09

## 2021-09-08 RX ORDER — AMOXICILLIN 250 MG
1 CAPSULE ORAL 2 TIMES DAILY PRN
Qty: 60 TABLET | Refills: 0 | Status: SHIPPED | OUTPATIENT
Start: 2021-09-08 | End: 2021-11-09

## 2021-09-08 RX ORDER — FLUDROCORTISONE ACETATE 0.1 MG/1
0.1 TABLET ORAL DAILY
Qty: 30 TABLET | Refills: 0 | Status: SHIPPED | OUTPATIENT
Start: 2021-09-09 | End: 2021-11-09

## 2021-09-08 RX ORDER — MULTIVITAMIN,THERAPEUTIC
1 TABLET ORAL DAILY
Qty: 30 TABLET | Refills: 0 | Status: SHIPPED | OUTPATIENT
Start: 2021-09-09 | End: 2021-11-09

## 2021-09-08 RX ORDER — PANTOPRAZOLE SODIUM 40 MG/1
40 TABLET, DELAYED RELEASE ORAL
Qty: 65 TABLET | Refills: 0 | Status: SHIPPED | OUTPATIENT
Start: 2021-09-09 | End: 2021-10-12

## 2021-09-08 RX ORDER — CYCLOBENZAPRINE HCL 10 MG
10 TABLET ORAL AT BEDTIME
Qty: 60 TABLET | Refills: 0 | Status: SHIPPED | OUTPATIENT
Start: 2021-09-08 | End: 2021-11-09

## 2021-09-08 RX ORDER — MIDODRINE HYDROCHLORIDE 10 MG/1
10 TABLET ORAL
Qty: 90 TABLET | Refills: 0 | Status: SHIPPED | OUTPATIENT
Start: 2021-09-08 | End: 2021-11-09

## 2021-09-08 RX ADMIN — MIDODRINE HYDROCHLORIDE 10 MG: 5 TABLET ORAL at 08:40

## 2021-09-08 RX ADMIN — CYCLOBENZAPRINE 10 MG: 5 TABLET, FILM COATED ORAL at 20:53

## 2021-09-08 RX ADMIN — THERA TABS 1 TABLET: TAB at 08:40

## 2021-09-08 RX ADMIN — PSYLLIUM HUSK 1 PACKET: 3.4 POWDER ORAL at 20:53

## 2021-09-08 RX ADMIN — PSYLLIUM HUSK 1 PACKET: 3.4 POWDER ORAL at 08:40

## 2021-09-08 RX ADMIN — MIDODRINE HYDROCHLORIDE 10 MG: 5 TABLET ORAL at 16:37

## 2021-09-08 RX ADMIN — Medication 1 MG: at 20:52

## 2021-09-08 RX ADMIN — Medication 250 MG: at 20:52

## 2021-09-08 RX ADMIN — PANTOPRAZOLE SODIUM 40 MG: 40 TABLET, DELAYED RELEASE ORAL at 16:37

## 2021-09-08 RX ADMIN — FLUDROCORTISONE ACETATE 100 MCG: 0.1 TABLET ORAL at 08:40

## 2021-09-08 RX ADMIN — Medication 250 MG: at 08:40

## 2021-09-08 RX ADMIN — MIDODRINE HYDROCHLORIDE 10 MG: 5 TABLET ORAL at 11:42

## 2021-09-08 RX ADMIN — TRAZODONE HYDROCHLORIDE 100 MG: 50 TABLET ORAL at 20:53

## 2021-09-08 RX ADMIN — PANTOPRAZOLE SODIUM 40 MG: 40 TABLET, DELAYED RELEASE ORAL at 08:40

## 2021-09-08 NOTE — PLAN OF CARE
"FOCUS/GOAL  Bowel management, Bladder management, Medication management, Medical management, Mobility, Skin integrity, and Safety management    ASSESSMENT, INTERVENTIONS AND CONTINUING PLAN FOR GOAL:  A/Ox4. On RA. Denies pain, CP, SOB, N/V, N/T, dizziness. MOD I with walker. Continent B&B. LBM 9/7 PM. Davis patent and draining adequately. Cervical incision approximated with steri strips, SATNAM. Abdominal binder and BLE compression stockings on during day. +1 edema BLE. Blanchable redness in groin area and gluteal folds. Groin/thigh/buttocks red discoloration is pt baseline (birthmark). Regular thin diet, fair appetite. Pt able to make needs known and call light within reach. Plan for dsc home with wife assist tomorrow, caregiver training completed. Continue POC.     /62 (BP Location: Right arm)   Pulse 84   Temp (!) 95.6  F (35.3  C) (Axillary)   Resp 16   Ht 1.803 m (5' 11\")   Wt 102.1 kg (225 lb)   SpO2 97%   BMI 31.38 kg/m     "

## 2021-09-08 NOTE — PROGRESS NOTES
Occupational Therapy Discharge Summary    Reason for therapy discharge:    Discharged to home with home therapy.    Progress towards therapy goal(s). See goals on Care Plan in [facility] electronic health record for goal details.  Goals partially met.  Barriers to achieving goals:   discharge from facility.    Therapy recommendation(s):    Continued therapy is recommended.  Rationale/Recommendations:  Recommending HC OT services to continue to increase IND and safety with ADLs/IADLs and functional mobility. .  Pt requires assistance with JOBST stockings which spouse will be able to assist upon discharge. Pt is IND with UBD and mod IND with LBD tasks. Pt is Mod IND with toileting with 4WW. Pt is mod IND with G/H tasks standing at EOS with 4WW. Recommending initial supervision with IADLs upon discharge. Pt to use thigh high JOBST stockings and abdominal binder to help manage orthostatic hypotension.

## 2021-09-08 NOTE — PROGRESS NOTES
"SIX MINUTE WALK TEST  Physical Therapy  9/8/2021    Rigo Johns MRN# [id number]   YOB: 1950 Age: 70 year old     Height: 5' 11\"  Weight (lbs): 225 lbs 0 oz Weight (kg): 102.1 kg (actual weight)    Supplemental oxygen during the test: No,  Gait Aid: Four Wheeled Walker    Total distance walked in 6 minutes: 876 feet    Paused during test? No  Stopped test before 6 minutes? No  Did the patient experience any pain or discomfort during the test? No    Comments: Pt exerted max effort and amb 876 ft in 6 minutes. He experienced dyspnea with exertion, and elevated vitals at end of test (/77 and ). After 5 minute rest break, vitals returned to (/64 and )    Patient is currently performing at 50% of the age and gender-matched norms. Recommending ongoing physical therapy follow-up to achieve functional status closer to baseline.    Performing Staff: Jesi Castellon, PT          "

## 2021-09-08 NOTE — PROGRESS NOTES
Prior Authorization **APPROVED**    Authorization Effective Date: 6/10/2021  Authorization Expiration Date: 9/8/2022  Medication: Cyclobenzaprine 10mg tablets **APPROVED**  Approved Dose/Quantity: 3 tablets daily  Reference #: CoverMyMeds Key: [id number], Case #: [id number]   Insurance Company: [facility] - Phone [phone] Fax [phone]  Expected CoPay: $5.65     CoPay Card Available: No    Foundation Assistance Needed: n/a  Which Pharmacy is filling the prescription (Not needed for infusion/clinic administered): [facility] [place], [place] - [address]  Pharmacy Notified: Yes  Patient Notified: Yes  Comments:  Discharge pharmacy sent patient with supply while auth is pending. *Retroactively Billed*          Riri Chavez CPhT  [place] Discharge Pharmacy Liaison  Pronouns: She/Her/Hers    [facility]  [address]  [address], [place] [address]   [email or web address]  [email or web address]   Phone: [phone]  Pager: [phone]  Fax: [phone]

## 2021-09-08 NOTE — PROGRESS NOTES
Physical Therapy Discharge Summary    Reason for therapy discharge:    Discharged to home with outpatient therapy.    Progress towards therapy goal(s). See goals on Care Plan in [facility] electronic health record for goal details.  Goals met    Therapy recommendation(s):    Continued therapy is recommended.  Rationale/Recommendations:  ..     Pt is now MOD I with basic mobility and is safe to discharge to home with 4WW. He is able to ambulate home distances without assist, but needs supervision when amb outside or for longer distances. Goal for home care to improve safety with mobility and balance, and transition to community mobility.     Recommending OP PT after discharge from home care to ensure transition to community activities such as driving, yard work, etc.    6MWT: 876 ft on 9/8/21 - 4WW, max effort requiring 5 min rest break to recover.

## 2021-09-08 NOTE — PROGRESS NOTES
"  [facility]   Acute Rehabilitation Unit  Daily progress note    INTERVAL HISTORY  No acute events overnight.  This morning Mr. Johns feels well and has no new concerns or complaints.  Denies chest pain or shortness of breath.  Had mild twitching of the leg this morning.  We discussed discharge medications and follow up appointments, and he is on track for discharge home tomorrow.  Functionally was able to ambulate 876 in the 6MWT, independent with UBD, SBA to don shorts at EOB, mod I toilet transfers.        MEDICATIONS  Scheduled meds    cyclobenzaprine  10 mg Oral At Bedtime     fludrocortisone  100 mcg Oral Daily     melatonin  1 mg Oral At Bedtime     midodrine  10 mg Oral TID w/meals     multivitamin, therapeutic  1 tablet Oral Daily     pantoprazole  40 mg Oral BID AC     psyllium  1 packet Oral BID     saccharomyces boulardii  250 mg Oral BID     traZODone  100 mg Oral At Bedtime       PRN meds:  acetaminophen, cyclobenzaprine, diclofenac, docusate sodium, lidocaine 4%, lidocaine (buffered or not buffered), miconazole, ondansetron, senna-docusate, sodium chloride (PF), sodium chloride (PF), White Petrolatum      PHYSICAL EXAM  /62 (BP Location: Right arm)   Pulse 84   Temp (!) 95.6  F (35.3  C) (Axillary)   Resp 16   Ht 1.803 m (5' 11\")   Wt 102.1 kg (225 lb)   SpO2 97%   BMI 31.38 kg/m       Gen: Awake, coooperative, no distress  HEENT: NC/AT  Pulm: Non-labored clear on room air  CV: rrr, S1+S2, no m/r/g  Ab: Soft, NT, ND, BS+  Ext: thigh high Compression stockings being donned with therapy  Neuro/MSK: alert, oriented, answers questions appropriately, follows commands      LABS  CBC RESULTS:   Recent Labs   Lab Test 09/06/21  0847 08/30/21  0812 08/25/21  0816   WBC 5.6 6.9 7.3   RBC 3.34* 2.99* 2.93*   HGB 10.6* 9.7* 9.5*   HCT 33.0* 29.2* 29.1*   MCV 99 98 99   MCH 31.7 32.4 32.4   MCHC 32.1 33.2 32.6   RDW 12.3 11.9 11.8    183 158     Last Basic " Metabolic Panel:  Recent Labs   Lab Test 09/06/21  0847 08/30/21  0812 08/25/21  0816    141 142   POTASSIUM 3.6 3.8 4.2   CHLORIDE 110* 109 110*   CO2 27 31 31   ANIONGAP 3 1* 1*   GLC 92 82 84   BUN 19 21 17   CR 1.23 1.30* 1.29*   GFRESTIMATED 59* 55* 55*   GARRISON 8.6 8.6 8.3*     CRP Inflammation   Date Value Ref Range Status   08/30/2021 35.0 (H) 0.0 - 8.0 mg/L Final     IMAGING  L ankle XR (8/2/21)  Impression:  1. Likely sequela of prior medial malleolar fracture.  2. Soft tissue swelling about the ankle and small joint effusion.     ASSESSMENT AND PLAN    Rigo Johns is a 70 year old L hand dominant male with a PMH of HTN, PILI, and prior cervical surgery (C5-7 anterior fusion in 1993 per notes) who is now status post a C3-4, C4-5 ACDF on 7/14/2021 for cervical myelopathy.  He was admitted to the [place] [facility]U from 7/18-7/27/21 but transferred back to the medical floor for fevers and prevertebral fluid collection suggestive of abscess.  Now improving with IV antbiotics and no surgical intervention was needed.  He is now being admitted to the ARU on 8/2/21.  Impairment group code: 04.1211 Quadriplegia, Incomplete C1-4 - s/p C3-5 ACDF.   PLAN  Rehabilitation  1. PT and OT 90 minutes of each on a daily basis, in addition to rehab nursing and close management of physiatrist.    2. Impairment of ADL's: Noted to have impaired ROM, impaired strength, impaired activity tolerance, edema management, impaired balance, and impaired coordination, all affecting his ability to safely and independently perform basic ADLs.  Goal for mod I with basic ADLs.  3. Impairment of mobility:  Noted to have impaired ROM, impaired strength, impaired activity tolerance, edema management, impaired balance and impaired coordination, all affecting his ability to safely and independently ambulate and perform basic mobility.  Goal for mod I with basic mobility.  Medical  1)Neurology  #Cervical Myelopathy s/p C3-4, C4-5 ACDF on  7/14/21 complicated by fevers and prevertebral fluid collection, suggestive of abscess  -Follow up MRI completed 8/10 which shows near resolution of the prevertebral fluid.  Discussed with ID who also discussed with NSGY and images reviewed.  Antibiotics discontinued after 8/11 (completed 2 week course)  -Maintain postoperative spinal precautions. No need for cervical collar  -Follow-up with neurosurgery after discharge  -Fever of 101.3 8/18- 100.7 8/23 , WBC stable, CRP elevated but improving, MRI stable. -ID assistance greatly appreciated.  CRP now trending down (improved 8/30 to 35 from 62).  Pt now stable and no infectious disease concerns, will cancel scheduled ID follow up and keep PRN only.    2)CVS  #Orthostasis  -Abd Binder, encourage liquids.  Advised to try drinking 500 ml quickly before getting up with therapies.  Have also ordered longer thigh high TEDs.  Encouraged sodium intake as well.  -continue Midodrine to 10 mg TID 8/23  -continue florinef 0.1 mg started 8/25- Consider increasing further if needed.  Pt declining at this time.  -Still orthostatic, but able to manage symptoms and is not as severe as it previously was and no longer a major hindrance to participation with therapies     3)Pulmonary  #CAP, resolved  -Completed course of antibiotics (Zosyn x1 in ED, Azithromycin 7/12-7/16, Ceftriaxone 7/12 - 7/17, and Cefuroxime x1) during initial hospitalization, 2 week course of abx for cervical fluid collection (Cefepime + Daptomycin) completed after 8/11  -Encourage incentive spirometer    #PILI  -Continue CPAP with home settings     4)FENGI  #Diet: Regular diet  #Neurogenic bowel  Required bid bowel program with enemeeze, now with improved sensation and patient attempting to taper off enemeeze  -Enemeeze prn- now with  Overall continence     #Melena  #Acute anemia  -Hgb 10.6 9/6.  Overall stable.  Continue to monitor  - Endoscopy 8/12 demonstrated duodenal ulcers that are no longer bleeding, which  were likely the cause of his melena and anemia.   - Now completed course of IV Protonix.  Continue PO BID x 8 weeks (started 8/16) per GI recs and PO daily indefinitely there after.     #Liver Lesion  -Abnormal appearance is seen on CAT scan with slight nodularity inferiorly. LFTs within normal limits.  -Follow-up primary care physician     5)  #neurogenic bladder- Urinary Retention   -Has failed multiple trial fo voids.  Voiding trial done again on 8/10. Torres removed with repeat trial of void 9/2- required consistent straight cath and was unable to independently to void during 2 day trial, intermittent straight cath not felt to be option due to large volume fluid consumption to help with orthostatic hypotension would require q2-3 hour sic.  torres replaced 9/4.  Will discharge with torres in place.   -follow up urology    #DONOVAN and likely CKD   -Pt with limited physician follow up so baseline creatinine not known. Peak at 2.13, improved with IV hydration.  Cr 8/30 stable at 1.23 9/6.    -Avoid nephrotoxic agents, NSAID     #Renal lesion  -Incidental finding on CT scan  -Renal ultrasound showed simple cysts of bilateral kidneys  -Follow up with PCP     6)DVT prophylaxis  -PCDs and ambulation     7)Pain  #Spasms (improved)   -Tylenol 650 mg q4h PRN  -continue Flexeril 10 mg at bedtime while inpatient -  but will maintain BID PRN on discharge.     #Left ankle pain (improved)   -xr left ankle 8/24 showed prior medial malleolar fracture and degenerative changes  -Presentation not consistent with gout and uric acid was WNL  -Ice PRN and  Active ankle brace (reports supportive)     8)Endo  -Prior steroid induced hyperglycemia.  HbA1c 5.0.   -Now completed course of steroids.  No need for further routine checks     9)Heme   #Acute anemia  -GI workup as above Hgb 10.6 9/6.     #Leukocytosis (resolved)   -Thought to be secondary to steroids which are now discontinued. WBCs wnl, crp down trending  To 35 (prior 62) fever no  clear source    #Thrombocytopenia (resolved)   -Noted upon initial admission to the emergency room. Had been stable low 100s, but now improved. 183 on 8/30.      10) Infectious disease  Recent prevertebral abscess- completed iv antibiotics MRI 8/19 with resolution  Fever- intermittent 8/18, 8/23 unclear source. MRI neg, UA neg, Cdiff neg, WBC wnl.  CRP elevated but now downtrending without acute intervention.  ROS + left ankle pain, orthostatic hypotension, fatigue.  Discussed with ID 8/18 with recommendation to monitor clinically .  -monitor closely pending trend consider re-consult ID if concerns arise    12)Social/Dispo  -Anticipate discharge home at a modified independent level for ambulation, mobility, and ADLs.   -ELOS:  Plan for discharge home 9/9  -Rehab prognosis: Fair  -Follow up appointments: PCP, NSGY (Dr. Hanley), Urology referral, PM&R     Robi Whitehead MD  Department of Rehabilitation Medicine    Time Spent on this Encounter   I, Robi Whitehead, spent a total of 25 minutes face-to-face or managing the care of Rigo Johns. Over 50% of my time on the unit was spent counseling the patient and coordinating care. See note for details.

## 2021-09-08 NOTE — PROGRESS NOTES
Prior Authorization **INITIATED**    Medication: Cyclobenzaprine 10mg tablets  Insurance Company: [employer] [place] - Phone [phone] Fax [phone]  Pharmacy Filling the Rx: [facility] - [place], [place] - [address]  Filling Pharmacy Phone: [phone]  Filling Pharmacy Fax: [phone]  Start Date: 9/8/2021  Reference #: CoverMyMeds Key: [id number]  Comments:  Discharge pharmacy sent patient with supply while auth is pending. Will retroactively bill once determination received.      Riri Chavez CPhT  [place] [facility] Liaison  Pronouns: She/Her/Hers    [facility]  [address]  [address], [place] [address]   [email or web address]  [email or web address]   Phone: [phone]  Pager: [phone]  Fax: [phone]

## 2021-09-08 NOTE — PLAN OF CARE
FOCUS/GOAL  Bowel management, Bladder management, Pain management and Cognition/Memory/Judgment/Problem solving    ASSESSMENT, INTERVENTIONS AND CONTINUING PLAN FOR GOAL:  Pt is alert and oriented, sleeping well overnight, torres in place and patent, using CPAP overnight, SBA at night, anamika once roman stockings on, no further care concerns at this time continue with POC.

## 2021-09-09 ENCOUNTER — TELEPHONE (OUTPATIENT)
Dept: PHYSICAL MEDICINE AND REHAB | Facility: CLINIC | Age: 71
End: 2021-09-09

## 2021-09-09 VITALS
BODY MASS INDEX: 31.5 KG/M2 | HEIGHT: 71 IN | RESPIRATION RATE: 20 BRPM | SYSTOLIC BLOOD PRESSURE: 118 MMHG | DIASTOLIC BLOOD PRESSURE: 59 MMHG | TEMPERATURE: 97.5 F | OXYGEN SATURATION: 96 % | WEIGHT: 225 LBS | HEART RATE: 86 BPM

## 2021-09-09 PROCEDURE — 99239 HOSP IP/OBS DSCHRG MGMT >30: CPT | Mod: 24 | Performed by: PHYSICAL MEDICINE & REHABILITATION

## 2021-09-09 PROCEDURE — 250N000013 HC RX MED GY IP 250 OP 250 PS 637: Performed by: STUDENT IN AN ORGANIZED HEALTH CARE EDUCATION/TRAINING PROGRAM

## 2021-09-09 PROCEDURE — 250N000013 HC RX MED GY IP 250 OP 250 PS 637: Performed by: PHYSICIAN ASSISTANT

## 2021-09-09 PROCEDURE — 250N000013 HC RX MED GY IP 250 OP 250 PS 637: Performed by: PHYSICAL MEDICINE & REHABILITATION

## 2021-09-09 RX ORDER — POLYETHYLENE GLYCOL 3350 17 G/17G
17 POWDER, FOR SOLUTION ORAL DAILY
Status: DISCONTINUED | OUTPATIENT
Start: 2021-09-09 | End: 2021-09-09 | Stop reason: HOSPADM

## 2021-09-09 RX ADMIN — PSYLLIUM HUSK 1 PACKET: 3.4 POWDER ORAL at 08:25

## 2021-09-09 RX ADMIN — MIDODRINE HYDROCHLORIDE 10 MG: 5 TABLET ORAL at 11:09

## 2021-09-09 RX ADMIN — PANTOPRAZOLE SODIUM 40 MG: 40 TABLET, DELAYED RELEASE ORAL at 08:26

## 2021-09-09 RX ADMIN — THERA TABS 1 TABLET: TAB at 08:26

## 2021-09-09 RX ADMIN — FLUDROCORTISONE ACETATE 100 MCG: 0.1 TABLET ORAL at 08:26

## 2021-09-09 RX ADMIN — MIDODRINE HYDROCHLORIDE 10 MG: 5 TABLET ORAL at 08:26

## 2021-09-09 RX ADMIN — ACETAMINOPHEN 650 MG: 325 TABLET, FILM COATED ORAL at 08:25

## 2021-09-09 RX ADMIN — CYCLOBENZAPRINE HYDROCHLORIDE 10 MG: 5 TABLET, FILM COATED ORAL at 08:36

## 2021-09-09 RX ADMIN — Medication 250 MG: at 08:25

## 2021-09-09 RX ADMIN — POLYETHYLENE GLYCOL 3350 17 G: 17 POWDER, FOR SOLUTION ORAL at 11:09

## 2021-09-09 NOTE — PLAN OF CARE
FOCUS/GOAL  Bladder management, Pain management, and Safety management    ASSESSMENT, INTERVENTIONS AND CONTINUING PLAN FOR GOAL:  Pt with torres catheter which is patent and intact, draining clear yellow urine. No s/s of pain or discomfort overnight. Appeared to be sleeping well during rounds. Pt uses CPAP. Call light within reach, able to make needs known. Bed alarm on at night for safety. Plan for discharge home today. Will continue with POC.

## 2021-09-09 NOTE — PLAN OF CARE
FOCUS/GOAL  Bowel management, Bladder management, Nutrition/Feeding/Swallowing precautions, Pain management, Mobility, Skin integrity, and Cognition/Memory/Judgment/Problem solving    ASSESSMENT, INTERVENTIONS AND CONTINUING PLAN FOR GOAL:    Patient A&Ox4. Denies pain, headache, CP, SOB, nausea, numbness and tingling, and fever. Patient SARAH in room. Regular diet, thin liquids, takes pills whole. Davis care complete, output-550cc. Discharge tomorrow. Cont of bladder and bowel, LG Bm during shift. Edema noted to legs and feet, skin unremarkable. VSS. Continue POC.

## 2021-09-09 NOTE — PLAN OF CARE
"Patient is adequate for discharge today, is alert/oriented x4 and able to be safe with Mod I in room with walker.  Patient requested PRN tylenol and flexaril, will be going home with that as PRN as well. Medications here from discharge pharmacy, reviewed discharge summary and medications with the patient and patient's spouse.  Patient was given some brief reinforcement education on proper care and management with the torres catheter and site.  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  No additional care concerns, escorted to vehicle via ARU staff via w/c.    "

## 2021-09-09 NOTE — TELEPHONE ENCOUNTER
[facility] Call Center    Phone Message    May a detailed message be left on voicemail: yes     Reason for Call: Order(s): Home Care Orders: Skilled Nursing:    Please call Cathleen at [phone] with a verbal approval for a start date of 9/17/21.    Action Taken: Message routed to:  [facility] ([facility]    Travel Screening: Not Applicable

## 2021-09-09 NOTE — DISCHARGE INSTRUCTIONS
Follow up Appointments  - Urology  You are scheduled for a virtual visit with Dr. Demario Aguirre on Wednesday, September 15th at 2:00 PM.   The clinic will call you at [phone] about 30min prior to the appointment for check-in.     Phone   [phone]    - Primary Care  You are scheduled to see Dr. Jyoti Cordoba on Tuesday, September 21st at 12:00 PM.    Address  [facility]                          [address], [place] [address]  Phone   [phone]  Fax                  [phone]    - Neurosurgery  You are scheduled to see Carey Whittington NP, on Friday, October 22nd at 10:30 AM.    Address  [facility] [place] [address]                          [place] [address]  Phone   [phone]    - PM&R  You are scheduled to see Dr. Whitehead on Wednesday, November 24th at 1:00 PM.    Address [facility] - Physical Medicine [facility]    [facility]    [address] [place] [address]    [address]    [place] [address]  Phone  Elikem: [phone]    Riri:  [phone]      ------------------------------------------      Home Health Care:   [facility] PH: [phone] (previously known as: [facility])  Nurse, physical therapy, occupational therapy, home health aide   -You will get a call after you have discharged from [facility] to schedule the first home care visit. The home health nurse should come out within the first 24-48 hours. If you don't get a call from them within the first 48 hours, please call to follow up (number above).

## 2021-09-09 NOTE — DISCHARGE SUMMARY
[facility]   Acute Rehabilitation Unit  Discharge summary     Date of Admission: 8/2/2021  Date of Discharge: 9/9/2021  Disposition: Home with home health  Primary Care Physician: [facility], [facility]-  Attending physician: Luis Whitehead MD  Other significant physician provider(s): KARL Medeiros      DISCHARGE DIAGNOSIS      Cervical myelopathy s/p C3-4, C4-5 ACDF complicated by prevertebral abscess    HTN, PILI, urinary retention needing Davis, neurogenic bowel in incontinence, thrombocytopenia, DONOVAN on CKD, ABLA, PILI, orthostatic hypotension, L ankle pain, muscle spasms, non-severe malnutrition      BRIEF SUMMARY (PER HPI)  Rigo Johns is a 70 year old male who is well known to the rehabilitation service as he was admitted to the [facility] from 7/18/21 to 7/27/21 after his C3-4 and C4-5 ACDF.  Recall H+P from that admission:     Rigo Johns is a 70 year old L hand dominant male with a PMH of HTN, PILI, and prior cervical surgery (C5-7 anterior fusion in 1993 per notes) who initially presented to the ED at [facility] on 7/12/21 with dyspnea and acute onset of neck and shoulder pain, and bilateral arm weakness.  A CT of the chest demonstrated bibasilar ATX vs. Infiltrate, and he was started on Zosyn for likely PNA.  A CT of the C-spine was also done and showed central stenosis at the C3-4 and C4-5 levels, with a subsequent MRI confirming this and also demonstrating severe b/l foraminal stenosis and abnormal T2 signal bilaterally, L>R.  He was started on IV steroids and transferred to [facility] for a C3-4, C4-5 ACDF which occurred on 7/14/21 by Dr. Hanley.  Hospital course has been complicated by DONOVAN with improvement with IV fluids, urinary retention with a Davis currently in place, steroid induced hyperglycemia, thrombocytopenia, and steroid induced leukocytosis.       During his rehabilitation stay, Mr. Johns developed fevers for which  he was started on Cefepime empirically, but was ultimately found to have a prevertebral fluid collection which was suggestive of an abscess or seroma.  This was discussed with NSGY team and initially recommended monitoring only off of antibiotics, but he continued to be febrile with signs of sepsis, so he was transferred back to the [facility] for further management.  He was re-started on IV Cefepime and Vancomycin (switched to Daptomycin due to CKD and elevated Cr), and a repeat MRI showed improvement in the fluid collection.  No surgical intervention was recommended and it was not felt there was enough fluid to aspirate for cultures.  Per ID recommendations, he will complete a 2 week course of IV antibiotics for empiric treatment of post-operative infection.  Further hospital complications include ongoing urinary retention with a Davis re-placed.       REHABILITATION COURSE  Mr. Johns was readmitted to the [facility] inpatient rehabilitation unit on 8/2/2021 where he participated in physical and occupational therapies for 3 hours/day.  A repeat MRI was performed which showed no significant residual prevertebral fluid remaining, and results were discussed with infectious disease and neurosurgical teams.  After review, he was cleared to discontinue cefepime and daptomycin after completion of his 2-week course.  He then developed decrease in hemoglobin and melena for which GI was consulted and performed an EGD.  Was found to have duodenal ulcers and is currently on twice daily Protonix for a total of 8 weeks, followed by daily dosing indefinitely.  Hemoglobin levels stabilized and melena episode resolved.  During his stay his progress was hindered by significant orthostatic hypotension and left ankle pain and spasms.  Multiple interventions were done to combat these including initiation of midodrine and Florinef, thigh-high Vishal stockings, abdominal binder, and encouragement of fluids.  Eventually he did have improvement  of the symptoms to where they became only mild and he was able to manage well on his own.  Left ankle pain also improved with initiation of Flexeril which was weaned down to bedtime only upon discharge. An x-ray was negative for acute pathology, but did demonstrate prior medial malleolar fracture and soft tissue swelling with a small joint effusion.  For bladder management he failed 2 voiding trials and will discharge with a Davis catheter and follow-up with urology.  Regarding bowels he had incontinent episodes and was started on a bowel program with a mini enema in the morning and nighttime, however he began having improved sensation and was able to successfully direct his own bowel program when needed versus when he was able to have a volitional bowel movement.  After the barriers of orthostasis, incontinence, and left ankle pain will overcome he made significant functional improvements and was ambulating multiple 100 feet with a four-wheel walker, needing supervision with outdoor ambulation.  He was modified independent for most ADLs except donning of compression stockings.  He will discharge home with ongoing home health physical and occupational therapies as well as a nurse and home health aide.      MEDICAL COURSE  1)Neurology  #Cervical Myelopathy s/p C3-4, C4-5 ACDF on 7/14/21 complicated by fevers and prevertebral fluid collection, suggestive of abscess  -Follow up MRI completed 8/10 which shows near resolution of the prevertebral fluid.  Discussed with ID who also discussed with NSGY and images reviewed.  Antibiotics discontinued after 8/11 (completed 2 week course)  -Maintain postoperative spinal precautions. No need for cervical collar  -Follow-up with neurosurgery after discharge  -Fever of 101.3 8/18- 100.7 8/23 , WBC stable, CRP elevated, MRI stable. -ID assistance greatly appreciated.  CRP now trending down (improved 8/30 to 35 from 62).  Pt now stable and no infectious disease concerns, will cancel  scheduled ID follow up and keep PRN only.     2)CVS  #Orthostasis  -Abd Binder, encourage liquids.  Advised to try drinking 500 ml quickly before getting up with therapies.  Have also ordered longer thigh high TEDs.  Encouraged sodium intake as well.  -continue Midodrine to 10 mg TID 8/23  -continue florinef 0.1 mg started 8/25-  -Still orthostatic but significantly improved and able to manage symptoms and is not as severe as it previously was.  No longer a major hindrance to participation with therapies     3)Pulmonary  #CAP, resolved  -Completed course of antibiotics (Zosyn x1 in ED, Azithromycin 7/12-7/16, Ceftriaxone 7/12 - 7/17, and Cefuroxime x1) during initial hospitalization, 2 week course of abx for cervical fluid collection (Cefepime + Daptomycin) completed after 8/11  -Encourage incentive spirometer     #PILI  -Continue CPAP with home settings     4)FENGI  #Diet: Regular diet  #MALNUTRITION  % Intake:Decreased intake does not meet criteria  % Weight Loss:5% in 1 month (non-severe/moderate)  Subcutaneous Fat Loss:None observed  Muscle Loss:Temporal:Mild,Thoracic region (clavicle, acromium bone, deltoid, trapezius, pectoral):Mild and dorsal  hand:mild  Fluid Accumulation/Edema:1+ BLE  Malnutrition Diagnosis:Non-severe malnutrition in the context ofacute illness     NUTRITION DIAGNOSIS  Inadequate oral intakerelated to increased needs for wound healing, decreased appetite and disliking hospital foodas  evidenced by documented intakes and wt loss of 12# (4.8%) wt loss in 1 month.      #Neurogenic bowel  Required bid bowel program with enemeeze, now with improved sensation and patient attempting to taper off enemeeze  -Enemeeze prn- now with  Overall continence      #Melena  #Acute anemia  -Hgb 10.6 9/6.  Overall stable.  Continue to monitor  - Endoscopy 8/12 demonstrated duodenal ulcers that are no longer bleeding, which were likely the cause of his melena and anemia.   - Now completed course of IV Protonix.   Continue PO BID x 8 weeks (started 8/16) per GI recs and PO daily indefinitely there after.     #Liver Lesion  -Abnormal appearance is seen on CAT scan with slight nodularity inferiorly. LFTs within normal limits.  -Follow-up primary care physician     5)  #neurogenic bladder- Urinary Retention   -Has failed multiple trial fo voids.  Voiding trial done again on 8/10. Torres removed with repeat trial of void 9/2- required consistent straight cath and was unable to independently to void during 2 day trial, intermittent straight cath not felt to be option due to large volume fluid consumption to help with orthostatic hypotension would require q2-3 hour sic.  torres replaced 9/4.  Will discharge with torres in place.   -follow up urology     #DONOVAN and likely CKD   -Pt with limited physician follow up so baseline creatinine not known. Peak at 2.13, improved with IV hydration.  Cr 8/30 stable at 1.23 9/6.    -Avoid nephrotoxic agents, NSAID     #Renal lesion  -Incidental finding on CT scan  -Renal ultrasound showed simple cysts of bilateral kidneys  -Follow up with PCP     6)Pain  #Spasms (improved)   -Tylenol 650 mg q4h PRN  -continue Flexeril 10 mg at bedtime with BID PRN dosing.  Wean as able.     #Left ankle pain (improved)   -xr left ankle 8/24 showed prior medial malleolar fracture and degenerative changes  -Presentation not consistent with gout and uric acid was WNL  -Ice PRN and  Active ankle brace (reports supportive)      8)Endo  -Prior steroid induced hyperglycemia.  HbA1c 5.0.   -Now completed course of steroids.  No need for further routine checks     9)Heme   #Acute anemia  -GI workup as above Hgb 10.6 9/6.     #Leukocytosis (resolved)   -Thought to be secondary to steroids which are now discontinued. WBCs wnl, crp down trending  To 35 (prior 62) fever no clear source     #Thrombocytopenia (resolved)   -Noted upon initial admission to the emergency room. Had been stable low 100s, but now improved. 172 on  9/6        10) Infectious disease  Recent prevertebral abscess- completed iv antibiotics MRI 8/19 with resolution  Fever- intermittent 8/18, 8/23 unclear source. MRI neg, UA neg, Cdiff neg, WBC wnl.  CRP elevated but now downtrending without acute intervention.  ROS + left ankle pain, orthostatic hypotension, fatigue.  Discussed with ID 8/18 with recommendation to monitor clinically .  -monitor closely pending trend consider re-consult ID if concerns arise     -Follow up appointments: PCP, NSGY (Dr. Hanley), Urology referral, PM&R    DISCHARGE MEDICATIONS  Current Discharge Medication List      START taking these medications    Details   cyclobenzaprine (FLEXERIL) 10 MG tablet Take 1 tablet (10 mg) by mouth At Bedtime  Qty: 60 tablet, Refills: 0    Comments: Take at bedtime, and may also take twice per day as needed for spasms.  Do not take within 4 hours of the night time dose.  Associated Diagnoses: H/O cervical spine surgery; Muscle spasm; Cervical myelopathy (H)      docusate sodium (ENEMEEZ) 283 MG enema Place 1 enema rectally 2 times daily as needed for constipation  Qty: 60 enema, Refills: 0    Associated Diagnoses: H/O cervical spine surgery; Cervical myelopathy (H); Neurogenic bowel      fludrocortisone (FLORINEF) 0.1 MG tablet Take 1 tablet (0.1 mg) by mouth daily  Qty: 30 tablet, Refills: 0    Associated Diagnoses: H/O cervical spine surgery; Cervical myelopathy (H); Orthostatic hypotension      miconazole (MICATIN) 2 % external powder Apply topically 2 times daily as needed for other or itching (redness)  Qty: 71 g, Refills: 0    Associated Diagnoses: H/O cervical spine surgery; Cervical myelopathy (H)      midodrine (PROAMATINE) 10 MG tablet Take 1 tablet (10 mg) by mouth 3 times daily (with meals)  Qty: 90 tablet, Refills: 0    Associated Diagnoses: H/O cervical spine surgery; Cervical myelopathy (H); Orthostatic hypotension      multivitamin, therapeutic (THERA-VIT) TABS tablet Take 1 tablet by mouth  daily  Qty: 30 tablet, Refills: 0    Associated Diagnoses: H/O cervical spine surgery; Cervical myelopathy (H)      !! pantoprazole (PROTONIX) 40 MG EC tablet Take 1 tablet (40 mg) by mouth 2 times daily (before meals) for 65 doses  Qty: 65 tablet, Refills: 0    Comments: Take 40 mg 2x/day through 10/11/21 (8 week course), then take 40 mg once per day indefinitely  Associated Diagnoses: H/O cervical spine surgery; Cervical myelopathy (H); Gastrointestinal hemorrhage associated with duodenal ulcer      !! pantoprazole (PROTONIX) 40 MG EC tablet Take 1 tablet (40 mg) by mouth daily  Qty: 30 tablet, Refills: 0    Comments: Change to once per day dosing starting 8/12/21.  You will be on this indefinitely.  Associated Diagnoses: H/O cervical spine surgery; Cervical myelopathy (H); Gastrointestinal hemorrhage associated with duodenal ulcer       !! - Potential duplicate medications found. Please discuss with provider.      CONTINUE these medications which have CHANGED    Details   psyllium (METAMUCIL/KONSYL) Packet Take 1 packet by mouth 2 times daily  Qty: 60 packet, Refills: 1    Associated Diagnoses: H/O cervical spine surgery; Cervical myelopathy (H)      senna-docusate (SENOKOT-S/PERICOLACE) 8.6-50 MG tablet Take 1 tablet by mouth 2 times daily as needed for constipation  Qty: 60 tablet, Refills: 0    Associated Diagnoses: Cervical stenosis of spinal canal      traZODone (DESYREL) 100 MG tablet Take 1 tablet (100 mg) by mouth At Bedtime  Qty: 30 tablet, Refills: 0    Associated Diagnoses: H/O cervical spine surgery; Cervical myelopathy (H); Insomnia due to medical condition         CONTINUE these medications which have NOT CHANGED    Details   acetaminophen (TYLENOL) 325 MG tablet Take 2 tablets (650 mg) by mouth every 6 hours as needed for mild pain or other (and adjunct with moderate or severe pain or per patient request)    Associated Diagnoses: Cervical stenosis of spinal canal      melatonin 1 MG TABS tablet  Take 1 tablet (1 mg) by mouth At Bedtime    Associated Diagnoses: Insomnia, unspecified type      TURMERIC PO          STOP taking these medications       bisacodyl (DULCOLAX) 10 MG suppository Comments:   Reason for Stopping:         ceFEPIme (MAXIPIME) 1 g SOLR vial Comments:   Reason for Stopping:         DAPTOmycin 650 mg Comments:   Reason for Stopping:         methocarbamol (ROBAXIN) 750 MG tablet Comments:   Reason for Stopping:         ondansetron (ZOFRAN) 4 MG tablet Comments:   Reason for Stopping:         saccharomyces boulardii (FLORASTOR) 250 MG capsule Comments:   Reason for Stopping:         tamsulosin (FLOMAX) 0.4 MG capsule Comments:   Reason for Stopping:                 DISCHARGE INSTRUCTIONS AND FOLLOW UP  Discharge Procedure Orders   Home Care PT Referral for Hospital Discharge   Referral Priority: Routine Referral Type: Home Health Therapies & Aides   Number of Visits Requested: 1     Home Care OT Referral for Hospital Discharge   Referral Priority: Routine Referral Type: Consultation   Number of Visits Requested: 1     Adult Urology Referral   Standing Status: Future   Referral Priority: Routine Referral Type: Consultation   Requested Specialty: Urology   Number of Visits Requested: 1     Home care nursing referral   Referral Priority: Routine Referral Type: Home Health Therapies & Aides   Number of Visits Requested: 1     Reason for your hospital stay   Order Comments: Rehabilitation after neck surgery for cervical myelopathy     Activity   Order Comments: Your activity upon discharge: activity as tolerated and no driving at this time.     Order Specific Question Answer Comments   Is discharge order? Yes      When to contact your care team   Order Comments: Call your primary doctor if you have any of the following: Increasing neck pain, fevers, chills, chest pain, shortness of breath, opening or drainage from your incision, return of black stools, worsening lightheadedness/dizziness, or any  other symptom you may find concerning.     MD face to face encounter   Order Comments: Documentation of Face to Face and Certification for Home Health Services    I certify that patient: Rigo Johns is under my care and that I, or a nurse practitioner or physician's assistant working with me, had a face-to-face encounter that meets the physician face-to-face encounter requirements with this patient on: 9/8/2021.    This encounter with the patient was in whole, or in part, for the following medical condition, which is the primary reason for home health care: Cervical myelopathy.    I certify that, based on my findings, the following services are medically necessary home health services: Nursing, Occupational Therapy, and Physical Therapy.    My clinical findings support the need for the above services because: Nurse is needed: To assess after changes in medications or other medical regimen., To provide assessment and oversight required in the home to assure adherence to the medical plan, and To provide caregiver training to assist with ADLs.  Occupational Therapy Services are needed to assess and treat cognitive ability and address ADL safety due to impairment in endurance, strength, balance.., and Physical Therapy Services are needed to assess and treat the following functional impairments: Strength, balance, endurance, ambulation.    Further, I certify that my clinical findings support that this patient is homebound (i.e. absences from home require considerable and taxing effort and are for medical reasons or [employer] services or infrequently or of short duration when for other reasons) because: Requires assistance of another person or specialized equipment to access medical services because patient: Is unable to exit home safely on own due to: weakness and orthostatic hypotension.    Based on the above findings. I certify that this patient is confined to the home and needs intermittent skilled nursing care,  physical therapy and/or speech therapy.  The patient is under my care, and I have initiated the establishment of the plan of care.  This patient will be followed by a physician who will periodically review the plan of care.  Physician/Provider to provide follow up care: [facility], [facility]-    Attending hospital physician (the Medicare certified PECASHISH provider): Luis Whitehead MD  Physician Signature: See electronic signature associated with these discharge orders.  Date: 9/8/2021     Adult [facility] Follow-up and recommended labs and tests   Order Comments: Follow up with primary care provider, [facility]- [facility], within 7 days to evaluate medication change, to evaluate after surgery, and for hospital follow- up.  The following labs/tests are recommended: CBC, BMP, check blood pressure.  Follow up on liver nodularity and b/l simple renal cysts.    Appointments on [place] and/or [facility] (with [facility] or [facility] provider or service). Call [phone] if you haven't heard regarding these appointments within 7 days of discharge.     Diet   Order Comments: Follow this diet upon discharge:      Regular Diet Adult Thin Liquids     Order Specific Question Answer Comments   Is discharge order? Yes           PHYSICAL EXAMINATION    Most recent Vital Signs:   Vitals:    09/07/21 2131 09/08/21 0837 09/08/21 1540 09/09/21 0802   BP: 135/70 116/62 (!) 141/73 118/59   BP Location: Right arm Right arm Left arm Right arm   Pulse: 100 84 92 86   Resp:  16 20 20   Temp:  (!) 95.6  F (35.3  C) 98.9  F (37.2  C) 97.5  F (36.4  C)   TempSrc:  Axillary Oral Oral   SpO2:  97% 97% 96%   Weight:       Height:           Gen: Awake, coooperative, no distress  HEENT: NC/AT  Pulm: Non-labored clear on room air  CV: rrr, S1+S2, no m/r/g  Ab: Soft, NT, ND, BS+, abdominal binder in place  Ext: +Foot edema, stockings not in place at the time of exam  Neuro/MSK: alert, oriented,  answers questions appropriately, follows commands.  MMT 5/5 to b/l UEs and LEs      45 minutes spent in discharge, including >50% in counseling and coordination of care, medication review and plan of care recommended on follow up.     Discharge summary was forwarded to [facility]- (PCP) at the time of discharge, so as to bridge from hospital to outpatient care.     It was our pleasure to care for Rigo Johns during this hospitalization. Please do not hesitate to contact me should there be questions regarding the hospital course or discharge plan.        Robi Whitehead MD  Department of Rehabilitation Medicine

## 2021-09-09 NOTE — PROGRESS NOTES
Home today. HC confirmed, in AVS and pt given a flyer with contact information. Pt wife will transport home. SW met with pt at bedside, IMM given and signed. Pt reported 'mixed emotions' RE discharge and expressed much appreciation for care at ARU. Pt denied any concerns or needs at this time.     Home Health Care:   [facility] PH: [phone] (previously known as: [facility])  Nurse, physical therapy, occupational therapy,home health aide    MELA Aguilar   [facility]   Direct Phone: [phone]  I   Pager: [phone]  I  Fax: [phone]

## 2021-09-10 ENCOUNTER — PRE VISIT (OUTPATIENT)
Dept: UROLOGY | Facility: CLINIC | Age: 71
End: 2021-09-10

## 2021-09-10 NOTE — TELEPHONE ENCOUNTER
Reason for visit: Consult     Relevant information: Neurogenic bladder    Records/imaging/labs/orders: in EPIC    Pt called: no    At Rooming: normal

## 2021-09-10 NOTE — TELEPHONE ENCOUNTER
MEDICAL RECORDS REQUEST   [facility]  Urology Clinic  [address] [place] [address]  PHONE: [phone]  Fax: [phone]        FUTURE VISIT INFORMATION                                                   Rigo Johns, : 1950 scheduled for future visit at [facility]    APPOINTMENT INFORMATION:    Date: 09/15/2021    Provider:  Demario Aguirre MD    Reason for Visit/Diagnosis: Neurogenic bladder [N31.9]    REFERRAL INFORMATION:    Referring provider:  Luis Whitehead MD    Specialty: N/A    Referring providers clinic:  [facility]    Clinic contact number:  [phone]    RECORDS REQUESTED FOR VISIT                                                     NOTES  STATUS/DETAILS   OFFICE NOTE from referring provider  yes   OFFICE NOTE from other specialist  no   DISCHARGE SUMMARY from hospital  yes, 2021 -- [facility]   DISCHARGE REPORT from the ER  no   OPERATIVE REPORT  no   MEDICATION LIST  yes   LABS     URINALYSIS (UA)  yes   URINE CYTOLOGY  no   NEUROGENIC BLADDER      CT ABDOMEN/PELVIS (IMAGES & REPORT)  yes   CYSTOGRAM (IMAGES & REPORT)  no   IVP (IMAGES & REPORT)  no   KUB (IMAGES & REPORT)  no   LABS  yes   RENAL/BLADDER ULTRASOUND (IMAGES & REPORT)  yes     PRE-VISIT CHECKLIST      Record collection complete Yes   Appointment appropriately scheduled           (right time/right provider) Yes   Joint diagnostic appointment coordinated correctly          (ensure right order & amount of time) Yes   MyChart activation Yes   Questionnaire complete If no, please explain pending

## 2021-09-15 ENCOUNTER — VIRTUAL VISIT (OUTPATIENT)
Dept: UROLOGY | Facility: CLINIC | Age: 71
End: 2021-09-15
Attending: PHYSICAL MEDICINE & REHABILITATION
Payer: COMMERCIAL

## 2021-09-15 VITALS — WEIGHT: 223 LBS | BODY MASS INDEX: 31.22 KG/M2 | HEIGHT: 71 IN

## 2021-09-15 DIAGNOSIS — N31.9 NEUROGENIC BLADDER: ICD-10-CM

## 2021-09-15 DIAGNOSIS — G95.9 CERVICAL MYELOPATHY (H): ICD-10-CM

## 2021-09-15 PROCEDURE — 99203 OFFICE O/P NEW LOW 30 MIN: CPT | Mod: 95 | Performed by: UROLOGY

## 2021-09-15 ASSESSMENT — MIFFLIN-ST. JEOR: SCORE: 1788.65

## 2021-09-15 ASSESSMENT — PAIN SCALES - GENERAL: PAINLEVEL: NO PAIN (0)

## 2021-09-15 NOTE — PROGRESS NOTES
"Jackson is a 70 year old who is being evaluated via a billable video visit.      Video Visit Technology for this patient: Ayannah Video Visit- Patient was left in waiting room    How would you like to obtain your AVS? MyChart  If the video visit is dropped, the invitation should be resent by: Send to e-mail at: [email or web address]  Will anyone else be joining your video visit? No      Video Start Time: 2:00p  Video-Visit Details    Type of service:  Video Visit    Video End Time:2:20 PM    Originating Location (pt. Location): Home    Distant Location (provider location):  [facility] UROLOGY [facility] [place]     Platform used for Video Visit: Ayannah    HPI:  Rigo Johns is a 70 year old male had neck surgery July 2021 and basically has had a catheter since.  He's now at home and had rehab.  He was unable to urinate after surgery and had a few voiding trials while inpatient.  He was left with a catheter after surgery.  He did straight cath for a period of time.  Strength is generally weak overall and he walks with a walker.    Exam:  Ht 1.803 m (5' 11\")   Wt 101.2 kg (223 lb)   BMI 31.10 kg/m    Exam:           Constitutional - No apparent distress. Patient of stated age.               Eyes - no redness or discharge.              Respiratory - no cough. no labored breathing              Musculoskeletal - full range of motion in all extremities              Skin - no visible skin discoloration or lesions              Neurological - no tremors              Psychiatric - no anxiety, alert & oriented                 The rest of a comprehensive physical examination is deferred due to PHE (public health emergency) video visit restrictions.      Review of Imaging:  The following imaging exams were independently viewed and interpreted by me and discussed with patient:  I reviewed CT abdomen/pelvis scan which demonstrates moderate sized prostate. Davis in place. Kidneys without hydronephrosis.    Review of " Labs:  The following labs were reviewed by me and discussed with the patient:  Cr 1.2    Assessment & Plan     Urinary retention after spine surgery    Unclear if obstructive or detrusor underactivity  Unclear if truly has neurogenic bladder.    I recommend urodynamics.    Given multiple failed voiding trials and now 2 months postop, I recommend urodynamics to assess if this is going to be a long-term catheter (then perhaps go for suprapubic tube) or he wants to try CIC again.    Or perhaps he passes his voiding trial that day.    Or perhaps he needs a bladder outlet surgery like HOLEP. His prostate is not large on CT. But if obstructive, then Dr. Nolan/Nikki would be good options.      Demario Aguirre MD  Reconstructive Urology  [facility]

## 2021-09-15 NOTE — NURSING NOTE
"Chief Complaint   Patient presents with     Consult     Neurogenic Bladder       Height 1.803 m (5' 11\"), weight 101.2 kg (223 lb). Body mass index is 31.1 kg/m .    Patient Active Problem List   Diagnosis     Shortness of breath     Hypoxia     Bilateral arm weakness     Cervical stenosis of spinal canal     Cervical myelopathy (H)     Fever      Leukocytosis     Diarrhea     Post op infection     H/O cervical spine surgery       Allergies   Allergen Reactions     Shrimp GI Disturbance       Current Outpatient Medications   Medication Sig Dispense Refill     acetaminophen (TYLENOL) 325 MG tablet Take 2 tablets (650 mg) by mouth every 6 hours as needed for mild pain or other (and adjunct with moderate or severe pain or per patient request)       cyclobenzaprine (FLEXERIL) 10 MG tablet Take 1 tablet (10 mg) by mouth At Bedtime 60 tablet 0     docusate sodium (ENEMEEZ) 283 MG enema Place 1 enema rectally 2 times daily as needed for constipation 60 enema 0     fludrocortisone (FLORINEF) 0.1 MG tablet Take 1 tablet (0.1 mg) by mouth daily 30 tablet 0     melatonin 1 MG TABS tablet Take 1 tablet (1 mg) by mouth At Bedtime       miconazole (MICATIN) 2 % external powder Apply topically 2 times daily as needed for other or itching (redness) 71 g 0     midodrine (PROAMATINE) 10 MG tablet Take 1 tablet (10 mg) by mouth 3 times daily (with meals) 90 tablet 0     multivitamin, therapeutic (THERA-VIT) TABS tablet Take 1 tablet by mouth daily 30 tablet 0     pantoprazole (PROTONIX) 40 MG EC tablet Take 1 tablet (40 mg) by mouth 2 times daily (before meals) for 65 doses 65 tablet 0     [START ON 10/12/2021] pantoprazole (PROTONIX) 40 MG EC tablet Take 1 tablet (40 mg) by mouth daily 30 tablet 0     psyllium (METAMUCIL/KONSYL) Packet Take 1 packet by mouth 2 times daily 60 packet 1     senna-docusate (SENOKOT-S/PERICOLACE) 8.6-50 MG tablet Take 1 tablet by mouth 2 times daily as needed for constipation 60 tablet 0     TURMERIC PO   "      traZODone (DESYREL) 100 MG tablet Take 1 tablet (100 mg) by mouth At Bedtime (Patient not taking: Reported on 9/15/2021) 30 tablet 0       Social History     Tobacco Use     Smoking status: Never Smoker     Smokeless tobacco: Never Used   Vaping Use     Vaping Use: Never assessed   Substance Use Topics     Alcohol use: Yes     Comment: once a week     Drug use: Never       Nasim Diehl EMT  9/15/2021  1:14 PM

## 2021-09-15 NOTE — LETTER
"9/15/2021       RE: Rigo Johns  [address]  [place] [address]     Dear Colleague,    Thank you for referring your patient, Rigo Johns, to the [facility] UROLOGY [facility] [place] [facility]. Please see a copy of my visit note below.    Jackson is a 70 year old who is being evaluated via a billable video visit.      Video Visit Technology for this patient: Zahida Video Visit- Patient was left in waiting room    How would you like to obtain your AVS? MyChart  If the video visit is dropped, the invitation should be resent by: Send to e-mail at: [email or web address]  Will anyone else be joining your video visit? No      Video Start Time: 2:00p  Video-Visit Details    Type of service:  Video Visit    Video End Time:2:20 PM    Originating Location (pt. Location): Home    Distant Location (provider location):  [facility] UROLOGY [facility]     Platform used for Video Visit: Zahida    HPI:  Rigo Johns is a 70 year old male had neck surgery July 2021 and basically has had a catheter since.  He's now at home and had rehab.  He was unable to urinate after surgery and had a few voiding trials while inpatient.  He was left with a catheter after surgery.  He did straight cath for a period of time.  Strength is generally weak overall and he walks with a walker.    Exam:  Ht 1.803 m (5' 11\")   Wt 101.2 kg (223 lb)   BMI 31.10 kg/m    Exam:           Constitutional - No apparent distress. Patient of stated age.               Eyes - no redness or discharge.              Respiratory - no cough. no labored breathing              Musculoskeletal - full range of motion in all extremities              Skin - no visible skin discoloration or lesions              Neurological - no tremors              Psychiatric - no anxiety, alert & oriented                 The rest of a comprehensive physical examination is deferred due to PHE (public health " emergency) video visit restrictions.    Review of Imaging:  The following imaging exams were independently viewed and interpreted by me and discussed with patient:  I reviewed CT abdomen/pelvis scan which demonstrates moderate sized prostate. Davis in place. Kidneys without hydronephrosis.    Review of Labs:  The following labs were reviewed by me and discussed with the patient:  Cr 1.2    Assessment & Plan     Urinary retention after spine surgery    Unclear if obstructive or detrusor underactivity  Unclear if truly has neurogenic bladder.    I recommend urodynamics.    Given multiple failed voiding trials and now 2 months postop, I recommend urodynamics to assess if this is going to be a long-term catheter (then perhaps go for suprapubic tube) or he wants to try CIC again.    Or perhaps he passes his voiding trial that day.    Or perhaps he needs a bladder outlet surgery like HOLEP. His prostate is not large on CT. But if obstructive, then Dr. Nolan/Nikki would be good options.    Demario Aguirre MD  Reconstructive Urology  [facility]

## 2021-09-17 ENCOUNTER — PRE VISIT (OUTPATIENT)
Dept: UROLOGY | Facility: CLINIC | Age: 71
End: 2021-09-17

## 2021-09-17 VITALS
RESPIRATION RATE: 20 BRPM | TEMPERATURE: 98.4 F | OXYGEN SATURATION: 96 % | DIASTOLIC BLOOD PRESSURE: 63 MMHG | SYSTOLIC BLOOD PRESSURE: 97 MMHG | HEART RATE: 115 BPM

## 2021-09-18 ENCOUNTER — HOSPITAL ENCOUNTER (EMERGENCY)
Facility: CLINIC | Age: 71
Discharge: HOME | End: 2021-09-18
Payer: COMMERCIAL

## 2021-09-18 ENCOUNTER — NURSE TRIAGE (OUTPATIENT)
Dept: NURSING | Facility: CLINIC | Age: 71
End: 2021-09-18

## 2021-09-19 NOTE — TELEPHONE ENCOUNTER
"Call received from spouse, [place]  Verbal Consent from pt to speak to spouse.  Pt present during call via speaker phone.    Rigo has an indwelling catheter in place.  It was initially started in July after spinal surgery  Last changed ~2 weeks ago.    Last night he had a fever, 103.7  orally  They were advised by Home Care to \"take some Tylenol and go to the ER\"    The presented to [facility] ER but due to very long wait time. Jackson was feeling better and his fever decreased    Tonight, he has a fever again.  Temp = 101   He also reports feeling very tired    Per protocol, ER advised or PCP triage. Offered to page on-call provider, Dr. Suazo, who is also pt's PCP    Pt and spouse decline paging and will go to [place] ER    COVID 19 Nurse Triage Plan/Patient Instructions    Please be aware that novel coronavirus (COVID-19) may be circulating in the community. If you develop symptoms such as fever, cough, or SOB or if you have concerns about the presence of another infection including coronavirus (COVID-19), please contact your health care provider or visit [email or web address].     Disposition/Instructions    Virtual Visit with provider recommended. Reference Visit Selection Guide.    Thank you for taking steps to prevent the spread of this virus.  o Limit your contact with others.  o Wear a simple mask to cover your cough.  o Wash your hands well and often.    Resources    [facility]: About COVID-19: [email or web address]    CDC: What to Do If You're Sick: www.cdc.gov/coronavirus/2019-ncov/about/steps-when-sick.html    CDC: Ending Home Isolation: www.cdc.gov/coronavirus/2019-ncov/hcp/disposition-in-home-patients.html     CDC: Caring for Someone: www.cdc.gov/coronavirus/2019-ncov/if-you-are-sick/care-for-someone.html     MD: Interim Guidance for Hospital Discharge to Home: www.health.[facility].[place].us/diseases/coronavirus/hcp/hospdischarge.pdf    [facility] clinical trials " (COVID-19 research studies): clinicalaffairs.[facility]-clinical-trials     Below are the COVID-19 hotlines at the [place] Department of Health ([facility]). Interpreters are available.   o For health questions: Call 992-628-2636 or 1-921.251.8443 (7 a.m. to 7 p.m.)  o For questions about schools and childcare: Call 224-982-2401 or 1-880.708.8562 (7 a.m. to 7 p.m.)     Mildred Prado RN  [facility] Nurse Advisors      Reason for Disposition    Fever > 100.4 F (38.0 C)    Additional Information    Negative: Shock suspected (e.g., cold/pale/clammy skin, too weak to stand, low BP, rapid pulse)    Negative: Sounds like a life-threatening emergency to the triager    Negative: [1] Catheter was accidentally pulled-out AND [2] bright red continuous bleeding    Negative: SEVERE abdominal pain    Protocols used: URINARY CATHETER SYMPTOMS AND [id number]-WU

## 2021-09-20 ENCOUNTER — PRE VISIT (OUTPATIENT)
Dept: UROLOGY | Facility: CLINIC | Age: 71
End: 2021-09-20

## 2021-09-20 NOTE — TELEPHONE ENCOUNTER
Reason for visit: Follow up     Relevant information: UDS results    Records/imaging/labs/orders: in EPIC    Pt called: no    At Rooming: normal

## 2021-09-23 ENCOUNTER — ANCILLARY PROCEDURE (OUTPATIENT)
Dept: RADIOLOGY | Facility: AMBULATORY SURGERY CENTER | Age: 71
End: 2021-09-23
Attending: PHYSICIAN ASSISTANT
Payer: COMMERCIAL

## 2021-09-23 ENCOUNTER — ALLIED HEALTH/NURSE VISIT (OUTPATIENT)
Dept: UROLOGY | Facility: CLINIC | Age: 71
End: 2021-09-23
Payer: COMMERCIAL

## 2021-09-23 VITALS
BODY MASS INDEX: 30.52 KG/M2 | WEIGHT: 218 LBS | HEIGHT: 71 IN | HEART RATE: 105 BPM | TEMPERATURE: 98.4 F | DIASTOLIC BLOOD PRESSURE: 84 MMHG | SYSTOLIC BLOOD PRESSURE: 144 MMHG

## 2021-09-23 DIAGNOSIS — R33.9 URINARY RETENTION: ICD-10-CM

## 2021-09-23 DIAGNOSIS — R33.9 URINARY RETENTION: Primary | ICD-10-CM

## 2021-09-23 PROBLEM — R93.5 ABNORMAL COMPUTERIZED AXIAL TOMOGRAPHY OF ABDOMEN: Status: ACTIVE | Noted: 2021-09-23

## 2021-09-23 PROBLEM — Z98.1 STATUS POST CERVICAL SPINAL ARTHRODESIS: Status: ACTIVE | Noted: 2021-09-23

## 2021-09-23 PROBLEM — Z87.828 HISTORY OF TEAR OF MENISCUS OF KNEE JOINT: Status: ACTIVE | Noted: 2021-09-23

## 2021-09-23 PROBLEM — Z98.890 HISTORY OF COLONOSCOPY: Status: ACTIVE | Noted: 2020-09-01

## 2021-09-23 PROBLEM — Z85.828 HISTORY OF BASAL CELL CARCINOMA (BCC): Status: ACTIVE | Noted: 2021-09-23

## 2021-09-23 PROBLEM — G47.33 OBSTRUCTIVE SLEEP APNEA TREATED WITH CONTINUOUS POSITIVE AIRWAY PRESSURE (CPAP): Status: ACTIVE | Noted: 2021-09-23

## 2021-09-23 PROBLEM — B02.9 HERPES ZOSTER: Status: ACTIVE | Noted: 2021-09-23

## 2021-09-23 PROBLEM — D12.6 TUBULAR ADENOMA OF COLON: Status: ACTIVE | Noted: 2020-09-01

## 2021-09-23 PROBLEM — E78.5 HYPERLIPIDEMIA: Status: ACTIVE | Noted: 2021-09-23

## 2021-09-23 PROBLEM — A41.9 SEPSIS (H): Status: ACTIVE | Noted: 2021-09-23

## 2021-09-23 PROCEDURE — 51784 ANAL/URINARY MUSCLE STUDY: CPT | Performed by: PHYSICIAN ASSISTANT

## 2021-09-23 PROCEDURE — 51600 INJECTION FOR BLADDER X-RAY: CPT | Performed by: PHYSICIAN ASSISTANT

## 2021-09-23 PROCEDURE — 51797 INTRAABDOMINAL PRESSURE TEST: CPT | Performed by: PHYSICIAN ASSISTANT

## 2021-09-23 PROCEDURE — 74430 CONTRAST X-RAY BLADDER: CPT | Performed by: PHYSICIAN ASSISTANT

## 2021-09-23 PROCEDURE — 51702 INSERT TEMP BLADDER CATH: CPT | Mod: XU | Performed by: PHYSICIAN ASSISTANT

## 2021-09-23 PROCEDURE — 51728 CYSTOMETROGRAM W/VP: CPT | Performed by: PHYSICIAN ASSISTANT

## 2021-09-23 RX ORDER — CIPROFLOXACIN 500 MG/1
500 TABLET, FILM COATED ORAL 2 TIMES DAILY
COMMUNITY
Start: 2021-09-21 | End: 2021-11-09

## 2021-09-23 RX ORDER — CEPHALEXIN 500 MG/1
CAPSULE ORAL
COMMUNITY
Start: 2021-09-19 | End: 2021-11-09

## 2021-09-23 RX ORDER — VALACYCLOVIR HYDROCHLORIDE 1 G/1
TABLET, FILM COATED ORAL
COMMUNITY
Start: 2020-10-10 | End: 2021-11-09

## 2021-09-23 RX ORDER — BACILLUS COAGULANS 1B CELL
1 CAPSULE ORAL DAILY
COMMUNITY
Start: 2021-09-21

## 2021-09-23 ASSESSMENT — MIFFLIN-ST. JEOR: SCORE: 1765.97

## 2021-09-23 ASSESSMENT — PAIN SCALES - GENERAL: PAINLEVEL: NO PAIN (0)

## 2021-09-23 NOTE — PATIENT INSTRUCTIONS
Follow up with Dr. Aguirre as scheduled.    It was a pleasure meeting with you today.  Thank you for allowing me and my team the privilege of caring for you today.  YOU are the reason we are here, and I truly hope we provided you with the excellent service you deserve.  Please let us know if there is anything else we can do for you so that we can be sure you are leaving completely satisfied with your care experience.        Verna Tang, CMA

## 2021-09-23 NOTE — NURSING NOTE
"  Chief Complaint   Patient presents with     Urodynamics Study     Urinary retention/Neurogenic bladder       Blood pressure (!) 144/84, pulse 105, temperature 98.4  F (36.9  C), temperature source Oral, height 1.803 m (5' 11\"), weight 98.9 kg (218 lb). Body mass index is 30.4 kg/m .    Patient Active Problem List   Diagnosis     Shortness of breath     Hypoxia     Bilateral arm weakness     Cervical stenosis of spinal canal     Cervical myelopathy (H)     Fever      Leukocytosis     Diarrhea     Post op infection     H/O cervical spine surgery     Abnormal computerized axial tomography of abdomen     Arthritis     Herpes zoster     History of basal cell carcinoma (BCC)     History of colonoscopy     History of tear of meniscus of knee joint     Hyperlipidemia     Sepsis (H)     Tubular adenoma of colon     Status post cervical spinal arthrodesis     Obstructive sleep apnea treated with continuous positive airway pressure (CPAP)     Sleep apnea       Allergies   Allergen Reactions     Shrimp Flavor      Other reaction(s): vomitting/upset stomach     Shrimp GI Disturbance       Current Outpatient Medications   Medication Sig Dispense Refill     acetaminophen (TYLENOL) 325 MG tablet Take 2 tablets (650 mg) by mouth every 6 hours as needed for mild pain or other (and adjunct with moderate or severe pain or per patient request)       ciprofloxacin (CIPRO) 500 MG tablet Take 500 mg by mouth 2 times daily       cyclobenzaprine (FLEXERIL) 10 MG tablet Take 1 tablet (10 mg) by mouth At Bedtime 60 tablet 0     docusate sodium (ENEMEEZ) 283 MG enema Place 1 enema rectally 2 times daily as needed for constipation 60 enema 0     fludrocortisone (FLORINEF) 0.1 MG tablet Take 1 tablet (0.1 mg) by mouth daily 30 tablet 0     melatonin 1 MG TABS tablet Take 1 tablet (1 mg) by mouth At Bedtime       miconazole (MICATIN) 2 % external powder Apply topically 2 times daily as needed for other or itching (redness) 71 g 0     " multivitamin, therapeutic (THERA-VIT) TABS tablet Take 1 tablet by mouth daily 30 tablet 0     pantoprazole (PROTONIX) 40 MG EC tablet Take 1 tablet (40 mg) by mouth 2 times daily (before meals) for 65 doses 65 tablet 0     [START ON 10/12/2021] pantoprazole (PROTONIX) 40 MG EC tablet Take 1 tablet (40 mg) by mouth daily 30 tablet 0     psyllium (METAMUCIL/KONSYL) Packet Take 1 packet by mouth 2 times daily 60 packet 1     senna-docusate (SENOKOT-S/PERICOLACE) 8.6-50 MG tablet Take 1 tablet by mouth 2 times daily as needed for constipation 60 tablet 0     traZODone (DESYREL) 100 MG tablet Take 1 tablet (100 mg) by mouth At Bedtime 30 tablet 0     TURMERIC PO        valACYclovir (VALTREX) 1000 mg tablet Three Times A Day       VITRON-C  MG TABS tablet Take 1 tablet by mouth daily       cephALEXin (KEFLEX) 500 MG capsule  (Patient not taking: Reported on 2021)       midodrine (PROAMATINE) 10 MG tablet Take 1 tablet (10 mg) by mouth 3 times daily (with meals) (Patient not taking: Reported on 2021) 90 tablet 0       Social History     Tobacco Use     Smoking status: Never Smoker     Smokeless tobacco: Never Used   Vaping Use     Vaping Use: Never assessed   Substance Use Topics     Alcohol use: Yes     Comment: once a week     Drug use: Never       Invasive Procedure Safety Checklist:    Procedure: Urodynamics    Action: Complete sections and checkboxes as appropriate.  Pre-procedure:  1. Patient ID Verified with 2 identifiers (Shirley and  or MRN) : YES    2. Procedure and site verified with patient/designee (when able) : YES    3. Accurate consent documentation in medical record : YES    4. H&P (or appropriate assessment) documented in medical record : N/A  H&P must be up to 30 days prior to procedure an updated within 24 hours of Procedure as applicable.     5. Relevant diagnostic and radiology test results appropriately labeled and displayed as applicable : YES    6. Blood products, implants,  devices, and/or special equipment available for the procedure as applicable : YES    7. Procedure site(s) marked with provider initials [Exclusions: none] : NO    8. Marking not required. Reason : Yes  Procedure does not require site marking    Time Out:     Time-Out performed immediately prior to starting procedure, including verbal and active participation of all team members addressing: YES    1. Correct patient identity.  2. Confirmed that the correct side and site are marked.  3. An accurate procedure to be done.  4. Agreement on the procedure to be done.  5. Correct patient position.  6. Relevant images and results are properly labeled and appropriately displayed.  7. The need to administer antibiotics or fluids for irrigation purposes during the procedure as applicable.  8. Safety precautions based on patient history or medication use.    During Procedure: Verification of correct person, site, and procedure occurs any time the responsibility for care of the patient is transferred to another member of the care team.      The following medication was given:     MEDICATION:  Omnipaque (Iohexol Injection) (240mgI/mL)  ROUTE: Provider Administered  SITE: Provider Administered via catheter  DOSE: 200mL  LOT #: [id number]  : [employer]  EXPIRATION DATE: 1/22/2024  NDC#: [address]-42   Was there drug waste? No          Torres catheter:    Removal:  16 Fr straight tipped latex torres catheter removed from urethral meatus without difficulty after removing 8 cc of fluid from the balloon.    Insertion:  16 Fr straight tipped latex straight catheter inserted into urethral meatus in the usual sterile fashion without difficulty.  Received 450 ml yellow urine output.     Patient did tolerate procedure well.    Patient instructed as to where to call or go for pain, fever, leakage, or decreased urine flow.       Verna Tang CMA  9/23/2021  9:01 AM

## 2021-09-23 NOTE — PROGRESS NOTES
PREPROCEDURE DIAGNOSES:    1. Urinary retention following C-spine surgery in July 2021    POSTPROCEDURE DIAGNOSES:  -Maximum cystometric capacity 450 mL with normal filling sensations.  -Fair bladder compliance (ratio 14) without detrusor overactivity or incontinence.  -No appreciable detrusor contraction during attempts to void. He tries to Valsalva void but ultimately has complete urinary retention with 0 mL voided and 450 mL residual by catheter.   -Concordant EMG activity throughout.   -Fluoroscopy reveals a mildly trabeculated bladder wall without diverticuli or VUR. The bladder neck is closed during filling; voiding images unavailable as patient stood to attempt voiding and ultimately did not void.    PROCEDURE:    1. Sterile urethral catheterization for measurement of residual urine volume.  2. Complex filling cystometrogram with measurement of bladder and rectal pressures.  3. Complex voiding cystometrogram with measurement of bladder and rectal pressures.  4. Electromyography of the pelvic floor during urodynamics.  5. Fluoroscopic imaging of the bladder during urodynamics, at least 3 views.    6. Interpretation of urodynamics and flouroscopic imaging.      INDICATIONS FOR PROCEDURE:  Mr. Rigo Johns is a pleasant 70 year old male with urinary retention following C-spine surgery in July 2021. Baseline video urodynamic assessment is requested today by Dr. Aguirre to better characterize Mr. Rigo Johns's voiding dysfunction.      VOIDING DIARY:  Did not complete (has Davis catheter).    DESCRIPTION OF PROCEDURE:  Risks, benefits, and alternatives to urodynamics were discussed with the patient and he wished to proceed.  Urodynamics are planned to better assess the primary etiology for Mr. Johns's urologic dysfunction.  The patient does not currently take anticholinergic medications for his bladder.  After informed consent was obtained, the patient was taken to the procedure room where uroflowmetry was  performed. Findings below.     PRE-STUDY UROFLOWMETRY:  Not performed due to indwelling Davis catheter.  Pretest urine dipstick was not performed per clinician discretion. Patient has been taking Cipro for 2 days for suspected UTI. Treated per outside ED. Initially started on Keflex but switched to Cipro when culture results finalized. He feels well today with improvement in his UTI symptoms. Given that he has the same Davis catheter as when he started treatment, there is little utility in performing urine dip as there may still be evidence for bacterial colonization from the catheter which would confound interpretation.     The patient's Davis catheter was removed uneventfully. Next a 7F double-lumen urodynamics catheter was inserted into the bladder under sterile technique via urethra.  A 7F abdominal manometry catheter was placed in the rectum.  EMG pads were placed on both sides of the anal verge.  The bladder was filled with 200 mL of Iohexol at 50 mL/minute and serial pressures were recorded.  With coughing there was an appropriate rise in vesical and abdominal pressures with no change in detrusor pressure, confirming good study catheter placement.    DURING THE FILLING PHASE:  First sensation: 154 mL.  First Desire: 241 mL.  Strong Desire: 352 mL.  Maximum Capacity: 436 mL.    Uninhibited detrusor contractions: none.  Compliance: fair. PDet=31 cmH20 at capacity. Compliance ratio of 14.  Continence: no DOI or TONY.  EMG: concordant during filling.    DURING THE VOIDING PHASE:  Maximum detrusor contraction with void: no appreciable detrusor contraction. He attempts to Valsalva voiding, ultimately unsuccessfully.  Voided volume: 0 mL.  Maximum flow rate: n/a.  Average flow rate: n/a.  Postvoid Residual: 450 mL.  EMG activity: quiet.  Character of voiding curve: n/a.  BOOI: n/a  [obstructed (GARDNER index [BOOI] ? 40); equivocal (no definite   obstruction; BOOI 20-40); and no obstruction (BOOI ? 20)]    FLUOROSCOPIC  IMAGING OF THE BLADDER DURING URODYNAMICS:  Please note, image numbers on UDS tracings correlate with iSite series numbers on PACS images. Fluoroscopy during today's procedure demonstrated a mildly trabeculated bladder wall without diverticulae or cellules.  No vesicoureteral reflux was observed.  The bladder neck was closed during filling; voiding images unavailable as patient stood to attempt voiding but ultimately did not void any volume.  At the conclusion of today's study, all catheters were removed and Davis catheter was replaced (see separate nursing note for details).    ASSESSMENT/PLAN:  Mr. Rigo Johns is a pleasant 70 year old male with urinary retention who demonstrated the following findings today on urodynamic evaluation:    -Maximum cystometric capacity 450 mL with normal filling sensations.  -Fair bladder compliance (ratio 14) without detrusor overactivity or incontinence.  -No appreciable detrusor contraction during attempts to void. He tries to Valsalva void but ultimately has complete urinary retention with 0 mL voided and 450 mL residual by catheter.   -Concordant EMG activity throughout.   -Fluoroscopy reveals a mildly trabeculated bladder wall without diverticuli or VUR. The bladder neck is closed during filling; voiding images unavailable as patient stood to attempt voiding and ultimately did not void.    Davis catheter replaced at the conclusion of today's study. See separate nursing note for details.     The patient will follow up as scheduled with Dr. Aguirre to further discuss today's study results and make plans for how best to proceed.      - The patient is currently taking Cipro for a suspected UTI. Therefore, additional antibiotic prophylaxis was not administered today. The risk of UTI with VUDS is low at ~2.5-3%.      Thank you for allowing me to participate in the care of Mr. Rigo Johns and please don't hesitate to contact me with any questions or concerns.      Micaela BUTTS  MAYNOR Flaherty  Urology Physician Assistant

## 2021-09-24 ENCOUNTER — TELEPHONE (OUTPATIENT)
Dept: NEUROSURGERY | Facility: CLINIC | Age: 71
End: 2021-09-24

## 2021-09-24 ENCOUNTER — TELEPHONE (OUTPATIENT)
Dept: NEUROSURGERY | Facility: OTHER | Age: 71
End: 2021-09-24

## 2021-09-24 NOTE — TELEPHONE ENCOUNTER
Returned call to patient and answered his questions regarding post operative restrictions and proper body mechanics.    Jackson verbalized understanding and agreement.     Patricia Blanhcard RN

## 2021-09-24 NOTE — TELEPHONE ENCOUNTER
Attempted to reach out to RICHARD Gore, no answer. Left voice message  to call clinic back to further discuss.

## 2021-09-24 NOTE — TELEPHONE ENCOUNTER
Sofía missed the phone call and called back to speak with a nurse. She is clarifying patient restrictions, eg lifting restrictions. Please call her: [phone]

## 2021-09-24 NOTE — TELEPHONE ENCOUNTER
After discussion with GRETA: returned call to Ilene and left the following information on her confidential voicemail: maintain a 15 lb weight lifting, pushing and pulling restriction, limit repetitive bending and twisting motions, avoid overhead reaching. Instructed Ilene to call back with further questions/clarifications.     Patricia Blanchard RN

## 2021-09-24 NOTE — TELEPHONE ENCOUNTER
Occupational Therapist, Sofía, called regarding order questions. She can be reached at: [phone].

## 2021-09-24 NOTE — TELEPHONE ENCOUNTER
"DOS: 7/14/21  C3-C4, C4-C5 anterior cervical discetomy and fusion with Dr Hanley    Next Visit: 10/25/21     Assessment:  [facility] OT is calling to clarify post surgical  restrictions    The patient went to a ARU and was readmitted 7/27/21 to 8/2/21 for a prevertebral abscess.  Returned to the ARU and was discharged to home 9/9/21.   Per the discharge note by rehab he should maintain post operative precautions and does not need a cervical collar.    \"Pt now stable and no infectious disease concerns, will cancel scheduled ID follow up and keep PRN only.\"     Action needed from provider:    Please verify the post op restrictions for OT.         "

## 2021-09-24 NOTE — TELEPHONE ENCOUNTER
Carissa from [facility] called requesting a verbal order for PT 1x week for 2 weeks working on body posture and mechanics. Verbal auth given.     Patricia Blanchard RN

## 2021-09-27 ENCOUNTER — TELEPHONE (OUTPATIENT)
Dept: NEUROSURGERY | Facility: OTHER | Age: 71
End: 2021-09-27

## 2021-09-27 DIAGNOSIS — Z53.9 DIAGNOSIS NOT YET DEFINED: Primary | ICD-10-CM

## 2021-09-27 PROCEDURE — G0180 MD CERTIFICATION HHA PATIENT: HCPCS | Performed by: PHYSICAL MEDICINE & REHABILITATION

## 2021-09-29 ENCOUNTER — TELEPHONE (OUTPATIENT)
Dept: NEUROSURGERY | Facility: CLINIC | Age: 71
End: 2021-09-29

## 2021-09-29 ENCOUNTER — VIRTUAL VISIT (OUTPATIENT)
Dept: UROLOGY | Facility: CLINIC | Age: 71
End: 2021-09-29
Payer: COMMERCIAL

## 2021-09-29 VITALS — HEIGHT: 71 IN | BODY MASS INDEX: 30.52 KG/M2 | WEIGHT: 218 LBS

## 2021-09-29 DIAGNOSIS — N31.9 NEUROGENIC BLADDER: Primary | ICD-10-CM

## 2021-09-29 PROCEDURE — 99213 OFFICE O/P EST LOW 20 MIN: CPT | Mod: 95 | Performed by: UROLOGY

## 2021-09-29 ASSESSMENT — PAIN SCALES - GENERAL: PAINLEVEL: NO PAIN (0)

## 2021-09-29 ASSESSMENT — MIFFLIN-ST. JEOR: SCORE: 1765.97

## 2021-09-29 NOTE — NURSING NOTE
"Chief Complaint   Patient presents with     Follow Up     UDS results       Height 1.803 m (5' 11\"), weight 98.9 kg (218 lb). Body mass index is 30.4 kg/m .    Patient Active Problem List   Diagnosis     Shortness of breath     Hypoxia     Bilateral arm weakness     Cervical stenosis of spinal canal     Cervical myelopathy (H)     Fever      Leukocytosis     Diarrhea     Post op infection     H/O cervical spine surgery     Abnormal computerized axial tomography of abdomen     Arthritis     Herpes zoster     History of basal cell carcinoma (BCC)     History of colonoscopy     History of tear of meniscus of knee joint     Hyperlipidemia     Sepsis (H)     Tubular adenoma of colon     Status post cervical spinal arthrodesis     Obstructive sleep apnea treated with continuous positive airway pressure (CPAP)     Sleep apnea       Allergies   Allergen Reactions     Shrimp Flavor      Other reaction(s): vomitting/upset stomach     Shrimp GI Disturbance       Current Outpatient Medications   Medication Sig Dispense Refill     acetaminophen (TYLENOL) 325 MG tablet Take 2 tablets (650 mg) by mouth every 6 hours as needed for mild pain or other (and adjunct with moderate or severe pain or per patient request)       cyclobenzaprine (FLEXERIL) 10 MG tablet Take 1 tablet (10 mg) by mouth At Bedtime 60 tablet 0     docusate sodium (ENEMEEZ) 283 MG enema Place 1 enema rectally 2 times daily as needed for constipation 60 enema 0     fludrocortisone (FLORINEF) 0.1 MG tablet Take 1 tablet (0.1 mg) by mouth daily 30 tablet 0     melatonin 1 MG TABS tablet Take 1 tablet (1 mg) by mouth At Bedtime       miconazole (MICATIN) 2 % external powder Apply topically 2 times daily as needed for other or itching (redness) 71 g 0     midodrine (PROAMATINE) 10 MG tablet Take 1 tablet (10 mg) by mouth 3 times daily (with meals) 90 tablet 0     multivitamin, therapeutic (THERA-VIT) TABS tablet Take 1 tablet by mouth daily 30 tablet 0     pantoprazole " (PROTONIX) 40 MG EC tablet Take 1 tablet (40 mg) by mouth 2 times daily (before meals) for 65 doses 65 tablet 0     [START ON 10/12/2021] pantoprazole (PROTONIX) 40 MG EC tablet Take 1 tablet (40 mg) by mouth daily 30 tablet 0     psyllium (METAMUCIL/KONSYL) Packet Take 1 packet by mouth 2 times daily 60 packet 1     senna-docusate (SENOKOT-S/PERICOLACE) 8.6-50 MG tablet Take 1 tablet by mouth 2 times daily as needed for constipation 60 tablet 0     traZODone (DESYREL) 100 MG tablet Take 1 tablet (100 mg) by mouth At Bedtime 30 tablet 0     TURMERIC PO        valACYclovir (VALTREX) 1000 mg tablet Three Times A Day       VITRON-C  MG TABS tablet Take 1 tablet by mouth daily       cephALEXin (KEFLEX) 500 MG capsule  (Patient not taking: Reported on 9/23/2021)       ciprofloxacin (CIPRO) 500 MG tablet Take 500 mg by mouth 2 times daily (Patient not taking: Reported on 9/29/2021)         Social History     Tobacco Use     Smoking status: Never Smoker     Smokeless tobacco: Never Used   Vaping Use     Vaping Use: Never assessed   Substance Use Topics     Alcohol use: Yes     Comment: once a week     Drug use: Rita Diehl EMT  9/29/2021  12:15 PM

## 2021-09-29 NOTE — PATIENT INSTRUCTIONS
Please follow up with Micaela Flaherty PA-C November 9th at 11am for next catheter change.    It was a pleasure meeting with you today.  Thank you for allowing me and my team the privilege of caring for you today.  YOU are the reason we are here, and I truly hope we provided you with the excellent service you deserve.  Please let us know if there is anything else we can do for you so that we can be sure you are leaving completely satisfied with your care experience.

## 2021-09-29 NOTE — TELEPHONE ENCOUNTER
The patient was working with the home OT.  They were on speaker phone trying to clarify the post op restrictions.      DOS: 7/14/21  C3-C4, C4-C5 anterior cervical discetomy and fusion with Dr DEWEY  The patient went to a ARU and was readmitted 7/27/21 to 8/2/21 for a prevertebral abscess.  Returned to the ARU and was discharged to home 9/9/21.       Reviewed the past phone conversations about the restriction.  They verbalized understanding of the neurosurgery recommendations discussed 9/24 but the patient wishes to continue with the exercise program he was doing at the ARU.  He reports he feels well and keeps getting stronger.  He is lifting a small bar above his head and is doing some activities of daily living such as pushing himself up off the chair to the standing position. He states he does not want to go backwards after all the improvements.     The will call if any further concerns or questions.

## 2021-09-29 NOTE — LETTER
9/29/2021       RE: Rigo Johns  [address]  [facility] [facility] [address]     Dear Colleague,    Thank you for referring your patient, Rigo Johns, to the [facility] UROLOGY CLINIC [place] at [facility]. Please see a copy of my visit note below.    Jackson is a 70 year old who is being evaluated via a billable video visit.      Video Visit Technology for this patient: Zahida Video Visit- Patient was left in waiting room    How would you like to obtain your AVS? MyChart  If the video visit is dropped, the invitation should be resent by: Send to e-mail at: [email or web address]  Will anyone else be joining your video visit? No      Video Start Time: 1245p  Video-Visit Details    Type of service:  Video Visit    Video End Time:100P    Originating Location (pt. Location): Home    Distant Location (provider location):  [facility] UROLOGY [facility]     Platform used for Video Visit: Zahida    Rigo Johns is a 70 year old male had neck surgery July 2021 and basically has had a catheter since.  He's now at home and had rehab.  He was unable to urinate after surgery and had a few voiding trials while inpatient.  He had UDS with Micaela:  -Maximum cystometric capacity 450 mL with normal filling sensations.  -Fair bladder compliance (ratio 14) without detrusor overactivity or incontinence.  -No appreciable detrusor contraction during attempts to void. He tries to Valsalva void but ultimately has complete urinary retention with 0 mL voided and 450 mL residual by catheter.   -Concordant EMG activity throughout.   -Fluoroscopy reveals a mildly trabeculated bladder wall without diverticuli or VUR. The bladder neck is closed during filling; voiding images unavailable as patient stood to attempt voiding and ultimately did not void.  A/P:  Neurogenic bladder  ?spinal shock still?  He has flaccid bladder on UDS    Followup 1 month with Micaela for next catheter  change.  Wants to continue to try to void.  He did regain bowel control.    If he urinates, great.  If he doesn't then he could have a Davis or SPT to buy more time.  Or he could be seen by BPH specialist for clinic cystoscopy - Nikki or An for HOLEP (with possible SPT) to completely relieve outlet obstruction. Sometimes this allows spontaneous voiding despite detrusor underactivity.      Demario Aguirre MD

## 2021-09-29 NOTE — PROGRESS NOTES
Jackson is a 70 year old who is being evaluated via a billable video visit.      Video Visit Technology for this patient: Zahida Video Visit- Patient was left in waiting room    How would you like to obtain your AVS? MyChart  If the video visit is dropped, the invitation should be resent by: Send to e-mail at: [email or web address]  Will anyone else be joining your video visit? No      Video Start Time: 1245p  Video-Visit Details    Type of service:  Video Visit    Video End Time:100P    Originating Location (pt. Location): Home    Distant Location (provider location):  [facility] UROLOGY CLINIC [place]     Platform used for Video Visit: Zahida    Rigo Johns is a 70 year old male had neck surgery July 2021 and basically has had a catheter since.  He's now at home and had rehab.  He was unable to urinate after surgery and had a few voiding trials while inpatient.  He had UDS with Micaela:  -Maximum cystometric capacity 450 mL with normal filling sensations.  -Fair bladder compliance (ratio 14) without detrusor overactivity or incontinence.  -No appreciable detrusor contraction during attempts to void. He tries to Valsalva void but ultimately has complete urinary retention with 0 mL voided and 450 mL residual by catheter.   -Concordant EMG activity throughout.   -Fluoroscopy reveals a mildly trabeculated bladder wall without diverticuli or VUR. The bladder neck is closed during filling; voiding images unavailable as patient stood to attempt voiding and ultimately did not void.  A/P:  Neurogenic bladder  ?spinal shock still?  He has flaccid bladder on UDS    Followup 1 month with Micaela for next catheter change.  Wants to continue to try to void.  He did regain bowel control.    If he urinates, great.  If he doesn't then he could have a Davis or SPT to buy more time.  Or he could be seen by BPH specialist for clinic cystoscopy - Nikki or An for HOLEP (with possible SPT) to completely relieve outlet  obstruction. Sometimes this allows spontaneous voiding despite detrusor underactivity.      Demario Aguirre MD

## 2021-10-17 ENCOUNTER — HEALTH MAINTENANCE LETTER (OUTPATIENT)
Age: 71
End: 2021-10-17

## 2021-10-18 ASSESSMENT — ENCOUNTER SYMPTOMS: FEVER: 1

## 2021-10-20 ENCOUNTER — TELEPHONE (OUTPATIENT)
Dept: UROLOGY | Facility: CLINIC | Age: 71
End: 2021-10-20

## 2021-10-20 NOTE — TELEPHONE ENCOUNTER
[facility] Call Center    Phone Message    May a detailed message be left on voicemail: yes     Reason for Call: Other: Enriqueta from [facility] called back on 10/6 and never recieved a call back, she is wondering if it is okay for them to change his catheter in home since there is still some time before his in clinic appt and Enriqueta said it is due to be changed. Please give her a call back to discuss.      Action Taken: Message routed to:  [facility] Uro    Travel Screening: Not Applicable

## 2021-10-20 NOTE — TELEPHONE ENCOUNTER
Called nurse and told her he needs cath change in 4 weeks which is oct 29th his appt here is nov9th too long per home care nurse .  She will change this late next week Stephanie Gaines LPN Staff Nurse

## 2021-10-22 DIAGNOSIS — Z98.1 STATUS POST CERVICAL SPINAL ARTHRODESIS: Primary | ICD-10-CM

## 2021-10-25 ENCOUNTER — OFFICE VISIT (OUTPATIENT)
Dept: NEUROSURGERY | Facility: CLINIC | Age: 71
End: 2021-10-25
Attending: NURSE PRACTITIONER
Payer: COMMERCIAL

## 2021-10-25 ENCOUNTER — ANCILLARY PROCEDURE (OUTPATIENT)
Dept: GENERAL RADIOLOGY | Facility: CLINIC | Age: 71
End: 2021-10-25
Attending: NURSE PRACTITIONER
Payer: COMMERCIAL

## 2021-10-25 VITALS
HEART RATE: 100 BPM | SYSTOLIC BLOOD PRESSURE: 148 MMHG | HEIGHT: 71 IN | WEIGHT: 218 LBS | RESPIRATION RATE: 18 BRPM | BODY MASS INDEX: 30.52 KG/M2 | DIASTOLIC BLOOD PRESSURE: 98 MMHG

## 2021-10-25 DIAGNOSIS — Z98.1 S/P CERVICAL SPINAL FUSION: Primary | ICD-10-CM

## 2021-10-25 DIAGNOSIS — Z98.1 STATUS POST CERVICAL SPINAL ARTHRODESIS: ICD-10-CM

## 2021-10-25 PROCEDURE — 72040 X-RAY EXAM NECK SPINE 2-3 VW: CPT | Performed by: INTERNAL MEDICINE

## 2021-10-25 PROCEDURE — 99024 POSTOP FOLLOW-UP VISIT: CPT | Performed by: NURSE PRACTITIONER

## 2021-10-25 PROCEDURE — G0463 HOSPITAL OUTPT CLINIC VISIT: HCPCS

## 2021-10-25 ASSESSMENT — MIFFLIN-ST. JEOR: SCORE: 1760.97

## 2021-10-25 NOTE — PROGRESS NOTES
"[facility]  Follow Up Visit     HPI: 3 months s/p C3-5 ACDF with Dr. Hanley. Patient had post op prevertebral fluid collection that improved with course of antibiotics. Today, patient reports feeling well overall. Occasional neck pain with activity. Pre op arm and leg weakness continues to improve. Denies radicular pain, paresthesias, falls, or incision concerns. He discharged to home from ARU on 9/9/21. He was working with home PT but would like to transition to outpatient PT at this time. He has weaned off pain medications. Davis catheter in place for urinary retention, follows with Urology, has upcoming appt on 11/9/21. Denies any other post op concerns today.     Current pain: 0/10    Exam:  Constitutional:  Alert, well nourished, NAD.  HEENT: Normocephalic, atraumatic.   Pulm:  Without shortness of breath   CV:  No pitting edema of BLE.     Vital Signs: BP (!) 148/98   Pulse 100   Resp 18   Ht 5' 11\" (1.803 m)   Wt 218 lb (98.9 kg)   BMI 30.40 kg/m      Neurological:  Awake  Alert  Oriented x 3  Motor exam:  Shoulder Abduction:  Right:  5/5    Left:  4+/5  Biceps:                      Right:  5/5    Left:  5/5  Triceps:                     Right:  5/5    Left:  5/5  Wrist Extensors:       Right:  5/5    Left:  5/5  Wrist Flexors:           Right:  5/5    Left:  5/5  Intrinsics:                  Right:  5/5    Left:  5/5  Hip Flexor:                 Right: 5/5  Left:  5/5  Quadriceps:               Right:  5/5  Left:  5/5  Hamstrings:               Right:  5/5  Left:  5/5  Gastroc Soleus:         Right:  5/5  Left:  5/5  Tib/Ant:                      Right:  5/5  Left:  5/5  EHL:                          Right:  5/5  Left:  5/5        Able to spontaneously move extremities bilaterally  Ambulates with walker   Sensation intact throughout all dermatomes    Incision: Well healed     Imaging: AP and lateral views reveal stable and intact hardware.     Assessment/Plan:   3 months s/p " C3-5 ACDF     - Continue with routine post operative care plan   - Referral to continue with post op PT   - May gradually increase activity as tolerated  - Follow up in 3 months with xray prior   - Call our clinic for any incisional concerns, increased pain, new symptoms, or any other post operative questions or concerns    Discussed red flag symptoms and advised to seek medical attention if these develop. Patient voiced understanding and agreement.      Carey Whittington CNP  [facility] Neurosurgery  [address] [address]  Tel [phone]  Pager [phone]

## 2021-10-25 NOTE — LETTER
"    10/25/2021         RE: Rigo Johns  [address] [facility] [facility] [address]        Dear Colleague,    Thank you for referring your patient, Rigo Johns, to the [facility] NEUROSURGERY CLINIC [place]. Please see a copy of my visit note below.    [facility]  Follow Up Visit     HPI: 3 months s/p C3-5 ACDF with Dr. Hanley. Patient had post op prevertebral fluid collection that improved with course of antibiotics. Today, patient reports feeling well overall. Occasional neck pain with activity. Pre op arm and leg weakness continues to improve. Denies radicular pain, paresthesias, falls, or incision concerns. He discharged to home from ARU on 9/9/21. He was working with home PT but would like to transition to outpatient PT at this time. He has weaned off pain medications. Davis catheter in place for urinary retention, follows with Urology, has upcoming appt on 11/9/21. Denies any other post op concerns today.     Current pain: 0/10    Exam:  Constitutional:  Alert, well nourished, NAD.  HEENT: Normocephalic, atraumatic.   Pulm:  Without shortness of breath   CV:  No pitting edema of BLE.     Vital Signs: BP (!) 148/98   Pulse 100   Resp 18   Ht 5' 11\" (1.803 m)   Wt 218 lb (98.9 kg)   BMI 30.40 kg/m      Neurological:  Awake  Alert  Oriented x 3  Motor exam:  Shoulder Abduction:  Right:  5/5    Left:  4+/5  Biceps:                      Right:  5/5    Left:  5/5  Triceps:                     Right:  5/5    Left:  5/5  Wrist Extensors:       Right:  5/5    Left:  5/5  Wrist Flexors:           Right:  5/5    Left:  5/5  Intrinsics:                  Right:  5/5    Left:  5/5  Hip Flexor:                 Right: 5/5  Left:  5/5  Quadriceps:               Right:  5/5  Left:  5/5  Hamstrings:               Right:  5/5  Left:  5/5  Gastroc Soleus:         Right:  5/5  Left:  5/5  Tib/Ant:                      Right:  5/5  Left:  5/5  EHL:                          Right:  5/5  Left:  5/5    "     Able to spontaneously move extremities bilaterally  Ambulates with walker   Sensation intact throughout all dermatomes    Incision: Well healed     Imaging: AP and lateral views reveal stable and intact hardware.     Assessment/Plan:   3 months s/p C3-5 ACDF     - Continue with routine post operative care plan   - Referral to continue with post op PT   - May gradually increase activity as tolerated  - Follow up in 3 months with xray prior   - Call our clinic for any incisional concerns, increased pain, new symptoms, or any other post operative questions or concerns    Discussed red flag symptoms and advised to seek medical attention if these develop. Patient voiced understanding and agreement.      Carey Whittington CNP  [facility] [place] [address]  Tel [phone]  Pager [phone]          Again, thank you for allowing me to participate in the care of your patient.        Sincerely,        Carey Whittington, NP

## 2021-10-25 NOTE — NURSING NOTE
"Rigo Johns is a 71 year old male who presents for:  Chief Complaint   Patient presents with     Surgical Followup     12 week         Initial Vitals:  BP (!) 148/98   Pulse 100   Resp 18   Ht 5' 11\" (1.803 m)   Wt 218 lb (98.9 kg)   BMI 30.40 kg/m   Estimated body mass index is 30.4 kg/m  as calculated from the following:    Height as of this encounter: 5' 11\" (1.803 m).    Weight as of this encounter: 218 lb (98.9 kg).. Body surface area is 2.23 meters squared. BP completed using cuff size: regular  Data Unavailable    Nursing Comments: Patient is doing well with no pain. 0/10    Teri Galloway MA    "

## 2021-10-25 NOTE — PATIENT INSTRUCTIONS
- Continue with routine post operative care plan   - Referral to continue with post op PT   - May gradually increase activity as tolerated  - Follow up in 3 months with xray prior   - Call our clinic for any incisional concerns, increased pain, new symptoms, or any other post operative questions or concerns

## 2021-10-28 ENCOUNTER — THERAPY VISIT (OUTPATIENT)
Dept: PHYSICAL THERAPY | Facility: CLINIC | Age: 71
End: 2021-10-28
Attending: NURSE PRACTITIONER
Payer: COMMERCIAL

## 2021-10-28 DIAGNOSIS — Z98.1 S/P CERVICAL SPINAL FUSION: ICD-10-CM

## 2021-10-28 PROCEDURE — 97161 PT EVAL LOW COMPLEX 20 MIN: CPT | Mod: GP | Performed by: PHYSICAL THERAPIST

## 2021-10-28 PROCEDURE — 97110 THERAPEUTIC EXERCISES: CPT | Mod: GP | Performed by: PHYSICAL THERAPIST

## 2021-10-28 NOTE — PROGRESS NOTES
Physical Therapy Initial Evaluation  Subjective:  Pt reports 7-11-21 sudden onset of cervical pain, SOB and weakness in all 4 extremities. Pt was taken by ambulance to the hospital. 7-14-21 C3-C4 and C4-C5 anterior cervical discectomy and fusion. While rehabilitating in transitional care pt developed and infection which required antibiotics. Pt had home health physical therapy upon returning home. Pt referred by MD for physical therapy on 10-25-21.    The history is provided by the patient.   Therapist Generated HPI Evaluation         Type of problem:  Cervical spine.    This is a new condition.  Condition occurred with:  Degenerative joint disease.  Where condition occurred: other.  Patient reports pain:  Cervical right side and cervical left side.  Pain is described as aching and is intermittent.  Pain is the same all the time.  Since onset symptoms are gradually improving.  Associated symptoms:  Loss of motion/stiffness, loss of strength, numbness and tingling. Symptoms are exacerbated by rotating head, looking up or down and sitting (end range motion, working on the computer)  and relieved by rest.  Special tests included:  X-ray and MRI (see report).  Previous treatment includes surgery and physical therapy (1993 C5-C6 and C6-C7 cervical disc fusion).   Work activity restrictions: retired.  Barriers include:  None as reported by patient.    Patient Health History             Pertinent medical history includes: high blood pressure, incontinence, overweight and sleep disorder/apnea.   Red flags:  Significant weakness.  Medical allergies: shrimp.   Surgeries include:  Orthopedic surgery (cervical fusion C3, C4, C5).    Current medications:  Sleep medication and muscle relaxants.    Current occupation is retired.   Primary job tasks include:  Computer work.   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                                  Objective:  Standing Alignment:    Cervical/thoracic deviations alignment:  forward head and shoulder posture.                Gait:  Walker outdoors, cane 50% of the time indoors        Flexibility/Screens:         Spine:  Decreased left spine flexibility:  Upper Trap and Levator    Decreased right spine flexibility:  Upper Trap and Levator                  Cervical/Thoracic Evaluation    AROM:  AROM Cervical:    Flexion:            Minimal loss ERP  Extension:       Maximum loss ERP  Rotation:         Left: moderate loss ERP     Right: moderate loss ERP  Side Bend:      Left: maximum loss ERP     Right:  Maximum loss ERP    Strength: weakness noted left shoulder flexors 4/5, left shoulder abductors 4/5 and left hip abductors 4/5            Cervical Palpation:  : muscle guarding upper trapezius and rhomboids. pt denies point tenderness with palpation.                                                       General     ROS    Assessment/Plan:    Patient is a 71 year old male with cervical complaints.    Patient has the following significant findings with corresponding treatment plan.                Diagnosis 1:  Post op C3-C3  Pain -  hot/cold therapy, self management, education and home program  Decreased ROM/flexibility - therapeutic exercise, therapeutic activity and home program  Decreased strength - therapeutic exercise, therapeutic activities and home program    Therapy Evaluation Codes:   1) History comprised of:   Personal factors that impact the plan of care:      None.    Comorbidity factors that impact the plan of care are:      High blood pressure, Overweight, Sleep disorder/apnea, Weakness and incontinence.     Medications impacting care: Muscle relaxant and Sleep.  2) Examination of Body Systems comprised of:   Body structures and functions that impact the plan of care:      Cervical spine.   Activity limitations that impact the plan of care are:      Lifting, Reading/Computer work, Sitting and Walking.  3) Clinical presentation characteristics  are:   Stable/Uncomplicated.  4) Decision-Making    Low complexity using standardized patient assessment instrument and/or measureable assessment of functional outcome.  Cumulative Therapy Evaluation is: Low complexity.    Previous and current functional limitations:  (See Goal Flow Sheet for this information)    Short term and Long term goals: (See Goal Flow Sheet for this information)     Communication ability:  Patient appears to be able to clearly communicate and understand verbal and written communication and follow directions correctly.  Treatment Explanation - The following has been discussed with the patient:   RX ordered/plan of care  Anticipated outcomes  Possible risks and side effects  This patient would benefit from PT intervention to resume normal activities.   Rehab potential is good.    Frequency:  1 X week, once daily  Duration:  for 8 weeks  Discharge Plan:  Achieve all LTG.  Independent in home treatment program.  Reach maximal therapeutic benefit.    Please refer to the daily flowsheet for treatment today, total treatment time and time spent performing 1:1 timed codes.

## 2021-10-29 ENCOUNTER — PRE VISIT (OUTPATIENT)
Dept: UROLOGY | Facility: CLINIC | Age: 71
End: 2021-10-29

## 2021-11-04 ENCOUNTER — THERAPY VISIT (OUTPATIENT)
Dept: PHYSICAL THERAPY | Facility: CLINIC | Age: 71
End: 2021-11-04
Payer: COMMERCIAL

## 2021-11-04 DIAGNOSIS — Z98.1 S/P CERVICAL SPINAL FUSION: ICD-10-CM

## 2021-11-04 PROCEDURE — 97112 NEUROMUSCULAR REEDUCATION: CPT | Mod: GP | Performed by: PHYSICAL THERAPIST

## 2021-11-04 PROCEDURE — 97110 THERAPEUTIC EXERCISES: CPT | Mod: GP | Performed by: PHYSICAL THERAPIST

## 2021-11-09 ENCOUNTER — MEDICAL CORRESPONDENCE (OUTPATIENT)
Dept: HEALTH INFORMATION MANAGEMENT | Facility: CLINIC | Age: 71
End: 2021-11-09

## 2021-11-09 ENCOUNTER — OFFICE VISIT (OUTPATIENT)
Dept: UROLOGY | Facility: CLINIC | Age: 71
End: 2021-11-09
Payer: COMMERCIAL

## 2021-11-09 VITALS
SYSTOLIC BLOOD PRESSURE: 157 MMHG | HEIGHT: 71 IN | WEIGHT: 220 LBS | BODY MASS INDEX: 30.8 KG/M2 | HEART RATE: 101 BPM | DIASTOLIC BLOOD PRESSURE: 84 MMHG

## 2021-11-09 DIAGNOSIS — R33.9 URINARY RETENTION: Primary | ICD-10-CM

## 2021-11-09 DIAGNOSIS — N31.9 NEUROGENIC BLADDER: ICD-10-CM

## 2021-11-09 DIAGNOSIS — G95.9 CERVICAL MYELOPATHY (H): ICD-10-CM

## 2021-11-09 PROCEDURE — 51798 US URINE CAPACITY MEASURE: CPT | Performed by: PHYSICIAN ASSISTANT

## 2021-11-09 PROCEDURE — 99214 OFFICE O/P EST MOD 30 MIN: CPT | Mod: 25 | Performed by: PHYSICIAN ASSISTANT

## 2021-11-09 RX ORDER — CIPROFLOXACIN 500 MG/1
500 TABLET, FILM COATED ORAL ONCE
Status: COMPLETED | OUTPATIENT
Start: 2021-11-09 | End: 2021-11-09

## 2021-11-09 RX ADMIN — CIPROFLOXACIN 500 MG: 500 TABLET, FILM COATED ORAL at 11:48

## 2021-11-09 ASSESSMENT — PAIN SCALES - GENERAL: PAINLEVEL: NO PAIN (0)

## 2021-11-09 ASSESSMENT — MIFFLIN-ST. JEOR: SCORE: 1770.04

## 2021-11-09 NOTE — PROGRESS NOTES
Urology Office Visit - Follow up    Reason for visit: possible voiding trial    Assessment/Plan:  71 year old male with post-operative urinary retention following C-spine surgery in July 2021 with atonic bladder on UDS in Sept 2021. Per Dr. Aguirre, possibly still in the spinal shock phase. He has had more urgency and bladder sensation over the last 3 weeks. Voiding trial today was partially successful with 300 mL instilled, total of 350 mL voided/leaked (in multiple instances), and final catheterized PVR just over 100 mL.  -As a precaution, patient was instructed in clean intermittent catheterization technique with instructions to check post-void residual volumes at least once a day to start and titrating frequency of CIC based on PVR volumes (see patient instructions for details).   -Patient was sent with a short supply of 14 Fr Coude intermittent catheters and we will order additional supplies for performing CIC up to 4 times per day. Note that patient requires a Coude tip catheter due to inability to pass a straight catheter into the bladder secondary to patient anatomy.   -Per Dr. Aguirre, can also follow up for cystoscopy with BPH specialist to see if bladder outlet obstruction may be contributing. Patient prefers to follow up in [place] given proximity to his home. Recommend that he see Dr. Sousa in that case.    Micaela Flaherty PA-C  Department of Urology      HPI: Rigo Johns is a 71 year old male who developed urinary retention post-operatively following C-spine surgery in July 2021. Previously followed with Dr. Aguirre - see his consult note from 9/15/21 and progress note from 9/29/21 for additional details. The patient underwent urodynamics testing on 9/23/21 which demonstrated flaccid bladder (see full report below). He has continued to manage with an indwelling Davis catheter since then. Presents today for possible voiding trial. He reports having more sensation in his bladder and urges over  "the last 3 weeks. Continues to do well with his bowels with what he estimates as 95% control over his bowels.    PEx  BP (!) 157/84   Pulse 101   Ht 1.803 m (5' 11\")   Wt 99.8 kg (220 lb)   BMI 30.68 kg/m    GENERAL: Healthy, alert and no distress  EYES: Eyes grossly normal to inspection.  No discharge or erythema, or obvious scleral/conjunctival abnormalities.  RESP: No audible wheeze, cough, or visible cyanosis.  No visible retractions or increased work of breathing.    : Davis catheter indwelling draining yellow urine.  SKIN: Visible skin clear. No significant rash, abnormal pigmentation or lesions.  NEURO: Cranial nerves grossly intact.  Mentation and speech appropriate for age.  PSYCH: Mentation appears normal, affect normal/bright, judgement and insight intact, normal speech and appearance well-groomed.    Voiding trial in clinic today (see separate nursing note for full details):   300 mL instilled into bladder  Patient voided 100 mL.  PVR by bladder scan 225 mL.  Patient voided/leaked another 125 mL.  Final residual volume by catheter ~125 mL.     UDS 9/23/21  -Maximum cystometric capacity 450 mL with normal filling sensations.  -Fair bladder compliance (ratio 14) without detrusor overactivity or incontinence.  -No appreciable detrusor contraction during attempts to void. He tries to Valsalva void but ultimately has complete urinary retention with 0 mL voided and 450 mL residual by catheter.   -Concordant EMG activity throughout.   -Fluoroscopy reveals a mildly trabeculated bladder wall without diverticuli or VUR. The bladder neck is closed during filling; voiding images unavailable as patient stood to attempt voiding and ultimately did not void.      34 minutes spent on the date of the encounter doing chart review, patient visit and documentation    "

## 2021-11-09 NOTE — PATIENT INSTRUCTIONS
"UROLOGY CLINIC VISIT PATIENT INSTRUCTIONS    Follow up for cystoscopy with urologist in [place] (Dr. Sousa) next available. If you prefer to return to the [facility] for follow up, recommend that you see Dr. Jordan or Dr. Garcia.    Always try to urinate on your own before catheterizing. Measure this volume and record is as \"voided volume.\" Then catheterize yourself and measure the catheterized output  - this amount is called the \"postvoid residual\".    -If your postvoid residual is consistently >400mL, then you should catheterize 4 times per day.  -If your postvoid residual is consistently 300-399 mL, then you should catheterize 3 times per day.  -If your postvoid residual is consistently 200-299 mL, then you should catheterize 2 times per day.  -If your postvoid residual is consistently 100-199 mL, then you should catheterize 1 time per day.  -If your postvoid residual is consistently <100 mL, then you can likely stop catheterizing altogether.     Keep a diary of voided volumes and postvoid residuals and bring this information to your follow up urology appointment.     CYSTOSCOPY    What is a Cystoscopy?  This is a procedure done to check for problems inside the bladder.  Problems may include polyps (growths), tumors, inflammation (swelling and redness) and other concerns.    The doctor inserts a thin tube (called a cystoscope) into the bladder.  The tube is about the size of a pencil.  We will give you numbing medicine to reduce the pain or discomfort you may feel.    The tube allows the doctor to:  The doctor will be able to see inside the bladder by filling the bladder with water.  The water makes it easier to see any problems that may be present.    If needed, the doctor may use the tube to:  The doctor is able to take tissue samples (biopsies).  Samples are sent to the lab for testing.  The doctor can also burn off any small growths or tumors that are found.  This is call " fulguration.    How should I get ready for the exam?  To prepare, stop taking any medications as instructed. Ask whether you should avoid eating or drinking anything after midnight before the procedure. Follow any other instructions your doctor gives you.    If you are having this procedure done at the clinic, you will be there for up to an hour.  You will receive care before and after the procedure.    Please tell your doctor if:  - You have a history of urinary tract infections.  - You know that you have a tumor in your bladder.  - You have bleeding problems.  - You have any allergies.  - You are or may be pregnant.      What happens after the exam?  You may go back to your normal diet and activity as you feel ready, unless your doctor tells you not to.    For the next two days, you may notice:  - Some blood in your urine.  - Some burning when you urinate (use the toilet).  - An urge to urinate more often.  - Bladder spasms.    These are normal after the procedure. They should go away on their own after a day or two.      - You can help to relieve the above listed symptoms by:  - Drinking 6 to 8 large glasses of water each day (includes drinks at meals).  This will help clear the urine.  - Take warm baths to relieve pain and bladder spasms.  Do not add anything to the bath water.  - Your doctor may prescribe pain medicine.  You may also take Tylenol (acetaminophen) for pain.    When should I call my doctor?  - A fever over 100.0 F (38 C) for more than a day.  (Before you call the doctor, check your temperature under your tongue.)  - Chills.  - Failure to urinate: No urine comes out when you try to use the toilet.  (Try soaking in a bathtub full of warm water.  If still no urine, call your doctor.)  - A lot of blood in the urine or blood clots larger than a nickel.  - Pain in the back or abdomen (belly / stomach area).  - Pain or spasms that are not relieved by warm tub baths and pain medicine.  - Severe pain,  burning or other problems while passing urine.  - Pain that gets worse after two days.        If you have any issues, questions or concerns in the meantime, do not hesitate to contact us at [phone] or via [employer]     It was a pleasure meeting with you today.  Thank you for allowing me and my team the privilege of caring for you today.  YOU are the reason we are here, and I truly hope we provided you with the excellent service you deserve.  Please let us know if there is anything else we can do for you so that we can be sure you are leaving completely satisfied with your care experience.

## 2021-11-09 NOTE — NURSING NOTE
"Chief Complaint   Patient presents with     Follow Up     neurogenic bladder        Blood pressure (!) 157/84, pulse 101, height 1.803 m (5' 11\"), weight 99.8 kg (220 lb). Body mass index is 30.68 kg/m .    Patient Active Problem List   Diagnosis     Shortness of breath     Hypoxia     Bilateral arm weakness     Cervical stenosis of spinal canal     Cervical myelopathy (H)     Fever      Leukocytosis     Diarrhea     Post op infection     H/O cervical spine surgery     Abnormal computerized axial tomography of abdomen     Arthritis     Herpes zoster     History of basal cell carcinoma (BCC)     History of colonoscopy     History of tear of meniscus of knee joint     Hyperlipidemia     Sepsis (H)     Tubular adenoma of colon     Status post cervical spinal arthrodesis     Obstructive sleep apnea treated with continuous positive airway pressure (CPAP)     Sleep apnea     S/P cervical spinal fusion       Allergies   Allergen Reactions     Shrimp Flavor      Other reaction(s): vomitting/upset stomach     Shrimp GI Disturbance       Current Outpatient Medications   Medication Sig Dispense Refill     miconazole (MICATIN) 2 % external powder Apply topically 2 times daily as needed for other or itching (redness) 71 g 0     pantoprazole (PROTONIX) 40 MG EC tablet Take 1 tablet (40 mg) by mouth daily 30 tablet 0     VITRON-C  MG TABS tablet Take 1 tablet by mouth daily       acetaminophen (TYLENOL) 325 MG tablet Take 2 tablets (650 mg) by mouth every 6 hours as needed for mild pain or other (and adjunct with moderate or severe pain or per patient request)       cephALEXin (KEFLEX) 500 MG capsule  (Patient not taking: Reported on 9/23/2021)       ciprofloxacin (CIPRO) 500 MG tablet Take 500 mg by mouth 2 times daily (Patient not taking: Reported on 9/29/2021)       cyclobenzaprine (FLEXERIL) 10 MG tablet Take 1 tablet (10 mg) by mouth At Bedtime 60 tablet 0     docusate sodium (ENEMEEZ) 283 MG enema Place 1 enema " rectally 2 times daily as needed for constipation 60 enema 0     fludrocortisone (FLORINEF) 0.1 MG tablet Take 1 tablet (0.1 mg) by mouth daily 30 tablet 0     melatonin 1 MG TABS tablet Take 1 tablet (1 mg) by mouth At Bedtime       midodrine (PROAMATINE) 10 MG tablet Take 1 tablet (10 mg) by mouth 3 times daily (with meals) 90 tablet 0     multivitamin, therapeutic (THERA-VIT) TABS tablet Take 1 tablet by mouth daily 30 tablet 0     psyllium (METAMUCIL/KONSYL) Packet Take 1 packet by mouth 2 times daily 60 packet 1     senna-docusate (SENOKOT-S/PERICOLACE) 8.6-50 MG tablet Take 1 tablet by mouth 2 times daily as needed for constipation 60 tablet 0     traZODone (DESYREL) 100 MG tablet Take 1 tablet (100 mg) by mouth At Bedtime 30 tablet 0     TURMERIC PO        valACYclovir (VALTREX) 1000 mg tablet Three Times A Day         Social History     Tobacco Use     Smoking status: Never Smoker     Smokeless tobacco: Never Used   Vaping Use     Vaping Use: None   Substance Use Topics     Alcohol use: Yes     Comment: once a week     Drug use: Never     Chief Complaint   Patient presents with     Follow Up     neurogenic bladder        Patient Active Problem List   Diagnosis     Shortness of breath     Hypoxia     Bilateral arm weakness     Cervical stenosis of spinal canal     Cervical myelopathy (H)     Fever      Leukocytosis     Diarrhea     Post op infection     H/O cervical spine surgery     Abnormal computerized axial tomography of abdomen     Arthritis     Herpes zoster     History of basal cell carcinoma (BCC)     History of colonoscopy     History of tear of meniscus of knee joint     Hyperlipidemia     Sepsis (H)     Tubular adenoma of colon     Status post cervical spinal arthrodesis     Obstructive sleep apnea treated with continuous positive airway pressure (CPAP)     Sleep apnea     S/P cervical spinal fusion       Allergies   Allergen Reactions     Shrimp Flavor      Other reaction(s): vomitting/upset  stomach     Shrimp GI Disturbance       Current Outpatient Medications   Medication Sig Dispense Refill     miconazole (MICATIN) 2 % external powder Apply topically 2 times daily as needed for other or itching (redness) 71 g 0     pantoprazole (PROTONIX) 40 MG EC tablet Take 1 tablet (40 mg) by mouth daily 30 tablet 0     VITRON-C  MG TABS tablet Take 1 tablet by mouth daily       acetaminophen (TYLENOL) 325 MG tablet Take 2 tablets (650 mg) by mouth every 6 hours as needed for mild pain or other (and adjunct with moderate or severe pain or per patient request)       cephALEXin (KEFLEX) 500 MG capsule  (Patient not taking: Reported on 9/23/2021)       ciprofloxacin (CIPRO) 500 MG tablet Take 500 mg by mouth 2 times daily (Patient not taking: Reported on 9/29/2021)       cyclobenzaprine (FLEXERIL) 10 MG tablet Take 1 tablet (10 mg) by mouth At Bedtime 60 tablet 0     docusate sodium (ENEMEEZ) 283 MG enema Place 1 enema rectally 2 times daily as needed for constipation 60 enema 0     fludrocortisone (FLORINEF) 0.1 MG tablet Take 1 tablet (0.1 mg) by mouth daily 30 tablet 0     melatonin 1 MG TABS tablet Take 1 tablet (1 mg) by mouth At Bedtime       midodrine (PROAMATINE) 10 MG tablet Take 1 tablet (10 mg) by mouth 3 times daily (with meals) 90 tablet 0     multivitamin, therapeutic (THERA-VIT) TABS tablet Take 1 tablet by mouth daily 30 tablet 0     psyllium (METAMUCIL/KONSYL) Packet Take 1 packet by mouth 2 times daily 60 packet 1     senna-docusate (SENOKOT-S/PERICOLACE) 8.6-50 MG tablet Take 1 tablet by mouth 2 times daily as needed for constipation 60 tablet 0     traZODone (DESYREL) 100 MG tablet Take 1 tablet (100 mg) by mouth At Bedtime 30 tablet 0     TURMERIC PO        valACYclovir (VALTREX) 1000 mg tablet Three Times A Day         Social History     Tobacco Use     Smoking status: Never Smoker     Smokeless tobacco: Never Used   Vaping Use     Vaping Use: None   Substance Use Topics     Alcohol use:  Yes     Comment: once a week     Drug use: Never         Rigo Johns presented today for a trial of void.  Approximately 300 mL of normal saline instilled into bladder via catheter.  Patient stated he had urge to urinate and catheter was removed without difficulty.  Patient was given a graduated cylinder to measure urine output.  Patient voided approximately 100 mL of pink urine.   mL.  Patient voided another 125 mL of pink urine    Cipro 500 given per protocol: Yes  The following medication was given:     MEDICATION:  Ciprofloxacin  ROUTE: PO  SITE: mouth  DOSE: 500 mg  LOT #: [id number]  : [employer]  EXPIRATION DATE: 11/2022  NDC#: 3051-2948-81   Was there drug waste? No    Prior to medication administration, verified patient identity using patient's name and date of birth.  Due to medication administration, patient instructed to remain in clinic for 15 minutes  afterwards, and to report any adverse reaction to me immediately.        Patient did tolerate procedure well.      Rigo Johns presented to clinic today to be taught CIC. Patient to catheterize with a 14 Fr coude catheter 3-4 times daily. Patient educated about the importance of hand hygiene before and after catheterizing. Patient then taught to cleanse urethral meatus. Patient taught CIC in normal clean fashion with sterile 14 Fr coude catheter.  Patient tolerated this without complications noted. Patient was informed to contact us with any questions or concerns.    Aliyah Jamison  11/9/2021  12:36 PM

## 2021-11-09 NOTE — LETTER
11/9/2021       RE: Rigo Johns  [address]  [facility] [address]     Dear Colleague,    Thank you for referring your patient, Rigo Johns, to the [facility] [place] [facility]. Please see a copy of my visit note below.    Urology Office Visit - Follow up    Reason for visit: possible voiding trial    Assessment/Plan:  71 year old male with post-operative urinary retention following C-spine surgery in July 2021 with atonic bladder on UDS in Sept 2021. Per Dr. Aguirre, possibly still in the spinal shock phase. He has had more urgency and bladder sensation over the last 3 weeks. Voiding trial today was partially successful with 300 mL instilled, total of 350 mL voided/leaked (in multiple instances), and final catheterized PVR just over 100 mL.  -As a precaution, patient was instructed in clean intermittent catheterization technique with instructions to check post-void residual volumes at least once a day to start and titrating frequency of CIC based on PVR volumes (see patient instructions for details).   -Patient was sent with a short supply of 14 Fr Coude intermittent catheters and we will order additional supplies for performing CIC up to 4 times per day. Note that patient requires a Coude tip catheter due to inability to pass a straight catheter into the bladder secondary to patient anatomy.   -Per Dr. Aguirre, can also follow up for cystoscopy with BPH specialist to see if bladder outlet obstruction may be contributing. Patient prefers to follow up in [place] given proximity to his home. Recommend that he see Dr. Sousa in that case.    Micaela Flaherty PA-C  Department of Urology      HPI: Rigo Johns is a 71 year old male who developed urinary retention post-operatively following C-spine surgery in July 2021. Previously followed with Dr. Aguirre - see his consult note from 9/15/21 and progress note from 9/29/21 for  "additional details. The patient underwent urodynamics testing on 9/23/21 which demonstrated flaccid bladder (see full report below). He has continued to manage with an indwelling Davis catheter since then. Presents today for possible voiding trial. He reports having more sensation in his bladder and urges over the last 3 weeks. Continues to do well with his bowels with what he estimates as 95% control over his bowels.    PEx  BP (!) 157/84   Pulse 101   Ht 1.803 m (5' 11\")   Wt 99.8 kg (220 lb)   BMI 30.68 kg/m    GENERAL: Healthy, alert and no distress  EYES: Eyes grossly normal to inspection.  No discharge or erythema, or obvious scleral/conjunctival abnormalities.  RESP: No audible wheeze, cough, or visible cyanosis.  No visible retractions or increased work of breathing.    : Davis catheter indwelling draining yellow urine.  SKIN: Visible skin clear. No significant rash, abnormal pigmentation or lesions.  NEURO: Cranial nerves grossly intact.  Mentation and speech appropriate for age.  PSYCH: Mentation appears normal, affect normal/bright, judgement and insight intact, normal speech and appearance well-groomed.    Voiding trial in clinic today (see separate nursing note for full details):   300 mL instilled into bladder  Patient voided 100 mL.  PVR by bladder scan 225 mL.  Patient voided/leaked another 125 mL.  Final residual volume by catheter ~125 mL.     UDS 9/23/21  -Maximum cystometric capacity 450 mL with normal filling sensations.  -Fair bladder compliance (ratio 14) without detrusor overactivity or incontinence.  -No appreciable detrusor contraction during attempts to void. He tries to Valsalva void but ultimately has complete urinary retention with 0 mL voided and 450 mL residual by catheter.   -Concordant EMG activity throughout.   -Fluoroscopy reveals a mildly trabeculated bladder wall without diverticuli or VUR. The bladder neck is closed during filling; voiding images unavailable as patient " stood to attempt voiding and ultimately did not void.      34 minutes spent on the date of the encounter doing chart review, patient visit and documentation

## 2021-11-10 ENCOUNTER — MEDICAL CORRESPONDENCE (OUTPATIENT)
Dept: HEALTH INFORMATION MANAGEMENT | Facility: CLINIC | Age: 71
End: 2021-11-10
Payer: COMMERCIAL

## 2021-11-15 ENCOUNTER — THERAPY VISIT (OUTPATIENT)
Dept: PHYSICAL THERAPY | Facility: CLINIC | Age: 71
End: 2021-11-15
Payer: COMMERCIAL

## 2021-11-15 DIAGNOSIS — Z98.1 S/P CERVICAL SPINAL FUSION: ICD-10-CM

## 2021-11-15 PROCEDURE — 97112 NEUROMUSCULAR REEDUCATION: CPT | Mod: GP | Performed by: PHYSICAL THERAPIST

## 2021-11-15 PROCEDURE — 97110 THERAPEUTIC EXERCISES: CPT | Mod: GP | Performed by: PHYSICAL THERAPIST

## 2021-11-22 ENCOUNTER — THERAPY VISIT (OUTPATIENT)
Dept: PHYSICAL THERAPY | Facility: CLINIC | Age: 71
End: 2021-11-22
Payer: COMMERCIAL

## 2021-11-22 DIAGNOSIS — Z98.1 S/P CERVICAL SPINAL FUSION: ICD-10-CM

## 2021-11-22 PROCEDURE — 97110 THERAPEUTIC EXERCISES: CPT | Mod: GP | Performed by: PHYSICAL THERAPIST

## 2021-11-22 PROCEDURE — 97112 NEUROMUSCULAR REEDUCATION: CPT | Mod: GP | Performed by: PHYSICAL THERAPIST

## 2021-11-24 ENCOUNTER — OFFICE VISIT (OUTPATIENT)
Dept: PHYSICAL MEDICINE AND REHAB | Facility: CLINIC | Age: 71
End: 2021-11-24
Payer: COMMERCIAL

## 2021-11-24 VITALS
SYSTOLIC BLOOD PRESSURE: 135 MMHG | HEART RATE: 93 BPM | BODY MASS INDEX: 32.19 KG/M2 | OXYGEN SATURATION: 98 % | WEIGHT: 230.8 LBS | RESPIRATION RATE: 16 BRPM | DIASTOLIC BLOOD PRESSURE: 92 MMHG

## 2021-11-24 DIAGNOSIS — Z98.1 S/P CERVICAL SPINAL FUSION: ICD-10-CM

## 2021-11-24 DIAGNOSIS — G95.9 CERVICAL MYELOPATHY (H): ICD-10-CM

## 2021-11-24 DIAGNOSIS — N31.9 NEUROGENIC BLADDER: ICD-10-CM

## 2021-11-24 DIAGNOSIS — K26.9 DUODENAL ULCER: Primary | ICD-10-CM

## 2021-11-24 DIAGNOSIS — Z74.09 IMPAIRED FUNCTIONAL MOBILITY AND ENDURANCE: ICD-10-CM

## 2021-11-24 PROCEDURE — 99215 OFFICE O/P EST HI 40 MIN: CPT | Performed by: PHYSICAL MEDICINE & REHABILITATION

## 2021-11-24 ASSESSMENT — PAIN SCALES - GENERAL: PAINLEVEL: NO PAIN (0)

## 2021-11-24 NOTE — LETTER
11/24/2021       RE: Rigo Johns  [address]  [facility] [address]     Dear Colleague,    Thank you for referring your patient, Rigo Johns, to the [facility] PHYSICAL MEDICINE AND REHABILITATION CLINIC [place] at [facility]. Please see a copy of my visit note below.    [facility] PM&R Clinic - Follow Up Patient Note   Rigo Johns  [id number]  Date of Evaluation: 11/24/2021  Reason for consult: Follow Up for inpatient rehabilitation stay  HPI:  Rigo Johns is a 72 y/o M who presents to the PM&R clinic today for follow-up from his stay at the [place] inpatient rehabilitation unit.  He underwent a C3-4, C4-5 ACDF on 7/14/2021 for high-grade C3-C4 and C4-C5 spinal canal stenosis with myelopathy and bilateral neural foraminal stenosis.  He was initially admitted to the rehab unit from 7/18/2021 to 7/27/2021 however was transferred for a prevertebral fluid collection suggestive of an abscess or seroma and was treated with antibiotics.  He was readmitted to the rehab unit from 8/2/2021 to 9/9/2021.  His stay was further complicated by acute drop in hemoglobin and melena for which he was found to have duodenal ulcers, spasms of the left ankle, orthostatic hypotension, and urinary retention for which he was discharged with a Davis catheter.  Functionally at the time of discharge he was ambulating multiple 100s of feet with a four-wheel walker needing supervision with outdoor ambulation, and modified independent for most ADLs except donning of compression stockings.  He was set up with home health therapies upon discharge.    Since discharge Mr. Johns reports that he is making steady improvements.  He endorses that his stamina is still low however this also continues to get better.  Functionally he has now ambulating without any assistive device but typically takes a single-point cane with him for safety during longer distances although he  "does not really use it.  Mobility in the upper extremities is improving and he is independent with all ADLs.  He has now transitioned from home health therapies to outpatient.    He reports that his leg spasms are now virtually gone but still has a \"twitch\" approximately once per day but does not particularly bother him.  He is no longer requiring any Flexeril and is not having any lightheadedness or dizziness.  Regarding his bladder he has followed up with urology and had a urodynamic study performed which demonstrated a flaccid bladder.  His Davis catheter was removed a few weeks ago and he has been able to void volitionally with borderline elevated PVRs and he is cathing twice per day to ensure emptying of his bladder.  Regarding bowels he is having 0-2 bowel movements per day and states he has 95% control of these.  He says this depends on a juggling act with Metamucil but otherwise is not on any other bowel medications.  His wife is inquiring about the duration of his PPI or if the dose can be adjusted.  I discussed with him that the recommendation from GI was 40 mg twice daily for 8 weeks which he has now completed, and then would need 40 mg daily indefinitely.  I recommended continue with this dose unless otherwise recommended by a gastroenterologist.     PMH:  Past Medical History:   Diagnosis Date     Cervical stenosis of spinal canal      Hypertension      Sleep apnea     uses cpap       PSH:  Past Surgical History:   Procedure Laterality Date     BACK SURGERY      cervical c5-6 fusion     ESOPHAGOSCOPY, GASTROSCOPY, DUODENOSCOPY (EGD), COMBINED N/A 8/12/2021    Procedure: ESOPHAGOGASTRODUODENOSCOPY, WITH BIOPSY;  Surgeon: Mamie Najera DO;  Location: [facility]     FUSION CERVICAL ANTERIOR TWO LEVELS N/A 7/14/2021    Procedure: C3-C4, C4-C5 anterior cervical discetomy and fusion;  Surgeon: Tee Hanley MD;  Location:  OR       Allergies:  Allergies   Allergen Reactions     Shrimp Flavor      Other " reaction(s): vomitting/upset stomach     Shrimp GI Disturbance       Medications:  Current Outpatient Medications   Medication     miconazole (MICATIN) 2 % external powder     pantoprazole (PROTONIX) 40 MG EC tablet     VITRON-C  MG TABS tablet     No current facility-administered medications for this visit.       Social History:  Social History     Socioeconomic History     Marital status:      Spouse name: Not on file     Number of children: Not on file     Years of education: Not on file     Highest education level: Not on file   Occupational History     Not on file   Tobacco Use     Smoking status: Never Smoker     Smokeless tobacco: Never Used   Vaping Use     Vaping Use: Not on file   Substance and Sexual Activity     Alcohol use: Yes     Comment: once a week     Drug use: Never     Sexual activity: Not on file   Other Topics Concern     Parent/sibling w/ CABG, MI or angioplasty before 65F 55M? Not Asked   Social History Narrative     Not on file     Social Determinants of Health     Financial Resource Strain: Not on file   Food Insecurity: Not on file   Transportation Needs: Not on file   Physical Activity: Not on file   Stress: Not on file   Social Connections: Not on file   Intimate Partner Violence: Not on file   Housing Stability: Not on file       Review of Systems - History obtained from chart review and the patient  General ROS: negative for - chills or fever, +decreased endurance  Respiratory ROS: no cough, shortness of breath, or wheezing  Cardiovascular ROS: no chest pain or dyspnea on exertion  Gastrointestinal ROS: positive for - Rare incontinence  Genito-Urinary ROS: positive for - Neurogenic bladder  Musculoskeletal ROS: Positive for - gait disturbance  Neurological ROS: positive for - Cervical stenosis s/p cervical fusion  Dermatological ROS: negative for rash  Functional Hx:  As per HPI    Physical Exam:  Wt 104.7 kg (230 lb 12.8 oz)   BMI 32.19 kg/m    Gen: NAD, pleasant and  cooperative  Skin: No rashes noted  HEENT: NC/AT  Pulm: Non-labored breathing  GI: Soft, NT/ND  Ext: +Edema, no calf tenderness  Neuro: AAOx3, follows commands, answers appropriately  Spasticity MAS:  0 to UEs, LEs.    MMT 5/5 throughout  Reflexes 2+  No Epstein's  Gait: slightly wide based, no device needed.    Assessment:  Rigo Johns is a 71 year old L hand dominant male with a PMH of HTN, PILI, and prior cervical surgery (C5-7 anterior fusion in 1993 per notes) who is seen in the PM&R clinic today for follow up from his inpatient rehabilitation stay status post a C3-4, C4-5 ACDF on 7/14/2021 for cervical myelopathy.     Recommendations:  -I am very happy to see the progress that Mr. Johns was made since discharge from the acute inpatient rehabilitation unit. He is now able to ambulate without an assistive device for the most part, however still uses a single-point cane for longer distances for safety. He will continue with outpatient therapies to continue to progress his gait, balance, and endurance.  -I would recommend continuing Protonix 40 mg daily as was initially recommended by the gastroenterology team. I offered a referral to a GI specialist to further discuss dosing or length of treatment, or if any follow-up would be needed for his known duodenal ulcers and they would like to do so.  -No need for spasticity medications or management at this time.  -Given his excellent progress he may follow-up with me on an as-needed basis only.    Robi Whitehead MD  Department of Rehabilitation Medicine      I, Robi Whitehead, spent a total of 40 minutes managing the care of Rigo Johns including chart review, documentation, counseling, education, and coordination of care.  Over 50% of my time was spent face to face with the patient.  See note for further details.         Again, thank you for allowing me to participate in the care of your patient.      Sincerely,    Luis Whitehead MD

## 2021-11-24 NOTE — LETTER
Date:January 8, 2022      Patient was self referred, no letter generated. Do not send.        [facility] Health Information

## 2021-11-24 NOTE — PROGRESS NOTES
"[facility] PM&R Clinic - Follow Up Patient Note   Rigo Johns  [id number]  Date of Evaluation: 11/24/2021  Reason for consult: Follow Up    HPI  Home therapies now transitioned to outpatient Athletic clinic  Stamina still low but continues to improve.  Using SEC takes with him for safety for long distances, but does not really need it. At home doesn't use it.  Independent with ADLs.    Mobility in UEs improving.    Leg spasms virtually gone.  \"twitch\" once per day. Not taking Flexeril anymore  Bladder UDS - flaccid  -Voiding now, PVRs borderline, cathing 2x/day.    Bowels 0-2 per day.  Has 95% control.  Juggling act with metamucil but otherwise not on any meds.    Asking about PPI - would recommend sticking with GI recs and follow up with them.      PMH:  Past Medical History:   Diagnosis Date     Cervical stenosis of spinal canal      Hypertension      Sleep apnea     uses cpap       PSH:  Past Surgical History:   Procedure Laterality Date     BACK SURGERY      cervical c5-6 fusion     ESOPHAGOSCOPY, GASTROSCOPY, DUODENOSCOPY (EGD), COMBINED N/A 8/12/2021    Procedure: ESOPHAGOGASTRODUODENOSCOPY, WITH BIOPSY;  Surgeon: Mamie Najera DO;  Location: [facility]     FUSION CERVICAL ANTERIOR TWO LEVELS N/A 7/14/2021    Procedure: C3-C4, C4-C5 anterior cervical discetomy and fusion;  Surgeon: Tee Hanley MD;  Location: [facility] OR       Allergies:  Allergies   Allergen Reactions     Shrimp Flavor      Other reaction(s): vomitting/upset stomach     Shrimp GI Disturbance       Medications:  Current Outpatient Medications   Medication     miconazole (MICATIN) 2 % external powder     pantoprazole (PROTONIX) 40 MG EC tablet     VITRON-C  MG TABS tablet     No current facility-administered medications for this visit.       Social History:  Social History     Socioeconomic History     Marital status:      Spouse name: Not on file     Number of children: Not on file     Years of education: Not on " file     Highest education level: Not on file   Occupational History     Not on file   Tobacco Use     Smoking status: Never Smoker     Smokeless tobacco: Never Used   Vaping Use     Vaping Use: Not on file   Substance and Sexual Activity     Alcohol use: Yes     Comment: once a week     Drug use: Never     Sexual activity: Not on file   Other Topics Concern     Parent/sibling w/ CABG, MI or angioplasty before 65F 55M? Not Asked   Social History Narrative     Not on file     Social Determinants of Health     Financial Resource Strain: Not on file   Food Insecurity: Not on file   Transportation Needs: Not on file   Physical Activity: Not on file   Stress: Not on file   Social Connections: Not on file   Intimate Partner Violence: Not on file   Housing Stability: Not on file       Review of Systems - {ros [id number]}    Functional Hx:  ***    Physical Exam:  Wt 104.7 kg (230 lb 12.8 oz)   BMI 32.19 kg/m    Gen: ***  Skin: No rashes noted  HEENT: NC/AT  Pulm: Non-labored breathing  GI: Soft, NT/ND  Ext: +Edema, no calf tenderness  Neuro: AAOx3, follows commands, answers appropriately  Spasticity MAS:  0 to UEs, LEs.    MMT 5/5 throughout  Reflexes 2+  No Epstein's  Gait: slightly wide based, no device needed.    Imaging:  ***    Assessment:  ***      Recommendations:  Continue thearpies  Rec follow up GI, referral placed  RTC PRN        Robi Whitehead MD  Department of Rehabilitation Medicine

## 2021-11-24 NOTE — NURSING NOTE
Chief Complaint   Patient presents with     RECHECK     [facility] Return - [facility] Rehab         Brittanie Nye

## 2021-11-29 ENCOUNTER — THERAPY VISIT (OUTPATIENT)
Dept: PHYSICAL THERAPY | Facility: CLINIC | Age: 71
End: 2021-11-29
Payer: COMMERCIAL

## 2021-11-29 DIAGNOSIS — Z98.1 S/P CERVICAL SPINAL FUSION: ICD-10-CM

## 2021-11-29 PROCEDURE — 97112 NEUROMUSCULAR REEDUCATION: CPT | Mod: GP | Performed by: PHYSICAL THERAPIST

## 2021-11-29 PROCEDURE — 97110 THERAPEUTIC EXERCISES: CPT | Mod: GP | Performed by: PHYSICAL THERAPIST

## 2021-11-29 NOTE — PROGRESS NOTES
Subjective:  HPI  Physical Exam                    Objective:  System    Physical Exam    General     ROS    Assessment/Plan:    SUBJECTIVE  Subjective changes as noted by pt:  Pt notes decreased pain and increased strength     Current pain level: 1/10     Changes in function:  Pt notes difficulty with looking over the shoulder secondary to limits from the fusion.      Adverse reaction to treatment or activity:  None    OBJECTIVE  Changes in objective findings:  Pt demonstrates improved strength scapular stabilizers and anterior deltoid. Pt notes pain with active abduction. Pt instructed in rotator cuff strengthening.         ASSESSMENT  Rigo continues to require intervention to meet STG and LTG's: PT  Patient's symptoms are resolving.  Response to therapy has shown an improvement in  strength and function  Progress made towards STG/LTG?  Yes,     PLAN  Current treatment program is being advanced to more complex exercises.    PTA/ATC plan:  N/A    Please refer to the daily flowsheet for treatment today, total treatment time and time spent performing 1:1 timed codes.

## 2021-12-02 NOTE — PROGRESS NOTES
"[facility] PM&R Clinic - Follow Up Patient Note   Rigo Johns  [id number]  Date of Evaluation: 11/24/2021  Reason for consult: Follow Up for inpatient rehabilitation stay  HPI:  Rigo Johns is a 72 y/o M who presents to the PM&R clinic today for follow-up from his stay at the [place] inpatient rehabilitation unit.  He underwent a C3-4, C4-5 ACDF on 7/14/2021 for high-grade C3-C4 and C4-C5 spinal canal stenosis with myelopathy and bilateral neural foraminal stenosis.  He was initially admitted to the rehab unit from 7/18/2021 to 7/27/2021 however was transferred for a prevertebral fluid collection suggestive of an abscess or seroma and was treated with antibiotics.  He was readmitted to the rehab unit from 8/2/2021 to 9/9/2021.  His stay was further complicated by acute drop in hemoglobin and melena for which he was found to have duodenal ulcers, spasms of the left ankle, orthostatic hypotension, and urinary retention for which he was discharged with a Davis catheter.  Functionally at the time of discharge he was ambulating multiple 100s of feet with a four-wheel walker needing supervision with outdoor ambulation, and modified independent for most ADLs except donning of compression stockings.  He was set up with home health therapies upon discharge.    Since discharge Mr. Johns reports that he is making steady improvements.  He endorses that his stamina is still low however this also continues to get better.  Functionally he has now ambulating without any assistive device but typically takes a single-point cane with him for safety during longer distances although he does not really use it.  Mobility in the upper extremities is improving and he is independent with all ADLs.  He has now transitioned from home health therapies to outpatient.    He reports that his leg spasms are now virtually gone but still has a \"twitch\" approximately once per day but does not particularly bother him.  He is no " longer requiring any Flexeril and is not having any lightheadedness or dizziness.  Regarding his bladder he has followed up with urology and had a urodynamic study performed which demonstrated a flaccid bladder.  His Davis catheter was removed a few weeks ago and he has been able to void volitionally with borderline elevated PVRs and he is cathing twice per day to ensure emptying of his bladder.  Regarding bowels he is having 0-2 bowel movements per day and states he has 95% control of these.  He says this depends on a juggling act with Metamucil but otherwise is not on any other bowel medications.  His wife is inquiring about the duration of his PPI or if the dose can be adjusted.  I discussed with him that the recommendation from GI was 40 mg twice daily for 8 weeks which he has now completed, and then would need 40 mg daily indefinitely.  I recommended continue with this dose unless otherwise recommended by a gastroenterologist.     PMH:  Past Medical History:   Diagnosis Date     Cervical stenosis of spinal canal      Hypertension      Sleep apnea     uses cpap       PSH:  Past Surgical History:   Procedure Laterality Date     BACK SURGERY      cervical c5-6 fusion     ESOPHAGOSCOPY, GASTROSCOPY, DUODENOSCOPY (EGD), COMBINED N/A 8/12/2021    Procedure: ESOPHAGOGASTRODUODENOSCOPY, WITH BIOPSY;  Surgeon: Mamie Najera DO;  Location: [facility]     FUSION CERVICAL ANTERIOR TWO LEVELS N/A 7/14/2021    Procedure: C3-C4, C4-C5 anterior cervical discetomy and fusion;  Surgeon: Tee Hanley MD;  Location: [facility] OR       Allergies:  Allergies   Allergen Reactions     Shrimp Flavor      Other reaction(s): vomitting/upset stomach     Shrimp GI Disturbance       Medications:  Current Outpatient Medications   Medication     miconazole (MICATIN) 2 % external powder     pantoprazole (PROTONIX) 40 MG EC tablet     VITRON-C  MG TABS tablet     No current facility-administered medications for this visit.       Social  History:  Social History     Socioeconomic History     Marital status:      Spouse name: Not on file     Number of children: Not on file     Years of education: Not on file     Highest education level: Not on file   Occupational History     Not on file   Tobacco Use     Smoking status: Never Smoker     Smokeless tobacco: Never Used   Vaping Use     Vaping Use: Not on file   Substance and Sexual Activity     Alcohol use: Yes     Comment: once a week     Drug use: Never     Sexual activity: Not on file   Other Topics Concern     Parent/sibling w/ CABG, MI or angioplasty before 65F 55M? Not Asked   Social History Narrative     Not on file     Social Determinants of Health     Financial Resource Strain: Not on file   Food Insecurity: Not on file   Transportation Needs: Not on file   Physical Activity: Not on file   Stress: Not on file   Social Connections: Not on file   Intimate Partner Violence: Not on file   Housing Stability: Not on file       Review of Systems - History obtained from chart review and the patient  General ROS: negative for - chills or fever, +decreased endurance  Respiratory ROS: no cough, shortness of breath, or wheezing  Cardiovascular ROS: no chest pain or dyspnea on exertion  Gastrointestinal ROS: positive for - Rare incontinence  Genito-Urinary ROS: positive for - Neurogenic bladder  Musculoskeletal ROS: Positive for - gait disturbance  Neurological ROS: positive for - Cervical stenosis s/p cervical fusion  Dermatological ROS: negative for rash  Functional Hx:  As per HPI    Physical Exam:  Wt 104.7 kg (230 lb 12.8 oz)   BMI 32.19 kg/m    Gen: NAD, pleasant and cooperative  Skin: No rashes noted  HEENT: NC/AT  Pulm: Non-labored breathing  GI: Soft, NT/ND  Ext: +Edema, no calf tenderness  Neuro: AAOx3, follows commands, answers appropriately  Spasticity MAS:  0 to UEs, LEs.    MMT 5/5 throughout  Reflexes 2+  No Epstein's  Gait: slightly wide based, no device  needed.    Assessment:  Rigo Johns is a 71 year old L hand dominant male with a PMH of HTN, PILI, and prior cervical surgery (C5-7 anterior fusion in 1993 per notes) who is seen in the PM&R clinic today for follow up from his inpatient rehabilitation stay status post a C3-4, C4-5 ACDF on 7/14/2021 for cervical myelopathy.     Recommendations:  -I am very happy to see the progress that Mr. Johns was made since discharge from the acute inpatient rehabilitation unit. He is now able to ambulate without an assistive device for the most part, however still uses a single-point cane for longer distances for safety. He will continue with outpatient therapies to continue to progress his gait, balance, and endurance.  -I would recommend continuing Protonix 40 mg daily as was initially recommended by the gastroenterology team. I offered a referral to a GI specialist to further discuss dosing or length of treatment, or if any follow-up would be needed for his known duodenal ulcers and they would like to do so.  -No need for spasticity medications or management at this time.  -Given his excellent progress he may follow-up with me on an as-needed basis only.    Robi Whitehead MD  Department of Rehabilitation Medicine      I, Robi Whitehead, spent a total of 40 minutes managing the care of Rigo Johns including chart review, documentation, counseling, education, and coordination of care.  Over 50% of my time was spent face to face with the patient.  See note for further details.

## 2021-12-06 ENCOUNTER — THERAPY VISIT (OUTPATIENT)
Dept: PHYSICAL THERAPY | Facility: CLINIC | Age: 71
End: 2021-12-06
Payer: COMMERCIAL

## 2021-12-06 DIAGNOSIS — Z98.1 S/P CERVICAL SPINAL FUSION: ICD-10-CM

## 2021-12-06 PROCEDURE — 97112 NEUROMUSCULAR REEDUCATION: CPT | Mod: GP | Performed by: PHYSICAL THERAPIST

## 2021-12-06 PROCEDURE — 97110 THERAPEUTIC EXERCISES: CPT | Mod: GP | Performed by: PHYSICAL THERAPIST

## 2021-12-13 ENCOUNTER — THERAPY VISIT (OUTPATIENT)
Dept: PHYSICAL THERAPY | Facility: CLINIC | Age: 71
End: 2021-12-13
Payer: COMMERCIAL

## 2021-12-13 DIAGNOSIS — Z98.1 S/P CERVICAL SPINAL FUSION: ICD-10-CM

## 2021-12-13 PROCEDURE — 97110 THERAPEUTIC EXERCISES: CPT | Mod: GP | Performed by: PHYSICAL THERAPIST

## 2021-12-13 PROCEDURE — 97112 NEUROMUSCULAR REEDUCATION: CPT | Mod: GP | Performed by: PHYSICAL THERAPIST

## 2021-12-13 NOTE — PROGRESS NOTES
Subjective:  HPI  Physical Exam                    Objective:  System    Physical Exam    General     ROS    Assessment/Plan:    SUBJECTIVE  Subjective changes as noted by pt:  Pt notes increased strength in the neck and upper back.      Current pain level: 2/10 Current Pain level: 2/10   Changes in function:  Pt notes improvement with ambulation Adverse reaction to treatment or activity:  None    OBJECTIVE  Changes in objective findings:  Pt demonstrates improved pelvic stability with ambulation and stepping up on the bosu.         ASSESSMENT  Rigo continues to require intervention to meet STG and LTG's: PT  Patient's symptoms are resolving.  Response to therapy has shown an improvement in  strength and function  Progress made towards STG/LTG?  Yes,     PLAN  Current treatment program is being advanced to more complex exercises.    PTA/ATC plan:  N/A    Please refer to the daily flowsheet for treatment today, total treatment time and time spent performing 1:1 timed codes.

## 2021-12-15 ENCOUNTER — TELEPHONE (OUTPATIENT)
Dept: UROLOGY | Facility: CLINIC | Age: 71
End: 2021-12-15
Payer: COMMERCIAL

## 2021-12-20 ENCOUNTER — THERAPY VISIT (OUTPATIENT)
Dept: PHYSICAL THERAPY | Facility: CLINIC | Age: 71
End: 2021-12-20
Payer: COMMERCIAL

## 2021-12-20 DIAGNOSIS — Z98.1 S/P CERVICAL SPINAL FUSION: ICD-10-CM

## 2021-12-20 PROCEDURE — 97112 NEUROMUSCULAR REEDUCATION: CPT | Mod: GP | Performed by: PHYSICAL THERAPIST

## 2021-12-20 PROCEDURE — 97110 THERAPEUTIC EXERCISES: CPT | Mod: GP | Performed by: PHYSICAL THERAPIST

## 2021-12-20 NOTE — PROGRESS NOTES
Subjective:  HPI  Physical Exam                    Objective:  System              Cervical/Thoracic Evaluation    AROM:  AROM Cervical:    Flexion:          Minimal loss  Extension:       Moderate loss   Rotation:         Left: minimal loss      Right: moderate loss   Side Bend:      Left:     Right:                                                                   General     ROS    Assessment/Plan:    DISCHARGE REPORT    Progress reporting period is from 10-28-21 to 12-20-21.       SUBJECTIVE  Subjective changes noted by patient:  Pt notes decreased pain and increased cervical ROM. Pt notes increased strength in the arms and legs. .       Current pain level is 0/10  .     Previous pain level was  2/10  .   Changes in function:  Pt notes increased ease with looking over the shoulder while driving. Pt notes increased ease with walking on uneven terrain. Pt denies pain or difficulty with reading and working on the computer.   Adverse reaction to treatment or activity: None    OBJECTIVE  Changes noted in objective findings:  Mild weakness remains left shoulder flexors. Pt demonstrates improved balance and pelvic stability with ambulation.         ASSESSMENT/PLAN  Updated problem list and treatment plan: Diagnosis 1:  Post op cervical   Pain -  hot/cold therapy, self management, education and home program  Decreased ROM/flexibility - therapeutic exercise, therapeutic activity and home program  Decreased strength - therapeutic exercise, therapeutic activities and home program  STG/LTGs have been met or progress has been made towards goals:  Yes,   Assessment of Progress: The patient's condition is improving.  Self Management Plans:  Patient has been instructed in a home treatment program.  I have re-evaluated this patient and find that the nature, scope, duration and intensity of the therapy is appropriate for the medical condition of the patient.  Rigo continues to require the following intervention to meet STG and  LTG's:  PT intervention is no longer required to meet STG/LTG.    Recommendations:  This patient is ready to be discharged from therapy and continue their home treatment program.    Please refer to the daily flowsheet for treatment today, total treatment time and time spent performing 1:1 timed codes.

## 2021-12-22 ENCOUNTER — OFFICE VISIT (OUTPATIENT)
Dept: UROLOGY | Facility: CLINIC | Age: 71
End: 2021-12-22
Payer: COMMERCIAL

## 2021-12-22 VITALS
WEIGHT: 220 LBS | BODY MASS INDEX: 30.8 KG/M2 | HEIGHT: 71 IN | DIASTOLIC BLOOD PRESSURE: 80 MMHG | SYSTOLIC BLOOD PRESSURE: 114 MMHG

## 2021-12-22 DIAGNOSIS — N31.2 HYPOCONTRACTILE BLADDER: ICD-10-CM

## 2021-12-22 DIAGNOSIS — N99.89 POSTOPERATIVE URINARY RETENTION: Primary | ICD-10-CM

## 2021-12-22 DIAGNOSIS — R33.8 POSTOPERATIVE URINARY RETENTION: Primary | ICD-10-CM

## 2021-12-22 LAB
ALBUMIN UR-MCNC: 30 MG/DL
APPEARANCE UR: CLEAR
BILIRUB UR QL STRIP: NEGATIVE
COLOR UR AUTO: YELLOW
GLUCOSE UR STRIP-MCNC: NEGATIVE MG/DL
HGB UR QL STRIP: ABNORMAL
KETONES UR STRIP-MCNC: NEGATIVE MG/DL
LEUKOCYTE ESTERASE UR QL STRIP: NEGATIVE
NITRATE UR QL: NEGATIVE
PH UR STRIP: 5 [PH] (ref 5–7)
SP GR UR STRIP: 1.02 (ref 1–1.03)
UROBILINOGEN UR STRIP-ACNC: 0.2 E.U./DL

## 2021-12-22 PROCEDURE — 81003 URINALYSIS AUTO W/O SCOPE: CPT | Mod: QW | Performed by: STUDENT IN AN ORGANIZED HEALTH CARE EDUCATION/TRAINING PROGRAM

## 2021-12-22 PROCEDURE — 99213 OFFICE O/P EST LOW 20 MIN: CPT | Mod: 25 | Performed by: STUDENT IN AN ORGANIZED HEALTH CARE EDUCATION/TRAINING PROGRAM

## 2021-12-22 PROCEDURE — 52000 CYSTOURETHROSCOPY: CPT | Performed by: STUDENT IN AN ORGANIZED HEALTH CARE EDUCATION/TRAINING PROGRAM

## 2021-12-22 RX ORDER — LIDOCAINE HYDROCHLORIDE 20 MG/ML
JELLY TOPICAL ONCE
Status: COMPLETED | OUTPATIENT
Start: 2021-12-22 | End: 2021-12-22

## 2021-12-22 RX ADMIN — LIDOCAINE HYDROCHLORIDE: 20 JELLY TOPICAL at 09:49

## 2021-12-22 ASSESSMENT — MIFFLIN-ST. JEOR: SCORE: 1770.04

## 2021-12-22 ASSESSMENT — PAIN SCALES - GENERAL: PAINLEVEL: NO PAIN (0)

## 2021-12-22 NOTE — LETTER
"12/22/2021       RE: Rigo Johns  [address] [facility] [address]     Dear Colleague,    Thank you for referring your patient, Rigo Johns, to the [facility] UROLOGY CLINIC [place] at [facility]. Please see a copy of my visit note below.    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      HPI:  Rigo Johns is a 71 year old male being seen for follow-up and cystoscopy.  Duration of problem: 3 months  Previous treatments: Urodynamic study and on intermittent self-catheterization      Reviewed previous notes from Dr. Demario Aguirre and Micaela Flaherty PA-C  Jackson tells me that he has come down from doing self catheterizing 3 times a day to once now before he goes to bed.  He feels that he is emptying out pretty well each time he goes to the bathroom.  And he feels that he is comfortable to probably come off the CIC in the next couple of weeks  He has been sent here for a cystoscopy to rule out possible bladder outflow obstruction as on the video urodynamic study the bladder neck was not open during the voiding phase  Exam:  /80   Ht 1.803 m (5' 11\")   Wt 99.8 kg (220 lb)   BMI 30.68 kg/m    General: age-appropriate appearing male in NAD sitting in an exam chair  Resp: no respiratory distress  CV: heart rate regular  Abdomen: Degree of obesity is mild. Abdomen is soft and nontender. No organomegaly.   : Deferred to cystoscopy  Rectal exam: Not done  Neuro: grossly non focal. 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  Here today for cysto.    Description of procedure: After fully informed voluntary consent was obtained patient was brought into the procedure room, identified and placed in a supine position on the cysto table.  The groin/scrotum were prepped and draped in a sterile fashion with betadine.  A 15F flexible cystoscope was inserted into the urethra and the bladder and urethra examined in a systematic manner.  There were no tumor stones or diverticula.  Ureteric orifices were normal in position and number and effluxing clear urine.  The prostate was 3 cm long and did not show bilobar hypertrophy.  There was no median lobe.  Distal urethra was normal.  The patient tolerated the procedure well and there were no complications.      Assessment/Plan: Patient with a history of Hypocontractile bladder  with negative cystoscopy.  Follow up as needed      Review of Imaging:  The following imaging exams were independently viewed and interpreted by me and discussed with patient:    Urodynamics :hypocontractile bladder with good bladder capacity (450 cc) and good compliance.  Patient could not void during the study    Review of Labs:  The following labs were reviewed by me and discussed with the patient:  UA: Normal    Assessment & Plan     Postoperative urinary retention  Possibly related to spinal surgery and a spinal shock thereafter  There is not any evidence of obstruction due to enlarging prostate  Recommended that he continue intermittent self catheterizing as needed.  - UA without Microscopic [[id number]]; Future  - lidocaine (XYLOCAINE) 2 % external gel  - UA without Microscopic [[id number]]  - CYSTOURETHROSCOPY  - MEASURE POST-VOID RESIDUAL URINE/BLADDER CAPACITY, US NON-IMAGING (54711)    Hypocontractile bladder  Getting better now.  Continue self-catheterization as needed    see us if issues persist        Sridhar Alaniz MD  [facility] UROLOGY CLINIC [place]      ==========================    Additional  Billing and Coding Information:  Review of external notes as documented above   Review of the result(s) of each unique test - Urodynamics, UA    Independent interpretation of a test performed by another physician/other qualified health care professional (not separately reported) -       Discussion of management or test interpretation with external physician/other qualified healthcare professional/appropriate source -       Diagnosis or treatment significantly limited by social determinants of health -       15 minutes spent on the date of the encounter doing chart review, review of test results, interpretation of tests, patient visit and documentation apart from time spent at cystoscopy    ==========================

## 2021-12-22 NOTE — PATIENT INSTRUCTIONS
"AFTER YOUR CYSTOSCOPY  ?  ?  You have just completed a cystoscopy, or \"cysto\", which allowed your physician to learn more about your bladder (or to remove a stent placed after surgery). We suggest that you continue to avoid caffeine, fruit juice, and alcohol for the next 24 hours, however, you are encouraged to return to your normal activities.  ?  ?  A few things that are considered normal after your cystoscopy:  ?  * small amount of bleeding (or spotting) that clears within the next 24 hours  ?  * slight burning sensation with urination  ?  * sensation of needing to void (urinate) more frequently  ?  * the feeling of \"air\" in your urine  ?  * mild discomfort that is relieved with Tylenol    * bladder spasms  ?  ?  ?  Please contact our office promptly if you:  ?  * develop a fever above 101 degrees  ?  * are unable to urinate  ?  * develop bright red blood that does not stop  ?  * experience severe pain or swelling  ?  ?  ?  And of course, please contact our office with any concerns or questions [phone]  ?      AFTER YOUR CYSTOSCOPY        You have just completed a cystoscopy, or \"cysto\", which allowed your physician to learn more about your bladder (or to remove a stent placed after surgery). We suggest that you continue to avoid caffeine, fruit juice, and alcohol for the next 24 hours, however, you are encouraged to return to your normal activities.         A few things that are considered normal after your cystoscopy:     * Small amount of bleeding (or spotting) that clears within the next 24 hours     * Slight burning sensation with urination     * Sensation to of needing to avoid more frequently     * The feeling of \"air\" in your urine     * Mild discomfort that is relieved with Tylenol        Please contact our office promptly if you:     * Develop a fever above 101 degrees     * Are unable to urinate     * Develop bright red blood that does not stop     * Severe pain or swelling         Please contact " our office with any concerns or questions @[facility].

## 2021-12-22 NOTE — PROGRESS NOTES
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      HPI:  Rigo Johns is a 71 year old male being seen for follow-up and cystoscopy.  Duration of problem: 3 months  Previous treatments: Urodynamic study and on intermittent self-catheterization      Reviewed previous notes from Dr. Demario Aguirre and Micaela Flaherty PA-C  Jackson tells me that he has come down from doing self catheterizing 3 times a day to once now before he goes to bed.  He feels that he is emptying out pretty well each time he goes to the bathroom.  And he feels that he is comfortable to probably come off the CIC in the next couple of weeks  He has been sent here for a cystoscopy to rule out possible bladder outflow obstruction as on the video urodynamic study the bladder neck was not open during the voiding phase  Exam:  /80   Ht 1.803 m (5' 11\")   Wt 99.8 kg (220 lb)   BMI 30.68 kg/m    General: age-appropriate appearing male in NAD sitting in an exam chair  Resp: no respiratory distress  CV: heart rate regular  Abdomen: Degree of obesity is mild. Abdomen is soft and nontender. No organomegaly.   : Deferred to cystoscopy  Rectal exam: Not done  Neuro: grossly non focal. 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  Here today for cysto.    Description of procedure: After fully informed voluntary consent was obtained patient was brought into the procedure room, identified and placed in a supine position on the cysto table.  The groin/scrotum were prepped and draped in a sterile fashion with betadine.  A 15F " flexible cystoscope was inserted into the urethra and the bladder and urethra examined in a systematic manner.  There were no tumor stones or diverticula.  Ureteric orifices were normal in position and number and effluxing clear urine.  The prostate was 3 cm long and did not show bilobar hypertrophy.  There was no median lobe.  Distal urethra was normal.  The patient tolerated the procedure well and there were no complications.      Assessment/Plan: Patient with a history of Hypocontractile bladder  with negative cystoscopy.  Follow up as needed      Review of Imaging:  The following imaging exams were independently viewed and interpreted by me and discussed with patient:    Urodynamics :hypocontractile bladder with good bladder capacity (450 cc) and good compliance.  Patient could not void during the study    Review of Labs:  The following labs were reviewed by me and discussed with the patient:  UA: Normal    Assessment & Plan     Postoperative urinary retention  Possibly related to spinal surgery and a spinal shock thereafter  There is not any evidence of obstruction due to enlarging prostate  Recommended that he continue intermittent self catheterizing as needed.  - UA without Microscopic [[id number]]; Future  - lidocaine (XYLOCAINE) 2 % external gel  - UA without Microscopic [[id number]]  - CYSTOURETHROSCOPY  - MEASURE POST-VOID RESIDUAL URINE/BLADDER CAPACITY, US NON-IMAGING (71625)    Hypocontractile bladder  Getting better now.  Continue self-catheterization as needed    see us if issues persist        Sridhar Alaniz MD  [facility] UROLOGY CLINIC [place]      ==========================    Additional Billing and Coding Information:  Review of external notes as documented above   Review of the result(s) of each unique test - Urodynamics, UA    Independent interpretation of a test performed by another physician/other qualified health care professional (not separately reported) -       Discussion of  management or test interpretation with external physician/other qualified healthcare professional/appropriate source -       Diagnosis or treatment significantly limited by social determinants of health -       15 minutes spent on the date of the encounter doing chart review, review of test results, interpretation of tests, patient visit and documentation apart from time spent at cystoscopy    ==========================

## 2021-12-22 NOTE — NURSING NOTE
Chief Complaint   Patient presents with     Urinary Retention     Cystoscopy     Prior to the start of the procedure and with procedural staff participation, I verbally confirmed the patient s identity using two indicators, relevant allergies, that the procedure was appropriate and matched the consent or emergent situation, and that the correct equipment/implants were available. Immediately prior to starting the procedure I conducted the Time Out with the procedural staff and re-confirmed the patient s name, procedure, and site/side. I have wiped the patient off with the povidone-Iodine solution, draped them, and used Lidocaine hydrochloride jelly. (The Joint Commission universal protocol was followed.)  Yes    Sedation (Moderate or Deep): None    5mL 2% lidocaine hydrochloride Urojet instilled into urethra.    NDC# [address]-7  Lot #: [id number]  Expiration Date:  6/23    Ruchi Landa EMT

## 2022-01-17 ENCOUNTER — HOSPITAL ENCOUNTER (OUTPATIENT)
Dept: GENERAL RADIOLOGY | Facility: CLINIC | Age: 72
Discharge: HOME | End: 2022-01-17
Attending: NURSE PRACTITIONER | Admitting: NURSE PRACTITIONER
Payer: COMMERCIAL

## 2022-01-17 DIAGNOSIS — Z98.1 S/P CERVICAL SPINAL FUSION: ICD-10-CM

## 2022-01-17 PROCEDURE — 72040 X-RAY EXAM NECK SPINE 2-3 VW: CPT

## 2022-01-24 ENCOUNTER — OFFICE VISIT (OUTPATIENT)
Dept: NEUROSURGERY | Facility: CLINIC | Age: 72
End: 2022-01-24
Payer: COMMERCIAL

## 2022-01-24 VITALS
HEIGHT: 71 IN | DIASTOLIC BLOOD PRESSURE: 92 MMHG | HEART RATE: 88 BPM | BODY MASS INDEX: 32.2 KG/M2 | RESPIRATION RATE: 18 BRPM | WEIGHT: 230 LBS | SYSTOLIC BLOOD PRESSURE: 148 MMHG

## 2022-01-24 DIAGNOSIS — Z98.1 S/P CERVICAL SPINAL FUSION: Primary | ICD-10-CM

## 2022-01-24 PROCEDURE — 99213 OFFICE O/P EST LOW 20 MIN: CPT | Performed by: NURSE PRACTITIONER

## 2022-01-24 ASSESSMENT — MIFFLIN-ST. JEOR: SCORE: 1815.4

## 2022-01-24 NOTE — PROGRESS NOTES
"[facility] Neurosurgery  Neurosurgery Followup:    HPI: 6 months s/p C3-5 ACDF with Dr. Hanley on 7/14/2021 with Dr. Hanley. Doing well. He noted continued improvement in mobility up until November 2021. He states since that time he feels like he has hit a plateau. He admits to not completing his PT exercises as regularly as he should. He reports limited ROM and some continued difficulty with walking including having his right leg buckle when hyperextended. He has been seeing urology for postoperative urinary retention and has decreased his intermittent self catheterization to in the evening as needed. He states his bowels continue to be irregular with bowel urgency. No incisional concerns.    Medical, surgical, family, and social history unchanged since prior exam.    Exam:  Constitutional:  Alert, well nourished, NAD.  HEENT: Normocephalic, atraumatic.   Pulm:  Without shortness of breath   CV:  No pitting edema of BLE.     Vital Signs:  BP (!) 148/92   Pulse 88   Resp 18   Ht 5' 11\" (1.803 m)   Wt 230 lb (104.3 kg)   BMI 32.08 kg/m      Neurological:  Awake  Alert  Oriented x 3  Motor exam:     Shoulder Abduction:  Right:  5/5    Left:  5/5  Biceps:                      Right:  5/5    Left:  5/5  Triceps:                     Right:  5/5    Left:  5/5  Wrist Extensors:       Right:  5/5    Left:  5/5  Wrist Flexors:           Right:  5/5    Left:  5/5  Intrinsics:                  Right:  5/5    Left:  5/5     Able to spontaneously move U/E bilaterally  Sensation intact throughout all U/E dermatomes    Incisions:  Healing nicely.    Imaging:  AP and lateral films reveal intact and stable hardware.    A/P: s/p cervical spine fusion. Routine post op follow up in 6 months with cervical xray prior. He verbalized understanding and agreement.    Patient Instructions   - Continue to increase activities as tolerated.    - Follow up in 6 months with a cervical xray prior.    - Call the clinic at [phone] for " increased pain or any other questions and concerns.    Mary Evans, SILVINO  [facility] Neurosurgery  [address] [place] [address], [place] [address]  Tel [phone]  Fax [phone]

## 2022-01-24 NOTE — LETTER
"    1/24/2022         RE: Rigo Johns  [address] [facility] [address]        Dear Colleague,    Thank you for referring your patient, Rigo Johns, to the [facility] NEUROLOGY CLINICS [facility]. Please see a copy of my visit note below.    [facility] Neurosurgery  Neurosurgery Followup:    HPI: 6 months s/p C3-5 ACDF with Dr. Hanley on 7/14/2021 with Dr. Hanley. Doing well. He noted continued improvement in mobility up until November 2021. He states since that time he feels like he has hit a plateau. He admits to not completing his PT exercises as regularly as he should. He reports limited ROM and some continued difficulty with walking including having his right leg buckle when hyperextended. He has been seeing urology for postoperative urinary retention and has decreased his intermittent self catheterization to in the evening as needed. He states his bowels continue to be irregular with bowel urgency. No incisional concerns.    Medical, surgical, family, and social history unchanged since prior exam.    Exam:  Constitutional:  Alert, well nourished, NAD.  HEENT: Normocephalic, atraumatic.   Pulm:  Without shortness of breath   CV:  No pitting edema of BLE.     Vital Signs:  BP (!) 148/92   Pulse 88   Resp 18   Ht 5' 11\" (1.803 m)   Wt 230 lb (104.3 kg)   BMI 32.08 kg/m      Neurological:  Awake  Alert  Oriented x 3  Motor exam:     Shoulder Abduction:  Right:  5/5    Left:  5/5  Biceps:                      Right:  5/5    Left:  5/5  Triceps:                     Right:  5/5    Left:  5/5  Wrist Extensors:       Right:  5/5    Left:  5/5  Wrist Flexors:           Right:  5/5    Left:  5/5  Intrinsics:                  Right:  5/5    Left:  5/5     Able to spontaneously move U/E bilaterally  Sensation intact throughout all U/E dermatomes    Incisions:  Healing nicely.    Imaging:  AP and lateral films reveal intact and stable hardware.    A/P: s/p cervical spine fusion. Routine post op follow " up in 6 months with cervical xray prior. He verbalized understanding and agreement.    Patient Instructions   - Continue to increase activities as tolerated.    - Follow up in 6 months with a cervical xray prior.    - Call the clinic at [phone] for increased pain or any other questions and concerns.    Mary Evans CNP  [facility] Neurosurgery  [address] [place] [id number]  Tel [phone]  Fax [phone]        Again, thank you for allowing me to participate in the care of your patient.        Sincerely,        Mary Evans, NP

## 2022-01-24 NOTE — PATIENT INSTRUCTIONS
- Continue to increase activities as tolerated.    - Follow up in 6 months with a cervical xray prior.    - Call the clinic at [phone] for increased pain or any other questions and concerns.

## 2022-01-24 NOTE — NURSING NOTE
"Rigo Johns is a 71 year old male who presents for:  Chief Complaint   Patient presents with     RECHECK     3 month         Initial Vitals:  BP (!) 148/92   Pulse 88   Resp 18   Ht 5' 11\" (1.803 m)   Wt 230 lb (104.3 kg)   BMI 32.08 kg/m   Estimated body mass index is 32.08 kg/m  as calculated from the following:    Height as of this encounter: 5' 11\" (1.803 m).    Weight as of this encounter: 230 lb (104.3 kg).. Body surface area is 2.29 meters squared. BP completed using cuff size: wrist cuff  Data Unavailable    Nursing Comments: Patient states that he has some neck pain/stiffness with sleeping.    Teri Galloway, BRENNON    "

## 2022-03-24 ENCOUNTER — OFFICE VISIT (OUTPATIENT)
Dept: GASTROENTEROLOGY | Facility: CLINIC | Age: 72
End: 2022-03-24
Payer: COMMERCIAL

## 2022-03-24 VITALS
HEIGHT: 71 IN | DIASTOLIC BLOOD PRESSURE: 79 MMHG | WEIGHT: 239 LBS | BODY MASS INDEX: 33.46 KG/M2 | HEART RATE: 82 BPM | SYSTOLIC BLOOD PRESSURE: 139 MMHG | OXYGEN SATURATION: 96 %

## 2022-03-24 DIAGNOSIS — K26.4 GASTROINTESTINAL HEMORRHAGE ASSOCIATED WITH DUODENAL ULCER: ICD-10-CM

## 2022-03-24 DIAGNOSIS — D50.8 OTHER IRON DEFICIENCY ANEMIA: Primary | ICD-10-CM

## 2022-03-24 PROCEDURE — 99213 OFFICE O/P EST LOW 20 MIN: CPT | Performed by: INTERNAL MEDICINE

## 2022-03-24 ASSESSMENT — PAIN SCALES - GENERAL: PAINLEVEL: NO PAIN (0)

## 2022-03-24 NOTE — PROGRESS NOTES
GASTROENTEROLOGY         Site of Visit: [address]    Provider: Dickson Solorio MD    PCP: [facility], [facility]-    Chief Complaint:    History of duodenal ulcers posthemorrhagic anemia currently on iron replacement    Assessment:  History of duodenal ulcers status post cervical fusion presented with melena and posthemorrhagic anemia, small duodenal ulcers at endoscopy, negative for HP on gastric biopsies, hemoglobin last checked in September on iron replacement.    Recommendations:   1-continue Protonix 40 mg p.o. daily indefinitely (do not stop).  Repeat upper endoscopy is not necessary.    2-CBC, ferritin, iron panel  3-I will respond to the patient via the chart and my chart about these results and give recommendations about iron replacement at that time as well    History of Present Illness:   This is a 71-year-old who had acute cervical spine canal impingement this last summer underwent acute decompression and cervical fusion had severe lower extremity weak weakness some mild upper extremity weakness.  His upper extremity weakness has improved completely but he does still have lower extremity weakness.  About 3 weeks postoperatively after being readmitted for a possible abscess and multiple rounds of antibiotics and pain medicine the patient began having melena and a drop in hemoglobin.  He underwent upper endoscopy by Javon Underwood who found the patient had several duodenal ulcers 1 of which is 15 mm.  Patient's ulcers were clean-based.  No endoscopic therapy was necessary.  The patient had gastric biopsies at the time of the upper endoscopy which were negative for Helicobacter pylori.  Patient's been maintained on Protonix since that time.  He is here for follow-up to discuss any issues that might be required follow-up.    Currently, Jackson is doing well he denies melena hematochezia hematemesis abdominal pain weight loss new lumps or bumps fevers chills or sweats.  He does  "have lower extremity weakness he does feel he has some fatigue.  He is somewhat more constipated than normal but he is taking Intron C iron replacement I think probably 125 mg of elemental iron takes every 3 days.  So it is not a huge amount.    Current Outpatient Medications   Medication Sig Dispense Refill     melatonin 5 MG tablet Take 5 mg by mouth nightly as needed for sleep       pantoprazole (PROTONIX) 40 MG EC tablet Take 1 tablet (40 mg) by mouth daily 30 tablet 0     VITRON-C  MG TABS tablet Take 1 tablet by mouth daily       miconazole (MICATIN) 2 % external powder Apply topically 2 times daily as needed for other or itching (redness) (Patient not taking: Reported on 3/24/2022) 71 g 0       Past Surgical History:   Procedure Laterality Date     BACK SURGERY      cervical c5-6 fusion     CYSTOSCOPY       ESOPHAGOSCOPY, GASTROSCOPY, DUODENOSCOPY (EGD), COMBINED N/A 8/12/2021    Procedure: ESOPHAGOGASTRODUODENOSCOPY, WITH BIOPSY;  Surgeon: Mamie Najera DO;  Location: [facility]     FUSION CERVICAL ANTERIOR TWO LEVELS N/A 7/14/2021    Procedure: C3-C4, C4-C5 anterior cervical discetomy and fusion;  Surgeon: Tee Hanley MD;  Location: [facility] OR       Past Medical History:   Diagnosis Date     Cervical stenosis of spinal canal      Hypertension      Sleep apnea     uses cpap       Family History   Problem Relation Age of Onset     Lymphoma Mother         reports that he has never smoked. He has never used smokeless tobacco. He reports current alcohol use. He reports that he does not use drugs.         Physical Exam:   /79 (BP Location: Left arm, Patient Position: Sitting, Cuff Size: Adult Large)   Pulse 82   Ht 1.803 m (5' 11\")   Wt 108.4 kg (239 lb)   SpO2 96%   BMI 33.33 kg/m    Wt:   Wt Readings from Last 2 Encounters:   03/24/22 108.4 kg (239 lb)   01/24/22 104.3 kg (230 lb)      Constitutional: cooperative, pleasant, not dyspneic/diaphoretic, no acute distress  Eyes: Sclera " anicteric/injected  CV: No edema  Respiratory: Unlabored breathing  Lymph: No axillary, submandibular, supraclavicular or inguinal lymphadenopathy  Abd:  Nondistended, +bs, no hepatosplenomegaly, nontender, no peritoneal signs  Skin: warm, perfused, no jaundice  Neuro: AAO x 3, No asterixis  Psych: Normal affect  MSK: Somewhat unsteady and wide-based, walks with a cane    Wt Readings from Last 2 Encounters:   03/24/22 108.4 kg (239 lb)   01/24/22 104.3 kg (230 lb)   \

## 2022-03-24 NOTE — NURSING NOTE
"Chief Complaint   Patient presents with     New Patient     Duodenal ulcer       Vitals:    03/24/22 0747   BP: 139/79   BP Location: Left arm   Patient Position: Sitting   Cuff Size: Adult Large   Pulse: 82   SpO2: 96%   Weight: 108.4 kg (239 lb)   Height: 1.803 m (5' 11\")       Body mass index is 33.33 kg/m .    Kaylene Robles MA    "

## 2022-03-24 NOTE — LETTER
3/24/2022         RE: Rigo Johns  [address]  [facility] [address]        Dear Colleague,    Thank you for referring your patient, Rigo Johns, to the [facility]. Please see a copy of my visit note below.      GASTROENTEROLOGY         Site of Visit: [address]    Provider: Dickson Solorio MD    PCP: [facility]    Chief Complaint:    History of duodenal ulcers posthemorrhagic anemia currently on iron replacement    Assessment:  History of duodenal ulcers status post cervical fusion presented with melena and posthemorrhagic anemia, small duodenal ulcers at endoscopy, negative for HP on gastric biopsies, hemoglobin last checked in September on iron replacement.    Recommendations:   1-continue Protonix 40 mg p.o. daily indefinitely (do not stop).  Repeat upper endoscopy is not necessary.    2-CBC, ferritin, iron panel  3-I will respond to the patient via the chart and my chart about these results and give recommendations about iron replacement at that time as well    History of Present Illness:   This is a 71-year-old who had acute cervical spine canal impingement this last summer underwent acute decompression and cervical fusion had severe lower extremity weak weakness some mild upper extremity weakness.  His upper extremity weakness has improved completely but he does still have lower extremity weakness.  About 3 weeks postoperatively after being readmitted for a possible abscess and multiple rounds of antibiotics and pain medicine the patient began having melena and a drop in hemoglobin.  He underwent upper endoscopy by Javon Underwood who found the patient had several duodenal ulcers 1 of which is 15 mm.  Patient's ulcers were clean-based.  No endoscopic therapy was necessary.  The patient had gastric biopsies at the time of the upper endoscopy which were negative for Helicobacter pylori.  Patient's been maintained on Protonix  "since that time.  He is here for follow-up to discuss any issues that might be required follow-up.    Currently, Jackson is doing well he denies melena hematochezia hematemesis abdominal pain weight loss new lumps or bumps fevers chills or sweats.  He does have lower extremity weakness he does feel he has some fatigue.  He is somewhat more constipated than normal but he is taking Intron C iron replacement I think probably 125 mg of elemental iron takes every 3 days.  So it is not a huge amount.    Current Outpatient Medications   Medication Sig Dispense Refill     melatonin 5 MG tablet Take 5 mg by mouth nightly as needed for sleep       pantoprazole (PROTONIX) 40 MG EC tablet Take 1 tablet (40 mg) by mouth daily 30 tablet 0     VITRON-C  MG TABS tablet Take 1 tablet by mouth daily       miconazole (MICATIN) 2 % external powder Apply topically 2 times daily as needed for other or itching (redness) (Patient not taking: Reported on 3/24/2022) 71 g 0       Past Surgical History:   Procedure Laterality Date     BACK SURGERY      cervical c5-6 fusion     CYSTOSCOPY       ESOPHAGOSCOPY, GASTROSCOPY, DUODENOSCOPY (EGD), COMBINED N/A 8/12/2021    Procedure: ESOPHAGOGASTRODUODENOSCOPY, WITH BIOPSY;  Surgeon: Mamie Najera DO;  Location: [facility]     FUSION CERVICAL ANTERIOR TWO LEVELS N/A 7/14/2021    Procedure: C3-C4, C4-C5 anterior cervical discetomy and fusion;  Surgeon: Tee Hanley MD;  Location:  OR       Past Medical History:   Diagnosis Date     Cervical stenosis of spinal canal      Hypertension      Sleep apnea     uses cpap       Family History   Problem Relation Age of Onset     Lymphoma Mother         reports that he has never smoked. He has never used smokeless tobacco. He reports current alcohol use. He reports that he does not use drugs.         Physical Exam:   /79 (BP Location: Left arm, Patient Position: Sitting, Cuff Size: Adult Large)   Pulse 82   Ht 1.803 m (5' 11\")   Wt 108.4 kg " (239 lb)   SpO2 96%   BMI 33.33 kg/m    Wt:   Wt Readings from Last 2 Encounters:   03/24/22 108.4 kg (239 lb)   01/24/22 104.3 kg (230 lb)      Constitutional: cooperative, pleasant, not dyspneic/diaphoretic, no acute distress  Eyes: Sclera anicteric/injected  CV: No edema  Respiratory: Unlabored breathing  Lymph: No axillary, submandibular, supraclavicular or inguinal lymphadenopathy  Abd:  Nondistended, +bs, no hepatosplenomegaly, nontender, no peritoneal signs  Skin: warm, perfused, no jaundice  Neuro: AAO x 3, No asterixis  Psych: Normal affect  MSK: Somewhat unsteady and wide-based, walks with a cane    Wt Readings from Last 2 Encounters:   03/24/22 108.4 kg (239 lb)   01/24/22 104.3 kg (230 lb)   \      Again, thank you for allowing me to participate in the care of your patient.        Sincerely,        Dickson Solorio MD

## 2022-03-24 NOTE — PATIENT INSTRUCTIONS
Recommendations:  1) continue Protonix 40 mg daily  2) Blood draw for iron binding, ferritin, and cbc  3) I will send result note and any other recommendations in [facility]

## 2022-03-29 ENCOUNTER — TELEPHONE (OUTPATIENT)
Dept: GASTROENTEROLOGY | Facility: CLINIC | Age: 72
End: 2022-03-29
Payer: COMMERCIAL

## 2022-03-29 NOTE — TELEPHONE ENCOUNTER
Care coordination based on 3/24 GI visit includes:      Labs (scheduled 3/31/22).  Dr Solorio will follow-up on lab result recommendations    Otherwise he needs to continue PPI indefinitely and no EGD needed thereafter. No additional appointment to follow-up with Dr. Solorio has been recommended. Patient to reach out.    
not examined

## 2022-03-31 ENCOUNTER — LAB (OUTPATIENT)
Dept: LAB | Facility: CLINIC | Age: 72
End: 2022-03-31
Payer: COMMERCIAL

## 2022-03-31 DIAGNOSIS — D50.8 OTHER IRON DEFICIENCY ANEMIA: ICD-10-CM

## 2022-03-31 LAB
BASOPHILS # BLD AUTO: 0.1 10E3/UL (ref 0–0.2)
BASOPHILS NFR BLD AUTO: 1 %
EOSINOPHIL # BLD AUTO: 0.2 10E3/UL (ref 0–0.7)
EOSINOPHIL NFR BLD AUTO: 3 %
ERYTHROCYTE [DISTWIDTH] IN BLOOD BY AUTOMATED COUNT: 12.3 % (ref 10–15)
FERRITIN SERPL-MCNC: 176 NG/ML (ref 26–388)
HCT VFR BLD AUTO: 43.2 % (ref 40–53)
HGB BLD-MCNC: 14.5 G/DL (ref 13.3–17.7)
LYMPHOCYTES # BLD AUTO: 1.7 10E3/UL (ref 0.8–5.3)
LYMPHOCYTES NFR BLD AUTO: 25 %
MCH RBC QN AUTO: 32.7 PG (ref 26.5–33)
MCHC RBC AUTO-ENTMCNC: 33.6 G/DL (ref 31.5–36.5)
MCV RBC AUTO: 98 FL (ref 78–100)
MONOCYTES # BLD AUTO: 0.8 10E3/UL (ref 0–1.3)
MONOCYTES NFR BLD AUTO: 11 %
NEUTROPHILS # BLD AUTO: 4.2 10E3/UL (ref 1.6–8.3)
NEUTROPHILS NFR BLD AUTO: 61 %
PLATELET # BLD AUTO: 125 10E3/UL (ref 150–450)
RBC # BLD AUTO: 4.43 10E6/UL (ref 4.4–5.9)
TRANSFERRIN SERPL-MCNC: 215 MG/DL (ref 210–360)
WBC # BLD AUTO: 6.9 10E3/UL (ref 4–11)

## 2022-03-31 PROCEDURE — 85025 COMPLETE CBC W/AUTO DIFF WBC: CPT

## 2022-03-31 PROCEDURE — 82728 ASSAY OF FERRITIN: CPT

## 2022-03-31 PROCEDURE — 36415 COLL VENOUS BLD VENIPUNCTURE: CPT

## 2022-03-31 PROCEDURE — 84466 ASSAY OF TRANSFERRIN: CPT

## 2022-03-31 PROCEDURE — 83550 IRON BINDING TEST: CPT

## 2022-04-01 LAB
IRON SATN MFR SERPL: 46 % (ref 15–46)
IRON SERPL-MCNC: 120 UG/DL (ref 35–180)
TIBC SERPL-MCNC: 260 UG/DL (ref 240–430)

## 2022-04-11 ENCOUNTER — MYC MEDICAL ADVICE (OUTPATIENT)
Dept: GASTROENTEROLOGY | Facility: CLINIC | Age: 72
End: 2022-04-11
Payer: COMMERCIAL

## 2022-04-13 DIAGNOSIS — Z98.890 H/O CERVICAL SPINE SURGERY: ICD-10-CM

## 2022-04-13 DIAGNOSIS — G95.9 CERVICAL MYELOPATHY (H): ICD-10-CM

## 2022-04-13 DIAGNOSIS — K26.4 GASTROINTESTINAL HEMORRHAGE ASSOCIATED WITH DUODENAL ULCER: ICD-10-CM

## 2022-04-13 RX ORDER — PANTOPRAZOLE SODIUM 40 MG/1
40 TABLET, DELAYED RELEASE ORAL DAILY
Qty: 90 TABLET | Refills: 3 | Status: SHIPPED | OUTPATIENT
Start: 2022-04-13

## 2022-04-13 NOTE — TELEPHONE ENCOUNTER
Patient requests refill of Protonix. Will order per standing order protocol at 40 mg po daily (per Dr. Solorio, patient is not to stop it).

## 2022-04-18 NOTE — TELEPHONE ENCOUNTER
Dickson Solorio MD  You 28 minutes ago (10:35 AM)     JW    Yes tell him to stop it.      Message text

## 2022-04-20 ENCOUNTER — TELEPHONE (OUTPATIENT)
Dept: UROLOGY | Facility: CLINIC | Age: 72
End: 2022-04-20
Payer: COMMERCIAL

## 2022-04-20 NOTE — TELEPHONE ENCOUNTER
Returned phone call and spoke with patient. Informed him that a few blood drops are not an issue after catheterizing himself. Patient also reports that he is not catheterizing everyday, but more every other day. But when he catheterizes he gets less than 250ml. Explained that this was fine to wait a day. Patient also informed of proper technique when reaching the prostate to angle penis upwards to abdomen. He was also informed not to ever push past resistance. Patient expressed understanding and had no further questions/or Concerns.     Monet Rodríguez LPN

## 2022-04-20 NOTE — TELEPHONE ENCOUNTER
[facility] Call Center    Phone Message    May a detailed message be left on voicemail: yes     Reason for Call: Other: Pt called and stated he has some questions and wanted something clarified regarding the cath - please call pt back to further discuss, Thanks!     Action Taken: Message routed to:  Other: [facility]    Travel Screening: Not Applicable

## 2022-09-13 ENCOUNTER — MEDICAL CORRESPONDENCE (OUTPATIENT)
Dept: HEALTH INFORMATION MANAGEMENT | Facility: CLINIC | Age: 72
End: 2022-09-13

## 2022-10-03 ENCOUNTER — HEALTH MAINTENANCE LETTER (OUTPATIENT)
Age: 72
End: 2022-10-03

## 2022-11-08 NOTE — PLAN OF CARE
Discharge Planner Post-Acute Rehab PT:     Discharge Plan: Home with family assist, mixed mobility likely (w/c and walker)     Precautions: Falls, cervical, PICC line  Lymph wraps on during day, off at night    Current Status:  Bed Mobility: mid I   Transfer: min A for due to increased L ankle pain, difficulty with WB   Gait: no Amb today, limited by L ankle pain and low BP in standing, ( 82/39)   Stairs: NT today.   Balance: CGA     Assessment:  pt seen in AM and PM today, pt demo low BP with all short standing with WW. ( 82/39). Seated /65.  PM pt declined standing due to pain, ice given to pt for lateral L ankle. Will assess tomorrow. PT may try standing with ortho shoe with pt shoes orthotic and arch support for foot ankle pain. Pt shoes too small due to Maximiliano swelling. ( pt has ABD binder and Maximiliano LE wrapped during session, still low BP's)     Other Barriers to Discharge (DME, Family Training, etc):   medical complexity, incontinence, DME needs, orthostatic BP          day(s)

## 2022-12-12 ENCOUNTER — TELEPHONE (OUTPATIENT)
Dept: UROLOGY | Facility: CLINIC | Age: 72
End: 2022-12-12

## 2022-12-12 NOTE — TELEPHONE ENCOUNTER
Returned phone call and spoke with patient. He is getting residuals of 175ml or less. He was advised that he can reduce to every other night or wean himself off of night-time cathing. Patient was advised that is he develops any night time frequency or urgency, he may resume to cathing himself once before bed. Patient will call back with concerns or questions, but expressed understanding.     Monet Rodríguez LPN

## 2022-12-12 NOTE — TELEPHONE ENCOUNTER
[facility] Call Center    Phone Message    May a detailed message be left on voicemail: yes     Reason for Call: Patient is still self cathing right before bed and is wondering if he should keep doing that. Writer scheduled him for first available with Dr. Alaniz which is 2/13/23. Please call back and advise. Thank you.    Action Taken: Message routed to:  Other: [facility]    Travel Screening: Not Applicable

## 2023-01-04 ENCOUNTER — OFFICE VISIT (OUTPATIENT)
Dept: NEUROSURGERY | Facility: CLINIC | Age: 73
End: 2023-01-04
Attending: PHYSICIAN ASSISTANT
Payer: COMMERCIAL

## 2023-01-04 ENCOUNTER — ANCILLARY PROCEDURE (OUTPATIENT)
Dept: GENERAL RADIOLOGY | Facility: CLINIC | Age: 73
End: 2023-01-04
Attending: NURSE PRACTITIONER
Payer: COMMERCIAL

## 2023-01-04 VITALS
OXYGEN SATURATION: 96 % | HEIGHT: 71 IN | BODY MASS INDEX: 33.6 KG/M2 | HEART RATE: 90 BPM | DIASTOLIC BLOOD PRESSURE: 90 MMHG | WEIGHT: 240 LBS | SYSTOLIC BLOOD PRESSURE: 155 MMHG

## 2023-01-04 DIAGNOSIS — Z98.1 S/P CERVICAL SPINAL FUSION: ICD-10-CM

## 2023-01-04 DIAGNOSIS — Z98.1 S/P CERVICAL SPINAL FUSION: Primary | ICD-10-CM

## 2023-01-04 PROCEDURE — G0463 HOSPITAL OUTPT CLINIC VISIT: HCPCS

## 2023-01-04 PROCEDURE — 99213 OFFICE O/P EST LOW 20 MIN: CPT | Performed by: PHYSICIAN ASSISTANT

## 2023-01-04 PROCEDURE — G0463 HOSPITAL OUTPT CLINIC VISIT: HCPCS | Performed by: PHYSICIAN ASSISTANT

## 2023-01-04 PROCEDURE — 72040 X-RAY EXAM NECK SPINE 2-3 VW: CPT | Mod: TC | Performed by: RADIOLOGY

## 2023-01-04 ASSESSMENT — PAIN SCALES - GENERAL: PAINLEVEL: MILD PAIN (2)

## 2023-01-04 NOTE — PROGRESS NOTES
"NEUROSURGERY CLINIC PROGRESS NOTE    DATE OF VISIT: 1/4/2023    HPI:     Rigo Johns is a pleasant 72 year old male who presents to the clinic today for a  18-month post-operative follow-up visit. On 07/14/2021 Dr. Hanley performed a C3-5 ACDF.   Today, the patient reports that overall he is doing well. His only concern is some stiffness primarily with cervical spine extension and flexion as well as some altered balance. He is also inquiring about whether or not he will another cervical fusion.     Current Outpatient Medications   Medication     miconazole (MICATIN) 2 % external powder     VITRON-C  MG TABS tablet     melatonin 5 MG tablet     pantoprazole (PROTONIX) 40 MG EC tablet     No current facility-administered medications for this visit.       Allergies   Allergen Reactions     Shrimp Flavor      Other reaction(s): vomitting/upset stomach     Shrimp GI Disturbance       Past Medical History:   Diagnosis Date     Cervical stenosis of spinal canal      Hypertension      Sleep apnea     uses cpap       Review Of Systems    Skin: negative  Eyes: negative  Ears/Nose/Throat: negative  Respiratory: No shortness of breath, dyspnea on exertion, cough, or hemoptysis  Cardiovascular: negative  Gastrointestinal: negative  Musculoskeletal: negative  Neurologic: negative  Psychiatric: negative  Hematologic/Lymphatic/Immunologic: negative  Endocrine: negative    OBJECTIVE:    BP (!) 155/90   Pulse 90   Ht 5' 11\" (1.803 m)   Wt 240 lb (108.9 kg)   SpO2 96%   BMI 33.47 kg/m      Imaging:    Updated cervical spine x-rays obtained today. No appreciable complication or concerns.     Radiographic Findings: Full radiological report in chart. I personally reviewed the images with the patient today.    Exam:    Patient appears comfortable and in no apparent distress. Moving all extremities.  Gait is non-antalgic.  CN II-XII grossly intact, alert and appropriate with conversation and following  commands  Bilateral upper " extremities with full strength including hand intrinsics and grasp.  Sensation intact throughout.  Bilateral lower extremities 5/5 strength including plantar and dorsiflexion.  Normal sensation throughout bilaterally.  Cervical incision edges well healed.    PLAN:    We explained that it can take up to 18-24 months for his symptoms to completely improve, but there is certainly a possibility that he has reached maximum improvement. We did offer a physical therapy referral, which he declined. He is glad to hear that his x-rays look quite good.     The patient gave verbal understanding and is in agreement with the above plan. He  will call or return to the clinic for any worsening or changes in symptoms.      Respectfully,     CHARLY Krause, PADamonC

## 2023-01-04 NOTE — PROGRESS NOTES
"Rigo Johns is a 72 year old male who presents for:  Chief Complaint   Patient presents with     Surgical Followup     DOS:7/14/21 C3-C5 ACDF        Initial Vitals:  BP (!) 155/90   Pulse 90   Ht 5' 11\" (1.803 m)   Wt 240 lb (108.9 kg)   SpO2 96%   BMI 33.47 kg/m   Estimated body mass index is 33.47 kg/m  as calculated from the following:    Height as of this encounter: 5' 11\" (1.803 m).    Weight as of this encounter: 240 lb (108.9 kg).. Body surface area is 2.34 meters squared. BP completed using cuff size: large  Mild Pain (2)    Nursing Comments:     Kalani Berry MA    "

## 2023-01-04 NOTE — LETTER
"    1/4/2023         RE: Rigo Johns  [address] [facility] [address]        Dear Colleague,    Thank you for referring your patient, Rigo Johns, to the [facility]UROS[facility]IC [place]. Please see a copy of my visit note below.    NEUROSURGERY CLINIC PROGRESS NOTE    DATE OF VISIT: 1/4/2023    HPI:     Rigo Johns is a pleasant 72 year old male who presents to the clinic today for a  18-month post-operative follow-up visit. On 07/14/2021 Dr. Hanley performed a C3-5 ACDF.   Today, the patient reports that overall he is doing well. His only concern is some stiffness primarily with cervical spine extension and flexion as well as some altered balance. He is also inquiring about whether or not he will another cervical fusion.     Current Outpatient Medications   Medication     miconazole (MICATIN) 2 % external powder     VITRON-C  MG TABS tablet     melatonin 5 MG tablet     pantoprazole (PROTONIX) 40 MG EC tablet     No current facility-administered medications for this visit.       Allergies   Allergen Reactions     Shrimp Flavor      Other reaction(s): vomitting/upset stomach     Shrimp GI Disturbance       Past Medical History:   Diagnosis Date     Cervical stenosis of spinal canal      Hypertension      Sleep apnea     uses cpap       Review Of Systems    Skin: negative  Eyes: negative  Ears/Nose/Throat: negative  Respiratory: No shortness of breath, dyspnea on exertion, cough, or hemoptysis  Cardiovascular: negative  Gastrointestinal: negative  Musculoskeletal: negative  Neurologic: negative  Psychiatric: negative  Hematologic/Lymphatic/Immunologic: negative  Endocrine: negative    OBJECTIVE:    BP (!) 155/90   Pulse 90   Ht 5' 11\" (1.803 m)   Wt 240 lb (108.9 kg)   SpO2 96%   BMI 33.47 kg/m      Imaging:    Updated cervical spine x-rays obtained today. No appreciable complication or concerns.     Radiographic Findings: Full radiological report in chart. I personally " "reviewed the images with the patient today.    Exam:    Patient appears comfortable and in no apparent distress. Moving all extremities.  Gait is non-antalgic.  CN II-XII grossly intact, alert and appropriate with conversation and following  commands  Bilateral upper extremities with full strength including hand intrinsics and grasp.  Sensation intact throughout.  Bilateral lower extremities 5/5 strength including plantar and dorsiflexion.  Normal sensation throughout bilaterally.  Cervical incision edges well healed.    PLAN:    We explained that it can take up to 18-24 months for his symptoms to completely improve, but there is certainly a possibility that he has reached maximum improvement. We did offer a physical therapy referral, which he declined. He is glad to hear that his x-rays look quite good.     The patient gave verbal understanding and is in agreement with the above plan. He  will call or return to the clinic for any worsening or changes in symptoms.      Respectfully,     CHARLY Krause, MAYNOR    Rigo Johns is a 72 year old male who presents for:  Chief Complaint   Patient presents with     Surgical Followup     DOS:7/14/21 C3-C5 ACDF        Initial Vitals:  BP (!) 155/90   Pulse 90   Ht 5' 11\" (1.803 m)   Wt 240 lb (108.9 kg)   SpO2 96%   BMI 33.47 kg/m   Estimated body mass index is 33.47 kg/m  as calculated from the following:    Height as of this encounter: 5' 11\" (1.803 m).    Weight as of this encounter: 240 lb (108.9 kg).. Body surface area is 2.34 meters squared. BP completed using cuff size: large  Mild Pain (2)    Nursing Comments:     Kalani Berry MA        Again, thank you for allowing me to participate in the care of your patient.        Sincerely,        Fco Samano PA-C    "

## 2023-02-13 ENCOUNTER — OFFICE VISIT (OUTPATIENT)
Dept: UROLOGY | Facility: CLINIC | Age: 73
End: 2023-02-13
Payer: COMMERCIAL

## 2023-02-13 VITALS
SYSTOLIC BLOOD PRESSURE: 124 MMHG | BODY MASS INDEX: 34.3 KG/M2 | WEIGHT: 245 LBS | HEIGHT: 71 IN | DIASTOLIC BLOOD PRESSURE: 78 MMHG

## 2023-02-13 DIAGNOSIS — N31.2 HYPOCONTRACTILE BLADDER: Primary | ICD-10-CM

## 2023-02-13 PROCEDURE — 99213 OFFICE O/P EST LOW 20 MIN: CPT | Performed by: STUDENT IN AN ORGANIZED HEALTH CARE EDUCATION/TRAINING PROGRAM

## 2023-02-13 RX ORDER — TAMSULOSIN HYDROCHLORIDE 0.4 MG/1
0.4 CAPSULE ORAL DAILY
Qty: 90 CAPSULE | Refills: 1 | Status: SHIPPED | OUTPATIENT
Start: 2023-02-13 | End: 2023-08-12

## 2023-02-13 ASSESSMENT — PAIN SCALES - GENERAL: PAINLEVEL: NO PAIN (0)

## 2023-02-13 NOTE — PATIENT INSTRUCTIONS
Start Flomax  Monitor residual volume  Start kegel's exercises  Follow-up in 1 year  May consider discontinuing CIC at night if residual volume decreases

## 2023-02-13 NOTE — NURSING NOTE
Chief Complaint   Patient presents with     Follow Up     Post op urinary retention      Pt is still doing CIC, pt does this once every night before bed with a residual typically between 150-175 mL. Pt states this is critical for sleep    Siobhan Aguilar CMA

## 2023-02-13 NOTE — LETTER
"2/13/2023       RE: Rigo Johns  [address] [facility] [address]     Dear Colleague,    Thank you for referring your patient, Rigo Johns, to the [facility]OLOGY CLIN[facility] [place] [facility]. Please see a copy of my visit note below.    CHIEF COMPLAINT   It was my pleasure to see Rigo Johns who is a 72 year old male for follow-up of follow-up.      HPI:  Rigo Johns is a 72 year old male being seen for follow-up for hypocontractile bladder.  Duration of problem: 1 year  Previous treatments: On intermittent self-catheterization     accompanied by his spouse  Jackson had been initially doing catheterizations on a regular basis  Now he just has a before going to bed time  He regularly monitors his residual volume whenever he catheterizes and it is around 150 to 175 mL each time he does not  He just emptied his bladder before coming to the clinic with a catheter and so we have not been able to do residual volume scan on his bladder  During the daytime he generally feels that he is able to empty his bladder to reasonable satisfaction  Being able to do catheterization prior to bedtime has helped him generally have a better sleep  He probably was having some form of spinal shock after his spine procedure which was presenting a picture of a possible hypocontractile bladder  Exam:  /78   Ht 1.803 m (5' 11\")   Wt 111.1 kg (245 lb)   BMI 34.17 kg/m    General: age-appropriate appearing male in NAD sitting in an exam chair  Resp: no respiratory distress  CV: heart rate regular  Abdomen: Degree of obesity is mild. Abdomen is soft and nontender. No organomegaly. : not performed  Neuro: grossly non focal. 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  Also had a enlarged prostate on clinical examination so that could also be accounting for the fact that he persist to have some residual urine when he self-catheterization  Considering the fact that he is not needing to self catheterize for most part of the day and the residual volume is not very significant, and on the account of him having and an enlarged prostate on clinical exam I discussed with him the option of starting him on tamsulosin, and then continuing to check his residual urine during his before bedtime self-catheterization 1 if there is a significant improvement may be coming off the self-catheterization.  Urodynamic evaluation had revealed a normal capacity normal compliance bladder.  He expressed understanding of this plan and was agreeable to start on Flomax   I discussed with him that if the Flomax does not change the volume of residual urine we may consider stopping it.  - tamsulosin (FLOMAX) 0.4 MG capsule; Take 1 capsule (0.4 mg) by mouth daily for 180 days      Sridhar Alaniz MD  [facility]OLOGY [facility] [place]      ==========================    Additional Billing and Coding Information:  Review of external notes as documented above     Independent interpretation of a test performed by another physician/other qualified health care professional (not separately reported) -       Discussion of management or test interpretation with external physician/other qualified healthcare professional/appropriate source -           25 minutes spent on the date of the encounter doing chart review, patient visit, documentation and discussion with family

## 2023-02-15 NOTE — PROGRESS NOTES
"CHIEF COMPLAINT   It was my pleasure to see Rigo Johns who is a 72 year old male for follow-up of follow-up.      HPI:  Rigo Johns is a 72 year old male being seen for follow-up for hypocontractile bladder.  Duration of problem: 1 year  Previous treatments: On intermittent self-catheterization     accompanied by his spouse  Jackson had been initially doing catheterizations on a regular basis  Now he just has a before going to bed time  He regularly monitors his residual volume whenever he catheterizes and it is around 150 to 175 mL each time he does not  He just emptied his bladder before coming to the clinic with a catheter and so we have not been able to do residual volume scan on his bladder  During the daytime he generally feels that he is able to empty his bladder to reasonable satisfaction  Being able to do catheterization prior to bedtime has helped him generally have a better sleep  He probably was having some form of spinal shock after his spine procedure which was presenting a picture of a possible hypocontractile bladder  Exam:  /78   Ht 1.803 m (5' 11\")   Wt 111.1 kg (245 lb)   BMI 34.17 kg/m    General: age-appropriate appearing male in NAD sitting in an exam chair  Resp: no respiratory distress  CV: heart rate regular  Abdomen: Degree of obesity is mild. Abdomen is soft and nontender. No organomegaly. : not performed  Neuro: grossly non focal. 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  Also had a enlarged prostate on clinical examination so that could also be accounting for the fact that he persist to have some residual urine when he self-catheterization  Considering the " fact that he is not needing to self catheterize for most part of the day and the residual volume is not very significant, and on the account of him having and an enlarged prostate on clinical exam I discussed with him the option of starting him on tamsulosin, and then continuing to check his residual urine during his before bedtime self-catheterization 1 if there is a significant improvement may be coming off the self-catheterization.  Urodynamic evaluation had revealed a normal capacity normal compliance bladder.  He expressed understanding of this plan and was agreeable to start on Flomax   I discussed with him that if the Flomax does not change the volume of residual urine we may consider stopping it.  - tamsulosin (FLOMAX) 0.4 MG capsule; Take 1 capsule (0.4 mg) by mouth daily for 180 days      Sridhar Alainz MD  [facility]OLOG[facility] [place]      ==========================    Additional Billing and Coding Information:  Review of external notes as documented above     Independent interpretation of a test performed by another physician/other qualified health care professional (not separately reported) -       Discussion of management or test interpretation with external physician/other qualified healthcare professional/appropriate source -           25 minutes spent on the date of the encounter doing chart review, patient visit, documentation and discussion with family     ==========================

## 2023-10-16 NOTE — PLAN OF CARE
Pt. A & O x 4.  VSS on RA.  NaCl @ 75ml/hr.  Neuro's Q 4; intact except left sided weakness.  Right pointer finger some residual numbness.  Incontinent of stool & urine.  Bladder scan @ 1200 for 234mls; repeat and straight cath per order if over 500mls.  NPO for possible procedure this afternoon.  Wearing soft collar for comfort.       Received call from PA- patient will now be picked up at 2000.

## 2023-10-21 ENCOUNTER — HEALTH MAINTENANCE LETTER (OUTPATIENT)
Age: 73
End: 2023-10-21

## 2024-02-14 ENCOUNTER — TELEPHONE (OUTPATIENT)
Dept: UROLOGY | Facility: CLINIC | Age: 74
End: 2024-02-14
Payer: COMMERCIAL

## 2024-02-14 NOTE — TELEPHONE ENCOUNTER
[facility] Call Center    Phone Message    May a detailed message be left on voicemail: yes     Reason for Call: Other: Per Pt he ran out of catheters and he needs at least 4 to last him until his regular shipment is available.He needs today if possible. Please advise.      Action Taken: Message routed to:  Other: [facility]    Travel Screening: Not Applicable

## 2024-03-15 ENCOUNTER — TELEPHONE (OUTPATIENT)
Dept: UROLOGY | Facility: CLINIC | Age: 74
End: 2024-03-15
Payer: COMMERCIAL

## 2024-03-15 NOTE — TELEPHONE ENCOUNTER
[facility] Call [facility]    Phone Message    May a detailed message be left on voicemail: yes     Reason for Call: Other: Pt would like a call back to shylauss a possible bladder infection as when he removes the catheter, the last few mills turn white. No pain or fever. Please call to discuss. Thank you!      Action Taken: Message routed to:  [facility]    Travel Screening: Not Applicable

## 2024-03-15 NOTE — TELEPHONE ENCOUNTER
"Patient has some sediment at the end of flow, no other sxs's . He also questions taking one ibuprofen at HS to help with the\"pain\" he wakes up with from having to urinate. I advised him that I couldn't advise that he takes a nightly ibuprofen.  Stefany Law, LPLUCIE   "

## 2024-06-03 ENCOUNTER — OFFICE VISIT (OUTPATIENT)
Dept: UROLOGY | Facility: CLINIC | Age: 74
End: 2024-06-03
Payer: COMMERCIAL

## 2024-06-03 VITALS
SYSTOLIC BLOOD PRESSURE: 132 MMHG | BODY MASS INDEX: 32.2 KG/M2 | DIASTOLIC BLOOD PRESSURE: 74 MMHG | HEART RATE: 75 BPM | HEIGHT: 71 IN | WEIGHT: 230 LBS

## 2024-06-03 DIAGNOSIS — R39.14 BENIGN PROSTATIC HYPERPLASIA WITH INCOMPLETE BLADDER EMPTYING: Primary | ICD-10-CM

## 2024-06-03 DIAGNOSIS — N40.1 BENIGN PROSTATIC HYPERPLASIA WITH INCOMPLETE BLADDER EMPTYING: Primary | ICD-10-CM

## 2024-06-03 DIAGNOSIS — N31.2 HYPOCONTRACTILE BLADDER: ICD-10-CM

## 2024-06-03 PROCEDURE — 99214 OFFICE O/P EST MOD 30 MIN: CPT | Performed by: STUDENT IN AN ORGANIZED HEALTH CARE EDUCATION/TRAINING PROGRAM

## 2024-06-03 RX ORDER — IBUPROFEN 200 MG
200 TABLET ORAL EVERY 4 HOURS PRN
COMMUNITY

## 2024-06-03 RX ORDER — TAMSULOSIN HYDROCHLORIDE 0.4 MG/1
0.4 CAPSULE ORAL DAILY
Qty: 90 CAPSULE | Refills: 3 | Status: SHIPPED | OUTPATIENT
Start: 2024-06-03 | End: 2025-05-29

## 2024-06-03 ASSESSMENT — PAIN SCALES - GENERAL: PAINLEVEL: NO PAIN (0)

## 2024-06-03 NOTE — NURSING NOTE
Chief Complaint   Patient presents with    Hypocontractile bladder     Pt here for 1 year follow up      Pt is doing SIC once at night before bed. Pt states he started taking one tablet of ibuprofen before bed at night and this helps him sleep 7 hours. Pt wondering if it is OK to continue this. Pt has no UTI concerns today    Siobhan Aguilar CMA

## 2024-06-03 NOTE — LETTER
"6/3/2024       RE: Rigo Johns  [address] [facility] [address]     Dear Colleague,    Thank you for referring your patient, Rigo Johns, to the [facility]OLOG[facility]INIC [place] at [facility]. Please see a copy of my visit note below.    CHIEF COMPLAINT   It was my pleasure to see Rigo Johns who is a 73 year old male for follow-up of post stroke neurogenic/hypocontractile bladder.      HPI:  Rigo Johns is a 73 year old male being seen for follow-up.  Duration of problem: 3 years  Previous treatments: On intermittent self-catheterization once a day     accompanied by his spouse  Reviewed previous notes   Feels that he empties his bladder relatively well at most times in the day  Flomax has been helpful and he continues to the same  He has been doing CIC once a day before he goes to bed  He feels that his daily ibuprofen has been helping him to sleep better  Recent ultrasound and renal function testing are not missing    Exam:  /74   Pulse 75   Ht 1.803 m (5' 11\")   Wt 104.3 kg (230 lb)   BMI 32.08 kg/m    General: age-appropriate appearing male in NAD sitting in an exam chair  Resp: no respiratory distress  CV: heart rate regular  Abdomen: Degree of obesity is mild. Abdomen is soft and nontender. No organomegaly.   : not performed  Neuro: grossly non focal. Normal reflexes  Motor: excellent strength throughout    Review of Imaging:  The following imaging exams were independently viewed and interpreted by me and discussed with patient:      Review of Labs:  The following labs were reviewed by me and discussed with the patient:      Assessment & Plan    Hypocontractile bladder  Discussed in detail about the need to get renal evaluation on a yearly basis with ultrasound kidneys and renal panel  I have ordered the same for him and will update him once results are available  Continue intermittent self-catheterization once a day as per " plan  - US Renal Complete Non-Vascular; Future  - Basic metabolic panel [LAB15]    Benign prostatic hyperplasia with incomplete bladder emptying  Continue with tamsulosin for now  He feels that he sleeps better with one 200 mg ibuprofen which I think is okay  Recommended that we need to monitor his renal function and long-term use his does have some consequences on higher doses for kidney function  - tamsulosin (FLOMAX) 0.4 MG capsule; Take 1 capsule (0.4 mg) by mouth daily for 360 days      Sridhar Alaniz MD  [facility]OLOG[facility] [place]      ==========================    Additional Billing and Coding Information:  Review of external notes as documented above   Review of the result(s) of each unique test - na                15 minutes spent by me on the date of the encounter doing chart review, review of test results, interpretation of tests, patient visit, documentation, and discussion with family     ==========================

## 2024-06-03 NOTE — PATIENT INSTRUCTIONS
BMP and renal US to be done  Continue flomax  Continue CIC as per plan  Ibuprofen at low once daily dose should be okay as long as his renal function is fine  Follow-up in 1 year

## 2024-06-03 NOTE — PROGRESS NOTES
"CHIEF COMPLAINT   It was my pleasure to see Rigo Johns who is a 73 year old male for follow-up of post stroke neurogenic/hypocontractile bladder.      HPI:  Rigo Johns is a 73 year old male being seen for follow-up.  Duration of problem: 3 years  Previous treatments: On intermittent self-catheterization once a day     accompanied by his spouse  Reviewed previous notes   Feels that he empties his bladder relatively well at most times in the day  Flomax has been helpful and he continues to the same  He has been doing CIC once a day before he goes to bed  He feels that his daily ibuprofen has been helping him to sleep better  Recent ultrasound and renal function testing are not missing    Exam:  /73   Pulse 75   Ht 1.803 m (5' 11\")   Wt 104.3 kg (230 lb)   BMI 32.08 kg/m    General: age-appropriate appearing male in NAD sitting in an exam chair  Resp: no respiratory distress  CV: heart rate regular  Abdomen: Degree of obesity is mild. Abdomen is soft and nontender. No organomegaly.   : not performed  Neuro: grossly non focal. Normal reflexes  Motor: excellent strength throughout    Review of Imaging:  The following imaging exams were independently viewed and interpreted by me and discussed with patient:      Review of Labs:  The following labs were reviewed by me and discussed with the patient:      Assessment & Plan     Hypocontractile bladder  Discussed in detail about the need to get renal evaluation on a yearly basis with ultrasound kidneys and renal panel  I have ordered the same for him and will update him once results are available  Continue intermittent self-catheterization once a day as per plan  - US Renal Complete Non-Vascular; Future  - Basic metabolic panel [LAB15]    Benign prostatic hyperplasia with incomplete bladder emptying  Continue with tamsulosin for now  He feels that he sleeps better with one 200 mg ibuprofen which I think is okay  Recommended that we need to monitor his renal " function and long-term use his does have some consequences on higher doses for kidney function  - tamsulosin (FLOMAX) 0.4 MG capsule; Take 1 capsule (0.4 mg) by mouth daily for 360 days      Sridhar Alaniz MD  [facility] [place]      ==========================    Additional Billing and Coding Information:  Review of external notes as documented above   Review of the result(s) of each unique test - na                15 minutes spent by me on the date of the encounter doing chart review, review of test results, interpretation of tests, patient visit, documentation, and discussion with family     ==========================

## 2024-06-20 ENCOUNTER — HOSPITAL ENCOUNTER (OUTPATIENT)
Dept: ULTRASOUND IMAGING | Facility: CLINIC | Age: 74
Discharge: HOME | End: 2024-06-20
Attending: STUDENT IN AN ORGANIZED HEALTH CARE EDUCATION/TRAINING PROGRAM | Admitting: STUDENT IN AN ORGANIZED HEALTH CARE EDUCATION/TRAINING PROGRAM
Payer: COMMERCIAL

## 2024-06-20 DIAGNOSIS — N31.2 HYPOCONTRACTILE BLADDER: ICD-10-CM

## 2024-06-20 PROCEDURE — 76770 US EXAM ABDO BACK WALL COMP: CPT

## 2024-08-22 NOTE — PATIENT INSTRUCTIONS
Emergency Department SIGN OUT NOTE     Patient: Romeo Recio Age: 34 year old Sex: male   MRN: [id number] : 1990 Encounter Date: 2024     Received Sign-Out From: Ender Pfeiffer MD  Dispo: Reeval @ 12AM to [place]  History & Course: 34 year old male no significant medical history presenting to the emergency room for possible allergic reaction. Patient states he was walking outside about an hour ago when he started to notice itching to his arms and legs and then noticed throat tightness and feeling like his face was swelling up. He has also developed nasal congestion. He denies being stung by anything. He denies any new exposures including new medicines or foods. Denies any history of allergies.     +lip, tongue swelling, wgheezing s.p epi, methylprd, famotidine; benadryl  Wheezing improved, lip swelling improved, no urticaria;   Prednisone, epipen to pharm    Not on lisinopril     Pending items: Reeval to dc      Discharge 2024 12:15 AM  Romeo Recio discharge to home/self care.        The patient's evaluation and treatment has been initiated and/or completed by prior team. I have only participated in the care of this patient as noted. Please refer to their documentation for further details regarding their care.     [] This patient's care has been transitioned to the primary admitting or observation team. The patient is currently boarding in the ED, awaiting bed placement in the hospital. Unless otherwise noted, I have not evaluated the patient personally or had any active involvement in the patient's care.     ED Course as of 24 0654   Wed Aug 21, 2024   2036 Patient re-evaluated, reports feeling significantly better, feels back to normal. Will continue to closely monitor. [RM]    Patient re-evaluated, continues to feel well, will continue to monitor. [RM]      ED Course User Index  [RM] Ender Pfeiffer MD     Vital Last Value 24 Hour Range   Temperature 98.8 °F (37.1 °C) (24  Please schedule:  1. the next available appointment for urodynamics testing.  2. follow up virtual visit 1 week after to discuss results with Dr. Aguirre.    It was a pleasure meeting with you today.  Thank you for allowing me and my team the privilege of caring for you today.  YOU are the reason we are here, and I truly hope we provided you with the excellent service you deserve.  Please let us know if there is anything else we can do for you so that we can be sure you are leaving completely satisfied with your care experience.           2010) Temp  Min: 98.8 °F (37.1 °C)  Max: 98.8 °F (37.1 °C)   Pulse (!) 54 (08/22/24 0017) Pulse  Min: 54  Max: 82   Respiratory 17 (08/22/24 0017) Resp  Min: 11  Max: 18   Non-Invasive  Blood Pressure 129/83 (08/22/24 0017) BP  Min: 118/87  Max: 159/101   Pulse Oximetry 97 % (08/22/24 0017) SpO2  Min: 94 %  Max: 97 %   Arterial   Blood Pressure   No data recorded     Results for orders placed or performed during the hospital encounter of 08/21/24   Light Blue Top    Specimen: Blood, Venous   Result Value Ref Range    Extra Tube Hold for Add Ons    Light Green Top    Specimen: Blood, Venous   Result Value Ref Range    Extra Tube Hold for Add Ons    Lavender Top    Specimen: Blood, Venous   Result Value Ref Range    Extra Tube Hold for Add Ons    Gold Top    Specimen: Blood, Venous   Result Value Ref Range    Extra Tube Hold for Add Ons    Pink Top Tube    Specimen: Blood, Venous   Result Value Ref Range    Extra Tube Hold for Add Ons      No orders to display     No results found for this or any previous visit (from the past 4464 hour(s)).  Medications received in the department:   ED Medication Orders (From admission, onward)      None             Morelia Mackey MD  Resident  08/22/24 2756

## 2024-11-05 ENCOUNTER — TELEPHONE (OUTPATIENT)
Dept: UROLOGY | Facility: CLINIC | Age: 74
End: 2024-11-05
Payer: COMMERCIAL

## 2024-11-05 NOTE — TELEPHONE ENCOUNTER
[facility] Call Center    Phone Message    May a detailed message be left on voicemail: no     Reason for Call: Other: [facility] is calling regarding a prescription refill they have been trying to fax to us. Please call Bonny back at [phone] and fax back forms to [phone]. They need it turned back in before tomorrow before it expires.      Action Taken: Other: [facility]    Travel Screening: Not Applicable

## 2024-12-14 ENCOUNTER — HEALTH MAINTENANCE LETTER (OUTPATIENT)
Age: 74
End: 2024-12-14

## 2024-12-23 ENCOUNTER — TELEPHONE (OUTPATIENT)
Dept: UROLOGY | Facility: CLINIC | Age: 74
End: 2024-12-23
Payer: COMMERCIAL

## 2024-12-23 NOTE — TELEPHONE ENCOUNTER
[facility] Call Center    Phone Message    May a detailed message be left on voicemail: yes     Reason for Call: Other: pt calling about supplies, going to [email or web address] out of [email or web address]   Fax is [phone] email is [email or web address],  they still have not gotten order for this for pt, please reach out to them or pt      Action Taken: Other: urology    Travel Screening: Not Applicable     Date of Service:

## 2024-12-23 NOTE — TELEPHONE ENCOUNTER
[facility] Call Center    Phone Message    May a detailed message be left on voicemail: yes     Reason for Call: Medication Refill Request    Has the patient contacted the pharmacy for the refill? Yes   Name of medication being requested: catheter supplies/patient is only requesting a quantity of 60  Provider who prescribed the medication: Katty  Pharmacy: [facility]   Date medication is needed: asap     Action Taken: Message routed to:  [facility]    Travel Screening: Not Applicable     Date of Service:

## 2025-02-14 NOTE — TELEPHONE ENCOUNTER
Ilene from OT called to request formal orders for visits 1x week for 4 weeks.     Verbal auth given.     Patricia Blanchard RN       
Samples Given: Amlactin
Patient Specific Otc Recommendations (Will Not Stick From Patient To Patient): Change from Arbon hand wash and hand  with essential oil \\nRecommended to use softsoap aquarium series to wash hands\\nCeraVe therapeutic hand cream 2-3x daily
Detail Level: Zone

## 2025-06-02 ENCOUNTER — OFFICE VISIT (OUTPATIENT)
Dept: UROLOGY | Facility: CLINIC | Age: 75
End: 2025-06-02
Payer: COMMERCIAL

## 2025-06-02 ENCOUNTER — LAB (OUTPATIENT)
Dept: LAB | Facility: CLINIC | Age: 75
End: 2025-06-02
Payer: COMMERCIAL

## 2025-06-02 VITALS — DIASTOLIC BLOOD PRESSURE: 72 MMHG | WEIGHT: 230 LBS | SYSTOLIC BLOOD PRESSURE: 148 MMHG | BODY MASS INDEX: 32.08 KG/M2

## 2025-06-02 DIAGNOSIS — N31.2 HYPOCONTRACTILE BLADDER: Primary | ICD-10-CM

## 2025-06-02 DIAGNOSIS — N31.2 HYPOCONTRACTILE BLADDER: ICD-10-CM

## 2025-06-02 LAB
ALBUMIN UR-MCNC: 30 MG/DL
APPEARANCE UR: ABNORMAL
BILIRUB UR QL STRIP: NEGATIVE
COLOR UR AUTO: YELLOW
GLUCOSE UR STRIP-MCNC: NEGATIVE MG/DL
HGB UR QL STRIP: ABNORMAL
KETONES UR STRIP-MCNC: NEGATIVE MG/DL
LEUKOCYTE ESTERASE UR QL STRIP: ABNORMAL
NITRATE UR QL: NEGATIVE
PH UR STRIP: 6 [PH] (ref 5–7)
SP GR UR STRIP: 1.01 (ref 1–1.03)
UROBILINOGEN UR STRIP-ACNC: 0.2 E.U./DL

## 2025-06-02 PROCEDURE — 3078F DIAST BP <80 MM HG: CPT | Performed by: STUDENT IN AN ORGANIZED HEALTH CARE EDUCATION/TRAINING PROGRAM

## 2025-06-02 PROCEDURE — 3077F SYST BP >= 140 MM HG: CPT | Performed by: STUDENT IN AN ORGANIZED HEALTH CARE EDUCATION/TRAINING PROGRAM

## 2025-06-02 PROCEDURE — 1126F AMNT PAIN NOTED NONE PRSNT: CPT | Performed by: STUDENT IN AN ORGANIZED HEALTH CARE EDUCATION/TRAINING PROGRAM

## 2025-06-02 PROCEDURE — 51798 US URINE CAPACITY MEASURE: CPT | Performed by: STUDENT IN AN ORGANIZED HEALTH CARE EDUCATION/TRAINING PROGRAM

## 2025-06-02 PROCEDURE — 99213 OFFICE O/P EST LOW 20 MIN: CPT | Mod: 25 | Performed by: STUDENT IN AN ORGANIZED HEALTH CARE EDUCATION/TRAINING PROGRAM

## 2025-06-02 PROCEDURE — 81003 URINALYSIS AUTO W/O SCOPE: CPT | Mod: QW

## 2025-06-02 ASSESSMENT — PAIN SCALES - GENERAL: PAINLEVEL_OUTOF10: NO PAIN (0)

## 2025-06-02 NOTE — LETTER
6/2/2025       RE: Rigo Johns  [address]  [facility] [address]     Dear Colleague,    Thank you for referring your patient, Rigo Johns, to the [facility] UROLOGY [facility] [place] at [facility]. Please see a copy of my visit note below.    CHIEF COMPLAINT   It was my pleasure to see Rigo Johns who is a 74 year old male for follow-up of possible hypocontractile bladder.      HPI:  Rigo Johns is a 74 year old male being seen for follow-up.  Duration of problem: Few years  Previous treatments: On intermittent self-catheterization once a night     accompanied by his spouse  Reviewed previous notes  Denies any specific issues  Doing relatively well  Exam:  BP (!) 148/72   Wt 104.3 kg (230 lb)   BMI 32.08 kg/m    General: age-appropriate appearing male in NAD sitting in an exam chair  Resp: no respiratory distress  CV: heart rate regular  Abdomen: Degree of obesity is none. Abdomen is soft and nontender. No organomegaly.   : not performed  Neuro: grossly non focal. Normal reflexes  Motor: excellent strength throughout    Review of Imaging:  The following imaging exams were independently viewed and interpreted by me and discussed with patient:  PVR: 167 mL  Review of Labs:  The following labs were reviewed by me and discussed with the patient:      Assessment & Plan    Hypocontractile bladder  No significant change  Discussed that he may stop doing CIC but he prefers to do the same  Follow-up in 1 year with Davis  BMP today  - UA without Microscopic [[id number]]; Future  - MEASURE POST-VOID RESIDUAL URINE/BLADDER CAPACITY, US NON-IMAGING (41672)      Sridhar Alaniz MD  [facility] [place]      ==========================    Additional Billing and Coding Information:  Review of external notes as documented above   Review of the result(s) of each unique test - PVR                12 minutes spent by me on the date of the  encounter doing chart review, review of test results, interpretation of tests, patient visit, and documentation     ==========================    Again, thank you for allowing me to participate in the care of your patient.      Sincerely,    Sridhar Alaniz MD

## 2025-06-02 NOTE — NURSING NOTE
Chief Complaint   Patient presents with    Hypocontractile bladder     Pt doing well performing CIC once daily, before bed. Getting 150-200 mL out when cathing  Pt denies gross hematuria or dysuria.    PVR: 167 mL by bladder scan    Ruchi Landa, Clinic Assistant

## 2025-06-02 NOTE — PROGRESS NOTES
CHIEF COMPLAINT   It was my pleasure to see Rigo Johns who is a 74 year old male for follow-up of possible hypocontractile bladder.      HPI:  Rigo Johns is a 74 year old male being seen for follow-up.  Duration of problem: Few years  Previous treatments: On intermittent self-catheterization once a night     accompanied by his spouse  Reviewed previous notes  Denies any specific issues  Doing relatively well  Exam:  BP (!) 148/72   Wt 104.3 kg (230 lb)   BMI 32.08 kg/m    General: age-appropriate appearing male in NAD sitting in an exam chair  Resp: no respiratory distress  CV: heart rate regular  Abdomen: Degree of obesity is none. Abdomen is soft and nontender. No organomegaly.   : not performed  Neuro: grossly non focal. Normal reflexes  Motor: excellent strength throughout    Review of Imaging:  The following imaging exams were independently viewed and interpreted by me and discussed with patient:  PVR: 167 mL  Review of Labs:  The following labs were reviewed by me and discussed with the patient:      Assessment & Plan     Hypocontractile bladder  No significant change  Discussed that he may stop doing CIC but he prefers to do the same  Follow-up in 1 year with Davis  BMP today  - UA without Microscopic [[id number]]; Future  - MEASURE POST-VOID RESIDUAL URINE/BLADDER CAPACITY, US NON-IMAGING ([id number])      Sridhar Alaniz MD  [facility] UROLOGY CLINIC [place]      ==========================    Additional Billing and Coding Information:  Review of external notes as documented above   Review of the result(s) of each unique test - PVR                12 minutes spent by me on the date of the encounter doing chart review, review of test results, interpretation of tests, patient visit, and documentation     ==========================

## 2025-06-03 LAB
ANION GAP SERPL CALCULATED.3IONS-SCNC: 11 MMOL/L (ref 7–15)
BUN SERPL-MCNC: 26.5 MG/DL (ref 8–23)
CALCIUM SERPL-MCNC: 9.7 MG/DL (ref 8.8–10.4)
CHLORIDE SERPL-SCNC: 111 MMOL/L (ref 98–107)
CREAT SERPL-MCNC: 1.56 MG/DL (ref 0.67–1.17)
EGFRCR SERPLBLD CKD-EPI 2021: 46 ML/MIN/1.73M2
GLUCOSE SERPL-MCNC: 71 MG/DL (ref 70–99)
HCO3 SERPL-SCNC: 22 MMOL/L (ref 22–29)
POTASSIUM SERPL-SCNC: 4.2 MMOL/L (ref 3.4–5.3)
SODIUM SERPL-SCNC: 144 MMOL/L (ref 135–145)

## 2025-07-06 NOTE — PROGRESS NOTES
Interval History:  No acute events overnight.  Patient reports continued pain with urination, had some leakage around Rodrigues this morning. Reports one episode of soft/loose stool this AM. Denies CP, SOB, cough.     Review of Systems  Objective:     Vital Signs (Most Recent):  Temp: 99.2 °F (37.3 °C) (07/06/25 0835)  Pulse: 74 (07/06/25 0844)  Resp: 16 (07/06/25 0929)  BP: 112/61 (07/06/25 0835)  SpO2: 95 % (07/06/25 0835) Vital Signs (24h Range):  Temp:  [98.2 °F (36.8 °C)-99.5 °F (37.5 °C)] 99.2 °F (37.3 °C)  Pulse:  [] 74  Resp:  [13-21] 16  SpO2:  [81 %-100 %] 95 %  BP: ()/(50-93) 112/61     Weight: 80.1 kg (176 lb 10.1 oz)  Body mass index is 32.31 kg/m².    Intake/Output Summary (Last 24 hours) at 7/6/2025 1004  Last data filed at 7/6/2025 0952  Gross per 24 hour   Intake 207.84 ml   Output 2250 ml   Net -2042.16 ml         Physical Exam  Vitals and nursing note reviewed.   Constitutional:       General: He is not in acute distress.     Appearance: Ill appearance: chronic.   Cardiovascular:      Rate and Rhythm: Normal rate and regular rhythm.      Heart sounds: No murmur heard.  Pulmonary:      Effort: Pulmonary effort is normal.      Breath sounds: Normal breath sounds. No wheezing, rhonchi or rales.   Abdominal:      General: Bowel sounds are normal. There is no distension.      Palpations: Abdomen is soft.      Tenderness: There is no abdominal tenderness.   Genitourinary:     Comments: Rodrigues with cloudy yellow urine   Musculoskeletal:      Right lower leg: No edema.      Left lower leg: No edema.   Neurological:      Mental Status: He is alert and oriented to person, place, and time. Mental status is at baseline.               Significant Labs: All pertinent labs within the past 24 hours have been reviewed.    Significant Imaging: I have reviewed all pertinent imaging results/findings within the past 24 hours.   Neurosurgery progress note:    MRI cervical spine reveals severe spinal stenosis:    CERVICAL SPINE MRI:  1.  High-grade C3-C4 and C4-C5 spinal canal stenoses and bilateral neural foraminal stenoses.  2.  Abnormal T2 prolongation in each hemicord from C3 through C4. It is slightly more pronounced oFon the left.  3.  High-grade C3-C4 and C4-C5 spinal canal stenoses and bilateral neural foraminal stenoses.  4.  Solid C5-C6 and C6-C7 interbody fusions. Bony bridging at C5-C6 is better seen on CT    RECOMMENDATIONS:  Transfer to [facility] tomorrow for surgical intervention with Dr. Hanley likely on Wednesday 7/14/21  Discussed with hospitalist who will coordinate transfer.  Solumedrol as ordered by Neurology.  Follow platelet count (102k today)    Discussed with Dr. Hanley.    Zuly York MPAS  [facility]surgery  [address] [address]    Tel [phone]  Pager [phone]

## (undated) DEVICE — GLOVE PROTEXIS W/NEU-THERA 7.5  2D73TE75

## (undated) DEVICE — DRAPE SHEET REV FOLD 3/4 9349

## (undated) DEVICE — ESU GROUND PAD UNIVERSAL W/O CORD

## (undated) DEVICE — STRAP POSITIONING VELCRO 13' CERVICAL HARNESS 920877

## (undated) DEVICE — TOOL DISSECT MIDAS MR8 14CM MATCH HEAD 3MM MR8-14MH30

## (undated) DEVICE — ESU ELEC BLADE 2.75" COATED/INSULATED E1455

## (undated) DEVICE — SU MONOCRYL 4-0 PS-2 18" UND Y496G

## (undated) DEVICE — STPL SKIN 35W ROTATING HEAD PRW35

## (undated) DEVICE — DRAIN JACKSON PRATT 07MM FLAT 3/4 PERF

## (undated) DEVICE — SOL WATER IRRIG 1000ML BOTTLE 2F7114

## (undated) DEVICE — SYR EAR BULB 3OZ 0035830

## (undated) DEVICE — DRAIN JACKSON PRATT RESERVOIR 100ML SU130-1305

## (undated) DEVICE — DRILL BIT MEDT 13MM SGL USE 7080513

## (undated) DEVICE — GLOVE PROTEXIS BLUE W/NEU-THERA 8.0  2D73EB80

## (undated) DEVICE — SUCTION MINISQUAIR SMOKE EVAC CAPTURE DEVICE SQ20012-01

## (undated) DEVICE — SPONGE KITTNER 31001010

## (undated) DEVICE — PIN DISTRACTION ANCHOR FOR SCR 14MM MDS9091414

## (undated) DEVICE — SUCTION FRAZIER 12FR W/OBTURATOR 33120

## (undated) DEVICE — DRAPE MAYO STAND 23X54 8337

## (undated) DEVICE — DRAPE MICROSCOPE LEICA 54X150" AR8033650

## (undated) DEVICE — DRAPE COVER C-ARM SEAMLESS SNAP-KAP 03-KP26 LATEX FREE

## (undated) DEVICE — LINEN TOWEL PACK X5 5464

## (undated) DEVICE — NDL SPINAL 18GA 3.5" 405184

## (undated) DEVICE — SU ETHILON 3-0 PS-2 18" 1669H

## (undated) DEVICE — SPONGE SURGIFOAM 100 1974

## (undated) DEVICE — MANIFOLD NEPTUNE 4 PORT 700-20

## (undated) DEVICE — PACK SPINE SM CUSTOM SNE15SSFSK

## (undated) DEVICE — TUBING SUCTION SOFT 20'X3/16" 0036570

## (undated) DEVICE — SU VICRYL 3-0 SH CR 8X18" J774

## (undated) DEVICE — STAPLE MEDT BONE PREFIX SPINE 2 PRONG 3037000

## (undated) RX ORDER — ONDANSETRON 2 MG/ML
INJECTION INTRAMUSCULAR; INTRAVENOUS
Status: DISPENSED
Start: 2021-07-14

## (undated) RX ORDER — HYDROMORPHONE HCL IN WATER/PF 6 MG/30 ML
PATIENT CONTROLLED ANALGESIA SYRINGE INTRAVENOUS
Status: DISPENSED
Start: 2021-07-14

## (undated) RX ORDER — PROPOFOL 10 MG/ML
INJECTION, EMULSION INTRAVENOUS
Status: DISPENSED
Start: 2021-07-14

## (undated) RX ORDER — KETAMINE HCL IN NACL, ISO-OSM 100MG/10ML
SYRINGE (ML) INJECTION
Status: DISPENSED
Start: 2021-07-14

## (undated) RX ORDER — FENTANYL CITRATE 50 UG/ML
INJECTION, SOLUTION INTRAMUSCULAR; INTRAVENOUS
Status: DISPENSED
Start: 2021-08-12

## (undated) RX ORDER — LIDOCAINE HYDROCHLORIDE 20 MG/ML
INJECTION, SOLUTION EPIDURAL; INFILTRATION; INTRACAUDAL; PERINEURAL
Status: DISPENSED
Start: 2021-07-14

## (undated) RX ORDER — CIPROFLOXACIN 500 MG/1
TABLET, FILM COATED ORAL
Status: DISPENSED
Start: 2021-11-09

## (undated) RX ORDER — HYDROMORPHONE HYDROCHLORIDE 1 MG/ML
INJECTION, SOLUTION INTRAMUSCULAR; INTRAVENOUS; SUBCUTANEOUS
Status: DISPENSED
Start: 2021-07-14

## (undated) RX ORDER — DEXAMETHASONE SODIUM PHOSPHATE 4 MG/ML
INJECTION, SOLUTION INTRA-ARTICULAR; INTRALESIONAL; INTRAMUSCULAR; INTRAVENOUS; SOFT TISSUE
Status: DISPENSED
Start: 2021-07-14

## (undated) RX ORDER — FENTANYL CITRATE 50 UG/ML
INJECTION, SOLUTION INTRAMUSCULAR; INTRAVENOUS
Status: DISPENSED
Start: 2021-07-14